# Patient Record
Sex: FEMALE | Race: WHITE | NOT HISPANIC OR LATINO | Employment: UNEMPLOYED | URBAN - METROPOLITAN AREA
[De-identification: names, ages, dates, MRNs, and addresses within clinical notes are randomized per-mention and may not be internally consistent; named-entity substitution may affect disease eponyms.]

---

## 2017-01-04 ENCOUNTER — ALLSCRIPTS OFFICE VISIT (OUTPATIENT)
Dept: OTHER | Facility: OTHER | Age: 57
End: 2017-01-04

## 2017-02-01 ENCOUNTER — ALLSCRIPTS OFFICE VISIT (OUTPATIENT)
Dept: OTHER | Facility: OTHER | Age: 57
End: 2017-02-01

## 2017-02-08 ENCOUNTER — APPOINTMENT (OUTPATIENT)
Dept: LAB | Facility: CLINIC | Age: 57
End: 2017-02-08
Payer: COMMERCIAL

## 2017-02-08 ENCOUNTER — GENERIC CONVERSION - ENCOUNTER (OUTPATIENT)
Dept: OTHER | Facility: OTHER | Age: 57
End: 2017-02-08

## 2017-02-08 ENCOUNTER — TRANSCRIBE ORDERS (OUTPATIENT)
Dept: LAB | Facility: CLINIC | Age: 57
End: 2017-02-08

## 2017-02-08 DIAGNOSIS — E78.5 HYPERLIPIDEMIA, UNSPECIFIED HYPERLIPIDEMIA TYPE: Primary | ICD-10-CM

## 2017-02-08 LAB — VENIPUNCTURE: NORMAL

## 2017-02-08 PROCEDURE — 36415 COLL VENOUS BLD VENIPUNCTURE: CPT | Performed by: FAMILY MEDICINE

## 2017-02-10 LAB — INTERPRETATION (HISTORICAL): NORMAL

## 2017-02-12 ENCOUNTER — GENERIC CONVERSION - ENCOUNTER (OUTPATIENT)
Dept: OTHER | Facility: OTHER | Age: 57
End: 2017-02-12

## 2017-03-01 ENCOUNTER — ALLSCRIPTS OFFICE VISIT (OUTPATIENT)
Dept: OTHER | Facility: OTHER | Age: 57
End: 2017-03-01

## 2017-03-01 DIAGNOSIS — J44.9 CHRONIC OBSTRUCTIVE PULMONARY DISEASE (HCC): ICD-10-CM

## 2017-03-01 DIAGNOSIS — M54.9 DORSALGIA: ICD-10-CM

## 2017-03-01 LAB
BILIRUB UR QL STRIP: NORMAL
CLARITY UR: NORMAL
COLOR UR: YELLOW
GLUCOSE (HISTORICAL): NORMAL
HGB UR QL STRIP.AUTO: NORMAL
KETONES UR STRIP-MCNC: NORMAL MG/DL
LEUKOCYTE ESTERASE UR QL STRIP: NORMAL
NITRITE UR QL STRIP: NORMAL
PH UR STRIP.AUTO: 8 [PH]
PROT UR STRIP-MCNC: NORMAL MG/DL
SP GR UR STRIP.AUTO: 1
UROBILINOGEN UR QL STRIP.AUTO: NORMAL

## 2017-03-20 ENCOUNTER — TRANSCRIBE ORDERS (OUTPATIENT)
Dept: RADIOLOGY | Facility: CLINIC | Age: 57
End: 2017-03-20

## 2017-03-20 ENCOUNTER — HOSPITAL ENCOUNTER (OUTPATIENT)
Dept: RADIOLOGY | Facility: CLINIC | Age: 57
Discharge: HOME | End: 2017-03-20
Payer: COMMERCIAL

## 2017-03-20 DIAGNOSIS — M54.9 DORSALGIA: ICD-10-CM

## 2017-03-20 DIAGNOSIS — J44.9 CHRONIC OBSTRUCTIVE PULMONARY DISEASE (HCC): ICD-10-CM

## 2017-03-20 PROCEDURE — 72110 X-RAY EXAM L-2 SPINE 4/>VWS: CPT

## 2017-03-20 PROCEDURE — 71020 HB CHEST X-RAY 2VW FRONTAL&LATL: CPT

## 2017-03-21 ENCOUNTER — GENERIC CONVERSION - ENCOUNTER (OUTPATIENT)
Dept: OTHER | Facility: OTHER | Age: 57
End: 2017-03-21

## 2017-03-27 ENCOUNTER — ALLSCRIPTS OFFICE VISIT (OUTPATIENT)
Dept: OTHER | Facility: OTHER | Age: 57
End: 2017-03-27

## 2017-04-07 ENCOUNTER — ALLSCRIPTS OFFICE VISIT (OUTPATIENT)
Dept: OTHER | Facility: OTHER | Age: 57
End: 2017-04-07

## 2017-06-02 ENCOUNTER — ALLSCRIPTS OFFICE VISIT (OUTPATIENT)
Dept: OTHER | Facility: OTHER | Age: 57
End: 2017-06-02

## 2017-06-02 LAB — HBA1C MFR BLD HPLC: 7.7 %

## 2017-06-21 ENCOUNTER — ALLSCRIPTS OFFICE VISIT (OUTPATIENT)
Dept: OTHER | Facility: OTHER | Age: 57
End: 2017-06-21

## 2017-06-22 ENCOUNTER — GENERIC CONVERSION - ENCOUNTER (OUTPATIENT)
Dept: OTHER | Facility: OTHER | Age: 57
End: 2017-06-22

## 2017-06-29 DIAGNOSIS — G89.4 CHRONIC PAIN SYNDROME: ICD-10-CM

## 2017-06-29 DIAGNOSIS — M54.50 LOW BACK PAIN: ICD-10-CM

## 2017-06-29 DIAGNOSIS — M96.1 POSTLAMINECTOMY SYNDROME, NOT ELSEWHERE CLASSIFIED: ICD-10-CM

## 2017-06-30 ENCOUNTER — GENERIC CONVERSION - ENCOUNTER (OUTPATIENT)
Dept: OTHER | Facility: OTHER | Age: 57
End: 2017-06-30

## 2017-07-17 ENCOUNTER — ALLSCRIPTS OFFICE VISIT (OUTPATIENT)
Dept: OTHER | Facility: OTHER | Age: 57
End: 2017-07-17

## 2017-07-24 ENCOUNTER — ALLSCRIPTS OFFICE VISIT (OUTPATIENT)
Dept: OTHER | Facility: OTHER | Age: 57
End: 2017-07-24

## 2017-08-02 ENCOUNTER — ALLSCRIPTS OFFICE VISIT (OUTPATIENT)
Dept: OTHER | Facility: OTHER | Age: 57
End: 2017-08-02

## 2017-08-07 ENCOUNTER — APPOINTMENT (OUTPATIENT)
Dept: PHYSICAL THERAPY | Facility: CLINIC | Age: 57
End: 2017-08-07
Payer: OTHER MISCELLANEOUS

## 2017-08-07 PROCEDURE — 97110 THERAPEUTIC EXERCISES: CPT

## 2017-08-07 PROCEDURE — 97161 PT EVAL LOW COMPLEX 20 MIN: CPT

## 2017-08-09 ENCOUNTER — GENERIC CONVERSION - ENCOUNTER (OUTPATIENT)
Dept: OTHER | Facility: OTHER | Age: 57
End: 2017-08-09

## 2017-08-14 ENCOUNTER — ALLSCRIPTS OFFICE VISIT (OUTPATIENT)
Dept: OTHER | Facility: OTHER | Age: 57
End: 2017-08-14

## 2017-08-28 ENCOUNTER — GENERIC CONVERSION - ENCOUNTER (OUTPATIENT)
Dept: OTHER | Facility: OTHER | Age: 57
End: 2017-08-28

## 2017-08-28 DIAGNOSIS — M79.661 PAIN OF RIGHT LOWER LEG: ICD-10-CM

## 2017-08-28 DIAGNOSIS — M79.89 OTHER DISORDERS OF SOFT TISSUE: ICD-10-CM

## 2017-08-28 DIAGNOSIS — I83.811 VARICOSE VEINS OF RIGHT LOWER EXTREMITY WITH PAIN: ICD-10-CM

## 2017-09-09 ENCOUNTER — GENERIC CONVERSION - ENCOUNTER (OUTPATIENT)
Dept: OTHER | Facility: OTHER | Age: 57
End: 2017-09-09

## 2017-09-18 ENCOUNTER — HOSPITAL ENCOUNTER (OUTPATIENT)
Dept: RADIOLOGY | Facility: HOSPITAL | Age: 57
Discharge: HOME | End: 2017-09-18
Attending: FAMILY MEDICINE
Payer: COMMERCIAL

## 2017-09-18 DIAGNOSIS — I83.811 VARICOSE VEINS OF RIGHT LOWER EXTREMITY WITH PAIN: ICD-10-CM

## 2017-09-18 DIAGNOSIS — M79.89 OTHER DISORDERS OF SOFT TISSUE: ICD-10-CM

## 2017-09-18 DIAGNOSIS — M79.661 PAIN OF RIGHT LOWER LEG: ICD-10-CM

## 2017-09-18 PROCEDURE — 93971 EXTREMITY STUDY: CPT

## 2017-09-20 ENCOUNTER — ALLSCRIPTS OFFICE VISIT (OUTPATIENT)
Dept: OTHER | Facility: OTHER | Age: 57
End: 2017-09-20

## 2017-10-02 ENCOUNTER — GENERIC CONVERSION - ENCOUNTER (OUTPATIENT)
Dept: OTHER | Facility: OTHER | Age: 57
End: 2017-10-02

## 2017-10-09 ENCOUNTER — APPOINTMENT (OUTPATIENT)
Dept: PHYSICAL THERAPY | Facility: CLINIC | Age: 57
End: 2017-10-09
Payer: OTHER MISCELLANEOUS

## 2017-10-09 ENCOUNTER — GENERIC CONVERSION - ENCOUNTER (OUTPATIENT)
Dept: OTHER | Facility: OTHER | Age: 57
End: 2017-10-09

## 2017-10-09 LAB
A/G RATIO (HISTORICAL): 2 (ref 1.2–2.2)
ALBUMIN SERPL BCP-MCNC: 4.1 G/DL (ref 3.5–5.5)
ALP SERPL-CCNC: 69 IU/L (ref 39–117)
ALT SERPL W P-5'-P-CCNC: 34 IU/L (ref 0–32)
AST SERPL W P-5'-P-CCNC: 21 IU/L (ref 0–40)
BASOPHILS # BLD AUTO: 0 %
BASOPHILS # BLD AUTO: 0 X10E3/UL (ref 0–0.2)
BILIRUB SERPL-MCNC: 0.2 MG/DL (ref 0–1.2)
BUN SERPL-MCNC: 19 MG/DL (ref 6–24)
BUN/CREA RATIO (HISTORICAL): 19 (ref 9–23)
CALCIUM SERPL-MCNC: 9.1 MG/DL (ref 8.7–10.2)
CHLORIDE SERPL-SCNC: 97 MMOL/L (ref 96–106)
CHOLEST SERPL-MCNC: 119 MG/DL (ref 100–199)
CK SERPL-CCNC: 131 U/L (ref 24–173)
CO2 SERPL-SCNC: 27 MMOL/L (ref 18–29)
CREAT SERPL-MCNC: 1.01 MG/DL (ref 0.57–1)
DEPRECATED RDW RBC AUTO: 13.5 % (ref 12.3–15.4)
EGFR AFRICAN AMERICAN (HISTORICAL): 71 ML/MIN/1.73
EGFR-AMERICAN CALC (HISTORICAL): 62 ML/MIN/1.73
EOSINOPHIL # BLD AUTO: 0.3 X10E3/UL (ref 0–0.4)
EOSINOPHIL # BLD AUTO: 3 %
GLUCOSE SERPL-MCNC: 131 MG/DL (ref 65–99)
HCT VFR BLD AUTO: 38.7 % (ref 34–46.6)
HDLC SERPL-MCNC: 32 MG/DL
HGB BLD-MCNC: 13.4 G/DL (ref 11.1–15.9)
IMM.GRANULOCYTES (CD4/8) (HISTORICAL): 0 %
IMM.GRANULOCYTES (CD4/8) (HISTORICAL): 0 X10E3/UL (ref 0–0.1)
LDLC SERPL CALC-MCNC: 43 MG/DL (ref 0–99)
LYMPHOCYTES # BLD AUTO: 2.5 X10E3/UL (ref 0.7–3.1)
LYMPHOCYTES # BLD AUTO: 28 %
MCH RBC QN AUTO: 31.7 PG (ref 26.6–33)
MCHC RBC AUTO-ENTMCNC: 34.6 G/DL (ref 31.5–35.7)
MCV RBC AUTO: 92 FL (ref 79–97)
MONOCYTES # BLD AUTO: 0.5 X10E3/UL (ref 0.1–0.9)
MONOCYTES (HISTORICAL): 6 %
NEUTROPHILS # BLD AUTO: 5.6 X10E3/UL (ref 1.4–7)
NEUTROPHILS # BLD AUTO: 63 %
PLATELET # BLD AUTO: 246 X10E3/UL (ref 150–379)
POTASSIUM SERPL-SCNC: 5 MMOL/L (ref 3.5–5.2)
RBC (HISTORICAL): 4.23 X10E6/UL (ref 3.77–5.28)
SODIUM SERPL-SCNC: 139 MMOL/L (ref 134–144)
TOT. GLOBULIN, SERUM (HISTORICAL): 2.1 G/DL (ref 1.5–4.5)
TOTAL PROTEIN (HISTORICAL): 6.2 G/DL (ref 6–8.5)
TRIGL SERPL-MCNC: 222 MG/DL (ref 0–149)
VLDLC SERPL CALC-MCNC: 44 MG/DL (ref 5–40)
WBC # BLD AUTO: 9 X10E3/UL (ref 3.4–10.8)

## 2017-10-09 PROCEDURE — 97110 THERAPEUTIC EXERCISES: CPT

## 2017-10-10 LAB
HBA1C MFR BLD HPLC: 9.2 % (ref 4.8–5.6)
INTERPRETATION (HISTORICAL): NORMAL
TSH SERPL DL<=0.05 MIU/L-ACNC: 2.35 UIU/ML (ref 0.45–4.5)

## 2017-10-11 ENCOUNTER — GENERIC CONVERSION - ENCOUNTER (OUTPATIENT)
Dept: OTHER | Facility: OTHER | Age: 57
End: 2017-10-11

## 2017-10-11 ENCOUNTER — APPOINTMENT (OUTPATIENT)
Dept: PHYSICAL THERAPY | Facility: CLINIC | Age: 57
End: 2017-10-11
Payer: OTHER MISCELLANEOUS

## 2017-10-12 ENCOUNTER — APPOINTMENT (OUTPATIENT)
Dept: PHYSICAL THERAPY | Facility: CLINIC | Age: 57
End: 2017-10-12
Payer: OTHER MISCELLANEOUS

## 2017-10-12 PROCEDURE — 97112 NEUROMUSCULAR REEDUCATION: CPT

## 2017-10-12 PROCEDURE — 97110 THERAPEUTIC EXERCISES: CPT

## 2017-10-13 ENCOUNTER — APPOINTMENT (OUTPATIENT)
Dept: PHYSICAL THERAPY | Facility: CLINIC | Age: 57
End: 2017-10-13
Payer: OTHER MISCELLANEOUS

## 2017-10-13 PROCEDURE — 97140 MANUAL THERAPY 1/> REGIONS: CPT

## 2017-10-13 PROCEDURE — 97110 THERAPEUTIC EXERCISES: CPT

## 2017-10-16 ENCOUNTER — APPOINTMENT (OUTPATIENT)
Dept: PHYSICAL THERAPY | Facility: CLINIC | Age: 57
End: 2017-10-16
Payer: OTHER MISCELLANEOUS

## 2017-10-16 PROCEDURE — 97110 THERAPEUTIC EXERCISES: CPT

## 2017-10-16 PROCEDURE — 97140 MANUAL THERAPY 1/> REGIONS: CPT

## 2017-10-17 ENCOUNTER — APPOINTMENT (OUTPATIENT)
Dept: PHYSICAL THERAPY | Facility: CLINIC | Age: 57
End: 2017-10-17
Payer: OTHER MISCELLANEOUS

## 2017-10-17 PROCEDURE — 97140 MANUAL THERAPY 1/> REGIONS: CPT

## 2017-10-17 PROCEDURE — 97110 THERAPEUTIC EXERCISES: CPT

## 2017-10-18 ENCOUNTER — APPOINTMENT (OUTPATIENT)
Dept: PHYSICAL THERAPY | Facility: CLINIC | Age: 57
End: 2017-10-18
Payer: OTHER MISCELLANEOUS

## 2017-10-18 PROCEDURE — 97110 THERAPEUTIC EXERCISES: CPT

## 2017-10-18 PROCEDURE — 97140 MANUAL THERAPY 1/> REGIONS: CPT

## 2017-10-20 ENCOUNTER — APPOINTMENT (OUTPATIENT)
Dept: PHYSICAL THERAPY | Facility: CLINIC | Age: 57
End: 2017-10-20
Payer: OTHER MISCELLANEOUS

## 2017-10-23 ENCOUNTER — APPOINTMENT (OUTPATIENT)
Dept: PHYSICAL THERAPY | Facility: CLINIC | Age: 57
End: 2017-10-23
Payer: OTHER MISCELLANEOUS

## 2017-10-23 PROCEDURE — 97112 NEUROMUSCULAR REEDUCATION: CPT

## 2017-10-23 PROCEDURE — 97110 THERAPEUTIC EXERCISES: CPT

## 2017-10-25 ENCOUNTER — APPOINTMENT (OUTPATIENT)
Dept: PHYSICAL THERAPY | Facility: CLINIC | Age: 57
End: 2017-10-25
Payer: OTHER MISCELLANEOUS

## 2017-10-25 PROCEDURE — 97112 NEUROMUSCULAR REEDUCATION: CPT

## 2017-10-25 PROCEDURE — 97110 THERAPEUTIC EXERCISES: CPT

## 2017-10-27 ENCOUNTER — APPOINTMENT (OUTPATIENT)
Dept: PHYSICAL THERAPY | Facility: CLINIC | Age: 57
End: 2017-10-27
Payer: OTHER MISCELLANEOUS

## 2017-10-27 ENCOUNTER — GENERIC CONVERSION - ENCOUNTER (OUTPATIENT)
Dept: OTHER | Facility: OTHER | Age: 57
End: 2017-10-27

## 2017-10-27 PROCEDURE — 97110 THERAPEUTIC EXERCISES: CPT

## 2017-10-27 PROCEDURE — 97112 NEUROMUSCULAR REEDUCATION: CPT

## 2017-11-01 DIAGNOSIS — M54.16 RADICULOPATHY OF LUMBAR REGION: ICD-10-CM

## 2017-11-01 DIAGNOSIS — R10.9 ABDOMINAL PAIN: ICD-10-CM

## 2017-11-01 DIAGNOSIS — M96.1 POSTLAMINECTOMY SYNDROME, NOT ELSEWHERE CLASSIFIED: ICD-10-CM

## 2017-11-01 DIAGNOSIS — M54.50 LOW BACK PAIN: ICD-10-CM

## 2017-11-01 DIAGNOSIS — R14.0 ABDOMINAL DISTENSION (GASEOUS): ICD-10-CM

## 2017-11-01 DIAGNOSIS — G89.4 CHRONIC PAIN SYNDROME: ICD-10-CM

## 2017-11-02 ENCOUNTER — HOSPITAL ENCOUNTER (OUTPATIENT)
Facility: AMBULARY SURGERY CENTER | Age: 57
Setting detail: OUTPATIENT SURGERY
Discharge: HOME | End: 2017-11-02
Attending: ANESTHESIOLOGY | Admitting: ANESTHESIOLOGY
Payer: OTHER MISCELLANEOUS

## 2017-11-02 ENCOUNTER — HOSPITAL ENCOUNTER (OUTPATIENT)
Dept: RADIOLOGY | Facility: HOSPITAL | Age: 57
Setting detail: OUTPATIENT SURGERY
Discharge: HOME | End: 2017-11-02
Payer: OTHER MISCELLANEOUS

## 2017-11-02 VITALS
OXYGEN SATURATION: 98 % | HEART RATE: 92 BPM | BODY MASS INDEX: 43.41 KG/M2 | WEIGHT: 245 LBS | DIASTOLIC BLOOD PRESSURE: 71 MMHG | HEIGHT: 63 IN | TEMPERATURE: 98.8 F | SYSTOLIC BLOOD PRESSURE: 139 MMHG | RESPIRATION RATE: 18 BRPM

## 2017-11-02 PROBLEM — G89.4 CHRONIC PAIN SYNDROME: Status: ACTIVE | Noted: 2017-11-02

## 2017-11-02 PROBLEM — M54.50 LOW BACK PAIN: Status: ACTIVE | Noted: 2017-11-02

## 2017-11-02 PROBLEM — M96.1 POSTLAMINECTOMY SYNDROME, NOT ELSEWHERE CLASSIFIED: Status: ACTIVE | Noted: 2017-11-02

## 2017-11-02 PROCEDURE — 72220 X-RAY EXAM SACRUM TAILBONE: CPT

## 2017-11-02 RX ORDER — DULOXETIN HYDROCHLORIDE 60 MG/1
20 CAPSULE, DELAYED RELEASE ORAL DAILY
COMMUNITY
End: 2018-03-19 | Stop reason: SDUPTHER

## 2017-11-02 RX ORDER — LISINOPRIL AND HYDROCHLOROTHIAZIDE 25; 20 MG/1; MG/1
1 TABLET ORAL DAILY
COMMUNITY
End: 2018-03-19 | Stop reason: SDUPTHER

## 2017-11-02 RX ORDER — OXYCODONE AND ACETAMINOPHEN 10; 325 MG/1; MG/1
1 TABLET ORAL EVERY 4 HOURS PRN
COMMUNITY
End: 2018-02-14 | Stop reason: SDUPTHER

## 2017-11-02 RX ORDER — FLUTICASONE PROPIONATE 50 MCG
1 SPRAY, SUSPENSION (ML) NASAL DAILY
COMMUNITY
End: 2018-02-21 | Stop reason: SDUPTHER

## 2017-11-02 RX ORDER — METHYLPREDNISOLONE ACETATE 80 MG/ML
INJECTION, SUSPENSION INTRA-ARTICULAR; INTRALESIONAL; INTRAMUSCULAR; SOFT TISSUE AS NEEDED
Status: DISCONTINUED | OUTPATIENT
Start: 2017-11-02 | End: 2017-11-02 | Stop reason: HOSPADM

## 2017-11-02 RX ORDER — PREGABALIN 100 MG/1
50 CAPSULE ORAL 3 TIMES DAILY
COMMUNITY
End: 2018-03-19 | Stop reason: SDUPTHER

## 2017-11-02 RX ORDER — DICLOFENAC SODIUM 75 MG/1
50 TABLET, DELAYED RELEASE ORAL 2 TIMES DAILY
COMMUNITY
End: 2018-05-23 | Stop reason: ALTCHOICE

## 2017-11-02 RX ORDER — ATORVASTATIN CALCIUM 80 MG/1
80 TABLET, FILM COATED ORAL DAILY
COMMUNITY
End: 2018-03-19 | Stop reason: SDUPTHER

## 2017-11-02 RX ORDER — LIDOCAINE HYDROCHLORIDE 10 MG/ML
INJECTION, SOLUTION EPIDURAL; INFILTRATION; INTRACAUDAL; PERINEURAL AS NEEDED
Status: DISCONTINUED | OUTPATIENT
Start: 2017-11-02 | End: 2017-11-02 | Stop reason: HOSPADM

## 2017-11-02 RX ORDER — QUETIAPINE FUMARATE 25 MG/1
25 TABLET, FILM COATED ORAL
COMMUNITY
End: 2018-03-19 | Stop reason: SDUPTHER

## 2017-11-02 RX ORDER — LIDOCAINE WITH 8.4% SOD BICARB 0.9%(10ML)
SYRINGE (ML) INJECTION AS NEEDED
Status: DISCONTINUED | OUTPATIENT
Start: 2017-11-02 | End: 2017-11-02 | Stop reason: HOSPADM

## 2017-11-02 RX ORDER — ALPRAZOLAM 1 MG/1
1 TABLET ORAL
COMMUNITY
End: 2018-02-14 | Stop reason: SDUPTHER

## 2017-11-02 RX ORDER — OXYCODONE HCL 10 MG/1
10 TABLET, FILM COATED, EXTENDED RELEASE ORAL EVERY 12 HOURS SCHEDULED
COMMUNITY
End: 2018-02-14 | Stop reason: SDUPTHER

## 2017-11-02 NOTE — OP NOTE
ATTENDING PHYSICIAN:  Avel Taylor MD     PROCEDURE: Caudal epidural steroid injection under fluoroscopic guidance  PREPROCEDURE DIAGNOSIS:  Lumbar post-laminectomy pain syndrome  POSTPROCEDURE DIAGNOSIS:  Lumbar post-laminectomy pain syndrome  ANESTHESIA:  Local     ESTIMATED BLOOD LOSS:  Minimal     COMPLICATIONS:  None  LOCATION:  [facility]  CONSENT:  Today's procedure, its potential benefits as well as its risks and potential side effects were reviewed  Discussed risks of the procedure including bleeding, infection, nerve irritation or damage, reactions to the medications, headache, failure of the pain to improve, and potential worsening of the pain were explained in detail to the patient who verbalized understanding and who wished to proceed  Written informed consent was thereby obtained  DESCRIPTION OF THE PROCEDURE: After written informed consent was obtained, the patient was taken to the fluoroscopy suite and placed in the prone position  Anatomical landmarks were identified by palpation to identify the sacral hiatus and by way of fluoroscopy in the PA and lateral views  The skin overlying the sacral hiatus region was prepped using antiseptic applied x3 and draped in the usual sterile fashion  Strict aseptic technique was utilized  The skin and subcutaneous tissues at the needle entry site were infiltrated with 3 mL of 1% preservative-free lidocaine using a 25-gauge 1-1/2-inch needle  A 22-gauge spinal needle was then advanced under fluoroscopic guidance in the lateral view at the sacral hiatus and guided to enter the sacral canal and directed towards the epidural space  Proper placement into the epidural space of the sacral canal in the midline was confirmed with fluoroscopy in the PA view  After negative aspiration for CSF or heme, contrast was injected which delineated the epidural space under fluoroscopy in the PA and lateral views      After negative aspiration was reconfirmed, a 13 mL injectate consisting of 1 mL of 40 mg/mL of Depo-Medrol, 1 mL of 80 mg/mL of Depo-Medrol, 3 mL of 1% preservative-free lidocaine, and 8 mL of preservative-free normal saline was slowly injected incrementally with negative aspiration between aliquots  The patient tolerated the procedure well and all needles were removed intact  Hemostasis was obtained and there were no paresthesias or apparent complications  The skin was wiped free of the antiseptic and a Band-Aid was placed as appropriate  The patient was monitored for an appropriate period of time following the procedure and remained hemodynamically stable and neurovascularly intact following the procedure  The patient was ultimately discharged to home with supervision in good condition and instructed to call the office in approximately 7-10 days with an update or sooner as warranted  Discharge and follow up instructions were provided  I was present for and participated in all key and critical portions of this procedure      Paulo Boles MD  11/2/2017  3:23 PM

## 2017-11-10 ENCOUNTER — ALLSCRIPTS OFFICE VISIT (OUTPATIENT)
Dept: OTHER | Facility: OTHER | Age: 57
End: 2017-11-10

## 2017-11-14 ENCOUNTER — GENERIC CONVERSION - ENCOUNTER (OUTPATIENT)
Dept: OTHER | Facility: OTHER | Age: 57
End: 2017-11-14

## 2017-11-17 ENCOUNTER — GENERIC CONVERSION - ENCOUNTER (OUTPATIENT)
Dept: OTHER | Facility: OTHER | Age: 57
End: 2017-11-17

## 2017-11-20 ENCOUNTER — GENERIC CONVERSION - ENCOUNTER (OUTPATIENT)
Dept: OTHER | Facility: OTHER | Age: 57
End: 2017-11-20

## 2017-11-27 ENCOUNTER — ALLSCRIPTS OFFICE VISIT (OUTPATIENT)
Dept: OTHER | Facility: OTHER | Age: 57
End: 2017-11-27

## 2017-12-12 ENCOUNTER — HOSPITAL ENCOUNTER (OUTPATIENT)
Dept: RADIOLOGY | Facility: HOSPITAL | Age: 57
Discharge: HOME | End: 2017-12-12
Attending: ANESTHESIOLOGY
Payer: OTHER MISCELLANEOUS

## 2017-12-12 ENCOUNTER — HOSPITAL ENCOUNTER (OUTPATIENT)
Dept: RADIOLOGY | Facility: HOSPITAL | Age: 57
Discharge: HOME | End: 2017-12-12
Attending: FAMILY MEDICINE
Payer: OTHER MISCELLANEOUS

## 2017-12-12 DIAGNOSIS — M96.1 POSTLAMINECTOMY SYNDROME, NOT ELSEWHERE CLASSIFIED: ICD-10-CM

## 2017-12-12 DIAGNOSIS — M54.50 LOW BACK PAIN: ICD-10-CM

## 2017-12-12 DIAGNOSIS — R10.9 ABDOMINAL PAIN: ICD-10-CM

## 2017-12-12 DIAGNOSIS — R14.0 ABDOMINAL DISTENSION (GASEOUS): ICD-10-CM

## 2017-12-12 DIAGNOSIS — M54.16 RADICULOPATHY OF LUMBAR REGION: ICD-10-CM

## 2017-12-12 DIAGNOSIS — G89.4 CHRONIC PAIN SYNDROME: ICD-10-CM

## 2017-12-12 PROCEDURE — A9585 GADOBUTROL INJECTION: HCPCS | Performed by: ANESTHESIOLOGY

## 2017-12-12 PROCEDURE — 76700 US EXAM ABDOM COMPLETE: CPT

## 2017-12-12 PROCEDURE — 72158 MRI LUMBAR SPINE W/O & W/DYE: CPT

## 2017-12-12 RX ADMIN — GADOBUTROL 11 ML: 604.72 INJECTION INTRAVENOUS at 10:26

## 2017-12-13 ENCOUNTER — GENERIC CONVERSION - ENCOUNTER (OUTPATIENT)
Dept: OTHER | Facility: OTHER | Age: 57
End: 2017-12-13

## 2017-12-13 DIAGNOSIS — R07.81 PLEURODYNIA: ICD-10-CM

## 2018-01-03 ENCOUNTER — GENERIC CONVERSION - ENCOUNTER (OUTPATIENT)
Dept: OTHER | Facility: OTHER | Age: 58
End: 2018-01-03

## 2018-01-09 NOTE — MISCELLANEOUS
Message   Recorded as Task   Date: 11/08/2017 12:57 PM, Created By: Kaela Rodriguez   Task Name: Follow Up   Assigned To: [facility] clerical,Team   Regarding Patient: Erika Araiza, Status: In Progress   Comment:    FrankJully - 08 Nov 2017 12:57 PM     TASK CREATED  L/m to return call with % of relief  S/p done CAUDAL JESSIE  Done 11/2/17 by Geno Reyes - 08 Nov 2017 1:01 PM     TASK EDITED  pt returning call thursday she was ok friday she had sharp pains in her back  when she starts to move around she gets the pain  when she doesnt do anything she is fine  no relief # [phone] confirmed follow up appt   FrankDari - 09 Nov 2017 1:37 PM     TASK EDITED  Return call and  L/m to return my call  Rachel Ramirez - 09 Nov 2017 1:54 PM     TASK EDITED  Pt returned call and can be reached at [phone]  FrankJully - 09 Nov 2017 2:04 PM     TASK EDITED  Patient reports she received 20 % relief from her procedure  Her pain level today is an 8  She stated that she gets minimal relief when she is sitting down doing nothing  FrankJully - 09 Nov 2017 2:04 PM     TASK EDITED   Jeny Molina - 09 Nov 2017 3:43 PM     TASK REPLIED TO: Previously Assigned To Jeny Molian  Follow up at 2 week sravan  FrankJully - 09 Nov 2017 3:49 PM     TASK EDITED   Arash Gaming - 16 Nov 7184 0:00 PM     TASK EDITED  Patient reports about 30% relief - Patient was advised if any other recommendations prior to next OV she will be contacted other wise she will be re evaluated at next [address] - 16 Nov 2017 3:45 PM     TASK REPLIED TO: Previously Assigned To Jeny Molina  Bring patient in for earlier follow up (next available)  Arash Gaming - 17 Nov 1137 5:90 AM     TASK EDITED  Please schedule for earlier appointment if avialable   Per Dr Blanco Albright - 17 Nov 2017 9:22 AM     TASK IN PROGRESS   Jitendra Hoffman - 17 Nov 2017 9:25 AM     TASK EDITED  Pt is r/s for 11/20/17 @ 8:15 Active Problems    1  Atypical nevi (216 9) (D22 9)   2  Back pain (724 5) (M54 9)   3  BMI 40 0-44 9, adult (V85 41) (Z68 41)   4  Chronic low back pain (724 2,338 29) (M54 5,G89 29)   5  Chronic obstructive pulmonary disease (496) (J44 9)   6  Chronic pain (338 29) (G89 29)   7  Chronic pain syndrome (338 4) (G89 4)   8  Cramp in lower leg (729 82) (R25 2)   9  Cramps of lower extremity (729 82) (R25 2)   10  Depression with anxiety (300 4) (F41 8)   11  Diabetes (250 00) (E11 9)   12  Diabetes type 2, uncontrolled (250 02) (E11 65)   13  Disc degeneration, lumbosacral (722 52) (M51 37)   14  Encounter for screening for malignant neoplasm of colon (V76 51) (Z12 11)   15  Encounter for screening mammogram for malignant neoplasm of breast (V76 12)    (Z12 31)   16  Esophageal reflux (530 81) (K21 9)   17  Fungal dermatitis (111 9) (B36 9)   18  Hyperlipidemia (272 4) (E78 5)   19  Hypertension (401 9) (I10)   20  Insomnia (780 52) (G47 00)   21  Laceration of middle finger of right hand without complication, subsequent encounter    (V58 89,883 0) (W03 570S)   22  Left shoulder pain (719 41) (M25 512)   23  Morbid or severe obesity due to excess calories (278 01) (E66 01)   24  Need for influenza vaccination (V04 81) (Z23)   25  Need for prophylactic vaccination and inoculation against influenza (V04 81) (Z23)   26  Need for tetanus booster (V03 7) (Z23)   27  Nicotine dependence (305 1) (F17 200)   28  Pap smear, as part of routine gynecological examination (V76 2) (Z01 419)   29  Persistent insomnia of non-organic origin (307 42) (F51 01)   30  Post-laminectomy syndrome (722 80) (M96 1)   31  Screening for breast cancer (V76 10) (Z12 31)   32  Screening for colorectal cancer (V76 51) (Z12 11,Z12 12)   33  Swelling of calf (729 81) (M79 89)   34  Tenderness of right calf (729 5) (M79 661)   35  Varicose veins of both lower extremities with pain (454 8) (I83 813)   36   Varicose veins with inflammation (454 1) (I83 10)   37  Varicose veins with pain, right    Current Meds   1  Alogliptin Benzoate 25 MG Oral Tablet; TAKE 1 TABLET BY MOUTH ONCE A DAY; Therapy: 22ZSJ1889 to (Evaluate:10Mar2018)  Requested for: 85WAN4436; Last   Rx:10Nov2017; Status: ACTIVE - Transmit to Pharmacy - Awaiting Verification Ordered   2  ALPRAZolam 1 MG Oral Tablet; Taper down to ONE-HALF  tab BID prn; Therapy: 42LKB2480 to (Sunny Andrade)  Requested for: 27Oct2017; Last   Rx:27Oct2017 Ordered   3  Atorvastatin Calcium 80 MG Oral Tablet; 1 EVERY MORNING; Therapy: 41MXB5490 to (Last Rx:93Ozi1534)  Requested for: 31Oct2017; Status:   ACTIVE - Transmit to Pharmacy - Awaiting Verification Ordered   4  Diclofenac Sodium 75 MG Oral Tablet Delayed Release; 1 two times a day; Therapy: 82BGA8621 to (Last NT:23TRE4716)  Requested for: 07PQP4051; Status:   ACTIVE - Transmit to Pharmacy - Awaiting Verification Ordered   5  Dulera 200-5 MCG/ACT Inhalation Aerosol; INHALE TWO PUFFS BY MOUTH EVERY   TWELVE HOURS; Therapy: 31WYU7011 to (Last Rx:26May2017)  Requested for: 90Bxc8592 Ordered   6  DULoxetine HCl - 60 MG Oral Capsule Delayed Release Particles (Cymbalta); TAKE   ONE CAPSULE BY MOUTH EVERY DAY IN THE MORNING; Therapy: 01CXI9661 to (Last Rx:05Osr7306)  Requested for: 31Oct2017; Status:   ACTIVE - Transmit to Pharmacy - Awaiting Verification Ordered   7  Fluticasone Propionate 50 MCG/ACT Nasal Suspension (Flonase Allergy Relief); USE   ONE TO TWO SPRAY(S) IN EACH NOSTRIL ONCE DAILY; Therapy: 13NSB9131 to (Last Rx:73Qsi6902)  Requested for: 40YYZ5231 Ordered   8  Ipratropium-Albuterol 0 5-2 5 (3) MG/3ML Inhalation Solution; one vial via nebuliazer   BID rpn; Therapy: 79BJD6261 to (Last Rx:02Jun2017)  Requested for: 02Jun2017; Status:   ACTIVE - Transmit to Pharmacy - Awaiting Verification Ordered   9  Lisinopril-Hydrochlorothiazide 20-25 MG Oral Tablet; TAKE ONE TABLET BY MOUTH   EVERY DAY IN THE MORNING;    Therapy: 01GZZ7203 to (Last PH:74YKL2213)  Requested for: 27DUJ2844; Status:   ACTIVE - Transmit to [facility] Verification Ordered   10  Lyrica 100 MG Oral Capsule; TAKE ONE CAPSULE BY MOUTH EVERY EIGHT HOURS; Therapy: 15LMI1053 to (Last Rx:92Kvz6419)  Requested for: 31Oct2017 Ordered   11  MetFORMIN HCl - 1000 MG Oral Tablet; 1 two times a day; Therapy: 29LLS2950 to (Last JU:16UAA6323)  Requested for: 31Oct2017; Status:    ACTIVE - Transmit to [facility] Verification Ordered   12  Nystatin-Triamcinolone 068489-8 1 UNIT/GM-% External Cream; APPLY SPARINGLY TO    AFFECTED AREA(S) TWICE DAILY; Therapy: 07Fey4299 to (Last Rx:89Zmw3863)  Requested for: 61Akt6987; Status:    ACTIVE - Transmit to [facility] Verification Ordered   13  Oxycodone-Acetaminophen  MG Oral Tablet (Percocet); 1 Every Six Hours as    needed; Therapy: 41Oli4694 to (Last Rx:27Oct2017)  Requested for: 27Oct2017 Ordered   14  OxyCONTIN 10 MG Oral Tablet ER 12 Hour Abuse-Deterrent; one tab po bid; Therapy: 09Qlr6737 to (Last Rx:27Oct2017) Ordered   15  QUEtiapine Fumarate 25 MG Oral Tablet; 1-2 tablets qhs;    Therapy: 50Dcp3982 to (Last Rx:01Nov2017)  Requested for: 04MPO5047; Status:    ACTIVE - Transmit to [facility] Verification Ordered   16  Ventolin  (90 Base) MCG/ACT Inhalation Aerosol Solution; INHALE TWO PUFFS    EVERY SIX HOURS AS NEEDED FOR COUGH/WHEEZE;    Therapy: 25SFC3112 to (Last GX:30CXC6946)  Requested for: 62UMX0847; Status:    ACTIVE - Transmit to Pharmacy - Awaiting Verification Ordered    Allergies    1   No Known Drug Allergies    Signatures   Electronically signed by : Vin Dominique, ; Nov 17 2017  9:25AM EST                       (Author)

## 2018-01-10 NOTE — RESULT NOTES
Verified Results  (1) CBC/PLT/DIFF 80NNT4096 12:55PM Dariela Albin     Test Name Result Flag Reference   WBC 9 0 x10E3/uL  3 4-10 8   RBC 4 23 x10E6/uL  3 77-5 28   Hemoglobin 13 4 g/dL  11 1-15 9   Hematocrit 38 7 %  34 0-46  6   MCV 92 fL  79-97   MCH 31 7 pg  26 6-33 0   MCHC 34 6 g/dL  31 5-35 7   RDW 13 5 %  12 3-15 4   Platelets 765 K98E4/MU  150-379   Neutrophils 63 %  Not Estab  Lymphs 28 %  Not Estab  Monocytes 6 %  Not Estab  Eos 3 %  Not Estab  Basos 0 %  Not Estab  Neutrophils (Absolute) 5 6 x10E3/uL  1 4-7 0   Lymphs (Absolute) 2 5 x10E3/uL  0 7-3 1   Monocytes(Absolute) 0 5 x10E3/uL  0 1-0 9   Eos (Absolute) 0 3 x10E3/uL  0 0-0 4   Baso (Absolute) 0 0 x10E3/uL  0 0-0 2   Immature Granulocytes 0 %  Not Estab     Immature Grans (Abs) 0 0 x10E3/uL  0 0-0 1     (1) COMPREHENSIVE METABOLIC PANEL 03VIH1034 81:53UX Dariela Albin     Test Name Result Flag Reference   Glucose, Serum 131 mg/dL H 65-99   BUN 19 mg/dL  6-24   Creatinine, Serum 1 01 mg/dL H 0 57-1 00   BUN/Creatinine Ratio 19  9-23   Sodium, Serum 139 mmol/L  134-144   Potassium, Serum 5 0 mmol/L  3 5-5 2   Chloride, Serum 97 mmol/L     Carbon Dioxide, Total 27 mmol/L  18-29   Calcium, Serum 9 1 mg/dL  8 7-10 2   Protein, Total, Serum 6 2 g/dL  6 0-8 5   Albumin, Serum 4 1 g/dL  3 5-5 5   Globulin, Total 2 1 g/dL  1 5-4 5   A/G Ratio 2 0  1 2-2 2   Bilirubin, Total 0 2 mg/dL  0 0-1 2   Alkaline Phosphatase, S 69 IU/L     AST (SGOT) 21 IU/L  0-40   ALT (SGPT) 34 IU/L H 0-32   eGFR If NonAfricn Am 62 mL/min/1 73  >59   eGFR If Africn Am 71 mL/min/1 73  >59     (1) CK (CPK) 09Oct2017 12:55PM [employer]     Test Name Result Flag Reference   Creatine Kinase,Total,Serum 131 U/L       (1) HEMOGLOBIN A1C 09Oct2017 12:55PM [employer]     Test Name Result Flag Reference   Hemoglobin A1c 9 2 % H 4 8-5 6   Pre-diabetes: 5 7 - 6 4           Diabetes: >6 4           Glycemic control for adults with diabetes: <7 0 (1) TSH 09Oct2017 12:55PM Marco Shuck     Test Name Result Flag Reference   TSH 2 350 uIU/mL  0 450-4 500     [facility]) Lipid Panel 24YQW4892 12:55PM Marco Shuck     Test Name Result Flag Reference   Cholesterol, Total 119 mg/dL  100-199   Triglycerides 222 mg/dL H 0-149   HDL Cholesterol 32 mg/dL L >39   VLDL Cholesterol Sandip 44 mg/dL H 5-40   LDL Cholesterol Calc 43 mg/dL  0-99     [facility]) Cardiovascular Risk Assessment 09Oct2017 12:55PM Marco Shuck     Test Name Result Flag Reference   Interpretation Note     -------------------------------  CARDIOVASCULAR REPORT:  -------------------------------  Current available clinical information suggests the  patient's risk is at least HIGH  Your patient appears to  have one CHD risk equivalent (diabetes)  Two additional  major risk factors are present (age over 54 and HDL-C less  than 40)  -  Insulin resistance, obesity, excessive alcohol use, smoking,  liver disease, and certain medications can cause secondary  dyslipidemia  Consider evaluation if clinically indicated  -  Therapeutic lifestyle changes are always valuable to achieve  optimal blood lipid status (diet, exercise, weight  management)  -------------------------------  LIPID MANAGEMENT  Select one patient risk category based upon medical history  and clinical judgment  Additional risk factors such as  personal or family history of premature CHD, smoking, and  hypertension modify a patient's goals of therapy  In CVD  prevention, the intensity of therapy should be adjusted to  the level of patient risk  MODERATE intensity statin therapy  generally results in an average LDL-C reduction of 30% to  less than 50% from the untreated baseline  Examples include  (daily doses): atorvastatin 10-20 mg, rosuvastatin 5-10 mg,  simvastatin 20-40 mg, pravastatin 40-80 mg, lovastatin 40  mg   HIGH intensity statin therapy generally results in an  average LDL-C reduction of 50% or more from the untreated  baseline  Examples include (daily doses): atorvastatin 40-80  mg and rosuvastatin 20 mg   -------------------------------  LOW RISK ASSESSMENT AND TREATMENT SUGGESTIONS  -------------------------------  LDL-C is optimal, was 77 and now is 43 mg/dL  Non-HDL  Cholesterol is optimal, was 112 and now is 87 mg/dL  -  Please refer to assessment and treatment suggestions under  high risk category  -------------------------------  INTERMEDIATE RISK ASSESSMENT AND TREATMENT SUGGESTIONS  -------------------------------  LDL-C is optimal, was 77 and now is 43 mg/dL  Non-HDL  Cholesterol is optimal, was 112 and now is 87 mg/dL  -  Please refer to assessment and treatment suggestions under  high risk category  -------------------------------  HIGH RISK ASSESSMENT AND TREATMENT SUGGESTIONS  -------------------------------  LDL-C is optimal, was 77 and now is 43 mg/dL  Non-HDL  Cholesterol is optimal, was 112 and now is 87 mg/dL  -  Continue statin if in use  Consider measurement of LDL  particle number or Apo B to adjudicate need for further LDL  lowering therapy  If statin cannot be tolerated or  increased, alternatives include use of an intestinal agent  (ezetimibe or bile acid sequestrant), niacin, and/or fish  oil   -------------------------------  DISCLAIMER  These assessments and treatment suggestions are provided as  a convenience in support of the physician-patient  relationship and are not intended to replace the physician's  clinical judgment  They are derived from national guidelines  in addition to other evidence and expert opinion  The  clinician should consider this information within the  context of clinical opinion and the individual patient    SEE GUIDANCE FOR CARDIOVASCULAR REPORT: Latricia Gunn  2013  ACC/AHA guideline on the treatment of blood cholesterol to  reduce atherosclerotic cardiovascular risk in adults: a  report of the [place] [employer]265 Task Force on Practice Guidelines  Circulation  2014; 129 (suppl 2): S1?S45; Stef et al  Clin Chem 2009;  55(3):407-419; Harvey et al  Diabetes Care 2008;  31(4):811-82  PDF Image

## 2018-01-10 NOTE — MISCELLANEOUS
Message   Recorded as Task   Date: 11/20/2017 08:11 AM, Created By: Jacob Brennan   Task Name: Miscellaneous   Assigned To: Rachel Ramirez   Regarding Patient: Jose Heard, Status: Active   Comment:    Rachel Ramirez - 20 Nov 2017 8:11 AM     TASK CREATED  Pt called to reschedule her appt from today at 8:15 to 11/27 at 8:45 because she thought her appt was at 9:00 today, not 8:15  [address] - 20 Nov 2017 8:11 AM     TASK REPLIED TO: Previously Assigned To [place]  Thanks       Signatures   Electronically signed by : Lali Roldan, ; Nov 20 2017  9:06AM EST                       (Author)

## 2018-01-10 NOTE — PROGRESS NOTES
Assessment    1  Varicose veins of both lower extremities with pain (454 8) (I58 293)   2  Morbid or severe obesity due to excess calories (278 01) (E66 01)    Plan    1  Begin or continue regular aerobic exercise  Gradually work up to at least {count1}   sessions of {dur1} of exercise a week ; Status:Complete;   Done: 02WIJ0067   2  Keep your leg elevated whenever you can to decrease swelling and increase circulation ;   Status:Complete;   Done: 19YVP2566   3  Some eating tips that can help you lose weight ; Status:Complete;   Done: 43GIO4088   4  Support stockings can help keep the blood from pooling in the small veins in your feet   and legs ; Status:Complete;   Done: 98JDK9024   5  Follow-up PRN Evaluation and Treatment  Follow-up  Status: Complete  Done:   28IAM6562   6  Gradient compression stocking, below knee, 20-30mm Hg, each; Status:Complete;     Done: 80LGR8624    Discussion/Summary  Discussion Summary:   Mrs Nathaniel Ni is seen for evaluation of bilateral lower extremity varicose veins  She is experiencing intermittent pulsating pain to bilateral calves and cramping  She has a normal pulse exam  We discussed the pathophysiology of venous disease  I advised she begin the daily use of 20-30 mmHg gradient compression stockings  We also discussed the benefits of periodic elevation, regular physical activity and weight loss  If her symptoms worsen or she develops edema she should notify the office, otherwise will be happy to see her on an as needed basis  Chief Complaint  Chief Complaint Free Text Note Form: "I am here to have my varicose veins checked " [facility] 9/18/17         History of Present Illness  Varicose Veins [facility]: The patient is being seen for a consultation regarding varicose veins     Symptoms:  bilateral bulging veins and bilateral leg pain, but no leg swelling, no muscle cramps, no spider veins, no stasis dermatitis, no cellulitis, no hyperpigmentation, no venous ulcers, no dry skin, no itching and no bleeding  The patient describes the pain as Pulsating  This patient has no history of DVT, pulmonary embolism, superficial venous thrombosis, or a hypercoagulable state  Evaluation and Treatment History: She was previously evaluated by a primary physician  This patient has had no prior surgical treatment of the venous system  This patient is not currently utilizing any conservative management strategies to manage the current symptoms  Free Text HPI: Patient is new to our practice, referred by Dr Miller  for evaluation of varicose veins  Patient has not had any recent testing  She has truncal varicosities to bilateral lower exremities  She reports intermittent pulsating pain that occurs mainly to the calves  She also reports occasional itching to the legs  These symptoms have been ongoing sporadically for almost 20 years  She is unable to over any exacerbating or relieving factors  Denies edema  She has never worn compression  She does not elevate  No tissue loss/skin changes  Review of Systems  Complete Female - Vasc:   Constitutional: no loss in appetite and feeling tired, but no fever, no recent weight gain, no chills and no recent weight loss  Eyes: purulent discharge from the eyes, no sudden vision loss, no blurred vision and no double vision, but no eye pain, no eyesight problems, no dryness of the eyes, eyes not red, no itching of the eyes and wears glasses  ENT: no loss of hearing, no nosebleeds, no hoarseness  Cardiovascular: no chest pain, regular heart rate  Respiratory: No sob, no wheezing, no cough, no sob with exertion, no orthopnea  Gastrointestinal: No nausea, No vomiting, no diarrhea, no blood in stool  Genitourinary: no dysuria, no Hematuria,no urinary incontinence  Musculoskeletal: myalgias, but lumbar pain, no joint swelling, no limb pain, no joint stiffness and no limb swelling  Integumentary: no rash, no lesions, no wounds, no ulcer  Neurological: no dementia, no headache, no numbness, no limb weakness, no dizziness, no difficulty walking  Psychiatric: depression, anxiety and no mood disorder  Hematologic/Lymphatic: no bleeding disorder, no easy bruising  ROS Reviewed:   ROS reviewed  Active Problems    1  Atypical nevi (216 9) (D22 9)   2  Back pain (724 5) (M54 9)   3  BMI 40 0-44 9, adult (V85 41) (Z68 41)   4  Chronic low back pain (724 2,338 29) (M54 5,G89 29)   5  Chronic obstructive pulmonary disease (496) (J44 9)   6  Chronic pain (338 29) (G89 29)   7  Chronic pain syndrome (338 4) (G89 4)   8  Cramp in lower leg (729 82) (R25 2)   9  Cramps of lower extremity (729 82) (R25 2)   10  Depression with anxiety (300 4) (F41 8)   11  Diabetes (250 00) (E11 9)   12  Diabetes type 2, uncontrolled (250 02) (E11 65)   13  Disc degeneration, lumbosacral (722 52) (M51 37)   14  Encounter for screening for malignant neoplasm of colon (V76 51) (Z12 11)   15  Encounter for screening mammogram for malignant neoplasm of breast (V76 12)    (Z12 31)   16  Esophageal reflux (530 81) (K21 9)   17  Fungal dermatitis (111 9) (B36 9)   18  Hyperlipidemia (272 4) (E78 5)   19  Hypertension (401 9) (I10)   20  Insomnia (780 52) (G47 00)   21  Laceration of middle finger of right hand without complication, subsequent encounter    (V58 89,883 0) (D10 979A)   22  Left shoulder pain (719 41) (M25 512)   23  Morbid or severe obesity due to excess calories (278 01) (E66 01)   24  Need for influenza vaccination (V04 81) (Z23)   25  Need for prophylactic vaccination and inoculation against influenza (V04 81) (Z23)   26  Need for tetanus booster (V03 7) (Z23)   27  Nicotine dependence (305 1) (F17 200)   28  Pap smear, as part of routine gynecological examination (V76 2) (Z01 419)   29  Persistent insomnia of non-organic origin (307 42) (F51 01)   30  Post-laminectomy syndrome (722 80) (M96 1)   31  Screening for breast cancer (V76 10) (Z12 31)   32  Screening for colorectal cancer (V76 51) (Z12 11,Z12 12)   33  Swelling of calf (729 81) (M79 89)   34  Tenderness of right calf (729 5) (M79 661)   35  Varicose veins of both lower extremities with pain (454 8) (I83 813)   36  Varicose veins with inflammation (454 1) (I83 10)   37  Varicose veins with pain, right    Past Medical History    1  History of Abdominal right upper quadrant tenderness (789 61) (R10 811)   2  History of Acute bronchitis (466 0) (J20 9)   3  History of Acute bronchitis (466 0) (J20 9)   4  History of Acute pain of left knee (719 46) (M25 562)   5  History of Acute upper respiratory infection (465 9) (J06 9)   6  History of Anxiety (300 00) (F41 9)   7  History of Bloating (787 3) (R14 0)   8  History of Carpal tunnel syndrome, unspecified laterality (354 0) (G56 00)   9  History of COPD with exacerbation (491 21) (J44 1)   10  History of Hip pain, unspecified laterality   11  History of acute bronchitis (V12 69) (Z87 09)   12  History of acute bronchitis (V12 69) (Z87 09)   13  History of acute bronchitis (V12 69) (Z87 09)   14  History of arthritis (V13 4) (Z87 39)   15  History of asthma (V12 69) (Z87 09)   16  History of breast lump (V13 89) (Z87 898)   17  History of depression (V11 8) (Z86 59)   18  History of fibromyalgia (V13 59) (Z87 39)   19  History of hypercholesterolemia (V12 29) (Z86 39)   20  History of sinusitis (V12 69) (Z87 09)   21  Hypertension (401 9) (I10)   22  History of Joint pain, knee (719 46) (M25 569)   23  History of Laceration of finger (883 0) (S61 219A)   24  History of Nodule of tendon sheath (727 89) (M67 90)   25  History of Right calf pain (729 5) (M79 661)   26  History of Type 2 diabetes mellitus with hyperglycemia (250 00) (E11 65)   27  History of Visit for wound check (V58 89) (Z51 89)  Active Problems And Past Medical History Reviewed: The active problems and past medical history were reviewed and updated today  Surgical History    1   History of Laminectomy Lumbar  Surgical History Reviewed: The surgical history was reviewed and updated today  Family History  Mother    1  Family history of diabetes mellitus (V18 0) (Z83 3)  Father    2  Family history of dementia (V17 2) (Z81 8)  Family History Reviewed: The family history was reviewed and updated today  Social History    · Current every day smoker (305 1) (F17 200)   ·    · Lives alone without help available (V60 4) (Z60 2)   · No alcohol use   · No illicit drug use   · Tobacco use (305 1) (Z72 0)   · Unemployed (V62 0) (Z56 0)  Social History Reviewed: The social history was reviewed and updated today  Current Meds   1  Alogliptin Benzoate 25 MG Oral Tablet; TAKE 1 TABLET BY MOUTH ONCE A DAY; Therapy: 89ATR6891 to (Evaluate:2018)  Requested for: 55SJO5301; Last   Rx:2017; Status: ACTIVE - Transmit to Pharmacy - Awaiting Verification Ordered   2  ALPRAZolam 1 MG Oral Tablet; Taper down to ONE-HALF  tab BID prn; Therapy: 79ERZ2215 to (Delonte Gongora)  Requested for: 2017; Last   Rx:2017 Ordered   3  Atorvastatin Calcium 80 MG Oral Tablet; 1 EVERY MORNING; Therapy: 46WLV2550 to (Last Rx:92Vvr1301)  Requested for: 2017; Status: ACTIVE   - Transmit to Pharmacy - Awaiting Verification Ordered   4  Diclofenac Sodium 75 MG Oral Tablet Delayed Release; 1 two times a day; Therapy: 60YBA5223 to (Last 48KOO8213)  Requested for: 79ZUT8815; Status: ACTIVE   - Transmit to Pharmacy - Awaiting Verification Ordered   5  Dulera 200-5 MCG/ACT Inhalation Aerosol; INHALE TWO PUFFS BY MOUTH EVERY   TWELVE HOURS; Therapy: 51GEE7898 to (Last Rx:53Mpk3347)  Requested for: 06Qka8465 Ordered   6  DULoxetine HCl - 60 MG Oral Capsule Delayed Release Particles; TAKE ONE CAPSULE   BY MOUTH EVERY DAY IN THE MORNING; Therapy: 94FFQ4021 to (Last Rx:03Ozo5110)  Requested for: 2017; Status: ACTIVE   - Transmit to Pharmacy - Awaiting Verification Ordered   7  Fluticasone Propionate 50 MCG/ACT Nasal Suspension; USE ONE TO TWO SPRAY(S)   IN EACH NOSTRIL ONCE DAILY; Therapy: 80QNM9325 to (Last Rx:18Ebd6783)  Requested for: 93OYZ9584 Ordered   8  Ipratropium-Albuterol 0 5-2 5 (3) MG/3ML Inhalation Solution; one vial via nebuliazer BID   rpn; Therapy: 50EVE0134 to (Last Rx:92Wba4506)  Requested for: 02Jun2017; Status: ACTIVE   - Transmit to Pharmacy - Awaiting Verification Ordered   9  Lisinopril-Hydrochlorothiazide 20-25 MG Oral Tablet; TAKE ONE TABLET BY MOUTH   EVERY DAY IN THE MORNING; Therapy: 25QDB3180 to (Last Rx:64Chq2964)  Requested for: 31Oct2017; Status: ACTIVE   - Transmit to [facility] Verification Ordered   10  Lyrica 100 MG Oral Capsule; TAKE ONE CAPSULE BY MOUTH EVERY EIGHT HOURS; Therapy: 71YLT7230 to (Last Rx:26Shz8175)  Requested for: 31Oct2017 Ordered   11  MetFORMIN HCl - 1000 MG Oral Tablet; 1 two times a day; Therapy: 90QVJ2441 to (Last DA:60ZOG2125)  Requested for: 31Oct2017; Status: ACTIVE    - Transmit to [facility] Verification Ordered   12  Nystatin-Triamcinolone 571320-7 1 UNIT/GM-% External Cream; APPLY SPARINGLY TO    AFFECTED AREA(S) TWICE DAILY; Therapy: 06Mru5957 to (Last Rx:89Jik3563)  Requested for: 51Dci2347; Status:    ACTIVE - Transmit to [facility] Verification Ordered   13  Oxycodone-Acetaminophen  MG Oral Tablet; 1 Every Six Hours as needed; Therapy: 03Ihl1663 to (Last Rx:97Ymg4038)  Requested for: 27Oct2017 Ordered   14  OxyCONTIN 10 MG Oral Tablet ER 12 Hour Abuse-Deterrent; one tab po bid; Therapy: 67Ldd6881 to (Last Rx:27Oct2017) Ordered   15  QUEtiapine Fumarate 25 MG Oral Tablet; 1-2 tablets qhs;    Therapy: 11Xhh3748 to (Last Rx:01Nov2017)  Requested for: 88XHA3238; Status:    ACTIVE - Transmit to [facility] Verification Ordered   16   Ventolin  (90 Base) MCG/ACT Inhalation Aerosol Solution; INHALE TWO PUFFS    EVERY SIX HOURS AS NEEDED FOR COUGH/WHEEZE; Therapy: 45FFL8444 to (Last TC:51NKK3498)  Requested for: 28QRB7407; Status: ACTIVE    - Transmit to Pharmacy - Awaiting Verification Ordered  Medication List Reviewed: The medication list was reviewed and updated today  Allergies    1  No Known Drug Allergies    Vitals  Vital Signs    Recorded: 82WHW7000 10:58AM   Temperature 98 2 F, Tympanic   Heart Rate 84, R Radial   Pulse Quality Normal, R Radial   Respiration Quality Normal   Respiration 18   Systolic 120, RUE, Sitting   Diastolic 90, RUE, Sitting   Height 5 ft 3 in   Weight 243 lb    BMI Calculated 43 05   BSA Calculated 2 1     Results/Data  VAS LOWER LIMB VENOUS DUPLEX STUDY, UNILATERAL/LIMITED 37Fhq5110 12:49PM Austin Driscoll Order Number: [id number]    - Patient Instructions: To schedule this appointment, please contact Central Scheduling at [phone]  Test Name Result Flag Reference   VAS LOWER LIMB VENOUS DUPLEX STUDY, UNILATERAL/LIMITED (Report)     [facility] REPORT   CLINICAL:   Indications: Other specified soft tissue disorders [M79 89]  Patient presents   with right lower extremity posterior calf vein in the area of a varicose vein  Clinical:         CONCLUSION:   Impression:   RIGHT LOWER LIMB: NORMAL   No evidence of acute or chronic deep vein thrombosis   No evidence of superficial thrombophlebitis noted  Doppler evaluation shows a normal response to augmentation maneuvers  Popliteal, posterior tibial and anterior tibial arterial Doppler waveforms are   triphasic  LEFT LOWER LIMB LIMITED: NORMAL   Evaluation shows no evidence of thrombus in the common femoral vein  Doppler evaluation shows a normal response to augmentation maneuvers  SIGNATURE:   Electronically Signed by: Robert Celaya MD on 2017-09-18 10:51:44 PM     Physical Exam    Posterior tibialis: right 2+ and left 2+  Dorsalis pedis: right 2+ and left 2+  Distal Pulse Exam: Normal Capillary Refill       Extremities: No upper or lower extremity edema  LE Varicose Veins: right leg truncal veins and left leg truncal veins  The heart rate was normal  The rhythm was regular  Heart sounds: normal S1 and normal S2    Murmurs: No murmurs were heard  Pulmonary   Respiratory effort: No increased work of breathing or signs of respiratory distress  Abdomen   Abdomen: The abdomen was obese  Psychiatric   Orientation to person, place and time: Normal    Eyes   Conjunctiva and lids: No swelling, erythema, or discharge  Ears, Nose, Mouth, and Throat   Hearing: Normal    Neck   Neck: No jugular venous distention, normal ROM and extension  No cervical adenopathy, trachea midline, neck supple  Neurologic Sensory exam normal   Motor skills intact  Musculoskeletal   Gait and station: Normal    Muscle strength/tone: Normal    Skin   Skin and subcutaneous tissue: Normal without rashes or lesions  Venous Disease: No lipodermatosclerosis, stasis dermatitis, hyperpigmentation, or atrophie norma noted on exam       Future Appointments    Date/Time Provider Specialty Site   11/20/2017 01:45 PM NOBLE Ugalde  [address] [place] [address]   11/30/2017 10:45 AM NOBLE Yee  Pain Management [facility]     Signatures   Electronically signed by : Tavo Kitchen; Nov 10 2017  1:08PM EST                       (Author)    Electronically signed by :  Sima Holder MD; Nov 12 2017  5:51AM EST                       (Author)

## 2018-01-11 NOTE — RESULT NOTES
Verified Results  (1) COMPREHENSIVE METABOLIC PANEL 53FMN3675 29:55EI Galina Poser     Test Name Result Flag Reference   Glucose, Serum 121 mg/dL H 65-99   BUN 17 mg/dL  6-24   Creatinine, Serum 0 89 mg/dL  0 57-1 00   eGFR If NonAfricn Am 73 mL/min/1 73  >59   eGFR If Africn Am 84 mL/min/1 73  >59   BUN/Creatinine Ratio 19  9-23   Sodium, Serum 142 mmol/L  134-144   Potassium, Serum 5 0 mmol/L  3 5-5 2   Chloride, Serum 101 mmol/L     Carbon Dioxide, Total 26 mmol/L  18-29   Calcium, Serum 9 5 mg/dL  8 7-10 2   Protein, Total, Serum 7 1 g/dL  6 0-8 5   Albumin, Serum 4 5 g/dL  3 5-5 5   Globulin, Total 2 6 g/dL  1 5-4 5   A/G Ratio 1 7  1 1-2 5   Bilirubin, Total 0 2 mg/dL  0 0-1 2   Alkaline Phosphatase, S 58 IU/L     AST (SGOT) 16 IU/L  0-40   ALT (SGPT) 18 IU/L  0-32     (1) HEMOGLOBIN A1C 84VSF2778 12:00AM Galina Poser     Test Name Result Flag Reference   Hemoglobin A1c 6 6 % H 4 8-5 6   Pre-diabetes: 5 7 - 6 4           Diabetes: >6 4           Glycemic control for adults with diabetes: <7 0     (1) TSH 59MMV9583 12:00AM Galina Poser     Test Name Result Flag Reference   TSH 3 630 uIU/mL  0 450-4 500     (1) LIPID PANEL, FASTING 92HAK0011 12:00AM Galina Poser     Test Name Result Flag Reference   Cholesterol, Total 228 mg/dL H 100-199   Triglycerides 267 mg/dL H 0-149   HDL Cholesterol 34 mg/dL L >39   According to ATP-III Guidelines, HDL-C >59 mg/dL is considered a  negative risk factor for CHD  VLDL Cholesterol Sandip 53 mg/dL H 5-40   LDL Cholesterol Calc 141 mg/dL H 0-99   T  Chol/HDL Ratio 6 7 ratio units H 0 0-4 4   T  Chol/HDL Ratio                                                             Men  Women                                               1/2 Avg  Risk  3 4    3 3                                                   Avg Risk  5 0    4 4                                                2X Avg  Risk  9 6    7 1                                                3X Avg  Risk 23 4   11 0 [facility]) Cardiovascular Risk Assessment 18LOQ2747 12:00AM Surendra Romero     Test Name Result Flag Reference   Interpretation Note     -------------------------------  CARDIOVASCULAR REPORT:  -------------------------------  Current available clinical information suggests the patient's risk is  at least HIGH  Your patient appears to have one CHD risk equivalent  (diabetes)  Two additional major risk factors are present (age over 54  and HDL-C less than 40)  -  Insulin resistance, obesity, excessive alcohol use, smoking, liver  disease, and certain medications can cause secondary dyslipidemia  Consider evaluation if clinically indicated  -  Fasting status is unknown; lipid assessment and treatment suggestions  assume patient was fasting  Therapeutic lifestyle changes are always  valuable to achieve optimal blood lipid status (diet, exercise, weight  management)  -------------------------------  LIPID MANAGEMENT  Select one patient risk category based upon medical history and  clinical judgment  Additional risk factors such as personal or family  history of premature CHD, smoking, and hypertension modify a patient's  goals of therapy  In CVD prevention, the intensity of therapy should  be adjusted to the level of patient risk  MODERATE intensity statin  therapy generally results in an average LDL-C reduction of 30% to less  than 50% from the untreated baseline  Examples include (daily doses):  atorvastatin 10-20 mg, rosuvastatin 5-10 mg, simvastatin 20-40 mg,  pravastatin 40-80 mg, lovastatin 40 mg  HIGH intensity statin therapy  generally results in an average LDL-C reduction of 50% or more from  the untreated baseline  Examples include (daily doses): atorvastatin  40-80 mg and rosuvastatin 20 mg   -------------------------------  LOW RISK ASSESSMENT AND TREATMENT SUGGESTIONS  -------------------------------  LDL-C is acceptable, was 71 and now is 141 mg/dL   Non-HDL Cholesterol  is high, was 127 and now is 194 mg/dL   -  Please refer to assessment and treatment suggestions under high risk  category  -------------------------------  INTERMEDIATE RISK ASSESSMENT AND TREATMENT SUGGESTIONS  -------------------------------  LDL-C is borderline high, was 71 and now is 141 mg/dL  Non-HDL  Cholesterol is high, was 127 and now is 194 mg/dL  -  Please refer to assessment and treatment suggestions under high risk  category  -------------------------------  HIGH RISK ASSESSMENT AND TREATMENT SUGGESTIONS  -------------------------------  LDL-C is high, was 71 and now is 141 mg/dL  Non-HDL Cholesterol is  high, was 127 and now is 194 mg/dL  -  Begin statin  If statin already in use, consider increasing dose to  achieve at least a 50% LDL reduction from baseline  Moderate or high  intensity statin is preferred  If statin cannot be tolerated or  increased, alternatives include use of an intestinal agent (ezetimibe  or bile acid sequestrant), niacin, and/or fish oil   -------------------------------  DISCLAIMER  These assessments and treatment suggestions are provided as a  convenience in support of the physician-patient relationship and are  not intended to replace the physician's clinical judgment  They are  derived from the national guidelines in addition to other evidence and  expert opinion  The clinician should consider this information within  the context of clinical opinion and the individual patient  SEE GUIDANCE FOR CARDIOVASCULAR REPORT: National Heart, Lung, and  Blood Parsons's Third Report of the NCEP Expert Panel on Detection,  Evaluation and Treatment of High Blood Cholesterol in Adults (ATP III)  (2002  NIH publication );  Harvey et al  Diabetes Care 2008;  31(4):811-82; Stef et al  Clin Chem 2009; 55(3):407-419; Liset Garcia  et al  2013 ACC/AHA guideline on the treatment of blood cholesterol to  reduce atherosclerotic cardiovascular risk in adults: a report of the  [employer] of Cardiology/American Heart Association Task Force  on Practice Guidelines  Circulation 4168;841(HQOJD 2):S1? S45  PDF Image            Discussion/Summary   Good results   Good results   Good results   Very poor results for diabetic, should consider additional medication

## 2018-01-11 NOTE — RESULT NOTES
Verified Results  (1) MICROALBUMIN CREATININE RATIO, RANDOM URINE 58Mcj0478 12:15PM Nohemi Lea     Test Name Result Flag Reference   Creatinine, Urine 55 6 mg/dL  15 0-278 0   Microalbumin, Urine 16 6 ug/mL  0 0-17 0   Microalb/Creat Ratio 29 9 mg/g creat  0 0-30 0     Health Maintenance Flow Sheet 92Yya8730 08:14AM      Test Name Result Flag Reference   Mammography 6/20/2000         Discussion/Summary   Good results

## 2018-01-12 VITALS
SYSTOLIC BLOOD PRESSURE: 120 MMHG | TEMPERATURE: 97.5 F | RESPIRATION RATE: 18 BRPM | BODY MASS INDEX: 43.41 KG/M2 | DIASTOLIC BLOOD PRESSURE: 74 MMHG | HEIGHT: 63 IN | WEIGHT: 245 LBS | HEART RATE: 68 BPM

## 2018-01-12 NOTE — RESULT NOTES
Verified Results  * XR CHEST PA & LATERAL 52NJQ5989 09:45AM [facility]   TW Order Number: [id number]     Test Name Result Flag Reference   XR CHEST PA & LATERAL (Report)     CHEST - DUAL ENERGY     INDICATION: COPD  COMPARISON: October 22, 2013  VIEWS: PA (including soft tissue/bone algorithms) and lateral projections     IMAGES: 4     FINDINGS:        Cardiomediastinal silhouette appears unremarkable  There is chronic mild scarring in the right upper lobe  The lungs and pleural spaces are otherwise clear  Visualized osseous structures appear within normal limits for the patient's age  IMPRESSION:     No acute abnormality in the chest        Workstation performed: [id number]     Signed by:   Bradley Rivas MD   3/21/17     * XR SPINE LUMBAR MINIMUM 4 VIEWS NON INJURY 20Mar2017 09:41AM [facility]   TW Order Number: [id number]     Test Name Result Flag Reference   XR SPINE LUMBAR MINIMUM 4 VIEWS (Report)     LUMBAR SPINE     INDICATION: Low back pain  History of surgery  COMPARISON: June 27, 2012  VIEWS: AP, lateral and bilateral oblique projections;      IMAGES: 5     FINDINGS:     There has been discectomy and fusion at L5-S1  There is no evidence of hardware failure  There is 1 1 cm of anterolisthesis of L4 on L5, more pronounced now than on the earlier study  Vertebral alignment is otherwise normal      The vertebral bodies are normal in height  There is no evidence of fracture  There is no evidence of bone destruction or osteoblastic lesion  Degenerative facet disease is present in the lower lumbar spine, most advanced at L4-L5  Degenerative disc disease is present at L4-L5  The other disc spaces are unremarkable  Visualized soft tissues appear unremarkable  IMPRESSION:     1  Prior L5-S1 fusion  2  1 1 cm of anterolisthesis of L4 on L5, most likely due to degenerative facet disease, more pronounced now than on a study from 2012   If clinically indicated, flexion and extension views of the lumbar spine may be helpful to determine if this is a    stable or unstable anterolisthesis  3  No acute bony abnormality in the lumbar spine         Workstation performed: [id number]     Signed by:   Elissa Medeiros MD   3/21/17       Discussion/Summary   please call to discuss results-Best Regards-Dr JULIO

## 2018-01-12 NOTE — RESULT NOTES
Verified Results  [facility]) Cardiovascular Risk Assessment [id number] 12:00AM Leyda Pelletier     Test Name Result Flag Reference   Interpretation Note     -------------------------------  CARDIOVASCULAR REPORT:  -------------------------------  Current available clinical information suggests the  patient's risk is at least HIGH  Your patient appears to  have one CHD risk equivalent (diabetes)  Two additional  major risk factors are present (age over 54 and HDL-C less  than 40)  -  Insulin resistance, obesity, excessive alcohol use, smoking,  liver disease, and certain medications can cause secondary  dyslipidemia  Consider evaluation if clinically indicated  -  Fasting status is unknown; lipid assessment and treatment  suggestions assume patient was fasting  Therapeutic  lifestyle changes are always valuable to achieve optimal  blood lipid status (diet, exercise, weight management)  -------------------------------  LIPID MANAGEMENT  Select one patient risk category based upon medical history  and clinical judgment  Additional risk factors such as  personal or family history of premature CHD, smoking, and  hypertension modify a patient's goals of therapy  In CVD  prevention, the intensity of therapy should be adjusted to  the level of patient risk  MODERATE intensity statin therapy  generally results in an average LDL-C reduction of 30% to  less than 50% from the untreated baseline  Examples include  (daily doses): atorvastatin 10-20 mg, rosuvastatin 5-10 mg,  simvastatin 20-40 mg, pravastatin 40-80 mg, lovastatin 40  mg  HIGH intensity statin therapy generally results in an  average LDL-C reduction of 50% or more from the untreated  baseline  Examples include (daily doses): atorvastatin 40-80  mg and rosuvastatin 20 mg   -------------------------------  LOW RISK ASSESSMENT AND TREATMENT SUGGESTIONS  -------------------------------  LDL-C is optimal, was 141 and now is 77 mg/dL   Non-HDL  Cholesterol is optimal, was 194 and now is 112 mg/dL  -  Please refer to assessment and treatment suggestions under  high risk category  -------------------------------  INTERMEDIATE RISK ASSESSMENT AND TREATMENT SUGGESTIONS  -------------------------------  LDL-C is optimal, was 141 and now is 77 mg/dL  Non-HDL  Cholesterol is optimal, was 194 and now is 112 mg/dL  -  Please refer to assessment and treatment suggestions under  high risk category  -------------------------------  HIGH RISK ASSESSMENT AND TREATMENT SUGGESTIONS  -------------------------------  LDL-C is normal, was 141 and now is 77 mg/dL  Non-HDL  Cholesterol is normal, was 194 and now is 112 mg/dL  -  If at least a 50% LDL reduction from baseline has not been  achieved, begin or increase statin  Consider measurement of  LDL particle number or Apo B to adjudicate need for further  LDL lowering therapy  If statin cannot be tolerated or  increased, alternatives include use of an intestinal agent  (ezetimibe or bile acid sequestrant), niacin, and/or fish  oil   -------------------------------  DISCLAIMER  These assessments and treatment suggestions are provided as  a convenience in support of the physician-patient  relationship and are not intended to replace the physician's  clinical judgment  They are derived from the national  guidelines in addition to other evidence and expert opinion  The clinician should consider this information within the  context of clinical opinion and the individual patient  SEE GUIDANCE FOR CARDIOVASCULAR REPORT: National Heart,  Lung, and Blood [place]'s Third Report of the NCEP Expert  Panel on Detection, Evaluation and Treatment of High Blood  Cholesterol in Adults (ATP III) (2002  NIH publication  );  Harvey et al  Diabetes Care 2008; 31(4):811-82;  Leobardoois et al  Clin Chem 2009; 55(3):407-419; Luz Campa et  al  2013 ACC/AHA guideline on the treatment of blood  cholesterol to reduce atherosclerotic cardiovascular risk in  adults: a report of the [employer] of  Cardiology/American Heart Association Task Force on Practice  Guidelines  Circulation 8231;704(XHVTL 2):S1? S45  PDF Image

## 2018-01-13 VITALS
WEIGHT: 241 LBS | HEART RATE: 88 BPM | OXYGEN SATURATION: 92 % | TEMPERATURE: 98.1 F | RESPIRATION RATE: 32 BRPM | SYSTOLIC BLOOD PRESSURE: 120 MMHG | BODY MASS INDEX: 42.7 KG/M2 | DIASTOLIC BLOOD PRESSURE: 78 MMHG | HEIGHT: 63 IN

## 2018-01-13 VITALS
BODY MASS INDEX: 43.59 KG/M2 | WEIGHT: 246 LBS | DIASTOLIC BLOOD PRESSURE: 70 MMHG | HEART RATE: 66 BPM | HEIGHT: 63 IN | TEMPERATURE: 96.9 F | RESPIRATION RATE: 16 BRPM | SYSTOLIC BLOOD PRESSURE: 110 MMHG

## 2018-01-13 VITALS
WEIGHT: 243 LBS | HEIGHT: 63 IN | TEMPERATURE: 97.1 F | SYSTOLIC BLOOD PRESSURE: 120 MMHG | HEART RATE: 92 BPM | OXYGEN SATURATION: 95 % | RESPIRATION RATE: 18 BRPM | DIASTOLIC BLOOD PRESSURE: 76 MMHG | BODY MASS INDEX: 43.05 KG/M2

## 2018-01-13 VITALS
WEIGHT: 245 LBS | HEART RATE: 82 BPM | TEMPERATURE: 97.7 F | HEIGHT: 63 IN | DIASTOLIC BLOOD PRESSURE: 70 MMHG | BODY MASS INDEX: 43.41 KG/M2 | SYSTOLIC BLOOD PRESSURE: 128 MMHG | RESPIRATION RATE: 20 BRPM

## 2018-01-13 VITALS
HEART RATE: 90 BPM | RESPIRATION RATE: 18 BRPM | HEIGHT: 63 IN | WEIGHT: 242 LBS | BODY MASS INDEX: 42.88 KG/M2 | SYSTOLIC BLOOD PRESSURE: 106 MMHG | OXYGEN SATURATION: 94 % | DIASTOLIC BLOOD PRESSURE: 70 MMHG | TEMPERATURE: 98.1 F

## 2018-01-13 VITALS
BODY MASS INDEX: 42.88 KG/M2 | HEIGHT: 63 IN | DIASTOLIC BLOOD PRESSURE: 78 MMHG | WEIGHT: 242 LBS | HEART RATE: 80 BPM | RESPIRATION RATE: 18 BRPM | SYSTOLIC BLOOD PRESSURE: 100 MMHG | TEMPERATURE: 97.8 F

## 2018-01-13 VITALS
WEIGHT: 243 LBS | HEIGHT: 63 IN | BODY MASS INDEX: 43.05 KG/M2 | HEART RATE: 84 BPM | RESPIRATION RATE: 18 BRPM | TEMPERATURE: 98.2 F | DIASTOLIC BLOOD PRESSURE: 90 MMHG | SYSTOLIC BLOOD PRESSURE: 130 MMHG

## 2018-01-14 VITALS
HEIGHT: 63 IN | RESPIRATION RATE: 16 BRPM | TEMPERATURE: 97.3 F | WEIGHT: 243.8 LBS | DIASTOLIC BLOOD PRESSURE: 80 MMHG | BODY MASS INDEX: 43.2 KG/M2 | SYSTOLIC BLOOD PRESSURE: 120 MMHG | HEART RATE: 80 BPM

## 2018-01-14 VITALS
TEMPERATURE: 97.7 F | WEIGHT: 239 LBS | BODY MASS INDEX: 42.35 KG/M2 | RESPIRATION RATE: 18 BRPM | DIASTOLIC BLOOD PRESSURE: 76 MMHG | SYSTOLIC BLOOD PRESSURE: 110 MMHG | HEIGHT: 63 IN | HEART RATE: 80 BPM

## 2018-01-14 VITALS
DIASTOLIC BLOOD PRESSURE: 72 MMHG | RESPIRATION RATE: 18 BRPM | SYSTOLIC BLOOD PRESSURE: 104 MMHG | HEIGHT: 63 IN | HEART RATE: 78 BPM | TEMPERATURE: 97.1 F | BODY MASS INDEX: 43.77 KG/M2 | WEIGHT: 247 LBS

## 2018-01-14 VITALS
WEIGHT: 248 LBS | HEIGHT: 63 IN | SYSTOLIC BLOOD PRESSURE: 124 MMHG | DIASTOLIC BLOOD PRESSURE: 76 MMHG | BODY MASS INDEX: 43.94 KG/M2 | HEART RATE: 80 BPM | RESPIRATION RATE: 18 BRPM

## 2018-01-14 VITALS
WEIGHT: 244 LBS | HEART RATE: 84 BPM | SYSTOLIC BLOOD PRESSURE: 116 MMHG | BODY MASS INDEX: 43.23 KG/M2 | RESPIRATION RATE: 20 BRPM | TEMPERATURE: 98 F | DIASTOLIC BLOOD PRESSURE: 74 MMHG | HEIGHT: 63 IN

## 2018-01-14 NOTE — MISCELLANEOUS
Message   Recorded as Task   Date: 06/21/2017 04:18 PM, Created By: Jamar Charlton   Task Name: Miscellaneous   Assigned To: [address] [facility], [address] Clinical,Team   Regarding Patient: Trish Rodriguez, Status: Active   Comment:    Jamar Charlton - 21 Jun 2017 4:18 PM     TASK CREATED  phone call from patient requesting a refill of percocet  patient states her family doctor is out on vacation  please call patient at [phone]  Anali Clancy - 21 Jun 2017 4:47 PM     TASK EDITED  Pt was seen today for a consult  I don't see that you are prescribing this medication for the pt  Via [facility] 17 - 22 Jun 2017 8:19 AM     TASK REPLIED TO: Previously Assigned To [facility] [address]  I am not taking over this patient's opioid medications  The covering physician should be able to take over prescribing this medication for her until her PCP comes back from vacation  Sundeep Olmstead - 22 Jun 2017 11:03 AM     TASK EDITED  s/w pt, advised of above  Pt verbalized understanding, will fu w/ PCP  Active Problems    1  Back pain (724 5) (M54 9)   2  BMI 40 0-44 9, adult (V85 41) (Z68 41)   3  Chronic low back pain (724 2,338 29) (M54 5,G89 29)   4  Chronic obstructive pulmonary disease (496) (J44 9)   5  Chronic pain (338 29) (G89 29)   6  Chronic pain syndrome (338 4) (G89 4)   7  Depression with anxiety (300 4) (F41 8)   8  Disc degeneration, lumbosacral (722 52) (M51 37)   9  Encounter for screening for malignant neoplasm of colon (V76 51) (Z12 11)   10  Encounter for screening mammogram for malignant neoplasm of breast (V76 12)    (Z12 31)   11  Esophageal reflux (530 81) (K21 9)   12  Hyperlipidemia (272 4) (E78 5)   13  Hypertension (401 9) (I10)   14  Joint pain, knee (719 46) (M25 569)   15  Laceration of finger (883 0) (S61 219A)   16  Laceration of middle finger of right hand without complication, subsequent encounter    (V58 89,883 0) (M18 561A)   17  Left shoulder pain (719 41) (M25 512)   18   Morbid or severe obesity due to excess calories (278 01) (E66 01)   19  Need for influenza vaccination (V04 81) (Z23)   20  Need for prophylactic vaccination and inoculation against influenza (V04 81) (Z23)   21  Need for tetanus booster (V03 7) (Z23)   22  Nicotine dependence (305 1) (F17 200)   23  Pap smear, as part of routine gynecological examination (V76 2) (Z01 419)   24  Persistent insomnia of non-organic origin (307 42) (F51 01)   25  Post-laminectomy syndrome (722 80) (M96 1)   26  Right calf pain (729 5) (M79 661)   27  Screening for breast cancer (V76 10) (Z12 39)   28  Screening for colorectal cancer (V76 51) (Z12 11,Z12 12)   29  Sinusitis (473 9) (J32 9)   30  Visit for wound check (V58 89) (Z51 89)    Current Meds   1  ALPRAZolam 1 MG Oral Tablet; Taper down to ONE-HALF TO ONE TABLET BY MOUTH   TWO TIMES DAILY; Therapy: 69IOY2272 to (Evaluate:70Stx7859)  Requested for: 62SAV8795; Last   Rx:34Hmh0240 Ordered   2  Atorvastatin Calcium 80 MG Oral Tablet; 1 EVERY MORNING; Therapy: 10BBU7904 to (Last RY:44NEQ0439)  Requested for: 39MUD2670; Status:   ACTIVE - Transmit to Pharmacy - Awaiting Verification Ordered   3  Diclofenac Sodium 75 MG Oral Tablet Delayed Release; 1 two times a day; Therapy: 09KTC5292 to (Last NL:34ZDL7732)  Requested for: 76LQT0813; Status:   ACTIVE - Transmit to Pharmacy - Awaiting Verification Ordered   4  Dulera 200-5 MCG/ACT Inhalation Aerosol; INHALE TWO PUFFS BY MOUTH EVERY   TWELVE HOURS; Therapy: 74LXV4234 to (Last Rx:76Cxb0064)  Requested for: 53DMM9979 Ordered   5  DULoxetine HCl - 60 MG Oral Capsule Delayed Release Particles (Cymbalta); TAKE   ONE CAPSULE BY MOUTH EVERY DAY IN THE MORNING; Therapy: 94XVA6064 to (Last Rx:42Ahg2967)  Requested for: 04ADJ9332; Status:   ACTIVE - Transmit to Pharmacy - Awaiting Verification Ordered   6  Fluticasone Propionate 50 MCG/ACT Nasal Suspension (Flonase Allergy Relief); USE   ONE TO TWO SPRAY(S) IN EACH NOSTRIL ONCE DAILY;    Therapy: 96CJQ3967 to (Last QZ:68DXS4518)  Requested for: 17LFL4614 Ordered   7  Ipratropium-Albuterol 0 5-2 5 (3) MG/3ML Inhalation Solution; one vial via nebuliazer   BID rpn; Therapy: 04VOO4896 to (Last Rx:02Jun2017)  Requested for: 02Jun2017; Status:   ACTIVE - Transmit to Pharmacy - Awaiting Verification Ordered   8  Lisinopril-Hydrochlorothiazide 20-25 MG Oral Tablet; TAKE ONE TABLET BY MOUTH   EVERY DAY IN THE MORNING; Therapy: 78DXF0766 to (Last Rx:69Rhr9121)  Requested for: 21GXN2888; Status:   ACTIVE - Transmit to [facility] Verification Ordered   9  Lyrica 100 MG Oral Capsule; TAKE ONE CAPSULE BY MOUTH EVERY EIGHT HOURS; Therapy: 01WET3389 to (Last Rx:16Ukz3570)  Requested for: 76GET9916 Ordered   10  MetFORMIN HCl - 1000 MG Oral Tablet; 1 two times a day; Therapy: 63BQO1185 to (Last IF:81TOD2750)  Requested for: 72KPY8501; Status:    ACTIVE - Transmit to [facility] Verification Ordered   11  Oxycodone-Acetaminophen  MG Oral Tablet (Percocet); 1 Every Six Hours as    needed; Therapy: 25Vcb0033 to (Last Rx:02Jun2017)  Requested for: 02Jun2017 Ordered   12  OxyCONTIN 80 MG Oral Tablet ER 12 Hour Abuse-Deterrent; one every 12 hours for    pain; Therapy: 66FKU4374 to (Last Rx:02Jun2017) Ordered   13  QUEtiapine Fumarate 25 MG Oral Tablet; TAKE ONE TABLET BY MOUTH ONE TIME    DAILY AT BEDTIME; Therapy: 32TTI8963 to (Last ED:71IYN2034)  Requested for: 56DJQ5484; Status:    ACTIVE - Transmit to [facility] Verification Ordered   14  Ventolin  (90 Base) MCG/ACT Inhalation Aerosol Solution; INHALE TWO PUFFS    EVERY SIX HOURS AS NEEDED FOR COUGH/WHEEZE;    Therapy: 98QQB7341 to (Last RH:41KAV4950)  Requested for: 92DTS9257; Status:    ACTIVE - Transmit to Pharmacy - Awaiting Verification Ordered    Allergies    1   No Known Drug Allergies    Signatures   Electronically signed by : Ulisses Hoyos, ; Jun 22 2017 11:03AM EST                       (Author)

## 2018-01-15 NOTE — RESULT NOTES
Verified Results  * XR KNEE 4+ VIEW LEFT [id number] 02:23PM Alize Rajput Order Number: [id number]     Test Name Result Flag Reference   XR KNEE 4+ VW LEFT (Report)     LEFT KNEE     INDICATION: Fall  Pain lateral patella     COMPARISON: None     VIEWS: 4; 4 images     FINDINGS:     There is no acute fracture or dislocation  There is a small well-corticated ossific density just lateral to the patella which may represent sequela from prior injury or degenerative change  Degenerative changes are noted minimally within all 3 compartments  No lytic or blastic lesions are seen  Soft tissue swelling noted minimally anterior to patella       IMPRESSION:     No acute osseous abnormality         Workstation performed: [id number]     Signed by:   David Siddiqui MD   5/18/16

## 2018-01-16 NOTE — RESULT NOTES
Verified Results  ([facility]) Pap IG, rfx HPV ASCU [id number] 12:00AM Buffy Session     Test Name Result Flag Reference   DIAGNOSIS: Comment     NEGATIVE FOR INTRAEPITHELIAL LESION AND MALIGNANCY  Specimen adequacy: Comment     Satisfactory for evaluation  Endocervical and/or squamous metaplastic  cells (endocervical component) are present  Clinician provided ICD10: Comment     Z01 419   Performed by: Jerilyn Rogers, Cytotechnologist (ASCP)     Brad Marie Note: Comment     The Pap smear is a screening test designed to aid in the detection of  premalignant and malignant conditions of the uterine cervix  It is not a  diagnostic procedure and should not be used as the sole means of detecting  cervical cancer  Both false-positive and false-negative reports do occur    Comment     The HPV DNA reflex criteria were not met with this specimen result  therefore, no HPV testing was performed  Discussion/Summary   negative pap  Repeat in 2 years     Signatures   Electronically signed by : ALEXIS Bosch;  Aug  9 2016  7:56AM EST                       (Author)

## 2018-01-17 NOTE — MISCELLANEOUS
Message   Recorded as Task   Date: 06/29/2017 10:18 AM, Created By: Estefania Oropeza   Task Name: Care Coordination   Assigned To: Dari Marie   Regarding Patient: Will Blum, Status: Active   Comment:    Dari Marie - 29 Jun 2017 10:18 AM     TASK CREATED  L/m notifying patient that MRI was denied by her Ins  Patient has to do 6wk of PT, MedicationTherapy and  have a f/u eval with DR Max Garcia  PT order was left at the  for  at her earliest convenience  [address] [place] [address] - 29 Jun 2017 10:35 AM     TASK REPLIED TO: Previously Assigned To [place]  Thanks  Active Problems    1  Back pain (724 5) (M54 9)   2  BMI 40 0-44 9, adult (V85 41) (Z68 41)   3  Chronic low back pain (724 2,338 29) (M54 5,G89 29)   4  Chronic obstructive pulmonary disease (496) (J44 9)   5  Chronic pain (338 29) (G89 29)   6  Chronic pain syndrome (338 4) (G89 4)   7  Depression with anxiety (300 4) (F41 8)   8  Disc degeneration, lumbosacral (722 52) (M51 37)   9  Encounter for screening for malignant neoplasm of colon (V76 51) (Z12 11)   10  Encounter for screening mammogram for malignant neoplasm of breast (V76 12)    (Z12 31)   11  Esophageal reflux (530 81) (K21 9)   12  Hyperlipidemia (272 4) (E78 5)   13  Hypertension (401 9) (I10)   14  Joint pain, knee (719 46) (M25 569)   15  Laceration of finger (883 0) (S61 219A)   16  Laceration of middle finger of right hand without complication, subsequent encounter    (V58 89,883 0) (K16 450U)   17  Left shoulder pain (719 41) (M25 512)   18  Morbid or severe obesity due to excess calories (278 01) (E66 01)   19  Need for influenza vaccination (V04 81) (Z23)   20  Need for prophylactic vaccination and inoculation against influenza (V04 81) (Z23)   21  Need for tetanus booster (V03 7) (Z23)   22  Nicotine dependence (305 1) (F17 200)   23  Pap smear, as part of routine gynecological examination (V76 2) (Z01 419)   24   Persistent insomnia of non-organic origin (307 42) (F51 01)   25  Post-laminectomy syndrome (722 80) (M96 1)   26  Right calf pain (729 5) (M79 661)   27  Screening for breast cancer (V76 10) (Z12 39)   28  Screening for colorectal cancer (V76 51) (Z12 11,Z12 12)   29  Sinusitis (473 9) (J32 9)   30  Visit for wound check (V58 89) (Z51 89)    Current Meds   1  ALPRAZolam 1 MG Oral Tablet; Taper down to ONE-HALF TO ONE TABLET BY MOUTH   TWO TIMES DAILY; Therapy: 31FBR4966 to (Evaluate:86Voi5216)  Requested for: 27Jun2017; Last   Rx:27Jun2017 Ordered   2  Atorvastatin Calcium 80 MG Oral Tablet; 1 EVERY MORNING; Therapy: 40ILX4066 to (Last XE:95CQB2115)  Requested for: 15XQA0617; Status:   ACTIVE - Transmit to Pharmacy - Awaiting Verification Ordered   3  Diclofenac Sodium 75 MG Oral Tablet Delayed Release; 1 two times a day; Therapy: 16GWC6244 to (Last VM:17IGV2632)  Requested for: 77ONG2789; Status:   ACTIVE - Transmit to Pharmacy - Awaiting Verification Ordered   4  Dulera 200-5 MCG/ACT Inhalation Aerosol; INHALE TWO PUFFS BY MOUTH EVERY   TWELVE HOURS; Therapy: 37SXK9569 to (Last Rx:43Uta7501)  Requested for: 82KTV6344 Ordered   5  DULoxetine HCl - 60 MG Oral Capsule Delayed Release Particles (Cymbalta); TAKE   ONE CAPSULE BY MOUTH EVERY DAY IN THE MORNING; Therapy: 58OFG4076 to (Last Rx:85Eaj4243)  Requested for: 90YOW3862; Status:   ACTIVE - Transmit to Pharmacy - Awaiting Verification Ordered   6  Fluticasone Propionate 50 MCG/ACT Nasal Suspension (Flonase Allergy Relief); USE   ONE TO TWO SPRAY(S) IN EACH NOSTRIL ONCE DAILY; Therapy: 87ZLZ9561 to (Last Rx:15Oey3959)  Requested for: 76UOF1670 Ordered   7  Ipratropium-Albuterol 0 5-2 5 (3) MG/3ML Inhalation Solution; one vial via nebuliazer   BID rpn; Therapy: 18RDE2129 to (Last Rx:75Fhr6474)  Requested for: 02Jun2017; Status:   ACTIVE - Transmit to Pharmacy - Awaiting Verification Ordered   8   Lisinopril-Hydrochlorothiazide 20-25 MG Oral Tablet; TAKE ONE TABLET BY MOUTH   EVERY DAY IN THE MORNING; Therapy: 80XPZ9263 to (Last Rx:57Qmm4703)  Requested for: 06IKQ1159; Status:   ACTIVE - Transmit to [facility] Verification Ordered   9  Lyrica 100 MG Oral Capsule; TAKE ONE CAPSULE BY MOUTH EVERY EIGHT HOURS; Therapy: 81KVS1800 to (Last Rx:31Tzt7163)  Requested for: 03HPO5010 Ordered   10  MetFORMIN HCl - 1000 MG Oral Tablet; 1 two times a day; Therapy: 41EME4121 to (Last KL:28GSN9304)  Requested for: 66ELC7984; Status:    ACTIVE - Transmit to [facility] Verification Ordered   11  Oxycodone-Acetaminophen  MG Oral Tablet (Percocet); 1 Every Six Hours as    needed; Therapy: 09Bus8292 to (Last Rx:26Jun2017)  Requested for: 26Jun2017 Ordered   12  OxyCONTIN 60 MG Oral Tablet ER 12 Hour Abuse-Deterrent; TAKE 1 TABLET EVERY    12 HOURS DAILY; Therapy: 79WKA7183 to (Evaluate:09Czh6144); Last Rx:26Jun2017 Ordered   13  OxyCONTIN 80 MG Oral Tablet ER 12 Hour Abuse-Deterrent; one every 12 hours for    pain; Therapy: 44BOX5217 to (Last Rx:02Jun2017) Ordered   14  QUEtiapine Fumarate 25 MG Oral Tablet; TAKE ONE TABLET BY MOUTH ONE TIME    DAILY AT BEDTIME; Therapy: 81OLR6270 to (Last NM:18VXM9169)  Requested for: 91WAP3931; Status:    ACTIVE - Transmit to [facility] Verification Ordered   15  Ventolin  (90 Base) MCG/ACT Inhalation Aerosol Solution; INHALE TWO PUFFS    EVERY SIX HOURS AS NEEDED FOR COUGH/WHEEZE;    Therapy: 57KTL5147 to (Last EJ:30BXE3403)  Requested for: 39KZX9120; Status:    ACTIVE - Transmit to Pharmacy - Awaiting Verification Ordered    Allergies    1   No Known Drug Allergies    Signatures   Electronically signed by : Noel Montano MA; Jun 30 2017  7:16AM EST                       (Author)

## 2018-01-17 NOTE — RESULT NOTES
Verified Results  (1) HEMOGLOBIN A1C 00RNQ9984 12:15PM Marc Him     Test Name Result Flag Reference   Hemoglobin A1c 6 5 % H 4 8-5 6   Pre-diabetes: 5 7 - 6 4           Diabetes: >6 4           Glycemic control for adults with diabetes: <7 0       Discussion/Summary   Diabetes remains well controlled

## 2018-01-22 ENCOUNTER — ALLSCRIPTS OFFICE VISIT (OUTPATIENT)
Dept: OTHER | Facility: OTHER | Age: 58
End: 2018-01-22

## 2018-01-22 VITALS
HEIGHT: 63 IN | WEIGHT: 244 LBS | RESPIRATION RATE: 22 BRPM | SYSTOLIC BLOOD PRESSURE: 132 MMHG | BODY MASS INDEX: 43.23 KG/M2 | HEART RATE: 80 BPM | DIASTOLIC BLOOD PRESSURE: 80 MMHG | TEMPERATURE: 97.7 F

## 2018-01-22 VITALS
WEIGHT: 248 LBS | TEMPERATURE: 97.1 F | RESPIRATION RATE: 16 BRPM | SYSTOLIC BLOOD PRESSURE: 120 MMHG | DIASTOLIC BLOOD PRESSURE: 70 MMHG | BODY MASS INDEX: 43.94 KG/M2 | HEIGHT: 63 IN | HEART RATE: 76 BPM

## 2018-01-22 VITALS
TEMPERATURE: 98.1 F | DIASTOLIC BLOOD PRESSURE: 80 MMHG | WEIGHT: 244 LBS | HEART RATE: 80 BPM | SYSTOLIC BLOOD PRESSURE: 124 MMHG | BODY MASS INDEX: 43.23 KG/M2 | HEIGHT: 63 IN

## 2018-01-22 VITALS
BODY MASS INDEX: 43.59 KG/M2 | TEMPERATURE: 98.2 F | RESPIRATION RATE: 14 BRPM | HEIGHT: 63 IN | HEART RATE: 72 BPM | DIASTOLIC BLOOD PRESSURE: 60 MMHG | WEIGHT: 246 LBS | SYSTOLIC BLOOD PRESSURE: 110 MMHG

## 2018-01-22 VITALS
HEIGHT: 63 IN | RESPIRATION RATE: 20 BRPM | TEMPERATURE: 97.8 F | HEART RATE: 88 BPM | BODY MASS INDEX: 43.23 KG/M2 | WEIGHT: 244 LBS | DIASTOLIC BLOOD PRESSURE: 72 MMHG | SYSTOLIC BLOOD PRESSURE: 112 MMHG

## 2018-01-22 VITALS
WEIGHT: 244 LBS | SYSTOLIC BLOOD PRESSURE: 110 MMHG | RESPIRATION RATE: 20 BRPM | HEART RATE: 80 BPM | TEMPERATURE: 97.9 F | DIASTOLIC BLOOD PRESSURE: 72 MMHG | HEIGHT: 63 IN | BODY MASS INDEX: 43.23 KG/M2

## 2018-01-22 LAB — HBA1C MFR BLD HPLC: 7.3 %

## 2018-01-23 NOTE — MISCELLANEOUS
Message   Recorded as Task   Date: 12/13/2017 11:01 AM, Created By: Jose Hi   Task Name: Care Coordination   Assigned To: [facility] 14 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 S1 nerve as it exits the spinal column  I would like the patient evaluated by Dr Lars Mejia to see if there are any surgical options prior to considering a spinal cord stimulator trial   I have placed a referral to see Dr Lars Mejia in [employer]  Please also have the patient educated regarding a [address] [address] spinal cord stimulator trial as well by Janette from [address] [place] [address] - 13 Dec 2017 11:46 AM     TASK EDITED  Attempted to reach pt  LVMMOM with c/b #, location and office hours  [facility] [address] 6954 1:04 PM     TASK EDITED  Pt called back  Please call [phone]   Sarah Prince - 13 Dec 2017 2:13 PM     TASK EDITED  SARAHY Meier, will you forward this to AS after you have spoken with the patient  Thanks  S/W pt  Advised pt same  Pt verbalized understanding and would like a referral mailed to her for Dr Lars Mejia  In addition, advised pt that AS would like for Janette from [address] [place] [address] to educate her on spinal cord stimulator trial  Advised pt will pass this on the Felicia Ajs who handles all of our stimulator trials  Pt verbalized understanding  Maria Guadalupe Trevino - 13 Dec 2017 2:19 PM     TASK EDITED  ***SARAHY Lorenzo Spoke with pt and she is scheduled for education on 1/4/18, 1500 at [facility] office  Saniya Ellis - 13 Dec 2017 2:20 PM     TASK REPLIED TO: Previously Assigned To [address] [place] [address] 64 [facility]  Active Problems    1  Abdominal bloating (787 3) (R14 0)   2  Abdominal cramping (789 00) (R10 9)   3   Atypical nevi (216 9) (D22 9)   4  Back pain (724 5) (M54 9)   5  BMI 40 0-44 9, adult (V85 41) (Z68 41)   6  Chronic low back pain (724 2,338 29) (M54 5,G89 29)   7  Chronic obstructive pulmonary disease (496) (J44 9)   8  Chronic pain (338 29) (G89 29)   9  Chronic pain syndrome (338 4) (G89 4)   10  Cramp in lower leg (729 82) (R25 2)   11  Cramps of lower extremity (729 82) (R25 2)   12  Depression with anxiety (300 4) (F41 8)   13  Diabetes (250 00) (E11 9)   14  Diabetes type 2, uncontrolled (250 02) (E11 65)   15  Disc degeneration, lumbosacral (722 52) (M51 37)   16  Encounter for screening for malignant neoplasm of colon (V76 51) (Z12 11)   17  Encounter for screening mammogram for malignant neoplasm of breast (V76 12)    (Z12 31)   18  Esophageal reflux (530 81) (K21 9)   19  Fungal dermatitis (111 9) (B36 9)   20  Hyperlipidemia (272 4) (E78 5)   21  Hypertension (401 9) (I10)   22  Insomnia (780 52) (G47 00)   23  Laceration of middle finger of right hand without complication, subsequent encounter    (V58 89,883 0) (Q18 703J)   24  Left shoulder pain (719 41) (M25 512)   25  Lumbar radiculopathy (724 4) (M54 16)   26  Morbid or severe obesity due to excess calories (278 01) (E66 01)   27  Need for influenza vaccination (V04 81) (Z23)   28  Need for prophylactic vaccination and inoculation against influenza (V04 81) (Z23)   29  Need for tetanus booster (V03 7) (Z23)   30  Nicotine dependence (305 1) (F17 200)   31  Pap smear, as part of routine gynecological examination (V76 2) (Z01 419)   32  Persistent insomnia of non-organic origin (307 42) (F51 01)   33  Post-laminectomy syndrome (722 80) (M96 1)   34  Rib pain on right side (786 50) (R07 81)   35  Screening for breast cancer (V76 10) (Z12 31)   36  Screening for colorectal cancer (V76 51) (Z12 11,Z12 12)   37  Swelling of calf (729 81) (M79 89)   38  Tenderness of right calf (729 5) (M79 661)   39  Varicose veins of both lower extremities with pain (454 8) (I83 813)   40  Varicose veins with inflammation (454 1) (I83 10)   41  Varicose veins with pain, right    Current Meds   1  Alogliptin Benzoate 25 MG Oral Tablet; TAKE 1 TABLET BY MOUTH ONCE A DAY; Therapy: 19BIN4186 to (Evaluate:10Mar2018)  Requested for: 17UXH3220; Last   Rx:10Nov2017; Status: ACTIVE - Transmit to Pharmacy - Awaiting Verification Ordered   2  ALPRAZolam 1 MG Oral Tablet; Taper down to ONE-HALF  tab BID prn; Therapy: 12PKE4975 to (Evaluate:12Jan2018)  Requested for: 29Veq4533; Last   Rx:85Vck1085 Ordered   3  Atorvastatin Calcium 80 MG Oral Tablet; 1 EVERY MORNING; Therapy: 92LXC8704 to (Last Rx:02Pml9366)  Requested for: 31Oct2017; Status:   ACTIVE - Transmit to Pharmacy - Awaiting Verification Ordered   4  Diclofenac Sodium 75 MG Oral Tablet Delayed Release; 1 two times a day; Therapy: 60HEV1219 to (Last UA:91LCT1954)  Requested for: 69JEQ1880; Status:   ACTIVE - Transmit to Pharmacy - Awaiting Verification Ordered   5  DULoxetine HCl - 60 MG Oral Capsule Delayed Release Particles (Cymbalta); TAKE   ONE CAPSULE BY MOUTH EVERY DAY IN THE MORNING; Therapy: 71FAO8501 to (Last Rx:65Seq1839)  Requested for: 28Nov2017; Status:   ACTIVE - Transmit to Pharmacy - Awaiting Verification Ordered   6  Fluticasone Propionate 50 MCG/ACT Nasal Suspension (Flonase Allergy Relief); USE   ONE TO TWO SPRAY(S) IN EACH NOSTRIL ONCE DAILY; Therapy: 81UEW2983 to (Last Rx:03Dxa5469)  Requested for: 22AFC4936 Ordered   7  Lisinopril-Hydrochlorothiazide 20-25 MG Oral Tablet; TAKE ONE TABLET BY MOUTH   EVERY DAY IN THE MORNING; Therapy: 84EGT3219 to (Evaluate:19Apr2018)  Requested for: 03JSY2200; Last   Rx:20Nov2017 Ordered   8  Lyrica 100 MG Oral Capsule; TAKE ONE CAPSULE BY MOUTH EVERY EIGHT HOURS; Therapy: 65NXH6429 to (Last Rx:20Nov2017)  Requested for: 20Nov2017 Ordered   9  MetFORMIN HCl - 1000 MG Oral Tablet; 1 two times a day;    Therapy: 86OCD2689 to (Last YY:40WLK2198)  Requested for: 31Oct2017; Status:   ACTIVE - Transmit to Pharmacy - Awaiting Verification Ordered   10  Oxycodone-Acetaminophen  MG Oral Tablet (Percocet); 1 Every Six Hours as    needed; Therapy: 28Axv0832 to (Last Rx:54Ayd4380)  Requested for: 39Qmw1334 Ordered   11  OxyCONTIN 10 MG Oral Tablet ER 12 Hour Abuse-Deterrent; one tab po bid; Therapy: 74Pvh7456 to (Last Rx:74Qcc0755) Ordered   12  QUEtiapine Fumarate 25 MG Oral Tablet; 1-2 tablets qhs;    Therapy: 26Lrz6700 to (Last Rx:01Nov2017)  Requested for: 79UWE9511; Status:    ACTIVE - Transmit to [facility] Verification Ordered   13  Ventolin  (90 Base) MCG/ACT Inhalation Aerosol Solution; INHALE TWO PUFFS    BY MOUTH EVERY SIX HOURS AS NEEDED FOR COUGH/WHEEZE;    Therapy: 10ZOJ6056 to (26 547112)  Requested for: 20Nov2017; Last    Rx:20Nov2017; Status: ACTIVE - Transmit to Pharmacy - Awaiting Verification Ordered    Allergies    1   No Known Drug Allergies    Signatures   Electronically signed by : Kim Carlos RN; Dec 13 2017  2:39PM EST                       (Author)

## 2018-01-23 NOTE — MISCELLANEOUS
Message   Recorded as Task   Date: 01/03/2018 08:57 AM, Created By: Daniella Galeas   Task Name: Miscellaneous   Assigned To: [facility], [facility] 14 [facility] Clinical,Team   Regarding Patient: Geoff Hi, Status: Active   Comment:    Daniella Galeas - 03 Jan 2018 8:57 AM     TASK CREATED  Pt called stating that she would like to speak with Dr Wagner Munoz regarding her MRI results  States that a nurse did call and review them with her, but she would like to go over them with the doctor before making an appt with Dr Paige Wilson  Does she need to schedul an office visit to do that? Pt can be reached at [phone]  Anish Hewitt - 03 Jan 2018 11:03 AM     TASK EDITED  Cassia Knight AS  Would you like to just call her and go over the MRI results or would you like to schedule her for an SOVS to go over the MRI results? Please advise  Thanks  Anish Hewitt - 03 Jan 2018 11:04 AM     TASK EDITED   Sudha Wadsworth - 03 Jan 2018 11:30 AM     TASK REPLIED TO: Previously Assigned To Sudha Wadsworth  I called the patient and discussed her MRI results over the phone  I also suggested that she keep her appointment for the educational session with the [facility] representatives and also to speak to Dr Paige Wilson about possible further surgery  Active Problems    1  Abdominal bloating (787 3) (R14 0)   2  Abdominal cramping (789 00) (R10 9)   3  Atypical nevi (216 9) (D22 9)   4  Back pain (724 5) (M54 9)   5  BMI 40 0-44 9, adult (V85 41) (Z68 41)   6  Chronic low back pain (724 2,338 29) (M54 5,G89 29)   7  Chronic obstructive pulmonary disease (496) (J44 9)   8  Chronic pain (338 29) (G89 29)   9  Chronic pain syndrome (338 4) (G89 4)   10  Cramp in lower leg (729 82) (R25 2)   11  Cramps of lower extremity (729 82) (R25 2)   12  Depression with anxiety (300 4) (F41 8)   13  Diabetes (250 00) (E11 9)   14  Diabetes type 2, uncontrolled (250 02) (E11 65)   15  Disc degeneration, lumbosacral (722 52) (W88 46)   16   Encounter for screening for malignant neoplasm of colon (V76 51) (Z12 11)   17  Encounter for screening mammogram for malignant neoplasm of breast (V76 12)    (Z12 31)   18  Esophageal reflux (530 81) (K21 9)   19  Fungal dermatitis (111 9) (B36 9)   20  Hyperlipidemia (272 4) (E78 5)   21  Hypertension (401 9) (I10)   22  Insomnia (780 52) (G47 00)   23  Laceration of middle finger of right hand without complication, subsequent encounter    (V58 89,883 0) (E46 694D)   24  Left shoulder pain (719 41) (M25 512)   25  Lumbar radiculopathy (724 4) (M54 16)   26  Morbid or severe obesity due to excess calories (278 01) (E66 01)   27  Need for influenza vaccination (V04 81) (Z23)   28  Need for prophylactic vaccination and inoculation against influenza (V04 81) (Z23)   29  Need for tetanus booster (V03 7) (Z23)   30  Nicotine dependence (305 1) (F17 200)   31  Pap smear, as part of routine gynecological examination (V76 2) (Z01 419)   32  Persistent insomnia of non-organic origin (307 42) (F51 01)   33  Post-laminectomy syndrome (722 80) (M96 1)   34  Rib pain on right side (786 50) (R07 81)   35  Screening for breast cancer (V76 10) (Z12 31)   36  Screening for colorectal cancer (V76 51) (Z12 11,Z12 12)   37  Swelling of calf (729 81) (M79 89)   38  Tenderness of right calf (729 5) (M79 661)   39  Varicose veins of both lower extremities with pain (454 8) (I83 813)   40  Varicose veins with inflammation (454 1) (I83 10)   41  Varicose veins with pain, right    Current Meds   1  Alogliptin Benzoate 25 MG Oral Tablet; TAKE 1 TABLET BY MOUTH ONCE A DAY; Therapy: 20WPP6036 to (Evaluate:10Mar2018)  Requested for: 18RAC0841; Last   Rx:10Nov2017; Status: ACTIVE - Transmit to Pharmacy - Awaiting Verification Ordered   2  ALPRAZolam 1 MG Oral Tablet; Taper down to ONE-HALF  tab BID prn; Therapy: 05MUR3732 to (Evaluate:12Jan2018)  Requested for: 71Jow5229; Last   Rx:73Jqy5808 Ordered   3  Atorvastatin Calcium 80 MG Oral Tablet; 1 EVERY MORNING;    Therapy: 22MXA2742 to (Last R22FFF8004)  Requested for: 2017; Status:   [address] to Pharmacy - Awaiting Verification Ordered   4  Diclofenac Sodium 75 MG Oral Tablet Delayed Release; 1 two times a day; Therapy: 95SWH8252 to (Last ML:64ESX7590)  Requested for: 72YHS7755; Status:   ACTIVE - Transmit to Pharmacy - Awaiting Verification Ordered   5  DULoxetine HCl - 60 MG Oral Capsule Delayed Release Particles (Cymbalta); TAKE   ONE CAPSULE BY MOUTH EVERY DAY IN THE MORNING; Therapy: 10FXN0316 to (Last Rx:07Jfz0225)  Requested for: 2017; Status:   ACTIVE - Transmit to Pharmacy - Awaiting Verification Ordered   6  Fluticasone Propionate 50 MCG/ACT Nasal Suspension (Flonase Allergy Relief); USE   ONE TO TWO SPRAY(S) IN EACH NOSTRIL ONCE DAILY; Therapy: 78EBC5558 to (Last Rx:95Hqq7329)  Requested for: 55UML0369 Ordered   7  Lisinopril-Hydrochlorothiazide 20-25 MG Oral Tablet; TAKE ONE TABLET BY MOUTH   EVERY DAY IN THE MORNING; Therapy: 88DON8372 to (Evaluate:2018)  Requested for: 75TPO3615; Last   Rx:2017 Ordered   8  Lyrica 100 MG Oral Capsule; TAKE ONE CAPSULE BY MOUTH EVERY EIGHT HOURS; Therapy: 34PTC1264 to (Last Rx:2017)  Requested for: 2017 Ordered   9  MetFORMIN HCl - 1000 MG Oral Tablet; 1 two times a day; Therapy: 47OWZ4780 to (Last G60CVP1777)  Requested for: 2017; Status:   ACTIVE - Transmit to [facility] Verification Ordered   10  Oxycodone-Acetaminophen  MG Oral Tablet (Percocet); 1 Every Six Hours as    needed; Therapy: 39Amo0912 to (Last Rx:95Xkp0683)  Requested for: 89Jgo8139 Ordered   11  OxyCONTIN 10 MG Oral Tablet ER 12 Hour Abuse-Deterrent; one tab po bid; Therapy: 29Ecu9047 to (Last Rx:15Fbe4990) Ordered   12  QUEtiapine Fumarate 25 MG Oral Tablet; 1-2 tablets qhs;    Therapy: 10Wwn1494 to (Last Rx:2017)  Requested for: 45WNA8100; Status:    ACTIVE - Transmit to [facility] Verification Ordered   13   [facility] 108 (90 Base) MCG/ACT Inhalation Aerosol Solution; INHALE TWO PUFFS    BY MOUTH EVERY SIX HOURS AS NEEDED FOR COUGH/WHEEZE;    Therapy: 29GPR8931 to (Reine Hodgkin)  Requested for: 20Nov2017; Last    Rx:20Nov2017; Status: ACTIVE - Transmit to Pharmacy - Awaiting Verification Ordered    Allergies    1   No Known Drug Allergies    Signatures   Electronically signed by : Patricia Barry RN; Ivan  3 2018 11:53AM EST                       (Author)

## 2018-01-23 NOTE — PROGRESS NOTES
Assessment    1  Backache (724 5) (M54 9)   2  Disc degeneration, lumbosacral (722 52) (M51 37)   3  Post-laminectomy syndrome (722 80) (M96 1)    Plan  Disc degeneration, lumbosacral    · Oxycodone-Acetaminophen  MG Oral Tablet (Percocet); 1 Every Six Hours  as needed   · OxyCONTIN 80 MG Oral Tablet ER 12 Hour Abuse-Deterrent; one every 12 hours  for pain    Discussion/Summary    Patient will continue to get workman's comp information for pain management evaluation  The patient was counseled regarding instructions for management, risk factor reductions  Chief Complaint  Med check renew oxycodone and oxycontin ck/lpn      History of Present Illness  The history is obtained from the patient  The patient is being seen for a medication check for routine follow-up  Medication(s): opiates  There were no side effects noted  The patient was compliant  Since her last visit, the following has occurred: no change in pain Symptomatic changes since the last visit included back pain worse while standing on hard floor  Bending over is painful as well  Any length of sitting causes pain  CAn't walk for any distance  Since the last visit, there has been no home monitoring  No additional therapy or lifestyle changes are being done  Review of Systems    Constitutional: feeling poorly  Cardiovascular: No complaints of slow heart rate, no fast heart rate, no chest pain, no palpitations, no leg claudication, no lower extremity edema  Respiratory: cough  Musculoskeletal: arthralgias, limb pain, myalgias, joint stiffness and lef thip  Integumentary: No complaints of skin rash or lesions, no itching, no skin wounds, no breast pain or lump  Active Problems    1  Anxiety and depression (300 4) (F41 8)   2  Backache (724 5) (M54 9)   3  Chronic obstructive pulmonary disease (496) (J44 9)   4  Depression (311) (F32 9)   5  Disc degeneration, lumbosacral (722 52) (M51 37)   6   Encounter for screening for malignant neoplasm of colon (V76 51) (Z12 11)   7  Encounter for screening mammogram for malignant neoplasm of breast (V76 12)   (Z12 31)   8  Esophageal reflux (530 81) (K21 9)   9  Hyperlipidemia (272 4) (E78 5)   10  Hypertension (401 9) (I10)   11  Joint pain, knee (719 46) (M25 569)   12  Morbid or severe obesity due to excess calories (278 01) (E66 01)   13  Need for prophylactic vaccination and inoculation against influenza (V04 81) (Z23)   14  Nicotine dependence (305 1) (F17 200)   15  Persistent insomnia of non-organic origin (307 42) (F51 01)   16  Post-laminectomy syndrome (722 80) (M96 1)   17  Right calf pain (729 5) (M79 661)   18  Type 2 diabetes mellitus with hyperglycemia (250 00) (E11 65)    Past Medical History    1  History of Abdominal right upper quadrant tenderness (789 61) (R10 811)   2  History of Acute bronchitis (466 0) (J20 9)   3  History of Acute bronchitis (466 0) (J20 9)   4  History of Bloating (787 3) (R14 0)   5  History of Carpal tunnel syndrome, unspecified laterality (354 0) (G56 00)   6  History of COPD with exacerbation (491 21) (J44 1)   7  History of Hip pain, unspecified laterality   8  History of acute bronchitis (V12 69) (Z87 09)   9  History of acute bronchitis (V12 69) (Z87 09)   10  History of breast lump (V13 89) (Z87 898)   11  History of Nodule of tendon sheath (727 89) (M67 90)    Surgical History    1  History of Laminectomy Lumbar    Family History    1  Family history of diabetes mellitus (V18 0) (Z83 3)    2  Family history of dementia (V17 2) (Z81 8)    Social History    · Current every day smoker (305 1) (F17 200)   · Tobacco use (305 1) (Z72 0)  The social history was reviewed and is unchanged  Current Meds   1  ALPRAZolam 1 MG Oral Tablet; TAKE ONE TABLET BY MOUTH THREE TIMES DAILY; Therapy: 40VQI3937 to (Last Rx:20Wln7054)  Requested for: 20Evc6134 Ordered   2  Diclofenac Sodium 75 MG Oral Tablet Delayed Release; 1 two times a day;    Therapy: 84IRH9481 to (Last Rx:30Sui4374)  Requested for: 03Nca6332 Ordered   3  Dulera 200-5 MCG/ACT Inhalation Aerosol; INHALE 2 PUFFS EVERY 12 HOURS; Therapy: 88ACB8169 to (Last Rx:10Ygu6401)  Requested for: 17Ghx0162 Ordered   4  DULoxetine HCl - 60 MG Oral Capsule Delayed Release Particles; TAKE ONE CAPSULE   BY MOUTH EVERY DAY IN THE MORNING; Therapy: 86BFW4121 to (Last Rx:10Nov2015)  Requested for: 97XGD9736 Ordered   5  Lisinopril-Hydrochlorothiazide 20-25 MG Oral Tablet; TAKE ONE TABLET BY MOUTH   EVERY DAY IN THE MORNING; Therapy: 82NSS2968 to (Last Rx:30Klo6102)  Requested for: 81Ycf9620 Ordered   6  Lyrica 100 MG Oral Capsule; TAKE ONE CAPSULE BY MOUTH EVERY EIGHT HOURS; Therapy: 64AHM1577 to (Last Rx:25Ryz3298)  Requested for: 79Vsh3479 Ordered   7  MetFORMIN HCl - 1000 MG Oral Tablet; 1 two times a day; Therapy: 11UQZ1685 to (Last Rx:85Qjc7243)  Requested for: 79Nsc2116 Ordered   8  Oxycodone-Acetaminophen  MG Oral Tablet; 1 Every Six Hours as needed; Therapy: 91Oll0103 to (Last Rx:06Jan2016)  Requested for: 62LNG1337 Ordered   9  OxyCONTIN 80 MG Oral Tablet ER 12 Hour Abuse-Deterrent; one every 12 hours for pain; Therapy: 18YDO6561 to (Last EI:45LJB2404) Ordered   10  Pravastatin Sodium 40 MG Oral Tablet; 1 Every Day At Bedtime; Therapy: 40ANH2656 to (Last Rx:40Jqo4057)  Requested for: 46Dyj3615 Ordered    The medication list was reviewed and updated today  Allergies    1  No Known Drug Allergies    Vitals  Vital Signs [Data Includes: Current Encounter]    Recorded: 72IDA7310 11:44AM   Temperature 96 9 F   Heart Rate 78   Respiration 14   Systolic 492   Diastolic 70   Height 5 ft 3 in   Weight 237 lb    BMI Calculated 41 98   BSA Calculated 2 08     Physical Exam    Constitutional   General appearance: Abnormal   uncomfortable and obese  Abdomen   Abdomen: Abnormal   The abdomen was obese     Musculoskeletal   Gait and station: Normal     Range of motion: Abnormal   Range of Motion: Flexion: painful restricted AROM  Internal rotation: painless restricted AROM  External rotation: painful restricted AROM     Muscle strength/tone: Normal        Signatures   Electronically signed by : NOBLE Mcclain ; Feb 2 2016 12:18PM EST                       (Author)

## 2018-01-23 NOTE — RESULT NOTES
Verified Results  * US ABDOMEN COMPLETE 75Lgr3227 10:50AM Khushbu Garcia Order Number: [id number]    - Patient Instructions: To schedule this appointment, please contact Central Scheduling at [phone]  Test Name Result Flag Reference   US ABDOMEN COMPLETE (Report)     ABDOMEN ULTRASOUND, COMPLETE     INDICATION: 54-year-old woman with right upper quadrant cramping  COMPARISON: Abdominal sonogram from May 14, 2015  TECHNIQUE: Real-time ultrasound of the abdomen was performed with a curvilinear transducer with both volumetric sweeps and still imaging techniques  Image quality is significantly limited due to the patient's body habitus and overlying gastrointestinal gas  FINDINGS:     PANCREAS: Portions obscured by overlying gastrointestinal gas  Imaged pancreas unremarkable  AORTA AND IVC: Not satisfactorily visualized  LIVER:   Poorly visualized with limited far field penetration  Unable to accurately measure liver size  Diffusely increased echogenicity, consistent with fatty infiltration  No gross evidence of hepatic mass, although much of the liver obscured and not evaluated  Main portal vein poorly visualized  GALLBLADDER: Layering biliary sludge without evidence of gallstone  Gallbladder wall normal in thickness, measuring 2 mm  No pericholecystic fluid  Sonographer reports no sonographic Mccabe's sign  BILE DUCTS: Common bile duct not satisfactorily visualized  No intra-hepatic bile duct dilatation  KIDNEYS:    Right kidney measures 10 4 x 4 1 cm  Cortex normal in thickness and echogenicity  No masses  No calculus or hydronephrosis  Left kidney measures 11 7 x 5 5 cm  Cortex normal in thickness and echogenicity  No masses  No calculus or hydronephrosis  SPLEEN:    Normal in size, measuring 10 3 cm in length  Normal echogenicity  No mass or perisplenic collection  ASCITES: None         IMPRESSION:     1  Markedly limited examination as described above  2  Hepatic steatosis  3  Biliary sludge in the gallbladder without evidence of cholelithiasis  4  Nondiagnostic evaluation of the pancreas and bile ducts         Workstation performed: [id number]     Signed by:   Maida Jones MD   12/13/17

## 2018-01-24 ENCOUNTER — TELEPHONE (OUTPATIENT)
Dept: FAMILY MEDICINE CLINIC | Facility: CLINIC | Age: 58
End: 2018-01-24

## 2018-01-24 DIAGNOSIS — E13.9 DIABETES 1.5, MANAGED AS TYPE 2 (HCC): Primary | ICD-10-CM

## 2018-01-24 NOTE — PROGRESS NOTES
Assessment   1  Conjunctivitis (372 30) (H10 9)   2  Chronic pain syndrome (338 4) (G89 4)   3  Depression with anxiety (300 4) (F41 8)   4   Hypertension (401 9) (I10)    Plan   Chronic obstructive pulmonary disease    · Ventolin  (90 Base) MCG/ACT Inhalation Aerosol Solution; INHALE TWO    PUFFS BY MOUTH EVERY SIX HOURS AS NEEDED FOR COUGH/WHEEZE  Chronic pain syndrome    · OxyCONTIN 10 MG Oral Tablet ER 12 Hour Abuse-Deterrent; one tab po bid  Chronic pain, Disc degeneration, lumbosacral    · Oxycodone-Acetaminophen  MG Oral Tablet (Percocet); 1 Every Six Hours    as needed  Conjunctivitis    · Tobramycin 0 3 % Ophthalmic Solution; 1-2 drops in affected eye(s) q4hrs wa x    3-5d   · Avoid touching or rubbing your eyes ; Status:Complete;   Done: 94EWA9035 04:52PM   · Avoid using eye makeup until your problem has cleared up ; Status:Complete;   Done:    55TDW9349 04:52PM   · Good handwashing is one of the best ways to control the spread of germs ;    Status:Complete;   Done: 68PEW0205 04:52PM   · How to use eye drops ; Status:Complete;   Done: 92ZQH0660 04:52PM   · Call [phone] if: Drainage from the eye is getting worse ; Status:Complete;   Done:    56JEN5752 04:52PM   · Call [phone] if: Drainage from the eye is not better in 1 day or gone in 1 week ;    Status:Complete;   Done: 32TLZ4833 04:52PM  Depression with anxiety    · ALPRAZolam 1 MG Oral Tablet; Taper down to ONE-HALF  tab BID prn  Depression with anxiety, Disc degeneration, lumbosacral    · DULoxetine HCl - 60 MG Oral Capsule Delayed Release Particles (Cymbalta);    TAKE ONE CAPSULE BY MOUTH EVERY DAY IN THE MORNING  Diabetes    · Hemoglobin A1c- POC; Status:Complete;   Done: 28FVW1036 02:12PM  Diabetes type 2, uncontrolled    · Alogliptin Benzoate 25 MG Oral Tablet; TAKE 1 TABLET BY MOUTH ONCE A DAY   · OneTouch Verio In Vitro Strip; TEST ONCE DAILY  Disc degeneration, lumbosacral    · Lyrica 100 MG Oral Capsule; TAKE ONE CAPSULE BY MOUTH EVERY EIGHT    HOURS  Hyperlipidemia    · Atorvastatin Calcium 80 MG Oral Tablet; 1 EVERY MORNING  Hypertension    · Lisinopril-Hydrochlorothiazide 20-25 MG Oral Tablet; TAKE ONE TABLET BY    MOUTH EVERY DAY IN THE MORNING  Insomnia    · QUEtiapine Fumarate 25 MG Oral Tablet; 1-2 tablets qhs  PMH: Type 2 diabetes mellitus with hyperglycemia    · MetFORMIN HCl - 1000 MG Oral Tablet; 1 two times a day    Discussion/Summary      Continue to taper down on benzodiazepine(alprazolam) pain med same for now--feel benefits outweigh risks in pt at this time mgt contract on file, [place]  has been previously reviewed        Possible side effects of new medications were reviewed with the patient/guardian today  The treatment plan was reviewed with the patient/guardian  The patient/guardian understands and agrees with the treatment plan      Chief Complaint   pt here for BP and med check  also c/o ?eye infection  Sylvain Manifold tc/cma      History of Present Illness   62 yo pt in for follow-up wnl  been to pain specialist--notes reviewed- pt not interested in any further surgical interventtion at this time- like to maximize non-surg options first--pain mgt d/w pt possible other med options but pt needs to be tapered off benzodiazepine-- acute c/o eye irritation/discharge-no trauma, no fever           Active Problems   1  Abdominal bloating (787 3) (R14 0)   2  Abdominal cramping (789 00) (R10 9)   3  Atypical nevi (216 9) (D22 9)   4  Back pain (724 5) (M54 9)   5  BMI 40 0-44 9, adult (V85 41) (Z68 41)   6  Chronic low back pain (724 2,338 29) (M54 5,G89 29)   7  Chronic obstructive pulmonary disease (496) (J44 9)   8  Chronic pain (338 29) (G89 29)   9  Chronic pain syndrome (338 4) (G89 4)   10  Conjunctivitis (372 30) (H10 9)   11  Cramp in lower leg (729 82) (R25 2)   12  Cramps of lower extremity (729 82) (R25 2)   13  Depression with anxiety (300 4) (F41 8)   14  Diabetes (250 00) (E11 9)   15   Diabetes type 2, uncontrolled (250 02) (E11 65)   16  Disc degeneration, lumbosacral (722 52) (M51 37)   17  Encounter for screening for malignant neoplasm of colon (V76 51) (Z12 11)   18  Encounter for screening mammogram for malignant neoplasm of breast (V76 12)      (Z12 31)   19  Esophageal reflux (530 81) (K21 9)   20  Fungal dermatitis (111 9) (B36 9)   21  Hyperlipidemia (272 4) (E78 5)   22  Hypertension (401 9) (I10)   23  Insomnia (780 52) (G47 00)   24  Laceration of middle finger of right hand without complication, subsequent encounter      (V58 89,883 0) (N12 773X)   25  Left shoulder pain (719 41) (M25 512)   26  Lumbar radiculopathy (724 4) (M54 16)   27  Morbid or severe obesity due to excess calories (278 01) (E66 01)   28  Need for influenza vaccination (V04 81) (Z23)   29  Need for prophylactic vaccination and inoculation against influenza (V04 81) (Z23)   30  Need for tetanus booster (V03 7) (Z23)   31  Nicotine dependence (305 1) (F17 200)   32  Pap smear, as part of routine gynecological examination (V76 2) (Z01 419)   33  Persistent insomnia of non-organic origin (307 42) (F51 01)   34  Post-laminectomy syndrome (722 80) (M96 1)   35  Rib pain on right side (786 50) (R07 81)   36  Screening for breast cancer (V76 10) (Z12 31)   37  Screening for colorectal cancer (V76 51) (Z12 11,Z12 12)   38  Swelling of calf (729 81) (M79 89)   39  Tenderness of right calf (729 5) (M79 661)   40  Varicose veins of both lower extremities with pain (454 8) (I83 813)   41  Varicose veins with inflammation (454 1) (I83 10)   42  Varicose veins with pain, right    Past Medical History   1  History of Abdominal right upper quadrant tenderness (789 61) (R10 811)   2  History of Acute bronchitis (466 0) (J20 9)   3  History of Acute bronchitis (466 0) (J20 9)   4  History of Acute pain of left knee (719 46) (M25 562)   5  History of Acute upper respiratory infection (465 9) (J06 9)   6  History of Anxiety (300 00) (F41 9)   7  History of Bloating (787 3) (R14 0)   8  History of Carpal tunnel syndrome, unspecified laterality (354 0) (G56 00)   9  History of COPD with exacerbation (491 21) (J44 1)   10  History of Hip pain, unspecified laterality   11  History of acute bronchitis (V12 69) (Z87 09)   12  History of acute bronchitis (V12 69) (Z87 09)   13  History of acute bronchitis (V12 69) (Z87 09)   14  History of arthritis (V13 4) (Z87 39)   15  History of asthma (V12 69) (Z87 09)   16  History of breast lump (V13 89) (Z87 898)   17  History of depression (V11 8) (Z86 59)   18  History of fibromyalgia (V13 59) (Z87 39)   19  History of hypercholesterolemia (V12 29) (Z86 39)   20  History of sinusitis (V12 69) (Z87 09)   21  Hypertension (401 9) (I10)   22  History of Joint pain, knee (719 46) (M25 569)   23  History of Laceration of finger (883 0) (S61 219A)   24  History of Nodule of tendon sheath (727 89) (M67 90)   25  History of Right calf pain (729 5) (M79 661)   26  History of Type 2 diabetes mellitus with hyperglycemia (250 00) (E11 65)   27  History of Visit for wound check (V58 89) (Z51 89)    Surgical History   1  History of Laminectomy Lumbar    Family History   Mother    1  Family history of diabetes mellitus (V18 0) (Z83 3)  Father    2  Family history of dementia (V17 2) (Z81 8)    Social History    · Current every day smoker (305 1) (F17 200)   ·    · Lives alone without help available (V60 4) (Z60 2)   · No alcohol use   · No illicit drug use   · Tobacco use (305 1) (Z72 0)   · Unemployed (V62 0) (Z56 0)    Current Meds    1  Alogliptin Benzoate 25 MG Oral Tablet; TAKE 1 TABLET BY MOUTH ONCE A DAY; Therapy: 97OIR4475 to (Evaluate:10Mar2018)  Requested for: 75UOR5608; Last     Rx:10Nov2017; Status: ACTIVE - Transmit to Pharmacy - Awaiting Verification Ordered   2  ALPRAZolam 1 MG Oral Tablet; Taper down to ONE-HALF  tab BID prn;      Therapy: 61KHR9456 to (Evaluate:12Jan2018)  Requested for: 52Dym9020; Last Rx:50Hea4309 Ordered   3  Atorvastatin Calcium 80 MG Oral Tablet; 1 EVERY MORNING; Therapy: 41SDN5285 to (Last Rx:95Fbj2580)  Requested for: 31Oct2017; Status: ACTIVE     - Transmit to Pharmacy - Awaiting Verification Ordered   4  Diclofenac Sodium 75 MG Oral Tablet Delayed Release; 1 two times a day; Therapy: 82LWA6601 to (Last VF:98CSE9201)  Requested for: 35VGY3859; Status: ACTIVE     - Transmit to Pharmacy - Awaiting Verification Ordered   5  DULoxetine HCl - 60 MG Oral Capsule Delayed Release Particles; TAKE ONE CAPSULE     BY MOUTH EVERY DAY IN THE MORNING; Therapy: 36DEU4205 to (Last Rx:70Hin4651)  Requested for: 28Nov2017; Status: ACTIVE     - Transmit to Pharmacy - Awaiting Verification Ordered   6  Fluticasone Propionate 50 MCG/ACT Nasal Suspension; USE ONE TO TWO SPRAY(S)     IN EACH NOSTRIL ONCE DAILY; Therapy: 97OVD1230 to (Last Rx:83Xgk8351)  Requested for: 56OLF4352 Ordered   7  Lisinopril-Hydrochlorothiazide 20-25 MG Oral Tablet; TAKE ONE TABLET BY MOUTH     EVERY DAY IN THE MORNING; Therapy: 28YLR6606 to (Evaluate:19Apr2018)  Requested for: 47HZO9645; Last     Rx:20Nov2017 Ordered   8  Lyrica 100 MG Oral Capsule; TAKE ONE CAPSULE BY MOUTH EVERY EIGHT HOURS; Therapy: 65WIS9152 to (Last Rx:20Nov2017)  Requested for: 20Nov2017 Ordered   9  MetFORMIN HCl - 1000 MG Oral Tablet; 1 two times a day; Therapy: 05VZW2494 to (Last ZP:27ZCD5084)  Requested for: 31Oct2017; Status: ACTIVE     - Transmit to [facility] Verification Ordered   10  OneTouch Test STRP; Therapy: ([employer]) to Recorded   11  Oxycodone-Acetaminophen  MG Oral Tablet; 1 Every Six Hours as needed; Therapy: 56Ike6934 to (Last Rx:87Knx3561)  Requested for: 83Qzz5654 Ordered   12  OxyCONTIN 10 MG Oral Tablet ER 12 Hour Abuse-Deterrent; one tab po bid; Therapy: 05Bib0066 to (Last Rx:27Xur4872) Ordered   13   QUEtiapine Fumarate 25 MG Oral Tablet; 1-2 tablets qhs;      Therapy: 71VXE7682 to (Last Rx:01Nov2017)  Requested for: 07TWR9092; Status:      ACTIVE - Transmit to [facility] Verification Ordered   14  Ventolin  (90 Base) MCG/ACT Inhalation Aerosol Solution; INHALE TWO PUFFS      BY MOUTH EVERY SIX HOURS AS NEEDED FOR COUGH/WHEEZE;      Therapy: 35DNG7354 to (Jesus Byrnes)  Requested for: 20Nov2017; Last      Rx:20Nov2017; Status: ACTIVE - Transmit to [facility] Verification Ordered     The medication list was reviewed and updated today  Allergies   1  No Known Drug Allergies    Vitals   Vital Signs    Recorded: 43PUS9689 01:36PM   Temperature 96 5 F, Temporal   Heart Rate 88, L Radial   Pulse Quality Normal, L Radial   Respiration Quality Normal   Respiration 22   Systolic 788, LUE, Sitting   Diastolic 72, LUE, Sitting   Height 5 ft 3 in   Weight 249 lb    BMI Calculated 44 11   BSA Calculated 2 12     Physical Exam        Constitutional      General appearance: No acute distress, well appearing and well nourished  chronically ill-- and-- obese  Eyes      Conjunctiva and lids: Abnormal  -- b/l conj injection w some crusting along lashline on left  Pulmonary      Auscultation of lungs: Clear to auscultation  Cardiovascular RRR  Musculoskeletal      Inspection/palpation of joints, bones, and muscles: Abnormal  -- cervical and LS paravertebral tenderness w restricted ROM  Neurologic      Cranial nerves: Cranial nerves 2-12 intact  Reflexes: 2+ and symmetric  Sensation: No sensory loss         Psychiatric      Orientation to person, place, and time: Normal        Mood and affect: Normal           Results/Data   Hemoglobin A1c- POC 46NHU3630 02:12PM Maddy Aburto      Test Name Result Flag Reference   HEMOGLOBIN A1C 7 3        PHQ-9 Adult Depression Screening 25LIV4975 01:44PM Rajiv, Seema      Test Name Result Flag Reference   PHQ-9 Adult Depression Score 7     Over the last two weeks, how often have you been bothered by any of the following problems? Little interest or pleasure in doing things: Not at all - 0     Feeling down, depressed, or hopeless: More than half the days - 2     Trouble falling or staying asleep, or sleeping too much: Not at all - 0     Feeling tired or having little energy: Nearly every day - 3     Poor appetite or over eating: Not at all - 0     Feeling bad about yourself - or that you are a failure or have let yourself or your family down: Several days - 1     Trouble concentrating on things, such as reading the newspaper or watching television: Not at all - 0     Moving or speaking so slowly that other people could have noticed  Or the opposite -  being so fidgety or restless that you have been moving around a lot more than usual: Several days - 1     Thoughts that you would be better off dead, or of hurting yourself in some way: Not at all - 0   PHQ-9 Adult Depression Screening Negative     PHQ-9 Difficulty Level Not difficult at all     PHQ-9 Severity Mild Depression          Health Management   Pap smear, as part of routine gynecological examination   (1) THIN PREP PAP WITH IMAGING; every 1 year; Next Due: 50Omv3115; Overdue    Future Appointments      Date/Time Provider Specialty Site   02/01/2018 11:00 AM NOBLE Butler   Neurosurgery [facility] 27     Signatures    Electronically signed by : NOBLE Mayer ; Jan 23 2018  4:53PM EST                       (Author)

## 2018-02-01 ENCOUNTER — OFFICE VISIT (OUTPATIENT)
Dept: NEUROSURGERY | Facility: CLINIC | Age: 58
End: 2018-02-01
Payer: OTHER MISCELLANEOUS

## 2018-02-01 VITALS
BODY MASS INDEX: 41.29 KG/M2 | SYSTOLIC BLOOD PRESSURE: 119 MMHG | HEIGHT: 63 IN | TEMPERATURE: 97.2 F | HEART RATE: 86 BPM | WEIGHT: 233 LBS | DIASTOLIC BLOOD PRESSURE: 81 MMHG | RESPIRATION RATE: 16 BRPM

## 2018-02-01 DIAGNOSIS — G89.4 CHRONIC PAIN SYNDROME: ICD-10-CM

## 2018-02-01 DIAGNOSIS — M54.41 CHRONIC BILATERAL LOW BACK PAIN WITH BILATERAL SCIATICA: ICD-10-CM

## 2018-02-01 DIAGNOSIS — M96.1 POSTLAMINECTOMY SYNDROME, NOT ELSEWHERE CLASSIFIED: ICD-10-CM

## 2018-02-01 DIAGNOSIS — M54.42 CHRONIC BILATERAL LOW BACK PAIN WITH BILATERAL SCIATICA: ICD-10-CM

## 2018-02-01 DIAGNOSIS — G89.29 CHRONIC BILATERAL LOW BACK PAIN WITH BILATERAL SCIATICA: ICD-10-CM

## 2018-02-01 DIAGNOSIS — R10.32 GROIN PAIN, LEFT: ICD-10-CM

## 2018-02-01 DIAGNOSIS — M43.16 SPONDYLOLISTHESIS OF LUMBAR REGION: Primary | Chronic | ICD-10-CM

## 2018-02-01 PROBLEM — IMO0002 ADJACENT SEGMENT DISEASE WITH SPINAL STENOSIS: Chronic | Status: ACTIVE | Noted: 2018-02-01

## 2018-02-01 PROCEDURE — 99205 OFFICE O/P NEW HI 60 MIN: CPT | Performed by: NEUROLOGICAL SURGERY

## 2018-02-01 RX ORDER — ALBUTEROL SULFATE 90 UG/1
AEROSOL, METERED RESPIRATORY (INHALATION) AS NEEDED
COMMUNITY
Start: 2016-03-23 | End: 2018-03-19 | Stop reason: SDUPTHER

## 2018-02-01 RX ORDER — PREGABALIN 100 MG/1
100 CAPSULE ORAL
COMMUNITY
Start: 2012-06-28 | End: 2018-03-19 | Stop reason: DRUGHIGH

## 2018-02-01 RX ORDER — TOBRAMYCIN 3 MG/ML
SOLUTION/ DROPS OPHTHALMIC
COMMUNITY
Start: 2018-01-22 | End: 2018-03-19 | Stop reason: ALTCHOICE

## 2018-02-01 RX ORDER — ALOGLIPTIN 25 MG/1
1 TABLET, FILM COATED ORAL DAILY
COMMUNITY
Start: 2017-11-10 | End: 2018-03-19 | Stop reason: SDUPTHER

## 2018-02-01 NOTE — PROGRESS NOTES
Office Note - Neurosurgery   Maria Luz Gaona 61 y o  female MRN: [id number]      Assessment:    50-year-old woman with lower back pain and bilateral radiculopathy secondary to adjacent level degenerative changes and spondylolisthesis at L4-5  She has left groin pain consistent with osteoarthritis of the left hip  I reviewed her history, physical examination and imaging in detail with her today  She has tried a number of nonsurgical pain management strategies with limited improvement in her symptoms  She is receiving education with respect to neuromodulation in the form of a spinal cord stimulator trial   This may help her lower back pain and bilateral leg pain  However, she does have anterolisthesis at L4-5 with stenosis impinging on the exiting nerve roots  She is a candidate for reopening of the lumbar incision for inspection of previous fusion and L4-5 with decompression and possible transverse lumbar interbody fusion and L4-S1 posterior instrumented fixation and fusion with possible extension to additional levels  The goal of surgery is to relieve pressure neural structures and hopefully improve radicular pain and symptoms of neurogenic claudication  Weakness, numbness and back pain are less likely to improve  The risks of surgery were described in detail  1  Risk of general anesthetic with possible cardiac and respiratory complication  Risk of infection and bleeding  2  Risk of neurological injury with new pain, weakness or numbness in the legs or difficulties with bowel and bladder function  Risk of CSF leak was described  3  Possible need for additional lumbar spine surgery in the future  All her questions were answered to her satisfaction  She would like to take some time to consider her options  She may require both lumbar decompression and fusion and at some point neuromodulation in the form of spinal cord stimulator to more effectively manage her symptoms    We also discussed the impact of smoking on perioperative morbidity and degenerative disc disease  There are number of smoking cessation strategies available to her and she will discuss this further with her PCP  I reviewed the signs and symptoms of progressive lumbar radiculopathy and cauda equina syndrome with her today asked her to contact my office should any concerns arise  She will follow up on a p r n  basis  I would be pleased to see her again should she have additional questions regarding surgery  She is in agreement with this course of action  Plan:    Diagnoses and all orders for this visit:    Spondylolisthesis of lumbar region    Chronic pain syndrome    Postlaminectomy syndrome, not elsewhere classified    Chronic bilateral low back pain with bilateral sciatica    Groin pain, left  -     XR hips bilateral 3-4 vw w pelvis if performed; Future    Other orders  -     pregabalin (LYRICA) 100 mg capsule; Take 100 mg by mouth  -     Alogliptin Benzoate 25 MG TABS; Take 1 tablet by mouth daily  -     tobramycin (TOBREX) 0 3 % SOLN; Apply to eye  -     albuterol (VENTOLIN HFA) 90 mcg/act inhaler; Inhale as needed        Subjective/Objective     Chief Complaint    Back pain radiating to  Left groin area    HPI    58-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 spine surgery in 2005 which provided some improvement in her lower back pain  However over the past few years she has had been having increasing back pain mostly when standing and walking  The pain can radiate into her left groin  She rates his 5 to 6/10 pain  She has also noticed some numbness and tingling diffusely down her legs, more so on the left than on the right which he describes as pulsating  It is not clearly positional or activity related  She denies any difficulties with bowel bladder function or changing perineal sensation  She currently takes oxycodone, Lyrica and diclofenac with some improvement in her symptoms  Physical therapy and caudal epidural steroid injection were not helpful  She is attending a spinal cord stimulator education session later on today  She presents today to review the results of an MRI and plain film of the lumbar spine  ROS    Review of Systems   Constitutional: Negative  HENT: Negative  Eyes: Negative  Respiratory: Negative  Cardiovascular: Negative  Gastrointestinal: Positive for constipation (due to meds)  Negative for abdominal distention, abdominal pain, anal bleeding, blood in stool, diarrhea, nausea, rectal pain and vomiting  Endocrine: Negative  Genitourinary: Negative  Musculoskeletal: Positive for back pain (radiating to left groing area)  Negative for arthralgias, gait problem, joint swelling, myalgias, neck pain and neck stiffness  Skin: Negative  Allergic/Immunologic: Negative  Neurological: Positive for numbness (numbness and tingling  B/L legs)  Negative for dizziness, tremors, seizures, syncope, facial asymmetry, speech difficulty, weakness, light-headedness and headaches  Hematological: Negative  Psychiatric/Behavioral: Negative  Family History    History reviewed  No pertinent family history  Social History    Social History     Social History    Marital status: Single     Spouse name: N/A    Number of children: N/A    Years of education: N/A     Occupational History    Not on file       Social History Main Topics    Smoking status: Current Every Day Smoker     Packs/day: 1 00     Types: Cigarettes    Smokeless tobacco: Never Used    Alcohol use No    Drug use: No    Sexual activity: Not on file     Other Topics Concern    Not on file     Social History Narrative    No narrative on file       Past Medical History    Past Medical History:   Diagnosis Date    Anxiety     Back pain     COPD [facility]chronic obstructive pulmonary disease) ([facility] )     Depression     Diabetes mellitus ([facility] )     GERD (gastroesophageal reflux disease)     Hyperlipidemia     Hypertension     Insomnia     Obesity     Varicose vein of leg        Surgical History    Past Surgical History:   Procedure Laterality Date    BACK SURGERY  2005    lower fusion    CAUDAL BLOCK N/A 2017    Procedure: BLOCK / INJECTION CAUDAL;  Surgeon: Velia Toro MD;  [facility] MA[facility];  Service: Pain Management      SECTION         Medications      Current Outpatient Prescriptions:     albuterol (VENTOLIN HFA) 90 mcg/act inhaler, Inhale as needed, Disp: , Rfl:     Alogliptin Benzoate 25 MG TABS, Take 1 tablet by mouth daily, Disp: , Rfl:     pregabalin (LYRICA) 100 mg capsule, Take 100 mg by mouth, Disp: , Rfl:     tobramycin (TOBREX) 0 3 % SOLN, Apply to eye, Disp: , Rfl:     ALPRAZolam (XANAX) 1 mg tablet, Take 1 mg by mouth daily at bedtime as needed for anxiety, Disp: , Rfl:     atorvastatin (LIPITOR) 80 mg tablet, Take 80 mg by mouth daily, Disp: , Rfl:     diclofenac (VOLTAREN) 75 mg EC tablet, Take 50 mg by mouth 2 (two) times a day, Disp: , Rfl:     DULoxetine (CYMBALTA) 60 mg delayed release capsule, Take 20 mg by mouth daily, Disp: , Rfl:     fluticasone (FLONASE) 50 mcg/act nasal spray, 1 spray into each nostril daily, Disp: , Rfl:     glucose blood test strip, by In Vitro route daily, Disp: , Rfl:     lisinopril-hydrochlorothiazide (PRINZIDE,ZESTORETIC) 20-25 MG per tablet, Take 1 tablet by mouth daily, Disp: , Rfl:     metFORMIN (GLUCOPHAGE) 1000 MG tablet, Take 1,000 mg by mouth 2 (two) times a day with meals, Disp: , Rfl:     oxyCODONE (OxyCONTIN) 10 mg 12 hr tablet, Take 10 mg by mouth every 12 (twelve) hours, Disp: , Rfl:     oxyCODONE-acetaminophen (PERCOCET)  mg per tablet, Take 1 tablet by mouth every 4 (four) hours as needed for moderate pain, Disp: , Rfl:     pregabalin (LYRICA) 100 mg capsule, Take 50 mg by mouth 3 (three) times a day, Disp: , Rfl:     QUEtiapine (SEROquel) 25 mg tablet, Take 25 mg by mouth daily at bedtime, Disp: , Rfl:     Allergies    No Known Allergies    The following portions of the patient's history were reviewed and updated as appropriate: allergies, current medications, past family history, past medical history, past social history, past surgical history and problem list     Investigations    I personally reviewed the MRI and XRAY results with the patient:    MRI of the lumbar spine without contrast dated December 12th, 2017  Previous anterior lumbar interbody fusion at L5-S1 with posterior decompression  There is degenerative changes at L4-5 with grade 1 anterolisthesis and mild to moderate central canal stenosis and moderate biforaminal stenosis, worse on the left than on the right  No other areas of significant neural compression  Intrathecal contents unremarkable  Upright plain film lumbar spine dated March of 2017  Grade 1 degenerative anterolisthesis at L4-5 which is adjacent to previous L5-S1 ALIF with apparent solid bony fusion across the L5-S1 disc space  Physical Exam    Vitals:  Blood pressure 119/81, pulse 86, temperature (!) 97 2 °F (36 2 °C), temperature source Tympanic, resp  rate 16, height 5' 3" (1 6 m), weight 106 kg (233 lb)  ,Body mass index is 41 27 kg/m²  Physical Exam   Constitutional: She is oriented to person, place, and time  She appears well-developed and well-nourished  Cardiovascular: Normal heart sounds  Pulmonary/Chest: Effort normal  She has wheezes in the left upper field  Musculoskeletal:        Left hip: She exhibits decreased range of motion  Lumbar back: She exhibits normal range of motion and no tenderness  Midline lumbar incision well healed  Extension produces pain which radiates into the legs  Pain and left groin with external rotation of the left hip  Neurological: She is alert and oriented to person, place, and time  Gait normal      Neurologic Exam     Mental Status   Oriented to person, place, and time       Motor Exam   Muscle bulk: normal  Right leg tone: normal  Left leg tone: normal    Strength   Strength 5/5 except as noted  Upper extremities not tested       Sensory Exam   Right leg light touch: normal  Left leg light touch: normal  Right leg pinprick: normal  Left leg pinprick: normal    Gait, Coordination, and Reflexes     Gait  Gait: normal

## 2018-02-01 NOTE — LETTER
February 1, 2018     Michael Mckinley MD  [address] [facility]    Patient: Maria C Ny   YOB: 1960   Date of Visit: 2/1/2018       Dear Dr Alejandro Yeh: Thank you for referring Abrahan Solano to me for evaluation  Below are the relevant portions of my assessment and plan of care  If you have questions, please do not hesitate to call me  I look forward to following [facility] along with you           Sincerely,        Venkata Wilson MD        CC: No Recipients

## 2018-02-05 ENCOUNTER — TELEPHONE (OUTPATIENT)
Dept: PAIN MEDICINE | Facility: CLINIC | Age: 58
End: 2018-02-05

## 2018-02-05 DIAGNOSIS — G89.4 CHRONIC PAIN SYNDROME: Primary | ICD-10-CM

## 2018-02-05 NOTE — TELEPHONE ENCOUNTER
Pt to be an [employer] SCS Trial with Dr Shelley Garcia  Pt completed education  This will be Worker's comp  Pt needs psych eval script   Message sent to Dr Shelley Garcia to put order in for psych eval

## 2018-02-05 NOTE — TELEPHONE ENCOUNTER
Script for psych eval with list of providers mailed to pt  I LM informing pt that on the script it is referring her to one particular group but that she can go to anyone on the enclosed list of providers

## 2018-02-14 ENCOUNTER — OFFICE VISIT (OUTPATIENT)
Dept: FAMILY MEDICINE CLINIC | Facility: CLINIC | Age: 58
End: 2018-02-14
Payer: OTHER MISCELLANEOUS

## 2018-02-14 VITALS
HEART RATE: 84 BPM | BODY MASS INDEX: 43.23 KG/M2 | TEMPERATURE: 97.6 F | RESPIRATION RATE: 20 BRPM | HEIGHT: 63 IN | WEIGHT: 244 LBS | DIASTOLIC BLOOD PRESSURE: 82 MMHG | SYSTOLIC BLOOD PRESSURE: 124 MMHG

## 2018-02-14 DIAGNOSIS — E11.9 TYPE 2 DIABETES MELLITUS WITHOUT COMPLICATION, WITHOUT LONG-TERM CURRENT USE OF INSULIN (HCC): ICD-10-CM

## 2018-02-14 DIAGNOSIS — F17.210 CIGARETTE NICOTINE DEPENDENCE WITHOUT COMPLICATION: ICD-10-CM

## 2018-02-14 DIAGNOSIS — R05.9 COUGH: Primary | ICD-10-CM

## 2018-02-14 DIAGNOSIS — G89.4 CHRONIC PAIN SYNDROME: Primary | ICD-10-CM

## 2018-02-14 DIAGNOSIS — F41.9 ANXIETY: ICD-10-CM

## 2018-02-14 PROCEDURE — 99214 OFFICE O/P EST MOD 30 MIN: CPT | Performed by: FAMILY MEDICINE

## 2018-02-14 RX ORDER — ALPRAZOLAM 1 MG/1
1 TABLET ORAL
Qty: 10 TABLET | Refills: 0 | Status: SHIPPED | OUTPATIENT
Start: 2018-02-14 | End: 2018-03-19 | Stop reason: ALTCHOICE

## 2018-02-14 RX ORDER — OXYCODONE HCL 10 MG/1
10 TABLET, FILM COATED, EXTENDED RELEASE ORAL EVERY 12 HOURS SCHEDULED
Qty: 60 TABLET | Refills: 0 | Status: SHIPPED | OUTPATIENT
Start: 2018-02-14 | End: 2018-03-19 | Stop reason: SDUPTHER

## 2018-02-14 RX ORDER — PREDNISONE 20 MG/1
20 TABLET ORAL DAILY
Qty: 11 TABLET | Refills: 0 | Status: SHIPPED | OUTPATIENT
Start: 2018-02-14 | End: 2018-02-16 | Stop reason: SDUPTHER

## 2018-02-14 RX ORDER — AZITHROMYCIN 250 MG/1
TABLET, FILM COATED ORAL
Qty: 6 TABLET | Refills: 0 | Status: SHIPPED | OUTPATIENT
Start: 2018-02-14 | End: 2018-02-19

## 2018-02-14 RX ORDER — OXYCODONE AND ACETAMINOPHEN 10; 325 MG/1; MG/1
1 TABLET ORAL EVERY 4 HOURS PRN
Qty: 30 TABLET | Refills: 0 | Status: SHIPPED | OUTPATIENT
Start: 2018-02-14 | End: 2018-03-19 | Stop reason: SDUPTHER

## 2018-02-15 PROBLEM — G89.29 CHRONIC LOW BACK PAIN: Status: ACTIVE | Noted: 2017-11-02

## 2018-02-15 PROBLEM — I83.813 VARICOSE VEINS OF BOTH LOWER EXTREMITIES WITH PAIN: Status: ACTIVE | Noted: 2017-09-20

## 2018-02-15 PROBLEM — I83.10 VARICOSE VEINS WITH INFLAMMATION: Status: ACTIVE | Noted: 2017-09-20

## 2018-02-15 PROBLEM — G47.00 INSOMNIA: Status: ACTIVE | Noted: 2017-08-14

## 2018-02-15 PROBLEM — M54.16 LUMBAR RADICULOPATHY: Status: ACTIVE | Noted: 2017-11-27

## 2018-02-15 PROBLEM — E11.9 DIABETES (HCC): Status: ACTIVE | Noted: 2017-10-27

## 2018-02-16 DIAGNOSIS — J42 CHRONIC BRONCHITIS, UNSPECIFIED CHRONIC BRONCHITIS TYPE (HCC): Primary | ICD-10-CM

## 2018-02-16 DIAGNOSIS — R05.9 COUGH: ICD-10-CM

## 2018-02-16 NOTE — PROGRESS NOTES
Assessment/Plan:       disPrescription given  Patient reports overall reduced pain and improved level of functioning without significant side effects so will continue on current regimen  Risks and side effects of chronic opioid treatment were discusses in detail with the patient including but not limited to nausea,vomiting, sedation, mental clouding, opioid -induced hyperalgesia,endocrine dysfunction, dependence and tolerance  Patient made aware of FDA black box warning regarding benzodiazepines and opioid medications  Patient is to take medication as prescribed and directed and to keep medication in safe place away from access of others  Pain management contract is on file and [place]  has been reviewed  Follow-up is planned in one month's time or sooner if needed/warranted  Diagnoses and all orders for this visit:    Chronic pain syndrome  -     oxyCODONE (OxyCONTIN) 10 mg 12 hr tablet; Take 1 tablet (10 mg total) by mouth every 12 (twelve) hours Max Daily Amount: 20 mg  -     oxyCODONE-acetaminophen (PERCOCET)  mg per tablet; Take 1 tablet by mouth every 4 (four) hours as needed for moderate pain Max Daily Amount: 6 tablets    Anxiety  -     ALPRAZolam (XANAX) 1 mg tablet; Take 1 tablet (1 mg total) by mouth daily at bedtime as needed for anxiety    Cigarette nicotine dependence without complication  -     Discontinue: nicotine (NICOTROL) 10 MG inhaler; Inhale 1 puff as needed for smoking cessation  -     nicotine (NICOTROL) 10 MG inhaler; Inhale 1 puff as needed for smoking cessation            Subjective:      Patient ID: Janice Caraballo is a 61 y o  female  Chief Complaint   Patient presents with    Pain     follow up       60 yo pt in for follow-up on chronic pain and anxiety--req med refills  Did see spinal and pain mgt specialists-consults reviewed in chart    Pt given possible alternative tx options in form of neuromodulation w spinal cord stimulator vs possible surgical intervention similar to past surgery  Pt not sure if she wants to proceed w any procedures at this time  She continues to work on tapering down bdz  Pain worse in colder weather  No change in b/b  The following portions of the patient's history were reviewed and updated as appropriate: allergies, current medications, past family history, past medical history, past social history, past surgical history and problem list      Review of Systems   Constitutional: Positive for fatigue  Respiratory: Negative  Cardiovascular: Negative  Gastrointestinal: Negative  Genitourinary: Negative  Musculoskeletal: Positive for arthralgias, back pain, gait problem, myalgias and neck pain  Skin: Negative for rash  Neurological: Positive for headaches  Negative for seizures  Psychiatric/Behavioral: Positive for sleep disturbance  The patient is nervous/anxious  Objective:    /82 (BP Location: Left arm, Patient Position: Sitting, Cuff Size: Standard)   Pulse 84   Temp 97 6 °F (36 4 °C)   Resp 20   Ht 5' 3" (1 6 m)   Wt 111 kg (244 lb)   BMI 43 22 kg/m²        Physical Exam   Constitutional: She is oriented to person, place, and time  She appears distressed  overweight   Neck:   Cervical paraspinal and trapezius mm tenderness  Some restricted ROM   Cardiovascular: Normal rate and regular rhythm  Pulmonary/Chest: Effort normal and breath sounds normal    Abdominal: Soft  Bowel sounds are normal    Musculoskeletal: She exhibits tenderness (b/l lower back w restricted ROM)  Neurological: She is alert and oriented to person, place, and time  Skin: Skin is warm and dry  Psychiatric: She has a normal mood and affect  Nursing note and vitals reviewed                Lily Kolb MD

## 2018-02-16 NOTE — PATIENT INSTRUCTIONS
Anxiety, Ambulatory Care   GENERAL INFORMATION:   Anxiety  is a condition that causes you to feel excessive worry, uneasiness, or fear  Family or work stress, smoking, caffeine, and alcohol can increase your risk for anxiety  Certain medicines or health conditions can also increase your risk  Anxiety may begin gradually and can become a long-term condition if it is not managed or treated  Common symptoms include the following:   · Fatigue or muscle tightness     · Shaking, restlessness, or irritability     · Problems focusing     · Trouble sleeping     · Feeling jumpy, easily startled, or dizzy     · Rapid heartbeat or shortness of breath  Seek immediate care for the following symptoms:   · Chest pain, tightness, or heaviness that may spread to your shoulders, arms, jaw, neck, or back    · Feeling like hurting yourself or someone else    · Dizziness or feeling lightheaded or faint  Treatment for anxiety  may include medicines to help you feel calm and relaxed, and decrease your symptoms  Healthcare providers will treat any medical conditions that may be causing your symptoms  Manage anxiety:   · Go to counseling as directed  Cognitive behavioral therapy can help you understand and change how you react to events that trigger your symptoms  · Find ways to manage your symptoms  Activities such as exercise, meditation, or listening to music can help you relax  · Practice deep breathing  Breathing can change how your body reacts to stress  Focus on taking slow, deep breaths several times a day, or during an anxiety attack  Breathe in through your nose, and out through your mouth  · Avoid caffeine  Caffeine can make your symptoms worse  Avoid foods or drinks that are meant to increase your energy level  · Limit or avoid alcohol  Ask your healthcare provider if alcohol is safe for you  You may not be able to drink alcohol if you take certain anxiety or depression medicines   Limit alcohol to 1 drink per day if you are a woman  Limit alcohol to 2 drinks per day if you are a man  A drink of alcohol is 12 ounces of beer, 5 ounces of wine, or 1½ ounces of liquor  Follow up with your healthcare provider as directed:  Write down your questions so you remember to ask them during your visits  CARE AGREEMENT:   You have the right to help plan your care  Learn about your health condition and how it may be treated  Discuss treatment options with your caregivers to decide what care you want to receive  You always have the right to refuse treatment  The above information is an  only  It is not intended as medical advice for individual conditions or treatments  Talk to your doctor, nurse or pharmacist before following any medical regimen to see if it is safe and effective for you  © 2014 [address] is for End User's use only and may not be sold, redistributed or otherwise used for commercial purposes  All illustrations and images included in CareNotes® are the copyrighted property of [employer]  or [employer] Jimenez  Chronic Pain   AMBULATORY CARE:   Chronic pain  is pain that does not get better for 3 months or longer  Chronic pain may hurt all the time, or come and go  Call 911 or have someone call 911 for any of the following:   · You are breathing slower than normal, or you have trouble breathing  · You cannot be awakened  · You have a seizure  Seek care immediately if:   · Your heart is beating slower than normal     · Your heart feels like it is jumping or fluttering  · You cannot think clearly  Contact your healthcare provider if:   · You have side effects from prescription pain medicine, such as itching, nausea, or vomiting  · You have trouble sleeping  · Your pain gets worse, even after you take medicine  · You don't think the medicine is working  · You have questions or concerns about your condition or care    Treatment for chronic pain  may need any of the following:  · Acetaminophen  decreases pain  It is available without a doctor's order  Ask how much to take and how often to take it  Follow directions  Read the labels of all other medicines you are using to see if they also contain acetaminophen, or ask your doctor or pharmacist  Acetaminophen can cause liver damage if not taken correctly  Do not use more than 4 grams (4,000 milligrams) total of acetaminophen in one day  · NSAIDs , such as ibuprofen, help decrease swelling, pain, and fever  This medicine is available with or without a doctor's order  NSAIDs can cause stomach bleeding or kidney problems in certain people  If you take blood thinner medicine, always ask your healthcare provider if NSAIDs are safe for you  Always read the medicine label and follow directions  · Prescription pain medicine  called narcotics or opioids may be given  Ask your healthcare provider how to take this medicine safely  · Anesthetics  can be rubbed on your skin or injected into a nerve or muscle to numb an area  · Other medicines  may reduce pain, anxiety, muscle tension, or swelling  Manage your chronic pain:   · Apply heat  on the area in pain for 20 to 30 minutes every 2 hours for as many days as directed  Heat helps decrease pain and muscle spasms  · Apply ice  on the part of your body that hurts for 15 to 20 minutes every hour or as directed  Use an ice pack, or put crushed ice in a plastic bag  Cover it with a towel  Ice decreases pain and swelling, and helps prevent tissue damage  · Go to physical therapy as directed  A physical therapist teaches you exercises to help improve movement and strength, and to decrease pain  · Exercise for 30 minutes, 3 times a week  Regular physical activity can help decrease pain and improve your quality of life  Ask your healthcare provider about the best exercise plan for your type of pain  · Get enough sleep    Create a relaxing bedtime routine  Go to sleep and wake up at the same time every day  Avoid caffeine in the afternoon  · Talk with a counselor or therapist   A type of counseling called cognitive behavioral therapy (CBT) can help your chronic pain by changing the way you think about it  CBT can also improve your mood, sleep, and ability to move  What you must know if you take narcotic pain medicine:   · You may need to take a bowel movement softener  The most common side effect of prescription pain medicine is constipation  Bowel movement softeners are available over the counter  · Do not mix prescription pain medicines  This can cause an overdose of medicine, which can become life-threatening  Read labels  Make sure you know the ingredients in all of your medicines  · Do not drink alcohol  when you take prescription pain medicine  It is not safe to mix narcotics or opioids with alcohol or illegal drugs  · Prescription pain medicine may impair your ability to drive or work safely  They may also cause dizziness and increase your risk for falling  · Store prescription pain medicine in a safe location at home  Keep your medicine away from children and other people  Never share your medicine with anyone  Follow up with your healthcare provider as directed: You may be referred to a pain specialist  Write down your questions so you remember to ask them during your visits  © 2017 [address] Information is for End User's use only and may not be sold, redistributed or otherwise used for commercial purposes  All illustrations and images included in CareNotes® are the copyrighted property of [employer]  or [address]  The above information is an  only  It is not intended as medical advice for individual conditions or treatments  Talk to your doctor, nurse or pharmacist before following any medical regimen to see if it is safe and effective for you

## 2018-02-19 RX ORDER — PREDNISONE 20 MG/1
20 TABLET ORAL DAILY
Qty: 11 TABLET | Refills: 0 | Status: SHIPPED | OUTPATIENT
Start: 2018-02-19 | End: 2018-03-01

## 2018-02-21 DIAGNOSIS — J30.89 CHRONIC ALLERGIC RHINITIS DUE TO OTHER ALLERGIC TRIGGER, UNSPECIFIED SEASONALITY: Primary | ICD-10-CM

## 2018-02-21 NOTE — TELEPHONE ENCOUNTER
Dr Cleve Cuellar wrote out a script for new blood sugar montiotr ,pt was notified it was at   also she needs to try nicatine patches before she csn try nictron  tc/cma

## 2018-02-22 RX ORDER — FLUTICASONE PROPIONATE 50 MCG
SPRAY, SUSPENSION (ML) NASAL
Qty: 16 G | Refills: 1 | Status: SHIPPED | OUTPATIENT
Start: 2018-02-22 | End: 2018-03-19 | Stop reason: SDUPTHER

## 2018-02-27 ENCOUNTER — TRANSCRIBE ORDERS (OUTPATIENT)
Dept: RADIOLOGY | Facility: CLINIC | Age: 58
End: 2018-02-27

## 2018-02-27 ENCOUNTER — APPOINTMENT (OUTPATIENT)
Dept: RADIOLOGY | Facility: CLINIC | Age: 58
End: 2018-02-27
Payer: COMMERCIAL

## 2018-02-27 ENCOUNTER — TELEPHONE (OUTPATIENT)
Dept: FAMILY MEDICINE CLINIC | Facility: CLINIC | Age: 58
End: 2018-02-27

## 2018-02-27 DIAGNOSIS — R10.32 GROIN PAIN, LEFT: ICD-10-CM

## 2018-02-27 PROCEDURE — 73522 X-RAY EXAM HIPS BI 3-4 VIEWS: CPT

## 2018-03-19 ENCOUNTER — OFFICE VISIT (OUTPATIENT)
Dept: FAMILY MEDICINE CLINIC | Facility: CLINIC | Age: 58
End: 2018-03-19
Payer: COMMERCIAL

## 2018-03-19 DIAGNOSIS — E11.8 TYPE 2 DIABETES MELLITUS WITH COMPLICATION, WITHOUT LONG-TERM CURRENT USE OF INSULIN (HCC): ICD-10-CM

## 2018-03-19 DIAGNOSIS — E78.01 FAMILIAL HYPERCHOLESTEROLEMIA: ICD-10-CM

## 2018-03-19 DIAGNOSIS — F51.01 PRIMARY INSOMNIA: ICD-10-CM

## 2018-03-19 DIAGNOSIS — F17.200 NICOTINE DEPENDENCE, UNCOMPLICATED, UNSPECIFIED NICOTINE PRODUCT TYPE: ICD-10-CM

## 2018-03-19 DIAGNOSIS — I10 ESSENTIAL HYPERTENSION: ICD-10-CM

## 2018-03-19 DIAGNOSIS — F41.8 DEPRESSION WITH ANXIETY: ICD-10-CM

## 2018-03-19 DIAGNOSIS — J44.9 CHRONIC OBSTRUCTIVE PULMONARY DISEASE, UNSPECIFIED COPD TYPE (HCC): ICD-10-CM

## 2018-03-19 DIAGNOSIS — G89.4 CHRONIC PAIN SYNDROME: Primary | ICD-10-CM

## 2018-03-19 DIAGNOSIS — J30.89 CHRONIC ALLERGIC RHINITIS DUE TO OTHER ALLERGIC TRIGGER, UNSPECIFIED SEASONALITY: ICD-10-CM

## 2018-03-19 PROCEDURE — 3074F SYST BP LT 130 MM HG: CPT | Performed by: FAMILY MEDICINE

## 2018-03-19 PROCEDURE — 3078F DIAST BP <80 MM HG: CPT | Performed by: FAMILY MEDICINE

## 2018-03-19 PROCEDURE — 99214 OFFICE O/P EST MOD 30 MIN: CPT | Performed by: FAMILY MEDICINE

## 2018-03-19 RX ORDER — FLUTICASONE PROPIONATE 50 MCG
SPRAY, SUSPENSION (ML) NASAL
Qty: 16 G | Refills: 3 | Status: SHIPPED | OUTPATIENT
Start: 2018-03-19 | End: 2018-07-17 | Stop reason: SDUPTHER

## 2018-03-19 RX ORDER — OXYCODONE HCL 10 MG/1
10 TABLET, FILM COATED, EXTENDED RELEASE ORAL EVERY 12 HOURS SCHEDULED
Qty: 60 TABLET | Refills: 0 | Status: SHIPPED | OUTPATIENT
Start: 2018-03-19 | End: 2018-04-10 | Stop reason: ALTCHOICE

## 2018-03-19 RX ORDER — LISINOPRIL AND HYDROCHLOROTHIAZIDE 25; 20 MG/1; MG/1
1 TABLET ORAL DAILY
Qty: 90 TABLET | Refills: 3 | Status: SHIPPED | OUTPATIENT
Start: 2018-03-19 | End: 2019-04-15 | Stop reason: SDUPTHER

## 2018-03-19 RX ORDER — ALOGLIPTIN 25 MG/1
1 TABLET, FILM COATED ORAL DAILY
Qty: 90 TABLET | Refills: 3 | Status: SHIPPED | OUTPATIENT
Start: 2018-03-19 | End: 2018-06-06 | Stop reason: DRUGHIGH

## 2018-03-19 RX ORDER — QUETIAPINE FUMARATE 25 MG/1
25 TABLET, FILM COATED ORAL 2 TIMES DAILY
Qty: 60 TABLET | Refills: 3 | Status: SHIPPED | OUTPATIENT
Start: 2018-03-19 | End: 2018-08-06 | Stop reason: SDUPTHER

## 2018-03-19 RX ORDER — ATORVASTATIN CALCIUM 80 MG/1
80 TABLET, FILM COATED ORAL DAILY
Qty: 90 TABLET | Refills: 3 | Status: SHIPPED | OUTPATIENT
Start: 2018-03-19 | End: 2019-03-18 | Stop reason: SDUPTHER

## 2018-03-19 RX ORDER — OXYCODONE AND ACETAMINOPHEN 10; 325 MG/1; MG/1
1 TABLET ORAL EVERY 4 HOURS PRN
Qty: 30 TABLET | Refills: 0 | Status: SHIPPED | OUTPATIENT
Start: 2018-03-19 | End: 2018-04-10 | Stop reason: ALTCHOICE

## 2018-03-19 RX ORDER — PREGABALIN 100 MG/1
100 CAPSULE ORAL 3 TIMES DAILY
Qty: 90 CAPSULE | Refills: 1 | Status: SHIPPED | OUTPATIENT
Start: 2018-03-19 | End: 2018-08-18 | Stop reason: SDUPTHER

## 2018-03-19 RX ORDER — ALBUTEROL SULFATE 90 UG/1
2 AEROSOL, METERED RESPIRATORY (INHALATION) EVERY 4 HOURS PRN
Qty: 1 INHALER | Refills: 3 | Status: SHIPPED | OUTPATIENT
Start: 2018-03-19 | End: 2018-07-17 | Stop reason: SDUPTHER

## 2018-03-19 RX ORDER — DULOXETIN HYDROCHLORIDE 60 MG/1
60 CAPSULE, DELAYED RELEASE ORAL DAILY
Qty: 90 CAPSULE | Refills: 3 | Status: SHIPPED | OUTPATIENT
Start: 2018-03-19 | End: 2019-03-19 | Stop reason: SDUPTHER

## 2018-03-19 RX ORDER — VARENICLINE TARTRATE 25 MG
KIT ORAL
Qty: 53 TABLET | Refills: 0 | Status: SHIPPED | OUTPATIENT
Start: 2018-03-19 | End: 2018-05-23 | Stop reason: SDUPTHER

## 2018-03-19 NOTE — PATIENT INSTRUCTIONS
Chronic Pain   WHAT YOU NEED TO KNOW:   What is chronic pain? Chronic pain is pain that does not get better for 3 months or longer  Chronic pain may hurt all the time, or come and go  How is chronic pain diagnosed? Your healthcare provider will ask where your pain is, what it feels like, and when it started  He will ask how your pain is affecting your daily life, your mood, and your sleep  Tell your healthcare provider if anything helps your pain or makes it worse  You may need any of the following:  · Pain scales  use numbers or faces to help you describe how bad your pain is  Your healthcare provider may ask you to rate your pain on a scale of 0 to 10  · An x-ray, ultrasound, CT, or MRI  may show the cause of your chronic pain  You may be given contrast liquid to help the area show up better in the pictures  Tell the healthcare provider if you have ever had an allergic reaction to contrast liquid  Do not enter the MRI room with anything metal  Metal can cause serious injury  Tell the healthcare provider if you have any metal in or on your body  · Stimulation tests  may help to find which nerves or muscles are affected by pain  These tests may include nerve conduction or muscle function studies  How is chronic pain treated? · Medicines:      ¨ Acetaminophen  decreases pain  It is available without a doctor's order  Ask how much to take and how often to take it  Follow directions  Read the labels of all other medicines you are using to see if they also contain acetaminophen, or ask your doctor or pharmacist  Acetaminophen can cause liver damage if not taken correctly  Do not use more than 4 grams (4,000 milligrams) total of acetaminophen in one day  ¨ NSAIDs , such as ibuprofen, help decrease swelling, pain, and fever  This medicine is available with or without a doctor's order  NSAIDs can cause stomach bleeding or kidney problems in certain people   If you take blood thinner medicine, always ask your healthcare provider if NSAIDs are safe for you  Always read the medicine label and follow directions  ¨ Prescription pain medicine  called narcotics or opioids may be given  Ask your healthcare provider how to take this medicine safely  ¨ Anesthetics  can be rubbed on your skin or injected into a nerve or muscle to numb an area  ¨ Other medicines  may reduce pain, anxiety, muscle tension, or swelling  · Transcutaneous electrical nerve stimulation  (TENS) gives you mild, safe electrical signals through a small device attached to your skin  · Surgery  may be done to implant a device that releases pain medicine into your body  Other devices stimulate your nerves with safe electrical signals  What else can I do to manage chronic pain? · Apply heat  on the area in pain for 20 to 30 minutes every 2 hours for as many days as directed  Heat helps decrease pain and muscle spasms  · Apply ice  on the part of your body that hurts for 15 to 20 minutes every hour or as directed  Use an ice pack, or put crushed ice in a plastic bag  Cover it with a towel  Ice decreases pain and swelling, and helps prevent tissue damage  · Go to physical therapy as directed  A physical therapist teaches you exercises to help improve movement and strength, and to decrease pain  · Exercise for 30 minutes, 3 times a week  Regular physical activity can help decrease pain and improve your quality of life  Ask your healthcare provider about the best exercise plan for your type of pain  · Get enough sleep  Create a relaxing bedtime routine  Go to sleep and wake up at the same time every day  Avoid caffeine in the afternoon  · Talk with a counselor or therapist   A type of counseling called cognitive behavioral therapy (CBT) can help your chronic pain by changing the way you think about it  CBT can also improve your mood, sleep, and ability to move    What do I need to know if I take prescription pain medicine? · You may need to take a bowel movement softener  The most common side effect of prescription pain medicine is constipation  Bowel movement softeners are available over the counter  · Do not mix prescription pain medicines  This can cause an overdose of medicine, which can become life-threatening  Read labels  Make sure you know the ingredients in all of your medicines  · Do not drink alcohol  when you take prescription pain medicine  It is not safe to mix narcotics or opioids with alcohol or illegal drugs  · Prescription pain medicine may impair your ability to drive or work safely  They may also cause dizziness and increase your risk for falling  · Store prescription pain medicine in a safe location at home  Keep your medicine away from children and other people  Never share your medicine with anyone  Call 911 or have someone call 911 for any of the following:   · You are breathing slower than normal, or you have trouble breathing  · You cannot be awakened  · You have a seizure  When should I seek immediate care? · Your heart is beating slower than normal     · Your heart feels like it is jumping or fluttering  · You cannot think clearly  When should I contact my healthcare provider? · You have side effects from prescription pain medicine, such as itching, nausea, or vomiting  · You have trouble sleeping  · Your pain gets worse, even after you take medicine  · You don't think the medicine is working  · You have questions or concerns about your condition or care  CARE AGREEMENT:   You have the right to help plan your care  Learn about your health condition and how it may be treated  Discuss treatment options with your caregivers to decide what care you want to receive  You always have the right to refuse treatment  The above information is an  only  It is not intended as medical advice for individual conditions or treatments   Talk to your doctor, nurse or pharmacist before following any medical regimen to see if it is safe and effective for you  © 2017 [address] Information is for End User's use only and may not be sold, redistributed or otherwise used for commercial purposes  All illustrations and images included in CareNotes® are the copyrighted property of [employer]  or [address]  Cigarette Smoking and Your Health   AMBULATORY CARE:   Risks to your health if you smoke:  Nicotine and other chemicals found in tobacco damage every cell in your body  Even if you are a light smoker, you have an increased risk for cancer, heart disease, and lung disease  If you are pregnant or have diabetes, smoking increases your risk for complications  Benefits to your health if you stop smoking:   · You decrease respiratory symptoms such as coughing, wheezing, and shortness of breath  · You reduce your risk for cancers of the lung, mouth, throat, kidney, bladder, pancreas, stomach, and cervix  If you already have cancer, you increase the benefits of chemotherapy  You also reduce your risk for cancer returning or a second cancer from developing  · You reduce your risk for heart disease, blood clots, heart attack, and stroke  · You reduce your risk for lung infections, and diseases such as pneumonia, asthma, chronic bronchitis, and emphysema  · Your circulation improves  More oxygen can be delivered to your body  If you have diabetes, you lower your risk for complications, such as kidney, artery, and eye diseases  You also lower your risk for nerve damage  Nerve damage can lead to amputations, poor vision, and blindness  · You improve your body's ability to heal and to fight infections  Benefits to the health of others if you stop smoking:  Tobacco is harmful to nonsmokers who breathe in your secondhand smoke   The following are ways the health of others around you may improve when you stop smoking:  · You lower the risks for lung cancer and heart disease in nonsmoking adults  · If you are pregnant, you lower the risk for miscarriage, early delivery, low birth weight, and stillbirth  You also lower your baby's risk for SIDS, obesity, developmental delay, and neurobehavioral problems, such as ADHD  · If you have children, you lower their risk for ear infections, colds, pneumonia, bronchitis, and asthma  For more information and support to stop smoking:   · [employer]  Phone: 0- 433 - [phone]  Web Address: www [employer]  Follow up with your healthcare provider as directed:  Write down your questions so you remember to ask them during your visits  © 2017 [address] [facility] Information is for End User's use only and may not be sold, redistributed or otherwise used for commercial purposes  All illustrations and images included in CareNotes® are the copyrighted property of [employer] M [employer]  or Daniel Jimenez  The above information is an  only  It is not intended as medical advice for individual conditions or treatments  Talk to your doctor, nurse or pharmacist before following any medical regimen to see if it is safe and effective for you

## 2018-03-20 ENCOUNTER — TELEPHONE (OUTPATIENT)
Dept: FAMILY MEDICINE CLINIC | Facility: CLINIC | Age: 58
End: 2018-03-20

## 2018-03-20 VITALS
WEIGHT: 243 LBS | HEIGHT: 63 IN | SYSTOLIC BLOOD PRESSURE: 108 MMHG | OXYGEN SATURATION: 97 % | RESPIRATION RATE: 18 BRPM | TEMPERATURE: 98 F | DIASTOLIC BLOOD PRESSURE: 72 MMHG | HEART RATE: 98 BPM | BODY MASS INDEX: 43.05 KG/M2

## 2018-03-20 NOTE — PROGRESS NOTES
Assessment/Plan:   disPrescription given  Patient reports overall reduced pain and improved level of functioning without significant side effects so will continue on current regimen  Risks and side effects of chronic opioid treatment were discusses in detail with the patient including but not limited to nausea,vomiting, sedation, mental clouding, opioid -induced hyperalgesia,endocrine dysfunction, dependence and tolerance  Patient made aware of FDA black box warning regarding benzodiazepines and opioid medications  Patient is to take medication as prescribed and directed and to keep medication in safe place away from access of others  Pain management contract is on file and [place]  has been reviewed  Follow-up is planned in one month's time or sooner if needed/warranted  Diagnoses and all orders for this visit:    Chronic pain syndrome  Comments:  rx given for this month  she will sched appt w pain mgt prior to next refill to discuss future mgt/ alternative tx now that she is off alprazolam   Orders:  -     oxyCODONE (OxyCONTIN) 10 mg 12 hr tablet; Take 1 tablet (10 mg total) by mouth every 12 (twelve) hours Max Daily Amount: 20 mg  -     oxyCODONE-acetaminophen (PERCOCET)  mg per tablet; Take 1 tablet by mouth every 4 (four) hours as needed for moderate pain Max Daily Amount: 6 tablets  -     DULoxetine (CYMBALTA) 60 mg delayed release capsule; Take 1 capsule (60 mg total) by mouth daily    Nicotine dependence, uncomplicated, unspecified nicotine product type  Comments:  rx chantix starter kit  Orders:  -     varenicline (CHANTIX BLAKE) 0 5 MG X 11 & 1 MG X 42 tablet; Take one 0 5mg tablet by mouth once daily for 3 days, then increase to one 0 5mg tablet twice daily for 3 days, then increase to one 1mg tablet twice daily  Chronic obstructive pulmonary disease, unspecified COPD type ([facility] )  Comments:  meds as listed  encouraged smoking cessation    Orders:  -     tiotropium (SPIRIVA) 18 mcg inhalation capsule; Place 1 capsule (18 mcg total) into inhaler and inhale daily  -     albuterol (VENTOLIN HFA) 90 mcg/act inhaler; Inhale 2 puffs every 4 (four) hours as needed for wheezing    Chronic allergic rhinitis due to other allergic trigger, unspecified seasonality  -     fluticasone (FLONASE) 50 mcg/act nasal spray; 1-2 sprays per nostril per day    Type 2 diabetes mellitus with complication, without long-term current use of insulin (HCC)  Comments:  cont current meds, ADA diet  Orders:  -     Alogliptin Benzoate 25 MG TABS; Take 1 tablet (25 mg total) by mouth daily  -     pregabalin (LYRICA) 100 mg capsule; Take 1 capsule (100 mg total) by mouth 3 (three) times a day    Familial hypercholesterolemia  -     atorvastatin (LIPITOR) 80 mg tablet; Take 1 tablet (80 mg total) by mouth daily    Essential hypertension  Comments:  BP low end  monitor at home-call w readings in 1-2 wks, sooner  prn -if remains low cut med to 1/2 tab  Orders:  -     lisinopril-hydrochlorothiazide (PRINZIDE,ZESTORETIC) 20-25 MG per tablet; Take 1 tablet by mouth daily    Primary insomnia  -     QUEtiapine (SEROquel) 25 mg tablet; Take 1 tablet (25 mg total) by mouth 2 (two) times a day for 30 days    Depression with anxiety  Comments:  pt has tapered off bdz(alprazolam)  Cont duloxetine  Subjective:      Patient ID: Erwin Wilkes is a 61 y o  female  Chief Complaint   Patient presents with    Follow-up     med check, discuss quitting smoking       HPI    The following portions of the patient's history were reviewed and updated as appropriate: allergies, current medications, past family history, past medical history, past social history, past surgical history and problem list      Review of Systems   Constitutional: Positive for fatigue  Respiratory: Positive for cough  Cardiovascular: Negative for chest pain  Genitourinary: Negative  Musculoskeletal: Positive for arthralgias, back pain and myalgias  Psychiatric/Behavioral: Positive for decreased concentration and sleep disturbance  Negative for hallucinations  The patient is nervous/anxious  Objective:    /72 (BP Location: Left arm, Patient Position: Sitting, Cuff Size: Large)   Pulse 98   Temp 98 °F (36 7 °C)   Resp 18   Ht 5' 3" (1 6 m)   Wt 110 kg (243 lb)   SpO2 97%   BMI 43 05 kg/m²        Physical Exam   Constitutional: She is oriented to person, place, and time  She appears well-developed and well-nourished  No distress  overweight   Neck: Neck supple  Cardiovascular: Normal rate and regular rhythm  Pulmonary/Chest: Effort normal and breath sounds normal    Abdominal: Soft  Bowel sounds are normal  There is no tenderness  Musculoskeletal:   Cervical and LS paraspinal tenderness w restricted ROM   Neurological: She is alert and oriented to person, place, and time  No cranial nerve deficit  Skin: Skin is warm and dry  Psychiatric: She has a normal mood and affect  Nursing note and vitals reviewed                Kusum Bustamante MD

## 2018-04-10 ENCOUNTER — OFFICE VISIT (OUTPATIENT)
Dept: PAIN MEDICINE | Facility: CLINIC | Age: 58
End: 2018-04-10
Payer: OTHER MISCELLANEOUS

## 2018-04-10 VITALS
DIASTOLIC BLOOD PRESSURE: 50 MMHG | SYSTOLIC BLOOD PRESSURE: 100 MMHG | BODY MASS INDEX: 44.37 KG/M2 | RESPIRATION RATE: 18 BRPM | HEART RATE: 81 BPM | HEIGHT: 63 IN | TEMPERATURE: 98.1 F | WEIGHT: 250.4 LBS

## 2018-04-10 DIAGNOSIS — M54.16 LUMBAR RADICULOPATHY: ICD-10-CM

## 2018-04-10 DIAGNOSIS — G89.4 CHRONIC PAIN SYNDROME: Primary | ICD-10-CM

## 2018-04-10 DIAGNOSIS — M54.42 CHRONIC BILATERAL LOW BACK PAIN WITH LEFT-SIDED SCIATICA: ICD-10-CM

## 2018-04-10 DIAGNOSIS — M96.1 POSTLAMINECTOMY SYNDROME, NOT ELSEWHERE CLASSIFIED: ICD-10-CM

## 2018-04-10 DIAGNOSIS — G89.29 CHRONIC BILATERAL LOW BACK PAIN WITH LEFT-SIDED SCIATICA: ICD-10-CM

## 2018-04-10 PROCEDURE — 99214 OFFICE O/P EST MOD 30 MIN: CPT | Performed by: ANESTHESIOLOGY

## 2018-04-10 NOTE — PATIENT INSTRUCTIONS
Tapentadol (By mouth)   Tapentadol (ta-PEN-ta-dol)  Treats severe pain  This medicine is a narcotic pain reliever  Brand Name(s): Karla RIGGS   There may be other brand names for this medicine  When This Medicine Should Not Be Used: This medicine is not right for everyone  Do not use it if you had an allergic reaction to tapentadol, or if you have severe breathing problems or paralytic ileus  How to Use This Medicine:   Liquid, Tablet, Long Acting Tablet  · Take your medicine as directed  Your dose may need to be changed several times to find what works best for you  An overdose can be dangerous  Follow directions carefully so you do not get too much medicine at one time  · Oral liquid: Measure the oral liquid medicine with a marked measuring spoon, oral syringe, or medicine cup  · Swallow the extended-release tablet whole  Do not crush, break, or chew it  Take the tablet with enough water to swallow it completely  If you have been told to use more than one tablet, take them one at a time  · This medicine should come with a Medication Guide  Ask your pharmacist for a copy if you do not have one  · Missed dose: Take a dose as soon as you remember  If it is almost time for your next dose, wait until then and take a regular dose  Do not take extra medicine to make up for a missed dose  · Store the medicine in a closed container at room temperature, away from heat, moisture, and direct light  Store the medicine in a safe and secure place  Do not throw unused medicine in the trash  Ask your pharmacist about the best way to dispose of medicine you do not use  Keep the oral liquid bottle upright after you open it  Drugs and Foods to Avoid:   Ask your doctor or pharmacist before using any other medicine, including over-the-counter medicines, vitamins, and herbal products  · Do not use this medicine if you are using or have used an MAO inhibitor within the past 14 days    · Some medicines can affect how tapentadol works  Tell your doctor if you are using any of the following:   ¨ Tramadol  ¨ Benzodiazepine medicine  ¨ Diuretic medicine  ¨ Medicine to treat depression (including mirtazapine, trazodone)  ¨ Phenothiazine medicine  ¨ Triptan medicine to treat migraine headaches  · Do not drink alcohol while you are using this medicine  · Tell your doctor if you use anything else that makes you sleepy  Some examples are allergy medicine, narcotic pain medicine, and alcohol  Tell your doctor if you are also using buprenorphine, butorphanol, nalbuphine, pentazocine, or a muscle relaxer  Warnings While Using This Medicine:   · Tell your doctor if you are pregnant or breastfeeding, or if you have kidney disease, liver disease, breathing or lung problems (such as COPD or asthma), adrenal problems, gallbladder problems, pancreas problems, stomach or bowel problems, or trouble urinating  Tell your doctor if you have a history of head injury, brain tumor, seizures, depression, or alcohol or drug abuse  · This medicine may cause the following problems:  ¨ High risk of overdose, which can lead to death  ¨ Respiratory depression (serious breathing problem that can be life-threatening)  ¨ Serotonin syndrome (when used with certain medicines)  · This medicine can be habit-forming  Do not use more than your prescribed dose  Call your doctor if you think your medicine is not working  · This medicine may make you dizzy, drowsy, or faint  Do not drive or do anything else that could be dangerous until you know how this medicine affects you  Sit or lie down if you feel dizzy  Stand up carefully  · Do not stop using this medicine suddenly  Your doctor will need to slowly decrease your dose before you stop it completely  · This medicine may cause constipation, especially with long-term use  Ask your doctor if you should use a laxative to prevent and treat constipation  · This medicine could cause infertility   Talk with your doctor before using this medicine if you plan to have children  · Keep all medicine out of the reach of children  Never share your medicine with anyone  Possible Side Effects While Using This Medicine:   Call your doctor right away if you notice any of these side effects:  · Allergic reaction: Itching or hives, swelling in your face or hands, swelling or tingling in your mouth or throat, chest tightness, trouble breathing  · Anxiety, restlessness, muscle spasms, twitching, diarrhea, seeing or hearing things that are not there  · Blue lips, fingernails, or skin  · Extreme dizziness or weakness, shallow breathing, slow or uneven heartbeat, sweating, seizures, and cold, clammy skin  · Severe confusion, lightheadedness, dizziness, fainting  · Severe constipation, stomach pain, or vomiting  · Trouble breathing or slow breathing  If you notice these less serious side effects, talk with your doctor:   · Mild constipation, diarrhea, nausea, or vomiting  · Mild sleepiness or tiredness  If you notice other side effects that you think are caused by this medicine, tell your doctor  Call your doctor for medical advice about side effects  You may report side effects to FDA at 7-047-FDA-5195  © 2017 [address] [employer] Information is for End User's use only and may not be sold, redistributed or otherwise used for commercial purposes  The above information is an  only  It is not intended as medical advice for individual conditions or treatments  Talk to your doctor, nurse or pharmacist before following any medical regimen to see if it is safe and effective for you

## 2018-04-10 NOTE — LETTER
April 10, 2018     Сергей Evangelista MD  [address]    Patient: Jacqueline Clark   YOB: 1960   Date of Visit: 4/10/2018       Dear Dr Rajwinder Kelley: Thank you for referring Ada Hughes to me for evaluation  Below are my notes for this consultation  If you have questions, please do not hesitate to call me  I look forward to following your patient along with you  Sincerely,        Areli Valverde MD        CC: No Recipients  Areli Valverde MD  4/10/2018  3:13 PM  Sign at close encounter  Pain Medicine Follow-Up Note    Assessment:  1  Chronic pain syndrome    2  Chronic bilateral low back pain with left-sided sciatica    3  Postlaminectomy syndrome, not elsewhere classified    4  Lumbar radiculopathy        Plan:  New Medications Ordered This Visit   Medications    Tapentadol HCl ER 50 MG TB12     Sig: Take 1 tablet (50 mg total) by mouth every 12 (twelve) hours Max Daily Amount: 100 mg     Dispense:  60 tablet     Refill:  0       My impressions and treatment recommendations were discussed in detail with the patient who verbalized understanding and had no further questions  The patient reports that she has been weaned off of all of her medicine except for OxyContin 10 mg every 12 hours  She is completely off of the benzodiazepine as well  She is asking that I take over her opioid medications at today's visit  As such, I discussed with her that I would perform an opioid rotation since I think she has significant opioid tolerance to oxycodone and OxyContin  I will trial the patient on Nucynta ER 50 mg every 12 hours  I had the patient sign an opioid treatment agreement at today's visit  The risks and side effects of chronic opioid treatment were discussed in detail with the patient   Side effects include but are not limited to nausea, vomiting, GI intolerance, sedation, constipation, mental clouding, opioid-induced hyperalgesia, endocrine dysfunction, addiction, dependence, and tolerance  The patient was asked to take his medications only as prescribed and directed, never in excess, and never for any other reason other than for pain control  The patient was also asked to keep his medications out of the reach of others and away from children, preferably in a locked drawer  The patient verbalized understanding and wished to use these opioid medications  A urine drug test was collected at today's office visit as part of our medication management protocol  The point of care testing results were appropriate for what was being prescribed  The specimen will be sent for confirmatory testing  The drug screen is medically necessary because the patient is either dependent on opioid medication or is being considered for opioid medication therapy and the results could impact ongoing or future treatment  The drug screen is to evaluate for the presence or absence of prescribed, non-prescribed, and/or illicit drugs and substances  [place] Prescription Drug Monitoring Program report was reviewed and was appropriate     The patient does report that she does not wish to pursue the spinal cord stimulator trial or see Dr Alden Piedra for lumbar spine surgery at this time  She wants to trial a different medication to see if that helps her  Follow-up is planned in 4 weeks time or sooner as warranted  Discharge instructions were provided  I personally saw and examined the patient and I agree with the above discussed plan of care  History of Present Illness:    Isaac Marte is a 61 y o  female who presents to [facility] for interval re-evaluation of the above stated pain complaints  The patient has a past medical and chronic pain history as outlined in the assessment section  She was last seen on November 27, 2018      At today's office visit, the patient is complaining primarily of low back pain with intermittent radiation into her left lower extremity  She states that her pain is worse in the morning, evening, and night  Her pain is constant in nature and she describes the quality of her pain as burning, dull/aching, sharp, cramping, pressure like, and shooting    The patient reports that she would like me to take over her opioid medications at today's visit  She states that she has completed her weaning off of OxyContin and is currently just maintained on OxyContin 10 mg every 12 hours  Pain Contract Signed:  April 10, 2018  Last Urine Drug Screen:  April 10, 2018    Other than as stated above, the patient denies any interval changes in medications, medical condition, mental condition, symptoms, or allergies since the last office visit  Review of Systems:    Review of Systems   Respiratory: Positive for shortness of breath  Cardiovascular: Negative for chest pain  Gastrointestinal: Positive for constipation  Negative for diarrhea, nausea and vomiting  Musculoskeletal: Positive for gait problem (difficulty walking/ decreased range of motion)  Negative for arthralgias, joint swelling and myalgias  Skin: Negative for rash  Neurological: Negative for dizziness, seizures and weakness  All other systems reviewed and are negative          Patient Active Problem List   Diagnosis    Chronic pain syndrome    Chronic low back pain    Postlaminectomy syndrome, not elsewhere classified    Adjacent segment disease with spinal stenosis    Spondylolisthesis of lumbar region    Groin pain, left    Chronic obstructive pulmonary disease (HCC)    Depression with anxiety    Diabetes ([facility]    Disc degeneration, lumbosacral    Hypertension    Hyperlipidemia    Insomnia    Lumbar radiculopathy    Nicotine dependence    Complication of surgical procedure    Varicose veins of both lower extremities with pain    Varicose veins with inflammation       Past Medical History:   Diagnosis Date    Anxiety     Asthma     Back pain     COPD [facility]chronic obstructive [facility] disease) [facility]     Depression     Diabetes mellitus (HCC)     GERD (gastroesophageal reflux disease)     Hyperlipidemia     Hypertension     Insomnia     Low back pain     Lumbosacral disc disease     Obesity     Peripheral neuropathy     Varicose vein of leg        Past Surgical History:   Procedure Laterality Date    BACK SURGERY  2005    lower fusion    CAUDAL BLOCK N/A 2017    Procedure: BLOCK / INJECTION CAUDAL;  Surgeon: Avel Taylor MD;  Location: [facility];  Service: Pain Management      SECTION      LAMINECTOMY      Lumbar        No family history on file  Social History     Occupational History    Not on file       Social History Main Topics    Smoking status: Current Every Day Smoker     Packs/day:  00     Types: Cigarettes    Smokeless tobacco: Never Used    Alcohol use No    Drug use: No    Sexual activity: Not on file         Current Outpatient Prescriptions:     albuterol (VENTOLIN HFA) 90 mcg/act inhaler, Inhale 2 puffs every 4 (four) hours as needed for wheezing, Disp: 1 Inhaler, Rfl: 3    Alogliptin Benzoate 25 MG TABS, Take 1 tablet (25 mg total) by mouth daily, Disp: 90 tablet, Rfl: 3    atorvastatin (LIPITOR) 80 mg tablet, Take 1 tablet (80 mg total) by mouth daily, Disp: 90 tablet, Rfl: 3    diclofenac (VOLTAREN) 75 mg EC tablet, Take 50 mg by mouth 2 (two) times a day, Disp: , Rfl:     DULoxetine (CYMBALTA) 60 mg delayed release capsule, Take 1 capsule (60 mg total) by mouth daily, Disp: 90 capsule, Rfl: 3    fluticasone (FLONASE) 50 mcg/act nasal spray, 1-2 sprays per nostril per day, Disp: 16 g, Rfl: 3    glucose blood test strip, Once daily testing, Disp: 100 each, Rfl: 0    lisinopril-hydrochlorothiazide (PRINZIDE,ZESTORETIC) 20-25 MG per tablet, Take 1 tablet by mouth daily, Disp: 90 tablet, Rfl: 3    metFORMIN (GLUCOPHAGE) 1000 MG tablet, Take 1,000 mg by mouth 2 (two) times a day with meals, Disp: , Rfl:     pregabalin (LYRICA) 100 mg capsule, Take 1 capsule (100 mg total) by mouth 3 (three) times a day, Disp: 90 capsule, Rfl: 1    QUEtiapine (SEROquel) 25 mg tablet, Take 1 tablet (25 mg total) by mouth 2 (two) times a day for 30 days, Disp: 60 tablet, Rfl: 3    tiotropium (SPIRIVA) 18 mcg inhalation capsule, Place 1 capsule (18 mcg total) into inhaler and inhale daily, Disp: 30 capsule, Rfl: 3    varenicline (CHANTIX BLAKE) 0 5 MG X 11 & 1 MG X 42 tablet, Take one 0 5mg tablet by mouth once daily for 3 days, then increase to one 0 5mg tablet twice daily for 3 days, then increase to one 1mg tablet twice daily  , Disp: 53 tablet, Rfl: 0    [START ON 4/20/2018] Tapentadol HCl ER 50 MG TB12, Take 1 tablet (50 mg total) by mouth every 12 (twelve) hours Max Daily Amount: 100 mg, Disp: 60 tablet, Rfl: 0    No Known Allergies    Physical Exam:    /50 (BP Location: Left arm, Patient Position: Sitting, Cuff Size: Standard)   Pulse 81   Temp 98 1 °F (36 7 °C) (Oral)   Resp 18   Ht 5' 3" (1 6 m)   Wt 114 kg (250 lb 6 4 oz)   BMI 44 36 kg/m²      Constitutional:obese  Eyes:anicteric  HEENT:grossly intact  Neck:supple, symmetric, trachea midline and no masses   Pulmonary:even and unlabored  Cardiovascular:No edema or pitting edema present  Skin:Normal without rashes or lesions and well hydrated  Psychiatric:Mood and affect appropriate  Neurologic:Cranial Nerves II-XII grossly intact  Musculoskeletal:normal, tenderness noted over the left sacroiliac joint  Imaging  No orders to display   2/27/18  BILATERAL HIPS AND PELVIS     INDICATION:  Left groin pain      COMPARISON:  07/13/2011     VIEWS:  XR HIPS BILATERAL 3-4 VW W PELVIS IF PERFORMED        FINDINGS:  The bony pelvis appears intact  Visualized lower lumbar spine is unremarkable  Postoperative changes in the upper sacrum  Degenerative changes in the lower lumbar spine      LEFT HIP:  Mild narrowing of the left hip joint    Joint space alignment is maintained  Mild calcific tendinitis is present      RIGHT HIP:  Mild narrowing of the right hip joint  Joint space alignment is maintained  Extensive calcific tendinitis      IMPRESSION:     1   Calcific tendinitis in both hips, right more than left  2   Degenerative changes in both hips and in the lower lumbar spine      Workstation performed: [id number]      Imaging     XR hips bilateral 3-4 vw w pelvis if performed (Order #[id number]) on 2/27/2018 - Imaging Information   Result History     XR hips bilateral 3-4 vw w pelvis if performed (Order #[id number]) on 2/27/2018 - Order Result History Report           No orders of the defined types were placed in this encounter

## 2018-05-04 ENCOUNTER — TELEPHONE (OUTPATIENT)
Dept: PAIN MEDICINE | Facility: CLINIC | Age: 58
End: 2018-05-04

## 2018-05-04 NOTE — TELEPHONE ENCOUNTER
LVMMOM with c/b #, [place] provided  ----- Message from Arielle Jade MD sent at 5/4/2018 11:36 AM EDT -----  Regarding: UDT Results  UDT from 4/11/18 shows positivity for hydrocodone and oxycodone  Is she using both?

## 2018-05-07 NOTE — TELEPHONE ENCOUNTER
S/W patient, reviewed UDS results  Pt states that she was taking the hydrocodone and oxycodone at the same time and she said [facility] was aware of this  Pt states that she now she is only taking Nucynta ER 50 mg  Pt states she has an appointment with [facility] tomorrow  Advised will make [facility] aware

## 2018-05-07 NOTE — TELEPHONE ENCOUNTER
Attempted to reach pt  LVMMOM with c/b #  Please put her through to [phone] when she calls back

## 2018-05-08 ENCOUNTER — OFFICE VISIT (OUTPATIENT)
Dept: PAIN MEDICINE | Facility: CLINIC | Age: 58
End: 2018-05-08
Payer: OTHER MISCELLANEOUS

## 2018-05-08 VITALS
HEART RATE: 79 BPM | HEIGHT: 63 IN | BODY MASS INDEX: 43.34 KG/M2 | DIASTOLIC BLOOD PRESSURE: 72 MMHG | TEMPERATURE: 97.5 F | SYSTOLIC BLOOD PRESSURE: 118 MMHG | RESPIRATION RATE: 18 BRPM | WEIGHT: 244.6 LBS

## 2018-05-08 DIAGNOSIS — G89.29 CHRONIC BILATERAL LOW BACK PAIN WITH LEFT-SIDED SCIATICA: ICD-10-CM

## 2018-05-08 DIAGNOSIS — G89.4 CHRONIC PAIN SYNDROME: ICD-10-CM

## 2018-05-08 DIAGNOSIS — M96.1 POSTLAMINECTOMY SYNDROME, NOT ELSEWHERE CLASSIFIED: ICD-10-CM

## 2018-05-08 DIAGNOSIS — M54.16 LUMBAR RADICULOPATHY: ICD-10-CM

## 2018-05-08 DIAGNOSIS — M54.42 CHRONIC BILATERAL LOW BACK PAIN WITH LEFT-SIDED SCIATICA: ICD-10-CM

## 2018-05-08 PROCEDURE — 99214 OFFICE O/P EST MOD 30 MIN: CPT | Performed by: ANESTHESIOLOGY

## 2018-05-08 NOTE — PATIENT INSTRUCTIONS
Tapentadol (By mouth)   Tapentadol (ta-PEN-ta-dol)  Treats severe pain  This medicine is a narcotic pain reliever  Brand Name(s): Frankey Single ER   There may be other brand names for this medicine  When This Medicine Should Not Be Used: This medicine is not right for everyone  Do not use it if you had an allergic reaction to tapentadol, or if you have severe breathing problems or paralytic ileus  How to Use This Medicine:   Liquid, Tablet, Long Acting Tablet  · Take your medicine as directed  Your dose may need to be changed several times to find what works best for you  An overdose can be dangerous  Follow directions carefully so you do not get too much medicine at one time  · Oral liquid: Measure the oral liquid medicine with a marked measuring spoon, oral syringe, or medicine cup  · Swallow the extended-release tablet whole  Do not crush, break, or chew it  Take the tablet with enough water to swallow it completely  If you have been told to use more than one tablet, take them one at a time  · This medicine should come with a Medication Guide  Ask your pharmacist for a copy if you do not have one  · Missed dose: Take a dose as soon as you remember  If it is almost time for your next dose, wait until then and take a regular dose  Do not take extra medicine to make up for a missed dose  · Store the medicine in a closed container at room temperature, away from heat, moisture, and direct light  Store the medicine in a safe and secure place  Do not throw unused medicine in the trash  Ask your pharmacist about the best way to dispose of medicine you do not use  Keep the oral liquid bottle upright after you open it  Drugs and Foods to Avoid:   Ask your doctor or pharmacist before using any other medicine, including over-the-counter medicines, vitamins, and herbal products  · Do not use this medicine if you are using or have used an MAO inhibitor within the past 14 days    · Some medicines can affect how tapentadol works  Tell your doctor if you are using any of the following:   ¨ Tramadol  ¨ Benzodiazepine medicine  ¨ Diuretic medicine  ¨ Medicine to treat depression (including mirtazapine, trazodone)  ¨ Phenothiazine medicine  ¨ Triptan medicine to treat migraine headaches  · Do not drink alcohol while you are using this medicine  · Tell your doctor if you use anything else that makes you sleepy  Some examples are allergy medicine, narcotic pain medicine, and alcohol  Tell your doctor if you are also using buprenorphine, butorphanol, nalbuphine, pentazocine, or a muscle relaxer  Warnings While Using This Medicine:   · Tell your doctor if you are pregnant or breastfeeding, or if you have kidney disease, liver disease, breathing or lung problems (such as COPD or asthma), adrenal problems, gallbladder problems, pancreas problems, stomach or bowel problems, or trouble urinating  Tell your doctor if you have a history of head injury, brain tumor, seizures, depression, or alcohol or drug abuse  · This medicine may cause the following problems:  ¨ High risk of overdose, which can lead to death  ¨ Respiratory depression (serious breathing problem that can be life-threatening)  ¨ Serotonin syndrome (when used with certain medicines)  · This medicine can be habit-forming  Do not use more than your prescribed dose  Call your doctor if you think your medicine is not working  · This medicine may make you dizzy, drowsy, or faint  Do not drive or do anything else that could be dangerous until you know how this medicine affects you  Sit or lie down if you feel dizzy  Stand up carefully  · Do not stop using this medicine suddenly  Your doctor will need to slowly decrease your dose before you stop it completely  · This medicine may cause constipation, especially with long-term use  Ask your doctor if you should use a laxative to prevent and treat constipation  · This medicine could cause infertility   Talk with your doctor before using this medicine if you plan to have children  · Keep all medicine out of the reach of children  Never share your medicine with anyone  Possible Side Effects While Using This Medicine:   Call your doctor right away if you notice any of these side effects:  · Allergic reaction: Itching or hives, swelling in your face or hands, swelling or tingling in your mouth or throat, chest tightness, trouble breathing  · Anxiety, restlessness, muscle spasms, twitching, diarrhea, seeing or hearing things that are not there  · Blue lips, fingernails, or skin  · Extreme dizziness or weakness, shallow breathing, slow or uneven heartbeat, sweating, seizures, and cold, clammy skin  · Severe confusion, lightheadedness, dizziness, fainting  · Severe constipation, stomach pain, or vomiting  · Trouble breathing or slow breathing  If you notice these less serious side effects, talk with your doctor:   · Mild constipation, diarrhea, nausea, or vomiting  · Mild sleepiness or tiredness  If you notice other side effects that you think are caused by this medicine, tell your doctor  Call your doctor for medical advice about side effects  You may report side effects to FDA at 6-729-FDA-0790  © 2017 [address] [employer] Information is for End User's use only and may not be sold, redistributed or otherwise used for commercial purposes  The above information is an  only  It is not intended as medical advice for individual conditions or treatments  Talk to your doctor, nurse or pharmacist before following any medical regimen to see if it is safe and effective for you

## 2018-05-08 NOTE — PROGRESS NOTES
Pain Medicine Follow-Up Note    Assessment:  1  Chronic pain syndrome    2  Chronic bilateral low back pain with left-sided sciatica    3  Postlaminectomy syndrome, not elsewhere classified    4  Lumbar radiculopathy        Plan:  New Medications Ordered This Visit   Medications    Tapentadol HCl ER 50 MG TB12     Sig: Take 1 tablet (50 mg total) by mouth every 12 (twelve) hours for 30 days Earliest Fill Date: 5/20/18 Max Daily Amount: 100 mg     Dispense:  60 tablet     Refill:  0     My impressions and treatment recommendations were discussed in detail with the patient who verbalized understanding and had no further questions  Given that the patient reports overall reduced pain and improved level functioning without significant side effects, I felt a reasonable to continue her on Nucynta ER 50 mg 1 tablet every 12 hours as needed for pain  The risks and side effects of chronic opioid treatment were discussed in detail with the patient  Side effects include but are not limited to nausea, vomiting, GI intolerance, sedation, constipation, mental clouding, opioid-induced hyperalgesia, endocrine dysfunction, addiction, dependence, and tolerance  The patient was asked to take his medications only as prescribed and directed, never in excess, and never for any other reason other than for pain control  The patient was also asked to keep his medications out of the reach of others and away from children, preferably in a locked drawer  The patient verbalized understanding and wished to use these opioid medications  [place] Prescription Drug Monitoring Program report was reviewed and was appropriate       I again discussed the possibility of undergoing a spinal cord stimulator trial with the patient  She is still hesitant to proceed  She also does not wish to pursue lumbar spine surgery at this time  Follow-up is planned in 4 weeks time or sooner as warranted  Discharge instructions were provided   I personally saw and examined the patient and I agree with the above discussed plan of care  History of Present Illness:    Nano Fish is a 61 y o  female who presents to [facility] for interval re-evaluation of the above stated pain complaints  The patient has a past medical and chronic pain history as outlined in the assessment section  She was last seen on  April 10, 2018 at time she was trialed on Nucynta ER 50 mg every 12 hours  At today's office visit, the patient's pain score is 6/10 on the verbal numerical pain rating scale  The patient states that her pain is constant nature and reports the quality of her pain as "burning, dull/ aching, sharp, and pressure like  "  Patient does report that the Nucynta ER 50 mg every 12 hours as helping "a little  "  She is complaining of some diarrhea that she had when she completely stopped the OxyContin, but notes that has improved over the past several weeks  Other than as stated above, the patient denies any interval changes in medications, medical condition, mental condition, symptoms, or allergies since the last office visit  Review of Systems:    Review of Systems   Respiratory: Negative for shortness of breath  Cardiovascular: Negative for chest pain  Gastrointestinal: Negative for constipation, diarrhea, nausea and vomiting  Musculoskeletal: Negative for arthralgias, gait problem, joint swelling and myalgias  Skin: Negative for rash  Neurological: Negative for dizziness, seizures and weakness  All other systems reviewed and are negative          Patient Active Problem List   Diagnosis    Chronic pain syndrome    Chronic low back pain    Postlaminectomy syndrome, not elsewhere classified    Adjacent segment disease with spinal stenosis    Spondylolisthesis of lumbar region    Groin pain, left    Chronic obstructive pulmonary disease ([facility] [address]    Depression with anxiety    Diabetes ([facility] [address] [facility]    Disc degeneration, lumbosacral    Hypertension    Hyperlipidemia    Insomnia    Lumbar radiculopathy    Nicotine dependence    Complication of surgical procedure    Varicose veins of both lower extremities with pain    Varicose veins with inflammation       Past Medical History:   Diagnosis Date    Anxiety     Asthma     Back pain     COPD (chronic obstructive pulmonary disease) (HCC)     Depression     Diabetes mellitus (HCC)     GERD (gastroesophageal reflux disease)     Hyperlipidemia     Hypertension     Insomnia     Low back pain     Lumbosacral disc disease     Obesity     Peripheral neuropathy     Varicose vein of leg        Past Surgical History:   Procedure Laterality Date    BACK SURGERY  2005    lower fusion    CAUDAL BLOCK N/A 2017    Procedure: BLOCK / INJECTION CAUDAL;  Surgeon: Mal Bains MD;  Location: [place] [address] MAIN OR;  Service: Pain Management      SECTION      LAMINECTOMY      Lumbar 2005       History reviewed  No pertinent family history  Social History     Occupational History    Not on file       Social History Main Topics    Smoking status: Current Every Day Smoker     Packs/day:  00     Types: Cigarettes    Smokeless tobacco: Never Used    Alcohol use No    Drug use: No    Sexual activity: Not on file         Current Outpatient Prescriptions:     albuterol (VENTOLIN HFA) 90 mcg/act inhaler, Inhale 2 puffs every 4 (four) hours as needed for wheezing, Disp: 1 Inhaler, Rfl: 3    Alogliptin Benzoate 25 MG TABS, Take 1 tablet (25 mg total) by mouth daily, Disp: 90 tablet, Rfl: 3    atorvastatin (LIPITOR) 80 mg tablet, Take 1 tablet (80 mg total) by mouth daily, Disp: 90 tablet, Rfl: 3    diclofenac (VOLTAREN) 75 mg EC tablet, Take 50 mg by mouth 2 (two) times a day, Disp: , Rfl:     DULoxetine (CYMBALTA) 60 mg delayed release capsule, Take 1 capsule (60 mg total) by mouth daily, Disp: 90 capsule, Rfl: 3    fluticasone (FLONASE) 50 mcg/act nasal spray, 1-2 sprays per nostril per day, Disp: 16 g, Rfl: 3    glucose blood test strip, Once daily testing, Disp: 100 each, Rfl: 0    lisinopril-hydrochlorothiazide (PRINZIDE,ZESTORETIC) 20-25 MG per tablet, Take 1 tablet by mouth daily, Disp: 90 tablet, Rfl: 3    metFORMIN (GLUCOPHAGE) 1000 MG tablet, Take 1,000 mg by mouth 2 (two) times a day with meals, Disp: , Rfl:     pregabalin (LYRICA) 100 mg capsule, Take 1 capsule (100 mg total) by mouth 3 (three) times a day, Disp: 90 capsule, Rfl: 1    [START ON 5/20/2018] Tapentadol HCl ER 50 MG TB12, Take 1 tablet (50 mg total) by mouth every 12 (twelve) hours for 30 days Earliest Fill Date: 5/20/18 Max Daily Amount: 100 mg, Disp: 60 tablet, Rfl: 0    tiotropium (SPIRIVA) 18 mcg inhalation capsule, Place 1 capsule (18 mcg total) into inhaler and inhale daily, Disp: 30 capsule, Rfl: 3    varenicline (CHANTIX BLAKE) 0 5 MG X 11 & 1 MG X 42 tablet, Take one 0 5mg tablet by mouth once daily for 3 days, then increase to one 0 5mg tablet twice daily for 3 days, then increase to one 1mg tablet twice daily  , Disp: 53 tablet, Rfl: 0    QUEtiapine (SEROquel) 25 mg tablet, Take 1 tablet (25 mg total) by mouth 2 (two) times a day for 30 days, Disp: 60 tablet, Rfl: 3    No Known Allergies    Physical Exam:    /72 (BP Location: Left arm, Patient Position: Sitting, Cuff Size: Standard)   Pulse 79   Temp 97 5 °F (36 4 °C) (Oral)   Resp 18   Ht 5' 3" (1 6 m)   Wt 111 kg (244 lb 9 6 oz)   BMI 43 33 kg/m²     Constitutional:obese  Eyes:anicteric  HEENT:grossly intact  Neck:supple, symmetric, trachea midline and no masses   Pulmonary:even and unlabored  Cardiovascular:No edema or pitting edema present  Skin:Normal without rashes or lesions and well hydrated  Psychiatric:Mood and affect appropriate  Neurologic:Cranial Nerves II-XII grossly intact  Musculoskeletal:normal

## 2018-05-08 NOTE — LETTER
May 8, 2018     Lakeisha Becker MD  [address] [facility]    Patient: Danny Beltrán   YOB: 1960   Date of Visit: 5/8/2018       Dear Dr Mauricio Vigil: Thank you for referring Jose Benny to me for evaluation  Below are my notes for this consultation  If you have questions, please do not hesitate to call me  I look forward to following your patient along with you  Sincerely,        Austin Ayon MD        CC: No Recipients  Austin Ayon MD  5/8/2018 10:45 AM  Sign at close encounter  Pain Medicine Follow-Up Note    Assessment:  1  Chronic pain syndrome    2  Chronic bilateral low back pain with left-sided sciatica    3  Postlaminectomy syndrome, not elsewhere classified    4  Lumbar radiculopathy        Plan:  New Medications Ordered This Visit   Medications    Tapentadol HCl ER 50 MG TB12     Sig: Take 1 tablet (50 mg total) by mouth every 12 (twelve) hours for 30 days Earliest Fill Date: 5/20/18 Max Daily Amount: 100 mg     Dispense:  60 tablet     Refill:  0     My impressions and treatment recommendations were discussed in detail with the patient who verbalized understanding and had no further questions  Given that the patient reports overall reduced pain and improved level functioning without significant side effects, I felt a reasonable to continue her on Nucynta ER 50 mg 1 tablet every 12 hours as needed for pain  The risks and side effects of chronic opioid treatment were discussed in detail with the patient  Side effects include but are not limited to nausea, vomiting, GI intolerance, sedation, constipation, mental clouding, opioid-induced hyperalgesia, endocrine dysfunction, addiction, dependence, and tolerance  The patient was asked to take his medications only as prescribed and directed, never in excess, and never for any other reason other than for pain control   The patient was also asked to keep his medications out of the reach of others and away from children, preferably in a locked drawer  The patient verbalized understanding and wished to use these opioid medications  [place] Prescription Drug Monitoring Program report was reviewed and was appropriate       I again discussed the possibility of undergoing a spinal cord stimulator trial with the patient  She is still hesitant to proceed  She also does not wish to pursue lumbar spine surgery at this time  Follow-up is planned in 4 weeks time or sooner as warranted  Discharge instructions were provided  I personally saw and examined the patient and I agree with the above discussed plan of care  History of Present Illness:    Yarely Dobbins is a 61 y o  female who presents to [facility] for interval re-evaluation of the above stated pain complaints  The patient has a past medical and chronic pain history as outlined in the assessment section  She was last seen on  April 10, 2018 at time she was trialed on Nucynta ER 50 mg every 12 hours  At today's office visit, the patient's pain score is 6/10 on the verbal numerical pain rating scale  The patient states that her pain is constant nature and reports the quality of her pain as "burning, dull/ aching, sharp, and pressure like  "  Patient does report that the Nucynta ER 50 mg every 12 hours as helping "a little  "  She is complaining of some diarrhea that she had when she completely stopped the OxyContin, but notes that has improved over the past several weeks  Other than as stated above, the patient denies any interval changes in medications, medical condition, mental condition, symptoms, or allergies since the last office visit  Review of Systems:    Review of Systems   Respiratory: Negative for shortness of breath  Cardiovascular: Negative for chest pain  Gastrointestinal: Negative for constipation, diarrhea, nausea and vomiting     Musculoskeletal: Negative for arthralgias, gait problem, joint swelling and myalgias  Skin: Negative for rash  Neurological: Negative for dizziness, seizures and weakness  All other systems reviewed and are negative  Patient Active Problem List   Diagnosis    Chronic pain syndrome    Chronic low back pain    Postlaminectomy syndrome, not elsewhere classified    Adjacent segment disease with spinal stenosis    Spondylolisthesis of lumbar region    Groin pain, left    Chronic obstructive pulmonary disease (HCC)    Depression with anxiety    Diabetes ([address] )    Disc degeneration, lumbosacral    Hypertension    Hyperlipidemia    Insomnia    Lumbar radiculopathy    Nicotine dependence    Complication of surgical procedure    Varicose veins of both lower extremities with pain    Varicose veins with inflammation       Past Medical History:   Diagnosis Date    Anxiety     Asthma     Back pain     COPD (chronic obstructive pulmonary disease) (HCC)     Depression     Diabetes mellitus (HCC)     GERD (gastroesophageal reflux disease)     Hyperlipidemia     Hypertension     Insomnia     Low back pain     Lumbosacral disc disease     Obesity     Peripheral neuropathy     Varicose vein of leg        Past Surgical History:   Procedure Laterality Date    BACK SURGERY  2005    lower fusion    CAUDAL BLOCK N/A 2017    Procedure: BLOCK / INJECTION CAUDAL;  Surgeon: Paulo Boles MD;  Location: [facility] MAIN OR;  Service: Pain Management      SECTION      LAMINECTOMY      Lumbar 2005       History reviewed  No pertinent family history  Social History     Occupational History    Not on file       Social History Main Topics    Smoking status: Current Every Day Smoker     Packs/day: 1 00     Types: Cigarettes    Smokeless tobacco: Never Used    Alcohol use No    Drug use: No    Sexual activity: Not on file         Current Outpatient Prescriptions:     albuterol (VENTOLIN HFA) 90 mcg/act inhaler, Inhale 2 puffs every 4 (four) hours as needed for wheezing, Disp: 1 Inhaler, Rfl: 3    Alogliptin Benzoate 25 MG TABS, Take 1 tablet (25 mg total) by mouth daily, Disp: 90 tablet, Rfl: 3    atorvastatin (LIPITOR) 80 mg tablet, Take 1 tablet (80 mg total) by mouth daily, Disp: 90 tablet, Rfl: 3    diclofenac (VOLTAREN) 75 mg EC tablet, Take 50 mg by mouth 2 (two) times a day, Disp: , Rfl:     DULoxetine (CYMBALTA) 60 mg delayed release capsule, Take 1 capsule (60 mg total) by mouth daily, Disp: 90 capsule, Rfl: 3    fluticasone (FLONASE) 50 mcg/act nasal spray, 1-2 sprays per nostril per day, Disp: 16 g, Rfl: 3    glucose blood test strip, Once daily testing, Disp: 100 each, Rfl: 0    lisinopril-hydrochlorothiazide (PRINZIDE,ZESTORETIC) 20-25 MG per tablet, Take 1 tablet by mouth daily, Disp: 90 tablet, Rfl: 3    metFORMIN (GLUCOPHAGE) 1000 MG tablet, Take 1,000 mg by mouth 2 (two) times a day with meals, Disp: , Rfl:     pregabalin (LYRICA) 100 mg capsule, Take 1 capsule (100 mg total) by mouth 3 (three) times a day, Disp: 90 capsule, Rfl: 1    [START ON 5/20/2018] Tapentadol HCl ER 50 MG TB12, Take 1 tablet (50 mg total) by mouth every 12 (twelve) hours for 30 days Earliest Fill Date: 5/20/18 Max Daily Amount: 100 mg, Disp: 60 tablet, Rfl: 0    tiotropium (SPIRIVA) 18 mcg inhalation capsule, Place 1 capsule (18 mcg total) into inhaler and inhale daily, Disp: 30 capsule, Rfl: 3    varenicline (CHANTIX BLAKE) 0 5 MG X 11 & 1 MG X 42 tablet, Take one 0 5mg tablet by mouth once daily for 3 days, then increase to one 0 5mg tablet twice daily for 3 days, then increase to one 1mg tablet twice daily  , Disp: 53 tablet, Rfl: 0    QUEtiapine (SEROquel) 25 mg tablet, Take 1 tablet (25 mg total) by mouth 2 (two) times a day for 30 days, Disp: 60 tablet, Rfl: 3    No Known Allergies    Physical Exam:    /72 (BP Location: Left arm, Patient Position: Sitting, Cuff Size: Standard)   Pulse 79   Temp 97 5 °F (36 4 °C) (Oral)   Resp 18   Ht 5' 3" (1 6 m)   Wt 111 kg (244 lb 9 6 oz)   BMI 43 33 kg/m²      Constitutional:obese  Eyes:anicteric  HEENT:grossly intact  Neck:supple, symmetric, trachea midline and no masses   Pulmonary:even and unlabored  Cardiovascular:No edema or pitting edema present  Skin:Normal without rashes or lesions and well hydrated  Psychiatric:Mood and affect appropriate  Neurologic:Cranial Nerves II-XII grossly intact  Musculoskeletal:normal

## 2018-05-23 ENCOUNTER — TRANSCRIBE ORDERS (OUTPATIENT)
Dept: RADIOLOGY | Facility: CLINIC | Age: 58
End: 2018-05-23

## 2018-05-23 ENCOUNTER — APPOINTMENT (OUTPATIENT)
Dept: RADIOLOGY | Facility: CLINIC | Age: 58
End: 2018-05-23
Payer: COMMERCIAL

## 2018-05-23 ENCOUNTER — OFFICE VISIT (OUTPATIENT)
Dept: FAMILY MEDICINE CLINIC | Facility: CLINIC | Age: 58
End: 2018-05-23
Payer: COMMERCIAL

## 2018-05-23 VITALS
BODY MASS INDEX: 43.52 KG/M2 | WEIGHT: 245.6 LBS | SYSTOLIC BLOOD PRESSURE: 124 MMHG | HEART RATE: 100 BPM | TEMPERATURE: 97.6 F | RESPIRATION RATE: 24 BRPM | DIASTOLIC BLOOD PRESSURE: 74 MMHG | HEIGHT: 63 IN

## 2018-05-23 DIAGNOSIS — E13.9 DIABETES 1.5, MANAGED AS TYPE 2 (HCC): ICD-10-CM

## 2018-05-23 DIAGNOSIS — R06.2 WHEEZE: ICD-10-CM

## 2018-05-23 DIAGNOSIS — F17.200 NICOTINE DEPENDENCE, UNCOMPLICATED, UNSPECIFIED NICOTINE PRODUCT TYPE: ICD-10-CM

## 2018-05-23 DIAGNOSIS — J44.9 CHRONIC OBSTRUCTIVE PULMONARY DISEASE, UNSPECIFIED COPD TYPE (HCC): ICD-10-CM

## 2018-05-23 DIAGNOSIS — R06.2 WHEEZE: Primary | ICD-10-CM

## 2018-05-23 LAB
SL AMB  POCT GLUCOSE, UA: 1000
SL AMB LEUKOCYTE ESTERASE,UA: ABNORMAL
SL AMB POCT BILIRUBIN,UA: ABNORMAL
SL AMB POCT BLOOD,UA: ABNORMAL
SL AMB POCT CLARITY,UA: ABNORMAL
SL AMB POCT COLOR,UA: YELLOW
SL AMB POCT HEMOGLOBIN AIC: 8.6
SL AMB POCT KETONES,UA: ABNORMAL
SL AMB POCT NITRITE,UA: ABNORMAL
SL AMB POCT PH,UA: 6.5
SL AMB POCT SPECIFIC GRAVITY,UA: 1
SL AMB POCT URINE PROTEIN: ABNORMAL
SL AMB POCT UROBILINOGEN: ABNORMAL

## 2018-05-23 PROCEDURE — 81003 URINALYSIS AUTO W/O SCOPE: CPT | Performed by: FAMILY MEDICINE

## 2018-05-23 PROCEDURE — 99214 OFFICE O/P EST MOD 30 MIN: CPT | Performed by: FAMILY MEDICINE

## 2018-05-23 PROCEDURE — 83036 HEMOGLOBIN GLYCOSYLATED A1C: CPT | Performed by: FAMILY MEDICINE

## 2018-05-23 PROCEDURE — 71046 X-RAY EXAM CHEST 2 VIEWS: CPT

## 2018-05-23 RX ORDER — AZITHROMYCIN 250 MG/1
250 TABLET, FILM COATED ORAL EVERY 24 HOURS
Qty: 6 TABLET | Refills: 0 | Status: SHIPPED | OUTPATIENT
Start: 2018-05-23 | End: 2018-05-28

## 2018-05-23 RX ORDER — PREDNISONE 10 MG/1
TABLET ORAL
Qty: 22 TABLET | Refills: 0 | Status: SHIPPED | OUTPATIENT
Start: 2018-05-23 | End: 2018-06-20 | Stop reason: DRUGHIGH

## 2018-05-23 RX ORDER — VARENICLINE TARTRATE 25 MG
KIT ORAL
Qty: 53 TABLET | Refills: 0 | Status: SHIPPED | OUTPATIENT
Start: 2018-05-23 | End: 2018-06-20

## 2018-05-23 NOTE — PATIENT INSTRUCTIONS
Cigarette Smoking and Your Health   AMBULATORY CARE:   Risks to your health if you smoke:  Nicotine and other chemicals found in tobacco damage every cell in your body  Even if you are a light smoker, you have an increased risk for cancer, heart disease, and lung disease  If you are pregnant or have diabetes, smoking increases your risk for complications  Benefits to your health if you stop smoking:   · You decrease respiratory symptoms such as coughing, wheezing, and shortness of breath  · You reduce your risk for cancers of the lung, mouth, throat, kidney, bladder, pancreas, stomach, and cervix  If you already have cancer, you increase the benefits of chemotherapy  You also reduce your risk for cancer returning or a second cancer from developing  · You reduce your risk for heart disease, blood clots, heart attack, and stroke  · You reduce your risk for lung infections, and diseases such as pneumonia, asthma, chronic bronchitis, and emphysema  · Your circulation improves  More oxygen can be delivered to your body  If you have diabetes, you lower your risk for complications, such as kidney, artery, and eye diseases  You also lower your risk for nerve damage  Nerve damage can lead to amputations, poor vision, and blindness  · You improve your body's ability to heal and to fight infections  Benefits to the health of others if you stop smoking:  Tobacco is harmful to nonsmokers who breathe in your secondhand smoke  The following are ways the health of others around you may improve when you stop smoking:  · You lower the risks for lung cancer and heart disease in nonsmoking adults  · If you are pregnant, you lower the risk for miscarriage, early delivery, low birth weight, and stillbirth  You also lower your baby's risk for SIDS, obesity, developmental delay, and neurobehavioral problems, such as ADHD       · If you have children, you lower their risk for ear infections, colds, pneumonia, bronchitis, and asthma  For more information and support to stop smoking:   · Smokefree  gov  Phone: 7- 868 - 612-9360  Web Address: www [employer]  Follow up with your healthcare provider as directed:  Write down your questions so you remember to ask them during your visits  © 2017 [address] Information is for End User's use only and may not be sold, redistributed or otherwise used for commercial purposes  All illustrations and images included in CareNotes® are the copyrighted property of [employer]  or [employer]  The above information is an  only  It is not intended as medical advice for individual conditions or treatments  Talk to your doctor, nurse or pharmacist before following any medical regimen to see if it is safe and effective for you  Acute Cough   AMBULATORY CARE:   An acute cough  can last up to 3 weeks  Common causes of an acute cough include a cold, allergies, or a lung infection  Seek care immediately if:   · You have trouble breathing or feel short of breath  · You cough up blood, or you see blood in your mucus  · You faint or feel weak or dizzy  · You have chest pain when you cough or take a deep breath  · You have new wheezing  Contact your healthcare provider if:   · You have a fever  · Your cough lasts longer than 4 weeks  · Your symptoms do not improve with treatment  · You have questions or concerns about your condition or care  Treatment:  An acute cough usually goes away on its own  Ask your healthcare provider about medicines you can take to decrease your cough  You may need medicine to stop the cough, decrease swelling in your airways, or help open your airways  Medicine may also be given to help you cough up mucus  If you have an infection caused by bacteria, you may need antibiotics  Manage your symptoms:   · Do not smoke and stay away from others who smoke    Nicotine and other chemicals in cigarettes and cigars can cause lung damage and make your cough worse  Ask your healthcare provider for information if you currently smoke and need help to quit  E-cigarettes or smokeless tobacco still contain nicotine  Talk to your healthcare provider before you use these products  · Drink extra liquids as directed  Liquids will help thin and loosen mucus so you can cough it up  Liquids will also help prevent dehydration  Examples of good liquids to drink include water, fruit juice, and broth  Do not drink liquids that contain caffeine  Caffeine can increase your risk for dehydration  Ask your healthcare provider how much liquid to drink each day  · Rest as directed  Do not do activities that make your cough worse, such as exercise  · Use a humidifier or vaporizer  Use a cool mist humidifier or a vaporizer to increase air moisture in your home  This may make it easier for you to breathe and help decrease your cough  · Eat 2 to 5 mL of honey 2 times each day  Honey can help thin mucus and decrease your cough  · Use cough drops or lozenges  These can help decrease throat irritation and your cough  Follow up with your healthcare provider as directed:  Write down your questions so you remember to ask them during your visits  © 2017 [facility] Information is for End User's use only and may not be sold, redistributed or otherwise used for commercial purposes  All illustrations and images included in CareNotes® are the copyrighted property of [employer]  or Daniel Jimenez  The above information is an  only  It is not intended as medical advice for individual conditions or treatments  Talk to your doctor, nurse or pharmacist before following any medical regimen to see if it is safe and effective for you

## 2018-05-24 NOTE — PROGRESS NOTES
Assessment/Plan:     Diagnoses and all orders for this visit:    Wheeze  Comments:  rx zpk, rx prednisone  check cxr  needs to quit smoking  Orders:  -     XR chest pa & lateral; Future  -     azithromycin (ZITHROMAX) 250 mg tablet; Take 1 tablet (250 mg total) by mouth every 24 hours for 5 days  -     predniSONE 10 mg tablet; 6 tabs po d1, 5 tabs po d2, 4 tabs d3, 3 tabs d4, 2 tabs d5, 1 tab d6&7    Chronic obstructive pulmonary disease, unspecified COPD type ([facility] )  Comments:  meds as listed  encouraged smoking cessation  Orders:  -     XR chest pa & lateral; Future  -     azithromycin (ZITHROMAX) 250 mg tablet; Take 1 tablet (250 mg total) by mouth every 24 hours for 5 days  -     predniSONE 10 mg tablet; 6 tabs po d1, 5 tabs po d2, 4 tabs d3, 3 tabs d4, 2 tabs d5, 1 tab d6&7    Diabetes 1 5, managed as type 2 ([facility] )  Comments:  uncontrolled  BwL9L=3 6% but pt on recent steroid  DIetary modif  /inc exercise when feeling better  cont current meds--may need increase if reading remain high  Orders:  -     POCT hemoglobin A1c  -     Microalbumin / creatinine urine ratio  -     POCT urine dip auto non-scope    Nicotine dependence, uncomplicated, unspecified nicotine product type  Comments:  rx chantix-may need PA  Orders:  -     varenicline (CHANTIX BLAKE) 0 5 MG X 11 & 1 MG X 42 tablet; Take one 0 5mg tablet by mouth once daily for 3 days, then increase to one 0 5mg tablet twice daily for 3 days, then increase to one 1mg tablet twice daily  -     XR chest pa & lateral; Future  -     azithromycin (ZITHROMAX) 250 mg tablet; Take 1 tablet (250 mg total) by mouth every 24 hours for 5 days  -     predniSONE 10 mg tablet; 6 tabs po d1, 5 tabs po d2, 4 tabs d3, 3 tabs d4, 2 tabs d5, 1 tab d6&7            Subjective:      Patient ID: Pedrito Ríos is a 61 y o  female  Chief Complaint   Patient presents with    Cough     chest feels heavy    Diabetes       62 yo pt-+smoker-in w cough and wheezing  No hemoptysis  +-/Shortness of breath  +Chest congestion  No inc edema  +Hx diabetes-uncontrolled-qxh8r=8 6% in office today  BP wnl  No fever/chills or gi sxs  No rash or neck stiffness  Following w Dr Angeles Montenegro for pain mgt---new meds reviewed  The following portions of the patient's history were reviewed and updated as appropriate: allergies, current medications, past family history, past medical history, past social history, past surgical history and problem list      Review of Systems   Constitutional: Positive for fatigue  Negative for fever  HENT: Positive for congestion, postnasal drip and sinus pressure  Respiratory: Positive for cough and wheezing  Cardiovascular: Negative for leg swelling  Gastrointestinal: Negative  Musculoskeletal: Positive for arthralgias and myalgias  Skin: Negative for rash  Psychiatric/Behavioral: Positive for sleep disturbance  The patient is nervous/anxious  Objective:    /74 (BP Location: Left arm, Patient Position: Sitting, Cuff Size: Standard)   Pulse 100   Temp 97 6 °F (36 4 °C)   Resp (!) 24   Ht 5' 3" (1 6 m)   Wt 111 kg (245 lb 9 6 oz)   BMI 43 51 kg/m²        Physical Exam   Constitutional: She is oriented to person, place, and time  Overweight, acutely ill  HENT:   Mouth/Throat: No oropharyngeal exudate  Eyes: Conjunctivae are normal    Neck: Neck supple  Cardiovascular: Normal rate and regular rhythm  Borderline tachy   Pulmonary/Chest: She has wheezes  Abdominal: Soft  Bowel sounds are normal  There is no tenderness  Musculoskeletal: She exhibits tenderness  Neurological: She is alert and oriented to person, place, and time  No cranial nerve deficit  Skin: Skin is warm and dry  No rash noted  Psychiatric: She has a normal mood and affect  Nursing note and vitals reviewed  Labs;  Labs in chart were reviewed        Ebony Swain MD

## 2018-05-25 ENCOUNTER — TELEPHONE (OUTPATIENT)
Dept: FAMILY MEDICINE CLINIC | Facility: CLINIC | Age: 58
End: 2018-05-25

## 2018-05-25 DIAGNOSIS — R05.9 COUGH: Primary | ICD-10-CM

## 2018-05-25 LAB
ALBUMIN/CREAT UR: 100.3 MG/G CREAT (ref 0–30)
CREAT UR-MCNC: 29 MG/DL
MICROALBUMIN UR-MCNC: 29.1 UG/ML

## 2018-05-25 RX ORDER — DEXTROMETHORPHAN HYDROBROMIDE AND PROMETHAZINE HYDROCHLORIDE 15; 6.25 MG/5ML; MG/5ML
5 SYRUP ORAL 4 TIMES DAILY PRN
Qty: 118 ML | Refills: 0 | Status: SHIPPED | OUTPATIENT
Start: 2018-05-25 | End: 2018-10-10 | Stop reason: ALTCHOICE

## 2018-05-25 NOTE — TELEPHONE ENCOUNTER
Dr Maren Santoro:    Patient came in today in reference to chest xray results  Also, she would like to know if you could call in something for her cough for when she is trying to sleep?

## 2018-06-06 ENCOUNTER — OFFICE VISIT (OUTPATIENT)
Dept: FAMILY MEDICINE CLINIC | Facility: CLINIC | Age: 58
End: 2018-06-06
Payer: COMMERCIAL

## 2018-06-06 VITALS
OXYGEN SATURATION: 92 % | TEMPERATURE: 97.1 F | RESPIRATION RATE: 24 BRPM | HEIGHT: 63 IN | BODY MASS INDEX: 42.84 KG/M2 | WEIGHT: 241.8 LBS | SYSTOLIC BLOOD PRESSURE: 114 MMHG | HEART RATE: 104 BPM | DIASTOLIC BLOOD PRESSURE: 68 MMHG

## 2018-06-06 DIAGNOSIS — R06.2 WHEEZES: Primary | ICD-10-CM

## 2018-06-06 DIAGNOSIS — R30.0 DYSURIA: ICD-10-CM

## 2018-06-06 DIAGNOSIS — E11.8 TYPE 2 DIABETES MELLITUS WITH COMPLICATION, WITHOUT LONG-TERM CURRENT USE OF INSULIN (HCC): ICD-10-CM

## 2018-06-06 LAB
SL AMB  POCT GLUCOSE, UA: ABNORMAL
SL AMB LEUKOCYTE ESTERASE,UA: 25
SL AMB POCT BILIRUBIN,UA: ABNORMAL
SL AMB POCT BLOOD,UA: 50
SL AMB POCT CLARITY,UA: ABNORMAL
SL AMB POCT COLOR,UA: YELLOW
SL AMB POCT KETONES,UA: ABNORMAL
SL AMB POCT NITRITE,UA: ABNORMAL
SL AMB POCT PH,UA: 5
SL AMB POCT SPECIFIC GRAVITY,UA: 1.01
SL AMB POCT URINE PROTEIN: ABNORMAL
SL AMB POCT UROBILINOGEN: ABNORMAL

## 2018-06-06 PROCEDURE — 99214 OFFICE O/P EST MOD 30 MIN: CPT | Performed by: FAMILY MEDICINE

## 2018-06-06 PROCEDURE — 81003 URINALYSIS AUTO W/O SCOPE: CPT | Performed by: FAMILY MEDICINE

## 2018-06-06 RX ORDER — LEVALBUTEROL INHALATION SOLUTION 1.25 MG/3ML
1.25 SOLUTION RESPIRATORY (INHALATION) ONCE
Status: COMPLETED | OUTPATIENT
Start: 2018-06-06 | End: 2018-06-06

## 2018-06-06 RX ORDER — SULFAMETHOXAZOLE AND TRIMETHOPRIM 800; 160 MG/1; MG/1
1 TABLET ORAL EVERY 12 HOURS SCHEDULED
Qty: 14 TABLET | Refills: 0 | Status: SHIPPED | OUTPATIENT
Start: 2018-06-06 | End: 2018-06-13

## 2018-06-06 RX ADMIN — LEVALBUTEROL INHALATION SOLUTION 1.25 MG: 1.25 SOLUTION RESPIRATORY (INHALATION) at 14:28

## 2018-06-06 NOTE — PATIENT INSTRUCTIONS
10% - bad control"> 10% - bad control,Hemoglobin A1c (HbA1c) greater than 10% indicating poor diabetic control,Haemoglobin A1c greater than 10% indicating poor diabetic control">   Diabetes Mellitus Type 2 in Adults, Ambulatory Care   GENERAL INFORMATION:   Diabetes mellitus type 2  is a disease that affects how your body uses glucose (sugar)  Insulin helps move sugar out of the blood so it can be used for energy  Normally, when the blood sugar level increases, the pancreas makes more insulin  Type 2 diabetes develops because either the body cannot make enough insulin, or it cannot use the insulin correctly  After many years, your pancreas may stop making insulin  Common symptoms include the following:   · More hunger or thirst than usual     · Frequent urination     · Weight loss without trying     · Blurred vision  Seek immediate care for the following symptoms:   · Severe abdominal pain, or pain that spreads to your back  You may also be vomiting  · Trouble staying awake or focusing    · Shaking or sweating    · Blurred or double vision    · Breath has a fruity, sweet smell    · Breathing is deep and labored, or rapid and shallow    · Heartbeat is fast and weak  Treatment for diabetes mellitus type 2  includes keeping your blood sugar at a normal level  You must eat the right foods, and exercise regularly  You may also need medicine if you cannot control your blood sugar level with nutrition and exercise  Manage diabetes mellitus type 2:   · Check your blood sugar level  You will be taught how to check a small drop of blood in a glucose monitor  Ask your healthcare provider when and how often to check during the day  Ask your healthcare provider what your blood sugar levels should be when you check them  · Keep track of carbohydrates (sugar and starchy foods)  Your blood sugar level can get too high if you eat too many carbohydrates   Your dietitian will help you plan meals and snacks that have the right amount of carbohydrates  · Eat low-fat foods  Some examples are skinless chicken and low-fat milk  · Eat less sodium (salt)  Some examples of high-sodium foods to limit are soy sauce, potato chips, and soup  Do not add salt to food you cook  Limit your use of table salt  · Eat high-fiber foods  Foods that are a good source of fiber include vegetables, whole grain bread, and beans  · Limit alcohol  Alcohol affects your blood sugar level and can make it harder to manage your diabetes  Women should limit alcohol to 1 drink a day  Men should limit alcohol to 2 drinks a day  A drink of alcohol is 12 ounces of beer, 5 ounces of wine, or 1½ ounces of liquor  · Get regular exercise  Exercise can help keep your blood sugar level steady, decrease your risk of heart disease, and help you lose weight  Exercise for at least 30 minutes, 5 days a week  Include muscle strengthening activities 2 days each week  Work with your healthcare provider to create an exercise plan  · Check your feet each day  for injuries or open sores  Ask your healthcare provider for activities you can do if you have an open sore  · Quit smoking  If you smoke, it is never too late to quit  Smoking can worsen the problems that may occur with diabetes  Ask your healthcare provider for information about how to stop smoking if you are having trouble quitting  · Ask about your weight:  Ask healthcare providers if you need to lose weight, and how much to lose  Ask them to help you with a weight loss program  Even a 10 to 15 pound weight loss can help you manage your blood sugar level  · Carry medical alert identification  Wear medical alert jewelry or carry a card that says you have diabetes  Ask your healthcare provider where to get these items  · Ask about vaccines  Diabetes puts you at risk of serious illness if you get the flu, pneumonia, or hepatitis   Ask your healthcare provider if you should get a flu, pneumonia, or hepatitis B vaccine, and when to get the vaccine  Follow up with your healthcare provider as directed:  Write down your questions so you remember to ask them during your visits  CARE AGREEMENT:   You have the right to help plan your care  Learn about your health condition and how it may be treated  Discuss treatment options with your caregivers to decide what care you want to receive  You always have the right to refuse treatment  The above information is an  only  It is not intended as medical advice for individual conditions or treatments  Talk to your doctor, nurse or pharmacist before following any medical regimen to see if it is safe and effective for you  © 2014 [address] is for End User's use only and may not be sold, redistributed or otherwise used for commercial purposes  All illustrations and images included in CareNotes® are the copyrighted property of [employer]  or Daniel Jimenez  Wheezing   WHAT YOU NEED TO KNOW:   Wheezing happens when air flows through a narrowed airway  Wheezing can happen when you breathe in, breathe out, or both  Wheezes may sound like a whistle, squeal, groan, or creak  Wheezes may also sound musical or high-pitched  Wheezing cannot be stopped by coughing  Asthma, allergies, or infection are the most common causes of wheezing  A foreign body, asthma, extra mucus, or smoking can also cause wheezing  DISCHARGE INSTRUCTIONS:   Call 911 if:   · You have sudden trouble breathing  · Your throat feels like it is swelling or feels tight  · You are dizzy, lightheaded, confused, or feel faint  · You have chest pain or tightness  Return to the emergency department if:   · You have shortness of breath  · You are coughing up blood  · You have chest pain  Contact your healthcare provider if:   · You have a fever  · Your wheezing does not get better or it gets worse      · You have questions or concerns about your condition or care  Medicines:   · Medicines  decrease inflammation, open airways, and make it easier to breathe  · Take your medicine as directed  Contact your healthcare provider if you think your medicine is not helping or if you have side effects  Tell him of her if you are allergic to any medicine  Keep a list of the medicines, vitamins, and herbs you take  Include the amounts, and when and why you take them  Bring the list or the pill bottles to follow-up visits  Carry your medicine list with you in case of an emergency  Follow up with your healthcare provider as directed: You may be referred to a specialist  Write down your questions so you remember to ask them during your visits  Manage your symptoms:   · Avoid allergy triggers , such as animals, grass, pollen, or dust     · Return to your usual activity as directed  You may need to limit certain activities until you follow up with your healthcare provider or your symptoms improve  Your child may need to avoid sports until his symptoms improve  · Take deep breaths and cough several times a day  This will decrease your risk for a lung infection and help decrease wheezing  Take a deep breath and hold it for as long as you can  Let the air out and then cough strongly  Deep breaths help open your airway  You may be given an incentive spirometer to help you take deep breaths  Put the plastic piece in your mouth and take a slow, deep breath, then let the air out and cough  Repeat these steps 10 times every hour  · Drink liquids as directed  You may need to drink more liquids than usual to thin your mucus and prevent dehydration  Ask how much liquid to drink each day and which liquids are best for you  © 2017 [address] Information is for End User's use only and may not be sold, redistributed or otherwise used for commercial purposes   All illustrations and images included in CareNotes® are the copyrighted property of [employer]  or [address]  The above information is an  only  It is not intended as medical advice for individual conditions or treatments  Talk to your doctor, nurse or pharmacist before following any medical regimen to see if it is safe and effective for you  Dysuria   WHAT YOU NEED TO KNOW:   What is dysuria? Dysuria is difficulty urinating, or pain, burning, or discomfort when you urinate  Dysuria is usually a symptom of another problem  What causes dysuria? The following are the most common causes of dysuria:  · Infections, such as urinary tract infections and sexually transmitted infections     · Trauma, such as bicycle injury or sexual abuse     · Abnormal structure, such as narrowing of the urethra     · Blockage, such as kidney stones     · Medical conditions, such as constipation, enlarged prostate, and reactive arthritis     · Chemicals, such as douches, spermicides, and bubble bath     · Medicines, such as chemotherapy  What increases my risk for dysuria? · Dehydration     · Loss of bladder muscle strength due to older age     · Holding urine in your bladder for a long period of time     · Caffeine, soda, alcohol, and citrus drinks  What other symptoms may I have with dysuria? · Fever     · Cloudy, bad smelling urine     · Urge to urinate often but urinating little     · Back, side, or abdominal pain     · Blood in your urine     · Discharge that smells bad     · Itching     · Swelling of your genitals     · Pain with ejaculation or bowel movement (for males)  How is dysuria diagnosed? Your healthcare provider will examine you and ask about your symptoms  Tell your healthcare provider about any medicines you are taking  You may need any of the following to find the cause of your dysuria:  · A urine test  may be done to look for bacteria, blood, or pus  · A blood test  may be done to look for signs of infection       · A cystoscopy  allows healthcare providers to look for problems inside your bladder  A scope is put into your bladder through your urethra  The scope is a flexible tube with a light and camera on the end  · An ultrasound  uses sound waves to show pictures on a monitor  An ultrasound may be done to show problems in your bladder  How is dysuria treated? Treatment will depend on what is causing your dysuria  Your healthcare provider may refer you to a specialist, such as a urologist or a nephrologist  Pallavi Margarita may need medicines to help treat a bacterial infection or help decrease bladder spasms  How can I manage my dysuria? · Drink more liquids  Liquids help flush out bacteria that may be causing an infection  Ask your healthcare provider how much liquid to drink each day and which liquids are best for you  · Take sitz baths as directed  Fill a bathtub with 4 to 6 inches of warm water  You may also use a sitz bath pan that fits over a toilet  Sit in the sitz bath for 20 minutes  Do this 2 to 3 times a day, or as directed  The warm water can help decrease pain and swelling  When should I seek immediate care? · You have severe back, side, or abdominal pain  · You have fever and shaking chills  · You vomit several times in a row  When should I contact my healthcare provider? · Your symptoms do not go away, even after treatment  · You have questions or concerns about your condition or care  CARE AGREEMENT:   You have the right to help plan your care  Learn about your health condition and how it may be treated  Discuss treatment options with your caregivers to decide what care you want to receive  You always have the right to refuse treatment  The above information is an  only  It is not intended as medical advice for individual conditions or treatments  Talk to your doctor, nurse or pharmacist before following any medical regimen to see if it is safe and effective for you    © 2017 [facility] [address] [place] [address] is for End User's use only and may not be sold, redistributed or otherwise used for commercial purposes  All illustrations and images included in CareNotes® are the copyrighted property of [employer]  or Daniel Jimenez

## 2018-06-08 NOTE — PROGRESS NOTES
Assessment/Plan:     Diagnoses and all orders for this visit:    Wheezes  Comments:  xopenex tx adminstered  rx bactrim and prednisone given  cont inhalers as per orders  encouraged smoking cessation  Orders:  -     levalbuterol (XOPENEX) inhalation solution 1 25 mg; Take 3 mL (1 25 mg total) by nebulization once   -     sulfamethoxazole-trimethoprim (BACTRIM DS) 800-160 mg per tablet; Take 1 tablet by mouth every 12 (twelve) hours for 7 days    Dysuria  -     POCT urine dip auto non-scope  -     sulfamethoxazole-trimethoprim (BACTRIM DS) 800-160 mg per tablet; Take 1 tablet by mouth every 12 (twelve) hours for 7 days    Type 2 diabetes mellitus with complication, without long-term current use of insulin (HCC)  -     liraglutide (VICTOZA) injection; Inject 0 1 mL (0 6 mg total) under the skin daily            Subjective:      Patient ID: Louisa Aguilar is a 61 y o  female  Chief Complaint   Patient presents with    Difficulty Urinating    Shortness of Breath       60 yo pt in w c/o dysuria and urgency  UA-+bld, +leukos, no vaginal d/c or itch  No fever/rash or abd pain  +chronic back pain  Also w cough and wheezing, no hemoptysis  +/-sweats  HgA1C inc =8 6%         The following portions of the patient's history were reviewed and updated as appropriate: allergies, current medications, past family history, past medical history, past social history, past surgical history and problem list      Review of Systems   Constitutional: Positive for fatigue  Negative for fever  HENT: Positive for congestion, postnasal drip and sinus pressure  Respiratory: Positive for cough, shortness of breath and wheezing  Cardiovascular: Positive for leg swelling  Negative for chest pain  Gastrointestinal: Positive for abdominal pain  Endocrine:        DM   Genitourinary: Positive for dysuria, frequency, hematuria and urgency  Negative for vaginal discharge     Musculoskeletal: Positive for arthralgias, back pain, myalgias and neck pain  Skin: Negative for rash  Allergic/Immunologic: Positive for environmental allergies  Neurological: Negative  Psychiatric/Behavioral: Positive for decreased concentration and sleep disturbance  The patient is nervous/anxious  Objective:    /68 (BP Location: Left arm, Patient Position: Sitting, Cuff Size: Standard)   Pulse 104   Temp (!) 97 1 °F (36 2 °C)   Resp (!) 24   Ht 5' 3" (1 6 m)   Wt 110 kg (241 lb 12 8 oz)   SpO2 92%   BMI 42 83 kg/m²        Physical Exam   Constitutional: She is oriented to person, place, and time  Obese, acutely and chronically ill   HENT:   Mouth/Throat: No oropharyngeal exudate  Nasal bogginess, pngtt   Eyes: Conjunctivae are normal    Neck: Neck supple  Cardiovascular: Regular rhythm  Borderline tachy   Pulmonary/Chest: Effort normal  She has wheezes  Abdominal: Soft  Bowel sounds are normal  There is no tenderness  Musculoskeletal: She exhibits edema and tenderness  Neurological: She is alert and oriented to person, place, and time  No cranial nerve deficit  Skin: Skin is warm and dry  Psychiatric: She has a normal mood and affect  Nursing note and vitals reviewed          John Amezquita MD

## 2018-06-18 DIAGNOSIS — E11.40 TYPE 2 DIABETES MELLITUS WITH DIABETIC NEUROPATHY, WITHOUT LONG-TERM CURRENT USE OF INSULIN (HCC): Primary | ICD-10-CM

## 2018-06-18 NOTE — TELEPHONE ENCOUNTER
[facility] said [facility] mini pen needles  they dont have her on file for needing any but they will work tc/cma

## 2018-06-20 ENCOUNTER — TRANSCRIBE ORDERS (OUTPATIENT)
Dept: RADIOLOGY | Facility: CLINIC | Age: 58
End: 2018-06-20

## 2018-06-20 ENCOUNTER — APPOINTMENT (OUTPATIENT)
Dept: RADIOLOGY | Facility: CLINIC | Age: 58
End: 2018-06-20
Payer: COMMERCIAL

## 2018-06-20 ENCOUNTER — OFFICE VISIT (OUTPATIENT)
Dept: FAMILY MEDICINE CLINIC | Facility: CLINIC | Age: 58
End: 2018-06-20
Payer: COMMERCIAL

## 2018-06-20 VITALS
TEMPERATURE: 96 F | RESPIRATION RATE: 20 BRPM | OXYGEN SATURATION: 92 % | HEIGHT: 63 IN | HEART RATE: 80 BPM | SYSTOLIC BLOOD PRESSURE: 110 MMHG | BODY MASS INDEX: 42.35 KG/M2 | WEIGHT: 239 LBS | DIASTOLIC BLOOD PRESSURE: 62 MMHG

## 2018-06-20 DIAGNOSIS — E13.9 DIABETES 1.5, MANAGED AS TYPE 2 (HCC): ICD-10-CM

## 2018-06-20 DIAGNOSIS — R06.2 WHEEZE: ICD-10-CM

## 2018-06-20 DIAGNOSIS — F17.210 CIGARETTE NICOTINE DEPENDENCE WITHOUT COMPLICATION: ICD-10-CM

## 2018-06-20 DIAGNOSIS — R06.2 WHEEZE: Primary | ICD-10-CM

## 2018-06-20 DIAGNOSIS — F34.1 DYSTHYMIA: ICD-10-CM

## 2018-06-20 PROCEDURE — 71046 X-RAY EXAM CHEST 2 VIEWS: CPT

## 2018-06-20 PROCEDURE — 99214 OFFICE O/P EST MOD 30 MIN: CPT | Performed by: FAMILY MEDICINE

## 2018-06-20 RX ORDER — IPRATROPIUM BROMIDE AND ALBUTEROL SULFATE 2.5; .5 MG/3ML; MG/3ML
3 SOLUTION RESPIRATORY (INHALATION) ONCE
Status: COMPLETED | OUTPATIENT
Start: 2018-06-20 | End: 2018-06-20

## 2018-06-20 RX ORDER — PREDNISONE 20 MG/1
TABLET ORAL
Qty: 13 TABLET | Refills: 1 | Status: SHIPPED | OUTPATIENT
Start: 2018-06-20 | End: 2018-07-06 | Stop reason: SDUPTHER

## 2018-06-20 RX ORDER — BUPROPION HYDROCHLORIDE 100 MG/1
100 TABLET ORAL 2 TIMES DAILY
Qty: 60 TABLET | Refills: 1 | Status: SHIPPED | OUTPATIENT
Start: 2018-06-20 | End: 2018-10-01 | Stop reason: SDUPTHER

## 2018-06-20 RX ADMIN — IPRATROPIUM BROMIDE AND ALBUTEROL SULFATE 3 ML: 2.5; .5 SOLUTION RESPIRATORY (INHALATION) at 12:04

## 2018-06-20 NOTE — PATIENT INSTRUCTIONS

## 2018-06-23 NOTE — PROGRESS NOTES
Assessment/Plan   Diagnoses and all orders for this visit:    Wheeze  Comments:  duoneb tx x 1 given in office  rx prednisone taper, chck cxr  Quit smoking  Orders:  -     ipratropium-albuterol (DUO-NEB) 0 5-2 5 mg/3 mL inhalation solution 3 mL; Take 3 mL by nebulization once   -     XR chest pa & lateral; Future  -     predniSONE 20 mg tablet; 3 tabs po x 2d, then 2 tabs x 2d, then 1 tab x 2d, 1/2 tab po x 2d    Diabetes 1 5, managed as type 2 ([facility] [address] )  Comments:  has not yet started the Victoza---just filled rx for needles needed for use  cont lifesyle modif w diet/exercise  Dysthymia  Comments:  start bupropion 100mg bid  Orders:  -     buPROPion (WELLBUTRIN) 100 mg tablet; Take 1 tablet (100 mg total) by mouth 2 (two) times a day    Cigarette nicotine dependence without complication  Comments:  monitor response to Bupropion  Discouraged vaporizer use  Other orders  -     Cancel: Diabetic foot exam; Future          Subjective:      Patient ID: Rupa Hardy is a 61 y o  female  Chief Complaint   Patient presents with    Diabetes    Headache - Recurrent or Known Dx Migraines    Wheezing     SOB       67-year-old patient in for follow-up on diabetes as well as with acute complaint of cough with wheeze  Has not yet started Victoza- needed Rx for needles to be filled  Continues to smoke but has cut down significantly  Was considering vaporizer use but wanted to discuss first   Is motivated to get off cigarettes-has been struggling with recurrent sinus and chest infections  Denies hemoptysis  No fever, rash, or vomiting  The following portions of the patient's history were reviewed and updated as appropriate: allergies, current medications, past family history, past medical history, past social history, past surgical history and problem list      Review of Systems   Constitutional: Positive for fatigue  Negative for fever  HENT: Positive for congestion      Respiratory: Positive for cough and wheezing  Cardiovascular: Negative for chest pain  Genitourinary: Negative for dysuria  Musculoskeletal: Positive for arthralgias, back pain and myalgias  Psychiatric/Behavioral: Positive for sleep disturbance  The patient is nervous/anxious  Objective:    /62 (BP Location: Left arm, Patient Position: Sitting, Cuff Size: Large)   Pulse 80   Temp (!) 96 °F (35 6 °C)   Resp 20   Ht 5' 3" (1 6 m)   Wt 108 kg (239 lb)   SpO2 92%   BMI 42 34 kg/m²        Physical Exam   Constitutional: She is oriented to person, place, and time  Obese, looks tired   HENT:   Mouth/Throat: No oropharyngeal exudate  Cardiovascular: Normal rate and regular rhythm  Pulmonary/Chest: She has wheezes  Abdominal: Soft  Bowel sounds are normal  There is no tenderness  Neurological: She is alert and oriented to person, place, and time  No cranial nerve deficit  Skin: No rash noted  Psychiatric:   anxious   Nursing note and vitals reviewed  Labs;  Labs in chart were reviewed        Lakeisha Becker MD

## 2018-06-25 ENCOUNTER — TELEPHONE (OUTPATIENT)
Dept: FAMILY MEDICINE CLINIC | Facility: CLINIC | Age: 58
End: 2018-06-25

## 2018-06-27 ENCOUNTER — OFFICE VISIT (OUTPATIENT)
Dept: PAIN MEDICINE | Facility: CLINIC | Age: 58
End: 2018-06-27
Payer: OTHER MISCELLANEOUS

## 2018-06-27 VITALS
WEIGHT: 239.6 LBS | HEART RATE: 84 BPM | BODY MASS INDEX: 42.45 KG/M2 | DIASTOLIC BLOOD PRESSURE: 68 MMHG | SYSTOLIC BLOOD PRESSURE: 102 MMHG | HEIGHT: 63 IN | RESPIRATION RATE: 20 BRPM

## 2018-06-27 DIAGNOSIS — M54.42 CHRONIC BILATERAL LOW BACK PAIN WITH LEFT-SIDED SCIATICA: ICD-10-CM

## 2018-06-27 DIAGNOSIS — M96.1 POSTLAMINECTOMY SYNDROME, NOT ELSEWHERE CLASSIFIED: ICD-10-CM

## 2018-06-27 DIAGNOSIS — G89.29 CHRONIC BILATERAL LOW BACK PAIN WITH LEFT-SIDED SCIATICA: ICD-10-CM

## 2018-06-27 DIAGNOSIS — M54.16 LUMBAR RADICULOPATHY: ICD-10-CM

## 2018-06-27 DIAGNOSIS — G89.4 CHRONIC PAIN SYNDROME: Primary | ICD-10-CM

## 2018-06-27 PROCEDURE — 99214 OFFICE O/P EST MOD 30 MIN: CPT | Performed by: ANESTHESIOLOGY

## 2018-06-27 NOTE — PROGRESS NOTES
Pain Medicine Follow-Up Note    Assessment:  1  Chronic pain syndrome    2  Chronic bilateral low back pain with left-sided sciatica    3  Lumbar radiculopathy    4  Postlaminectomy syndrome, not elsewhere classified        Plan:  New Medications Ordered This Visit   Medications    Tapentadol HCl ER (NUCYNTA ER) 50 MG TB12     Sig: Take 1 tablet (50 mg total) by mouth every 12 (twelve) hours for 30 days Max Daily Amount: 100 mg     Dispense:  60 tablet     Refill:  0    Tapentadol HCl ER (NUCYNTA ER) 50 MG TB12     Sig: Take 1 tablet (50 mg total) by mouth every 12 (twelve) hours for 30 days Max Daily Amount: 100 mg     Dispense:  60 tablet     Refill:  0     My impressions and treatment recommendations were discussed in detail with the patient who verbalized understanding and had no further questions  Given that the patient reports overall reduced pain and improved level of functioning without significant side effects, I felt a reasonable to continue the patient on Nucynta ER 50 mg every 12 hr  The risks and side effects of chronic opioid treatment were discussed in detail with the patient  Side effects include but are not limited to nausea, vomiting, GI intolerance, sedation, constipation, mental clouding, opioid-induced hyperalgesia, endocrine dysfunction, addiction, dependence, and tolerance  The patient was asked to take his medications only as prescribed and directed, never in excess, and never for any other reason other than for pain control  The patient was also asked to keep his medications out of the reach of others and away from children, preferably in a locked drawer  The patient verbalized understanding and wished to use these opioid medications  [place] Prescription Drug Monitoring Program report was reviewed and was appropriate     Follow-up is planned in 2 months time or sooner as warranted  Discharge instructions were provided   I personally saw and examined the patient and I agree with the above discussed plan of care  History of Present Illness:    Rupa Hardy is a 61 y o  female who presents to [facility] for interval re-evaluation of the above stated pain complaints  The patient has a past medical and chronic pain history as outlined in the assessment section  She was last seen on May 8, 2018 at which time she was maintained on Nucynta ER 50 mg every 12 hr  At today's office visit, the patient's pain score is 4/10 on the verbal numerical pain rating scale  The patient states that her pain is worse in the morning, evening, and night  Her pain is intermittent in nature and she describes the quality of her pain as dull/aching    The patient reports that she is doing very well with Nucynta ER 50 mg every 12 hr  She states that she occasionally takes the Nucynta ER only once a day rather than twice a day, which is why she had extra tablets  She only has 2 tablets left of the Nucynta ER at this point in time  She denies opioid induced constipation  Other than as stated above, the patient denies any interval changes in medications, medical condition, mental condition, symptoms, or allergies since the last office visit  Review of Systems:    Review of Systems   Respiratory: Positive for shortness of breath  Cardiovascular: Negative for chest pain  Gastrointestinal: Positive for diarrhea  Negative for constipation, nausea and vomiting  Musculoskeletal: Positive for gait problem (difficulty walking)  Negative for arthralgias, joint swelling and myalgias  Skin: Negative for rash  Neurological: Negative for dizziness, seizures and weakness  All other systems reviewed and are negative          Patient Active Problem List   Diagnosis    Chronic pain syndrome    Chronic low back pain    Postlaminectomy syndrome, not elsewhere classified    Adjacent segment disease with spinal stenosis    Spondylolisthesis of lumbar region    Groin pain, left    Chronic obstructive pulmonary disease (HCC)    Depression with anxiety    Diabetes (HCC)    Disc degeneration, lumbosacral    Hypertension    Hyperlipidemia    Insomnia    Lumbar radiculopathy    Nicotine dependence    Complication of surgical procedure    Varicose veins of both lower extremities with pain    Varicose veins with inflammation       Past Medical History:   Diagnosis Date    Anxiety     Arthritis     Asthma     Back pain     Breast lump     last assessed 10/14/14     Carpal tunnel syndrome 2006    unspecifiedd laterality / last assessed 10/14/14     COPD (chronic obstructive pulmonary disease) ([facility] )     with exacerbation / last assessed 14     Depression     Diabetes mellitus ([facility] )     GERD (gastroesophageal reflux disease)     Hypercholesterolemia     Hyperlipidemia     Hypertension     Insomnia     Low back pain     Lumbosacral disc disease     Nodule of tendon sheath     last assessed 2/5/15     Obesity     Peripheral neuropathy     Type 2 diabetes mellitus with hyperglycemia ([facility] )     last assessed 17     Varicose vein of leg        Past Surgical History:   Procedure Laterality Date    BACK SURGERY  2005    lower fusion    CAUDAL BLOCK N/A 2017    Procedure: BLOCK / INJECTION CAUDAL;  Surgeon: Jaren Babb MD;  [facility] MA[facility];  Service: Pain Management      SECTION      LAMINECTOMY      Lumbar        Family History   Problem Relation Age of Onset    Diabetes Mother     Dementia Father        Social History     Occupational History          unemployed     Social History Main Topics    Smoking status: Current Every Day Smoker     Packs/day: 1 00     Types: Cigarettes    Smokeless tobacco: Never Used      Comment: tobacco use    Alcohol use No    Drug use: No    Sexual activity: Not on file         Current Outpatient Prescriptions:     albuterol (VENTOLIN HFA) 90 mcg/act inhaler, Inhale 2 puffs every 4 (four) hours as needed for wheezing, Disp: 1 Inhaler, Rfl: 3    atorvastatin (LIPITOR) 80 mg tablet, Take 1 tablet (80 mg total) by mouth daily, Disp: 90 tablet, Rfl: 3    buPROPion (WELLBUTRIN) 100 mg tablet, Take 1 tablet (100 mg total) by mouth 2 (two) times a day, Disp: 60 tablet, Rfl: 1    DULoxetine (CYMBALTA) 60 mg delayed release capsule, Take 1 capsule (60 mg total) by mouth daily, Disp: 90 capsule, Rfl: 3    fluticasone (FLONASE) 50 mcg/act nasal spray, 1-2 sprays per nostril per day, Disp: 16 g, Rfl: 3    glucose blood test strip, Once daily testing, Disp: 100 each, Rfl: 0    Insulin Pen Needle 31G X 5 MM MISC, Inject as directed daily For use w victoza pen, Disp: 30 each, Rfl: 3    liraglutide (VICTOZA) injection, Inject 0 1 mL (0 6 mg total) under the skin daily, Disp: 3 mL, Rfl: 3    lisinopril-hydrochlorothiazide (PRINZIDE,ZESTORETIC) 20-25 MG per tablet, Take 1 tablet by mouth daily, Disp: 90 tablet, Rfl: 3    metFORMIN (GLUCOPHAGE) 1000 MG tablet, Take 1,000 mg by mouth 2 (two) times a day with meals, Disp: , Rfl:     predniSONE 20 mg tablet, 3 tabs po x 2d, then 2 tabs x 2d, then 1 tab x 2d, 1/2 tab po x 2d, Disp: 13 tablet, Rfl: 1    pregabalin (LYRICA) 100 mg capsule, Take 1 capsule (100 mg total) by mouth 3 (three) times a day, Disp: 90 capsule, Rfl: 1    promethazine-dextromethorphan (PHENERGAN-DM) 6 25-15 mg/5 mL oral syrup, Take 5 mL by mouth 4 (four) times a day as needed for cough, Disp: 118 mL, Rfl: 0    tiotropium (SPIRIVA) 18 mcg inhalation capsule, Place 1 capsule (18 mcg total) into inhaler and inhale daily, Disp: 30 capsule, Rfl: 3    QUEtiapine (SEROquel) 25 mg tablet, Take 1 tablet (25 mg total) by mouth 2 (two) times a day for 30 days, Disp: 60 tablet, Rfl: 3    Tapentadol HCl ER (NUCYNTA ER) 50 MG TB12, Take 1 tablet (50 mg total) by mouth every 12 (twelve) hours for 30 days Max Daily Amount: 100 mg, Disp: 60 tablet, Rfl: 0    [START ON 7/27/2018] Tapentadol HCl ER (NUCYNTA ER) 50 MG TB12, Take 1 tablet (50 mg total) by mouth every 12 (twelve) hours for 30 days Max Daily Amount: 100 mg, Disp: 60 tablet, Rfl: 0    No Known Allergies    Physical Exam:    /68 (BP Location: Left arm, Patient Position: Sitting, Cuff Size: Standard)   Pulse 84   Resp 20   Ht 5' 3" (1 6 m)   Wt 109 kg (239 lb 9 6 oz)   BMI 42 44 kg/m²     Constitutional:obese  Eyes:anicteric  HEENT:grossly intact  Neck:supple, symmetric, trachea midline and no masses   Pulmonary:even and unlabored  Cardiovascular:No edema or pitting edema present  Skin:Normal without rashes or lesions and well hydrated  Psychiatric:Mood and affect appropriate  Neurologic:Cranial Nerves II-XII grossly intact  Musculoskeletal:normal

## 2018-06-27 NOTE — PATIENT INSTRUCTIONS
Tapentadol (By mouth)   Tapentadol (ta-PEN-ta-dol)  Treats severe pain  This medicine is a narcotic pain reliever  Brand Name(s): Zaynab RIGGS   There may be other brand names for this medicine  When This Medicine Should Not Be Used: This medicine is not right for everyone  Do not use it if you had an allergic reaction to tapentadol, or if you have severe breathing problems or paralytic ileus  How to Use This Medicine:   Liquid, Tablet, Long Acting Tablet  · Take your medicine as directed  Your dose may need to be changed several times to find what works best for you  An overdose can be dangerous  Follow directions carefully so you do not get too much medicine at one time  · Oral liquid: Measure the oral liquid medicine with a marked measuring spoon, oral syringe, or medicine cup  · Swallow the extended-release tablet whole  Do not crush, break, or chew it  Take the tablet with enough water to swallow it completely  If you have been told to use more than one tablet, take them one at a time  · This medicine should come with a Medication Guide  Ask your pharmacist for a copy if you do not have one  · Missed dose: Take a dose as soon as you remember  If it is almost time for your next dose, wait until then and take a regular dose  Do not take extra medicine to make up for a missed dose  · Store the medicine in a closed container at room temperature, away from heat, moisture, and direct light  Store the medicine in a safe and secure place  Do not throw unused medicine in the trash  Ask your pharmacist about the best way to dispose of medicine you do not use  Keep the oral liquid bottle upright after you open it  Drugs and Foods to Avoid:   Ask your doctor or pharmacist before using any other medicine, including over-the-counter medicines, vitamins, and herbal products  · Do not use this medicine if you are using or have used an MAO inhibitor within the past 14 days    · Some medicines can affect how tapentadol works  Tell your doctor if you are using any of the following:   ¨ Tramadol  ¨ Benzodiazepine medicine  ¨ Diuretic medicine  ¨ Medicine to treat depression (including mirtazapine, trazodone)  ¨ Phenothiazine medicine  ¨ Triptan medicine to treat migraine headaches  · Do not drink alcohol while you are using this medicine  · Tell your doctor if you use anything else that makes you sleepy  Some examples are allergy medicine, narcotic pain medicine, and alcohol  Tell your doctor if you are also using buprenorphine, butorphanol, nalbuphine, pentazocine, or a muscle relaxer  Warnings While Using This Medicine:   · Tell your doctor if you are pregnant or breastfeeding, or if you have kidney disease, liver disease, breathing or lung problems (such as COPD or asthma), adrenal problems, gallbladder problems, pancreas problems, stomach or bowel problems, or trouble urinating  Tell your doctor if you have a history of head injury, brain tumor, seizures, depression, or alcohol or drug abuse  · This medicine may cause the following problems:  ¨ High risk of overdose, which can lead to death  ¨ Respiratory depression (serious breathing problem that can be life-threatening)  ¨ Serotonin syndrome (when used with certain medicines)  · This medicine can be habit-forming  Do not use more than your prescribed dose  Call your doctor if you think your medicine is not working  · This medicine may make you dizzy, drowsy, or faint  Do not drive or do anything else that could be dangerous until you know how this medicine affects you  Sit or lie down if you feel dizzy  Stand up carefully  · Do not stop using this medicine suddenly  Your doctor will need to slowly decrease your dose before you stop it completely  · This medicine may cause constipation, especially with long-term use  Ask your doctor if you should use a laxative to prevent and treat constipation  · This medicine could cause infertility   Talk with your doctor before using this medicine if you plan to have children  · Keep all medicine out of the reach of children  Never share your medicine with anyone  Possible Side Effects While Using This Medicine:   Call your doctor right away if you notice any of these side effects:  · Allergic reaction: Itching or hives, swelling in your face or hands, swelling or tingling in your mouth or throat, chest tightness, trouble breathing  · Anxiety, restlessness, muscle spasms, twitching, diarrhea, seeing or hearing things that are not there  · Blue lips, fingernails, or skin  · Extreme dizziness or weakness, shallow breathing, slow or uneven heartbeat, sweating, seizures, and cold, clammy skin  · Severe confusion, lightheadedness, dizziness, fainting  · Severe constipation, stomach pain, or vomiting  · Trouble breathing or slow breathing  If you notice these less serious side effects, talk with your doctor:   · Mild constipation, diarrhea, nausea, or vomiting  · Mild sleepiness or tiredness  If you notice other side effects that you think are caused by this medicine, tell your doctor  Call your doctor for medical advice about side effects  You may report side effects to FDA at 2-860-FDA-9314  © 2017 [address] [employer] Information is for End User's use only and may not be sold, redistributed or otherwise used for commercial purposes  The above information is an  only  It is not intended as medical advice for individual conditions or treatments  Talk to your doctor, nurse or pharmacist before following any medical regimen to see if it is safe and effective for you

## 2018-06-27 NOTE — LETTER
June 27, 2018     Graham Medina MD  [address] [facility]    Patient: Rupa Hardy   YOB: 1960   Date of Visit: 6/27/2018       Dear Dr Renny Roper: Thank you for referring Flori Amanda to me for evaluation  Below are my notes for this consultation  If you have questions, please do not hesitate to call me  I look forward to following your patient along with you  Sincerely,        Vida Jacobson MD        CC: No Recipients  Vida Jacobson MD  6/27/2018 10:23 AM  Sign at close encounter  Pain Medicine Follow-Up Note    Assessment:  1  Chronic pain syndrome    2  Chronic bilateral low back pain with left-sided sciatica    3  Lumbar radiculopathy    4  Postlaminectomy syndrome, not elsewhere classified        Plan:  New Medications Ordered This Visit   Medications    Tapentadol HCl ER (NUCYNTA ER) 50 MG TB12     Sig: Take 1 tablet (50 mg total) by mouth every 12 (twelve) hours for 30 days Max Daily Amount: 100 mg     Dispense:  60 tablet     Refill:  0    Tapentadol HCl ER (NUCYNTA ER) 50 MG TB12     Sig: Take 1 tablet (50 mg total) by mouth every 12 (twelve) hours for 30 days Max Daily Amount: 100 mg     Dispense:  60 tablet     Refill:  0     My impressions and treatment recommendations were discussed in detail with the patient who verbalized understanding and had no further questions  Given that the patient reports overall reduced pain and improved level of functioning without significant side effects, I felt a reasonable to continue the patient on Nucynta ER 50 mg every 12 hr  The risks and side effects of chronic opioid treatment were discussed in detail with the patient  Side effects include but are not limited to nausea, vomiting, GI intolerance, sedation, constipation, mental clouding, opioid-induced hyperalgesia, endocrine dysfunction, addiction, dependence, and tolerance   The patient was asked to take his medications only as prescribed and directed, never in excess, and never for any other reason other than for pain control  The patient was also asked to keep his medications out of the reach of others and away from children, preferably in a locked drawer  The patient verbalized understanding and wished to use these opioid medications  [place] Prescription Drug Monitoring Program report was reviewed and was appropriate     Follow-up is planned in 2 months time or sooner as warranted  Discharge instructions were provided  I personally saw and examined the patient and I agree with the above discussed plan of care  History of Present Illness:    Yo Alva is a 61 y o  female who presents to [facility] for interval re-evaluation of the above stated pain complaints  The patient has a past medical and chronic pain history as outlined in the assessment section  She was last seen on May 8, 2018 at which time she was maintained on Nucynta ER 50 mg every 12 hr  At today's office visit, the patient's pain score is 4/10 on the verbal numerical pain rating scale  The patient states that her pain is worse in the morning, evening, and night  Her pain is intermittent in nature and she describes the quality of her pain as dull/aching    The patient reports that she is doing very well with Nucynta ER 50 mg every 12 hr  She states that she occasionally takes the Nucynta ER only once a day rather than twice a day, which is why she had extra tablets  She only has 2 tablets left of the Nucynta ER at this point in time  She denies opioid induced constipation  Other than as stated above, the patient denies any interval changes in medications, medical condition, mental condition, symptoms, or allergies since the last office visit  Review of Systems:    Review of Systems   Respiratory: Positive for shortness of breath  Cardiovascular: Negative for chest pain  Gastrointestinal: Positive for diarrhea   Negative for constipation, nausea and vomiting  Musculoskeletal: Positive for gait problem (difficulty walking)  Negative for arthralgias, joint swelling and myalgias  Skin: Negative for rash  Neurological: Negative for dizziness, seizures and weakness  All other systems reviewed and are negative          Patient Active Problem List   Diagnosis    Chronic pain syndrome    Chronic low back pain    Postlaminectomy syndrome, not elsewhere classified    Adjacent segment disease with spinal stenosis    Spondylolisthesis of lumbar region    Groin pain, left    Chronic obstructive pulmonary disease ([address]    Depression with anxiety    Diabetes ([address]    Disc degeneration, lumbosacral    Hypertension    Hyperlipidemia    Insomnia    Lumbar radiculopathy    Nicotine dependence    Complication of surgical procedure    Varicose veins of both lower extremities with pain    Varicose veins with inflammation       Past Medical History:   Diagnosis Date    Anxiety     Arthritis     Asthma     Back pain     Breast lump     last assessed 10/14/14     Carpal tunnel syndrome 2006    unspecifiedd laterality / last assessed 10/14/14     COPD (chronic obstructive pulmonary disease) ([address] )     with exacerbation / last assessed 14     Depression     Diabetes mellitus (HCC)     GERD (gastroesophageal reflux disease)     Hypercholesterolemia     Hyperlipidemia     Hypertension     Insomnia     Low back pain     Lumbosacral disc disease     Nodule of tendon sheath     last assessed 2/5/15     Obesity     Peripheral neuropathy     Type 2 diabetes mellitus with hyperglycemia ([facility] [address] )     last assessed 17     Varicose vein of leg        Past Surgical History:   Procedure Laterality Date    BACK SURGERY      lower fusion    CAUDAL BLOCK N/A 2017    Procedure: BLOCK / INJECTION CAUDAL;  Surgeon: Meghan Estrada MD;  [facility] MA[facility];  Service: Pain Management      SECTION      LAMINECTOMY      Lumbar 2005       Family History   Problem Relation Age of Onset    Diabetes Mother     Dementia Father        Social History     Occupational History          unemployed     Social History Main Topics    Smoking status: Current Every Day Smoker     Packs/day: 1 00     Types: Cigarettes    Smokeless tobacco: Never Used      Comment: tobacco use    Alcohol use No    Drug use: No    Sexual activity: Not on file         Current Outpatient Prescriptions:     albuterol (VENTOLIN HFA) 90 mcg/act inhaler, Inhale 2 puffs every 4 (four) hours as needed for wheezing, Disp: 1 Inhaler, Rfl: 3    atorvastatin (LIPITOR) 80 mg tablet, Take 1 tablet (80 mg total) by mouth daily, Disp: 90 tablet, Rfl: 3    buPROPion (WELLBUTRIN) 100 mg tablet, Take 1 tablet (100 mg total) by mouth 2 (two) times a day, Disp: 60 tablet, Rfl: 1    DULoxetine (CYMBALTA) 60 mg delayed release capsule, Take 1 capsule (60 mg total) by mouth daily, Disp: 90 capsule, Rfl: 3    fluticasone (FLONASE) 50 mcg/act nasal spray, 1-2 sprays per nostril per day, Disp: 16 g, Rfl: 3    glucose blood test strip, Once daily testing, Disp: 100 each, Rfl: 0    Insulin Pen Needle 31G X 5 MM MISC, Inject as directed daily For use w victoza pen, Disp: 30 each, Rfl: 3    liraglutide (VICTOZA) injection, Inject 0 1 mL (0 6 mg total) under the skin daily, Disp: 3 mL, Rfl: 3    lisinopril-hydrochlorothiazide (PRINZIDE,ZESTORETIC) 20-25 MG per tablet, Take 1 tablet by mouth daily, Disp: 90 tablet, Rfl: 3    metFORMIN (GLUCOPHAGE) 1000 MG tablet, Take 1,000 mg by mouth 2 (two) times a day with meals, Disp: , Rfl:     predniSONE 20 mg tablet, 3 tabs po x 2d, then 2 tabs x 2d, then 1 tab x 2d, 1/2 tab po x 2d, Disp: 13 tablet, Rfl: 1    pregabalin (LYRICA) 100 mg capsule, Take 1 capsule (100 mg total) by mouth 3 (three) times a day, Disp: 90 capsule, Rfl: 1    promethazine-dextromethorphan (PHENERGAN-DM) 6 25-15 mg/5 mL oral syrup, Take 5 mL by mouth 4 (four) times a day as needed for cough, Disp: 118 mL, Rfl: 0    tiotropium (SPIRIVA) 18 mcg inhalation capsule, Place 1 capsule (18 mcg total) into inhaler and inhale daily, Disp: 30 capsule, Rfl: 3    QUEtiapine (SEROquel) 25 mg tablet, Take 1 tablet (25 mg total) by mouth 2 (two) times a day for 30 days, Disp: 60 tablet, Rfl: 3    Tapentadol HCl ER (NUCYNTA ER) 50 MG TB12, Take 1 tablet (50 mg total) by mouth every 12 (twelve) hours for 30 days Max Daily Amount: 100 mg, Disp: 60 tablet, Rfl: 0    [START ON 7/27/2018] Tapentadol HCl ER (NUCYNTA ER) 50 MG TB12, Take 1 tablet (50 mg total) by mouth every 12 (twelve) hours for 30 days Max Daily Amount: 100 mg, Disp: 60 tablet, Rfl: 0    No Known Allergies    Physical Exam:    /68 (BP Location: Left arm, Patient Position: Sitting, Cuff Size: Standard)   Pulse 84   Resp 20   Ht 5' 3" (1 6 m)   Wt 109 kg (239 lb 9 6 oz)   BMI 42 44 kg/m²      Constitutional:obese  Eyes:anicteric  HEENT:grossly intact  Neck:supple, symmetric, trachea midline and no masses   Pulmonary:even and unlabored  Cardiovascular:No edema or pitting edema present  Skin:Normal without rashes or lesions and well hydrated  Psychiatric:Mood and affect appropriate  Neurologic:Cranial Nerves II-XII grossly intact  Musculoskeletal:normal

## 2018-07-06 ENCOUNTER — OFFICE VISIT (OUTPATIENT)
Dept: FAMILY MEDICINE CLINIC | Facility: CLINIC | Age: 58
End: 2018-07-06
Payer: COMMERCIAL

## 2018-07-06 ENCOUNTER — TRANSCRIBE ORDERS (OUTPATIENT)
Dept: RADIOLOGY | Facility: CLINIC | Age: 58
End: 2018-07-06

## 2018-07-06 ENCOUNTER — APPOINTMENT (OUTPATIENT)
Dept: RADIOLOGY | Facility: CLINIC | Age: 58
End: 2018-07-06
Payer: COMMERCIAL

## 2018-07-06 VITALS
RESPIRATION RATE: 18 BRPM | HEART RATE: 88 BPM | WEIGHT: 239.4 LBS | OXYGEN SATURATION: 97 % | TEMPERATURE: 97 F | HEIGHT: 63 IN | SYSTOLIC BLOOD PRESSURE: 108 MMHG | DIASTOLIC BLOOD PRESSURE: 70 MMHG | BODY MASS INDEX: 42.42 KG/M2

## 2018-07-06 DIAGNOSIS — F17.200 NICOTINE DEPENDENCE, UNCOMPLICATED, UNSPECIFIED NICOTINE PRODUCT TYPE: ICD-10-CM

## 2018-07-06 DIAGNOSIS — J45.909 UNCOMPLICATED ASTHMA, UNSPECIFIED ASTHMA SEVERITY, UNSPECIFIED WHETHER PERSISTENT: ICD-10-CM

## 2018-07-06 DIAGNOSIS — M54.42 CHRONIC BILATERAL LOW BACK PAIN WITH LEFT-SIDED SCIATICA: ICD-10-CM

## 2018-07-06 DIAGNOSIS — R06.2 WHEEZE: Primary | ICD-10-CM

## 2018-07-06 DIAGNOSIS — J98.11 ATELECTASIS: ICD-10-CM

## 2018-07-06 DIAGNOSIS — R06.2 WHEEZE: ICD-10-CM

## 2018-07-06 DIAGNOSIS — G89.29 CHRONIC BILATERAL LOW BACK PAIN WITH LEFT-SIDED SCIATICA: ICD-10-CM

## 2018-07-06 PROCEDURE — 71046 X-RAY EXAM CHEST 2 VIEWS: CPT

## 2018-07-06 PROCEDURE — 99214 OFFICE O/P EST MOD 30 MIN: CPT | Performed by: FAMILY MEDICINE

## 2018-07-06 RX ORDER — PREDNISONE 20 MG/1
TABLET ORAL
Qty: 13 TABLET | Refills: 0 | Status: SHIPPED | OUTPATIENT
Start: 2018-07-06 | End: 2018-09-13 | Stop reason: ALTCHOICE

## 2018-07-09 NOTE — PROGRESS NOTES
Assessment/Plan:     Diagnoses and all orders for this visit:    Wheeze  Comments:   rpt prednisone taper, chck cxr  Quit smoking  Orders:  -     XR chest pa & lateral; Future  -     predniSONE 20 mg tablet; 3 tabs po x 2d, then 2 tabs x 2d, then 1 tab x 2d, 1/2 tab po x 2d    Atelectasis  Comments:  check follow-up cxr  cont deep breathing exercises  Orders:  -     XR chest pa & lateral; Future    Uncomplicated asthma, unspecified asthma severity, unspecified whether persistent  Comments:  cont inhalers as per med orders  Orders:  -     XR chest pa & lateral; Future    Nicotine dependence, uncomplicated, unspecified nicotine product type  Comments:  cont bupropion  Orders:  -     XR chest pa & lateral; Future    Chronic bilateral low back pain with left-sided sciatica  Comments:  cont folow w Dr Bernarda Hathaway  monitor repsonse to prednisone (rx for wheeze above)            Subjective:      Patient ID: Kayleen Aleman is a 61 y o  female  Chief Complaint   Patient presents with    Asthma     f/u       58-year-old patient in for follow-up  Treated for bronchitis at last visit  Chest x-ray at that time showed some atelectasis  Patient has decreased smoking with bupropion use  Is complaining of increased problems with her site  Sees Dr Bernarda Hathaway for pain management  The following portions of the patient's history were reviewed and updated as appropriate: allergies, current medications, past family history, past medical history, past social history, past surgical history and problem list      Review of Systems   Constitutional: Positive for fatigue  Negative for fever  Respiratory: Positive for wheezing  Cardiovascular: Negative for chest pain  Gastrointestinal: Negative for abdominal pain and blood in stool  Musculoskeletal: Positive for back pain  Psychiatric/Behavioral: Positive for sleep disturbance           Objective:    /70 (BP Location: Left arm, Patient Position: Sitting, Cuff Size: Standard)   Pulse 88   Temp (!) 97 °F (36 1 °C)   Resp 18   Ht 5' 3" (1 6 m)   Wt 109 kg (239 lb 6 4 oz)   SpO2 97%   BMI 42 41 kg/m²        Physical Exam   Constitutional:   overweight   Cardiovascular: Normal rate and regular rhythm  Pulmonary/Chest: Effort normal    occ faint wheeze--dec from previous visit  BS=   Abdominal: Soft  Bowel sounds are normal  There is no tenderness  Musculoskeletal: She exhibits tenderness  Skin: Skin is warm and dry  No rash noted  Nursing note and vitals reviewed            Joselo Smith MD

## 2018-07-16 ENCOUNTER — TELEPHONE (OUTPATIENT)
Dept: OBGYN CLINIC | Facility: HOSPITAL | Age: 58
End: 2018-07-16

## 2018-07-16 NOTE — TELEPHONE ENCOUNTER
Caller: patient  Call back number: [phone]    Patient called stating she is having a lot of pain in her neck  She states she tried OTC medications and nothing helped  She is asking what to do for the pain  Also, nuccynta is no longer helping  She states it did for a little while but not she feels no relief  She is asking if she can increase it or what she should no   Please advise

## 2018-07-17 ENCOUNTER — OFFICE VISIT (OUTPATIENT)
Dept: PAIN MEDICINE | Facility: CLINIC | Age: 58
End: 2018-07-17
Payer: OTHER MISCELLANEOUS

## 2018-07-17 VITALS
DIASTOLIC BLOOD PRESSURE: 70 MMHG | RESPIRATION RATE: 18 BRPM | HEIGHT: 63 IN | HEART RATE: 100 BPM | SYSTOLIC BLOOD PRESSURE: 118 MMHG | BODY MASS INDEX: 43.05 KG/M2 | WEIGHT: 243 LBS

## 2018-07-17 DIAGNOSIS — G89.29 CHRONIC BILATERAL LOW BACK PAIN WITH LEFT-SIDED SCIATICA: ICD-10-CM

## 2018-07-17 DIAGNOSIS — M79.18 MYOFASCIAL PAIN SYNDROME: ICD-10-CM

## 2018-07-17 DIAGNOSIS — M54.2 CERVICAL PAIN (NECK): Primary | ICD-10-CM

## 2018-07-17 DIAGNOSIS — J31.0 RHINITIS, UNSPECIFIED TYPE: Primary | ICD-10-CM

## 2018-07-17 DIAGNOSIS — J44.9 CHRONIC OBSTRUCTIVE PULMONARY DISEASE, UNSPECIFIED COPD TYPE (HCC): ICD-10-CM

## 2018-07-17 DIAGNOSIS — M54.42 CHRONIC BILATERAL LOW BACK PAIN WITH LEFT-SIDED SCIATICA: ICD-10-CM

## 2018-07-17 PROCEDURE — 99214 OFFICE O/P EST MOD 30 MIN: CPT | Performed by: ANESTHESIOLOGY

## 2018-07-17 RX ORDER — METHYLPREDNISOLONE 4 MG/1
TABLET ORAL
Qty: 21 TABLET | Refills: 0 | Status: SHIPPED | OUTPATIENT
Start: 2018-07-17 | End: 2018-09-13 | Stop reason: ALTCHOICE

## 2018-07-17 RX ORDER — TIOTROPIUM BROMIDE 18 UG/1
CAPSULE ORAL; RESPIRATORY (INHALATION)
Qty: 30 EACH | Refills: 2 | Status: SHIPPED | OUTPATIENT
Start: 2018-07-17 | End: 2018-10-10 | Stop reason: SDUPTHER

## 2018-07-17 RX ORDER — BACLOFEN 10 MG/1
10 TABLET ORAL 3 TIMES DAILY PRN
Qty: 90 TABLET | Refills: 0 | Status: SHIPPED | OUTPATIENT
Start: 2018-07-17 | End: 2018-08-27 | Stop reason: ALTCHOICE

## 2018-07-17 RX ORDER — FLUTICASONE PROPIONATE 50 MCG
SPRAY, SUSPENSION (ML) NASAL
Qty: 16 G | Refills: 2 | Status: SHIPPED | OUTPATIENT
Start: 2018-07-17 | End: 2018-10-10 | Stop reason: SDUPTHER

## 2018-07-17 NOTE — TELEPHONE ENCOUNTER
I have never evaluated her for the neck as of yet  Are we following spine center protocols for new onset pain in the neck or back  If so, we should follow the spine center protocol for her new onset neck pain

## 2018-07-17 NOTE — TELEPHONE ENCOUNTER
Sorry, I am not aware of the spine center protocol  Would you like me to make an appointment for the patient to be evaluated for new onset of symptoms?  Thanks

## 2018-07-17 NOTE — LETTER
July 17, 2018     John Amezquita MD  [address] [facility]    Patient: Lauren July   YOB: 1960   Date of Visit: 7/17/2018       Dear Dr Vasquez Ace: Thank you for referring Carmen Mckeon to me for evaluation  Below are my notes for this consultation  If you have questions, please do not hesitate to call me  I look forward to following your patient along with you  Sincerely,        Charisma Acuña MD        CC: No Recipients  Charisma Acuña MD  7/17/2018  2:46 PM  Sign at close encounter  Pain Medicine Follow-Up Note    Assessment:  1  Cervical pain (neck)    2  Myofascial pain syndrome    3  Chronic bilateral low back pain with left-sided sciatica        Plan:  Orders Placed This Encounter   Procedures    Ambulatory referral to Physical Therapy     Standing Status:   Future     Standing Expiration Date:   1/17/2019     Referral Priority:   Routine     Referral Type:   Physical Therapy     Referral Reason:   Specialty Services Required     Requested Specialty:   Physical Therapy     Number of Visits Requested:   1     Expiration Date:   7/17/2019       New Medications Ordered This Visit   Medications    Methylprednisolone 4 MG TBPK     Sig: Use as directed on package     Dispense:  21 tablet     Refill:  0    baclofen 10 mg tablet     Sig: Take 1 tablet (10 mg total) by mouth 3 (three) times a day as needed for muscle spasms     Dispense:  90 tablet     Refill:  0     My impressions and treatment recommendations were discussed in detail with the patient who verbalized understanding and had no further questions  The patient reports that she woke up about 3 days ago with intense pain in her neck  She is having difficulty her head to both the left and right side    As such, since she is having significant muscular pain of her neck, I felt a reasonable to prescribe her a muscle relaxant in the form of baclofen 10 mg 0 5-1 tablet up to 3 times daily as needed for muscle spasms  I also felt a reasonable to have the patient undergo a course of physical therapy 2-3 times per week for 4-6 weeks in regards to her neck pain  In addition, the patient reports that she has walked significantly long distances at the beginning of July 2018  She reports that her back has been bothering her significantly  As such, I felt a reasonable to have her use a methylprednisolone Dosepak help alleviate any residual inflammatory component to her pain  The patient stated that she would trial this medication  Side effects were reviewed with patient  Follow-up planned with the patient in 6 weeks time or sooner as warranted  Discharge instructions were provided  I personally saw and examined the patient and I agree with the above discussed plan of care  History of Present Illness:    Umu Retana is a 61 y o  female who presents to [facility] for interval re-evaluation of the above stated pain complaints  The patient has a past medical and chronic pain history as outlined in the assessment section  She was last seen on June 27, 2018 at which time she was maintained on Nucynta ER 50 mg every 12 hours  At today's office visit, the patient's pain score is 8/10 on the verbal numerical pain rating scale  The patient states that her pain is worse in the morning, evening, and night  Her pain is constant in nature and she reports the quality of her pain as burning, throbbing, and pressure like    The patient states that she woke up about 3 days ago with intense pain in her neck  She attributed it to a muscle spasm, but reports that it has not improved much over the past 3 days  She is wondering what can be done in regards to her pain  She is willing to trial a muscle relaxant at today's visit as well as physical therapy      Other than as stated above, the patient denies any interval changes in medications, medical condition, mental condition, symptoms, or allergies since the last office visit  Review of Systems:    Review of Systems   Respiratory: Negative for shortness of breath  Cardiovascular: Negative for chest pain  Gastrointestinal: Negative for constipation, diarrhea, nausea and vomiting  Musculoskeletal: Positive for gait problem (difficulty walking) and joint swelling (joint stiffness)  Negative for arthralgias and myalgias  Skin: Negative for rash  Neurological: Negative for dizziness, seizures and weakness  All other systems reviewed and are negative          Patient Active Problem List   Diagnosis    Chronic pain syndrome    Chronic low back pain    Postlaminectomy syndrome, not elsewhere classified    Adjacent segment disease with spinal stenosis    Spondylolisthesis of lumbar region    Groin pain, left    Chronic obstructive pulmonary disease (HCC)    Depression with anxiety    Diabetes [facility] [address] [facility]    Disc degeneration, lumbosacral    Hypertension    Hyperlipidemia    Insomnia    Lumbar radiculopathy    Nicotine dependence    Complication of surgical procedure    Varicose veins of both lower extremities with pain    Varicose veins with inflammation    Cervical pain (neck)    Myofascial pain syndrome       Past Medical History:   Diagnosis Date    Anxiety     Arthritis     Asthma     Back pain     Breast lump     last assessed 10/14/14     Carpal tunnel syndrome 03/17/2006    unspecifiedd laterality / last assessed 10/14/14     COPD (chronic obstructive pulmonary disease) ([address] )     with exacerbation / last assessed 6/25/14     Depression     Diabetes mellitus ([facility])     GERD (gastroesophageal reflux disease)     Hypercholesterolemia     Hyperlipidemia     Hypertension     Insomnia     Low back pain     Lumbosacral disc disease     Nodule of tendon sheath     last assessed 2/5/15     Obesity     Peripheral neuropathy     Type 2 diabetes mellitus with hyperglycemia ([address] )     last assessed 6/8/17     Varicose vein of leg        Past Surgical History:   Procedure Laterality Date    BACK SURGERY      lower fusion    CAUDAL BLOCK N/A 2017    Procedure: BLOCK / INJECTION CAUDAL;  Surgeon: Daniele Lopez MD;  Location: [place] [address] MAIN OR;  Service: Pain Management      SECTION      LAMINECTOMY      Lumbar        Family History   Problem Relation Age of Onset    Diabetes Mother     Dementia Father        Social History     Occupational History          unemployed     Social History Main Topics    Smoking status: Current Every Day Smoker     Packs/day: 1 00     Types: Cigarettes    Smokeless tobacco: Never Used      Comment: tobacco use    Alcohol use No    Drug use: No    Sexual activity: Not on file         Current Outpatient Prescriptions:     albuterol (VENTOLIN HFA) 90 mcg/act inhaler, Inhale 2 puffs every 4 (four) hours as needed for wheezing, Disp: 1 Inhaler, Rfl: 3    atorvastatin (LIPITOR) 80 mg tablet, Take 1 tablet (80 mg total) by mouth daily, Disp: 90 tablet, Rfl: 3    buPROPion (WELLBUTRIN) 100 mg tablet, Take 1 tablet (100 mg total) by mouth 2 (two) times a day, Disp: 60 tablet, Rfl: 1    DULoxetine (CYMBALTA) 60 mg delayed release capsule, Take 1 capsule (60 mg total) by mouth daily, Disp: 90 capsule, Rfl: 3    fluticasone (FLONASE) 50 mcg/act nasal spray, 1-2 sprays per nostril per day, Disp: 16 g, Rfl: 3    glucose blood test strip, Once daily testing, Disp: 100 each, Rfl: 0    Insulin Pen Needle 31G X 5 MM MISC, Inject as directed daily For use w victoza pen, Disp: 30 each, Rfl: 3    liraglutide (VICTOZA) injection, Inject 0 1 mL (0 6 mg total) under the skin daily, Disp: 3 mL, Rfl: 3    lisinopril-hydrochlorothiazide (PRINZIDE,ZESTORETIC) 20-25 MG per tablet, Take 1 tablet by mouth daily, Disp: 90 tablet, Rfl: 3    metFORMIN (GLUCOPHAGE) 1000 MG tablet, Take 1,000 mg by mouth 2 (two) times a day with meals, Disp: , Rfl:     predniSONE 20 mg tablet, 3 tabs po x 2d, then 2 tabs x 2d, then 1 tab x 2d, 1/2 tab po x 2d, Disp: 13 tablet, Rfl: 0    pregabalin (LYRICA) 100 mg capsule, Take 1 capsule (100 mg total) by mouth 3 (three) times a day, Disp: 90 capsule, Rfl: 1    promethazine-dextromethorphan (PHENERGAN-DM) 6 25-15 mg/5 mL oral syrup, Take 5 mL by mouth 4 (four) times a day as needed for cough, Disp: 118 mL, Rfl: 0    Tapentadol HCl ER (NUCYNTA ER) 50 MG TB12, Take 1 tablet (50 mg total) by mouth every 12 (twelve) hours for 30 days Max Daily Amount: 100 mg, Disp: 60 tablet, Rfl: 0    tiotropium (SPIRIVA) 18 mcg inhalation capsule, Place 1 capsule (18 mcg total) into inhaler and inhale daily, Disp: 30 capsule, Rfl: 3    baclofen 10 mg tablet, Take 1 tablet (10 mg total) by mouth 3 (three) times a day as needed for muscle spasms, Disp: 90 tablet, Rfl: 0    Methylprednisolone 4 MG TBPK, Use as directed on package, Disp: 21 tablet, Rfl: 0    QUEtiapine (SEROquel) 25 mg tablet, Take 1 tablet (25 mg total) by mouth 2 (two) times a day for 30 days, Disp: 60 tablet, Rfl: 3    No Known Allergies    Physical Exam:    /70 (BP Location: Right arm, Patient Position: Sitting, Cuff Size: Standard)   Pulse 100   Resp 18   Ht 5' 3" (1 6 m)   Wt 110 kg (243 lb)   BMI 43 05 kg/m²      Constitutional:obese  Eyes:anicteric  HEENT:grossly intact  Neck:supple, symmetric, trachea midline and no masses   Pulmonary:even and unlabored  Cardiovascular:No edema or pitting edema present  Skin:Normal without rashes or lesions and well hydrated  Psychiatric:Mood and affect appropriate  Neurologic:Cranial Nerves II-XII grossly intact  Musculoskeletal:normal     Cervical Spine Exam    Appearance:  Normal lordosis  Palpation/Tenderness:  left cervical paraspinal tenderness  right cervical paraspinal tenderness  Sensory:  no sensory deficits noted  Range of Motion:  Flexion:   Moderately limited  with pain  Extension:  Moderately limited  with pain  Lateral Flexion - Left:  Moderately limited  with pain  Lateral Flexion - Right:  Moderately limited  with pain  Rotation - Left:  Moderately limited  with pain  Rotation - Right:  Moderately limited  with pain  Motor Strength:  Left Arm Flexion  5/5  Left Arm Extension  5/5  Right Arm Flexion  5/5  Right Arm Extension  5/5  Left Wrist Flexion  5/5  Left Wrist Extension  5/5  Left Finger Abduction  5/5  Right Finger Abduction  5/5  Left Pincer Grasp  5/5  Right Pincer Grasp  5/5  Left    5/5  Right   5/5  Reflexes:  Left Biceps:  2+   Right Biceps:  2+   Left Brachioradialis:  2+   Right Brachioradialis:  2+   Left Triceps:  2+   Right Triceps:  2+   Special Tests:  Left Spurlings:  negative  Right Spurlings  negative    Orders Placed This Encounter   Procedures    Ambulatory referral to Physical Therapy

## 2018-07-17 NOTE — PROGRESS NOTES
Pain Medicine Follow-Up Note    Assessment:  1  Cervical pain (neck)    2  Myofascial pain syndrome    3  Chronic bilateral low back pain with left-sided sciatica        Plan:  Orders Placed This Encounter   Procedures    Ambulatory referral to Physical Therapy     Standing Status:   Future     Standing Expiration Date:   1/17/2019     Referral Priority:   Routine     Referral Type:   Physical Therapy     Referral Reason:   Specialty Services Required     Requested Specialty:   Physical Therapy     Number of Visits Requested:   1     Expiration Date:   7/17/2019       New Medications Ordered This Visit   Medications    Methylprednisolone 4 MG TBPK     Sig: Use as directed on package     Dispense:  21 tablet     Refill:  0    baclofen 10 mg tablet     Sig: Take 1 tablet (10 mg total) by mouth 3 (three) times a day as needed for muscle spasms     Dispense:  90 tablet     Refill:  0     My impressions and treatment recommendations were discussed in detail with the patient who verbalized understanding and had no further questions  The patient reports that she woke up about 3 days ago with intense pain in her neck  She is having difficulty her head to both the left and right side  As such, since she is having significant muscular pain of her neck, I felt a reasonable to prescribe her a muscle relaxant in the form of baclofen 10 mg 0 5-1 tablet up to 3 times daily as needed for muscle spasms  I also felt a reasonable to have the patient undergo a course of physical therapy 2-3 times per week for 4-6 weeks in regards to her neck pain  In addition, the patient reports that she has walked significantly long distances at the beginning of July 2018  She reports that her back has been bothering her significantly  As such, I felt a reasonable to have her use a methylprednisolone Dosepak help alleviate any residual inflammatory component to her pain  The patient stated that she would trial this medication    Side effects were reviewed with patient  Follow-up planned with the patient in 6 weeks time or sooner as warranted  Discharge instructions were provided  I personally saw and examined the patient and I agree with the above discussed plan of care  History of Present Illness:    Mora Hernandez is a 61 y o  female who presents to [facility] for interval re-evaluation of the above stated pain complaints  The patient has a past medical and chronic pain history as outlined in the assessment section  She was last seen on June 27, 2018 at which time she was maintained on Nucynta ER 50 mg every 12 hours  At today's office visit, the patient's pain score is 8/10 on the verbal numerical pain rating scale  The patient states that her pain is worse in the morning, evening, and night  Her pain is constant in nature and she reports the quality of her pain as burning, throbbing, and pressure like    The patient states that she woke up about 3 days ago with intense pain in her neck  She attributed it to a muscle spasm, but reports that it has not improved much over the past 3 days  She is wondering what can be done in regards to her pain  She is willing to trial a muscle relaxant at today's visit as well as physical therapy  Other than as stated above, the patient denies any interval changes in medications, medical condition, mental condition, symptoms, or allergies since the last office visit  Review of Systems:    Review of Systems   Respiratory: Negative for shortness of breath  Cardiovascular: Negative for chest pain  Gastrointestinal: Negative for constipation, diarrhea, nausea and vomiting  Musculoskeletal: Positive for gait problem (difficulty walking) and joint swelling (joint stiffness)  Negative for arthralgias and myalgias  Skin: Negative for rash  Neurological: Negative for dizziness, seizures and weakness     All other systems reviewed and are negative          Patient Active Problem List   Diagnosis    Chronic pain syndrome    Chronic low back pain    Postlaminectomy syndrome, not elsewhere classified    Adjacent segment disease with spinal stenosis    Spondylolisthesis of lumbar region    Groin pain, left    Chronic obstructive pulmonary disease (HCC)    Depression with anxiety    [facility] [address]    Disc degeneration, lumbosacral    Hypertension    Hyperlipidemia    Insomnia    Lumbar radiculopathy    Nicotine dependence    Complication of surgical procedure    Varicose veins of both lower extremities with pain    Varicose veins with inflammation    Cervical pain (neck)    Myofascial pain syndrome       Past Medical History:   Diagnosis Date    Anxiety     Arthritis     Asthma     Back pain     Breast lump     last assessed 10/14/14     Carpal tunnel syndrome 2006    unspecifiedd laterality / last assessed 10/14/14     COPD (chronic obstructive pulmonary disease) ([facility] [address] [facility]     with exacerbation / last assessed 14     Depression     Diabetes mellitus (HCC)     GERD (gastroesophageal reflux disease)     Hypercholesterolemia     Hyperlipidemia     Hypertension     Insomnia     Low back pain     Lumbosacral disc disease     Nodule of tendon sheath     last assessed 2/5/15     Obesity     Peripheral neuropathy     Type 2 diabetes mellitus with hyperglycemia ([facility] [address] )     last assessed 17     Varicose vein of leg        Past Surgical History:   Procedure Laterality Date    BACK SURGERY  2005    lower fusion    CAUDAL BLOCK N/A 2017    Procedure: BLOCK / INJECTION CAUDAL;  Surgeon: Tigre Cintron MD;  [facility] MAIN OR;  Service: Pain Management      SECTION      LAMINECTOMY      Lumbar        Family History   Problem Relation Age of Onset    Diabetes Mother     Dementia Father        Social History     Occupational History          unemployed     Social History Main Topics    Smoking status: Current Every Day Smoker     Packs/day: 1 00     Types: Cigarettes    Smokeless tobacco: Never Used      Comment: tobacco use    Alcohol use No    Drug use: No    Sexual activity: Not on file         Current Outpatient Prescriptions:     albuterol (VENTOLIN HFA) 90 mcg/act inhaler, Inhale 2 puffs every 4 (four) hours as needed for wheezing, Disp: 1 Inhaler, Rfl: 3    atorvastatin (LIPITOR) 80 mg tablet, Take 1 tablet (80 mg total) by mouth daily, Disp: 90 tablet, Rfl: 3    buPROPion (WELLBUTRIN) 100 mg tablet, Take 1 tablet (100 mg total) by mouth 2 (two) times a day, Disp: 60 tablet, Rfl: 1    DULoxetine (CYMBALTA) 60 mg delayed release capsule, Take 1 capsule (60 mg total) by mouth daily, Disp: 90 capsule, Rfl: 3    fluticasone (FLONASE) 50 mcg/act nasal spray, 1-2 sprays per nostril per day, Disp: 16 g, Rfl: 3    glucose blood test strip, Once daily testing, Disp: 100 each, Rfl: 0    Insulin Pen Needle 31G X 5 MM MISC, Inject as directed daily For use w victoza pen, Disp: 30 each, Rfl: 3    liraglutide (VICTOZA) injection, Inject 0 1 mL (0 6 mg total) under the skin daily, Disp: 3 mL, Rfl: 3    lisinopril-hydrochlorothiazide (PRINZIDE,ZESTORETIC) 20-25 MG per tablet, Take 1 tablet by mouth daily, Disp: 90 tablet, Rfl: 3    metFORMIN (GLUCOPHAGE) 1000 MG tablet, Take 1,000 mg by mouth 2 (two) times a day with meals, Disp: , Rfl:     predniSONE 20 mg tablet, 3 tabs po x 2d, then 2 tabs x 2d, then 1 tab x 2d, 1/2 tab po x 2d, Disp: 13 tablet, Rfl: 0    pregabalin (LYRICA) 100 mg capsule, Take 1 capsule (100 mg total) by mouth 3 (three) times a day, Disp: 90 capsule, Rfl: 1    promethazine-dextromethorphan (PHENERGAN-DM) 6 25-15 mg/5 mL oral syrup, Take 5 mL by mouth 4 (four) times a day as needed for cough, Disp: 118 mL, Rfl: 0    Tapentadol HCl ER (NUCYNTA ER) 50 MG TB12, Take 1 tablet (50 mg total) by mouth every 12 (twelve) hours for 30 days Max Daily Amount: 100 mg, Disp: 60 tablet, Rfl: 0    tiotropium (SPIRIVA) 18 mcg inhalation capsule, Place 1 capsule (18 mcg total) into inhaler and inhale daily, Disp: 30 capsule, Rfl: 3    baclofen 10 mg tablet, Take 1 tablet (10 mg total) by mouth 3 (three) times a day as needed for muscle spasms, Disp: 90 tablet, Rfl: 0    Methylprednisolone 4 MG TBPK, Use as directed on package, Disp: 21 tablet, Rfl: 0    QUEtiapine (SEROquel) 25 mg tablet, Take 1 tablet (25 mg total) by mouth 2 (two) times a day for 30 days, Disp: 60 tablet, Rfl: 3    No Known Allergies    Physical Exam:    /70 (BP Location: Right arm, Patient Position: Sitting, Cuff Size: Standard)   Pulse 100   Resp 18   Ht 5' 3" (1 6 m)   Wt 110 kg (243 lb)   BMI 43 05 kg/m²     Constitutional:obese  Eyes:anicteric  HEENT:grossly intact  Neck:supple, symmetric, trachea midline and no masses   Pulmonary:even and unlabored  Cardiovascular:No edema or pitting edema present  Skin:Normal without rashes or lesions and well hydrated  Psychiatric:Mood and affect appropriate  Neurologic:Cranial Nerves II-XII grossly intact  Musculoskeletal:normal     Cervical Spine Exam    Appearance:  Normal lordosis  Palpation/Tenderness:  left cervical paraspinal tenderness  right cervical paraspinal tenderness  Sensory:  no sensory deficits noted  Range of Motion:  Flexion:   Moderately limited  with pain  Extension:  Moderately limited  with pain  Lateral Flexion - Left:  Moderately limited  with pain  Lateral Flexion - Right:  Moderately limited  with pain  Rotation - Left:  Moderately limited  with pain  Rotation - Right:  Moderately limited  with pain  Motor Strength:  Left Arm Flexion  5/5  Left Arm Extension  5/5  Right Arm Flexion  5/5  Right Arm Extension  5/5  Left Wrist Flexion  5/5  Left Wrist Extension  5/5  Left Finger Abduction  5/5  Right Finger Abduction  5/5  Left Pincer Grasp  5/5  Right Pincer Grasp  5/5  Left    5/5  Right   5/5  Reflexes:  Left Biceps:  2+   Right Biceps:  2+   Left Brachioradialis:  2+   Right Brachioradialis:  2+   Left Triceps:  2+   Right Triceps:  2+   Special Tests:  Left Spurlings:  negative  Right Spurlings  negative    Orders Placed This Encounter   Procedures    Ambulatory referral to Physical Therapy

## 2018-08-06 DIAGNOSIS — F32.A DEPRESSION, UNSPECIFIED DEPRESSION TYPE: Primary | ICD-10-CM

## 2018-08-06 DIAGNOSIS — F51.01 PRIMARY INSOMNIA: ICD-10-CM

## 2018-08-06 RX ORDER — QUETIAPINE FUMARATE 25 MG/1
25 TABLET, FILM COATED ORAL 2 TIMES DAILY
Qty: 60 TABLET | Refills: 2 | Status: SHIPPED | OUTPATIENT
Start: 2018-08-06 | End: 2018-10-29 | Stop reason: SDUPTHER

## 2018-08-06 NOTE — TELEPHONE ENCOUNTER
Patient said that her sleeping pill is  and out of refills so she needs you to please refill it   TY

## 2018-08-13 ENCOUNTER — TELEPHONE (OUTPATIENT)
Dept: FAMILY MEDICINE CLINIC | Facility: CLINIC | Age: 58
End: 2018-08-13

## 2018-08-13 NOTE — TELEPHONE ENCOUNTER
Dr Paula Alvares    Patient was at birthday party for her great nephew last month  Child has now been diagnosed with whooping cough and his pediatrician is recommending that everyone who was in contact with him, get zpac  Please call in zpac or antibiotic to [facility]

## 2018-08-14 DIAGNOSIS — R05.9 COUGH: Primary | ICD-10-CM

## 2018-08-14 RX ORDER — AZITHROMYCIN 250 MG/1
TABLET, FILM COATED ORAL
Qty: 6 TABLET | Refills: 0 | Status: SHIPPED | OUTPATIENT
Start: 2018-08-14 | End: 2018-08-18

## 2018-08-18 DIAGNOSIS — E11.8 TYPE 2 DIABETES MELLITUS WITH COMPLICATION, WITHOUT LONG-TERM CURRENT USE OF INSULIN (HCC): ICD-10-CM

## 2018-08-27 ENCOUNTER — TELEPHONE (OUTPATIENT)
Dept: PAIN MEDICINE | Facility: CLINIC | Age: 58
End: 2018-08-27

## 2018-08-27 ENCOUNTER — OFFICE VISIT (OUTPATIENT)
Dept: PAIN MEDICINE | Facility: CLINIC | Age: 58
End: 2018-08-27
Payer: OTHER MISCELLANEOUS

## 2018-08-27 VITALS
BODY MASS INDEX: 43.23 KG/M2 | RESPIRATION RATE: 18 BRPM | HEART RATE: 87 BPM | WEIGHT: 244 LBS | HEIGHT: 63 IN | SYSTOLIC BLOOD PRESSURE: 110 MMHG | DIASTOLIC BLOOD PRESSURE: 70 MMHG

## 2018-08-27 DIAGNOSIS — M96.1 POSTLAMINECTOMY SYNDROME, NOT ELSEWHERE CLASSIFIED: ICD-10-CM

## 2018-08-27 DIAGNOSIS — G89.29 CHRONIC BILATERAL LOW BACK PAIN WITH LEFT-SIDED SCIATICA: ICD-10-CM

## 2018-08-27 DIAGNOSIS — F11.20 UNCOMPLICATED OPIOID DEPENDENCE (HCC): ICD-10-CM

## 2018-08-27 DIAGNOSIS — M54.42 CHRONIC BILATERAL LOW BACK PAIN WITH LEFT-SIDED SCIATICA: ICD-10-CM

## 2018-08-27 DIAGNOSIS — M54.16 LUMBAR RADICULOPATHY: ICD-10-CM

## 2018-08-27 DIAGNOSIS — G89.4 CHRONIC PAIN SYNDROME: Primary | ICD-10-CM

## 2018-08-27 DIAGNOSIS — Z79.891 LONG-TERM CURRENT USE OF OPIATE ANALGESIC: ICD-10-CM

## 2018-08-27 PROCEDURE — 80305 DRUG TEST PRSMV DIR OPT OBS: CPT | Performed by: ANESTHESIOLOGY

## 2018-08-27 PROCEDURE — 99214 OFFICE O/P EST MOD 30 MIN: CPT | Performed by: ANESTHESIOLOGY

## 2018-08-27 NOTE — LETTER
August 27, 2018     Rima Granados MD  [address]    Patient: Jes Lance   YOB: 1960   Date of Visit: 8/27/2018       Dear Dr Veronica Horton: Thank you for referring Lissy Curtis to me for evaluation  Below are my notes for this consultation  If you have questions, please do not hesitate to call me  I look forward to following your patient along with you  Sincerely,        Jimmy Bennett MD        CC: No Recipients  Jimmy Bennett MD  8/27/2018  1:00 PM  Sign at close encounter  Pain Medicine Follow-Up Note    Assessment:  1  Chronic pain syndrome    2  Chronic bilateral low back pain with left-sided sciatica    3  Lumbar radiculopathy    4  Postlaminectomy syndrome, not elsewhere classified        Plan:  Orders Placed This Encounter   Procedures    Ambulatory referral to Orthopedic Surgery     Standing Status:   Future     Standing Expiration Date:   2/27/2019     Referral Priority:   Routine     Referral Type:   Consult - AMB     Referral Reason:   Specialty Services Required     Referred to Provider:   Sachi Mireles DO     Requested Specialty:   Orthopedic Surgery     Number of Visits Requested:   1     Expiration Date:   8/27/2019     My impressions and treatment recommendations were discussed in detail with the patient who verbalized understanding and had no further questions  Given that the patient reports overall reduced pain and improved level of functioning without significant side effects, I felt a reasonable to continue the patient on Nucynta ER 50 mg every 12 hr  The patient does report that Baclofen is not significantly alleviating her pain complaints, so I felt it reasonable to discontinue baclofen at today's visit  The risks and side effects of chronic opioid treatment were discussed in detail with the patient   Side effects include but are not limited to nausea, vomiting, GI intolerance, sedation, constipation, mental clouding, opioid-induced hyperalgesia, endocrine dysfunction, addiction, dependence, and tolerance  The patient was asked to take his medications only as prescribed and directed, never in excess, and never for any other reason other than for pain control  The patient was also asked to keep his medications out of the reach of others and away from children, preferably in a locked drawer  The patient verbalized understanding and wished to use these opioid medications  A urine drug test was collected at today's office visit as part of our medication management protocol  The point of care testing results were appropriate for what was being prescribed  The specimen will be sent for confirmatory testing  The drug screen is medically necessary because the patient is either dependent on opioid medication or is being considered for opioid medication therapy and the results could impact ongoing or future treatment  The drug screen is to evaluate for the presence or absence of prescribed, non-prescribed, and/or illicit drugs and substances  [place] Prescription Drug Monitoring Program report was reviewed and was appropriate     The patient does report that her pain in her neck continues to bother her at this point in time  She does report that she had some relief of symptoms following the methylprednisolone Dosepak that I had prescribed for her at her last office visit, but only reports about a week worth of relief  She has not yet initiated physical therapy, so I suggested that she trial physical therapy for her pain at this time  Follow-up is planned in 2 months time or sooner as warranted  Discharge instructions were provided  I personally saw and examined the patient and I agree with the above discussed plan of care  History of Present Illness:    Tenzin Olivares is a 61 y o  female who presents to [facility] for interval re-evaluation of the above stated pain complaints   The patient has a past medical and chronic pain history as outlined in the assessment section  She was last seen on July 17, 2018  At today's office visit, the patient's pain score is 8/10 on the verbal numerical pain rating scale  The patient states that her pain is primarily in her low back and left hip region  She states that her pain is worse in the morning, evening, and night  Her pain is constant in nature and she describes the quality of her pain as burning, dull/aching, and pressure like    She reports that the Nucynta ER 50 mg every 12 hours is giving her some pain relief, but states that her neck continues to bother her along with her low back  She has not yet started physical therapy for her neck region  Last Urine Drug Screen:  August 27, 2018    Other than as stated above, the patient denies any interval changes in medications, medical condition, mental condition, symptoms, or allergies since the last office visit  Review of Systems:    Review of Systems   Respiratory: Positive for shortness of breath  Cardiovascular: Negative for chest pain  Gastrointestinal: Positive for diarrhea  Negative for constipation, nausea and vomiting  Musculoskeletal: Positive for gait problem (difficulty walking)  Negative for arthralgias, joint swelling and myalgias  Skin: Negative for rash  Neurological: Negative for dizziness, seizures and weakness  All other systems reviewed and are negative          Patient Active Problem List   Diagnosis    Chronic pain syndrome    Chronic low back pain    Postlaminectomy syndrome, not elsewhere classified    Adjacent segment disease with spinal stenosis    Spondylolisthesis of lumbar region    Groin pain, left    Chronic obstructive pulmonary disease (HCC)    Depression with anxiety    Diabetes ([facility] [address]    Disc degeneration, lumbosacral    Hypertension    Hyperlipidemia    Insomnia    Lumbar radiculopathy    Nicotine dependence    Complication of surgical procedure    Varicose veins of both lower extremities with pain    Varicose veins with inflammation    Cervical pain (neck)    Myofascial pain syndrome       Past Medical History:   Diagnosis Date    Anxiety     Arthritis     Asthma     Back pain     Breast lump     last assessed 10/14/14     Carpal tunnel syndrome 2006    unspecifiedd laterality / last assessed 10/14/14     COPD (chronic obstructive pulmonary disease) ([facility] [address] [facility]     with exacerbation / last assessed 14     Depression     Diabetes mellitus ([facility] [address] [facility]     GERD (gastroesophageal reflux disease)     Hypercholesterolemia     Hyperlipidemia     Hypertension     Insomnia     Low back pain     Lumbosacral disc disease     Nodule of tendon sheath     last assessed 2/5/15     Obesity     Peripheral neuropathy     Type 2 diabetes mellitus with hyperglycemia ([facility] [address] )     last assessed 17     Varicose vein of leg        Past Surgical History:   Procedure Laterality Date    BACK SURGERY  2005    lower fusion    CAUDAL BLOCK N/A 2017    Procedure: BLOCK / [address];  Surgeon: Hansa Cuba MD;  Location: [place] [address] MAIN OR;  Service: Pain Management      SECTION      LAMINECTOMY      Lumbar        Family History   Problem Relation Age of Onset    Diabetes Mother     Dementia Father        Social History     Occupational History          unemployed     Social History Main Topics    Smoking status: Current Every Day Smoker     Packs/day: 1 00     Types: Cigarettes    Smokeless tobacco: Never Used      Comment: tobacco use    Alcohol use No    Drug use: No    Sexual activity: Not on file         Current Outpatient Prescriptions:     atorvastatin (LIPITOR) 80 mg tablet, Take 1 tablet (80 mg total) by mouth daily, Disp: 90 tablet, Rfl: 3    baclofen 10 mg tablet, Take 1 tablet (10 mg total) by mouth 3 (three) times a day as needed for muscle spasms, Disp: 90 tablet, Rfl: 0    buPROPion (WELLBUTRIN) 100 mg tablet, Take 1 tablet (100 mg total) by mouth 2 (two) times a day, Disp: 60 tablet, Rfl: 1    DULoxetine (CYMBALTA) 60 mg delayed release capsule, Take 1 capsule (60 mg total) by mouth daily, Disp: 90 capsule, Rfl: 3    fluticasone (FLONASE) 50 mcg/act nasal spray, USE ONE OR TWO SPRAYS EACH NOSTRIL DAILY , Disp: 16 g, Rfl: 2    glucose blood test strip, Once daily testing, Disp: 100 each, Rfl: 0    Insulin Pen Needle 31G X 5 MM MISC, Inject as directed daily For use w victoza pen, Disp: 30 each, Rfl: 3    liraglutide (VICTOZA) injection, Inject 0 1 mL (0 6 mg total) under the skin daily, Disp: 3 mL, Rfl: 3    lisinopril-hydrochlorothiazide (PRINZIDE,ZESTORETIC) 20-25 MG per tablet, Take 1 tablet by mouth daily, Disp: 90 tablet, Rfl: 3    LYRICA 100 MG capsule, TAKE ONE CAPSULE BY MOUTH EVERY EIGHT HOURS , Disp: 90 capsule, Rfl: 4    metFORMIN (GLUCOPHAGE) 1000 MG tablet, Take 1,000 mg by mouth 2 (two) times a day with meals, Disp: , Rfl:     predniSONE 20 mg tablet, 3 tabs po x 2d, then 2 tabs x 2d, then 1 tab x 2d, 1/2 tab po x 2d, Disp: 13 tablet, Rfl: 0    promethazine-dextromethorphan (PHENERGAN-DM) 6 25-15 mg/5 mL oral syrup, Take 5 mL by mouth 4 (four) times a day as needed for cough, Disp: 118 mL, Rfl: 0    QUEtiapine (SEROquel) 25 mg tablet, Take 1 tablet (25 mg total) by mouth 2 (two) times a day for 30 days, Disp: 60 tablet, Rfl: 2    SPIRIVA HANDIHALER 18 MCG inhalation capsule, TAKE 1 INHALATION ONCE A DAY , Disp: 30 each, Rfl: 2    VENTOLIN  (90 Base) MCG/ACT inhaler, INHALE TWO PUFFS BY MOUTH EVERY FOUR HOURS AS NEEDED FOR WHEEZING , Disp: 18 g, Rfl: 2    Methylprednisolone 4 MG TBPK, Use as directed on package (Patient not taking: Reported on 8/27/2018 ), Disp: 21 tablet, Rfl: 0    Tapentadol HCl ER (NUCYNTA ER) 50 MG TB12, Take 1 tablet (50 mg total) by mouth every 12 (twelve) hours for 30 days Max Daily Amount: 100 mg, Disp: 60 tablet, Rfl: 0    No Known Allergies    Physical Exam:    /70 (BP Location: Left arm, Patient Position: Sitting, Cuff Size: Standard)   Pulse 87   Resp 18   Ht 5' 3" (1 6 m)   Wt 111 kg (244 lb)   BMI 43 22 kg/m²      Constitutional:obese  Eyes:anicteric  HEENT:grossly intact  Neck:supple, symmetric, trachea midline and no masses   Pulmonary:even and unlabored  Cardiovascular:No edema or pitting edema present  Skin:Normal without rashes or lesions and well hydrated  Psychiatric:Mood and affect appropriate  Neurologic:Cranial Nerves II-XII grossly intact  Musculoskeletal:normal    Orders Placed This Encounter   Procedures    Ambulatory referral to Orthopedic Surgery

## 2018-08-27 NOTE — PROGRESS NOTES
Pain Medicine Follow-Up Note    Assessment:  1  Chronic pain syndrome    2  Chronic bilateral low back pain with left-sided sciatica    3  Lumbar radiculopathy    4  Postlaminectomy syndrome, not elsewhere classified        Plan:  Orders Placed This Encounter   Procedures    Ambulatory referral to Orthopedic Surgery     Standing Status:   Future     Standing Expiration Date:   2/27/2019     Referral Priority:   Routine     Referral Type:   Consult - AMB     Referral Reason:   Specialty Services Required     Referred to Provider:   Torrey Philippe DO     Requested Specialty:   Orthopedic Surgery     Number of Visits Requested:   1     Expiration Date:   8/27/2019     My impressions and treatment recommendations were discussed in detail with the patient who verbalized understanding and had no further questions  Given that the patient reports overall reduced pain and improved level of functioning without significant side effects, I felt a reasonable to continue the patient on Nucynta ER 50 mg every 12 hr  The patient does report that Baclofen is not significantly alleviating her pain complaints, so I felt it reasonable to discontinue baclofen at today's visit  The risks and side effects of chronic opioid treatment were discussed in detail with the patient  Side effects include but are not limited to nausea, vomiting, GI intolerance, sedation, constipation, mental clouding, opioid-induced hyperalgesia, endocrine dysfunction, addiction, dependence, and tolerance  The patient was asked to take his medications only as prescribed and directed, never in excess, and never for any other reason other than for pain control  The patient was also asked to keep his medications out of the reach of others and away from children, preferably in a locked drawer  The patient verbalized understanding and wished to use these opioid medications      A urine drug test was collected at today's office visit as part of our medication management protocol  The point of care testing results were appropriate for what was being prescribed  The specimen will be sent for confirmatory testing  The drug screen is medically necessary because the patient is either dependent on opioid medication or is being considered for opioid medication therapy and the results could impact ongoing or future treatment  The drug screen is to evaluate for the presence or absence of prescribed, non-prescribed, and/or illicit drugs and substances  [place] Prescription Drug Monitoring Program report was reviewed and was appropriate     The patient does report that her pain in her neck continues to bother her at this point in time  She does report that she had some relief of symptoms following the methylprednisolone Dosepak that I had prescribed for her at her last office visit, but only reports about a week worth of relief  She has not yet initiated physical therapy, so I suggested that she trial physical therapy for her pain at this time  Follow-up is planned in 2 months time or sooner as warranted  Discharge instructions were provided  I personally saw and examined the patient and I agree with the above discussed plan of care  History of Present Illness:    Jacqueline Clark is a 61 y o  female who presents to [facility] for interval re-evaluation of the above stated pain complaints  The patient has a past medical and chronic pain history as outlined in the assessment section  She was last seen on July 17, 2018  At today's office visit, the patient's pain score is 8/10 on the verbal numerical pain rating scale  The patient states that her pain is primarily in her low back and left hip region  She states that her pain is worse in the morning, evening, and night  Her pain is constant in nature and she describes the quality of her pain as burning, dull/aching, and pressure like    She reports that the Nucynta ER 50 mg every 12 hours is giving her some pain relief, but states that her neck continues to bother her along with her low back  She has not yet started physical therapy for her neck region  Last Urine Drug Screen:  August 27, 2018    Other than as stated above, the patient denies any interval changes in medications, medical condition, mental condition, symptoms, or allergies since the last office visit  Review of Systems:    Review of Systems   Respiratory: Positive for shortness of breath  Cardiovascular: Negative for chest pain  Gastrointestinal: Positive for diarrhea  Negative for constipation, nausea and vomiting  Musculoskeletal: Positive for gait problem (difficulty walking)  Negative for arthralgias, joint swelling and myalgias  Skin: Negative for rash  Neurological: Negative for dizziness, seizures and weakness  All other systems reviewed and are negative          Patient Active Problem List   Diagnosis    Chronic pain syndrome    Chronic low back pain    Postlaminectomy syndrome, not elsewhere classified    Adjacent segment disease with spinal stenosis    Spondylolisthesis of lumbar region    Groin pain, left    Chronic obstructive pulmonary disease (HCC)    Depression with anxiety    Diabetes ([facility] [place] [address] [facility]    Disc degeneration, lumbosacral    Hypertension    Hyperlipidemia    Insomnia    Lumbar radiculopathy    Nicotine dependence    Complication of surgical procedure    Varicose veins of both lower extremities with pain    Varicose veins with inflammation    Cervical pain (neck)    Myofascial pain syndrome       Past Medical History:   Diagnosis Date    Anxiety     Arthritis     Asthma     Back pain     Breast lump     last assessed 10/14/14     Carpal tunnel syndrome 03/17/2006    unspecifiedd laterality / last assessed 10/14/14     COPD (chronic obstructive pulmonary disease) ([facility] [place] [address] [facility]     with exacerbation / last assessed 6/25/14     Depression     Diabetes mellitus (HCC)     GERD (gastroesophageal reflux disease)     Hypercholesterolemia     Hyperlipidemia     Hypertension     Insomnia     Low back pain     Lumbosacral disc disease     Nodule of tendon sheath     last assessed 2/5/15     Obesity     Peripheral neuropathy     Type 2 diabetes mellitus with hyperglycemia ([facility] [address]     last assessed 17     Varicose vein of leg        Past Surgical History:   Procedure Laterality Date    BACK SURGERY  2005    lower fusion    CAUDAL BLOCK N/A 2017    Procedure: BLOCK / INJECTION CAUDAL;  Surgeon: Harlan Mckinley MD;  Location: [facility] MAIN OR;  Service: Pain Management      SECTION      LAMINECTOMY      Lumbar        Family History   Problem Relation Age of Onset    Diabetes Mother     Dementia Father        Social History     Occupational History          unemployed     Social History Main Topics    Smoking status: Current Every Day Smoker     Packs/day: 1 00     Types: Cigarettes    Smokeless tobacco: Never Used      Comment: tobacco use    Alcohol use No    Drug use: No    Sexual activity: Not on file         Current Outpatient Prescriptions:     atorvastatin (LIPITOR) 80 mg tablet, Take 1 tablet (80 mg total) by mouth daily, Disp: 90 tablet, Rfl: 3    baclofen 10 mg tablet, Take 1 tablet (10 mg total) by mouth 3 (three) times a day as needed for muscle spasms, Disp: 90 tablet, Rfl: 0    buPROPion (WELLBUTRIN) 100 mg tablet, Take 1 tablet (100 mg total) by mouth 2 (two) times a day, Disp: 60 tablet, Rfl: 1    DULoxetine (CYMBALTA) 60 mg delayed release capsule, Take 1 capsule (60 mg total) by mouth daily, Disp: 90 capsule, Rfl: 3    fluticasone (FLONASE) 50 mcg/act nasal spray, USE ONE OR TWO SPRAYS EACH NOSTRIL DAILY , Disp: 16 g, Rfl: 2    glucose blood test strip, Once daily testing, Disp: 100 each, Rfl: 0    Insulin Pen Needle 31G X 5 MM MISC, Inject as directed daily For use w victoza pen, Disp: 30 each, Rfl: 3    liraglutide (VICTOZA) injection, Inject 0 1 mL (0 6 mg total) under the skin daily, Disp: 3 mL, Rfl: 3    lisinopril-hydrochlorothiazide (PRINZIDE,ZESTORETIC) 20-25 MG per tablet, Take 1 tablet by mouth daily, Disp: 90 tablet, Rfl: 3    LYRICA 100 MG capsule, TAKE ONE CAPSULE BY MOUTH EVERY EIGHT HOURS , Disp: 90 capsule, Rfl: 4    metFORMIN (GLUCOPHAGE) 1000 MG tablet, Take 1,000 mg by mouth 2 (two) times a day with meals, Disp: , Rfl:     predniSONE 20 mg tablet, 3 tabs po x 2d, then 2 tabs x 2d, then 1 tab x 2d, 1/2 tab po x 2d, Disp: 13 tablet, Rfl: 0    promethazine-dextromethorphan (PHENERGAN-DM) 6 25-15 mg/5 mL oral syrup, Take 5 mL by mouth 4 (four) times a day as needed for cough, Disp: 118 mL, Rfl: 0    QUEtiapine (SEROquel) 25 mg tablet, Take 1 tablet (25 mg total) by mouth 2 (two) times a day for 30 days, Disp: 60 tablet, Rfl: 2    SPIRIVA HANDIHALER 18 MCG inhalation capsule, TAKE 1 INHALATION ONCE A DAY , Disp: 30 each, Rfl: 2    VENTOLIN  (90 Base) MCG/ACT inhaler, INHALE TWO PUFFS BY MOUTH EVERY FOUR HOURS AS NEEDED FOR WHEEZING , Disp: 18 g, Rfl: 2    Methylprednisolone 4 MG TBPK, Use as directed on package (Patient not taking: Reported on 8/27/2018 ), Disp: 21 tablet, Rfl: 0    Tapentadol HCl ER (NUCYNTA ER) 50 MG TB12, Take 1 tablet (50 mg total) by mouth every 12 (twelve) hours for 30 days Max Daily Amount: 100 mg, Disp: 60 tablet, Rfl: 0    No Known Allergies    Physical Exam:    /70 (BP Location: Left arm, Patient Position: Sitting, Cuff Size: Standard)   Pulse 87   Resp 18   Ht 5' 3" (1 6 m)   Wt 111 kg (244 lb)   BMI 43 22 kg/m²     Constitutional:obese  Eyes:anicteric  HEENT:grossly intact  Neck:supple, symmetric, trachea midline and no masses   Pulmonary:even and unlabored  Cardiovascular:No edema or pitting edema present  Skin:Normal without rashes or lesions and well hydrated  Psychiatric:Mood and affect appropriate  Neurologic:Cranial Nerves II-XII grossly intact  Musculoskeletal:normal    Orders Placed This Encounter   Procedures    Ambulatory referral to Orthopedic Surgery

## 2018-08-27 NOTE — TELEPHONE ENCOUNTER
----- Message from Tamara Marie MD sent at 8/27/2018 12:14 PM EDT -----  Regarding: E-prescribing error  Received an error from [facility] that there was an error in e-prescribing  Can you check with the pharmacy to see what was received and what I have to re-send?

## 2018-08-27 NOTE — TELEPHONE ENCOUNTER
S/w pharmacist at [facility] they did not receive any medications today on this patient

## 2018-08-31 ENCOUNTER — OFFICE VISIT (OUTPATIENT)
Dept: OBGYN CLINIC | Facility: CLINIC | Age: 58
End: 2018-08-31
Payer: OTHER MISCELLANEOUS

## 2018-08-31 VITALS
HEIGHT: 63 IN | SYSTOLIC BLOOD PRESSURE: 128 MMHG | WEIGHT: 244.4 LBS | DIASTOLIC BLOOD PRESSURE: 86 MMHG | BODY MASS INDEX: 43.3 KG/M2 | HEART RATE: 106 BPM

## 2018-08-31 DIAGNOSIS — M16.12 PRIMARY OSTEOARTHRITIS OF ONE HIP, LEFT: ICD-10-CM

## 2018-08-31 DIAGNOSIS — M25.552 PAIN IN LEFT HIP: Primary | ICD-10-CM

## 2018-08-31 PROCEDURE — 99204 OFFICE O/P NEW MOD 45 MIN: CPT | Performed by: ORTHOPAEDIC SURGERY

## 2018-08-31 NOTE — PROGRESS NOTES
Assessment/Plan:  1  Pain in left hip  XR hip/pelv 2-3 vws left if performed    Ambulatory referral to Physical Therapy   2  Primary osteoarthritis of one hip, left  Ambulatory referral to Physical Therapy   3  BMI 40 0-44 9, adult [facility])       Patient is a very pleasant 69-year-old female that is here as a referral from Dr Marcy Vidal of spine and pain for evaluation of her left hip pain  After thorough history, clinical exam, review of her x-rays she does have very mild osteoarthritis of her left hip with greater than 50% of the joint space still remaining  She cannot take anti-inflammatories due to other medical comorbidities well as her other pain medications she is on therefore recommended she continue her analgesics regiment with her pain management specialist and I have added physical therapy to address her left hip osteoarthritis  Patient does have some mild calcifications just superior to the acetabulum that could be heterotopic ossification or and osteophyte that has dissociated from the acetabulum  It is benign in appearance  At this point she is not in any need of left hip surgical intervention and I recommend that she continue with conservative measures of treatment for the time being  If she does not see any significant relief with physical therapy over the next 3 months we may consider a steroid injection with Dr Marcy Vidal into her left hip articulation  If she sees him prior to her next appointment with me and she is not doing well with physical therapy he may address this with her  I will see her back in 3 months time  If she is still having significant discomfort at that point we will get repeat x-rays of her left hip at that time  Subjective:   Left hip pain    Patient ID: Iman Burgos is a 61 y o  female  HPI  Patient is a 69-year-old female this is a referral from Dr Marcy Vidal of spine and pain for evaluation of her left hip discomfort    She states that she has had left groin pain for a few years now this point  She reports sharp and achy pain that is localized in the left groin  The pain can reach 7-8/10 on the pain scale  The pain is exacerbated with walking, ambulating, prolonged periods of weight-bearing, and going up stairs  She denies any paresthetica type pain down the left lower extremity  She denies any radicular type pain going into her left groin  She does have a significant history of prior lumbosacral surgery  She feels as though her pain is strictly activity-related and it does happen to decreased with rest   She is on various analgesic medications via her pain management specialist   She has not attempted any significant anti-inflammatories for her discomfort  She has not attempted formal physical therapy for her left hip yet  Review of Systems   Constitutional: Positive for activity change  HENT: Negative  Eyes: Negative  Respiratory: Negative  Cardiovascular: Negative  Gastrointestinal: Negative  Endocrine: Negative  Musculoskeletal: Positive for arthralgias  Skin: Negative  Neurological: Negative  Psychiatric/Behavioral: Negative            Past Medical History:   Diagnosis Date    Anxiety     Arthritis     Asthma     Back pain     Breast lump     last assessed 10/14/14     Carpal tunnel syndrome 03/17/2006    unspecifiedd laterality / last assessed 10/14/14     COPD (chronic obstructive pulmonary disease) [address] [address]     with exacerbation / last assessed 6/25/14     Depression     Diabetes mellitus ([facility] [address] [facility]     GERD (gastroesophageal reflux disease)     Hypercholesterolemia     Hyperlipidemia     Hypertension     Insomnia     Low back pain     Lumbosacral disc disease     Nodule of tendon sheath     last assessed 2/5/15     Obesity     Peripheral neuropathy     Type 2 diabetes mellitus with hyperglycemia ([facility] [address] [facility]     last assessed 6/8/17     Varicose vein of leg        Past Surgical History:   Procedure Laterality Date    BACK SURGERY      lower fusion    CAUDAL BLOCK N/A 2017    Procedure: BLOCK / INJECTION CAUDAL;  Surgeon: Zina Lagunas MD;  Location: [place] [id number] MAIN OR;  Service: Pain Management      SECTION      LAMINECTOMY      Lumbar        Family History   Problem Relation Age of Onset    Diabetes Mother     Dementia Father        Social History     Occupational History          unemployed     Social History Main Topics    Smoking status: Current Every Day Smoker     Packs/day: 1 00     Types: Cigarettes    Smokeless tobacco: Never Used      Comment: tobacco use    Alcohol use No    Drug use: No    Sexual activity: Not on file         Current Outpatient Prescriptions:     atorvastatin (LIPITOR) 80 mg tablet, Take 1 tablet (80 mg total) by mouth daily, Disp: 90 tablet, Rfl: 3    buPROPion (WELLBUTRIN) 100 mg tablet, Take 1 tablet (100 mg total) by mouth 2 (two) times a day, Disp: 60 tablet, Rfl: 1    DULoxetine (CYMBALTA) 60 mg delayed release capsule, Take 1 capsule (60 mg total) by mouth daily, Disp: 90 capsule, Rfl: 3    fluticasone (FLONASE) 50 mcg/act nasal spray, USE ONE OR TWO SPRAYS EACH NOSTRIL DAILY , Disp: 16 g, Rfl: 2    glucose blood test strip, Once daily testing, Disp: 100 each, Rfl: 0    Insulin Pen Needle 31G X 5 MM MISC, Inject as directed daily For use w victoza pen, Disp: 30 each, Rfl: 3    liraglutide (VICTOZA) injection, Inject 0 1 mL (0 6 mg total) under the skin daily, Disp: 3 mL, Rfl: 3    lisinopril-hydrochlorothiazide (PRINZIDE,ZESTORETIC) 20-25 MG per tablet, Take 1 tablet by mouth daily, Disp: 90 tablet, Rfl: 3    LYRICA 100 MG capsule, TAKE ONE CAPSULE BY MOUTH EVERY EIGHT HOURS , Disp: 90 capsule, Rfl: 4    metFORMIN (GLUCOPHAGE) 1000 MG tablet, Take 1,000 mg by mouth 2 (two) times a day with meals, Disp: , Rfl:     Methylprednisolone 4 MG TBPK, Use as directed on package, Disp: 21 tablet, Rfl: 0    predniSONE 20 mg tablet, 3 tabs po x 2d, then 2 tabs x 2d, then 1 tab x 2d, 1/2 tab po x 2d, Disp: 13 tablet, Rfl: 0    promethazine-dextromethorphan (PHENERGAN-DM) 6 25-15 mg/5 mL oral syrup, Take 5 mL by mouth 4 (four) times a day as needed for cough, Disp: 118 mL, Rfl: 0    QUEtiapine (SEROquel) 25 mg tablet, Take 1 tablet (25 mg total) by mouth 2 (two) times a day for 30 days, Disp: 60 tablet, Rfl: 2    SPIRIVA HANDIHALER 18 MCG inhalation capsule, TAKE 1 INHALATION ONCE A DAY , Disp: 30 each, Rfl: 2    Tapentadol HCl ER (NUCYNTA ER) 50 MG TB12, Take 1 tablet (50 mg total) by mouth every 12 (twelve) hours for 30 days Max Daily Amount: 100 mg, Disp: 60 tablet, Rfl: 0    [START ON 9/26/2018] Tapentadol HCl ER 50 MG TB12, Take 1 tablet (50 mg total) by mouth every 12 (twelve) hours for 30 days Max Daily Amount: 100 mg, Disp: 60 tablet, Rfl: 0    VENTOLIN  (90 Base) MCG/ACT inhaler, INHALE TWO PUFFS BY MOUTH EVERY FOUR HOURS AS NEEDED FOR WHEEZING , Disp: 18 g, Rfl: 2    No Known Allergies    Objective:  Vitals:    08/31/18 1428   BP: 128/86   Pulse: (!) 106       Body mass index is 43 29 kg/m²  Right Knee Exam     Other   Other tests: no effusion present      Left Hip Exam     Tenderness   The patient is experiencing no tenderness  Range of Motion   Extension: 0   Flexion: 110   Internal Rotation: 20   External Rotation: 50   Abduction: 50   Adduction: 30     Muscle Strength   Abduction: 5/5   Adduction: 5/5   Flexion: 5/5     Tests   SHERRELL: negative  Roger: negative    Other   Erythema: absent  Scars: absent  Sensation: normal  Pulse: present    Comments:  Patient does have an equivocal Stinchfield  Patient has some mild discomfort with FADIR, negative SHERRELL  No internal or external snapping hip  No tenderness palpation over the greater trochanter  No leg length deformity  Observations     Right Knee   Negative for effusion  Physical Exam   Constitutional: She is oriented to person, place, and time  She appears well-developed  HENT:   Head: Atraumatic  Eyes: EOM are normal    Neck: Neck supple  Cardiovascular: Normal rate  Pulmonary/Chest: Effort normal    Musculoskeletal:        Right knee: She exhibits no effusion  See orthopedic exam   Neurological: She is alert and oriented to person, place, and time  Skin: Skin is warm and dry  Psychiatric: She has a normal mood and affect  Nursing note and vitals reviewed  I have personally reviewed pertinent films in PACS  X-rays from February 2018 reviewed by me  X-rays of the left hip and pelvis demonstrate mild osteoarthritis of the left hip articulation with subtle sclerosis and early osteophyte formation on the periphery of the acetabulum  Greater than 50% of the joint space still remaining  There is a small area of calcification within the soft tissues that could represent heterotopic ossification verses a dissociated osteophyte complex that is in the soft tissues  This appears benign on x-ray  No lytic or blastic lesions appreciated  No fracture appreciated  Prior lumbosacral surgery with what appears to be fusion is noted  Linda SULLIVAN    Division of Adult Reconstruction  Department of [facility]

## 2018-09-05 ENCOUNTER — OFFICE VISIT (OUTPATIENT)
Dept: FAMILY MEDICINE CLINIC | Facility: CLINIC | Age: 58
End: 2018-09-05
Payer: COMMERCIAL

## 2018-09-05 VITALS
HEART RATE: 98 BPM | DIASTOLIC BLOOD PRESSURE: 80 MMHG | RESPIRATION RATE: 18 BRPM | HEIGHT: 63 IN | SYSTOLIC BLOOD PRESSURE: 108 MMHG | TEMPERATURE: 96.9 F | WEIGHT: 243.8 LBS | BODY MASS INDEX: 43.2 KG/M2

## 2018-09-05 DIAGNOSIS — R30.0 DYSURIA: ICD-10-CM

## 2018-09-05 DIAGNOSIS — F32.A DEPRESSION, UNSPECIFIED DEPRESSION TYPE: ICD-10-CM

## 2018-09-05 DIAGNOSIS — E78.5 HYPERLIPIDEMIA, UNSPECIFIED HYPERLIPIDEMIA TYPE: ICD-10-CM

## 2018-09-05 DIAGNOSIS — E11.8 TYPE 2 DIABETES MELLITUS WITH COMPLICATION, WITHOUT LONG-TERM CURRENT USE OF INSULIN (HCC): Primary | ICD-10-CM

## 2018-09-05 DIAGNOSIS — R53.83 FATIGUE, UNSPECIFIED TYPE: ICD-10-CM

## 2018-09-05 LAB
SL AMB  POCT GLUCOSE, UA: 250
SL AMB LEUKOCYTE ESTERASE,UA: 25
SL AMB POCT BILIRUBIN,UA: ABNORMAL
SL AMB POCT BLOOD,UA: ABNORMAL
SL AMB POCT CLARITY,UA: ABNORMAL
SL AMB POCT COLOR,UA: YELLOW
SL AMB POCT HEMOGLOBIN AIC: 8.7
SL AMB POCT KETONES,UA: ABNORMAL
SL AMB POCT NITRITE,UA: ABNORMAL
SL AMB POCT PH,UA: 5
SL AMB POCT SPECIFIC GRAVITY,UA: 1.02
SL AMB POCT URINE PROTEIN: ABNORMAL
SL AMB POCT UROBILINOGEN: ABNORMAL

## 2018-09-05 PROCEDURE — 99214 OFFICE O/P EST MOD 30 MIN: CPT | Performed by: FAMILY MEDICINE

## 2018-09-05 PROCEDURE — 83036 HEMOGLOBIN GLYCOSYLATED A1C: CPT | Performed by: FAMILY MEDICINE

## 2018-09-05 PROCEDURE — 81003 URINALYSIS AUTO W/O SCOPE: CPT | Performed by: FAMILY MEDICINE

## 2018-09-05 PROCEDURE — 3008F BODY MASS INDEX DOCD: CPT | Performed by: FAMILY MEDICINE

## 2018-09-05 PROCEDURE — 3045F PR MOST RECENT HEMOGLOBIN A1C LEVEL 7.0-9.0%: CPT | Performed by: FAMILY MEDICINE

## 2018-09-05 RX ORDER — DULOXETIN HYDROCHLORIDE 30 MG/1
CAPSULE, DELAYED RELEASE ORAL
Qty: 30 CAPSULE | Refills: 0 | Status: SHIPPED | OUTPATIENT
Start: 2018-09-05 | End: 2018-09-19 | Stop reason: SDUPTHER

## 2018-09-09 LAB
BACTERIA UR CULT: ABNORMAL
LABORATORY COMMENT REPORT: NORMAL
Lab: ABNORMAL
SL AMB ANTIMICROBIAL SUSCEPTIBILITY: ABNORMAL

## 2018-09-11 ENCOUNTER — APPOINTMENT (OUTPATIENT)
Dept: PHYSICAL THERAPY | Facility: CLINIC | Age: 58
End: 2018-09-11
Payer: OTHER MISCELLANEOUS

## 2018-09-11 NOTE — PROGRESS NOTES
Assessment/Plan:     Diagnoses and all orders for this visit:    Type 2 diabetes mellitus with complication, without long-term current use of insulin (HCC)  Comments:  fux5w=7 7%-inc victoza t 1/2mg and then further to 1 8mg if no improvement  cont metformin-Inc exercise  sched eye exam   follow-ADA diet  Orders:  -     POCT urine dip auto non-scope  -     POCT hemoglobin A1c  -     liraglutide (VICTOZA) injection; Inject 0 2 mL (1 2 mg total) under the skin daily  -     Comprehensive metabolic panel; Future  -     Comprehensive metabolic panel  -     Ambulatory referral to Ophthalmology; Future    Depression, unspecified depression type  Comments:  start trial of Duloxetine  Orders:  -     DULoxetine (CYMBALTA) 30 mg delayed release capsule; Take 30mg cap along w 60mg cap for total of 90mg daily    Hyperlipidemia, unspecified hyperlipidemia type  Comments:  low chol diet/inc actvity as tolerated  Orders:  -     Comprehensive metabolic panel; Future  -     TSH, 3rd generation with Free T4 reflex; Future  -     Lipid panel; Future  -     Comprehensive metabolic panel  -     TSH, 3rd generation with Free T4 reflex  -     Lipid panel    Fatigue, unspecified type  Comments:  check labs as lsited  Orders:  -     Comprehensive metabolic panel; Future  -     CBC and Platelet; Future  -     Comprehensive metabolic panel  -     CBC and Platelet    Dysuria  Comments:  UA-gluc/leukos otherwise neg  needs tighter DM control  Orders:  -     Cancel: Urine culture; Future  -     Urine culture  -     Urine culture    Other orders  -     Result  -     [facility] Comment            Subjective:      Patient ID: Kamran Rangel is a 61 y o  female  Chief Complaint   Patient presents with    Diabetes     med check wants to make sure shot is doing what is should be       60 yo pt in for follow-up on chronic conditions inc diabetes  MzX2F=0 7% and UA-+glucose/leukos  Tolerating Victoza w/o adverse effect   Has not been taking the metformin consistently  Admits to non-compliance w diet/exercise  Cont t smoke but has cut back significantly  Due for eye check  BP wnl  The following portions of the patient's history were reviewed and updated as appropriate: allergies, current medications, past family history, past medical history, past social history, past surgical history and problem list      Review of Systems   Constitutional: Positive for fatigue  Negative for fever  Respiratory: Positive for cough  Cardiovascular: Negative for chest pain  Gastrointestinal: Negative for abdominal pain  Endocrine:        DM   Genitourinary: Positive for dysuria  Negative for hematuria  Musculoskeletal: Positive for arthralgias, back pain, myalgias and neck pain  Neurological: Negative  Psychiatric/Behavioral: Positive for decreased concentration and sleep disturbance  The patient is nervous/anxious  Objective:    /80 (BP Location: Left arm, Patient Position: Sitting, Cuff Size: Large)   Pulse 98   Temp (!) 96 9 °F (36 1 °C)   Resp 18   Ht 5' 3" (1 6 m)   Wt 111 kg (243 lb 12 8 oz)   BMI 43 19 kg/m²        Physical Exam   Constitutional: She is oriented to person, place, and time  Overweight, NAD   Eyes: Conjunctivae are normal    Neck: Neck supple  Cardiovascular: Normal rate, regular rhythm, normal heart sounds and intact distal pulses  No murmur heard  Pulmonary/Chest: Effort normal and breath sounds normal  No respiratory distress  Occ, scattered wheeze   Abdominal: Soft  Bowel sounds are normal  There is no tenderness  Neurological: She is alert and oriented to person, place, and time  No cranial nerve deficit  Skin: Skin is warm and dry  Psychiatric: She has a normal mood and affect  Nursing note and vitals reviewed  Labs;  Labs in chart were reviewed        Albino Ramirez MD

## 2018-09-13 ENCOUNTER — TELEPHONE (OUTPATIENT)
Dept: FAMILY MEDICINE CLINIC | Facility: CLINIC | Age: 58
End: 2018-09-13

## 2018-09-13 DIAGNOSIS — N39.0 URINARY TRACT INFECTION WITHOUT HEMATURIA, SITE UNSPECIFIED: Primary | ICD-10-CM

## 2018-09-13 RX ORDER — CIPROFLOXACIN 500 MG/1
500 TABLET, FILM COATED ORAL EVERY 12 HOURS SCHEDULED
Qty: 6 TABLET | Refills: 0 | Status: SHIPPED | OUTPATIENT
Start: 2018-09-13 | End: 2018-09-16

## 2018-09-13 NOTE — TELEPHONE ENCOUNTER
----- Message from Verona Baker MD sent at 9/13/2018  8:49 AM EDT -----  Urine cx-+--rx for antibiotic sent to pharmacy--please inform pt--

## 2018-09-13 NOTE — TELEPHONE ENCOUNTER
Urine cx-+e  coil--rx for Cipro sent to pharmacy-please confirm that pt not taking any steroid at this time from any other provider  Will rpt UA at follow-up

## 2018-09-14 ENCOUNTER — APPOINTMENT (OUTPATIENT)
Dept: PHYSICAL THERAPY | Facility: CLINIC | Age: 58
End: 2018-09-14
Payer: OTHER MISCELLANEOUS

## 2018-09-19 DIAGNOSIS — F32.A DEPRESSION, UNSPECIFIED DEPRESSION TYPE: ICD-10-CM

## 2018-09-20 RX ORDER — DULOXETIN HYDROCHLORIDE 30 MG/1
CAPSULE, DELAYED RELEASE ORAL
Qty: 30 CAPSULE | Refills: 0 | Status: SHIPPED | OUTPATIENT
Start: 2018-09-20 | End: 2018-10-10 | Stop reason: SDUPTHER

## 2018-09-29 LAB
ALBUMIN SERPL-MCNC: 4.5 G/DL (ref 3.5–5.5)
ALBUMIN/GLOB SERPL: 2.3 {RATIO} (ref 1.2–2.2)
ALP SERPL-CCNC: 86 IU/L (ref 39–117)
ALT SERPL-CCNC: 26 IU/L (ref 0–32)
AST SERPL-CCNC: 17 IU/L (ref 0–40)
BILIRUB SERPL-MCNC: 0.3 MG/DL (ref 0–1.2)
BUN SERPL-MCNC: 17 MG/DL (ref 6–24)
BUN/CREAT SERPL: 16 (ref 9–23)
CALCIUM SERPL-MCNC: 9.8 MG/DL (ref 8.7–10.2)
CHLORIDE SERPL-SCNC: 93 MMOL/L (ref 96–106)
CHOLEST SERPL-MCNC: 133 MG/DL (ref 100–199)
CHOLEST/HDLC SERPL: 3.2 RATIO (ref 0–4.4)
CO2 SERPL-SCNC: 27 MMOL/L (ref 20–29)
CREAT SERPL-MCNC: 1.08 MG/DL (ref 0.57–1)
ERYTHROCYTE [DISTWIDTH] IN BLOOD BY AUTOMATED COUNT: 13.5 % (ref 12.3–15.4)
GLOBULIN SER-MCNC: 2 G/DL (ref 1.5–4.5)
GLUCOSE SERPL-MCNC: 166 MG/DL (ref 65–99)
HCT VFR BLD AUTO: 42.3 % (ref 34–46.6)
HDLC SERPL-MCNC: 41 MG/DL
HGB BLD-MCNC: 14.5 G/DL (ref 11.1–15.9)
LDLC SERPL CALC-MCNC: 51 MG/DL (ref 0–99)
MCH RBC QN AUTO: 31.9 PG (ref 26.6–33)
MCHC RBC AUTO-ENTMCNC: 34.3 G/DL (ref 31.5–35.7)
MCV RBC AUTO: 93 FL (ref 79–97)
MICRODELETION SYND BLD/T FISH: NORMAL
MICRODELETION SYND BLD/T FISH: NORMAL
PLATELET # BLD AUTO: 307 X10E3/UL (ref 150–379)
POTASSIUM SERPL-SCNC: 4.6 MMOL/L (ref 3.5–5.2)
PROT SERPL-MCNC: 6.5 G/DL (ref 6–8.5)
RBC # BLD AUTO: 4.55 X10E6/UL (ref 3.77–5.28)
SL AMB EGFR AFRICAN AMERICAN: 65 ML/MIN/1.73
SL AMB EGFR NON AFRICAN AMERICAN: 57 ML/MIN/1.73
SL AMB PDF IMAGE: NORMAL
SL AMB VLDL CHOLESTEROL CALC: 41 MG/DL (ref 5–40)
SODIUM SERPL-SCNC: 136 MMOL/L (ref 134–144)
TRIGL SERPL-MCNC: 207 MG/DL (ref 0–149)
TSH SERPL DL<=0.005 MIU/L-ACNC: 2.64 UIU/ML (ref 0.45–4.5)
WBC # BLD AUTO: 12.3 X10E3/UL (ref 3.4–10.8)

## 2018-10-01 ENCOUNTER — EVALUATION (OUTPATIENT)
Dept: PHYSICAL THERAPY | Facility: CLINIC | Age: 58
End: 2018-10-01
Payer: OTHER MISCELLANEOUS

## 2018-10-01 DIAGNOSIS — M25.552 LEFT HIP PAIN: Primary | ICD-10-CM

## 2018-10-01 DIAGNOSIS — F34.1 DYSTHYMIA: ICD-10-CM

## 2018-10-01 DIAGNOSIS — M25.552 PAIN IN LEFT HIP: Primary | ICD-10-CM

## 2018-10-01 PROCEDURE — G8978 MOBILITY CURRENT STATUS: HCPCS | Performed by: PHYSICAL THERAPIST

## 2018-10-01 PROCEDURE — G8979 MOBILITY GOAL STATUS: HCPCS | Performed by: PHYSICAL THERAPIST

## 2018-10-01 PROCEDURE — 97161 PT EVAL LOW COMPLEX 20 MIN: CPT | Performed by: PHYSICAL THERAPIST

## 2018-10-01 RX ORDER — BUPROPION HYDROCHLORIDE 100 MG/1
TABLET ORAL
Qty: 60 TABLET | Refills: 0 | Status: SHIPPED | OUTPATIENT
Start: 2018-10-01 | End: 2018-10-10 | Stop reason: SDUPTHER
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
## 2018-10-05 ENCOUNTER — OFFICE VISIT (OUTPATIENT)
Dept: PHYSICAL THERAPY | Facility: CLINIC | Age: 58
End: 2018-10-05
Payer: OTHER MISCELLANEOUS

## 2018-10-05 DIAGNOSIS — M25.552 LEFT HIP PAIN: Primary | ICD-10-CM

## 2018-10-05 PROCEDURE — 97110 THERAPEUTIC EXERCISES: CPT

## 2018-10-05 PROCEDURE — 97140 MANUAL THERAPY 1/> REGIONS: CPT

## 2018-10-05 NOTE — PROGRESS NOTES
Daily Note     Today's date: 10/5/2018  Patient name: Anay Olivia  : 1960  MRN: [id number]  Referring provider: Eric Sandoval DO  Dx:   Encounter Diagnosis     ICD-10-CM    1  Left hip pain M25 552                   Subjective: /10 L hip pain  Objective: See treatment diary below    Precautions: COPD, DM    Daily Treatment Diary     Manual   10/5           L hip  prom           L hip flexor stretching  done                                                      Exercise Diary  10/1 10/5           bridge 10x 10x           SLR Flex, ABD 2x10 hep           clamshells 10x Red tb x20           nustep  20min L1           Step ups  4"x10           Lateral step ups  4" x10           Heel slides  20x           Lumbar rotation  20x           [place] resisted walking  @#7   2x10                                                                                                                                                              Modalities                                                         Assessment: Tolerated treatment well  Patient demonstrated fatigue post treatment      Plan: Progress treatment as tolerated

## 2018-10-08 ENCOUNTER — OFFICE VISIT (OUTPATIENT)
Dept: PHYSICAL THERAPY | Facility: CLINIC | Age: 58
End: 2018-10-08
Payer: OTHER MISCELLANEOUS

## 2018-10-08 DIAGNOSIS — M25.552 LEFT HIP PAIN: Primary | ICD-10-CM

## 2018-10-08 PROCEDURE — 97110 THERAPEUTIC EXERCISES: CPT

## 2018-10-08 PROCEDURE — 97112 NEUROMUSCULAR REEDUCATION: CPT

## 2018-10-08 NOTE — PROGRESS NOTES
Daily Note     Today's date: 10/8/2018  Patient name: Justin Ty  : 1960  MRN: [id number]  Referring provider: Noelle Akhtar DO  Dx:   Encounter Diagnosis     ICD-10-CM    1  Left hip pain M25 552                   Subjective: Not too sore after last visit  Objective: See treatment diary below    Precautions: COPD, DM    Daily Treatment Diary     Manual   10/5 10/8          L hip  prom -----          L hip flexor stretching  done done                                                     Exercise Diary  10/1 10/5 10/8          bridge 10x 10x 10x          SLR Flex, ABD 2x10 hep hep          clamshells 10x Red tb x20 Red tbx20          nustep  20min L1 15min L2          Step ups  4"x10 4"x20          Lateral step ups  4" x10 4"x20          Heel slides  20x 20x          Lumbar rotation  20x 20x          [place] resisted walking  @#7   2x10 @#7  4x10          Sit to stand    Chair x 20                                                                                                                                                Modalities                                                       Assessment: Tolerated treatment well  Patient demonstrated fatigue post treatment      Plan: Progress treatment as tolerated

## 2018-10-09 ENCOUNTER — OFFICE VISIT (OUTPATIENT)
Dept: PHYSICAL THERAPY | Facility: CLINIC | Age: 58
End: 2018-10-09
Payer: OTHER MISCELLANEOUS

## 2018-10-09 DIAGNOSIS — M25.552 LEFT HIP PAIN: Primary | ICD-10-CM

## 2018-10-09 PROCEDURE — 97110 THERAPEUTIC EXERCISES: CPT

## 2018-10-09 PROCEDURE — 97112 NEUROMUSCULAR REEDUCATION: CPT

## 2018-10-10 ENCOUNTER — OFFICE VISIT (OUTPATIENT)
Dept: FAMILY MEDICINE CLINIC | Facility: CLINIC | Age: 58
End: 2018-10-10
Payer: COMMERCIAL

## 2018-10-10 VITALS
DIASTOLIC BLOOD PRESSURE: 80 MMHG | TEMPERATURE: 96 F | WEIGHT: 241 LBS | SYSTOLIC BLOOD PRESSURE: 116 MMHG | RESPIRATION RATE: 22 BRPM | HEIGHT: 63 IN | BODY MASS INDEX: 42.7 KG/M2

## 2018-10-10 DIAGNOSIS — Z23 NEED FOR PNEUMOCOCCAL VACCINATION: ICD-10-CM

## 2018-10-10 DIAGNOSIS — J44.9 CHRONIC OBSTRUCTIVE PULMONARY DISEASE, UNSPECIFIED COPD TYPE (HCC): ICD-10-CM

## 2018-10-10 DIAGNOSIS — Z23 NEED FOR INFLUENZA VACCINATION: ICD-10-CM

## 2018-10-10 DIAGNOSIS — J31.0 RHINITIS, UNSPECIFIED TYPE: ICD-10-CM

## 2018-10-10 DIAGNOSIS — R05.9 COUGH: ICD-10-CM

## 2018-10-10 DIAGNOSIS — F34.1 DYSTHYMIA: ICD-10-CM

## 2018-10-10 DIAGNOSIS — F32.A DEPRESSION, UNSPECIFIED DEPRESSION TYPE: ICD-10-CM

## 2018-10-10 DIAGNOSIS — E11.8 TYPE 2 DIABETES MELLITUS WITH COMPLICATION, WITHOUT LONG-TERM CURRENT USE OF INSULIN (HCC): Primary | ICD-10-CM

## 2018-10-10 DIAGNOSIS — R30.0 DYSURIA: ICD-10-CM

## 2018-10-10 LAB
SL AMB  POCT GLUCOSE, UA: 50
SL AMB LEUKOCYTE ESTERASE,UA: ABNORMAL
SL AMB POCT BILIRUBIN,UA: ABNORMAL
SL AMB POCT BLOOD,UA: ABNORMAL
SL AMB POCT CLARITY,UA: CLEAR
SL AMB POCT COLOR,UA: YELLOW
SL AMB POCT KETONES,UA: ABNORMAL
SL AMB POCT NITRITE,UA: ABNORMAL
SL AMB POCT PH,UA: 5
SL AMB POCT SPECIFIC GRAVITY,UA: 1.02
SL AMB POCT URINE PROTEIN: ABNORMAL
SL AMB POCT UROBILINOGEN: ABNORMAL

## 2018-10-10 PROCEDURE — 99214 OFFICE O/P EST MOD 30 MIN: CPT | Performed by: FAMILY MEDICINE

## 2018-10-10 PROCEDURE — 90472 IMMUNIZATION ADMIN EACH ADD: CPT

## 2018-10-10 PROCEDURE — 90732 PPSV23 VACC 2 YRS+ SUBQ/IM: CPT

## 2018-10-10 PROCEDURE — 90471 IMMUNIZATION ADMIN: CPT

## 2018-10-10 PROCEDURE — 90686 IIV4 VACC NO PRSV 0.5 ML IM: CPT

## 2018-10-10 PROCEDURE — 81003 URINALYSIS AUTO W/O SCOPE: CPT | Performed by: FAMILY MEDICINE

## 2018-10-10 RX ORDER — DULOXETIN HYDROCHLORIDE 30 MG/1
30 CAPSULE, DELAYED RELEASE ORAL DAILY
Qty: 90 CAPSULE | Refills: 3 | Status: SHIPPED | OUTPATIENT
Start: 2018-10-10 | End: 2019-10-01 | Stop reason: SDUPTHER

## 2018-10-10 RX ORDER — BUPROPION HYDROCHLORIDE 100 MG/1
100 TABLET ORAL 2 TIMES DAILY
Qty: 60 TABLET | Refills: 5 | Status: SHIPPED | OUTPATIENT
Start: 2018-10-10 | End: 2019-04-15 | Stop reason: SDUPTHER

## 2018-10-10 RX ORDER — FLUTICASONE PROPIONATE 50 MCG
2 SPRAY, SUSPENSION (ML) NASAL DAILY
Qty: 16 G | Refills: 3 | Status: SHIPPED | OUTPATIENT
Start: 2018-10-10 | End: 2019-04-25 | Stop reason: SDUPTHER

## 2018-10-10 RX ORDER — PROMETHAZINE HYDROCHLORIDE AND CODEINE PHOSPHATE 6.25; 1 MG/5ML; MG/5ML
5 SYRUP ORAL EVERY 4 HOURS PRN
Qty: 180 ML | Refills: 0 | Status: SHIPPED | OUTPATIENT
Start: 2018-10-10 | End: 2019-01-24

## 2018-10-10 RX ORDER — ALBUTEROL SULFATE 90 UG/1
2 AEROSOL, METERED RESPIRATORY (INHALATION) EVERY 4 HOURS PRN
Qty: 18 G | Refills: 5 | Status: SHIPPED | OUTPATIENT
Start: 2018-10-10 | End: 2019-07-07 | Stop reason: SDUPTHER

## 2018-10-14 NOTE — PROGRESS NOTES
Assessment/Plan:     Diagnoses and all orders for this visit:    Type 2 diabetes mellitus with complication, without long-term current use of insulin (HCC)  Comments:  sje1j=4 7%-rx victoza 1 2mg and inc further to 1 8mg if no improvement  cont metformin-Inc exercise  sched eye exam   follow-ADA diet  Orders:  -     liraglutide (VICTOZA) injection; Inject 0 2 mL (1 2 mg total) under the skin daily    Need for influenza vaccination  Comments:  fu vacc administered  Orders:  -     SYRINGE/SINGLE-DOSE VIAL: influenza vaccine, 9406-1902, quadrivalent, 0 5 mL, preservative-free, for patients 3+ yr (FLUZONE)  -     Cancel: influenza vaccine, 7913-6805, high-dose, PF 0 5 mL, for patients 65 yr+ (FLUZONE HIGH-DOSE)    Need for pneumococcal vaccination  Comments:  +smoker+DM-last vacc 5yrs ago-booster dose given today w flu vacc  Orders:  -     PNEUMOCOCCAL POLYSACCHARIDE VACCINE 23-VALENT =>1YO SQ IM    Cough  Comments:  chronic-cont efforts at smoking cessation  Orders:  -     promethazine-codeine (PHENERGAN WITH CODEINE) 6 25-10 mg/5 mL syrup; Take 5 mL by mouth every 4 (four) hours as needed for cough    Chronic obstructive pulmonary disease, unspecified [facility] type ([facility] )  Comments:  meds as listed  encouraged smoking cessation  Orders:  -     tiotropium (SPIRIVA HANDIHALER) 18 mcg inhalation capsule; Place 1 capsule (18 mcg total) into inhaler and inhale daily  -     albuterol (VENTOLIN HFA) 90 mcg/act inhaler; Inhale 2 puffs every 4 (four) hours as needed for wheezing    Depression, unspecified depression type  Comments:  start trial of Duloxetine  Orders:  -     DULoxetine (CYMBALTA) 30 mg delayed release capsule; Take 1 capsule (30 mg total) by mouth daily    Dysthymia  Comments:  start bupropion 100mg bid  Orders:  -     buPROPion (WELLBUTRIN) 100 mg tablet;  Take 1 tablet (100 mg total) by mouth 2 (two) times a day    Rhinitis, unspecified type  -     fluticasone (FLONASE) 50 mcg/act nasal spray; 2 sprays into each nostril daily    Dysuria  Comments:  UA-+glucose-otherwie neg  Orders:  -     POCT urine dip auto non-scope            Subjective:      Patient ID: Rubens Rabago is a 61 y o  female  Chief Complaint   Patient presents with    Results     BW    Constipation     after 2 days she starts to go and it is loose ,     Cough       58-year-old patient in for follow-up on diabetes and to review blood work  Last labs done in September showed hemoglobin A1c equal to 8 7%--pt on Victoza--recently inc dose-trying to modify diet, exercise limited by back pain  WBC slightly elevated 12 8 patient was recently on steroids at time of lab draw--chronic cough --trying to quit smoking-has dec sig  Metabolic panel essentially normal creatinine equal to 1 08  Total cholesterol equal to 133 triglycerides elevated=261, HDLlow end=41  TSH wnl  LFTS wnl   req flu shot and pneum booster  Eye/dental care due  The following portions of the patient's history were reviewed and updated as appropriate: allergies, current medications, past family history, past medical history, past social history, past surgical history and problem list      Review of Systems   Constitutional: Positive for fatigue  Gastrointestinal:        Occ GERD   Endocrine:        DM   Musculoskeletal: Positive for arthralgias  Neurological: Negative  Psychiatric/Behavioral: Positive for sleep disturbance  The patient is nervous/anxious  Objective:    /80 (BP Location: Left arm, Patient Position: Sitting, Cuff Size: Large)   Temp (!) 96 °F (35 6 °C)   Resp 22   Ht 5' 3" (1 6 m)   Wt 109 kg (241 lb)   BMI 42 69 kg/m²        Physical Exam   Constitutional: She is oriented to person, place, and time  OW, chronically ill, NAD   Eyes: Conjunctivae are normal    Neck: Neck supple  Cardiovascular: Normal rate, regular rhythm, normal heart sounds and intact distal pulses  Pulmonary/Chest: Effort normal    No loc   W/r/r   Abdominal: Soft  Bowel sounds are normal  There is no tenderness  Musculoskeletal: She exhibits tenderness  Neurological: She is alert and oriented to person, place, and time  No cranial nerve deficit  Skin: Skin is warm and dry  Psychiatric: She has a normal mood and affect  Nursing note and vitals reviewed  Labs;  Labs in chart were reviewed    Recent Results (from the past 672 hour(s))   Comprehensive metabolic panel    Collection Time: 09/28/18 10:17 AM   Result Value Ref Range    Glucose 166 (H) 65 - 99 mg/dL    BUN 17 6 - 24 mg/dL    Creatinine 1 08 (H) 0 57 - 1 00 mg/dL    eGFR Non  57 (L) >59 mL/min/1 73    SL AMB EGFR AFRICAN AMERICAN 65 >59 mL/min/1 73    SL AMB BUN/CREATININE RATIO 16 9 - 23    Sodium 136 134 - 144 mmol/L    SL AMB POTASSIUM 4 6 3 5 - 5 2 mmol/L    Chloride 93 (L) 96 - 106 mmol/L    CO2 27 20 - 29 mmol/L    CALCIUM 9 8 8 7 - 10 2 mg/dL    SL AMB PROTEIN, TOTAL 6 5 6 0 - 8 5 g/dL    Albumin 4 5 3 5 - 5 5 g/dL    Globulin, Total 2 0 1 5 - 4 5 g/dL    SL AMB ALBUMIN/GLOBULIN RATIO 2 3 (H) 1 2 - 2 2    TOTAL BILIRUBIN 0 3 0 0 - 1 2 mg/dL    Alk Phos Isoenzymes 86 39 - 117 IU/L    SL AMB AST 17 0 - 40 IU/L    SL AMB ALT 26 0 - 32 IU/L   CBC and Platelet    Collection Time: 09/28/18 10:17 AM   Result Value Ref Range    White Blood Cell Count 12 3 (H) 3 4 - 10 8 x10E3/uL    Red Blood Cell Count 4 55 3 77 - 5 28 x10E6/uL    Hemoglobin 14 5 11 1 - 15 9 g/dL    HCT 42 3 34 0 - 46 6 %    MCV 93 79 - 97 fL    MCH 31 9 26 6 - 33 0 pg    MCHC 34 3 31 5 - 35 7 g/dL    RDW 13 5 12 3 - 15 4 %    Platelet Count 510 304 - 379 x10E3/uL   TSH, 3rd generation with Free T4 reflex    Collection Time: 09/28/18 10:17 AM   Result Value Ref Range    TSH 2 640 0 450 - 4 500 uIU/mL   Lipid panel    Collection Time: 09/28/18 10:17 AM   Result Value Ref Range    Cholesterol, Total 133 100 - 199 mg/dL    Triglycerides 207 (H) 0 - 149 mg/dL    HDL 41 >39 mg/dL    SL AMB VLDL CHOLESTEROL CALC 41 (H) 5 - 40 mg/dL    LDL Direct 51 0 - 99 mg/dL    T   Chol/HDL Ratio 3 2 0 0 - 4 4 ratio   Cardiovascular Report    Collection Time: 09/28/18 10:17 AM   Result Value Ref Range    SL AMB INTERPRETATION Note     PDF Image Not applicable    [facility] CKD Program    Collection Time: 09/28/18 10:17 AM   Result Value Ref Range    SL AMB INTERPRETATION Note    POCT urine dip auto non-scope    Collection Time: 10/10/18 11:38 AM   Result Value Ref Range     COLOR,UA yellow      CLARITY,UA clear     SPECIFIC GRAVITY,UA 1 020      PH,UA 5     LEUKOCYTE ESTERASE,UA neg     NITRITE,UA neg     GLUCOSE, UA 50     KETONES,UA neg      BILIRUBIN,UA neg      BLOOD,UA neg     SL AMB POCT URINE PROTEIN neg     SL AMB POCT UROBILINOGEN neg      Guillaume Marrero MD

## 2018-10-15 ENCOUNTER — OFFICE VISIT (OUTPATIENT)
Dept: PHYSICAL THERAPY | Facility: CLINIC | Age: 58
End: 2018-10-15
Payer: OTHER MISCELLANEOUS

## 2018-10-15 DIAGNOSIS — M25.552 LEFT HIP PAIN: Primary | ICD-10-CM

## 2018-10-15 PROCEDURE — 97140 MANUAL THERAPY 1/> REGIONS: CPT

## 2018-10-15 PROCEDURE — G8978 MOBILITY CURRENT STATUS: HCPCS

## 2018-10-15 PROCEDURE — G8979 MOBILITY GOAL STATUS: HCPCS

## 2018-10-15 PROCEDURE — 97110 THERAPEUTIC EXERCISES: CPT

## 2018-10-15 NOTE — PROGRESS NOTES
Daily Note     Today's date: 10/15/2018  Patient name: Roxy Roman  : 1960  MRN: [id number]  Referring provider: Diaz Cano DO  Dx:   Encounter Diagnosis     ICD-10-CM    1  Left hip pain M25 552                   Subjective: Patient reports increased soreness from a busy weekend  Objective: See treatment diary below    Precautions: COPD, DM    Daily Treatment Diary     Manual   10/5 10/8  10/15        L hip  prom -----  prom        L hip flexor stretching  done done  stretch/STM                                                   Exercise Diary  10/1 10/5 10/8 10/9 10/15        bridge 10x 10x 10x 20x 20xw/  add        SLR Flex, ABD 2x10 hep hep 2x10 2x10        clamshells 10x Red tb x20 Red tbx20 Red tb x10  S/L Red tb x20        nustep  20min L1 15min L2 Bike x11min L3 NuStep x15min L2        Step ups  4"x10 4"x20 4"x20 4"x20        Lateral step ups  4" x10 4"x20 4"x20 4"x20        Heel slides  20x 20x 20x 20x        Lumbar rotation  20x 20x 20x 20x        [place] resisted walking  @#7   2x10 @#7  4x10 @#8  4x10 @#*  4x10        Sit to stand    Chair x 20 20x chair 20x chair        SLS kicking    4x5 hep                                                                                                                                 Modalities                                                       Assessment: Tolerated treatment fair  Patient would benefit from continued PT      Plan: Progress treatment as tolerated

## 2018-10-16 ENCOUNTER — APPOINTMENT (OUTPATIENT)
Dept: PHYSICAL THERAPY | Facility: CLINIC | Age: 58
End: 2018-10-16
Payer: OTHER MISCELLANEOUS

## 2018-10-23 ENCOUNTER — OFFICE VISIT (OUTPATIENT)
Dept: PHYSICAL THERAPY | Facility: CLINIC | Age: 58
End: 2018-10-23
Payer: OTHER MISCELLANEOUS

## 2018-10-23 DIAGNOSIS — M25.552 LEFT HIP PAIN: Primary | ICD-10-CM

## 2018-10-23 PROCEDURE — 97112 NEUROMUSCULAR REEDUCATION: CPT

## 2018-10-23 PROCEDURE — 97110 THERAPEUTIC EXERCISES: CPT

## 2018-10-23 NOTE — PROGRESS NOTES
Daily Note     Today's date: 10/23/2018  Patient name: Sebastien Pina  : 1960  MRN: [id number]  Referring provider: Jong Sanz DO  Dx:   Encounter Diagnosis     ICD-10-CM    1  Left hip pain M25 552                   Subjective: Exhausted from a very busy weekend  Objective: See treatment diary below  Precautions: COPD, DM    Daily Treatment Diary     Manual   10/5 10/8  10/15        L hip  prom -----  prom        L hip flexor stretching  done done  stretch/STM                                                   Exercise Diary  10/1 10/5 10/8 10/9 10/15 10/23       bridge 10x 10x 10x 20x 20xw/  add 20x w/add       SLR Flex, ABD 2x10 hep hep 2x10 2x10 2x10       clamshells 10x Red tb x20 Red tbx20 Red tb x10  S/L Red tb x20 Gr tb x20       nustep  20min L1 15min L2 Bike x11min L3 NuStep x15min L2 NuStep x15min   L2       Step ups  4"x10 4"x20 4"x20 4"x20 4"x20       Lateral step ups  4" x10 4"x20 4"x20 4"x20 4"x20       Heel slides  20x 20x 20x 20x 20x       Lumbar rotation  20x 20x 20x 20x 20x       [place] resisted walking  @#7   2x10 @#7  4x10 @#8  4x10 @#8  4x10 @#8  4x10       Sit to stand    Chair x 20 20x chair 20x chair 20x chair       SLS kicking    4x5 hep hep                                                                                                                                Modalities                                                       Assessment: Tolerated treatment well  Patient demonstrated fatigue post treatment      Plan: Progress treatment as tolerated

## 2018-10-24 ENCOUNTER — OFFICE VISIT (OUTPATIENT)
Dept: PHYSICAL THERAPY | Facility: CLINIC | Age: 58
End: 2018-10-24
Payer: OTHER MISCELLANEOUS

## 2018-10-24 DIAGNOSIS — M25.552 LEFT HIP PAIN: Primary | ICD-10-CM

## 2018-10-24 PROCEDURE — 97112 NEUROMUSCULAR REEDUCATION: CPT

## 2018-10-24 PROCEDURE — G8979 MOBILITY GOAL STATUS: HCPCS

## 2018-10-24 PROCEDURE — 97110 THERAPEUTIC EXERCISES: CPT

## 2018-10-24 PROCEDURE — G8978 MOBILITY CURRENT STATUS: HCPCS

## 2018-10-24 NOTE — PROGRESS NOTES
PT Re-Evaluation     Today's date: 10/24/2018  Patient name: Charley Velazquez  : 1960  MRN: [id number]  Referring provider: Swathi Land DO  Dx:   Encounter Diagnosis     ICD-10-CM    1  Left hip pain M25 552        Start Time: 0900  Stop Time: 1000  Total time in clinic (min): 60 minutes    Assessment  Impairments: abnormal or restricted ROM, activity intolerance, impaired balance, impaired physical strength and pain with function    Assessment details: Patient has shown steady progress of left hip function, she continues with pain, limited balance and limited ambulation ability, she would benefit with continued PT to restore prior level of function  Goals  STG  TUG < 9 sec in 3 weeks  Gait Speed >  95 m/s in 3 weeks  SLS on left > 20 sec in 3 weeks  LTG  TUG < 8 5 sec in 6 weeks  Gait Speed > 1 1 m/s in 6 weeks  SLS on left > 25 sec in 6 weeks    Plan  Planned therapy interventions: joint mobilization, manual therapy, neuromuscular re-education, strengthening, stretching, therapeutic exercise, balance, gait training and home exercise program  Frequency: 2x week  Duration in visits: 12  Duration in weeks: 6  Treatment plan discussed with: patient        Subjective Evaluation    History of Present Illness  Mechanism of injury: Patient reports less intense pain, less frequent pain of the left hip  The physical therapy is benefitting her ability to walk and improved balance  Objective     Active Range of Motion   Left Hip   Flexion: 105 degrees   Abduction: 30 degrees   External rotation (90/90): 35 degrees     Strength/Myotome Testing     Left Hip   Planes of Motion   Flexion: 3+  Abduction: 3+  External rotation: 3+    Right Hip   Planes of Motion   Flexion: 5  Abduction: 5  External rotation: 5    Functional Assessment     Single Leg Stance   Left: 14 seconds  Right: 24 seconds    Comments  TUG  9 86  Gait Speed   85 m/s      Flowsheet Rows      Most Recent Value   PT/OT G-Codes   Current Score 62   Projected Score  52   FOTO information reviewed  Yes   Assessment Type  Re-evaluation   G code set  Mobility: Walking & Moving Around   Mobility: Walking and Moving Around Current Status ()  CK   Mobility: Walking and Moving Around Goal Status ()  CK

## 2018-10-24 NOTE — PROGRESS NOTES
Daily Note     Today's date: 10/24/2018  Patient name: Charley Velazquez  : 1960  MRN: [id number]  Referring provider: Swathi Land DO  Dx:   Encounter Diagnosis     ICD-10-CM    1  Left hip pain M25 552                   Subjective: Feeling better  Objective: See treatment diary below  Precautions: COPD, DM    Daily Treatment Diary     Manual   10/5 10/8  10/15        L hip  prom -----  prom        L hip flexor stretching  done done  stretch/STM                                                   Exercise Diary  10/1 10/5 10/8 10/9 10/15 10/23 10/24      bridge 10x 10x 10x 20x 20xw/  add 20x w/add 20x w/add      SLR Flex, ABD 2x10 hep hep 2x10 2x10 2x10 2x10      clamshells 10x Red tb x20 Red tbx20 Red tb x10  S/L Red tb x20 Gr tb x20 Gr tb x20      nustep  20min L1 15min L2 Bike x11min L3 NuStep x15min L2 NuStep x15min   L2 Bike x15min       Step ups  4"x10 4"x20 4"x20 4"x20 4"x20 6"x10      Lateral step ups  4" x10 4"x20 4"x20 4"x20 4"x20 6"x10      Heel slides  20x 20x 20x 20x 20x 20x      Lumbar rotation  20x 20x 20x 20x 20x 20x      [place] resisted walking  @#7   2x10 @#7  4x10 @#8  4x10 @#8  4x10 @#8  4x10 @#13  4x10      Sit to stand    Chair x 20 20x chair 20x chair 20x chair 20x  chair      SLS kicking    4x5 hep hep hep                                                                                                                               Modalities                                                       Assessment: Tolerated treatment fair  Patient demonstrated fatigue post treatment      Plan: Progress treatment as tolerated

## 2018-10-25 ENCOUNTER — OFFICE VISIT (OUTPATIENT)
Dept: PAIN MEDICINE | Facility: CLINIC | Age: 58
End: 2018-10-25
Payer: OTHER MISCELLANEOUS

## 2018-10-25 VITALS
SYSTOLIC BLOOD PRESSURE: 114 MMHG | HEIGHT: 63 IN | RESPIRATION RATE: 22 BRPM | BODY MASS INDEX: 43.3 KG/M2 | DIASTOLIC BLOOD PRESSURE: 68 MMHG | WEIGHT: 244.4 LBS | HEART RATE: 62 BPM

## 2018-10-25 DIAGNOSIS — M54.42 CHRONIC BILATERAL LOW BACK PAIN WITH LEFT-SIDED SCIATICA: ICD-10-CM

## 2018-10-25 DIAGNOSIS — M54.16 LUMBAR RADICULOPATHY: ICD-10-CM

## 2018-10-25 DIAGNOSIS — G89.29 CHRONIC BILATERAL LOW BACK PAIN WITH LEFT-SIDED SCIATICA: ICD-10-CM

## 2018-10-25 DIAGNOSIS — M96.1 POSTLAMINECTOMY SYNDROME, NOT ELSEWHERE CLASSIFIED: ICD-10-CM

## 2018-10-25 DIAGNOSIS — G89.4 CHRONIC PAIN SYNDROME: ICD-10-CM

## 2018-10-25 PROCEDURE — 99214 OFFICE O/P EST MOD 30 MIN: CPT | Performed by: ANESTHESIOLOGY

## 2018-10-25 NOTE — LETTER
October 25, 2018     [place] Halter, [address] [facility] [id number]    Patient: Doris Jimenes   YOB: 1960   Date of Visit: 10/25/2018       Dear Dr Cuellar Gamaliel: Thank you for referring Lorraine Esteban to me for evaluation  Below are my notes for this consultation  If you have questions, please do not hesitate to call me  I look forward to following your patient along with you  Sincerely,        Criss Malik MD        CC: No Recipients  Criss Malik MD  10/25/2018 11:46 AM  Sign at close encounter  Pain Medicine Follow-Up Note    Assessment:  1  Chronic pain syndrome    2  Chronic bilateral low back pain with left-sided sciatica    3  Lumbar radiculopathy    4  Postlaminectomy syndrome, not elsewhere classified        Plan:  New Medications Ordered This Visit   Medications    Tapentadol HCl ER (NUCYNTA ER) 50 MG TB12     Sig: Take 1 tablet (50 mg total) by mouth every 12 (twelve) hours for 30 days Max Daily Amount: 100 mg     Dispense:  60 tablet     Refill:  0    Tapentadol HCl ER 50 MG TB12     Sig: Take 1 tablet (50 mg total) by mouth every 12 (twelve) hours for 30 days Max Daily Amount: 100 mg     Dispense:  60 tablet     Refill:  0     My impressions and treatment recommendations were discussed in detail with the patient who verbalized understanding and had no further questions  Given that the patient reports overall reduced pain and improved level of functioning without significant side effects, I felt a reasonable to continue the patient on Nucynta ER 50 mg every 12 hours  I have sent E prescriptions dated October 26, 2018 and November 25, 2018 to the patient's pharmacy  The risks and side effects of chronic opioid treatment were discussed in detail with the patient   Side effects include but are not limited to nausea, vomiting, GI intolerance, sedation, constipation, mental clouding, opioid-induced hyperalgesia, endocrine dysfunction, addiction, dependence, and tolerance  The patient was asked to take his medications only as prescribed and directed, never in excess, and never for any other reason other than for pain control  The patient was also asked to keep his medications out of the reach of others and away from children, preferably in a locked drawer  The patient verbalized understanding and wished to use these opioid medications  [place] Prescription Drug Monitoring Program report was reviewed and was appropriate     I did also discuss with the patient about the possibility of undergoing a intra-articular joint injection in her hip  She states that she is doing somewhat okay with physical therapy at this point in time  She would prefer to follow-up with Dr Linsey Garcia before deciding on a intra-articular joint injection  Follow-up is planned in 2 months time or sooner as warranted  Discharge instructions were provided  I personally saw and examined the patient and I agree with the above discussed plan of care  History of Present Illness:    Vasile Horne is a 61 y o  female who presents to [facility] for interval re-evaluation of the above stated pain complaints  The patient has a past medical and chronic pain history as outlined in the assessment section  She was last seen on August 27, 2018 at which time she was maintained on Nucynta ER 50 mg every 12 hours  At today's office visit, the patient's pain score is 5/10 on the verbal numerical pain rating scale  The patient states that her pain is worse in the morning, evening, and night  Her pain is intermittent in nature and she describes the quality of her pain as burning, dull/aching, and pressure like    Patient reports 50% relief of symptoms with the Nucynta ER 50 mg every 12 hours  She reports that she is having diarrhea rather than opioid induced constipation at this time      Last Urine Drug Screen:  August 27, 2018    Other than as stated above, the patient denies any interval changes in medications, medical condition, mental condition, symptoms, or allergies since the last office visit  Review of Systems:    Review of Systems   Respiratory: Negative for shortness of breath  Cardiovascular: Negative for chest pain  Gastrointestinal: Positive for diarrhea  Negative for constipation, nausea and vomiting  Musculoskeletal: Negative for arthralgias, gait problem, joint swelling and myalgias  Skin: Negative for rash  Neurological: Negative for dizziness, seizures and weakness  All other systems reviewed and are negative          Patient Active Problem List   Diagnosis    Chronic pain syndrome    Chronic low back pain    Postlaminectomy syndrome, not elsewhere classified    Adjacent segment disease with spinal stenosis    Spondylolisthesis of lumbar region    Groin pain, left    Chronic obstructive pulmonary disease (HCC)    Depression with anxiety    [facility]    Disc degeneration, lumbosacral    Hypertension    Hyperlipidemia    Insomnia    Lumbar radiculopathy    Nicotine dependence    Complication of surgical procedure    Varicose veins of both lower extremities with pain    Varicose veins with inflammation    Cervical pain (neck)    Myofascial pain syndrome    Primary osteoarthritis of one hip, left    Pain in left hip       Past Medical History:   Diagnosis Date    Anxiety     Arthritis     Asthma     Back pain     Breast lump     last assessed 10/14/14     Carpal tunnel syndrome 03/17/2006    unspecifiedd laterality / last assessed 10/14/14     COPD (chronic obstructive pulmonary disease) ([facility] )     with exacerbation / last assessed 6/25/14     Depression     [facility]     GERD (gastroesophageal reflux disease)     Hypercholesterolemia     Hyperlipidemia     Hypertension     Insomnia     Low back pain     Lumbosacral disc disease     Nodule of tendon sheath     last assessed 2/5/15  Obesity     Peripheral neuropathy     Type 2 diabetes mellitus with hyperglycemia ([facility]     last assessed 17     Varicose vein of leg        Past Surgical History:   Procedure Laterality Date    BACK SURGERY  2005    lower fusion    CAUDAL BLOCK N/A 2017    Procedure: BLOCK / INJECTION CAUDAL;  Surgeon: Ruben Amaral MD;  Location[facility] MAIN OR;  Service: Pain Management      SECTION      LAMINECTOMY      Lumbar        Family History   Problem Relation Age of Onset    Diabetes Mother     Dementia Father        Social History     Occupational History          unemployed     Social History Main Topics    Smoking status: Current Every Day Smoker     Packs/day: 1 00     Types: Cigarettes    Smokeless tobacco: Never Used      Comment: tobacco use    Alcohol use No    Drug use: No    Sexual activity: Not on file         Current Outpatient Prescriptions:     albuterol (VENTOLIN HFA) 90 mcg/act inhaler, Inhale 2 puffs every 4 (four) hours as needed for wheezing, Disp: 18 g, Rfl: 5    atorvastatin (LIPITOR) 80 mg tablet, Take 1 tablet (80 mg total) by mouth daily, Disp: 90 tablet, Rfl: 3    buPROPion (WELLBUTRIN) 100 mg tablet, Take 1 tablet (100 mg total) by mouth 2 (two) times a day, Disp: 60 tablet, Rfl: 5    DULoxetine (CYMBALTA) 30 mg delayed release capsule, Take 1 capsule (30 mg total) by mouth daily, Disp: 90 capsule, Rfl: 3    DULoxetine (CYMBALTA) 60 mg delayed release capsule, Take 1 capsule (60 mg total) by mouth daily, Disp: 90 capsule, Rfl: 3    fluticasone (FLONASE) 50 mcg/act nasal spray, 2 sprays into each nostril daily, Disp: 16 g, Rfl: 3    glucose blood test strip, Once daily testing, Disp: 100 each, Rfl: 0    Insulin Pen Needle 31G X 5 MM MISC, Inject as directed daily For use w victoza pen, Disp: 30 each, Rfl: 3    liraglutide (VICTOZA) injection, Inject 0 2 mL (1 2 mg total) under the skin daily, Disp: 3 mL, Rfl: 3    lisinopril-hydrochlorothiazide (PRINZIDE,ZESTORETIC) 20-25 MG per tablet, Take 1 tablet by mouth daily, Disp: 90 tablet, Rfl: 3    LYRICA 100 MG capsule, TAKE ONE CAPSULE BY MOUTH EVERY EIGHT HOURS , Disp: 90 capsule, Rfl: 4    metFORMIN (GLUCOPHAGE) 1000 MG tablet, Take 1,000 mg by mouth daily , Disp: , Rfl:     promethazine-codeine (PHENERGAN WITH CODEINE) 6 25-10 mg/5 mL syrup, Take 5 mL by mouth every 4 (four) hours as needed for cough, Disp: 180 mL, Rfl: 0    [START ON 11/25/2018] Tapentadol HCl ER 50 MG TB12, Take 1 tablet (50 mg total) by mouth every 12 (twelve) hours for 30 days Max Daily Amount: 100 mg, Disp: 60 tablet, Rfl: 0    tiotropium (SPIRIVA HANDIHALER) 18 mcg inhalation capsule, Place 1 capsule (18 mcg total) into inhaler and inhale daily, Disp: 30 capsule, Rfl: 5    QUEtiapine (SEROquel) 25 mg tablet, Take 1 tablet (25 mg total) by mouth 2 (two) times a day for 30 days, Disp: 60 tablet, Rfl: 2    [START ON 10/26/2018] Tapentadol HCl ER (NUCYNTA ER) 50 MG TB12, Take 1 tablet (50 mg total) by mouth every 12 (twelve) hours for 30 days Max Daily Amount: 100 mg, Disp: 60 tablet, Rfl: 0    No Known Allergies    Physical Exam:    /68 (BP Location: Right arm, Patient Position: Sitting, Cuff Size: Standard)   Pulse 62   Resp 22   Ht 5' 3" (1 6 m)   Wt 111 kg (244 lb 6 4 oz)   BMI 43 29 kg/m²      Constitutional:normal, well developed, well nourished, alert, in no distress and non-toxic and no overt pain behavior    Eyes:anicteric  HEENT:grossly intact  Neck:supple, symmetric, trachea midline and no masses   Pulmonary:even and unlabored  Cardiovascular:No edema or pitting edema present  Skin:Normal without rashes or lesions and well hydrated  Psychiatric:Mood and affect appropriate  Neurologic:Cranial Nerves II-XII grossly intact  Musculoskeletal:normal

## 2018-10-25 NOTE — PROGRESS NOTES
Pain Medicine Follow-Up Note    Assessment:  1  Chronic pain syndrome    2  Chronic bilateral low back pain with left-sided sciatica    3  Lumbar radiculopathy    4  Postlaminectomy syndrome, not elsewhere classified        Plan:  New Medications Ordered This Visit   Medications    Tapentadol HCl ER (NUCYNTA ER) 50 MG TB12     Sig: Take 1 tablet (50 mg total) by mouth every 12 (twelve) hours for 30 days Max Daily Amount: 100 mg     Dispense:  60 tablet     Refill:  0    Tapentadol HCl ER 50 MG TB12     Sig: Take 1 tablet (50 mg total) by mouth every 12 (twelve) hours for 30 days Max Daily Amount: 100 mg     Dispense:  60 tablet     Refill:  0     My impressions and treatment recommendations were discussed in detail with the patient who verbalized understanding and had no further questions  Given that the patient reports overall reduced pain and improved level of functioning without significant side effects, I felt a reasonable to continue the patient on Nucynta ER 50 mg every 12 hours  I have sent E prescriptions dated October 26, 2018 and November 25, 2018 to the patient's pharmacy  The risks and side effects of chronic opioid treatment were discussed in detail with the patient  Side effects include but are not limited to nausea, vomiting, GI intolerance, sedation, constipation, mental clouding, opioid-induced hyperalgesia, endocrine dysfunction, addiction, dependence, and tolerance  The patient was asked to take his medications only as prescribed and directed, never in excess, and never for any other reason other than for pain control  The patient was also asked to keep his medications out of the reach of others and away from children, preferably in a locked drawer  The patient verbalized understanding and wished to use these opioid medications      [place] Prescription Drug Monitoring Program report was reviewed and was appropriate     I did also discuss with the patient about the possibility of undergoing a intra-articular joint injection in her hip  She states that she is doing somewhat okay with physical therapy at this point in time  She would prefer to follow-up with Dr Irene Tanner before deciding on a intra-articular joint injection  Follow-up is planned in 2 months time or sooner as warranted  Discharge instructions were provided  I personally saw and examined the patient and I agree with the above discussed plan of care  History of Present Illness:    Mario Linares is a 61 y o  female who presents to [facility] for interval re-evaluation of the above stated pain complaints  The patient has a past medical and chronic pain history as outlined in the assessment section  She was last seen on August 27, 2018 at which time she was maintained on Nucynta ER 50 mg every 12 hours  At today's office visit, the patient's pain score is 5/10 on the verbal numerical pain rating scale  The patient states that her pain is worse in the morning, evening, and night  Her pain is intermittent in nature and she describes the quality of her pain as burning, dull/aching, and pressure like    Patient reports 50% relief of symptoms with the Nucynta ER 50 mg every 12 hours  She reports that she is having diarrhea rather than opioid induced constipation at this time  Last Urine Drug Screen:  August 27, 2018    Other than as stated above, the patient denies any interval changes in medications, medical condition, mental condition, symptoms, or allergies since the last office visit  Review of Systems:    Review of Systems   Respiratory: Negative for shortness of breath  Cardiovascular: Negative for chest pain  Gastrointestinal: Positive for diarrhea  Negative for constipation, nausea and vomiting  Musculoskeletal: Negative for arthralgias, gait problem, joint swelling and myalgias  Skin: Negative for rash  Neurological: Negative for dizziness, seizures and weakness     All other systems reviewed and are negative          Patient Active Problem List   Diagnosis    Chronic pain syndrome    Chronic low back pain    Postlaminectomy syndrome, not elsewhere classified    Adjacent segment disease with spinal stenosis    Spondylolisthesis of lumbar region    Groin pain, left    Chronic obstructive pulmonary disease (HCC)    Depression with anxiety    Diabetes ([facility]    Disc degeneration, lumbosacral    Hypertension    Hyperlipidemia    Insomnia    Lumbar radiculopathy    Nicotine dependence    Complication of surgical procedure    Varicose veins of both lower extremities with pain    Varicose veins with inflammation    Cervical pain (neck)    Myofascial pain syndrome    Primary osteoarthritis of one hip, left    Pain in left hip       Past Medical History:   Diagnosis Date    Anxiety     Arthritis     Asthma     Back pain     Breast lump     last assessed 10/14/14     Carpal tunnel syndrome 2006    unspecifiedd laterality / last assessed 10/14/14     COPD (chronic obstructive pulmonary disease) ([facility] )     with exacerbation / last assessed 14     Depression     Diabetes mellitus (HCC)     GERD (gastroesophageal reflux disease)     Hypercholesterolemia     Hyperlipidemia     Hypertension     Insomnia     Low back pain     Lumbosacral disc disease     Nodule of tendon sheath     last assessed 2/5/15     Obesity     Peripheral neuropathy     Type 2 diabetes mellitus with hyperglycemia ([facility] )     last assessed 17     Varicose vein of leg        Past Surgical History:   Procedure Laterality Date    BACK SURGERY  2005    lower fusion    CAUDAL BLOCK N/A 2017    Procedure: BLOCK / INJECTION CAUDAL;  Surgeon: Igor Pozo MD;  Location: [place] [address] MAIN OR;  Service: Pain Management      SECTION      LAMINECTOMY      Lumbar        Family History   Problem Relation Age of Onset    Diabetes Mother     Dementia Father        Social History     Occupational History          unemployed     Social History Main Topics    Smoking status: Current Every Day Smoker     Packs/day: 1 00     Types: Cigarettes    Smokeless tobacco: Never Used      Comment: tobacco use    Alcohol use No    Drug use: No    Sexual activity: Not on file         Current Outpatient Prescriptions:     albuterol (VENTOLIN HFA) 90 mcg/act inhaler, Inhale 2 puffs every 4 (four) hours as needed for wheezing, Disp: 18 g, Rfl: 5    atorvastatin (LIPITOR) 80 mg tablet, Take 1 tablet (80 mg total) by mouth daily, Disp: 90 tablet, Rfl: 3    buPROPion (WELLBUTRIN) 100 mg tablet, Take 1 tablet (100 mg total) by mouth 2 (two) times a day, Disp: 60 tablet, Rfl: 5    DULoxetine (CYMBALTA) 30 mg delayed release capsule, Take 1 capsule (30 mg total) by mouth daily, Disp: 90 capsule, Rfl: 3    DULoxetine (CYMBALTA) 60 mg delayed release capsule, Take 1 capsule (60 mg total) by mouth daily, Disp: 90 capsule, Rfl: 3    fluticasone (FLONASE) 50 mcg/act nasal spray, 2 sprays into each nostril daily, Disp: 16 g, Rfl: 3    glucose blood test strip, Once daily testing, Disp: 100 each, Rfl: 0    Insulin Pen Needle 31G X 5 MM MISC, Inject as directed daily For use w victoza pen, Disp: 30 each, Rfl: 3    liraglutide (VICTOZA) injection, Inject 0 2 mL (1 2 mg total) under the skin daily, Disp: 3 mL, Rfl: 3    lisinopril-hydrochlorothiazide (PRINZIDE,ZESTORETIC) 20-25 MG per tablet, Take 1 tablet by mouth daily, Disp: 90 tablet, Rfl: 3    LYRICA 100 MG capsule, TAKE ONE CAPSULE BY MOUTH EVERY EIGHT HOURS , Disp: 90 capsule, Rfl: 4    metFORMIN (GLUCOPHAGE) 1000 MG tablet, Take 1,000 mg by mouth daily , Disp: , Rfl:     promethazine-codeine (PHENERGAN WITH CODEINE) 6 25-10 mg/5 mL syrup, Take 5 mL by mouth every 4 (four) hours as needed for cough, Disp: 180 mL, Rfl: 0    [START ON 11/25/2018] Tapentadol HCl ER 50 MG TB12, Take 1 tablet (50 mg total) by mouth every 12 (twelve) hours for 30 days Max Daily Amount: 100 mg, Disp: 60 tablet, Rfl: 0    tiotropium (SPIRIVA HANDIHALER) 18 mcg inhalation capsule, Place 1 capsule (18 mcg total) into inhaler and inhale daily, Disp: 30 capsule, Rfl: 5    QUEtiapine (SEROquel) 25 mg tablet, Take 1 tablet (25 mg total) by mouth 2 (two) times a day for 30 days, Disp: 60 tablet, Rfl: 2    [START ON 10/26/2018] Tapentadol HCl ER (NUCYNTA ER) 50 MG TB12, Take 1 tablet (50 mg total) by mouth every 12 (twelve) hours for 30 days Max Daily Amount: 100 mg, Disp: 60 tablet, Rfl: 0    No Known Allergies    Physical Exam:    /68 (BP Location: Right arm, Patient Position: Sitting, Cuff Size: Standard)   Pulse 62   Resp 22   Ht 5' 3" (1 6 m)   Wt 111 kg (244 lb 6 4 oz)   BMI 43 29 kg/m²     Constitutional:normal, well developed, well nourished, alert, in no distress and non-toxic and no overt pain behavior    Eyes:anicteric  HEENT:grossly intact  Neck:supple, symmetric, trachea midline and no masses   Pulmonary:even and unlabored  Cardiovascular:No edema or pitting edema present  Skin:Normal without rashes or lesions and well hydrated  Psychiatric:Mood and affect appropriate  Neurologic:Cranial Nerves II-XII grossly intact  Musculoskeletal:normal

## 2018-10-29 ENCOUNTER — OFFICE VISIT (OUTPATIENT)
Dept: PHYSICAL THERAPY | Facility: CLINIC | Age: 58
End: 2018-10-29
Payer: OTHER MISCELLANEOUS

## 2018-10-29 DIAGNOSIS — F32.A DEPRESSION, UNSPECIFIED DEPRESSION TYPE: ICD-10-CM

## 2018-10-29 DIAGNOSIS — M25.552 LEFT HIP PAIN: Primary | ICD-10-CM

## 2018-10-29 PROCEDURE — 97110 THERAPEUTIC EXERCISES: CPT

## 2018-10-29 PROCEDURE — 97112 NEUROMUSCULAR REEDUCATION: CPT

## 2018-10-29 NOTE — PROGRESS NOTES
Daily Note     Today's date: 10/29/2018  Patient name: Bob Soto  : 1960  MRN: [id number]  Referring provider: Marianela Suh, DO  Dx:   Encounter Diagnosis     ICD-10-CM    1  Left hip pain M25 552                   Subjective: I saw Dr Ciara Ely on Thursday & did not get an injection in my hip because I'm feeling better  I follow up with Dr Marie Sinclair on   Objective: See treatment diary below    Precautions: COPD, DM; Dr Marie Sinclair 18  Daily Treatment Diary     Manual   10/5 10/8  10/15        L hip  prom -----  prom        L hip flexor stretching  done done  stretch/STM                                                   Exercise Diary  10/1 10/5 10/8 10/9 10/15 10/23 10/24 10/29     bridge 10x 10x 10x 20x 20xw/  add 20x w/add 20x w/add 20x w/add     SLR Flex, ABD 2x10 hep hep 2x10 2x10 2x10 2x10 2x10     clamshells 10x Red tb x20 Red tbx20 Red tb x10  S/L Red tb x20 Gr tb x20 Gr tb x20 Gr tb x20     nustep  20min L1 15min L2 Bike x11min L3 NuStep x15min L2 NuStep x15min   L2 Bike x15min  Bike x 15 min      Step ups  4"x10 4"x20 4"x20 4"x20 4"x20 6"x10 6"x15     Lateral step ups  4" x10 4"x20 4"x20 4"x20 4"x20 6"x10 6"x15     Heel slides  20x 20x 20x 20x 20x 20x 20x     Lumbar rotation  20x 20x 20x 20x 20x 20x 20x     [place] resisted walking  @#7   2x10 @#7  4x10 @#8  4x10 @#8  4x10 @#8  4x10 @#13  4x10 @#13  4x10     Sit to stand    Chair x 20 20x chair 20x chair 20x chair 20x  chair 20x chair     SLS kicking    4x5 hep hep hep hep                                                                                                                              Modalities                                                       Assessment: Tolerated treatment well  Patient demonstrated fatigue post treatment      Plan: Progress treatment as tolerated

## 2018-10-30 RX ORDER — QUETIAPINE FUMARATE 25 MG/1
TABLET, FILM COATED ORAL
Qty: 60 TABLET | Refills: 1 | Status: SHIPPED | OUTPATIENT
Start: 2018-10-30 | End: 2018-12-27 | Stop reason: SDUPTHER

## 2018-10-30 RX ORDER — QUETIAPINE FUMARATE 25 MG/1
25 TABLET, FILM COATED ORAL 2 TIMES DAILY
Qty: 60 TABLET | Refills: 0 | OUTPATIENT
Start: 2018-10-30 | End: 2018-11-29

## 2018-11-13 ENCOUNTER — OFFICE VISIT (OUTPATIENT)
Dept: PHYSICAL THERAPY | Facility: CLINIC | Age: 58
End: 2018-11-13
Payer: OTHER MISCELLANEOUS

## 2018-11-13 DIAGNOSIS — M25.552 LEFT HIP PAIN: Primary | ICD-10-CM

## 2018-11-13 PROCEDURE — 97140 MANUAL THERAPY 1/> REGIONS: CPT

## 2018-11-13 PROCEDURE — 97110 THERAPEUTIC EXERCISES: CPT

## 2018-11-13 NOTE — PROGRESS NOTES
Daily Note     Today's date: 2018  Patient name: Maria Luz Gaona  : 1960  MRN: [id number]  Referring provider: Chloe Kasper DO  Dx:   Encounter Diagnosis     ICD-10-CM    1  Left hip pain M25 552                   Subjective: My L hip pain is back & it's snapping, 5/10  Objective: See treatment diary below    Precautions: COPD, DM; Dr Nick Christiansen 18  Daily Treatment Diary     Manual   10/5 10/8  10/15    11/13    L hip  prom -----  prom    ----    L hip flexor stretching  done done  stretch/STM    Over edge of mat             S/L foam roller             L HS stretching                     Exercise Diary  10/1 10/5 10/8 10/9 10/15 10/23 10/24 10/29 11/13    bridge 10x 10x 10x 20x 20xw/  add 20x w/add 20x w/add 20x w/add Hip add w/ ball x20    SLR Flex, ABD 2x10 hep hep 2x10 2x10 2x10 2x10 2x10 2x10    clamshells 10x Red tb x20 Red tbx20 Red tb x10  S/L Red tb x20 Gr tb x20 Gr tb x20 Gr tb x20 Gr tb x20    nustep  20min L1 15min L2 Bike x11min L3 NuStep x15min L2 NuStep x15min   L2 Bike x15min  Bike x 15 min  NuStep x15min L2    Step ups  4"x10 4"x20 4"x20 4"x20 4"x20 6"x10 6"x15 4"x15    Lateral step ups  4" x10 4"x20 4"x20 4"x20 4"x20 6"x10 6"x15 4"x15    Heel slides  20x 20x 20x 20x 20x 20x 20x 20x    Lumbar rotation  20x 20x 20x 20x 20x 20x 20x 20x    [place] resisted walking  @#7   2x10 @#7  4x10 @#8  4x10 @#8  4x10 @#8  4x10 @#13  4x10 @#13  4x10 @#10  4x10    Sit to stand    Chair x 20 20x chair 20x chair 20x chair 20x  chair 20x chair 20x chair    SLS kicking    4x5 hep hep hep hep hep                                                                                                                             Modalities                                                       Assessment: Tolerated treatment fair  Patient demonstrated fatigue post treatment      Plan: Progress treatment as tolerated

## 2018-11-16 ENCOUNTER — APPOINTMENT (OUTPATIENT)
Dept: PHYSICAL THERAPY | Facility: CLINIC | Age: 58
End: 2018-11-16
Payer: OTHER MISCELLANEOUS

## 2018-11-20 ENCOUNTER — APPOINTMENT (OUTPATIENT)
Dept: PHYSICAL THERAPY | Facility: CLINIC | Age: 58
End: 2018-11-20
Payer: OTHER MISCELLANEOUS

## 2018-11-27 ENCOUNTER — OFFICE VISIT (OUTPATIENT)
Dept: PHYSICAL THERAPY | Facility: CLINIC | Age: 58
End: 2018-11-27
Payer: OTHER MISCELLANEOUS

## 2018-11-27 DIAGNOSIS — M25.552 LEFT HIP PAIN: Primary | ICD-10-CM

## 2018-11-27 PROCEDURE — 97140 MANUAL THERAPY 1/> REGIONS: CPT

## 2018-11-27 PROCEDURE — 97110 THERAPEUTIC EXERCISES: CPT

## 2018-11-27 NOTE — PROGRESS NOTES
Daily Note     Today's date: 2018  Patient name: Mario Linares  : 1960  MRN: [id number]  Referring provider: Manuel Dixon DO  Dx: No diagnosis found  Subjective: I have not been experiencing that sharp hip pain since last visit  Objective: See treatment diary below    Precautions: COPD, DM; Dr Irene Tanner 18  Daily Treatment Diary     Manual   10/5 10/8  10/15    11/13 11/27   L hip  prom -----  prom    ---- ---   L hip flexor stretching  done done  stretch/STM    Over edge of mat done            S/L foam roller ---            L HS stretching done                    Exercise Diary  10/1 10/5 10/8 10/9 10/15 10/23 10/24 10/29 11/13 11/27   bridge 10x 10x 10x 20x 20xw/  add 20x w/add 20x w/add 20x w/add Hip add w/ ball x20 2x10   SLR Flex, ABD 2x10 hep hep 2x10 2x10 2x10 2x10 2x10 2x10 2x10   clamshells 10x Red tb x20 Red tbx20 Red tb x10  S/L Red tb x20 Gr tb x20 Gr tb x20 Gr tb x20 Gr tb x20 Gr tb x20   nustep  20min L1 15min L2 Bike x11min L3 NuStep x15min L2 NuStep x15min   L2 Bike x15min  Bike x 15 min  NuStep x15min L2 NuStep x15 mni L3   Step ups  4"x10 4"x20 4"x20 4"x20 4"x20 6"x10 6"x15 4"x15 6"x10   Lateral step ups  4" x10 4"x20 4"x20 4"x20 4"x20 6"x10 6"x15 4"x15 6"x10   Heel slides  20x 20x 20x 20x 20x 20x 20x 20x 20x   Lumbar rotation  20x 20x 20x 20x 20x 20x 20x 20x 20x   [place] resisted walking  @#7   2x10 @#7  4x10 @#8  4x10 @#8  4x10 @#8  4x10 @#13  4x10 @#13  4x10 @#10  4x10 @#10  4x10   Sit to stand    Chair x 20 20x chair 20x chair 20x chair 20x  chair 20x chair 20x chair 20x chair   SLS kicking    4x5 hep hep hep hep hep hep                                                                                                                            Modalities                                                       Assessment: Tolerated treatment well  Patient would benefit from continued PT      Plan: Progress treatment as tolerated

## 2018-11-28 ENCOUNTER — OFFICE VISIT (OUTPATIENT)
Dept: PHYSICAL THERAPY | Facility: CLINIC | Age: 58
End: 2018-11-28
Payer: OTHER MISCELLANEOUS

## 2018-11-28 DIAGNOSIS — M25.552 LEFT HIP PAIN: Primary | ICD-10-CM

## 2018-11-28 PROCEDURE — 97110 THERAPEUTIC EXERCISES: CPT

## 2018-11-28 PROCEDURE — 97140 MANUAL THERAPY 1/> REGIONS: CPT

## 2018-11-28 NOTE — PROGRESS NOTES
Daily Note     Today's date: 2018  Patient name: Bianka Purdy  : 1960  MRN: [id number]  Referring provider: Stevie Logan DO  Dx:   Encounter Diagnosis     ICD-10-CM    1  Left hip pain M25 552                   Subjective: My hip pain returned today, 5/10  My low back is painful today also though  Objective: See treatment diary below    Precautions: COPD, DM; Dr Latisha Yanes 18  Daily Treatment Diary     Manual   10/5 10/8  10/15    11/13 11/28   L hip  prom -----  prom    ---- ---   L hip flexor stretching  done done  stretch/STM    Over edge of mat done            S/L foam roller ---            L HS stretching done                    Exercise Diary  10/1 10/5 10/8 10/9 10/15 10/23 10/24 10/29 11/13 11/28   bridge 10x 10x 10x 20x 20xw/  add 20x w/add 20x w/add 20x w/add Hip add w/ ball x20 2x10   SLR Flex, ABD 2x10 hep hep 2x10 2x10 2x10 2x10 2x10 2x10 hep   clamshells 10x Red tb x20 Red tbx20 Red tb x10  S/L Red tb x20 Gr tb x20 Gr tb x20 Gr tb x20 Gr tb x20 Gr tb x20   nustep  20min L1 15min L2 Bike x11min L3 NuStep x15min L2 NuStep x15min   L2 Bike x15min  Bike x 15 min  NuStep x15min L2 NuStep x10 min L2   Step ups  4"x10 4"x20 4"x20 4"x20 4"x20 6"x10 6"x15 4"x15 4"x10   Lateral step ups  4" x10 4"x20 4"x20 4"x20 4"x20 6"x10 6"x15 4"x15 4"x10   Heel slides  20x 20x 20x 20x 20x 20x 20x 20x 20x   Lumbar rotation  20x 20x 20x 20x 20x 20x 20x 20x 20x   [place]ernando resisted walking  @#7   2x10 @#7  4x10 @#8  4x10 @#8  4x10 @#8  4x10 @#13  4x10 @#13  4x10 @#10  4x10 @#8  4x10   Sit to stand    Chair x 20 20x chair 20x chair 20x chair 20x  chair 20x chair 20x chair 20x chair   SLS kicking    4x5 hep hep hep hep hep hep                                                                                                                            Modalities              CP           patientdeferred                               Assessment: Tolerated treatment fair   Patient demonstrated fatigue post treatment      Plan: Progress treatment as tolerated

## 2018-11-29 ENCOUNTER — OFFICE VISIT (OUTPATIENT)
Dept: PHYSICAL THERAPY | Facility: CLINIC | Age: 58
End: 2018-11-29
Payer: OTHER MISCELLANEOUS

## 2018-11-29 DIAGNOSIS — M25.552 LEFT HIP PAIN: Primary | ICD-10-CM

## 2018-11-29 PROCEDURE — 97110 THERAPEUTIC EXERCISES: CPT

## 2018-11-29 PROCEDURE — 97140 MANUAL THERAPY 1/> REGIONS: CPT

## 2018-11-29 PROCEDURE — G8979 MOBILITY GOAL STATUS: HCPCS

## 2018-11-29 PROCEDURE — G8978 MOBILITY CURRENT STATUS: HCPCS

## 2018-11-29 NOTE — PROGRESS NOTES
Daily Note     Today's date: 2018  Patient name: Ricardo Levin  : 1960  MRN: [id number]  Referring provider: Rebekah Viera DO  Dx:   Encounter Diagnosis     ICD-10-CM    1  Left hip pain M25 552                   Subjective: I see the Dr tomorrow  5/10 L hip pain       Objective: See treatment diary below    Precautions: COPD, DM; Dr Maria Ines Ferreira 18  Daily Treatment Diary     Manual   10/5 10/8  10/15    11/13 11/29   L hip  prom -----  prom    ---- ---   L hip flexor stretching  done done  stretch/STM    Over edge of mat done            S/L foam roller ---            L HS stretching done                    Exercise Diary  10/1 10/5 10/8 10/9 10/15 10/23 10/24 10/29 11/13 11/29   bridge 10x 10x 10x 20x 20xw/  add 20x w/add 20x w/add 20x w/add Hip add w/ ball x20 2x10   SLR Flex, ABD 2x10 hep hep 2x10 2x10 2x10 2x10 2x10 2x10 hep   clamshells 10x Red tb x20 Red tbx20 Red tb x10  S/L Red tb x20 Gr tb x20 Gr tb x20 Gr tb x20 Gr tb x20 Gr tb x20   nustep  20min L1 15min L2 Bike x11min L3 NuStep x15min L2 NuStep x15min   L2 Bike x15min  Bike x 15 min  NuStep x15min L2 NuStep x10 min L2   Step ups  4"x10 4"x20 4"x20 4"x20 4"x20 6"x10 6"x15 4"x15 4"x10   Lateral step ups  4" x10 4"x20 4"x20 4"x20 4"x20 6"x10 6"x15 4"x15 4"x10   Heel slides  20x 20x 20x 20x 20x 20x 20x 20x 20x   Lumbar rotation  20x 20x 20x 20x 20x 20x 20x 20x 20x   Fernando resisted walking  @#7   2x10 @#7  4x10 @#8  4x10 @#8  4x10 @#8  4x10 @#13  4x10 @#13  4x10 @#10  4x10 @#8  4x10   Sit to stand    Chair x 20 20x chair 20x chair 20x chair 20x  chair 20x chair 20x chair 20x chair   SLS kicking    4x5 hep hep hep hep hep hep                                                                                                                            Modalities              CP           patientdeferred                               Assessment: Tolerated treatment fair  Patient demonstrated fatigue post treatment    Progression limited due to c/o LBP  Plan: Progress treatment as tolerated

## 2018-11-30 ENCOUNTER — OFFICE VISIT (OUTPATIENT)
Dept: OBGYN CLINIC | Facility: CLINIC | Age: 58
End: 2018-11-30
Payer: OTHER MISCELLANEOUS

## 2018-11-30 VITALS
HEART RATE: 98 BPM | BODY MASS INDEX: 43.05 KG/M2 | DIASTOLIC BLOOD PRESSURE: 77 MMHG | HEIGHT: 63 IN | SYSTOLIC BLOOD PRESSURE: 114 MMHG | WEIGHT: 243 LBS

## 2018-11-30 DIAGNOSIS — M25.552 LEFT HIP PAIN: ICD-10-CM

## 2018-11-30 DIAGNOSIS — M16.12 PRIMARY OSTEOARTHRITIS OF ONE HIP, LEFT: Primary | ICD-10-CM

## 2018-11-30 PROCEDURE — 99213 OFFICE O/P EST LOW 20 MIN: CPT | Performed by: ORTHOPAEDIC SURGERY

## 2018-11-30 NOTE — PROGRESS NOTES
Assessment/Plan:  1  Primary osteoarthritis of one hip, left  XR hip/pelv 2-3 vws left if performed   2  Left hip pain     3  BMI 40 0-44 9, adult ([address] )       Karrie Bach is a pleasant 60year old with activity related left hip pain due to moderate osteoarthritis  Although she has seen significant benefit from physical therapy she continues to have groin pain consistent with arthritis when active and is limited in her abilities to perform ADLs  She also has lumbar spine pathology which could be contributing to her pain  She is reluctant, but we encouraged her to contact Dr Leigh Knight office for a fluoroscopic guided intra-articular left hip injection  This would serve diagnostic and therapeutic purposes to see how much pain is from the hip and how much for the back  She would like to finish out her current course of physical therapy and continue with her home exercise program to see how she responds  She will contemplate the intra-articular injection and contact Dr Leigh Knight office directly to schedule  She can follow up with us as needed  Subjective: Left hip pain follow up    Patient ID: Sincere Ricci is a 60 y o  female  Karrie Bach is a very pleasant 47 female presenting today for her activity related left hip pain  She has been working with physical therapy for the past 3 months and noticed approximately 50 percent decrease in her overall symptoms  She continues to activity pain in groin  She did see Dr Daniel Raza in the interim offered her an intra-articular cortisone injection, but she declined as she had seen improvement  Most of her improvement of pain is at rest, but her activity related groin pain remains and limits her ability to perform ADLs and activities of enjoyment, such as walking  No new injuries  Review of Systems   Constitutional: Negative  HENT: Negative  Eyes: Positive for visual disturbance  Respiratory: Positive for cough, shortness of breath and wheezing      Cardiovascular: Negative  Gastrointestinal: Negative  Endocrine: Positive for polydipsia and polyuria  Genitourinary: Negative  Musculoskeletal: Positive for arthralgias  Skin: Negative  Allergic/Immunologic: Negative  Neurological: Negative  Hematological: Negative  Psychiatric/Behavioral: Positive for decreased concentration  The patient is nervous/anxious            Past Medical History:   Diagnosis Date    Anxiety     Arthritis     Asthma     Back pain     Breast lump     last assessed 10/14/14     Carpal tunnel syndrome 2006    unspecifiedd laterality / last assessed 10/14/14     COPD (chronic obstructive pulmonary disease) ([place] [address] )     with exacerbation / last assessed 14     Depression     Diabetes mellitus ([facility] )     GERD (gastroesophageal reflux disease)     Hypercholesterolemia     Hyperlipidemia     Hypertension     Insomnia     Low back pain     Lumbosacral disc disease     Nodule of tendon sheath     last assessed 2/5/15     Obesity     Peripheral neuropathy     Type 2 diabetes mellitus with hyperglycemia ([place] [address] )     last assessed 17     Varicose vein of leg        Past Surgical History:   Procedure Laterality Date    BACK SURGERY  2005    lower fusion    CAUDAL BLOCK N/A 2017    Procedure: BLOCK / INJECTION CAUDAL;  Surgeon: Milena Randall MD;  Location: [facility] MAIN OR;  Service: Pain Management      SECTION      LAMINECTOMY      Lumbar        Family History   Problem Relation Age of Onset    Diabetes Mother     Dementia Father        Social History     Occupational History          unemployed     Social History Main Topics    Smoking status: Current Every Day Smoker     Packs/day: 1 00     Types: Cigarettes    Smokeless tobacco: Never Used      Comment: tobacco use    Alcohol use No    Drug use: No    Sexual activity: Not on file         Current Outpatient Prescriptions:     albuterol (VENTOLIN HFA) 90 mcg/act inhaler, Inhale 2 puffs every 4 (four) hours as needed for wheezing, Disp: 18 g, Rfl: 5    atorvastatin (LIPITOR) 80 mg tablet, Take 1 tablet (80 mg total) by mouth daily, Disp: 90 tablet, Rfl: 3    buPROPion (WELLBUTRIN) 100 mg tablet, Take 1 tablet (100 mg total) by mouth 2 (two) times a day, Disp: 60 tablet, Rfl: 5    DULoxetine (CYMBALTA) 30 mg delayed release capsule, Take 1 capsule (30 mg total) by mouth daily, Disp: 90 capsule, Rfl: 3    DULoxetine (CYMBALTA) 60 mg delayed release capsule, Take 1 capsule (60 mg total) by mouth daily, Disp: 90 capsule, Rfl: 3    fluticasone (FLONASE) 50 mcg/act nasal spray, 2 sprays into each nostril daily, Disp: 16 g, Rfl: 3    glucose blood test strip, Once daily testing, Disp: 100 each, Rfl: 0    Insulin Pen Needle 31G X 5 MM MISC, Inject as directed daily For use w victoza pen, Disp: 30 each, Rfl: 3    liraglutide (VICTOZA) injection, Inject 0 2 mL (1 2 mg total) under the skin daily, Disp: 3 mL, Rfl: 3    lisinopril-hydrochlorothiazide (PRINZIDE,ZESTORETIC) 20-25 MG per tablet, Take 1 tablet by mouth daily, Disp: 90 tablet, Rfl: 3    LYRICA 100 MG capsule, TAKE ONE CAPSULE BY MOUTH EVERY EIGHT HOURS , Disp: 90 capsule, Rfl: 4    metFORMIN (GLUCOPHAGE) 1000 MG tablet, Take 1,000 mg by mouth daily , Disp: , Rfl:     promethazine-codeine (PHENERGAN WITH CODEINE) 6 25-10 mg/5 mL syrup, Take 5 mL by mouth every 4 (four) hours as needed for cough, Disp: 180 mL, Rfl: 0    QUEtiapine (SEROquel) 25 mg tablet, TAKE 1 TABLET BY MOUTH TWICE DAILY , Disp: 60 tablet, Rfl: 1    Tapentadol HCl ER 50 MG TB12, Take 1 tablet (50 mg total) by mouth every 12 (twelve) hours for 30 days Max Daily Amount: 100 mg, Disp: 60 tablet, Rfl: 0    tiotropium (SPIRIVA HANDIHALER) 18 mcg inhalation capsule, Place 1 capsule (18 mcg total) into inhaler and inhale daily, Disp: 30 capsule, Rfl: 5    Tapentadol HCl ER (NUCYNTA ER) 50 MG TB12, Take 1 tablet (50 mg total) by mouth every 12 (twelve) hours for 30 days Max Daily Amount: 100 mg, Disp: 60 tablet, Rfl: 0    No Known Allergies    Objective:  Vitals:    11/30/18 1059   BP: 114/77   Pulse: 98       Body mass index is 43 05 kg/m²  Left Hip Exam     Tenderness   The patient is experiencing no tenderness  Range of Motion   Extension: 0   Flexion: 110   Internal Rotation: 20   External Rotation: 50   Abduction: 50   Adduction: 30     Muscle Strength   Abduction: 5/5   Adduction: 5/5   Flexion: 5/5     Tests   SHERRELL: negative  Roger: negative    Other   Erythema: absent  Scars: absent  Sensation: normal  Pulse: present    Comments:  Patient does have an equivocal [place] and impingement  Negative SHERRELL  No internal or external snapping hip  No tenderness palpation over the greater trochanter or IT band  No leg length discrepancy  No abrasion, laceration, ulceration, or other skin changes over anterior hip            Physical Exam   Constitutional: She is oriented to person, place, and time  She appears well-developed and well-nourished  Body mass index is 43 05 kg/m²  HENT:   Head: Normocephalic and atraumatic  Eyes: EOM are normal    Neck: Normal range of motion  Cardiovascular: Intact distal pulses  Pulmonary/Chest: Effort normal    Musculoskeletal:   See ortho exam   Neurological: She is alert and oriented to person, place, and time  Skin: Skin is warm and dry  Psychiatric: She has a normal mood and affect   Her behavior is normal  Judgment and thought content normal

## 2018-12-04 ENCOUNTER — TELEPHONE (OUTPATIENT)
Dept: FAMILY MEDICINE CLINIC | Facility: CLINIC | Age: 58
End: 2018-12-04

## 2018-12-04 ENCOUNTER — OFFICE VISIT (OUTPATIENT)
Dept: PHYSICAL THERAPY | Facility: CLINIC | Age: 58
End: 2018-12-04
Payer: OTHER MISCELLANEOUS

## 2018-12-04 DIAGNOSIS — M25.552 LEFT HIP PAIN: Primary | ICD-10-CM

## 2018-12-04 PROCEDURE — 97110 THERAPEUTIC EXERCISES: CPT

## 2018-12-04 PROCEDURE — 97140 MANUAL THERAPY 1/> REGIONS: CPT

## 2018-12-04 NOTE — PROGRESS NOTES
Daily Note     Today's date: 2018  Patient name: Nette Singh  : 1960  MRN: [id number]  Referring provider: Crystal Villanueva DO  Dx:   Encounter Diagnosis     ICD-10-CM    1  Left hip pain M25 552                   Subjective: I saw Dr Rashmi Johnson & he wants me to go see Dr Haley Jauregui for a L hip injection  I have an appointment on the   I took my mother to her Dr's appointments & shopping at [employer] yesterday & by the end of the day, I could hardly walk because I had so much L groin & LBP  Objective: See treatment diary below  Precautions: COPD, DM; Dr Rashmi Johnson 18      Daily Treatment Diary     Manual   10/5 10/8  10/15    11/13 12/4   L hip  prom -----  prom    ---- ---   L hip flexor stretching  done done  stretch/STM    Over edge of mat done            S/L foam roller ---            L HS stretching done                    Exercise Diary  10/1 10/5 10/8 10/9 10/15 10/23 10/24 10/29 11/13 12/4   bridge 10x 10x 10x 20x 20xw/  add 20x w/add 20x w/add 20x w/add Hip add w/ ball x20 2x10   SLR Flex, ABD 2x10 hep hep 2x10 2x10 2x10 2x10 2x10 2x10 hep   clamshells 10x Red tb x20 Red tbx20 Red tb x10  S/L Red tb x20 Gr tb x20 Gr tb x20 Gr tb x20 Gr tb x20 Gr tb x20   nustep  20min L1 15min L2 Bike x11min L3 NuStep x15min L2 NuStep x15min   L2 Bike x15min  Bike x 15 min  NuStep x15min L2 NuStep x10 min L2   Step ups  4"x10 4"x20 4"x20 4"x20 4"x20 6"x10 6"x15 4"x15 4"x10   Lateral step ups  4" x10 4"x20 4"x20 4"x20 4"x20 6"x10 6"x15 4"x15 4"x10   Heel slides  20x 20x 20x 20x 20x 20x 20x 20x 20x   Lumbar rotation  20x 20x 20x 20x 20x 20x 20x 20x 20x   [place]ernando resisted walking  @#7   2x10 @#7  4x10 @#8  4x10 @#8  4x10 @#8  4x10 @#13  4x10 @#13  4x10 @#10  4x10 @#9  4x10   Sit to stand    Chair x 20 20x chair 20x chair 20x chair 20x  chair 20x chair 20x chair 20x chair   SLS kicking    4x5 hep hep hep hep hep hep Modalities           12/4   CP           patientdeferred                               Assessment: Tolerated treatment fair  Patient demonstrated fatigue post treatment      Plan: Progress treatment as tolerated

## 2018-12-05 ENCOUNTER — APPOINTMENT (OUTPATIENT)
Dept: PHYSICAL THERAPY | Facility: CLINIC | Age: 58
End: 2018-12-05
Payer: OTHER MISCELLANEOUS

## 2018-12-07 ENCOUNTER — OFFICE VISIT (OUTPATIENT)
Dept: PHYSICAL THERAPY | Facility: CLINIC | Age: 58
End: 2018-12-07
Payer: OTHER MISCELLANEOUS

## 2018-12-07 DIAGNOSIS — M25.552 LEFT HIP PAIN: Primary | ICD-10-CM

## 2018-12-07 PROCEDURE — 97140 MANUAL THERAPY 1/> REGIONS: CPT

## 2018-12-07 PROCEDURE — 97110 THERAPEUTIC EXERCISES: CPT

## 2018-12-07 NOTE — PROGRESS NOTES
Daily Note     Today's date: 2018  Patient name: Tere Grace  : 1960  MRN: [id number]  Referring provider: Cristina Zamora DO  Dx:   Encounter Diagnosis     ICD-10-CM    1  Left hip pain M25 552                   Subjective: Continued c/o L groin area soreness  Objective: See treatment diary below  Precautions: COPD, DM; Dr Matthew Marti  18  Daily Treatment Diary     Manual  12/7  10/8  10/15    11/13 12/4   L hip prom  -----  prom    ---- ---   L hip flexor stretching Over edge of mat  done  stretch/STM    Over edge of mat done   L HS stretching done        S/L foam roller ---            L HS stretching done                    Exercise Diary  12/7 10/5 10/8 10/9 10/15 10/23 10/24 10/29 11/13 12/4   bridge 2x10  10x 20x 20xw/  add 20x w/add 20x w/add 20x w/add Hip add w/ ball x20 2x10   SLR Flex, ABD ---  hep 2x10 2x10 2x10 2x10 2x10 2x10 hep   clamshells Gr tb x20  Red tbx20 Red tb x10  S/L Red tb x20 Gr tb x20 Gr tb x20 Gr tb x20 Gr tb x20 Gr tb x20   nustep 15min L1  15min L2 Bike x11min L3 NuStep x15min L2 NuStep x15min   L2 Bike x15min  Bike x 15 min  NuStep x15min L2 NuStep x10 min L2   Step ups 4"x10  4"x20 4"x20 4"x20 4"x20 6"x10 6"x15 4"x15 4"x10   Lateral step ups 4"x10  4"x20 4"x20 4"x20 4"x20 6"x10 6"x15 4"x15 4"x10   Heel slides 20x  20x 20x 20x 20x 20x 20x 20x 20x   Lumbar rotation 20x  20x 20x 20x 20x 20x 20x 20x 20x   Fernando resisted walking @#8 4x10  @#7  4x10 @#8  4x10 @#8  4x10 @#8  4x10 @#13  4x10 @#13  4x10 @#10  4x10 @#9  4x10   Sit to stand  20x chair  Chair x 20 20x chair 20x chair 20x chair 20x  chair 20x chair 20x chair 20x chair   SLS kicking hep   4x5 hep hep hep hep hep hep   Standing hip flexor stretching [id number]                                                                                                                        Modalities              CP           patientdeferred                               Assessment: Tolerated treatment fair   Patient demonstrated fatigue post treatment   Patient instructed in standing hip flexor stretch  Advised to perform daily, patient verbalized understanding  Plan: Progress treatment as tolerated

## 2018-12-11 ENCOUNTER — OFFICE VISIT (OUTPATIENT)
Dept: PHYSICAL THERAPY | Facility: CLINIC | Age: 58
End: 2018-12-11
Payer: OTHER MISCELLANEOUS

## 2018-12-11 DIAGNOSIS — M25.552 LEFT HIP PAIN: Primary | ICD-10-CM

## 2018-12-11 PROCEDURE — 97110 THERAPEUTIC EXERCISES: CPT

## 2018-12-11 PROCEDURE — 97140 MANUAL THERAPY 1/> REGIONS: CPT

## 2018-12-11 NOTE — PROGRESS NOTES
Daily Note     Today's date: 2018  Patient name: Anay Olivia  : 1960  MRN: [id number]  Referring provider: Eric Sandoval DO  Dx:   Encounter Diagnosis     ICD-10-CM    1  Left hip pain M25 552                   Subjective: Continued c/o L hip/groin pain, but not as bad today as it has been  Objective: See treatment diary below    Precautions: COPD, DM; Dr Nelly Smith  18      Daily Treatment Diary     Manual  12/7 12/11   10/15    11/13 12/4   L hip prom prom   prom    ---- ---   L hip flexor stretching Over edge of mat Over edge of mat   stretch/STM    Over edge of mat done   L HS stretching done done       S/L foam roller ---            L HS stretching done                    Exercise Diary  12/7 12/11  10/9 10/15 10/23 10/24 10/29 11/13 12/4   bridge 2x10 2x10  20x 20xw/  add 20x w/add 20x w/add 20x w/add Hip add w/ ball x20 2x10   SLR Flex, ABD --- hep hep 2x10 2x10 2x10 2x10 2x10 2x10 hep   clamshells Gr tb x20 Gr tb x20  Red tb x10  S/L Red tb x20 Gr tb x20 Gr tb x20 Gr tb x20 Gr tb x20 Gr tb x20   nustep 15min L1 20min L1  Bike x11min L3 NuStep x15min L2 NuStep x15min   L2 Bike x15min  Bike x 15 min  NuStep x15min L2 NuStep x10 min L2   Step ups 4"x10 4"x10  4"x20 4"x20 4"x20 6"x10 6"x15 4"x15 4"x10   Lateral step ups 4"x10 4"x10  4"x20 4"x20 4"x20 6"x10 6"x15 4"x15 4"x10   Heel slides 20x 20x  20x 20x 20x 20x 20x 20x 20x   Lumbar rotation 20x 20x  20x 20x 20x 20x 20x 20x 20x   Fernando resisted walking @#8 4x10 @#8  4x10  @#8  4x10 @#8  4x10 @#8  4x10 @#13  4x10 @#13  4x10 @#10  4x10 @#9  4x10   Sit to stand  20x chair 20x chair  20x chair 20x chair 20x chair 20x  chair 20x chair 20x chair 20x chair   SLS kicking hep hep  4x5 hep hep hep hep hep hep   Standing hip flexor stretching [id number] hep                                                                                                                       Modalities           12/4   CP           patientdeferred Assessment: Tolerated treatment fair  Patient demonstrated fatigue post treatment      Plan: Progress treatment as tolerated

## 2018-12-13 ENCOUNTER — OFFICE VISIT (OUTPATIENT)
Dept: PHYSICAL THERAPY | Facility: CLINIC | Age: 58
End: 2018-12-13
Payer: OTHER MISCELLANEOUS

## 2018-12-13 DIAGNOSIS — M25.552 LEFT HIP PAIN: Primary | ICD-10-CM

## 2018-12-13 PROCEDURE — 97110 THERAPEUTIC EXERCISES: CPT

## 2018-12-13 PROCEDURE — G8980 MOBILITY D/C STATUS: HCPCS

## 2018-12-13 PROCEDURE — G8978 MOBILITY CURRENT STATUS: HCPCS

## 2018-12-13 PROCEDURE — G8979 MOBILITY GOAL STATUS: HCPCS

## 2018-12-13 PROCEDURE — 97140 MANUAL THERAPY 1/> REGIONS: CPT

## 2018-12-13 NOTE — PROGRESS NOTES
Daily Note     Today's date: 2018  Patient name: Maria Luz Gaona  : 1960  MRN: [id number]  Referring provider: Chloe Kasper DO  Dx:   Encounter Diagnosis     ICD-10-CM    1  Left hip pain M25 552                   Subjective: My hip feels better, but my L low back is acting up today  Objective: See treatment diary below    Precautions: COPD, DM; Dr Derrick De Jesus  18      Daily Treatment Diary     Manual            L hip prom prom prom  prom    ---- ---   L hip flexor stretching Over edge of mat Over edge of mat done  stretch/STM    Over edge of mat done   L HS stretching done done done      S/L foam roller ---            L HS stretching done                    Exercise Diary            bridge 2x10 2x10 2x10 20x 20xw/  add 20x w/add 20x w/add 20x w/add Hip add w/ ball x20 2x10   SLR Flex, ABD --- hep hep 2x10 2x10 2x10 2x10 2x10 2x10 hep   clamshells Gr tb x20 Gr tb x20 Gr  Tb x20 Red tb x10  S/L Red tb x20 Gr tb x20 Gr tb x20 Gr tb x20 Gr tb x20 Gr tb x20   nustep 15min L1 20min L1 20min L1 Bike x11min L3 NuStep x15min L2 NuStep x15min   L2 Bike x15min  Bike x 15 min  NuStep x15min L2 NuStep x10 min L2   Step ups 4"x10 4"x10 4"x10 4"x20 4"x20 4"x20 6"x10 6"x15 4"x15 4"x10   Lateral step ups 4"x10 4"x10 4"x10 4"x20 4"x20 4"x20 6"x10 6"x15 4"x15 4"x10   Heel slides 20x 20x 20x 20x 20x 20x 20x 20x 20x 20x   Lumbar rotation 20x 20x 20x 20x 20x 20x 20x 20x 20x 20x   [place] resisted walking @#8 4x10 @#8  4x10 @#8   4x10 @#8  4x10 @#8  4x10 @#8  4x10 @#13  4x10 @#13  4x10 @#10  4x10 @#9  4x10   Sit to stand  20x chair 20x chair Chair x20 20x chair 20x chair 20x chair 20x  chair 20x chair 20x chair 20x chair   SLS kicking hep hep hep 4x5 hep hep hep hep hep hep   Standing hip flexor stretching [id number] hep review                                                                                                                      Modalities              CP patientdeferred                                   Assessment: Tolerated treatment fair  Patient demonstrated fatigue post treatment      Plan: D/C with HEP & follow up with pain management

## 2018-12-13 NOTE — PROGRESS NOTES
PT Discharge    Today's date: 2018  Patient name: Bob Soto  : 1960  MRN: [id number]  Referring provider: Marianela Suh DO  Dx:   Encounter Diagnosis     ICD-10-CM    1  Left hip pain M25 552        Start Time: 0900  Stop Time: 1015  Total time in clinic (min): 75 minutes    Assessment  Assessment details: Bob Soto has been seen for Left hip pain  (primary encounter diagnosis),and has met the goals of physical therapy  And is independent with a HEP  Plan  Plan details: Discontinue PT        Subjective Evaluation    History of Present Illness  Mechanism of injury: Overall my hip and back are less painful and I am walking better  Pain  Current pain ratin  At best pain ratin  At worst pain ratin          Objective     Functional Assessment     Comments  TUG  9 13 sec  Gait Speed   93 m/s  30 sec Chair Rise Test 12 reps      Flowsheet Rows      Most Recent Value   PT/OT G-Codes   Current Score  42   Projected Score  52   FOTO information reviewed  Yes   Assessment Type  Discharge   G code set  Mobility: Walking & Moving Around   Mobility: Walking and Moving Around Current Status ()  CK   Mobility: Walking and Moving Around Goal Status ()  CK   Mobility: Walking and Moving Around Discharge Status ()  CK

## 2018-12-17 ENCOUNTER — OFFICE VISIT (OUTPATIENT)
Dept: PAIN MEDICINE | Facility: CLINIC | Age: 58
End: 2018-12-17
Payer: OTHER MISCELLANEOUS

## 2018-12-17 ENCOUNTER — TELEPHONE (OUTPATIENT)
Dept: PAIN MEDICINE | Facility: CLINIC | Age: 58
End: 2018-12-17

## 2018-12-17 VITALS
SYSTOLIC BLOOD PRESSURE: 112 MMHG | RESPIRATION RATE: 22 BRPM | HEART RATE: 98 BPM | BODY MASS INDEX: 43.09 KG/M2 | WEIGHT: 243.2 LBS | DIASTOLIC BLOOD PRESSURE: 64 MMHG | HEIGHT: 63 IN

## 2018-12-17 DIAGNOSIS — G89.29 CHRONIC BILATERAL LOW BACK PAIN WITH LEFT-SIDED SCIATICA: ICD-10-CM

## 2018-12-17 DIAGNOSIS — M54.16 LUMBAR RADICULOPATHY: ICD-10-CM

## 2018-12-17 DIAGNOSIS — M54.42 CHRONIC BILATERAL LOW BACK PAIN WITH LEFT-SIDED SCIATICA: ICD-10-CM

## 2018-12-17 DIAGNOSIS — M96.1 POSTLAMINECTOMY SYNDROME, NOT ELSEWHERE CLASSIFIED: ICD-10-CM

## 2018-12-17 DIAGNOSIS — G89.4 CHRONIC PAIN SYNDROME: Primary | ICD-10-CM

## 2018-12-17 PROBLEM — M54.50 LOW BACK PAIN: Status: ACTIVE | Noted: 2018-12-17

## 2018-12-17 PROCEDURE — 99214 OFFICE O/P EST MOD 30 MIN: CPT | Performed by: ANESTHESIOLOGY

## 2018-12-17 NOTE — PROGRESS NOTES
Pain Medicine Follow-Up Note    Assessment:  1  Chronic pain syndrome    2  Chronic bilateral low back pain with left-sided sciatica    3  Lumbar radiculopathy    4  Postlaminectomy syndrome, not elsewhere classified        Plan:  New Medications Ordered This Visit   Medications    Tapentadol HCl ER (NUCYNTA ER) 50 MG TB12     Sig: Take 1 tablet (50 mg total) by mouth every 12 (twelve) hours Earliest Fill Date: 12/26/18 Max Daily Amount: 100 mg     Dispense:  60 tablet     Refill:  0    Tapentadol HCl ER 50 MG TB12     Sig: Take 1 tablet (50 mg total) by mouth every 12 (twelve) hours for 30 days Earliest Fill Date: 1/25/19 Max Daily Amount: 100 mg     Dispense:  60 tablet     Refill:  0     My impressions and treatment recommendations were discussed in detail with the patient who verbalized understanding and had no further questions  The patient is reporting an exquisite amount of low back pain at today's visit  This appears to be largely related to her lumbar post-laminectomy syndrome  As such, I felt a reasonable to offer the patient a caudal epidural steroid injection since this could be potentially therapeutic  The procedures, its risks, and benefits were explained in detail to the patient  Risks include but are not limited to bleeding, infection, hematoma formation, abscess formation, weakness, headache, failure the pain to improve, nerve irritation or damage, and potential worsening of the pain  The patient verbalized understanding and wished to proceed with the procedure  I also felt a reasonable to continue the patient on Nucynta ER 50 mg every 12 hr   I sent E prescriptions to the pharmacy dated December 26, 2018 as well as January 25, 2019  The risks and side effects of chronic opioid treatment were discussed in detail with the patient   Side effects include but are not limited to nausea, vomiting, GI intolerance, sedation, constipation, mental clouding, opioid-induced hyperalgesia, endocrine dysfunction, addiction, dependence, and tolerance  The patient was asked to take his medications only as prescribed and directed, never in excess, and never for any other reason other than for pain control  The patient was also asked to keep his medications out of the reach of others and away from children, preferably in a locked drawer  The patient verbalized understanding and wished to use these opioid medications  A urine drug test was collected at today's office visit as part of our medication management protocol  The point of care testing results were appropriate for what was being prescribed  The specimen will be sent for confirmatory testing  The drug screen is medically necessary because the patient is either dependent on opioid medication or is being considered for opioid medication therapy and the results could impact ongoing or future treatment  The drug screen is to evaluate for the presence or absence of prescribed, non-prescribed, and/or illicit drugs and substances  [place] Prescription Drug Monitoring Program report was reviewed and was appropriate     Follow-up is planned in 4 weeks time or sooner as warranted  Discharge instructions were provided  I personally saw and examined the patient and I agree with the above discussed plan of care  History of Present Illness:    Ruben De Jesus is a 60 y o  female who presents to [facility] for interval re-evaluation of the above stated pain complaints  The patient has a past medical and chronic pain history as outlined in the assessment section  She was last seen on October 25, 2018 at which time she was maintained on Nucynta ER 50 mg every 12 hr  At today's office visit, the patient's pain score is 10/10 on the verbal numerical pain rating scale  She states that her pain is primarily in her left low back region  She describes her pain as worse in the morning, evening, and night    Her pain is constant in nature and she reports the quality of her pain as sharp, throbbing, and shooting    She reports 0% relief of symptoms with the Nucynta ER 50 mg every 12 hr  She reports that her acute exacerbation of the pain has been lasting about 3 days and she would like to have it relieved as soon as possible  She denies opioid induced constipation  Other than as stated above, the patient denies any interval changes in medications, medical condition, mental condition, symptoms, or allergies since the last office visit  Review of Systems:    Review of Systems   Respiratory: Negative for shortness of breath  Cardiovascular: Negative for chest pain  Gastrointestinal: Negative for constipation, diarrhea, nausea and vomiting  Musculoskeletal: Positive for gait problem (difficulty walking/ decreased range of motion)  Negative for arthralgias, joint swelling and myalgias  Skin: Negative for rash  Neurological: Negative for dizziness, seizures and weakness  All other systems reviewed and are negative          Patient Active Problem List   Diagnosis    Chronic pain syndrome    Chronic low back pain    Postlaminectomy syndrome, not elsewhere classified    Adjacent segment disease with spinal stenosis    Spondylolisthesis of lumbar region    Groin pain, left    Chronic obstructive pulmonary disease (HCC)    Depression with anxiety    Diabetes ([facility] )    Disc degeneration, lumbosacral    Hypertension    Hyperlipidemia    Insomnia    Lumbar radiculopathy    Nicotine dependence    Complication of surgical procedure    Varicose veins of both lower extremities with pain    Varicose veins with inflammation    Cervical pain (neck)    Myofascial pain syndrome    Primary osteoarthritis of one hip, left    Pain in left hip    BMI 40 0-44 9, adult [facility])       Past Medical History:   Diagnosis Date    Anxiety     Arthritis     Asthma     Back pain     Breast lump     last assessed 10/14/14     Carpal tunnel syndrome 2006    unspecifiedd laterality / last assessed 10/14/14     COPD (chronic obstructive pulmonary disease) ([facility] )     with exacerbation / last assessed 14     Depression     Diabetes mellitus ([facility] )     GERD (gastroesophageal reflux disease)     Hypercholesterolemia     Hyperlipidemia     Hypertension     Insomnia     Low back pain     Lumbosacral disc disease     Nodule of tendon sheath     last assessed 2/5/15     Obesity     Peripheral neuropathy     Type 2 diabetes mellitus with hyperglycemia ([facility] )     last assessed 17     Varicose vein of leg        Past Surgical History:   Procedure Laterality Date    BACK SURGERY  2005    lower fusion    CAUDAL BLOCK N/A 2017    Procedure: BLOCK / INJECTION CAUDAL;  Surgeon: Marilee York MD;  Location: [place] [address] MAIN OR;  Service: Pain Management      SECTION      LAMINECTOMY      Lumbar        Family History   Problem Relation Age of Onset    Diabetes Mother     Dementia Father        Social History     Occupational History          unemployed     Social History Main Topics    Smoking status: Current Every Day Smoker     Packs/day: 1 00     Types: Cigarettes    Smokeless tobacco: Never Used      Comment: tobacco use    Alcohol use No    Drug use: No    Sexual activity: Not on file         Current Outpatient Prescriptions:     albuterol (VENTOLIN HFA) 90 mcg/act inhaler, Inhale 2 puffs every 4 (four) hours as needed for wheezing, Disp: 18 g, Rfl: 5    atorvastatin (LIPITOR) 80 mg tablet, Take 1 tablet (80 mg total) by mouth daily, Disp: 90 tablet, Rfl: 3    buPROPion (WELLBUTRIN) 100 mg tablet, Take 1 tablet (100 mg total) by mouth 2 (two) times a day, Disp: 60 tablet, Rfl: 5    DULoxetine (CYMBALTA) 30 mg delayed release capsule, Take 1 capsule (30 mg total) by mouth daily, Disp: 90 capsule, Rfl: 3    DULoxetine (CYMBALTA) 60 mg delayed release capsule, Take 1 capsule (60 mg total) by mouth daily, Disp: 90 capsule, Rfl: 3    fluticasone (FLONASE) 50 mcg/act nasal spray, 2 sprays into each nostril daily, Disp: 16 g, Rfl: 3    glucose blood test strip, Once daily testing, Disp: 100 each, Rfl: 0    Insulin Pen Needle 31G X 5 MM MISC, Inject as directed daily For use w victoza pen, Disp: 30 each, Rfl: 3    liraglutide (VICTOZA) injection, Inject 0 2 mL (1 2 mg total) under the skin daily, Disp: 3 mL, Rfl: 3    lisinopril-hydrochlorothiazide (PRINZIDE,ZESTORETIC) 20-25 MG per tablet, Take 1 tablet by mouth daily, Disp: 90 tablet, Rfl: 3    LYRICA 100 MG capsule, TAKE ONE CAPSULE BY MOUTH EVERY EIGHT HOURS , Disp: 90 capsule, Rfl: 4    metFORMIN (GLUCOPHAGE) 1000 MG tablet, Take 1,000 mg by mouth daily , Disp: , Rfl:     promethazine-codeine (PHENERGAN WITH CODEINE) 6 25-10 mg/5 mL syrup, Take 5 mL by mouth every 4 (four) hours as needed for cough, Disp: 180 mL, Rfl: 0    QUEtiapine (SEROquel) 25 mg tablet, TAKE 1 TABLET BY MOUTH TWICE DAILY , Disp: 60 tablet, Rfl: 1    [START ON 1/25/2019] Tapentadol HCl ER 50 MG TB12, Take 1 tablet (50 mg total) by mouth every 12 (twelve) hours for 30 days Earliest Fill Date: 1/25/19 Max Daily Amount: 100 mg, Disp: 60 tablet, Rfl: 0    tiotropium (SPIRIVA HANDIHALER) 18 mcg inhalation capsule, Place 1 capsule (18 mcg total) into inhaler and inhale daily, Disp: 30 capsule, Rfl: 5    [START ON 12/26/2018] Tapentadol HCl ER (NUCYNTA ER) 50 MG TB12, Take 1 tablet (50 mg total) by mouth every 12 (twelve) hours Earliest Fill Date: 12/26/18 Max Daily Amount: 100 mg, Disp: 60 tablet, Rfl: 0    No Known Allergies    Physical Exam:    /64 (BP Location: Left arm, Patient Position: Sitting, Cuff Size: Standard)   Pulse 98   Resp 22   Ht 5' 3" (1 6 m)   Wt 110 kg (243 lb 3 2 oz)   BMI 43 08 kg/m²     Constitutional:obese  Eyes:anicteric  HEENT:grossly intact  Neck:supple, symmetric, trachea midline and no masses   Pulmonary:even and unlabored  Cardiovascular:No edema or pitting edema present  Skin:Normal without rashes or lesions and well hydrated  Psychiatric:Mood and affect appropriate  Neurologic:Cranial Nerves II-XII grossly intact  Musculoskeletal:normal, no tenderness noted over the left sacroiliac joint

## 2018-12-17 NOTE — TELEPHONE ENCOUNTER
Sched pt for Caudal JESSIE for 12/20/2018   Went over pre-procedure instructions below:  Pt denies prescription blood thinners  Pt denies Nsaids  Nothing to eat or drink 1 hr prior to procedure  Need to arrange transportation  Proper clothing for procedure  If ill or placed on antibiotics please call to reschedule

## 2018-12-17 NOTE — LETTER
December 17, 2018     Trevor Lopez MD  [address] [facility]    Patient: Javi Fraction   YOB: 1960   Date of Visit: 12/17/2018       Dear Dr Garner Primes: Thank you for referring Robert Murillo to me for evaluation  Below are my notes for this consultation  If you have questions, please do not hesitate to call me  I look forward to following your patient along with you           Sincerely,        Kavin Recio MD        CC: No Recipients

## 2018-12-19 ENCOUNTER — OFFICE VISIT (OUTPATIENT)
Dept: FAMILY MEDICINE CLINIC | Facility: CLINIC | Age: 58
End: 2018-12-19
Payer: COMMERCIAL

## 2018-12-19 VITALS
DIASTOLIC BLOOD PRESSURE: 68 MMHG | SYSTOLIC BLOOD PRESSURE: 96 MMHG | BODY MASS INDEX: 42.66 KG/M2 | HEART RATE: 80 BPM | HEIGHT: 63 IN | WEIGHT: 240.8 LBS | TEMPERATURE: 97 F | RESPIRATION RATE: 20 BRPM

## 2018-12-19 DIAGNOSIS — E11.9 TYPE 2 DIABETES MELLITUS WITHOUT COMPLICATION, WITHOUT LONG-TERM CURRENT USE OF INSULIN (HCC): Primary | ICD-10-CM

## 2018-12-19 DIAGNOSIS — I10 ESSENTIAL HYPERTENSION: ICD-10-CM

## 2018-12-19 DIAGNOSIS — Z83.49 FAMILY HISTORY OF HEMOCHROMATOSIS: ICD-10-CM

## 2018-12-19 LAB — SL AMB POCT HEMOGLOBIN AIC: 8.4

## 2018-12-19 PROCEDURE — 83036 HEMOGLOBIN GLYCOSYLATED A1C: CPT | Performed by: FAMILY MEDICINE

## 2018-12-19 PROCEDURE — 99214 OFFICE O/P EST MOD 30 MIN: CPT | Performed by: FAMILY MEDICINE

## 2018-12-20 ENCOUNTER — APPOINTMENT (OUTPATIENT)
Dept: RADIOLOGY | Facility: HOSPITAL | Age: 58
End: 2018-12-20
Payer: OTHER MISCELLANEOUS

## 2018-12-20 ENCOUNTER — HOSPITAL ENCOUNTER (OUTPATIENT)
Facility: AMBULARY SURGERY CENTER | Age: 58
Setting detail: OUTPATIENT SURGERY
Discharge: HOME | End: 2018-12-20
Attending: ANESTHESIOLOGY | Admitting: ANESTHESIOLOGY
Payer: OTHER MISCELLANEOUS

## 2018-12-20 VITALS
OXYGEN SATURATION: 96 % | SYSTOLIC BLOOD PRESSURE: 135 MMHG | DIASTOLIC BLOOD PRESSURE: 69 MMHG | HEART RATE: 101 BPM | RESPIRATION RATE: 20 BRPM | TEMPERATURE: 97 F

## 2018-12-20 LAB — GLUCOSE SERPL-MCNC: 146 MG/DL (ref 65–140)

## 2018-12-20 PROCEDURE — 62323 NJX INTERLAMINAR LMBR/SAC: CPT | Performed by: ANESTHESIOLOGY

## 2018-12-20 PROCEDURE — 82948 REAGENT STRIP/BLOOD GLUCOSE: CPT

## 2018-12-20 PROCEDURE — 72220 X-RAY EXAM SACRUM TAILBONE: CPT

## 2018-12-20 RX ORDER — LIDOCAINE HYDROCHLORIDE 10 MG/ML
INJECTION, SOLUTION EPIDURAL; INFILTRATION; INTRACAUDAL; PERINEURAL AS NEEDED
Status: DISCONTINUED | OUTPATIENT
Start: 2018-12-20 | End: 2018-12-20 | Stop reason: HOSPADM

## 2018-12-20 RX ORDER — METHYLPREDNISOLONE ACETATE 80 MG/ML
INJECTION, SUSPENSION INTRA-ARTICULAR; INTRALESIONAL; INTRAMUSCULAR; SOFT TISSUE AS NEEDED
Status: DISCONTINUED | OUTPATIENT
Start: 2018-12-20 | End: 2018-12-20 | Stop reason: HOSPADM

## 2018-12-20 RX ORDER — LIDOCAINE WITH 8.4% SOD BICARB 0.9%(10ML)
SYRINGE (ML) INJECTION AS NEEDED
Status: DISCONTINUED | OUTPATIENT
Start: 2018-12-20 | End: 2018-12-20 | Stop reason: HOSPADM

## 2018-12-20 RX ORDER — 0.9 % SODIUM CHLORIDE 0.9 %
VIAL (ML) INJECTION AS NEEDED
Status: DISCONTINUED | OUTPATIENT
Start: 2018-12-20 | End: 2018-12-20 | Stop reason: HOSPADM

## 2018-12-20 NOTE — OP NOTE
ATTENDING PHYSICIAN:  Kuldeep Wang MD     PROCEDURE: Caudal epidural steroid injection under fluoroscopic guidance  PREPROCEDURE DIAGNOSIS:  Lumbar post-laminectomy syndrome  POSTPROCEDURE DIAGNOSIS:  Lumbar post-laminectomy syndrome  ANESTHESIA:  Local     ESTIMATED BLOOD LOSS:  Minimal     COMPLICATIONS:  None  LOCATION:  [address] [place] [address]  CONSENT:  Today's procedure, its potential benefits as well as its risks and potential side effects were reviewed  Discussed risks of the procedure including bleeding, infection, nerve irritation or damage, reactions to the medications, headache, failure of the pain to improve, and potential worsening of the pain were explained in detail to the patient who verbalized understanding and who wished to proceed  Written informed consent was thereby obtained  DESCRIPTION OF THE PROCEDURE: After written informed consent was obtained, the patient was taken to the fluoroscopy suite and placed in the prone position  Anatomical landmarks were identified by palpation to identify the sacral hiatus and by way of fluoroscopy in the PA and lateral views  The skin overlying the sacral hiatus region was prepped using antiseptic applied x3 and draped in the usual sterile fashion  Strict aseptic technique was utilized  The skin and subcutaneous tissues at the needle entry site were infiltrated with 3 mL of 1% preservative-free lidocaine using a 25-gauge 1-1/2-inch needle  A 22-gauge spinal needle was then advanced under fluoroscopic guidance in the lateral view at the sacral hiatus and guided to enter the sacral canal and directed towards the epidural space  Proper placement into the epidural space of the sacral canal in the midline was confirmed with fluoroscopy in the PA view  After negative aspiration for CSF or heme, contrast was injected which delineated the epidural space under fluoroscopy in the PA and lateral views      After negative aspiration was reconfirmed, a 13 mL injectate consisting of 1 mL of 40 mg/mL of Depo-Medrol, 1 mL of 80 mg/mL of Depo-Medrol, 3 mL of 1% preservative-free lidocaine, and 8 mL of preservative-free normal saline was slowly injected incrementally with negative aspiration between aliquots  The patient tolerated the procedure well and all needles were removed intact  Hemostasis was obtained and there were no paresthesias or apparent complications  The skin was wiped free of the antiseptic and a Band-Aid was placed as appropriate  The patient was monitored for an appropriate period of time following the procedure and remained hemodynamically stable and neurovascularly intact following the procedure  The patient was ultimately discharged to home with supervision in good condition and instructed to call the office in approximately 7-10 days with an update or sooner as warranted  Discharge and follow up instructions were provided  I was present for and participated in all key and critical portions of this procedure      Jassi Brown MD  12/20/2018  2:35 PM

## 2018-12-20 NOTE — H&P (VIEW-ONLY)
Pain Medicine Follow-Up Note    Assessment:  1  Chronic pain syndrome    2  Chronic bilateral low back pain with left-sided sciatica    3  Lumbar radiculopathy    4  Postlaminectomy syndrome, not elsewhere classified        Plan:  New Medications Ordered This Visit   Medications    Tapentadol HCl ER (NUCYNTA ER) 50 MG TB12     Sig: Take 1 tablet (50 mg total) by mouth every 12 (twelve) hours Earliest Fill Date: 12/26/18 Max Daily Amount: 100 mg     Dispense:  60 tablet     Refill:  0    Tapentadol HCl ER 50 MG TB12     Sig: Take 1 tablet (50 mg total) by mouth every 12 (twelve) hours for 30 days Earliest Fill Date: 1/25/19 Max Daily Amount: 100 mg     Dispense:  60 tablet     Refill:  0     My impressions and treatment recommendations were discussed in detail with the patient who verbalized understanding and had no further questions  The patient is reporting an exquisite amount of low back pain at today's visit  This appears to be largely related to her lumbar post-laminectomy syndrome  As such, I felt a reasonable to offer the patient a caudal epidural steroid injection since this could be potentially therapeutic  The procedures, its risks, and benefits were explained in detail to the patient  Risks include but are not limited to bleeding, infection, hematoma formation, abscess formation, weakness, headache, failure the pain to improve, nerve irritation or damage, and potential worsening of the pain  The patient verbalized understanding and wished to proceed with the procedure  I also felt a reasonable to continue the patient on Nucynta ER 50 mg every 12 hr   I sent E prescriptions to the pharmacy dated December 26, 2018 as well as January 25, 2019  The risks and side effects of chronic opioid treatment were discussed in detail with the patient   Side effects include but are not limited to nausea, vomiting, GI intolerance, sedation, constipation, mental clouding, opioid-induced hyperalgesia, endocrine dysfunction, addiction, dependence, and tolerance  The patient was asked to take his medications only as prescribed and directed, never in excess, and never for any other reason other than for pain control  The patient was also asked to keep his medications out of the reach of others and away from children, preferably in a locked drawer  The patient verbalized understanding and wished to use these opioid medications  A urine drug test was collected at today's office visit as part of our medication management protocol  The point of care testing results were appropriate for what was being prescribed  The specimen will be sent for confirmatory testing  The drug screen is medically necessary because the patient is either dependent on opioid medication or is being considered for opioid medication therapy and the results could impact ongoing or future treatment  The drug screen is to evaluate for the presence or absence of prescribed, non-prescribed, and/or illicit drugs and substances  [place] Prescription Drug Monitoring Program report was reviewed and was appropriate     Follow-up is planned in 4 weeks time or sooner as warranted  Discharge instructions were provided  I personally saw and examined the patient and I agree with the above discussed plan of care  History of Present Illness:    Rubens Rabago is a 60 y o  female who presents to [facility] for interval re-evaluation of the above stated pain complaints  The patient has a past medical and chronic pain history as outlined in the assessment section  She was last seen on October 25, 2018 at which time she was maintained on Nucynta ER 50 mg every 12 hr  At today's office visit, the patient's pain score is 10/10 on the verbal numerical pain rating scale  She states that her pain is primarily in her left low back region  She describes her pain as worse in the morning, evening, and night    Her pain is constant in nature and she reports the quality of her pain as sharp, throbbing, and shooting    She reports 0% relief of symptoms with the Nucynta ER 50 mg every 12 hr  She reports that her acute exacerbation of the pain has been lasting about 3 days and she would like to have it relieved as soon as possible  She denies opioid induced constipation  Other than as stated above, the patient denies any interval changes in medications, medical condition, mental condition, symptoms, or allergies since the last office visit  Review of Systems:    Review of Systems   Respiratory: Negative for shortness of breath  Cardiovascular: Negative for chest pain  Gastrointestinal: Negative for constipation, diarrhea, nausea and vomiting  Musculoskeletal: Positive for gait problem (difficulty walking/ decreased range of motion)  Negative for arthralgias, joint swelling and myalgias  Skin: Negative for rash  Neurological: Negative for dizziness, seizures and weakness  All other systems reviewed and are negative          Patient Active Problem List   Diagnosis    Chronic pain syndrome    Chronic low back pain    Postlaminectomy syndrome, not elsewhere classified    Adjacent segment disease with spinal stenosis    Spondylolisthesis of lumbar region    Groin pain, left    Chronic obstructive pulmonary disease (HCC)    Depression with anxiety    Diabetes ([facility] )    Disc degeneration, lumbosacral    Hypertension    Hyperlipidemia    Insomnia    Lumbar radiculopathy    Nicotine dependence    Complication of surgical procedure    Varicose veins of both lower extremities with pain    Varicose veins with inflammation    Cervical pain (neck)    Myofascial pain syndrome    Primary osteoarthritis of one hip, left    Pain in left hip    BMI 40 0-44 9, adult [facility])       Past Medical History:   Diagnosis Date    Anxiety     Arthritis     Asthma     Back pain     Breast lump     last assessed 10/14/14     Carpal tunnel syndrome 2006    unspecifiedd laterality / last assessed 10/14/14     COPD (chronic obstructive pulmonary disease) ([facility] )     with exacerbation / last assessed 14     Depression     Diabetes mellitus ([facility] )     GERD (gastroesophageal reflux disease)     Hypercholesterolemia     Hyperlipidemia     Hypertension     Insomnia     Low back pain     Lumbosacral disc disease     Nodule of tendon sheath     last assessed 2/5/15     Obesity     Peripheral neuropathy     Type 2 diabetes mellitus with hyperglycemia ([facility] )     last assessed 17     Varicose vein of leg        Past Surgical History:   Procedure Laterality Date    BACK SURGERY  2005    lower fusion    CAUDAL BLOCK N/A 2017    Procedure: BLOCK / INJECTION CAUDAL;  Surgeon: Vitaliy Thomson MD;  Location: [facility];  Service: Pain Management      SECTION      LAMINECTOMY      Lumbar        Family History   Problem Relation Age of Onset    Diabetes Mother     Dementia Father        Social History     Occupational History          unemployed     Social History Main Topics    Smoking status: Current Every Day Smoker     Packs/day: 1 00     Types: Cigarettes    Smokeless tobacco: Never Used      Comment: tobacco use    Alcohol use No    Drug use: No    Sexual activity: Not on file         Current Outpatient Prescriptions:     albuterol (VENTOLIN HFA) 90 mcg/act inhaler, Inhale 2 puffs every 4 (four) hours as needed for wheezing, Disp: 18 g, Rfl: 5    atorvastatin (LIPITOR) 80 mg tablet, Take 1 tablet (80 mg total) by mouth daily, Disp: 90 tablet, Rfl: 3    buPROPion (WELLBUTRIN) 100 mg tablet, Take 1 tablet (100 mg total) by mouth 2 (two) times a day, Disp: 60 tablet, Rfl: 5    DULoxetine (CYMBALTA) 30 mg delayed release capsule, Take 1 capsule (30 mg total) by mouth daily, Disp: 90 capsule, Rfl: 3    DULoxetine (CYMBALTA) 60 mg delayed release capsule, Take 1 capsule (60 mg total) by mouth daily, Disp: 90 capsule, Rfl: 3    fluticasone (FLONASE) 50 mcg/act nasal spray, 2 sprays into each nostril daily, Disp: 16 g, Rfl: 3    glucose blood test strip, Once daily testing, Disp: 100 each, Rfl: 0    Insulin Pen Needle 31G X 5 MM MISC, Inject as directed daily For use w victoza pen, Disp: 30 each, Rfl: 3    liraglutide (VICTOZA) injection, Inject 0 2 mL (1 2 mg total) under the skin daily, Disp: 3 mL, Rfl: 3    lisinopril-hydrochlorothiazide (PRINZIDE,ZESTORETIC) 20-25 MG per tablet, Take 1 tablet by mouth daily, Disp: 90 tablet, Rfl: 3    LYRICA 100 MG capsule, TAKE ONE CAPSULE BY MOUTH EVERY EIGHT HOURS , Disp: 90 capsule, Rfl: 4    metFORMIN (GLUCOPHAGE) 1000 MG tablet, Take 1,000 mg by mouth daily , Disp: , Rfl:     promethazine-codeine (PHENERGAN WITH CODEINE) 6 25-10 mg/5 mL syrup, Take 5 mL by mouth every 4 (four) hours as needed for cough, Disp: 180 mL, Rfl: 0    QUEtiapine (SEROquel) 25 mg tablet, TAKE 1 TABLET BY MOUTH TWICE DAILY , Disp: 60 tablet, Rfl: 1    [START ON 1/25/2019] Tapentadol HCl ER 50 MG TB12, Take 1 tablet (50 mg total) by mouth every 12 (twelve) hours for 30 days Earliest Fill Date: 1/25/19 Max Daily Amount: 100 mg, Disp: 60 tablet, Rfl: 0    tiotropium (SPIRIVA HANDIHALER) 18 mcg inhalation capsule, Place 1 capsule (18 mcg total) into inhaler and inhale daily, Disp: 30 capsule, Rfl: 5    [START ON 12/26/2018] Tapentadol HCl ER (NUCYNTA ER) 50 MG TB12, Take 1 tablet (50 mg total) by mouth every 12 (twelve) hours Earliest Fill Date: 12/26/18 Max Daily Amount: 100 mg, Disp: 60 tablet, Rfl: 0    No Known Allergies    Physical Exam:    /64 (BP Location: Left arm, Patient Position: Sitting, Cuff Size: Standard)   Pulse 98   Resp 22   Ht 5' 3" (1 6 m)   Wt 110 kg (243 lb 3 2 oz)   BMI 43 08 kg/m²     Constitutional:obese  Eyes:anicteric  HEENT:grossly intact  Neck:supple, symmetric, trachea midline and no masses   Pulmonary:even and unlabored  Cardiovascular:No edema or pitting edema present  Skin:Normal without rashes or lesions and well hydrated  Psychiatric:Mood and affect appropriate  Neurologic:Cranial Nerves II-XII grossly intact  Musculoskeletal:normal, no tenderness noted over the left sacroiliac joint

## 2018-12-20 NOTE — DISCHARGE INSTRUCTIONS
Epidural Steroid Injection   WHAT YOU NEED TO KNOW:   An epidural steroid injection (JESSIE) is a procedure to inject steroid medicine into the epidural space  The epidural space is between your spinal cord and vertebrae  Steroids reduce inflammation and fluid buildup in your spine that may be causing pain  You may be given pain medicine along with the steroids  ACTIVITY  · Do not drive or operate machinery today  · No strenuous activity today - bending, lifting, etc   · You may resume normal activites starting tomorrow - start slowly and as tolerated  · You may shower today, but no tub baths or hot tubs  · You may have numbness for several hours from the local anesthetic  Please use caution and common sense, especially with weight-bearing activities  CARE OF THE INJECTION SITE  · If you have soreness or pain, apply ice to the area today (20 minutes on/20 minutes off)  · Starting tomorrow, you may use warm, moist heat or ice if needed  · You may have an increase or change in your discomfort for 36-48 hours after your treatment  · Apply ice and continue with any pain medication you have been prescribed  · Notify [facility] if you have any of the following: redness, drainage, swelling, headache, stiff neck or fever above 100°F     SPECIAL INSTRUCTIONS  · Our office will contact you in approximately 7 days for a progress report  MEDICATIONS  · Continue to take all routine medications  · Our office may have instructed you to hold some medications  If you have a problem specifically related to your procedure, please call our office at [phone]  Problems not related to your procedure should be directed to your primary care physician

## 2018-12-24 LAB
ALBUMIN SERPL-MCNC: 4.5 G/DL (ref 3.5–5.5)
ALBUMIN/GLOB SERPL: 1.8 {RATIO} (ref 1.2–2.2)
ALP SERPL-CCNC: 92 IU/L (ref 39–117)
ALT SERPL-CCNC: 25 IU/L (ref 0–32)
AST SERPL-CCNC: 18 IU/L (ref 0–40)
BILIRUB SERPL-MCNC: 0.2 MG/DL (ref 0–1.2)
BUN SERPL-MCNC: 18 MG/DL (ref 6–24)
BUN/CREAT SERPL: 17 (ref 9–23)
CALCIUM SERPL-MCNC: 10.1 MG/DL (ref 8.7–10.2)
CHLORIDE SERPL-SCNC: 95 MMOL/L (ref 96–106)
CO2 SERPL-SCNC: 25 MMOL/L (ref 20–29)
CREAT SERPL-MCNC: 1.04 MG/DL (ref 0.57–1)
FERRITIN SERPL-MCNC: 138 NG/ML (ref 15–150)
GLOBULIN SER-MCNC: 2.5 G/DL (ref 1.5–4.5)
GLUCOSE SERPL-MCNC: 134 MG/DL (ref 65–99)
HFE GENE MUT ANL BLD/T: NORMAL
IRON SATN MFR SERPL: 21 % (ref 15–55)
IRON SERPL-MCNC: 77 UG/DL (ref 27–159)
MICRODELETION SYND BLD/T FISH: NORMAL
POTASSIUM SERPL-SCNC: 4.6 MMOL/L (ref 3.5–5.2)
PROT SERPL-MCNC: 7 G/DL (ref 6–8.5)
SL AMB EGFR AFRICAN AMERICAN: 68 ML/MIN/1.73
SL AMB EGFR NON AFRICAN AMERICAN: 59 ML/MIN/1.73
SODIUM SERPL-SCNC: 140 MMOL/L (ref 134–144)
TIBC SERPL-MCNC: 364 UG/DL (ref 250–450)
UIBC SERPL-MCNC: 287 UG/DL (ref 131–425)

## 2018-12-27 ENCOUNTER — TELEPHONE (OUTPATIENT)
Dept: PAIN MEDICINE | Facility: CLINIC | Age: 58
End: 2018-12-27

## 2018-12-27 DIAGNOSIS — F32.A DEPRESSION, UNSPECIFIED DEPRESSION TYPE: ICD-10-CM

## 2018-12-27 RX ORDER — QUETIAPINE FUMARATE 25 MG/1
25 TABLET, FILM COATED ORAL 2 TIMES DAILY
Qty: 60 TABLET | Refills: 0 | Status: SHIPPED | OUTPATIENT
Start: 2018-12-27 | End: 2019-01-22 | Stop reason: SDUPTHER

## 2019-01-07 NOTE — PROGRESS NOTES
Assessment/Plan:   Diagnoses and all orders for this visit:    Type 2 diabetes mellitus without complication, without long-term current use of insulin (HCC)  Comments:  NeS3Y=9 4%--slight improvement from 8 7%  pt tolerating Victoza well  Cont ADA diet, inc activity as tolerated  Orders:  -     POCT hemoglobin A1c  -     Ferritin; Future  -     Ferritin    Family history of hemochromatosis  Comments:  check for  [facility]  mutation along w ferritin level and lfts  Orders:  -     Comprehensive metabolic panel; Future  -     Iron Saturation %; Future  -     Hemochromatosis mutation; Future  -     Comprehensive metabolic panel  -     Hemochromatosis mutation    Essential hypertension  Comments:  BP low end normal--trial dec Lisnopril-HCTZ to 1/2 tab daily  BMI 40 0-44 9, [facility])  Comments:  encouraged cont efforts at wgt reduction thru lifestyle changes  Other orders  -     TIBC  -     [facility] CKD Program            Subjective:      Patient ID: Da Duong is a 60 y o  female  Chief Complaint   Patient presents with    Follow-up     would like iron levels checked    Diabetes     would like a1c done       Diabetes   She presents for her follow-up diabetic visit  She has type 2 diabetes mellitus  Her disease course has been improving  Hypoglycemia symptoms include headaches and nervousness/anxiousness  Associated symptoms include fatigue, polyuria and weakness  Symptoms are improving  Risk factors for coronary artery disease include dyslipidemia, family history, hypertension, post-menopausal, sedentary lifestyle, stress, obesity and male sex  Current diabetic treatment includes diet (metformin plus victoza inj)  She is compliant with treatment most of the time  She is following a diabetic and low fat/cholesterol diet  She participates in exercise intermittently  An ACE inhibitor/angiotensin II receptor blocker is being taken  Eye exam is current  Hypertension   This is a chronic problem   The current episode started more than 1 year ago  The problem has been waxing and waning since onset  The problem is controlled  Associated symptoms include anxiety, headaches, neck pain and palpitations  Risk factors for coronary artery disease include diabetes mellitus, dyslipidemia, obesity, post-menopausal state, sedentary lifestyle, smoking/tobacco exposure and stress  Past treatments include diuretics and ACE inhibitors  The current treatment provides moderate improvement  Compliance problems include exercise, diet and psychosocial issues  The following portions of the patient's history were reviewed and updated as appropriate: allergies, current medications, past family history, past medical history, past social history, past surgical history and problem list      Review of Systems   Constitutional: Positive for fatigue  Respiratory: Positive for cough  Cardiovascular: Positive for palpitations  Gastrointestinal:        Occ GERD   Endocrine: Positive for polyuria  DM   Musculoskeletal: Positive for arthralgias, back pain, myalgias and neck pain  Neurological: Positive for weakness, numbness and headaches  Psychiatric/Behavioral: Positive for sleep disturbance  The patient is nervous/anxious  Objective:    BP 96/68 (BP Location: Left arm, Patient Position: Sitting, Cuff Size: Large)   Pulse 80   Temp (!) 97 °F (36 1 °C)   Resp 20   Ht 5' 3" (1 6 m)   Wt 109 kg (240 lb 12 8 oz)   BMI 42 66 kg/m²        Physical Exam   Constitutional: She is oriented to person, place, and time  OW, NAD   HENT:   Head: Normocephalic and atraumatic  Mouth/Throat: No oropharyngeal exudate  Eyes: Pupils are equal, round, and reactive to light  Conjunctivae are normal  No scleral icterus  Neck: Neck supple  Cardiovascular: Normal rate, regular rhythm and intact distal pulses  Pulmonary/Chest: Effort normal and breath sounds normal  No respiratory distress  Abdominal: Soft   Bowel sounds are normal  There is no tenderness  Musculoskeletal: She exhibits tenderness  Neurological: She is alert and oriented to person, place, and time  No cranial nerve deficit  Skin: Skin is warm and dry  Psychiatric: She has a normal mood and affect  Nursing note and vitals reviewed        Primo Vidal MD

## 2019-01-17 ENCOUNTER — OFFICE VISIT (OUTPATIENT)
Dept: PAIN MEDICINE | Facility: CLINIC | Age: 59
End: 2019-01-17
Payer: OTHER MISCELLANEOUS

## 2019-01-17 VITALS
HEART RATE: 91 BPM | WEIGHT: 236 LBS | HEIGHT: 63 IN | RESPIRATION RATE: 20 BRPM | BODY MASS INDEX: 41.82 KG/M2 | DIASTOLIC BLOOD PRESSURE: 70 MMHG | SYSTOLIC BLOOD PRESSURE: 110 MMHG

## 2019-01-17 DIAGNOSIS — G89.29 CHRONIC BILATERAL LOW BACK PAIN WITH LEFT-SIDED SCIATICA: ICD-10-CM

## 2019-01-17 DIAGNOSIS — M96.1 POSTLAMINECTOMY SYNDROME, NOT ELSEWHERE CLASSIFIED: ICD-10-CM

## 2019-01-17 DIAGNOSIS — G89.4 CHRONIC PAIN SYNDROME: ICD-10-CM

## 2019-01-17 DIAGNOSIS — M54.42 CHRONIC BILATERAL LOW BACK PAIN WITH LEFT-SIDED SCIATICA: ICD-10-CM

## 2019-01-17 DIAGNOSIS — M54.16 LUMBAR RADICULOPATHY: ICD-10-CM

## 2019-01-17 PROCEDURE — 99214 OFFICE O/P EST MOD 30 MIN: CPT | Performed by: ANESTHESIOLOGY

## 2019-01-17 NOTE — PROGRESS NOTES
Pain Medicine Follow-Up Note    Assessment:  1  Chronic pain syndrome    2  Chronic bilateral low back pain with left-sided sciatica    3  Lumbar radiculopathy    4  Postlaminectomy syndrome, not elsewhere classified        Plan:  My impressions and treatment recommendations were discussed in detail with the patient who verbalized understanding and had no further questions  The patient is reporting excellent pain relief following the caudal epidural steroid injection on December 20, 2018  She reports 90% relief of symptoms and is very happy with the results of the injection  She continues to use the Nucynta ER 50 mg every 12 hours and states that this continues to be of significant clinical benefit  She does not require a refill at today's visit as I already refilled her January 4, 2019 at her last office visit  The risks and side effects of chronic opioid treatment were discussed in detail with the patient  Side effects include but are not limited to nausea, vomiting, GI intolerance, sedation, constipation, mental clouding, opioid-induced hyperalgesia, endocrine dysfunction, addiction, dependence, and tolerance  The patient was asked to take his medications only as prescribed and directed, never in excess, and never for any other reason other than for pain control  The patient was also asked to keep his medications out of the reach of others and away from children, preferably in a locked drawer  The patient verbalized understanding and wished to use these opioid medications  [place] Prescription Drug Monitoring Program report was reviewed and was appropriate     Follow-up is planned in 4 weeks time or sooner as warranted  Discharge instructions were provided  I personally saw and examined the patient and I agree with the above discussed plan of care      History of Present Illness:    Jennie Campo is a 60 y o  female who presents to [facility] for interval re-evaluation of the above stated pain complaints  The patient has a past medical and chronic pain history as outlined in the assessment section  She was last seen on December 20, 2018 at which time she underwent a caudal epidural steroid injection  The patient has also been maintained on Nucynta ER 50 mg every 12 hours    At today's office visit, the patient's pain score is 3/10 on the verbal numerical pain rating scale  The patient states that her pain is worse in the evening and intermittent in nature  She reports the quality of her pain as dull/aching    She reports excellent pain relief with Nucynta ER 50 mg every 12 hours  She denies any opioid induced constipation  She is very happy with the results of the injection as well as the pain medications that she is using  Other than as stated above, the patient denies any interval changes in medications, medical condition, mental condition, symptoms, or allergies since the last office visit  Review of Systems:    Review of Systems   Respiratory: Negative for shortness of breath  Cardiovascular: Negative for chest pain  Gastrointestinal: Negative for constipation, diarrhea, nausea and vomiting  Musculoskeletal: Negative for arthralgias, gait problem, joint swelling and myalgias  Skin: Negative for rash  Neurological: Positive for dizziness  Negative for seizures and weakness  All other systems reviewed and are negative          Patient Active Problem List   Diagnosis    Chronic pain syndrome    Chronic low back pain    Postlaminectomy syndrome, not elsewhere classified    Adjacent segment disease with spinal stenosis    Spondylolisthesis of lumbar region    Groin pain, left    Chronic obstructive pulmonary disease (HCC)    Depression with anxiety    Diabetes ([facility] [address] [facility]    Disc degeneration, lumbosacral    Hypertension    Hyperlipidemia    Insomnia    Lumbar radiculopathy    Nicotine dependence    Complication of surgical procedure    Varicose veins of both lower extremities with pain    Varicose veins with inflammation    Cervical pain (neck)    Myofascial pain syndrome    Primary osteoarthritis of one hip, left    Pain in left hip    BMI 40 0-44 9, adult (HCC)    Postlaminectomy syndrome, lumbar region    Low back pain       Past Medical History:   Diagnosis Date    Anxiety     Arthritis     Asthma     Back pain     Breast lump     last assessed 10/14/14     Carpal tunnel syndrome 2006    unspecifiedd laterality / last assessed 10/14/14     COPD (chronic obstructive pulmonary disease) ([facility] )     with exacerbation / last assessed 14     Depression     Diabetes mellitus ([facility] )     GERD (gastroesophageal reflux disease)     Hypercholesterolemia     Hyperlipidemia     Hypertension     Insomnia     Low back pain     Lumbosacral disc disease     Nodule of tendon sheath     last assessed 2/5/15     Obesity     Peripheral neuropathy     Type 2 diabetes mellitus with hyperglycemia ([place] [address] )     last assessed 17     Varicose vein of leg        Past Surgical History:   Procedure Laterality Date    BACK SURGERY  2005    lower fusion    CAUDAL BLOCK N/A 2017    Procedure: BLOCK / INJECTION CAUDAL;  Surgeon: Vitaliy Thomson MD;  Location: [place] [address] MAIN OR;  Service: Pain Management     CAUDAL BLOCK N/A 2018    Procedure: Caudal Epidural Steroid Injection (17007);   Surgeon: Vitaliy Thomson MD;  Location: [facility] MAIN OR;  Service: Pain Management      SECTION      LAMINECTOMY      Lumbar        Family History   Problem Relation Age of Onset    Diabetes Mother     Dementia Father        Social History     Occupational History          unemployed     Social History Main Topics    Smoking status: Current Every Day Smoker     Packs/day: 1 00     Types: Cigarettes    Smokeless tobacco: Never Used      Comment: tobacco use    Alcohol use No    Drug use: No    Sexual activity: Not on file Current Outpatient Prescriptions:     albuterol (VENTOLIN HFA) 90 mcg/act inhaler, Inhale 2 puffs every 4 (four) hours as needed for wheezing, Disp: 18 g, Rfl: 5    atorvastatin (LIPITOR) 80 mg tablet, Take 1 tablet (80 mg total) by mouth daily, Disp: 90 tablet, Rfl: 3    buPROPion (WELLBUTRIN) 100 mg tablet, Take 1 tablet (100 mg total) by mouth 2 (two) times a day, Disp: 60 tablet, Rfl: 5    DULoxetine (CYMBALTA) 30 mg delayed release capsule, Take 1 capsule (30 mg total) by mouth daily, Disp: 90 capsule, Rfl: 3    DULoxetine (CYMBALTA) 60 mg delayed release capsule, Take 1 capsule (60 mg total) by mouth daily, Disp: 90 capsule, Rfl: 3    fluticasone (FLONASE) 50 mcg/act nasal spray, 2 sprays into each nostril daily, Disp: 16 g, Rfl: 3    glucose blood test strip, Once daily testing, Disp: 100 each, Rfl: 0    Insulin Pen Needle 31G X 5 MM MISC, Inject as directed daily For use w victoza pen, Disp: 30 each, Rfl: 3    liraglutide (VICTOZA) injection, Inject 0 2 mL (1 2 mg total) under the skin daily, Disp: 3 mL, Rfl: 3    lisinopril-hydrochlorothiazide (PRINZIDE,ZESTORETIC) 20-25 MG per tablet, Take 1 tablet by mouth daily, Disp: 90 tablet, Rfl: 3    LYRICA 100 MG capsule, TAKE ONE CAPSULE BY MOUTH EVERY EIGHT HOURS , Disp: 90 capsule, Rfl: 4    metFORMIN (GLUCOPHAGE) 1000 MG tablet, Take 1,000 mg by mouth daily , Disp: , Rfl:     promethazine-codeine (PHENERGAN WITH CODEINE) 6 25-10 mg/5 mL syrup, Take 5 mL by mouth every 4 (four) hours as needed for cough, Disp: 180 mL, Rfl: 0    QUEtiapine (SEROquel) 25 mg tablet, Take 1 tablet (25 mg total) by mouth 2 (two) times a day, Disp: 60 tablet, Rfl: 0    Tapentadol HCl ER (NUCYNTA ER) 50 MG TB12, Take 1 tablet (50 mg total) by mouth every 12 (twelve) hours Earliest Fill Date: 12/26/18 Max Daily Amount: 100 mg, Disp: 60 tablet, Rfl: 0    tiotropium (SPIRIVA HANDIHALER) 18 mcg inhalation capsule, Place 1 capsule (18 mcg total) into inhaler and inhale daily, Disp: 30 capsule, Rfl: 5    No Known Allergies    Physical Exam:    /70 (BP Location: Left arm, Patient Position: Sitting, Cuff Size: Standard)   Pulse 91   Resp 20   Ht 5' 3" (1 6 m)   Wt 107 kg (236 lb)   BMI 41 81 kg/m²     Constitutional:normal, well developed, well nourished, alert, in no distress and non-toxic and no overt pain behavior    Eyes:anicteric  HEENT:grossly intact  Neck:supple, symmetric, trachea midline and no masses   Pulmonary:even and unlabored  Cardiovascular:No edema or pitting edema present  Skin:Normal without rashes or lesions and well hydrated  Psychiatric:Mood and affect appropriate  Neurologic:Cranial Nerves II-XII grossly intact  Musculoskeletal:normal

## 2019-01-17 NOTE — LETTER
January 17, 2019     Saintclair Halter, MD  [address] [facility]    Patient: Doris Jimenes   YOB: 1960   Date of Visit: 1/17/2019       Dear Dr Cuellar Gamaliel: Thank you for referring Lorraine Esteban to me for evaluation  Below are my notes for this consultation  If you have questions, please do not hesitate to call me  I look forward to following your patient along with you  Sincerely,        Criss Malik MD        CC: No Recipients  Criss Malik MD  1/17/2019 10:39 AM  Sign at close encounter  Pain Medicine Follow-Up Note    Assessment:  1  Chronic pain syndrome    2  Chronic bilateral low back pain with left-sided sciatica    3  Lumbar radiculopathy    4  Postlaminectomy syndrome, not elsewhere classified        Plan:  My impressions and treatment recommendations were discussed in detail with the patient who verbalized understanding and had no further questions  The patient is reporting excellent pain relief following the caudal epidural steroid injection on December 20, 2018  She reports 90% relief of symptoms and is very happy with the results of the injection  She continues to use the Nucynta ER 50 mg every 12 hours and states that this continues to be of significant clinical benefit  She does not require a refill at today's visit as I already refilled her January 4, 2019 at her last office visit  The risks and side effects of chronic opioid treatment were discussed in detail with the patient  Side effects include but are not limited to nausea, vomiting, GI intolerance, sedation, constipation, mental clouding, opioid-induced hyperalgesia, endocrine dysfunction, addiction, dependence, and tolerance  The patient was asked to take his medications only as prescribed and directed, never in excess, and never for any other reason other than for pain control   The patient was also asked to keep his medications out of the reach of others and away from children, preferably in a locked drawer  The patient verbalized understanding and wished to use these opioid medications  [place] Prescription Drug Monitoring Program report was reviewed and was appropriate     Follow-up is planned in 4 weeks time or sooner as warranted  Discharge instructions were provided  I personally saw and examined the patient and I agree with the above discussed plan of care  History of Present Illness:    Denny Marx is a 60 y o  female who presents to [facility] for interval re-evaluation of the above stated pain complaints  The patient has a past medical and chronic pain history as outlined in the assessment section  She was last seen on December 20, 2018 at which time she underwent a caudal epidural steroid injection  The patient has also been maintained on Nucynta ER 50 mg every 12 hours    At today's office visit, the patient's pain score is 3/10 on the verbal numerical pain rating scale  The patient states that her pain is worse in the evening and intermittent in nature  She reports the quality of her pain as dull/aching    She reports excellent pain relief with Nucynta ER 50 mg every 12 hours  She denies any opioid induced constipation  She is very happy with the results of the injection as well as the pain medications that she is using  Other than as stated above, the patient denies any interval changes in medications, medical condition, mental condition, symptoms, or allergies since the last office visit  Review of Systems:    Review of Systems   Respiratory: Negative for shortness of breath  Cardiovascular: Negative for chest pain  Gastrointestinal: Negative for constipation, diarrhea, nausea and vomiting  Musculoskeletal: Negative for arthralgias, gait problem, joint swelling and myalgias  Skin: Negative for rash  Neurological: Positive for dizziness  Negative for seizures and weakness     All other systems reviewed and are negative  Patient Active Problem List   Diagnosis    Chronic pain syndrome    Chronic low back pain    Postlaminectomy syndrome, not elsewhere classified    Adjacent segment disease with spinal stenosis    Spondylolisthesis of lumbar region    Groin pain, left    Chronic obstructive pulmonary disease ([address] )    Depression with anxiety    Diabetes ([address] )    Disc degeneration, lumbosacral    Hypertension    Hyperlipidemia    Insomnia    Lumbar radiculopathy    Nicotine dependence    Complication of surgical procedure    Varicose veins of both lower extremities with pain    Varicose veins with inflammation    Cervical pain (neck)    Myofascial pain syndrome    Primary osteoarthritis of one hip, left    Pain in left hip    BMI 40 0-44 9, adult (HCC)    Postlaminectomy syndrome, lumbar region    Low back pain       Past Medical History:   Diagnosis Date    Anxiety     Arthritis     Asthma     Back pain     Breast lump     last assessed 10/14/14     Carpal tunnel syndrome 03/17/2006    unspecifiedd laterality / last assessed 10/14/14     COPD (chronic obstructive pulmonary disease) ([facility]     with exacerbation / last assessed 6/25/14     Depression     [facility]     GERD (gastroesophageal reflux disease)     Hypercholesterolemia     Hyperlipidemia     Hypertension     Insomnia     Low back pain     Lumbosacral disc disease     Nodule of tendon sheath     last assessed 2/5/15     Obesity     Peripheral neuropathy     Type 2 diabetes mellitus with hyperglycemia ([address] )     last assessed 6/8/17     Varicose vein of leg        Past Surgical History:   Procedure Laterality Date    BACK SURGERY  2005    lower fusion    CAUDAL BLOCK N/A 11/2/2017    Procedure: BLOCK / INJECTION CAUDAL;  Surgeon: Rere Walker MD;  Location: [place] [address] MAIN OR;  Service: Pain Management     CAUDAL BLOCK N/A 12/20/2018    Procedure: Caudal Epidural Steroid Injection (06396);   Surgeon: Dina Parson MD;  Location: [facility] MA[facility];  Service: Pain Management      SECTION      LAMINECTOMY      Lumbar 2005       Family History   Problem Relation Age of Onset    Diabetes Mother     Dementia Father        Social History     Occupational History          unemployed     Social History Main Topics    Smoking status: Current Every Day Smoker     Packs/day: 1 00     Types: Cigarettes    Smokeless tobacco: Never Used      Comment: tobacco use    Alcohol use No    Drug use: No    Sexual activity: Not on file         Current Outpatient Prescriptions:     albuterol (VENTOLIN HFA) 90 mcg/act inhaler, Inhale 2 puffs every 4 (four) hours as needed for wheezing, Disp: 18 g, Rfl: 5    atorvastatin (LIPITOR) 80 mg tablet, Take 1 tablet (80 mg total) by mouth daily, Disp: 90 tablet, Rfl: 3    buPROPion (WELLBUTRIN) 100 mg tablet, Take 1 tablet (100 mg total) by mouth 2 (two) times a day, Disp: 60 tablet, Rfl: 5    DULoxetine (CYMBALTA) 30 mg delayed release capsule, Take 1 capsule (30 mg total) by mouth daily, Disp: 90 capsule, Rfl: 3    DULoxetine (CYMBALTA) 60 mg delayed release capsule, Take 1 capsule (60 mg total) by mouth daily, Disp: 90 capsule, Rfl: 3    fluticasone (FLONASE) 50 mcg/act nasal spray, 2 sprays into each nostril daily, Disp: 16 g, Rfl: 3    glucose blood test strip, Once daily testing, Disp: 100 each, Rfl: 0    Insulin Pen Needle 31G X 5 MM MISC, Inject as directed daily For use w victoza pen, Disp: 30 each, Rfl: 3    liraglutide (VICTOZA) injection, Inject 0 2 mL (1 2 mg total) under the skin daily, Disp: 3 mL, Rfl: 3    lisinopril-hydrochlorothiazide (PRINZIDE,ZESTORETIC) 20-25 MG per tablet, Take 1 tablet by mouth daily, Disp: 90 tablet, Rfl: 3    LYRICA 100 MG capsule, TAKE ONE CAPSULE BY MOUTH EVERY EIGHT HOURS , Disp: 90 capsule, Rfl: 4    metFORMIN (GLUCOPHAGE) 1000 MG tablet, Take 1,000 mg by mouth daily , Disp: , Rfl:     promethazine-codeine (PHENERGAN WITH CODEINE) 6 25-10 mg/5 mL syrup, Take 5 mL by mouth every 4 (four) hours as needed for cough, Disp: 180 mL, Rfl: 0    QUEtiapine (SEROquel) 25 mg tablet, Take 1 tablet (25 mg total) by mouth 2 (two) times a day, Disp: 60 tablet, Rfl: 0    Tapentadol HCl ER (NUCYNTA ER) 50 MG TB12, Take 1 tablet (50 mg total) by mouth every 12 (twelve) hours Earliest Fill Date: 12/26/18 Max Daily Amount: 100 mg, Disp: 60 tablet, Rfl: 0    tiotropium (SPIRIVA HANDIHALER) 18 mcg inhalation capsule, Place 1 capsule (18 mcg total) into inhaler and inhale daily, Disp: 30 capsule, Rfl: 5    No Known Allergies    Physical Exam:    /70 (BP Location: Left arm, Patient Position: Sitting, Cuff Size: Standard)   Pulse 91   Resp 20   Ht 5' 3" (1 6 m)   Wt 107 kg (236 lb)   BMI 41 81 kg/m²      Constitutional:normal, well developed, well nourished, alert, in no distress and non-toxic and no overt pain behavior    Eyes:anicteric  HEENT:grossly intact  Neck:supple, symmetric, trachea midline and no masses   Pulmonary:even and unlabored  Cardiovascular:No edema or pitting edema present  Skin:Normal without rashes or lesions and well hydrated  Psychiatric:Mood and affect appropriate  Neurologic:Cranial Nerves II-XII grossly intact  Musculoskeletal:normal

## 2019-01-22 DIAGNOSIS — F32.A DEPRESSION, UNSPECIFIED DEPRESSION TYPE: ICD-10-CM

## 2019-01-22 RX ORDER — QUETIAPINE FUMARATE 25 MG/1
TABLET, FILM COATED ORAL
Qty: 60 TABLET | Refills: 5 | Status: SHIPPED | OUTPATIENT
Start: 2019-01-22 | End: 2019-07-10 | Stop reason: SDUPTHER

## 2019-01-24 ENCOUNTER — OFFICE VISIT (OUTPATIENT)
Dept: FAMILY MEDICINE CLINIC | Facility: CLINIC | Age: 59
End: 2019-01-24
Payer: COMMERCIAL

## 2019-01-24 VITALS
RESPIRATION RATE: 22 BRPM | BODY MASS INDEX: 41.64 KG/M2 | SYSTOLIC BLOOD PRESSURE: 120 MMHG | WEIGHT: 235 LBS | HEART RATE: 102 BPM | HEIGHT: 63 IN | DIASTOLIC BLOOD PRESSURE: 70 MMHG | TEMPERATURE: 98.7 F

## 2019-01-24 DIAGNOSIS — J06.9 UPPER RESPIRATORY TRACT INFECTION, UNSPECIFIED TYPE: Primary | ICD-10-CM

## 2019-01-24 DIAGNOSIS — J44.9 CHRONIC OBSTRUCTIVE PULMONARY DISEASE, UNSPECIFIED COPD TYPE (HCC): ICD-10-CM

## 2019-01-24 PROCEDURE — 99213 OFFICE O/P EST LOW 20 MIN: CPT | Performed by: FAMILY MEDICINE

## 2019-01-24 RX ORDER — AZITHROMYCIN 250 MG/1
TABLET, FILM COATED ORAL
Qty: 6 TABLET | Refills: 0 | Status: SHIPPED | OUTPATIENT
Start: 2019-01-24 | End: 2019-01-28

## 2019-01-24 RX ORDER — PROMETHAZINE HYDROCHLORIDE AND CODEINE PHOSPHATE 6.25; 1 MG/5ML; MG/5ML
5 SYRUP ORAL EVERY 4 HOURS PRN
Qty: 180 ML | Refills: 0 | Status: SHIPPED | OUTPATIENT
Start: 2019-01-24 | End: 2019-04-03

## 2019-01-24 RX ORDER — PREDNISONE 10 MG/1
TABLET ORAL
Qty: 30 TABLET | Refills: 0 | Status: SHIPPED | OUTPATIENT
Start: 2019-01-24 | End: 2019-02-20 | Stop reason: SDUPTHER

## 2019-01-24 NOTE — PROGRESS NOTES
Vasile Horne 1960 female MRN: [id number]    FAMILY PRACTICE OFFICE VISIT  [facility] - [address]      ASSESSMENT/PLAN  Vasile Horne is a 60 y o  female presents to the office for    Diagnoses and all orders for this visit:    Upper respiratory tract infection, unspecified type  -     azithromycin (ZITHROMAX) 250 mg tablet; Take 2 tablets today then 1 tablet daily x 4 days  -     predniSONE 10 mg tablet; 4 tablets x 3, 3 tablets x 3, 2 tablets x 3, then 1 tablets x 3   -     promethazine-codeine (PHENERGAN WITH CODEINE) 6 25-10 mg/5 mL syrup; Take 5 mL by mouth every 4 (four) hours as needed for cough    Chronic obstructive pulmonary disease, unspecified COPD type ([facility] )  -     azithromycin (ZITHROMAX) 250 mg tablet; Take 2 tablets today then 1 tablet daily x 4 days  -     predniSONE 10 mg tablet; 4 tablets x 3, 3 tablets x 3, 2 tablets x 3, then 1 tablets x 3  Patient prior to her appointment did have to use her rescue inhaler therefore an accurate lung exam was not able to be obtain  Patient does have diffuse wheezing with decreased breath sounds  Will start the patient on a Z-Diego/presents on taper/codeine syrup to help with cough  Patient is to see her PCP if her symptoms worsen  Reassured the patient that this is not the flu    Upper lip sore is likely secondary to dry lips  No signs of herpes or fever ulcers      Disposition: RTC in 1 week if symptoms worsen  Future Appointments  Date Time Provider [place]   3/1/2019 11:30 AM DO KATHI [facility]   3/4/2019 11:00 AM Jovita Hinson MD  [facility]          SUBJECTIVE  CC: Nasal Congestion (x 4 days ) and Cough      HPI:  Vasile Horne is a 60 y o  female who presents for an acute appointment  Patient presenting with 4 days of nasal congestion and significant cough with wheezes  Patient has been dependent on her albuterol inhaler    States that she usually gets 1 bout of bronchitis yearly  In decided to come in soon so that did not turn into bronchitis  She states that she does have a history of COPD very well controlled on her inhalers  Patient has been visiting her mom at the nursing home and has been around a lot of sick contacts  Has not tried anything over-the-counter has only been using her inhalers for relief      Review of Systems   Constitutional: Negative for activity change, appetite change, chills, fatigue and fever  HENT: Positive for congestion  Eyes: Negative for visual disturbance  Respiratory: Positive for cough  Negative for chest tightness and shortness of breath  Cardiovascular: Negative for chest pain and leg swelling  Gastrointestinal: Negative for abdominal distention, abdominal pain, constipation, diarrhea, nausea and vomiting  Allergic/Immunologic: Negative for environmental allergies  Neurological: Negative for dizziness, light-headedness and headaches  All other systems reviewed and are negative        Historical Information   The patient history was reviewed as follows:  Past Medical History:   Diagnosis Date    Anxiety     Arthritis     Asthma     Back pain     Breast lump     last assessed 10/14/14     Carpal tunnel syndrome 03/17/2006    unspecifiedd laterality / last assessed 10/14/14     COPD [facility]chronic obstructive [facility] disease) ([facility] )     with exacerbation / last assessed 6/25/14     Depression     Diabetes mellitus [facility]     GERD (gastroesophageal reflux disease)     Hypercholesterolemia     Hyperlipidemia     Hypertension     Insomnia     Low back pain     Lumbosacral disc disease     Nodule of tendon sheath     last assessed 2/5/15     Obesity     Peripheral neuropathy     Type 2 diabetes mellitus with hyperglycemia ([facility] )     last assessed 6/8/17     Varicose vein of leg          Medications:     Current Outpatient Prescriptions:     albuterol (VENTOLIN HFA) 90 mcg/act inhaler, Inhale 2 puffs every 4 (four) hours as needed for wheezing, Disp: 18 g, Rfl: 5    atorvastatin (LIPITOR) 80 mg tablet, Take 1 tablet (80 mg total) by mouth daily, Disp: 90 tablet, Rfl: 3    buPROPion (WELLBUTRIN) 100 mg tablet, Take 1 tablet (100 mg total) by mouth 2 (two) times a day, Disp: 60 tablet, Rfl: 5    DULoxetine (CYMBALTA) 30 mg delayed release capsule, Take 1 capsule (30 mg total) by mouth daily, Disp: 90 capsule, Rfl: 3    DULoxetine (CYMBALTA) 60 mg delayed release capsule, Take 1 capsule (60 mg total) by mouth daily, Disp: 90 capsule, Rfl: 3    fluticasone (FLONASE) 50 mcg/act nasal spray, 2 sprays into each nostril daily, Disp: 16 g, Rfl: 3    glucose blood test strip, Once daily testing, Disp: 100 each, Rfl: 0    Insulin Pen Needle 31G X 5 MM MISC, Inject as directed daily For use w victoza pen, Disp: 30 each, Rfl: 3    liraglutide (VICTOZA) injection, Inject 0 2 mL (1 2 mg total) under the skin daily, Disp: 3 mL, Rfl: 3    lisinopril-hydrochlorothiazide (PRINZIDE,ZESTORETIC) 20-25 MG per tablet, Take 1 tablet by mouth daily, Disp: 90 tablet, Rfl: 3    LYRICA 100 MG capsule, TAKE ONE CAPSULE BY MOUTH EVERY EIGHT HOURS , Disp: 90 capsule, Rfl: 4    metFORMIN (GLUCOPHAGE) 1000 MG tablet, Take 1,000 mg by mouth daily , Disp: , Rfl:     QUEtiapine (SEROquel) 25 mg tablet, TAKE 1 TABLET BY MOUTH TWICE A DAY, Disp: 60 tablet, Rfl: 5    Tapentadol HCl ER (NUCYNTA ER) 50 MG TB12, Take 1 tablet (50 mg total) by mouth every 12 (twelve) hours Earliest Fill Date: 12/26/18 Max Daily Amount: 100 mg, Disp: 60 tablet, Rfl: 0    tiotropium (SPIRIVA HANDIHALER) 18 mcg inhalation capsule, Place 1 capsule (18 mcg total) into inhaler and inhale daily, Disp: 30 capsule, Rfl: 5    No Known Allergies    OBJECTIVE  Vitals:   Vitals:    01/24/19 0956   BP: 120/70   BP Location: Left arm   Patient Position: Sitting   Cuff Size: Standard   Pulse: 102   Resp: 22   Temp: 98 7 °F (37 1 °C)   Weight: 107 kg (235 lb)   Height: 5' 3" (1 6 m)         Physical Exam   Constitutional: She is oriented to person, place, and time  Vital signs are normal  She appears well-developed and well-nourished  HENT:   Head: Normocephalic and atraumatic  Right Ear: Hearing normal    Left Ear: Hearing normal    Mouth/Throat: Posterior oropharyngeal erythema present  Eyes: Pupils are equal, round, and reactive to light  Conjunctivae and EOM are normal    Neck: Normal range of motion  Neck supple  Cardiovascular: Normal rate, regular rhythm, S1 normal, S2 normal, normal heart sounds and intact distal pulses  No murmur heard  Pulmonary/Chest: Effort normal  No respiratory distress  She has wheezes (Diffusely)  Abdominal: Soft  Bowel sounds are normal  She exhibits no distension  There is no tenderness  Musculoskeletal: Normal range of motion  She exhibits no edema  Neurological: She is alert and oriented to person, place, and time  She has normal strength  Skin: Skin is warm  No rash noted  Psychiatric: She has a normal mood and affect  Her speech is normal and behavior is normal  Judgment and thought content normal    Vitals reviewed                   Bernerd Gowers, MD,   [facility]  1/24/2019

## 2019-02-15 ENCOUNTER — TELEPHONE (OUTPATIENT)
Dept: FAMILY MEDICINE CLINIC | Facility: CLINIC | Age: 59
End: 2019-02-15

## 2019-02-15 ENCOUNTER — OFFICE VISIT (OUTPATIENT)
Dept: FAMILY MEDICINE CLINIC | Facility: CLINIC | Age: 59
End: 2019-02-15
Payer: COMMERCIAL

## 2019-02-15 DIAGNOSIS — J44.9 CHRONIC OBSTRUCTIVE PULMONARY DISEASE, UNSPECIFIED COPD TYPE (HCC): ICD-10-CM

## 2019-02-15 DIAGNOSIS — Z12.11 COLON CANCER SCREENING: ICD-10-CM

## 2019-02-15 DIAGNOSIS — R06.00 DYSPNEA ON EXERTION: ICD-10-CM

## 2019-02-15 DIAGNOSIS — R05.9 COUGH: ICD-10-CM

## 2019-02-15 DIAGNOSIS — F17.219 CIGARETTE NICOTINE DEPENDENCE WITH NICOTINE-INDUCED DISORDER: ICD-10-CM

## 2019-02-15 DIAGNOSIS — K21.9 GASTROESOPHAGEAL REFLUX DISEASE WITHOUT ESOPHAGITIS: ICD-10-CM

## 2019-02-15 DIAGNOSIS — L71.9 ROSACEA: ICD-10-CM

## 2019-02-15 DIAGNOSIS — E11.9 TYPE 2 DIABETES MELLITUS WITHOUT COMPLICATION, WITHOUT LONG-TERM CURRENT USE OF INSULIN (HCC): Primary | ICD-10-CM

## 2019-02-15 PROCEDURE — 99214 OFFICE O/P EST MOD 30 MIN: CPT | Performed by: FAMILY MEDICINE

## 2019-02-15 RX ORDER — FLUTICASONE FUROATE AND VILANTEROL 100; 25 UG/1; UG/1
1 POWDER RESPIRATORY (INHALATION) DAILY
Qty: 1 INHALER | Refills: 4 | Status: SHIPPED | OUTPATIENT
Start: 2019-02-15 | End: 2019-02-22 | Stop reason: ALTCHOICE

## 2019-02-15 RX ORDER — RANITIDINE 150 MG/1
150 TABLET ORAL 2 TIMES DAILY
Qty: 60 TABLET | Refills: 1 | Status: SHIPPED | OUTPATIENT
Start: 2019-02-15 | End: 2019-04-03

## 2019-02-15 NOTE — PROGRESS NOTES
Assessment/Plan:     Diagnoses and all orders for this visit:    Type 2 diabetes mellitus without complication, without long-term current use of insulin ([facility] )  Comments:  uncontrolled  cont Victoza and metformin-reviewed ADA diet, exercise limited at present sec musculoskeletal condition sched eye check  Orders:  -     Diabetic foot exam; Future  -     Ambulatory referral to Ophthalmology; Future  -     Ambulatory referral to Cardiology; Future    Colon cancer screening  Comments:  ref to GI for eval for colonoscopy  Orders:  -     Ambulatory referral to Gastroenterology; Future    Cough  Comments:  prob copd related +/- GERD  sched pulm and GI follow-up  Orders:  -     Discontinue: fluticasone-vilanterol (BREO ELLIPTA) 100-25 mcg/inh inhaler; Inhale 1 puff daily Rinse mouth after use  Chronic obstructive pulmonary disease, unspecified COPD type ([facility] )  -     Discontinue: fluticasone-vilanterol (BREO ELLIPTA) 100-25 mcg/inh inhaler; Inhale 1 puff daily Rinse mouth after use  Rosacea  Comments:  rx Metrogel 1%  Orders:  -     Discontinue: metroNIDAZOLE (NORITRATE) 1 % cream; Apply topically daily    Gastroesophageal reflux disease without esophagitis  -     ranitidine (ZANTAC) 150 mg tablet; Take 1 tablet (150 mg total) by mouth 2 (two) times a day    Dyspnea on exertion  Comments:  sched cardio eval, cont efforts at smoking cessation and weight reduction  Orders:  -     Ambulatory referral to Cardiology; Future    Cigarette nicotine dependence with nicotine-induced disorder  Comments:  readdressed need for smoking cessation  Orders:  -     Ambulatory referral to Cardiology; Future            Subjective:      Patient ID: Maria Luz Gaona is a 60 y o  female  Chief Complaint   Patient presents with    Results     iron lab    Rash     on right cheek    Shortness of Breath     with cough       60year-old patient in to review labs  ongoing fatigue and some dyspnea on exertion as well as a mild facial rash  Denies chest pain     Continues to smoke  Also continues to struggle with weight  Is seeing Dr Sultana Ortega for pain management-musculoskeletal condition does limited amount of activity patient able to maintain  Blood sugars running okay although last hemoglobin A1c was elevated  Patient has made some positive dietary changes since that time  She is due for eye care- requests referral   Also needs referral for GI for eval for colonoscopy  Will be scheduling Pulmonary and Cardio follow-up to further evaluate her shortness of breath in addition to other specialiats outlined above             The following portions of the patient's history were reviewed and updated as appropriate: allergies, current medications, past family history, past medical history, past social history, past surgical history and problem list      Review of Systems   Constitutional: Positive for fatigue  Negative for fever  HENT: Positive for congestion and postnasal drip  Respiratory: Positive for cough and shortness of breath  Cardiovascular: Positive for palpitations  Negative for chest pain  Gastrointestinal:        GERD   Endocrine:        DM   Genitourinary: Negative for dysuria  Musculoskeletal: Positive for arthralgias, back pain, gait problem and myalgias  Skin: Positive for rash  Allergic/Immunologic: Positive for environmental allergies  Neurological: Positive for dizziness  Psychiatric/Behavioral: Positive for sleep disturbance  Negative for hallucinations and self-injury  The patient is nervous/anxious  Objective:    /82 (BP Location: Left arm, Patient Position: Sitting, Cuff Size: Large)   Pulse 88   Temp 97 7 °F (36 5 °C)   Resp 22   Ht 5' 3" (1 6 m)   Wt 108 kg (238 lb 3 2 oz)   SpO2 95% Comment: RA  BMI 42 20 kg/m²        Physical Exam   Constitutional: She is oriented to person, place, and time  OW, chronically ill   HENT:   Mouth/Throat: No oropharyngeal exudate  Neck: Neck supple  Cardiovascular: Normal rate and regular rhythm  Pulmonary/Chest: No respiratory distress  She has wheezes  Abdominal: Soft  Bowel sounds are normal  There is no tenderness  Neurological: She is alert and oriented to person, place, and time  No cranial nerve deficit  Skin: Skin is warm and dry  Rash (few pappust lesions and telangiectasias across nasal bridge area) noted  Psychiatric: Her mood appears anxious  Nursing note and vitals reviewed  Labs;  Labs in chart were reviewed        Cullen Rodriguez MD

## 2019-02-17 DIAGNOSIS — E11.8 TYPE 2 DIABETES MELLITUS WITH COMPLICATION, WITHOUT LONG-TERM CURRENT USE OF INSULIN (HCC): ICD-10-CM

## 2019-02-17 DIAGNOSIS — L71.9 ROSACEA: ICD-10-CM

## 2019-02-17 RX ORDER — METRONIDAZOLE 10 MG/G
GEL TOPICAL DAILY
Qty: 45 G | Refills: 0 | Status: SHIPPED | OUTPATIENT
Start: 2019-02-17 | End: 2019-07-09

## 2019-02-20 DIAGNOSIS — J06.9 UPPER RESPIRATORY TRACT INFECTION, UNSPECIFIED TYPE: ICD-10-CM

## 2019-02-20 DIAGNOSIS — J44.9 CHRONIC OBSTRUCTIVE PULMONARY DISEASE, UNSPECIFIED COPD TYPE (HCC): ICD-10-CM

## 2019-02-20 RX ORDER — PREDNISONE 10 MG/1
TABLET ORAL
Qty: 30 TABLET | Refills: 0 | Status: SHIPPED | OUTPATIENT
Start: 2019-02-20 | End: 2019-04-03

## 2019-02-21 ENCOUNTER — TELEPHONE (OUTPATIENT)
Dept: FAMILY MEDICINE CLINIC | Facility: CLINIC | Age: 59
End: 2019-02-21

## 2019-02-22 DIAGNOSIS — R05.9 COUGH: Primary | ICD-10-CM

## 2019-02-22 RX ORDER — FLUTICASONE PROPIONATE AND SALMETEROL 113; 14 UG/1; UG/1
1 POWDER, METERED RESPIRATORY (INHALATION) 2 TIMES DAILY
Qty: 1 INHALER | Refills: 3 | Status: SHIPPED | OUTPATIENT
Start: 2019-02-22 | End: 2019-11-26 | Stop reason: CLARIF

## 2019-03-01 ENCOUNTER — OFFICE VISIT (OUTPATIENT)
Dept: OBGYN CLINIC | Facility: CLINIC | Age: 59
End: 2019-03-01
Payer: OTHER MISCELLANEOUS

## 2019-03-01 VITALS
WEIGHT: 240 LBS | DIASTOLIC BLOOD PRESSURE: 78 MMHG | HEART RATE: 110 BPM | HEIGHT: 63 IN | SYSTOLIC BLOOD PRESSURE: 112 MMHG | BODY MASS INDEX: 42.52 KG/M2

## 2019-03-01 DIAGNOSIS — M96.1 POSTLAMINECTOMY SYNDROME, LUMBAR REGION: Primary | ICD-10-CM

## 2019-03-01 DIAGNOSIS — M25.552 LEFT HIP PAIN: ICD-10-CM

## 2019-03-01 DIAGNOSIS — M16.12 PRIMARY OSTEOARTHRITIS OF ONE HIP, LEFT: ICD-10-CM

## 2019-03-01 DIAGNOSIS — M54.16 LUMBAR RADICULOPATHY: ICD-10-CM

## 2019-03-01 PROCEDURE — 99213 OFFICE O/P EST LOW 20 MIN: CPT | Performed by: ORTHOPAEDIC SURGERY

## 2019-03-05 ENCOUNTER — OFFICE VISIT (OUTPATIENT)
Dept: PAIN MEDICINE | Facility: CLINIC | Age: 59
End: 2019-03-05
Payer: OTHER MISCELLANEOUS

## 2019-03-05 VITALS
BODY MASS INDEX: 43.41 KG/M2 | DIASTOLIC BLOOD PRESSURE: 60 MMHG | WEIGHT: 245 LBS | SYSTOLIC BLOOD PRESSURE: 106 MMHG | HEIGHT: 63 IN | RESPIRATION RATE: 18 BRPM | HEART RATE: 102 BPM

## 2019-03-05 DIAGNOSIS — M96.1 POSTLAMINECTOMY SYNDROME, NOT ELSEWHERE CLASSIFIED: ICD-10-CM

## 2019-03-05 DIAGNOSIS — G89.29 CHRONIC BILATERAL LOW BACK PAIN WITH LEFT-SIDED SCIATICA: ICD-10-CM

## 2019-03-05 DIAGNOSIS — M54.42 CHRONIC BILATERAL LOW BACK PAIN WITH LEFT-SIDED SCIATICA: ICD-10-CM

## 2019-03-05 DIAGNOSIS — M54.16 LUMBAR RADICULOPATHY: ICD-10-CM

## 2019-03-05 DIAGNOSIS — G89.4 CHRONIC PAIN SYNDROME: Primary | ICD-10-CM

## 2019-03-05 PROCEDURE — 99214 OFFICE O/P EST MOD 30 MIN: CPT | Performed by: ANESTHESIOLOGY

## 2019-03-05 NOTE — PROGRESS NOTES
Pain Medicine Follow-Up Note    Assessment:  1  Chronic pain syndrome    2  Chronic bilateral low back pain with left-sided sciatica    3  Lumbar radiculopathy    4  Postlaminectomy syndrome, not elsewhere classified        Plan:  New Medications Ordered This Visit   Medications    Tapentadol HCl ER (NUCYNTA ER) 50 MG TB12     Sig: Take 1 tablet (50 mg total) by mouth every 12 (twelve) hoursMax Daily Amount: 100 mg     Dispense:  60 tablet     Refill:  0    Tapentadol HCl ER 50 MG TB12     Sig: Take 1 tablet (50 mg total) by mouth every 12 (twelve) hoursMax Daily Amount: 100 mg     Dispense:  60 tablet     Refill:  0     My impressions and treatment recommendations were discussed in detail with the patient who verbalized understanding and had no further questions  The patient currently reports the 3/10 pain on the verbal numerical pain rating scale  She states that her pain is primarily in her low back  She states that her pain is well controlled on the tapentadol ER 50 mg every 12 hours  She denies any side effects of this medication  As such, I felt a reasonable to continue her on tapentadol ER 50 mg every 12 hours  I sent refills dated March 5, 2019 and April 4, 2019 to her pharmacy  The risks and side effects of chronic opioid treatment were discussed in detail with the patient  Side effects include but are not limited to nausea, vomiting, GI intolerance, sedation, constipation, mental clouding, opioid-induced hyperalgesia, endocrine dysfunction, addiction, dependence, and tolerance  The patient was asked to take his medications only as prescribed and directed, never in excess, and never for any other reason other than for pain control  The patient was also asked to keep his medications out of the reach of others and away from children, preferably in a locked drawer  The patient verbalized understanding and wished to use these opioid medications      A urine drug test was collected at [facility]'s office visit as part of our medication management protocol  The point of care testing results were appropriate for what was being prescribed  The specimen will be sent for confirmatory testing  The drug screen is medically necessary because the patient is either dependent on opioid medication or is being considered for opioid medication therapy and the results could impact ongoing or future treatment  The drug screen is to evaluate for the presence or absence of prescribed, non-prescribed, and/or illicit drugs and substances  In addition, the patient has been reporting excellent pain relief following the caudal epidural steroid injections that I have been performing on her  She states that her pain has been slowly returning and would be interested in undergoing a repeat caudal epidural steroid injection in the future  I asked her to call my office when she is interested in proceeding  The patient verbalized understanding  Follow-up is planned in 4 weeks time or sooner as warranted  Discharge instructions were provided  I personally saw and examined the patient and I agree with the above discussed plan of care  History of Present Illness:    Lauryn Matthew is a 60 y o  female who presents to [facility] for interval re-evaluation of the above stated pain complaints  The patient has a past medical and chronic pain history as outlined in the assessment section  She was last seen on January 17, 2019 at which time she was maintained on tapentadol ER 50 mg every 12 hours  At today's office visit, the patient's pain score is 3/10 on the verbal numerical pain rating scale  The patient states that her pain is primarily in her low back  She describes her pain as worse in the morning, evening, and night  Her pain is constant in nature and she reports the quality of her pain as dull/aching   She reports 60% relief of symptoms with the combination of her medications as well as the caudal epidural steroid injection performed on her in December 2018  She does state that her pain is slowly returning, but is not interested in repeating the injection currently  She states that she will consider it if her pain continues to worsen  Last Urine Drug Screen:  December 17, 2018    Other than as stated above, the patient denies any interval changes in medications, medical condition, mental condition, symptoms, or allergies since the last office visit  Review of Systems:    Review of Systems   Respiratory: Positive for shortness of breath  Cardiovascular: Negative for chest pain  Gastrointestinal: Negative for constipation, diarrhea, nausea and vomiting  Musculoskeletal: Positive for gait problem (difficulty walking)  Negative for arthralgias, joint swelling and myalgias  Skin: Negative for rash  Neurological: Positive for dizziness and weakness (muscle weakness)  Negative for seizures  All other systems reviewed and are negative          Patient Active Problem List   Diagnosis    Chronic pain syndrome    Chronic low back pain    Postlaminectomy syndrome, not elsewhere classified    Adjacent segment disease with spinal stenosis    Spondylolisthesis of lumbar region    Groin pain, left    Chronic obstructive pulmonary disease (HCC)    Depression with anxiety    Diabetes [facility]    Disc degeneration, lumbosacral    Hypertension    Hyperlipidemia    Insomnia    Lumbar radiculopathy    Nicotine dependence    Complication of surgical procedure    Varicose veins of both lower extremities with pain    Varicose veins with inflammation    Cervical pain (neck)    Myofascial pain syndrome    Primary osteoarthritis of one hip, left    Pain in left hip    BMI 40 0-44 9, adult (HCC)    Postlaminectomy syndrome, lumbar region    Low back pain       Past Medical History:   Diagnosis Date    Anxiety     Arthritis     Asthma     Back pain     Breast lump     last assessed 10/14/14     Carpal tunnel syndrome 2006    unspecifiedd laterality / last assessed 10/14/14     COPD (chronic obstructive pulmonary disease) ([facility])     with exacerbation / last assessed 14     Depression     Diabetes mellitus ([facility] )     GERD (gastroesophageal reflux disease)     Hypercholesterolemia     Hyperlipidemia     Hypertension     Insomnia     Low back pain     Lumbosacral disc disease     Nodule of tendon sheath     last assessed 2/5/15     Obesity     Peripheral neuropathy     Type 2 diabetes mellitus with hyperglycemia ([facility] )     last assessed 17     Varicose vein of leg        Past Surgical History:   Procedure Laterality Date    BACK SURGERY      lower fusion    CAUDAL BLOCK N/A 2017    Procedure: BLOCK / INJECTION CAUDAL;  Surgeon: Jassi Brown MD;  [facility] MAIN OR;  Service: Pain Management     CAUDAL BLOCK N/A 2018    Procedure: Caudal Epidural Steroid Injection (43075);   Surgeon: Jassi Brown MD;  Location: [facility] MAIN OR;  Service: Pain Management      SECTION      LAMINECTOMY      Lumbar 2005       Family History   Problem Relation Age of Onset    Diabetes Mother     Dementia Father        Social History     Occupational History     Comment: unemployed   Tobacco Use    Smoking status: Current Every Day Smoker     Packs/day: 1 00     Types: Cigarettes    Smokeless tobacco: Never Used    Tobacco comment: tobacco use   Substance and Sexual Activity    Alcohol use: No    Drug use: No    Sexual activity: Not on file         Current Outpatient Medications:     albuterol (VENTOLIN HFA) 90 mcg/act inhaler, Inhale 2 puffs every 4 (four) hours as needed for wheezing, Disp: 18 g, Rfl: 5    atorvastatin (LIPITOR) 80 mg tablet, Take 1 tablet (80 mg total) by mouth daily, Disp: 90 tablet, Rfl: 3    buPROPion (WELLBUTRIN) 100 mg tablet, Take 1 tablet (100 mg total) by mouth 2 (two) times a day, Disp: 60 tablet, Rfl: 5    DULoxetine (CYMBALTA) 30 mg delayed release capsule, Take 1 capsule (30 mg total) by mouth daily, Disp: 90 capsule, Rfl: 3    DULoxetine (CYMBALTA) 60 mg delayed release capsule, Take 1 capsule (60 mg total) by mouth daily, Disp: 90 capsule, Rfl: 3    fluticasone (FLONASE) 50 mcg/act nasal spray, 2 sprays into each nostril daily, Disp: 16 g, Rfl: 3    fluticasone-salmeterol (AIRDUO RESPICLICK) 089-04 mcg/act dry powder inhaler, Inhale 1 puff 2 (two) times a day Rinse mouth after use , Disp: 1 Inhaler, Rfl: 3    glucose blood test strip, Once daily testing, Disp: 100 each, Rfl: 0    Insulin Pen Needle 31G X 5 MM MISC, Inject as directed daily For use w victoza pen, Disp: 30 each, Rfl: 3    liraglutide (VICTOZA) injection, Inject 0 3 mL (1 8 mg total) under the skin daily, Disp: 3 mL, Rfl: 3    lisinopril-hydrochlorothiazide (PRINZIDE,ZESTORETIC) 20-25 MG per tablet, Take 1 tablet by mouth daily, Disp: 90 tablet, Rfl: 3    LYRICA 100 MG capsule, TAKE ONE CAPSULE BY MOUTH EVERY EIGHT HOURS , Disp: 90 capsule, Rfl: 4    metFORMIN (GLUCOPHAGE) 1000 MG tablet, Take 1,000 mg by mouth daily , Disp: , Rfl:     metroNIDAZOLE (METROGEL) 1 % gel, Apply topically daily, Disp: 45 g, Rfl: 0    predniSONE 10 mg tablet, 4 tablets x 3, 3 tablets x 3, 2 tablets x 3, then 1 tablets x 3 , Disp: 30 tablet, Rfl: 0    promethazine-codeine (PHENERGAN WITH CODEINE) 6 25-10 mg/5 mL syrup, Take 5 mL by mouth every 4 (four) hours as needed for cough, Disp: 180 mL, Rfl: 0    QUEtiapine (SEROquel) 25 mg tablet, TAKE 1 TABLET BY MOUTH TWICE A DAY, Disp: 60 tablet, Rfl: 5    ranitidine (ZANTAC) 150 mg tablet, Take 1 tablet (150 mg total) by mouth 2 (two) times a day, Disp: 60 tablet, Rfl: 1    Tapentadol HCl ER (NUCYNTA ER) 50 MG TB12, Take 1 tablet (50 mg total) by mouth every 12 (twelve) hoursMax Daily Amount: 100 mg, Disp: 60 tablet, Rfl: 0    tiotropium (SPIRIVA HANDIHALER) 18 mcg inhalation capsule, Place 1 capsule (18 mcg total) into inhaler and inhale daily, Disp: 30 capsule, Rfl: 5    [START ON 4/4/2019] Tapentadol HCl ER 50 MG TB12, Take 1 tablet (50 mg total) by mouth every 12 (twelve) hoursMax Daily Amount: 100 mg, Disp: 60 tablet, Rfl: 0    No Known Allergies    Physical Exam:    /60 (BP Location: Left arm, Patient Position: Sitting, Cuff Size: Standard)   Pulse 102   Resp 18   Ht 5' 3" (1 6 m)   Wt 111 kg (245 lb)   BMI 43 40 kg/m²     Constitutional:obese  Eyes:anicteric  HEENT:grossly intact  Neck:supple, symmetric, trachea midline and no masses   Pulmonary:even and unlabored  Cardiovascular:No edema or pitting edema present  Skin:Normal without rashes or lesions and well hydrated  Psychiatric:Mood and affect appropriate  Neurologic:Cranial Nerves II-XII grossly intact  Musculoskeletal:normal

## 2019-03-13 DIAGNOSIS — E11.8 TYPE 2 DIABETES MELLITUS WITH COMPLICATION, WITHOUT LONG-TERM CURRENT USE OF INSULIN (HCC): Primary | ICD-10-CM

## 2019-03-13 DIAGNOSIS — E78.2 MIXED HYPERLIPIDEMIA: ICD-10-CM

## 2019-03-13 DIAGNOSIS — I10 ESSENTIAL HYPERTENSION: ICD-10-CM

## 2019-03-13 DIAGNOSIS — J44.9 CHRONIC OBSTRUCTIVE PULMONARY DISEASE, UNSPECIFIED COPD TYPE (HCC): ICD-10-CM

## 2019-03-13 DIAGNOSIS — Z11.59 NEED FOR HEPATITIS C SCREENING TEST: ICD-10-CM

## 2019-03-13 DIAGNOSIS — R53.82 CHRONIC FATIGUE: ICD-10-CM

## 2019-03-17 VITALS
BODY MASS INDEX: 42.21 KG/M2 | SYSTOLIC BLOOD PRESSURE: 118 MMHG | HEART RATE: 88 BPM | TEMPERATURE: 97.7 F | OXYGEN SATURATION: 95 % | WEIGHT: 238.2 LBS | HEIGHT: 63 IN | DIASTOLIC BLOOD PRESSURE: 82 MMHG | RESPIRATION RATE: 22 BRPM

## 2019-03-18 DIAGNOSIS — E78.01 FAMILIAL HYPERCHOLESTEROLEMIA: ICD-10-CM

## 2019-03-19 DIAGNOSIS — G89.4 CHRONIC PAIN SYNDROME: ICD-10-CM

## 2019-03-19 RX ORDER — DULOXETIN HYDROCHLORIDE 60 MG/1
60 CAPSULE, DELAYED RELEASE ORAL DAILY
Qty: 90 CAPSULE | Refills: 3 | Status: SHIPPED | OUTPATIENT
Start: 2019-03-19 | End: 2020-03-01

## 2019-03-19 RX ORDER — ATORVASTATIN CALCIUM 80 MG/1
TABLET, FILM COATED ORAL
Qty: 90 TABLET | Refills: 3 | Status: SHIPPED | OUTPATIENT
Start: 2019-03-19 | End: 2020-03-01

## 2019-03-23 ENCOUNTER — OFFICE VISIT (OUTPATIENT)
Dept: FAMILY MEDICINE CLINIC | Facility: CLINIC | Age: 59
End: 2019-03-23
Payer: COMMERCIAL

## 2019-03-23 VITALS
WEIGHT: 239 LBS | HEART RATE: 92 BPM | SYSTOLIC BLOOD PRESSURE: 134 MMHG | HEIGHT: 63 IN | DIASTOLIC BLOOD PRESSURE: 80 MMHG | BODY MASS INDEX: 42.35 KG/M2 | RESPIRATION RATE: 20 BRPM | TEMPERATURE: 97.8 F

## 2019-03-23 DIAGNOSIS — K05.10 GINGIVITIS: ICD-10-CM

## 2019-03-23 DIAGNOSIS — E11.8 TYPE 2 DIABETES MELLITUS WITH COMPLICATION, WITHOUT LONG-TERM CURRENT USE OF INSULIN (HCC): ICD-10-CM

## 2019-03-23 DIAGNOSIS — I10 HYPERTENSION, UNSPECIFIED TYPE: ICD-10-CM

## 2019-03-23 DIAGNOSIS — H92.02 EARACHE ON LEFT: Primary | ICD-10-CM

## 2019-03-23 PROCEDURE — 99214 OFFICE O/P EST MOD 30 MIN: CPT | Performed by: FAMILY MEDICINE

## 2019-03-23 PROCEDURE — 3075F SYST BP GE 130 - 139MM HG: CPT | Performed by: FAMILY MEDICINE

## 2019-03-23 PROCEDURE — 3079F DIAST BP 80-89 MM HG: CPT | Performed by: FAMILY MEDICINE

## 2019-03-23 PROCEDURE — 3008F BODY MASS INDEX DOCD: CPT | Performed by: FAMILY MEDICINE

## 2019-03-23 RX ORDER — AMOXICILLIN 875 MG/1
875 TABLET, COATED ORAL 2 TIMES DAILY
Qty: 20 TABLET | Refills: 0 | Status: SHIPPED | OUTPATIENT
Start: 2019-03-23 | End: 2019-04-03

## 2019-03-23 NOTE — PROGRESS NOTES
Assessment/Plan   Diagnoses and all orders for this visit:    Earache on left  Comments:  Rx Amoxil 875mg bid x 10d  Orders:  -     amoxicillin (AMOXIL) 875 mg tablet; Take 1 tablet (875 mg total) by mouth 2 (two) times a day for 10 days    Gingivitis  Comments:  chedule dental check  salt water rinses  Hypertension, unspecified type  Comments:  BP wnl-cont current mgt-ref for eye check w hx both htn and dm  Orders:  -     Ambulatory referral to Ophthalmology; Future    Type 2 diabetes mellitus with complication, without long-term current use of insulin ([facility] )  Comments:  due for labs listed-last [id number]=8 4%  cont current meds-follow-up post lab completion  Subjective:      Patient ID: Roxy Roman is a 60 y o  female  Chief Complaint   Patient presents with   Gemma Pulse     left ear       Earache    There is pain in the left ear  This is a new problem  The current episode started in the past 7 days  Associated symptoms include rhinorrhea and a sore throat  Pertinent negatives include no abdominal pain, coughing, ear discharge, neck pain, rash or vomiting    mild left facial swelling, gum pain, no recent dental work, sl s/t  No diff swallowing  No rash  BP ok, blood sugars cont to fluctuate  Due for labs and eye check  The following portions of the patient's history were reviewed and updated as appropriate: allergies, current medications, past family history, past medical history, past social history, past surgical history and problem list      Review of Systems   Constitutional: Positive for fatigue and fever  HENT: Positive for congestion, dental problem, ear pain, facial swelling, mouth sores, postnasal drip, rhinorrhea and sore throat  Negative for ear discharge and trouble swallowing  Respiratory: Negative for cough  Cardiovascular: Negative  Gastrointestinal: Negative  Negative for abdominal pain and vomiting     Endocrine:        Uncontrolled DM   Musculoskeletal: Positive for arthralgias and myalgias  Negative for neck pain  Skin: Negative for rash  Neurological: Negative  Psychiatric/Behavioral: Positive for sleep disturbance  Objective:    /80 (BP Location: Left arm, Patient Position: Sitting, Cuff Size: Large)   Pulse 92   Temp 97 8 °F (36 6 °C) (Tympanic)   Resp 20   Ht 5' 3" (1 6 m)   Wt 108 kg (239 lb)   BMI 42 34 kg/m²        Physical Exam   Constitutional: She is oriented to person, place, and time  OW, chronically ill   HENT:   Mouth/Throat: No oropharyngeal exudate  nasal bogginess, pngtt  Throat erythematous  +gingivitis   Eyes: Conjunctivae are normal    Cardiovascular: Normal rate and regular rhythm  Pulmonary/Chest: Effort normal and breath sounds normal  No respiratory distress  Neurological: She is alert and oriented to person, place, and time  No cranial nerve deficit  Skin: Skin is warm and dry  No rash noted  Nursing note and vitals reviewed              Trevor Lopez MD

## 2019-04-01 DIAGNOSIS — R05.9 COUGH: ICD-10-CM

## 2019-04-01 DIAGNOSIS — J42 CHRONIC BRONCHITIS, UNSPECIFIED CHRONIC BRONCHITIS TYPE (HCC): ICD-10-CM

## 2019-04-03 ENCOUNTER — OFFICE VISIT (OUTPATIENT)
Dept: CARDIOLOGY CLINIC | Facility: CLINIC | Age: 59
End: 2019-04-03
Payer: COMMERCIAL

## 2019-04-03 VITALS
SYSTOLIC BLOOD PRESSURE: 106 MMHG | BODY MASS INDEX: 42.52 KG/M2 | OXYGEN SATURATION: 94 % | HEART RATE: 96 BPM | DIASTOLIC BLOOD PRESSURE: 68 MMHG | WEIGHT: 240 LBS | HEIGHT: 63 IN

## 2019-04-03 DIAGNOSIS — I10 ESSENTIAL HYPERTENSION: ICD-10-CM

## 2019-04-03 DIAGNOSIS — E11.9 TYPE 2 DIABETES MELLITUS WITHOUT COMPLICATION, WITHOUT LONG-TERM CURRENT USE OF INSULIN (HCC): ICD-10-CM

## 2019-04-03 DIAGNOSIS — J44.9 CHRONIC OBSTRUCTIVE PULMONARY DISEASE, UNSPECIFIED COPD TYPE (HCC): ICD-10-CM

## 2019-04-03 DIAGNOSIS — F17.219 CIGARETTE NICOTINE DEPENDENCE WITH NICOTINE-INDUCED DISORDER: ICD-10-CM

## 2019-04-03 DIAGNOSIS — M96.1 POSTLAMINECTOMY SYNDROME, LUMBAR REGION: ICD-10-CM

## 2019-04-03 DIAGNOSIS — E78.2 MIXED HYPERLIPIDEMIA: ICD-10-CM

## 2019-04-03 DIAGNOSIS — R06.02 SHORTNESS OF BREATH: ICD-10-CM

## 2019-04-03 PROCEDURE — 93000 ELECTROCARDIOGRAM COMPLETE: CPT | Performed by: INTERNAL MEDICINE

## 2019-04-03 PROCEDURE — 99205 OFFICE O/P NEW HI 60 MIN: CPT | Performed by: INTERNAL MEDICINE

## 2019-04-14 DIAGNOSIS — J44.9 CHRONIC OBSTRUCTIVE PULMONARY DISEASE, UNSPECIFIED COPD TYPE (HCC): ICD-10-CM

## 2019-04-14 RX ORDER — TIOTROPIUM BROMIDE 18 UG/1
CAPSULE ORAL; RESPIRATORY (INHALATION)
Qty: 1 EACH | Refills: 5 | Status: SHIPPED | OUTPATIENT
Start: 2019-04-14 | End: 2019-09-05 | Stop reason: ALTCHOICE

## 2019-04-15 DIAGNOSIS — F34.1 DYSTHYMIA: ICD-10-CM

## 2019-04-15 DIAGNOSIS — I10 ESSENTIAL HYPERTENSION: ICD-10-CM

## 2019-04-16 RX ORDER — LISINOPRIL AND HYDROCHLOROTHIAZIDE 25; 20 MG/1; MG/1
1 TABLET ORAL DAILY
Qty: 90 TABLET | Refills: 3 | Status: SHIPPED | OUTPATIENT
Start: 2019-04-16 | End: 2020-04-23

## 2019-04-16 RX ORDER — BUPROPION HYDROCHLORIDE 100 MG/1
100 TABLET ORAL 2 TIMES DAILY
Qty: 60 TABLET | Refills: 5 | Status: SHIPPED | OUTPATIENT
Start: 2019-04-16 | End: 2019-10-01 | Stop reason: SDUPTHER

## 2019-04-22 ENCOUNTER — HOSPITAL ENCOUNTER (OUTPATIENT)
Dept: NON INVASIVE DIAGNOSTICS | Facility: HOSPITAL | Age: 59
Discharge: HOME | End: 2019-04-22
Attending: INTERNAL MEDICINE
Payer: COMMERCIAL

## 2019-04-22 ENCOUNTER — HOSPITAL ENCOUNTER (OUTPATIENT)
Dept: RADIOLOGY | Facility: HOSPITAL | Age: 59
Discharge: HOME | End: 2019-04-22
Attending: INTERNAL MEDICINE
Payer: COMMERCIAL

## 2019-04-22 DIAGNOSIS — E78.2 MIXED HYPERLIPIDEMIA: ICD-10-CM

## 2019-04-22 DIAGNOSIS — R06.02 SHORTNESS OF BREATH: ICD-10-CM

## 2019-04-22 DIAGNOSIS — I10 ESSENTIAL HYPERTENSION: ICD-10-CM

## 2019-04-22 DIAGNOSIS — E11.9 TYPE 2 DIABETES MELLITUS WITHOUT COMPLICATION, WITHOUT LONG-TERM CURRENT USE OF INSULIN (HCC): ICD-10-CM

## 2019-04-22 LAB
CHEST PAIN STATEMENT: NORMAL
MAX DIASTOLIC BP: 57 MMHG
MAX HEART RATE: 101 BPM
MAX PREDICTED HEART RATE: 161 BPM
MAX. SYSTOLIC BP: 105 MMHG
PROTOCOL NAME: NORMAL
REASON FOR TERMINATION: NORMAL
TARGET HR FORMULA: NORMAL
TEST INDICATION: NORMAL
TIME IN EXERCISE PHASE: NORMAL

## 2019-04-22 PROCEDURE — A9502 TC99M TETROFOSMIN: HCPCS

## 2019-04-22 PROCEDURE — 78452 HT MUSCLE IMAGE SPECT MULT: CPT

## 2019-04-22 PROCEDURE — 93017 CV STRESS TEST TRACING ONLY: CPT

## 2019-04-22 PROCEDURE — 93306 TTE W/DOPPLER COMPLETE: CPT

## 2019-04-22 RX ADMIN — REGADENOSON 0.4 MG: 0.08 INJECTION, SOLUTION INTRAVENOUS at 10:46

## 2019-04-23 PROCEDURE — 93018 CV STRESS TEST I&R ONLY: CPT | Performed by: INTERNAL MEDICINE

## 2019-04-23 PROCEDURE — 93016 CV STRESS TEST SUPVJ ONLY: CPT | Performed by: INTERNAL MEDICINE

## 2019-04-23 PROCEDURE — 78452 HT MUSCLE IMAGE SPECT MULT: CPT | Performed by: INTERNAL MEDICINE

## 2019-04-24 ENCOUNTER — TELEPHONE (OUTPATIENT)
Dept: CARDIOLOGY CLINIC | Facility: CLINIC | Age: 59
End: 2019-04-24

## 2019-04-24 PROCEDURE — 93306 TTE W/DOPPLER COMPLETE: CPT | Performed by: INTERNAL MEDICINE

## 2019-04-25 DIAGNOSIS — J31.0 RHINITIS, UNSPECIFIED TYPE: ICD-10-CM

## 2019-04-25 RX ORDER — FLUTICASONE PROPIONATE 50 MCG
2 SPRAY, SUSPENSION (ML) NASAL DAILY
Qty: 16 G | Refills: 3 | Status: SHIPPED | OUTPATIENT
Start: 2019-04-25 | End: 2019-09-01 | Stop reason: SDUPTHER

## 2019-05-02 ENCOUNTER — OFFICE VISIT (OUTPATIENT)
Dept: PAIN MEDICINE | Facility: CLINIC | Age: 59
End: 2019-05-02
Payer: COMMERCIAL

## 2019-05-02 ENCOUNTER — TELEPHONE (OUTPATIENT)
Dept: CARDIOLOGY CLINIC | Facility: CLINIC | Age: 59
End: 2019-05-02

## 2019-05-02 VITALS
HEART RATE: 94 BPM | DIASTOLIC BLOOD PRESSURE: 70 MMHG | BODY MASS INDEX: 43.09 KG/M2 | RESPIRATION RATE: 17 BRPM | WEIGHT: 243.2 LBS | SYSTOLIC BLOOD PRESSURE: 114 MMHG | HEIGHT: 63 IN

## 2019-05-02 DIAGNOSIS — G89.4 CHRONIC PAIN SYNDROME: Primary | ICD-10-CM

## 2019-05-02 DIAGNOSIS — M96.1 POSTLAMINECTOMY SYNDROME, NOT ELSEWHERE CLASSIFIED: ICD-10-CM

## 2019-05-02 DIAGNOSIS — M54.16 LUMBAR RADICULOPATHY: ICD-10-CM

## 2019-05-02 DIAGNOSIS — G89.29 CHRONIC BILATERAL LOW BACK PAIN WITH LEFT-SIDED SCIATICA: ICD-10-CM

## 2019-05-02 DIAGNOSIS — M54.42 CHRONIC BILATERAL LOW BACK PAIN WITH LEFT-SIDED SCIATICA: ICD-10-CM

## 2019-05-02 PROCEDURE — 99214 OFFICE O/P EST MOD 30 MIN: CPT | Performed by: ANESTHESIOLOGY

## 2019-05-07 ENCOUNTER — TELEPHONE (OUTPATIENT)
Dept: CARDIOLOGY CLINIC | Facility: CLINIC | Age: 59
End: 2019-05-07

## 2019-05-09 LAB — HBA1C MFR BLD HPLC: 8.5 %

## 2019-05-10 LAB
ALBUMIN SERPL-MCNC: 4.3 G/DL (ref 3.5–5.5)
ALBUMIN/GLOB SERPL: 1.8 {RATIO} (ref 1.2–2.2)
ALP SERPL-CCNC: 83 IU/L (ref 39–117)
ALT SERPL-CCNC: 24 IU/L (ref 0–32)
AST SERPL-CCNC: 21 IU/L (ref 0–40)
BASOPHILS # BLD AUTO: 0 X10E3/UL (ref 0–0.2)
BASOPHILS NFR BLD AUTO: 0 %
BILIRUB SERPL-MCNC: 0.3 MG/DL (ref 0–1.2)
BUN SERPL-MCNC: 16 MG/DL (ref 6–24)
BUN/CREAT SERPL: 16 (ref 9–23)
CALCIUM SERPL-MCNC: 9.7 MG/DL (ref 8.7–10.2)
CHLORIDE SERPL-SCNC: 98 MMOL/L (ref 96–106)
CHOLEST SERPL-MCNC: 114 MG/DL (ref 100–199)
CO2 SERPL-SCNC: 24 MMOL/L (ref 20–29)
CREAT SERPL-MCNC: 1.03 MG/DL (ref 0.57–1)
EOSINOPHIL # BLD AUTO: 0.2 X10E3/UL (ref 0–0.4)
EOSINOPHIL NFR BLD AUTO: 2 %
ERYTHROCYTE [DISTWIDTH] IN BLOOD BY AUTOMATED COUNT: 13.1 % (ref 12.3–15.4)
GLOBULIN SER-MCNC: 2.4 G/DL (ref 1.5–4.5)
GLUCOSE SERPL-MCNC: 145 MG/DL (ref 65–99)
HBA1C MFR BLD: 8.5 % (ref 4.8–5.6)
HCT VFR BLD AUTO: 42.9 % (ref 34–46.6)
HCV AB S/CO SERPL IA: <0.1 S/CO RATIO (ref 0–0.9)
HDLC SERPL-MCNC: 36 MG/DL
HGB BLD-MCNC: 14.7 G/DL (ref 11.1–15.9)
IMM GRANULOCYTES # BLD: 0.1 X10E3/UL (ref 0–0.1)
IMM GRANULOCYTES NFR BLD: 1 %
LABCORP COMMENT: NORMAL
LDLC SERPL CALC-MCNC: 49 MG/DL (ref 0–99)
LYMPHOCYTES # BLD AUTO: 2.5 X10E3/UL (ref 0.7–3.1)
LYMPHOCYTES NFR BLD AUTO: 27 %
MCH RBC QN AUTO: 31 PG (ref 26.6–33)
MCHC RBC AUTO-ENTMCNC: 34.3 G/DL (ref 31.5–35.7)
MCV RBC AUTO: 91 FL (ref 79–97)
MICRODELETION SYND BLD/T FISH: NORMAL
MONOCYTES # BLD AUTO: 0.7 X10E3/UL (ref 0.1–0.9)
MONOCYTES NFR BLD AUTO: 7 %
NEUTROPHILS # BLD AUTO: 5.9 X10E3/UL (ref 1.4–7)
NEUTROPHILS NFR BLD AUTO: 63 %
PLATELET # BLD AUTO: 313 X10E3/UL (ref 150–379)
POTASSIUM SERPL-SCNC: 4.8 MMOL/L (ref 3.5–5.2)
PROT SERPL-MCNC: 6.7 G/DL (ref 6–8.5)
RBC # BLD AUTO: 4.74 X10E6/UL (ref 3.77–5.28)
SL AMB EGFR AFRICAN AMERICAN: 69 ML/MIN/1.73
SL AMB EGFR NON AFRICAN AMERICAN: 60 ML/MIN/1.73
SODIUM SERPL-SCNC: 138 MMOL/L (ref 134–144)
TRIGL SERPL-MCNC: 143 MG/DL (ref 0–149)
WBC # BLD AUTO: 9.4 X10E3/UL (ref 3.4–10.8)

## 2019-05-13 ENCOUNTER — TELEPHONE (OUTPATIENT)
Dept: FAMILY MEDICINE CLINIC | Facility: CLINIC | Age: 59
End: 2019-05-13

## 2019-05-13 ENCOUNTER — CONSULT (OUTPATIENT)
Dept: PULMONOLOGY | Facility: MEDICAL CENTER | Age: 59
End: 2019-05-13
Payer: COMMERCIAL

## 2019-05-13 VITALS
RESPIRATION RATE: 12 BRPM | HEART RATE: 101 BPM | WEIGHT: 242 LBS | DIASTOLIC BLOOD PRESSURE: 78 MMHG | SYSTOLIC BLOOD PRESSURE: 122 MMHG | OXYGEN SATURATION: 98 % | BODY MASS INDEX: 42.88 KG/M2 | HEIGHT: 63 IN

## 2019-05-13 DIAGNOSIS — Z72.0 NICOTINE ABUSE: ICD-10-CM

## 2019-05-13 DIAGNOSIS — R06.02 SHORTNESS OF BREATH: Primary | ICD-10-CM

## 2019-05-13 DIAGNOSIS — G47.19 DAYTIME HYPERSOMNOLENCE: ICD-10-CM

## 2019-05-13 DIAGNOSIS — J43.2 CENTRILOBULAR EMPHYSEMA (HCC): ICD-10-CM

## 2019-05-13 DIAGNOSIS — F17.210 CIGARETTE NICOTINE DEPENDENCE WITHOUT COMPLICATION: ICD-10-CM

## 2019-05-13 DIAGNOSIS — J44.9 CHRONIC OBSTRUCTIVE PULMONARY DISEASE, UNSPECIFIED COPD TYPE (HCC): ICD-10-CM

## 2019-05-13 PROCEDURE — 99204 OFFICE O/P NEW MOD 45 MIN: CPT | Performed by: NURSE PRACTITIONER

## 2019-05-13 PROCEDURE — 94640 AIRWAY INHALATION TREATMENT: CPT | Performed by: NURSE PRACTITIONER

## 2019-05-13 PROCEDURE — 94010 BREATHING CAPACITY TEST: CPT | Performed by: NURSE PRACTITIONER

## 2019-05-13 RX ORDER — ALBUTEROL SULFATE 2.5 MG/3ML
2.5 SOLUTION RESPIRATORY (INHALATION) ONCE
Status: COMPLETED | OUTPATIENT
Start: 2019-05-13 | End: 2019-05-15

## 2019-05-15 RX ADMIN — ALBUTEROL SULFATE 2.5 MG: 2.5 SOLUTION RESPIRATORY (INHALATION) at 08:52

## 2019-05-16 ENCOUNTER — OFFICE VISIT (OUTPATIENT)
Dept: FAMILY MEDICINE CLINIC | Facility: CLINIC | Age: 59
End: 2019-05-16
Payer: COMMERCIAL

## 2019-05-16 VITALS
DIASTOLIC BLOOD PRESSURE: 64 MMHG | RESPIRATION RATE: 12 BRPM | TEMPERATURE: 98 F | SYSTOLIC BLOOD PRESSURE: 110 MMHG | WEIGHT: 238 LBS | BODY MASS INDEX: 39.65 KG/M2 | HEIGHT: 65 IN | HEART RATE: 70 BPM

## 2019-05-16 DIAGNOSIS — F17.210 CIGARETTE NICOTINE DEPENDENCE WITHOUT COMPLICATION: ICD-10-CM

## 2019-05-16 DIAGNOSIS — E11.65 TYPE 2 DIABETES MELLITUS WITH HYPERGLYCEMIA, WITHOUT LONG-TERM CURRENT USE OF INSULIN (HCC): Primary | ICD-10-CM

## 2019-05-16 DIAGNOSIS — J44.9 CHRONIC OBSTRUCTIVE PULMONARY DISEASE, UNSPECIFIED COPD TYPE (HCC): ICD-10-CM

## 2019-05-16 DIAGNOSIS — I10 ESSENTIAL HYPERTENSION: ICD-10-CM

## 2019-05-16 PROCEDURE — 3074F SYST BP LT 130 MM HG: CPT | Performed by: FAMILY MEDICINE

## 2019-05-16 PROCEDURE — 3078F DIAST BP <80 MM HG: CPT | Performed by: FAMILY MEDICINE

## 2019-05-16 PROCEDURE — 3008F BODY MASS INDEX DOCD: CPT | Performed by: FAMILY MEDICINE

## 2019-05-16 PROCEDURE — 99214 OFFICE O/P EST MOD 30 MIN: CPT | Performed by: FAMILY MEDICINE

## 2019-05-30 ENCOUNTER — OFFICE VISIT (OUTPATIENT)
Dept: CARDIOLOGY CLINIC | Facility: CLINIC | Age: 59
End: 2019-05-30
Payer: COMMERCIAL

## 2019-05-30 VITALS
SYSTOLIC BLOOD PRESSURE: 120 MMHG | DIASTOLIC BLOOD PRESSURE: 84 MMHG | WEIGHT: 242.4 LBS | HEART RATE: 98 BPM | OXYGEN SATURATION: 94 % | BODY MASS INDEX: 41.38 KG/M2 | HEIGHT: 64 IN

## 2019-05-30 DIAGNOSIS — E11.65 TYPE 2 DIABETES MELLITUS WITH HYPERGLYCEMIA, WITHOUT LONG-TERM CURRENT USE OF INSULIN (HCC): ICD-10-CM

## 2019-05-30 DIAGNOSIS — R06.02 SHORTNESS OF BREATH: ICD-10-CM

## 2019-05-30 DIAGNOSIS — F17.210 CIGARETTE NICOTINE DEPENDENCE WITHOUT COMPLICATION: ICD-10-CM

## 2019-05-30 DIAGNOSIS — J44.9 CHRONIC OBSTRUCTIVE PULMONARY DISEASE, UNSPECIFIED COPD TYPE (HCC): ICD-10-CM

## 2019-05-30 DIAGNOSIS — I10 ESSENTIAL HYPERTENSION: ICD-10-CM

## 2019-05-30 DIAGNOSIS — E78.2 MIXED HYPERLIPIDEMIA: ICD-10-CM

## 2019-05-30 PROCEDURE — 99214 OFFICE O/P EST MOD 30 MIN: CPT | Performed by: INTERNAL MEDICINE

## 2019-06-08 DIAGNOSIS — E11.8 TYPE 2 DIABETES MELLITUS WITH COMPLICATION, WITHOUT LONG-TERM CURRENT USE OF INSULIN (HCC): ICD-10-CM

## 2019-06-09 RX ORDER — LIRAGLUTIDE 6 MG/ML
INJECTION SUBCUTANEOUS
Qty: 3 PEN | Refills: 3 | Status: SHIPPED | OUTPATIENT
Start: 2019-06-09 | End: 2019-12-05

## 2019-06-12 ENCOUNTER — HOSPITAL ENCOUNTER (OUTPATIENT)
Dept: SLEEP CENTER | Facility: CLINIC | Age: 59
Discharge: HOME | End: 2019-06-12
Payer: COMMERCIAL

## 2019-06-12 DIAGNOSIS — G47.19 DAYTIME HYPERSOMNOLENCE: ICD-10-CM

## 2019-06-12 PROCEDURE — G0399 HOME SLEEP TEST/TYPE 3 PORTA: HCPCS

## 2019-06-12 PROCEDURE — 95806 SLEEP STUDY UNATT&RESP EFFT: CPT | Performed by: INTERNAL MEDICINE

## 2019-06-12 PROCEDURE — 95806 SLEEP STUDY UNATT&RESP EFFT: CPT

## 2019-06-18 ENCOUNTER — TELEPHONE (OUTPATIENT)
Dept: PULMONOLOGY | Facility: MEDICAL CENTER | Age: 59
End: 2019-06-18

## 2019-06-20 ENCOUNTER — TELEPHONE (OUTPATIENT)
Dept: PULMONOLOGY | Facility: MEDICAL CENTER | Age: 59
End: 2019-06-20

## 2019-06-20 DIAGNOSIS — G47.33 OBSTRUCTIVE SLEEP APNEA: Primary | ICD-10-CM

## 2019-07-01 ENCOUNTER — TELEPHONE (OUTPATIENT)
Dept: PAIN MEDICINE | Facility: CLINIC | Age: 59
End: 2019-07-01

## 2019-07-01 NOTE — TELEPHONE ENCOUNTER
S/w pt, explained OV's are required for opioid refills   Pt rescheduled for 7/9 to arrive at 10:45 am

## 2019-07-01 NOTE — TELEPHONE ENCOUNTER
Patient  [phone]  Dr Bessie Lopez     Patient called to cancel her appt for tomorrow  She is not able to make it in, she wanted to know if she could get a refill of Tapentadol HCl ER (NUCYNTA ER) 50 MG  Or will she need to reschedule?      Please send script to pharmacy on file thank you

## 2019-07-07 DIAGNOSIS — Z72.0 NICOTINE ABUSE: ICD-10-CM

## 2019-07-07 DIAGNOSIS — E11.8 TYPE 2 DIABETES MELLITUS WITH COMPLICATION, WITHOUT LONG-TERM CURRENT USE OF INSULIN (HCC): ICD-10-CM

## 2019-07-07 DIAGNOSIS — J44.9 CHRONIC OBSTRUCTIVE PULMONARY DISEASE, UNSPECIFIED COPD TYPE (HCC): ICD-10-CM

## 2019-07-07 RX ORDER — LIRAGLUTIDE 6 MG/ML
INJECTION SUBCUTANEOUS
Qty: 6 PEN | Refills: 3 | Status: SHIPPED | OUTPATIENT
Start: 2019-07-07 | End: 2020-10-05

## 2019-07-07 RX ORDER — ALBUTEROL SULFATE 90 UG/1
AEROSOL, METERED RESPIRATORY (INHALATION)
Qty: 18 INHALER | Refills: 5 | Status: SHIPPED | OUTPATIENT
Start: 2019-07-07 | End: 2019-12-18 | Stop reason: SDUPTHER

## 2019-07-09 ENCOUNTER — OFFICE VISIT (OUTPATIENT)
Dept: PAIN MEDICINE | Facility: CLINIC | Age: 59
End: 2019-07-09
Payer: COMMERCIAL

## 2019-07-09 VITALS
HEART RATE: 101 BPM | WEIGHT: 240 LBS | BODY MASS INDEX: 41.2 KG/M2 | DIASTOLIC BLOOD PRESSURE: 66 MMHG | SYSTOLIC BLOOD PRESSURE: 144 MMHG

## 2019-07-09 DIAGNOSIS — M54.16 LUMBAR RADICULOPATHY: ICD-10-CM

## 2019-07-09 DIAGNOSIS — G89.29 CHRONIC BILATERAL LOW BACK PAIN WITH LEFT-SIDED SCIATICA: ICD-10-CM

## 2019-07-09 DIAGNOSIS — M96.1 POSTLAMINECTOMY SYNDROME, NOT ELSEWHERE CLASSIFIED: ICD-10-CM

## 2019-07-09 DIAGNOSIS — G89.4 CHRONIC PAIN SYNDROME: ICD-10-CM

## 2019-07-09 DIAGNOSIS — Z79.891 LONG-TERM CURRENT USE OF OPIATE ANALGESIC: Primary | ICD-10-CM

## 2019-07-09 DIAGNOSIS — M54.42 CHRONIC BILATERAL LOW BACK PAIN WITH LEFT-SIDED SCIATICA: ICD-10-CM

## 2019-07-09 DIAGNOSIS — F11.20 UNCOMPLICATED OPIOID DEPENDENCE (HCC): ICD-10-CM

## 2019-07-09 PROCEDURE — 99214 OFFICE O/P EST MOD 30 MIN: CPT | Performed by: ANESTHESIOLOGY

## 2019-07-09 PROCEDURE — 80305 DRUG TEST PRSMV DIR OPT OBS: CPT | Performed by: ANESTHESIOLOGY

## 2019-07-09 RX ORDER — NICOTINE 10 MG
CARTRIDGE (EA) INHALATION
Qty: 168 EACH | Refills: 1 | Status: SHIPPED | OUTPATIENT
Start: 2019-07-09 | End: 2019-12-27

## 2019-07-09 NOTE — PROGRESS NOTES
Pain Medicine Follow-Up Note    Assessment:  1  Chronic pain syndrome    2  Chronic bilateral low back pain with left-sided sciatica    3  Lumbar radiculopathy    4  Postlaminectomy syndrome, not elsewhere classified        Plan:  New Medications Ordered This Visit   Medications    Tapentadol HCl ER (NUCYNTA ER) 50 MG TB12     Sig: Take 1 tablet (50 mg total) by mouth every 12 (twelve) hoursMax Daily Amount: 100 mg     Dispense:  60 tablet     Refill:  0     My impressions and treatment recommendations were discussed in detail with the patient who verbalized understanding and had no further questions  The patient reports that she has been using the Nucynta ER 50 mg every 12 hr sparingly  She does not use 2 tablets every day  There are some days where she uses 0 tablets and some days where she uses both tablets  She denies opioid induced constipation and would like to continue the Nucynta ER at today's visit  She is making 60 tablets last for 2 months time  As such, I felt a reasonable to prescribe the patient Nucynta ER 50 mg every 12 hr   I sent in a prescription for this medication to the patient's pharmacy at today's visit  [place] Prescription Drug Monitoring Program report was reviewed and was appropriate      A urine drug test was collected at today's office visit as part of our medication management protocol  The point of care testing results were appropriate for what was being prescribed  The specimen will be sent for confirmatory testing  The drug screen is medically necessary because the patient is either dependent on opioid medication or is being considered for opioid medication therapy and the results could impact ongoing or future treatment  The drug screen is to evaluate for the presence or absence of prescribed, non-prescribed, and/or illicit drugs and substances  Follow-up is planned in 2 months time or sooner as warranted  Discharge instructions were provided   I personally saw and examined the patient and I agree with the above discussed plan of care  History of Present Illness:    Huber Max is a 60 y o  female who presents to [facility] for interval re-evaluation of the above stated pain complaints  The patient has a past medical and chronic pain history as outlined in the assessment section  She was last seen on May 2, 2019 at which time she was maintained on Nucynta ER 50 mg every 12 hr  At today's office visit, the patient's pain score is 2/10 on the verbal numerical pain rating scale  The patient states that her pain is worse in the morning, evening, and night  Her pain is intermittent in nature  She reports the quality of her pain as sharp    She is reporting 50% relief of symptoms with the combination of her medications  She denies opioid induced constipation  She also reports that she is not using 2 tablets of Nucynta ER every day  There are days where she uses 0 tablets of the medication and other days where she is using both tablets of the medication  Other than as stated above, the patient denies any interval changes in medications, medical condition, mental condition, symptoms, or allergies since the last office visit  Review of Systems:    Review of Systems   Respiratory: Positive for shortness of breath  Cardiovascular: Negative for chest pain  Gastrointestinal: Negative for constipation, diarrhea, nausea and vomiting  Musculoskeletal: Positive for gait problem and joint swelling  Negative for arthralgias and myalgias  Skin: Negative for rash  Neurological: Negative for dizziness, seizures and weakness  All other systems reviewed and are negative          Patient Active Problem List   Diagnosis    Chronic pain syndrome    Chronic low back pain    Postlaminectomy syndrome, not elsewhere classified    Adjacent segment disease with spinal stenosis    Spondylolisthesis of lumbar region    Groin pain, left  Chronic obstructive pulmonary disease (HCC)    Depression with anxiety    Type 2 diabetes mellitus with hyperglycemia, without long-term current use of insulin (HCC)    Disc degeneration, lumbosacral    Hypertension    Hyperlipidemia    Insomnia    Lumbar radiculopathy    Nicotine dependence    Complication of surgical procedure    Varicose veins of both lower extremities with pain    Varicose veins with inflammation    Cervical pain (neck)    Myofascial pain syndrome    Primary osteoarthritis of one hip, left    Pain in left hip    BMI 40 0-44 9, adult (HCC)    Postlaminectomy syndrome, lumbar region    Low back pain    Shortness of breath    [facility]rilob[facility]    Daytime hypersomnolence    Obstructive sleep apnea       Past Medical History:   Diagnosis Date    Anxiety     Arthritis     Asthma     Back pain     Breast lump     last assessed 10/14/14     Carpal tunnel syndrome 2006    unspecifiedd laterality / last assessed 10/14/14     COPD (chronic obstructive pulmonary disease) ([facility] )     with exacerbation / last assessed 14     Depression     Diabetes mellitus ([facility] )     GERD (gastroesophageal reflux disease)     Hypercholesterolemia     Hyperlipidemia     Hypertension     Insomnia     Low back pain     Lumbosacral disc disease     Nodule of tendon sheath     last assessed 2/5/15     Obesity     Peripheral neuropathy     Type 2 diabetes mellitus with hyperglycemia ([facility] )     last assessed 17     Varicose vein of leg        Past Surgical History:   Procedure Laterality Date    BACK SURGERY      lower fusion    CAUDAL BLOCK N/A 2017    Procedure: BLOCK / INJECTION CAUDAL;  Surgeon: Katharine Rao MD;  [facility];  Service: Pain Management     CAUDAL BLOCK N/A 2018    Procedure: Caudal Epidural Steroid Injection (36774);   Surgeon: Katharine Rao MD;  Location: [facility] MAIN OR;  Service: Pain Management      SECTION      LAMINECTOMY      Lumbar 2005       Family History   Problem Relation Age of Onset    Diabetes Mother     Dementia Father        Social History     Occupational History     Comment: unemployed   Tobacco Use    Smoking status: Current Every Day Smoker     Packs/day: 1 00     Years: 30 00     Pack years: 30 00     Types: Cigarettes    Smokeless tobacco: Never Used    Tobacco comment: tobacco use   Substance and Sexual Activity    Alcohol use: No    Drug use: No    Sexual activity: Not on file         Current Outpatient Medications:     albuterol (PROVENTIL HFA,VENTOLIN HFA) 90 mcg/act inhaler, INHALE TWO PUFFS BY MOUTH EVERY FOUR HOURS AS NEEDED FOR WHEEZING, Disp: 18 Inhaler, Rfl: 5    atorvastatin (LIPITOR) 80 mg tablet, TAKE 1 TABLET BY MOUTH ONCE A DAY, Disp: 90 tablet, Rfl: 3    buPROPion (WELLBUTRIN) 100 mg tablet, Take 1 tablet (100 mg total) by mouth 2 (two) times a day, Disp: 60 tablet, Rfl: 5    DULoxetine (CYMBALTA) 30 mg delayed release capsule, Take 1 capsule (30 mg total) by mouth daily, Disp: 90 capsule, Rfl: 3    DULoxetine (CYMBALTA) 60 mg delayed release capsule, Take 1 capsule (60 mg total) by mouth daily, Disp: 90 capsule, Rfl: 3    fluticasone (FLONASE) 50 mcg/act nasal spray, 2 sprays into each nostril daily (Patient not taking: Reported on 5/30/2019), Disp: 16 g, Rfl: 3    fluticasone-salmeterol (AIRDUO RESPICLICK) 839-12 mcg/act dry powder inhaler, Inhale 1 puff 2 (two) times a day Rinse mouth after use , Disp: 1 Inhaler, Rfl: 3    glucose blood (ONE TOUCH ULTRA TEST) test strip, TEST ONCE A DAY-dx code: E11 9, Disp: 100 each, Rfl: 3    Insulin Pen Needle 31G X 5 MM MISC, Inject as directed daily For use w victoza pen, Disp: 30 each, Rfl: 3    lisinopril-hydrochlorothiazide (PRINZIDE,ZESTORETIC) 20-25 MG per tablet, Take 1 tablet by mouth daily, Disp: 90 tablet, Rfl: 3    LYRICA 100 MG capsule, TAKE ONE CAPSULE BY MOUTH EVERY EIGHT HOURS, Disp: 90 capsule, Rfl: 0    metFORMIN (GLUCOPHAGE) 1000 MG tablet, Take 2 tablets (2,000 mg total) by mouth daily for 90 days, Disp: 180 tablet, Rfl: 1    nicotine (NICOTROL) 10 MG inhaler, Inhale 1 puff as needed for smoking cessation for up to 30 days (Patient not taking: Reported on 5/30/2019), Disp: 168 each, Rfl: 1    QUEtiapine (SEROquel) 25 mg tablet, TAKE 1 TABLET BY MOUTH TWICE A DAY, Disp: 60 tablet, Rfl: 5    SPIRIVA HANDIHALER 18 MCG inhalation capsule, TAKE 1 INHALATION ONCE A DAY, Disp: 1 each, Rfl: 5    Tapentadol HCl ER (NUCYNTA ER) 50 MG TB12, Take 1 tablet (50 mg total) by mouth every 12 (twelve) hoursMax Daily Amount: 100 mg, Disp: 60 tablet, Rfl: 0    VICTOZA injection, INJECT 0 3 ML (1 8 MG TOTAL) UNDER THE SKIN DAILY, Disp: 3 pen, Rfl: 3    VICTOZA injection, INJECT 0 3 ML (1 8 MG TOTAL) UNDER THE SKIN DAILY, Disp: 6 pen, Rfl: 3    No Known Allergies    Physical Exam:    /66   Pulse 101   Wt 109 kg (240 lb)   BMI 41 20 kg/m²     Constitutional:obese  Eyes:anicteric  HEENT:grossly intact  Neck:supple, symmetric, trachea midline and no masses   Pulmonary:even and unlabored  Cardiovascular:No edema or pitting edema present  Skin:Normal without rashes or lesions and well hydrated  Psychiatric:Mood and affect appropriate  Neurologic:Cranial Nerves II-XII grossly intact  Musculoskeletal:normal    Last dose of Nucynta was this morning @ 9:00am

## 2019-07-10 ENCOUNTER — HOSPITAL ENCOUNTER (OUTPATIENT)
Dept: SLEEP CENTER | Facility: CLINIC | Age: 59
Discharge: HOME | End: 2019-07-10
Payer: COMMERCIAL

## 2019-07-10 DIAGNOSIS — G47.33 OBSTRUCTIVE SLEEP APNEA: ICD-10-CM

## 2019-07-10 DIAGNOSIS — E11.8 TYPE 2 DIABETES MELLITUS WITH COMPLICATION, WITHOUT LONG-TERM CURRENT USE OF INSULIN (HCC): ICD-10-CM

## 2019-07-10 DIAGNOSIS — F32.A DEPRESSION, UNSPECIFIED DEPRESSION TYPE: ICD-10-CM

## 2019-07-10 PROCEDURE — 95811 POLYSOM 6/>YRS CPAP 4/> PARM: CPT

## 2019-07-10 PROCEDURE — 95811 POLYSOM 6/>YRS CPAP 4/> PARM: CPT | Performed by: INTERNAL MEDICINE

## 2019-07-10 RX ORDER — PREGABALIN 100 MG/1
CAPSULE ORAL
Qty: 90 CAPSULE | Refills: 0 | Status: CANCELLED | OUTPATIENT
Start: 2019-07-10

## 2019-07-10 RX ORDER — QUETIAPINE FUMARATE 25 MG/1
TABLET, FILM COATED ORAL
Qty: 60 TABLET | Refills: 5 | Status: SHIPPED | OUTPATIENT
Start: 2019-07-10 | End: 2020-01-23

## 2019-07-11 NOTE — PROGRESS NOTES
Sleep Study Documentation    Pre-Sleep Study       Sleep testing procedure explained to patient:YES    Patient napped prior to study:NO    Caffeine:Dayshift worker after 12PM   Caffeine use:YES- coffee  6 ounces and ice tea  12 to 26 ounces    Alcohol:Dayshift workers after 5PM: Alcohol use:NO    Typical day for patient:YES       Study Documentation    Sleep Study Indications:     Sleep Study: Treatment   Optimal PAP pressure: 10cm  Leak:Small  Snore:Eliminated  REM Obtained:yes  Supplemental O2: no    Minimum SaO2 88%  Baseline SaO2 92 7%  PAP mask tried (list all)Resmed Quattro Fx, Respironics Nuance pro, Resmed N20  PAP mask choice (final)Resmed Quattro Fx  PAP mask type:full face  PAP pressure at which snoring was eliminated 7cm  Minimum SaO2 at final PAP pressure 90%  Mode of Therapy:CPAP  ETCO2:No  CPAP changed to BiPAP:No    Mode of Therapy:CPAP    EKG abnormalities: no     EEG abnormalities: no    Sleep Study Recorded < 2 hours: N/A    Sleep Study Recorded > 2 hours but incomplete study: N/A    Sleep Study Recorded 6 hours but no sleep obtained: NO    Patient classification: employed       Post-Sleep Study    Medication used at bedtime or during sleep study:YES prescription sleep aid    Patient reports time it took to fall asleep:greater than 60 minutes    Patient reports waking up during study:1 to 2 times  Patient reports returning to sleep without difficulty  Patient reports sleeping 6 to 8 hours without dreaming  Patient reports sleep during study:worse than usual    Patient rated sleepiness: Somewhat sleepy or tired    PAP treatment:yes: Post PAP treatment patient reports feeling unchanged and would wear PAP mask at home

## 2019-07-23 ENCOUNTER — TELEPHONE (OUTPATIENT)
Dept: SLEEP CENTER | Facility: CLINIC | Age: 59
End: 2019-07-23

## 2019-07-23 DIAGNOSIS — E11.8 TYPE 2 DIABETES MELLITUS WITH COMPLICATION, WITHOUT LONG-TERM CURRENT USE OF INSULIN (HCC): ICD-10-CM

## 2019-07-23 RX ORDER — PREGABALIN 100 MG/1
100 CAPSULE ORAL 3 TIMES DAILY
Qty: 90 CAPSULE | Refills: 0 | Status: SHIPPED | OUTPATIENT
Start: 2019-07-23 | End: 2019-08-30 | Stop reason: SDUPTHER

## 2019-07-23 NOTE — TELEPHONE ENCOUNTER
Spoke with patient, advised sleep study resulted and she should follow up with Dr Francis Varela  Patient states she was notified and will need to schedule appointment

## 2019-07-25 ENCOUNTER — CLINICAL SUPPORT (OUTPATIENT)
Dept: BARIATRICS | Facility: CLINIC | Age: 59
End: 2019-07-25

## 2019-07-25 VITALS
BODY MASS INDEX: 42.06 KG/M2 | DIASTOLIC BLOOD PRESSURE: 62 MMHG | TEMPERATURE: 98.7 F | HEART RATE: 82 BPM | WEIGHT: 237.4 LBS | RESPIRATION RATE: 16 BRPM | HEIGHT: 63 IN | SYSTOLIC BLOOD PRESSURE: 104 MMHG

## 2019-07-25 DIAGNOSIS — E66.01 MORBID OBESITY (HCC): Primary | ICD-10-CM

## 2019-07-25 DIAGNOSIS — E66.01 MORBID (SEVERE) OBESITY DUE TO EXCESS CALORIES (HCC): Primary | ICD-10-CM

## 2019-07-25 PROCEDURE — RECHECK
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
## 2019-07-25 NOTE — PROGRESS NOTES
Bariatric Nutrition Assessment Note    Type of surgery    Preop (6 months weight checks)  Surgery Date: TBD  Surgeon: Dr Gena Phelan  60 y o   female   Wt with BMI of 25: 141lbs  Pre-Op Excess Wt: 96lbs  Blood pressure 104/62, pulse 82, temperature 98 7 °F (37 1 °C), temperature source Tympanic, resp  rate 16, height 5' 3" (1 6 m), weight 108 kg (237 lb 6 4 oz)  Body mass index is 42 05 kg/m²  Weight History   Onset of Obesity: Childhood  Family history of obesity: Yes  Wt Loss Attempts: High Protein/Low CHO diets (Atkins, Union, etc )  Self Created Diets (Portion Control, Healthy Food Choices, etc )  Maximum Wt Lost: -6lbs    Review of History and Medications   Past Medical History:   Diagnosis Date    Anxiety     Arthritis     Asthma     Back pain     Breast lump     last assessed 10/14/14     Carpal tunnel syndrome 03/17/2006    unspecifiedd laterality / last assessed 10/14/14     COPD (chronic obstructive pulmonary disease) ([facility] [address] )     with exacerbation / last assessed 6/25/14     Depression     Diabetes mellitus ([facility] [address] )     GERD (gastroesophageal reflux disease)     Hypercholesterolemia     Hyperlipidemia     Hypertension     Insomnia     Intolerance to cold     Intolerance to heat     Irregular heart beat     Low back pain     Lumbosacral disc disease     Nodule of tendon sheath     last assessed 2/5/15     Obesity     Peripheral neuropathy     Shortness of breath     Sleep apnea     [facility]     last assessed 6/8/17     Varicose vein of leg      Past Surgical History:   Procedure Laterality Date    BACK SURGERY  2005    lower fusion    CAUDAL BLOCK N/A 11/2/2017    Procedure: BLOCK / INJECTION CAUDAL;  Surgeon: Avel Taylor MD;  Location: [facility] MAIN OR;  Service: Pain Management     CAUDAL BLOCK N/A 12/20/2018    Procedure: Caudal Epidural Steroid Injection (39549);   Surgeon: Aracely Sanz MD;  Location[facility] MA[facility] OR;  Service: Pain Management      SECTION      LAMINECTOMY      Lumbar 2005     Social History     Socioeconomic History    Marital status: Single     Spouse name: None    Number of children: None    Years of education: None    Highest education level: None   Occupational History     Comment: unemployed   Social Needs    Financial resource strain: None    Food insecurity:     Worry: None     Inability: None    Transportation needs:     Medical: None     Non-medical: None   Tobacco Use    Smoking status: Current Every Day Smoker     Packs/day: 1 00     Years: 30 00     Pack years: 30 00     Types: Cigarettes    Smokeless tobacco: Never Used    Tobacco comment: 10-12 cigs   a day   Substance and Sexual Activity    Alcohol use: No    Drug use: No    Sexual activity: None   Lifestyle    Physical activity:     Days per week: None     Minutes per session: None    Stress: None   Relationships    Social connections:     Talks on phone: None     Gets together: None     Attends Sikh service: None     Active member of club or organization: None     Attends meetings of clubs or organizations: None     Relationship status: None    Intimate partner violence:     Fear of current or ex partner: None     Emotionally abused: None     Physically abused: None     Forced sexual activity: None   Other Topics Concern    None   Social History Narrative     per allscript     Lives alone without help available       Current Outpatient Medications:     albuterol (PROVENTIL HFA,VENTOLIN HFA) 90 mcg/act inhaler, INHALE TWO PUFFS BY MOUTH EVERY FOUR HOURS AS NEEDED FOR WHEEZING, Disp: 18 Inhaler, Rfl: 5    atorvastatin (LIPITOR) 80 mg tablet, TAKE 1 TABLET BY MOUTH ONCE A DAY, Disp: 90 tablet, Rfl: 3    buPROPion (WELLBUTRIN) 100 mg tablet, Take 1 tablet (100 mg total) by mouth 2 (two) times a day, Disp: 60 tablet, Rfl: 5    DULoxetine (CYMBALTA) 30 mg delayed release capsule, Take 1 capsule (30 mg total) by mouth daily, Disp: 90 capsule, Rfl: 3    DULoxetine (CYMBALTA) 60 mg delayed release capsule, Take 1 capsule (60 mg total) by mouth daily, Disp: 90 capsule, Rfl: 3    fluticasone (FLONASE) 50 mcg/act nasal spray, 2 sprays into each nostril daily (Patient taking differently: 2 sprays into each nostril as needed ), Disp: 16 g, Rfl: 3    fluticasone-salmeterol (AIRDUO RESPICLICK) 728-06 mcg/act dry powder inhaler, Inhale 1 puff 2 (two) times a day Rinse mouth after use , Disp: 1 Inhaler, Rfl: 3    glucose blood (ONE TOUCH ULTRA TEST) test strip, TEST ONCE A DAY-dx code: E11 9, Disp: 100 each, Rfl: 3    Insulin Pen Needle 31G X 5 MM MISC, Inject as directed daily For use w victoza pen, Disp: 30 each, Rfl: 3    lisinopril-hydrochlorothiazide (PRINZIDE,ZESTORETIC) 20-25 MG per tablet, Take 1 tablet by mouth daily, Disp: 90 tablet, Rfl: 3    QUEtiapine (SEROquel) 25 mg tablet, TAKE 1 TABLET BY MOUTH TWICE A DAY, Disp: 60 tablet, Rfl: 5    SPIRIVA HANDIHALER 18 MCG inhalation capsule, TAKE 1 INHALATION ONCE A DAY, Disp: 1 each, Rfl: 5    Tapentadol HCl ER (NUCYNTA ER) 50 MG TB12, Take 1 tablet (50 mg total) by mouth every 12 (twelve) hoursMax Daily Amount: 100 mg, Disp: 60 tablet, Rfl: 0    VICTOZA injection, INJECT 0 3 ML (1 8 MG TOTAL) UNDER THE SKIN DAILY, Disp: 3 pen, Rfl: 3    metFORMIN (GLUCOPHAGE) 1000 MG tablet, Take 2 tablets (2,000 mg total) by mouth daily for 90 days, Disp: 180 tablet, Rfl: 1    NICOTROL 10 MG inhaler, INHALE 1 PUFF AS NEEDED FOR SMOKING CESSATION FOR UP TO 30 DAYS, Disp: 168 each, Rfl: 1    pregabalin (LYRICA) 100 mg capsule, Take 1 capsule (100 mg total) by mouth 3 (three) times a day (Patient not taking: Reported on 7/25/2019), Disp: 90 capsule, Rfl: 0    VICTOZA injection, INJECT 0 3 ML (1 8 MG TOTAL) UNDER THE SKIN DAILY (Patient not taking: Reported on 7/25/2019), Disp: 6 pen, Rfl: 3  Food Intake and Lifestyle Assessment   Food Intake Assessment completed via food log brought by patient  Breakfast: bowl of frosted flakes or cinnamon toast crunch or oat bran with 6oz OJ and coffee with 1tbsp Half and Half and 1 tsp sugar  Snack: -   Lunch: skips or will have 2 light and fit yogurts or egg salad sandwich on whole wheat bread or tomato and tuna salad or salad with 2 boiled eggs and dressing  Snack: -  Dinner: corn on the cob with butter and a cheeseburger or eggplant parm and salad or 2 hot dogs without the bun and green beans or corn and chicken and peas  Snack: yogurt or gold fish  Beverage intake: water, sugar free beverages and coffee/tea  Protein supplement: not at this time, provided with samples  Estimated protein intake per day: 60gm  Estimated fluid intake per day: 16oz water, 48-64oz of unsweet tea, 6-8oz of OJ  Meals eaten away from home: not often, maybe once a month  Typical meal pattern: 2-3 meals per day and 1-2 snacks per day  Eating Behaviors: Consumption of high calorie/ high fat foods, Large portion sizes and Mindless eating  Food allergies or intolerances: No Known Allergies  Cultural or Hoahaoism considerations: none    Physical Assessment  Physical Activity  Types of exercise: None, very active in her daily activities, but no specific exercise routine  Current physical limitations: had back surgery    Psychosocial Assessment   Support systems: parent(s) friend(s) relative(s)  Socioeconomic factors: none    Nutrition Diagnosis  Diagnosis: Overweight / Obesity (NC-3 3)  Related to: Physical inactivity and Excessive energy intake  As Evidenced by: BMI >25     Nutrition Prescription: Recommend the following diet  Regular    Interventions and Teaching   Discussed pre-op and post-op nutrition guidelines  Patient educated and handouts provided    Surgical changes to stomach / GI  Capacity of post-surgery stomach  Diet progression  Adequate hydration  Sugar and fat restriction to decrease "dumping syndrome"  Fat restriction to decrease steatorrhea  Expected weight loss  Weight loss plateaus/ possibility of weight regain  Exercise  Suggestions for pre-op diet  Nutrition considerations after surgery  Protein supplements  Meal planning and preparation  Appropriate carbohydrate, protein, and fat intake, and food/fluid choices to maximize safe weight loss, nutrient intake, and tolerance   Dietary and lifestyle changes  Possible problems with poor eating habits  Intuitive eating  Techniques for self monitoring and keeping daily food journal  Potential for food intolerance after surgery, and ways to deal with them including: lactose intolerance, nausea, reflux, vomiting, diarrhea, food intolerance, appetite changes, gas  Vitamin / Mineral supplementation of Multivitamin with minerals and Vitamin D    Education provided to: patient    Barriers to learning: No barriers identified  Readiness to change: preparation    Prior research on procedure: books, internet, discussed with provider and friends or family    Comprehension: verbalizes understanding     Expected Compliance: good  Recommendations  Pt is an appropriate candidate for surgery  Yes  Evaluation / Monitoring  Dietitian to Monitor: Eating pattern as discussed Body weight Physical activity  Pt with DM  Last A1c in May was 8 5  Advised pt that the goal is to keep the A1c under 9 for surgery  Reviewed diet recall and it appears pt has added sugar in her diet in the form of OJ, sugary cereals, sugar in her coffee, etc   Discussed with pt to change to more protein based foods in the morning and to try splenda in place of the sugar to help decrease her calorie, carb/sugar intake and better control her blood sugar serena  Did some menu planning and stressed the importance of adding physical activity      Pre-op weight loss goals:  5% by day of surgery:  -12lbs (225lbs)  1/2 in order to submit:  -6lbs (231lbs)  Goals  Food journal via Yozio mónica  Exercise 30 minutes 5 times per week-stationary bike  Complete lession plans 1-6  Eat 3 meals per day with protein at each meal  Eliminate mindless snacking  Decrease carbs and sugary cereals    In place try cottage cheese for breakfast    Time Spent:   1 Hour

## 2019-08-03 NOTE — PROGRESS NOTES
Consultation - Cardiology Office  [facility]  Rupa Hardy 60 y o  female MRN: [id number]  : 1960  Unit/Bed#:  Encounter: [id number]      Assessment:     1  Shortness of breath    2  Chronic obstructive pulmonary disease, unspecified COPD type ([facility] )    3  Essential hypertension    4  Obstructive sleep apnea    5  Mixed hyperlipidemia    6  Chronic pain syndrome    7  Cigarette nicotine dependence without complication    8  BMI 40 0-44 9, adult ([facility] )    9  Morbid obesity ([facility] )        Discussion summary and Plan:  1  Shortness of breath  Patient has shortness of breath which may be multifactorial   She still smokes pack a day and has been smoking for about 35 years  Pulmonary note noted  She has moderate to severe COPD with restrictive and obstructive defect on pulmonary function test   She has responded well to inhaler treatment  Nuclear stress test reviewed  Echo shows normal LV systolic function nuclear has minimal ischemia in apical area which could be attenuation artifact  Patient continues to have shortness of breath  She had a strong family history of coronary artery disease  Discussed with patient at length about various options available to us  As she has multiple risk factors as well as continues tobacco abuse and she may need bariatric surgery she will be scheduled for cardiac catheterization  All risks benefit alternate discussed with patient she wishes to proceed  2  Essential hypertension  She taking lisinopril HCT 20/20 5 mg daily   Patient blood pressure is acceptable  3  Dyslipidemia  Continue high intensity statins  She has diabetes mellitus and she has multiple other risk factors  Continue Lipitor 80 mg p o  Q h s      4  Type 2 diabetes mellitus  Not pretty well controlled as per patient  She is diabetic for more than 10 years    Issues related to uncontrolled diabetes mellitus including high risk for heart attack and stroke discussed with her  As she has mildly abnormal stress test she has chronic shortness of breath foam multiple reasons she was scheduled for cardiac catheterization  5  Post laminectomy syndrome in the lumbar region as well as chronic pain  Advised to lose weight it will help her with her back pain    6  Obesity with BMI around 42 and thick neck and with snoring  She was diagnosed to have sleep apnea  She already has been given CPAP machine however she is not using it she has as she did not have the mask  Advised to lose weight  She is considering bariatric surgery  7  COPD on inhalers management as per Pulmonary  Pulmonary function test report noted   She has significant impairment in her lung function  8  Continues tobacco abuse advised to quit smoking  Patient still continues to smoke  She has been smoking for long period of time  Her PFT, sleep apnea, risk for coronary artery disease pathophysiology of coronary artery disease increase risk for heart attack discussed with her  Scheduled her for cardiac catheterization  Routine labs  A BMP CBC PT INR ordered           Thank you for your consultation  If he have any question please call me at [phone]  Counseling :  A description of the counseling  Goals and Barriers  Patient's ability to self care: Yes  Medication side effect reviewed with patient in detail and all their questions answered to their satisfaction  Primary Care Physician :Xiomara Ponce MD    HPI :     Susanna Kramer is a 60y o  year old female who was referred by primary care doctor for shortness of breath  She has past medical history diabetes mellitus for the last 10 years, COPD, tobacco abuse, dyslipidemia, obesity with BMI around 43, family history of coronary artery disease who was having episodes of shortness of breath    Her mother had episodes of shortness of breath when she was around 80 and during cardiac catheterization found to have three-vessel disease and needed stents  Patient is very concerned about that  She has chronic back pain and because of that she is not much active and not able to lose weight  As mentioned earlier she is diabetic for about 10 years now she started recently on Victoza  Her blood sugar are running little high  She has been taking her cholesterol medication for a while  Lately she was feeling little bit dizzy her primary care doctor decrease her lisinopril hydrochlorothiazide as she is feeling little bit better  Blood pressure is 106/70 today  Heart rate is elevated  She denies any chest pain  She short of breath when she walks and does some stuff  But she also has short of breath due to COPD  No leg pain    She smokes about pack a day has been smoking for 35 years  She is single now  She lives with her mom  She has son was 80-year-old  No recent surgery no other issues  She takes a sleeping pill  She snores a lot at night  She has no recent sleep apnea study  08/08/2019  Above reviewed  Patient came for follow-up  She still have shortness of breath  She also had known moderate to severe emphysema  Her FEV1 is 58 percent of predicted  Nuclear stress was equivocal with mild reversible ischemia overall LV function was normal   She denies any chest pain but still have shortness of breath which is mostly exertional not changed  She has significantly decreased her smoking but has not fully quit  She also had restrictive as well as obstructive disease as noted by pulmonary function  No nausea no vomiting  She is now motivated to lose weight and may need sleep surgery  Her nuclear stress test finding discussed with her  Recently she is diagnosed to have sleep apnea and equipment has been ordered though she has not started to use it  Review of Systems   Constitutional: Negative for activity change, chills, diaphoresis, fever and unexpected weight change  HENT: Negative for congestion      Eyes: Negative for discharge and redness  Respiratory: Positive for shortness of breath  Negative for cough, chest tightness and wheezing  Cardiovascular: Negative  Negative for chest pain, palpitations and leg swelling  Gastrointestinal: Negative for abdominal pain, diarrhea and nausea  Endocrine: Negative  Genitourinary: Negative for decreased urine volume and urgency  Musculoskeletal: Positive for back pain  Negative for arthralgias and gait problem  Skin: Negative for rash and wound  Allergic/Immunologic: Negative  Neurological: Negative for dizziness, seizures, syncope, weakness, light-headedness and headaches  Hematological: Negative  Psychiatric/Behavioral: Positive for sleep disturbance  Negative for agitation and confusion  The patient is nervous/anxious          Historical Information   Past Medical History:   Diagnosis Date    Anxiety     Arthritis     Asthma     Back pain     Breast lump     last assessed 10/14/14     Carpal tunnel syndrome 03/17/2006    unspecifiedd laterality / last assessed 10/14/14     COPD (chronic obstructive pulmonary disease) ([facility] )     with exacerbation / last assessed 6/25/14     Depression     Diabetes mellitus ([facility] )     GERD (gastroesophageal reflux disease)     Hypercholesterolemia     Hyperlipidemia     Hypertension     Insomnia     Intolerance to cold     Intolerance to heat     Irregular heart beat     Low back pain     Lumbosacral disc disease     Nodule of tendon sheath     last assessed 2/5/15     Obesity     Peripheral neuropathy     Shortness of breath     Sleep apnea     Type 2 diabetes mellitus with hyperglycemia ([facility] )     last assessed 6/8/17     Varicose vein of leg      Past Surgical History:   Procedure Laterality Date    BACK SURGERY  2005    lower fusion    CAUDAL BLOCK N/A 11/2/2017    Procedure: BLOCK / INJECTION CAUDAL;  Surgeon: Tamara Botello MD;  Location: [place] [address] MAIN OR;  Service: Pain Management     CAUDAL BLOCK N/A 2018    Procedure: Caudal Epidural Steroid Injection (01576); Surgeon: Daniele Lopez MD;  Location: [facility] MAIN OR;  Service: Pain Management      SECTION      LAMINECTOMY      Lumbar 2005     Social History     Substance and Sexual Activity   Alcohol Use No     Social History     Substance and Sexual Activity   Drug Use No     Social History     Tobacco Use   Smoking Status Current Every Day Smoker    Packs/day: 1 00    Years: 30 00    Pack years: 30 00    Types: Cigarettes   Smokeless Tobacco Never Used   Tobacco Comment    10-12 cigs   a day     Family History:   Family History   Problem Relation Age of Onset    Diabetes Mother     Heart disease Mother     Dementia Father     Heart disease Father     Heart disease Sister     Diabetes Sister     Diabetes Brother        Meds/Allergies     No Known Allergies    Current Outpatient Medications:     albuterol (PROVENTIL HFA,VENTOLIN HFA) 90 mcg/act inhaler, INHALE TWO PUFFS BY MOUTH EVERY FOUR HOURS AS NEEDED FOR WHEEZING, Disp: 18 Inhaler, Rfl: 5    atorvastatin (LIPITOR) 80 mg tablet, TAKE 1 TABLET BY MOUTH ONCE A DAY, Disp: 90 tablet, Rfl: 3    buPROPion (WELLBUTRIN) 100 mg tablet, Take 1 tablet (100 mg total) by mouth 2 (two) times a day, Disp: 60 tablet, Rfl: 5    DULoxetine (CYMBALTA) 30 mg delayed release capsule, Take 1 capsule (30 mg total) by mouth daily, Disp: 90 capsule, Rfl: 3    DULoxetine (CYMBALTA) 60 mg delayed release capsule, Take 1 capsule (60 mg total) by mouth daily, Disp: 90 capsule, Rfl: 3    fluticasone (FLONASE) 50 mcg/act nasal spray, 2 sprays into each nostril daily (Patient taking differently: 2 sprays into each nostril as needed ), Disp: 16 g, Rfl: 3    fluticasone-salmeterol (AIRDUO RESPICLICK) 106-84 mcg/act dry powder inhaler, Inhale 1 puff 2 (two) times a day Rinse mouth after use , Disp: 1 Inhaler, Rfl: 3    glucose blood (ONE TOUCH ULTRA TEST) test strip, TEST ONCE A DAY-dx code: E11 9, Disp: 100 each, Rfl: 3    Insulin Pen Needle 31G X 5 MM MISC, Inject as directed daily For use w victoza pen, Disp: 30 each, Rfl: 3    lisinopril-hydrochlorothiazide (PRINZIDE,ZESTORETIC) 20-25 MG per tablet, Take 1 tablet by mouth daily, Disp: 90 tablet, Rfl: 3    metFORMIN (GLUCOPHAGE) 1000 MG tablet, Take 2 tablets (2,000 mg total) by mouth daily for 90 days, Disp: 180 tablet, Rfl: 1    pregabalin (LYRICA) 100 mg capsule, Take 1 capsule (100 mg total) by mouth 3 (three) times a day, Disp: 90 capsule, Rfl: 0    QUEtiapine (SEROquel) 25 mg tablet, TAKE 1 TABLET BY MOUTH TWICE A DAY, Disp: 60 tablet, Rfl: 5    SPIRIVA HANDIHALER 18 MCG inhalation capsule, TAKE 1 INHALATION ONCE A DAY, Disp: 1 each, Rfl: 5    Tapentadol HCl ER (NUCYNTA ER) 50 MG TB12, Take 1 tablet (50 mg total) by mouth every 12 (twelve) hoursMax Daily Amount: 100 mg, Disp: 60 tablet, Rfl: 0    VICTOZA injection, INJECT 0 3 ML (1 8 MG TOTAL) UNDER THE SKIN DAILY, Disp: 3 pen, Rfl: 3    VICTOZA injection, INJECT 0 3 ML (1 8 MG TOTAL) UNDER THE SKIN DAILY, Disp: 6 pen, Rfl: 3    NICOTROL 10 MG inhaler, INHALE 1 PUFF AS NEEDED FOR SMOKING CESSATION FOR UP TO 30 DAYS (Patient not taking: Reported on 8/8/2019), Disp: 168 each, Rfl: 1    Vitals: Blood pressure 104/70, pulse 88, height 5' 3" (1 6 m), weight 108 kg (239 lb), SpO2 96 %  Body mass index is 42 34 kg/m²  Vitals:    08/08/19 1446   Weight: 108 kg (239 lb)     BP Readings from Last 3 Encounters:   08/08/19 104/70   08/07/19 104/68   08/06/19 118/80         Physical Exam   Constitutional: She is oriented to person, place, and time  She appears well-developed and well-nourished  No distress  HENT:   Head: Normocephalic and atraumatic  Eyes: Pupils are equal, round, and reactive to light  Neck: Normal range of motion  Neck supple  No JVD present  No tracheal deviation present  No thyromegaly present     Cardiovascular: Normal rate, regular rhythm, S1 normal and S2 normal  Exam reveals no gallop, no S3, no S4, no distant heart sounds and no friction rub  Murmur heard  Systolic (ejection) murmur is present with a grade of 2/6  Pulmonary/Chest: No respiratory distress  She has no wheezes  She has no rales  She exhibits no tenderness  Bilateral decreased air entry with pursed lip breathing and coarse breath sounds  Rare rhonchi and wheezing   Abdominal: Soft  Bowel sounds are normal  She exhibits no distension  There is no tenderness  There is no rebound  Musculoskeletal: Normal range of motion  She exhibits no edema, tenderness or deformity  Neurological: She is alert and oriented to person, place, and time  Skin: Skin is warm and dry  No rash noted  She is not diaphoretic  No pallor  Psychiatric: She has a normal mood and affect  Her behavior is normal  Judgment normal        Diagnostic Studies Review Cardio:    Echo Doppler  Echo Doppler in April of 2019 shows EF 60 percent, no significant valvular disease  Tricuspid jet was not sufficient to main pulmonary artery pressure  Mild LVH noted  Grade 1 diastolic dysfunction  Nuclear stress test shows mild apical ischemia  No large reversible defect was noted  Her EF was preserved  Stress test done April of 2019  EKG:  Twelve lead EKG done in our office shows normal sinus rhythm  There is a mild persistent elevation noted in inferior leads most likely due to early repolarization changes  Heart rate 96 beats per minute  No other significant ST changes  Imaging:  Chest X-Ray:   Xr Chest Pa & Lateral    Result Date: 7/6/2018  Impression No acute cardiopulmonary disease  Workstation performed: [id number]     Xr Chest Pa & Lateral    =ent    Lab Review   Lab Results   Component Value Date    WBC 9 4 05/09/2019    HGB 14 7 05/09/2019    HCT 42 9 05/09/2019    MCV 91 05/09/2019    RDW 13 1 05/09/2019     05/09/2019     BMP:  Lab Results   Component Value Date    SODIUM 138 05/09/2019    K 4 8 05/09/2019    CL 98 05/09/2019    CO2 24 05/09/2019    BUN 16 05/09/2019    CREATININE 1 03 (H) 05/09/2019    GLUC 145 (H) 05/09/2019    CALCIUM 9 1 10/09/2017     LFT:  Lab Results   Component Value Date    AST 21 05/09/2019    ALT 24 05/09/2019    ALKPHOS 69 10/09/2017    TP 6 7 05/09/2019    ALB 4 3 05/09/2019      Lab Results   Component Value Date    MUQ4YREUREEP 2 350 10/09/2017     Lab Results   Component Value Date    HGBA1C 8 5 (H) 05/09/2019     Lipid Profile:   Lab Results   Component Value Date    CHOLESTEROL 114 05/09/2019    HDL 36 (L) 05/09/2019    LDLCALC 43 10/09/2017    TRIG 143 05/09/2019     Lab Results   Component Value Date    CHOLESTEROL 114 05/09/2019    CHOLESTEROL 133 09/28/2018     Lab Results   Component Value Date    CKTOTAL 131 10/09/2017       Dr Aleksandar Kelly MD [facility]      "This note has been constructed using a voice recognition system  Therefore there may be syntax, spelling, and/or grammatical errors   Please call if you have any questions  "

## 2019-08-06 ENCOUNTER — OFFICE VISIT (OUTPATIENT)
Dept: BARIATRICS | Facility: CLINIC | Age: 59
End: 2019-08-06
Payer: COMMERCIAL

## 2019-08-06 VITALS
TEMPERATURE: 98 F | HEIGHT: 63 IN | WEIGHT: 236.8 LBS | BODY MASS INDEX: 41.96 KG/M2 | DIASTOLIC BLOOD PRESSURE: 80 MMHG | SYSTOLIC BLOOD PRESSURE: 118 MMHG | RESPIRATION RATE: 16 BRPM | HEART RATE: 91 BPM

## 2019-08-06 DIAGNOSIS — Z01.818 PREOPERATIVE CLEARANCE: ICD-10-CM

## 2019-08-06 DIAGNOSIS — E11.65 TYPE 2 DIABETES MELLITUS WITH HYPERGLYCEMIA, WITHOUT LONG-TERM CURRENT USE OF INSULIN (HCC): ICD-10-CM

## 2019-08-06 DIAGNOSIS — F41.8 DEPRESSION WITH ANXIETY: ICD-10-CM

## 2019-08-06 DIAGNOSIS — I10 ESSENTIAL HYPERTENSION: ICD-10-CM

## 2019-08-06 DIAGNOSIS — G89.4 CHRONIC PAIN SYNDROME: ICD-10-CM

## 2019-08-06 DIAGNOSIS — G47.33 OBSTRUCTIVE SLEEP APNEA: ICD-10-CM

## 2019-08-06 DIAGNOSIS — F17.210 CIGARETTE NICOTINE DEPENDENCE WITHOUT COMPLICATION: ICD-10-CM

## 2019-08-06 DIAGNOSIS — E78.2 MIXED HYPERLIPIDEMIA: ICD-10-CM

## 2019-08-06 DIAGNOSIS — E66.01 MORBID OBESITY (HCC): Primary | ICD-10-CM

## 2019-08-06 PROCEDURE — 3074F SYST BP LT 130 MM HG: CPT | Performed by: PHYSICIAN ASSISTANT

## 2019-08-06 PROCEDURE — 99204 OFFICE O/P NEW MOD 45 MIN: CPT | Performed by: PHYSICIAN ASSISTANT

## 2019-08-06 NOTE — PATIENT INSTRUCTIONS
Goals: Food log (ie ) www myfitnesspal com,sparkpeople  com,loseit com,calorieking  com,etc    No sugary beverages  At least 64oz of water daily  Increase physical activity by 10 minutes daily   Gradually increase physical activity to a goal of 5 days per week for 30 minutes of MODERATE intensity PLUS 2 days per week of FULL BODY resistance training  Practice 30/60 rule  Practice lesson plans 1-6 in bariatric manual   Discuss extended release medication with Pain medicine - absorption may be affected post surgery  Please continue work on quitting smoking

## 2019-08-06 NOTE — ASSESSMENT & PLAN NOTE
-followed by pain management  -On Nucynta ER and cymbalta  Asked patient to discuss with prescribing provider as ER medications may not be fully absorbed post surgery   ER meds in capsule may be absorbed better as most capsules can be opened up

## 2019-08-06 NOTE — PROGRESS NOTES
Assessment/Plan: Morbid obesity ([facility] )  -Interested in bariatric surgery with Dr Val Norwood  10% Weight loss-Not applicable   Screening labs-reviewed Lipid panel  Needs HgbA1c, CMP, CBC and TSH prior to surgery  Recommend after 3rd weight check  Reports PCP will order HgbA1c  Support Group-Not Completed  Cardiac Risk Assessment-Not Completed  Upper Endoscopy-Not Completed; H  Pylori biopsy-not completed  Sleep Medicine Assessment- Has LESLIE, picking up CPAP mask tomorrow  Alcohol and nicotine use is not recommended following bariatric surgery - needs cotinine testing after 6th weight check  NSAIDs should be avoided following bariatric surgery  Advised that pregnancy should be avoided following bariatric surgery for 12-18 months and should not solely rely on OCP and consider additional/alternative sources for contraception due to potential absorption issues- post menopausal    Type 2 diabetes mellitus with hyperglycemia, without long-term current use of insulin ([facility])  Lab Results   Component Value Date    HGBA1C 8 5 (H) 05/09/2019     -on Metformin and Victoza  -goal to keep HgbA1c under 9 for surgery  -due for repeat HgbA1c by PCP  -likely to improve/resolve post bariatric surgery      Hypertension  -controlled on antihypertensives  -likely to improve with weight loss    Obstructive sleep apnea  -newly diagnosed  -has f/u with pulm tomorrow    Nicotine dependence  -discussed risks of nicotine use post bariatric surgery  -reports she is actively trying to quit    Chronic pain syndrome  -followed by pain management  -On Nucynta ER and cymbalta  Asked patient to discuss with prescribing provider as ER medications may not be fully absorbed post surgery   ER meds in capsule may be absorbed better as most capsules can be opened up     Depression with anxiety  -On Wellbutrin and Seroquel    Hyperlipidemia  -on statin therapy  -dietary/lifestyle changes    Goals:    Food log (ie ) www myfitnesspal com,sparkpeople  com,loseit com,eShakti.com  com,etc    No sugary beverages  At least 64oz of water daily  Increase physical activity by 10 minutes daily  Gradually increase physical activity to a goal of 5 days per week for 30 minutes of MODERATE intensity PLUS 2 days per week of FULL BODY resistance training  Practice 30/60 rule  Practice lesson plans 1-6 in bariatric manual   Discuss extended release medication with Pain medicine - absorption may be affected post surgery  Please continue to work on quitting smoking    Follow up in approximately 1 month with Surgical Dietician  Diagnoses and all orders for this visit:    Morbid obesity ([facility] [facility] )  -     Comprehensive metabolic panel; Future  -     CBC and Platelet; Future  -     TSH, 3rd generation with Free T4 reflex; Future  -     Comprehensive metabolic panel  -     CBC and Platelet  -     TSH, 3rd generation with Free T4 reflex    Type 2 diabetes mellitus with hyperglycemia, without long-term current use of insulin (HCC)    Essential hypertension    Preoperative clearance  -     Comprehensive metabolic panel; Future  -     CBC and Platelet; Future  -     TSH, 3rd generation with Free T4 reflex; Future  -     Comprehensive metabolic panel  -     CBC and Platelet  -     TSH, 3rd generation with Free T4 reflex    Obstructive sleep apnea    Cigarette nicotine dependence without complication    Chronic pain syndrome    Depression with anxiety    Mixed hyperlipidemia          Subjective:   Chief Complaint   Patient presents with    Weight Check     Pt is here for 1/6 wt check  Patient ID: Kamran Rangel  is a 60 y o  female with excess weight/obesity here to pursue weight managment      Past Medical History:   Diagnosis Date    Anxiety     Arthritis     Asthma     Back pain     Breast lump     last assessed 10/14/14     Carpal tunnel syndrome 03/17/2006    unspecifiedd laterality / last assessed 10/14/14     COPD (chronic obstructive pulmonary disease) ([facility] )     with exacerbation / last assessed 6/25/14     Depression     Diabetes mellitus ([place] [address] )     GERD (gastroesophageal reflux disease)     Hypercholesterolemia     Hyperlipidemia     Hypertension     Insomnia     Intolerance to cold     Intolerance to heat     Irregular heart beat     Low back pain     Lumbosacral disc disease     Nodule of tendon sheath     last assessed 2/5/15     Obesity     Peripheral neuropathy     Shortness of breath     Sleep apnea     Type 2 diabetes mellitus with hyperglycemia ([place] [address] )     last assessed 6/8/17     Varicose vein of leg          HPI: Patient presents for 1/6 weight check  Obesity/Excess Weight:  Severity: Severe  Onset:  Since teenager    Modifiers: cutting out sweets  cutting down portions  Contributing factors: large portion sizes  Associated symptoms: comorbid conditions and increased shortness of breath    The patient has been:  Following the lessons in the manual- yes  Practicing the 30/60 minute rule- yes  Food logging- no  Exercise- walking sporadically, limited by back and hip pain    The following portions of the patient's history were reviewed and updated as appropriate: allergies, current medications, past family history, past medical history, past social history, past surgical history and problem list     Review of Systems   Constitutional: Negative for chills and fever  HENT: Negative for sore throat  Respiratory: Positive for cough and shortness of breath  Cardiovascular: Negative for chest pain and palpitations  Gastrointestinal: Negative for abdominal pain, nausea and vomiting  Musculoskeletal: Positive for arthralgias and back pain  Skin: Negative for rash  Neurological: Negative for dizziness and headaches     Psychiatric/Behavioral:        Reports mood stable       Objective:    /80 (BP Location: Left arm, Patient Position: Sitting, Cuff Size: Large)   Pulse 91   Temp 98 °F (36 7 °C) (Tympanic) Resp 16   Ht 5' 3" (1 6 m)   Wt 107 kg (236 lb 12 8 oz)   BMI 41 95 kg/m²     Physical Exam   Nursing note and vitals reviewed  Constitutional   General appearance: Abnormal   well developed and morbidly obese  Eyes No conjunctival pallor  Ears, Nose, Mouth, and Throat Oral mucosa moist    Pulmonary   Respiratory effort: No increased work of breathing or signs of respiratory distress  Auscultation of lungs: Clear to auscultation, equal breath sounds bilaterally, no wheezes, no rales, no rhonci  Cardiovascular   Auscultation of heart: Normal rate and rhythm, normal S1 and S2, without murmurs  Examination of extremities for edema and/or varicosities: Normal   no edema  Abdomen   Abdomen: Abnormal   The abdomen was obese  Bowel sounds were normal  The abdomen was soft and nontender     Musculoskeletal   Gait and station: Normal     Psychiatric   Orientation to person, place and time: Normal     Affect: appropriate

## 2019-08-06 NOTE — ASSESSMENT & PLAN NOTE
Lab Results   Component Value Date    HGBA1C 8 5 (H) 05/09/2019     -on Metformin and Victoza  -goal to keep HgbA1c under 9 for surgery  -due for repeat HgbA1c by PCP  -likely to improve/resolve post bariatric surgery

## 2019-08-06 NOTE — ASSESSMENT & PLAN NOTE
-Interested in bariatric surgery with Dr Cara Ahumada  10% Weight loss-Not applicable   Screening labs-reviewed Lipid panel  Needs HgbA1c, CMP, CBC and TSH prior to surgery  Recommend after 3rd weight check   Reports PCP will order HgbA1c  Support Group-Not Completed  Cardiac Risk Assessment-Not Completed  Upper Endoscopy-Not Completed; H  Pylori biopsy-not completed  Sleep Medicine Assessment- Has LESLIE, picking up CPAP mask tomorrow  Alcohol and nicotine use is not recommended following bariatric surgery - needs cotinine testing after 6th weight check  NSAIDs should be avoided following bariatric surgery  Advised that pregnancy should be avoided following bariatric surgery for 12-18 months and should not solely rely on OCP and consider additional/alternative sources for contraception due to potential absorption issues- post menopausal

## 2019-08-07 ENCOUNTER — OFFICE VISIT (OUTPATIENT)
Dept: PULMONOLOGY | Facility: MEDICAL CENTER | Age: 59
End: 2019-08-07
Payer: COMMERCIAL

## 2019-08-07 VITALS
HEIGHT: 63 IN | SYSTOLIC BLOOD PRESSURE: 104 MMHG | HEART RATE: 97 BPM | BODY MASS INDEX: 41.82 KG/M2 | DIASTOLIC BLOOD PRESSURE: 68 MMHG | OXYGEN SATURATION: 96 % | WEIGHT: 236 LBS | RESPIRATION RATE: 12 BRPM | TEMPERATURE: 96.5 F

## 2019-08-07 DIAGNOSIS — F17.210 CIGARETTE NICOTINE DEPENDENCE WITHOUT COMPLICATION: ICD-10-CM

## 2019-08-07 DIAGNOSIS — J43.2 CENTRILOBULAR EMPHYSEMA (HCC): ICD-10-CM

## 2019-08-07 DIAGNOSIS — R06.89 ALVEOLAR HYPOVENTILATION: ICD-10-CM

## 2019-08-07 DIAGNOSIS — G47.33 OBSTRUCTIVE SLEEP APNEA: Primary | ICD-10-CM

## 2019-08-07 PROCEDURE — 99213 OFFICE O/P EST LOW 20 MIN: CPT | Performed by: NURSE PRACTITIONER

## 2019-08-07 NOTE — ASSESSMENT & PLAN NOTE
Kendra Encinas is aware that cigarette smoking reduction would be overall beneficial to her health  She is now smoking 3/4 of a pack per day  She does have nickel trial inhaler and did use    However she stopped using soon thereafter and would like to retry again

## 2019-08-07 NOTE — ASSESSMENT & PLAN NOTE
Joseline Garcia has moderately severe centrilobular emphysema  Pulmonary function tests were done at her initial visit May 13, 2019  Forced vital capacity is 1 85 L or 58% of predicted, FEV1 was 1 36 L or 55% obstruction ratio 74  She is now currently using her air Duo 113/14 mcg 1 puff twice per day she also is using Spiriva once daily  Overall she is improved as she had not been using her maintenance inhaler on a daily basis

## 2019-08-07 NOTE — PROGRESS NOTES
Assessment/Plan:     Problem List Items Addressed This Visit        Respiratory    Centrilobular emphysema ([address] )     Samir Go has moderately severe centrilobular emphysema  Pulmonary function tests were done at her initial visit May 13, 2019  Forced vital capacity is 1 85 L or 58% of predicted, FEV1 was 1 36 L or 55% obstruction ratio 74  She is now currently using her air Duo 113/14 mcg 1 puff twice per day she also is using Spiriva once daily  Overall she is improved as she had not been using her maintenance inhaler on a daily basis  Obstructive sleep apnea - Primary     Samir Go has a history of moderate obstructive sleep apnea  She had a home sleep study in June which revealed overall apneic hypopnea index is 16 4  This was increased to 21 5 in the supine position she also had significant hypoxemia  It was recommended that she undergo a in-lab treatment study  On July 10, 2019 she [facility] to the [facility] [address]  She had good response to it CPAP at 10 cm of water pressure  At this pressure almost off her obstructive events were eliminated  A AHI was reduced to 0 9 which also help correct her hypoxemia  She also achieved REM sleep  All stages of sleep were seen  She is agreeable to have CPAP of 10 cm of water pressure  Other    Nicotine dependence     Samir Go is aware that cigarette smoking reduction would be overall beneficial to her health  She is now smoking 3/4 of a pack per day  She does have nickel trial inhaler and did use  However she stopped using soon thereafter and would like to retry again         Alveolar hypoventilation     L likely has alveolar hypoventilation from obesity syndrome  BMI is 41  She is aware that weight loss would be beneficial   Apparently she is considering bariatric surgery in the future  This likely can be contributing to her nocturnal hypoxemia    It was noted in her CPAP titration study that uses CPAP at 10 did correct hypo ventilation obesity hypoxemia  No follow-ups on file  All questions are answered to the patient's satisfaction and understanding  She verbalizes understanding  She is encouraged to call with any further questions or concerns  Portions of the record may have been created with voice recognition software  Occasional wrong word or "sound a like" substitutions may have occurred due to the inherent limitations of voice recognition software  Read the chart carefully and recognize, using context, where substitutions have occurred  HPI:  Lavonne Lau is 60-year-old female who underwent a home sleep study  This was done June 12th  Overall apneic hypopnea index was 16 4 and increased to 21 5 in the supine position  Oxygen justen was 60 and average was 89%  Proxy may was likely related to alveolar hypoventilation  She has a history now of mild-to-moderate obstructive sleep apnea  She went on to have a treatment study  This would was recommended as she had low oxygen in her diagnostic study  She is here today to discuss results  Electronically Signed by LURDES Miller    ______________________________________________________________________    Chief Complaint:   Chief Complaint   Patient presents with    Sleep Apnea     pt is here today to discuss results     Shortness of Breath     pt states a little heavy   no cough    Wheezing     occasional       Patient ID: Wagner Covington is a 60 y o  y o  female has a past medical history of Anxiety, Arthritis, Asthma, Back pain, Breast lump, Carpal tunnel syndrome (03/17/2006), COPD (chronic obstructive pulmonary disease) ([facility] [address] ), Depression, Diabetes mellitus ([facility] [address] ), GERD (gastroesophageal reflux disease), Hypercholesterolemia, Hyperlipidemia, Hypertension, Insomnia, Intolerance to cold, Intolerance to heat, Irregular heart beat, Low back pain, Lumbosacral disc disease, Nodule of tendon sheath, Obesity, Peripheral neuropathy, Shortness of breath, Sleep apnea, Type 2 diabetes mellitus with hyperglycemia ([facility] [address] ), and Varicose vein of leg     8/7/2019  Patient presents today for follow-up visit  Shortness of Breath   This is a chronic problem  The current episode started more than 1 year ago  The problem occurs daily  The problem has been waxing and waning  Associated symptoms include wheezing  The symptoms are aggravated by exercise  Risk factors include smoking  She has tried beta agonist inhalers, rest and steroid inhalers for the symptoms  The treatment provided mild relief  Her past medical history is significant for COPD  Wheezing    Associated symptoms include shortness of breath  Her past medical history is significant for COPD  Review of Systems   Constitutional: Negative  HENT: Negative  Eyes: Negative  Respiratory: Positive for shortness of breath and wheezing  Cardiovascular: Negative  Gastrointestinal: Negative  Endocrine: Negative  Genitourinary: Negative  Musculoskeletal: Negative  Skin: Negative  Allergic/Immunologic: Negative  Neurological: Negative  Hematological: Negative  Psychiatric/Behavioral: Negative  Smoking history: She reports that she has been smoking cigarettes  She has a 30 00 pack-year smoking history   She has never used smokeless tobacco     The following portions of the patient's history were reviewed and updated as appropriate: allergies, current medications, past family history, past medical history, past social history, past surgical history and problem list     Immunization History   Administered Date(s) Administered    Influenza Quadrivalent Preservative Free 3 years and older IM 11/01/2016, 10/02/2017    Influenza TIV (IM) 01/06/2005, 10/07/2010, 10/07/2013, 10/14/2014, 09/16/2015    Influenza, injectable, quadrivalent, preservative free 0 5 mL 10/10/2018    Pneumococcal Conjugate 13-Valent 05/18/2016    Pneumococcal Polysaccharide PPV23 10/07/2013, 10/10/2018    Tdap 09/21/2016 Current Outpatient Medications   Medication Sig Dispense Refill    albuterol (PROVENTIL HFA,VENTOLIN HFA) 90 mcg/act inhaler INHALE TWO PUFFS BY MOUTH EVERY FOUR HOURS AS NEEDED FOR WHEEZING 18 Inhaler 5    atorvastatin (LIPITOR) 80 mg tablet TAKE 1 TABLET BY MOUTH ONCE A DAY 90 tablet 3    buPROPion (WELLBUTRIN) 100 mg tablet Take 1 tablet (100 mg total) by mouth 2 (two) times a day 60 tablet 5    DULoxetine (CYMBALTA) 30 mg delayed release capsule Take 1 capsule (30 mg total) by mouth daily 90 capsule 3    DULoxetine (CYMBALTA) 60 mg delayed release capsule Take 1 capsule (60 mg total) by mouth daily 90 capsule 3    fluticasone (FLONASE) 50 mcg/act nasal spray 2 sprays into each nostril daily (Patient taking differently: 2 sprays into each nostril as needed ) 16 g 3    fluticasone-salmeterol (AIRDUO RESPICLICK) 884-29 mcg/act dry powder inhaler Inhale 1 puff 2 (two) times a day Rinse mouth after use   1 Inhaler 3    glucose blood (ONE TOUCH ULTRA TEST) test strip TEST ONCE A DAY-dx code: E11 9 100 each 3    Insulin Pen Needle 31G X 5 MM MISC Inject as directed daily For use w victoza pen 30 each 3    lisinopril-hydrochlorothiazide (PRINZIDE,ZESTORETIC) 20-25 MG per tablet Take 1 tablet by mouth daily 90 tablet 3    NICOTROL 10 MG inhaler INHALE 1 PUFF AS NEEDED FOR SMOKING CESSATION FOR UP TO 30 DAYS 168 each 1    pregabalin (LYRICA) 100 mg capsule Take 1 capsule (100 mg total) by mouth 3 (three) times a day 90 capsule 0    QUEtiapine (SEROquel) 25 mg tablet TAKE 1 TABLET BY MOUTH TWICE A DAY 60 tablet 5    SPIRIVA HANDIHALER 18 MCG inhalation capsule TAKE 1 INHALATION ONCE A DAY 1 each 5    Tapentadol HCl ER (NUCYNTA ER) 50 MG TB12 Take 1 tablet (50 mg total) by mouth every 12 (twelve) hoursMax Daily Amount: 100 mg 60 tablet 0    VICTOZA injection INJECT 0 3 ML (1 8 MG TOTAL) UNDER THE SKIN DAILY 3 pen 3    metFORMIN (GLUCOPHAGE) 1000 MG tablet Take 2 tablets (2,000 mg total) by mouth daily for 90 days 180 tablet 1    VICTOZA injection INJECT 0 3 ML (1 8 MG TOTAL) UNDER THE SKIN DAILY (Patient not taking: Reported on 7/25/2019) 6 pen 3     No current facility-administered medications for this visit  Allergies: Patient has no known allergies  Objective:  Vitals:    08/07/19 0912   BP: 104/68   BP Location: Left arm   Patient Position: Sitting   Cuff Size: Standard   Pulse: 97   Resp: 12   Temp: (!) 96 5 °F (35 8 °C)   TempSrc: Tympanic   SpO2: 96%   Weight: 107 kg (236 lb)   Height: 5' 3" (1 6 m)   Oxygen Therapy  SpO2: 96 %    Wt Readings from Last 3 Encounters:   08/07/19 107 kg (236 lb)   08/06/19 107 kg (236 lb 12 8 oz)   07/25/19 108 kg (237 lb 6 4 oz)     Body mass index is 41 81 kg/m²  Physical Exam   Constitutional: She is oriented to person, place, and time  She appears well-developed and well-nourished  HENT:   Head: Normocephalic and atraumatic  Mallampati 4   Eyes: Pupils are equal, round, and reactive to light  EOM are normal    Neck: Normal range of motion  Neck supple  Cardiovascular: Normal rate  Pulmonary/Chest: Effort normal and breath sounds normal    Abdominal: Soft  Musculoskeletal: Normal range of motion  Right lower leg: Normal         Left lower leg: Normal    Neurological: She is alert and oriented to person, place, and time  Skin: Skin is warm  Capillary refill takes less than 2 seconds  Rash noted  Psychiatric: She has a normal mood and affect   Her behavior is normal        Lab Review:   Orders Only on 05/09/2019   Component Date Value    White Blood Cell Count 05/09/2019 9 4     Red Blood Cell Count 05/09/2019 4 74     Hemoglobin 05/09/2019 14 7     HCT 05/09/2019 42 9     MCV 05/09/2019 91     MCH 05/09/2019 31 0     MCHC 05/09/2019 34 3     RDW 05/09/2019 13 1     Platelet Count 23/38/6298 313     Neutrophils 05/09/2019 63     Lymphocytes 05/09/2019 27     Monocytes 05/09/2019 7     Eosinophils 05/09/2019 2     Basophils PCT 05/09/2019 0     Neutrophils (Absolute) 05/09/2019 5 9     Lymphocytes (Absolute) 05/09/2019 2 5     Monocytes (Absolute) 05/09/2019 0 7     Eosinophils (Absolute) 05/09/2019 0 2     Basophils ABS 05/09/2019 0 0     Immature Granulocytes 05/09/2019 1     Immature Granulocytes (A* 05/09/2019 0 1     Glucose, Random 05/09/2019 145*    BUN 05/09/2019 16     Creatinine 05/09/2019 1 03*    eGFR Non African American 05/09/2019 60     eGFR  05/09/2019 69     SL AMB BUN/CREATININE RA* 05/09/2019 16     Sodium 05/09/2019 138     Potassium 05/09/2019 4 8     Chloride 05/09/2019 98     CO2 05/09/2019 24     CALCIUM 05/09/2019 9 7     Protein, Total 05/09/2019 6 7     Albumin 05/09/2019 4 3     Globulin, Total 05/09/2019 2 4     Albumin/Globulin Ratio 05/09/2019 1 8     TOTAL BILIRUBIN 05/09/2019 0 3     Alk Phos Isoenzymes 05/09/2019 83     AST 05/09/2019 21     ALT 05/09/2019 24     Cholesterol, Total 05/09/2019 114     Triglycerides 05/09/2019 143     HDL 05/09/2019 36*    LDL Direct 05/09/2019 49     Hemoglobin A1C 05/09/2019 8 5*    HEP C AB 05/09/2019 <0 1     Interpretation 05/09/2019 Note     Comment 05/09/2019 Comment    Orders Only on 05/09/2019   Component Date Value    Hemoglobin A1C 05/09/2019 8 5    Hospital Outpatient Visit on 04/22/2019   Component Date Value    Protocol Name 04/22/2019 LEXISCAN     Time In Exercise Phase 04/22/2019 00:01:00     MAX  SYSTOLIC BP 12/25/9021 320     Max Diastolic Bp 76/83/3490 57     Max Heart Rate 04/22/2019 101     Max Predicted Heart Rate 04/22/2019 161     Reason for Termination 04/22/2019 PROTOCOL COMPLETED     Test Indication 04/22/2019 Dyspnea     Target Hr Formular 04/22/2019 (220 - Age)*100%     Chest Pain Statement 04/22/2019 none        Diagnostics:  I have personally reviewed pertinent reports  Office Spirometry Results:     ESS:    No results found

## 2019-08-07 NOTE — ASSESSMENT & PLAN NOTE
Lavonne Lau has a history of moderate obstructive sleep apnea  She had a home sleep study in June which revealed overall apneic hypopnea index is 16 4  This was increased to 21 5 in the supine position she also had significant hypoxemia  It was recommended that she undergo a in-lab treatment study  On July 10, 2019 she went to the [facility] [address]  She had good response to it CPAP at 10 cm of water pressure  At this pressure almost off her obstructive events were eliminated  A AHI was reduced to 0 9 which also help correct her hypoxemia  She also achieved REM sleep  All stages of sleep were seen  She is agreeable to have CPAP of 10 cm of water pressure

## 2019-08-07 NOTE — ASSESSMENT & PLAN NOTE
L likely has alveolar hypoventilation from obesity syndrome  BMI is 41  She is aware that weight loss would be beneficial   Apparently she is considering bariatric surgery in the future  This likely can be contributing to her nocturnal hypoxemia  It was noted in her CPAP titration study that uses CPAP at 10 did correct hypo ventilation obesity hypoxemia

## 2019-08-07 NOTE — PATIENT INSTRUCTIONS
Sleep Apnea   AMBULATORY CARE:   Sleep apnea  is also called obstructive sleep apnea  It is a condition that causes you to stop breathing for 10 seconds or more while you are sleeping  During normal sleep, your throat is kept open by muscles that let air pass through easily  Sleep apnea causes the muscles and tissues around your throat to relax and block air from passing through  Sleep apnea may happen many times while you are asleep  Common symptoms include the following:   · No signs of breathing for 10 seconds or more while you sleep    · Snoring loudly, snorting, gasping or choking while you sleep, and waking up suddenly because of these    · A hard time thinking, remembering things, or focusing on your tasks the following day    · Headache or nausea    · Bedwetting or waking up often during the night to urinate    · Feeling sleepy, slow, and tired during the day  Seek care immediately if:   · You have chest pain or trouble breathing  Contact your healthcare provider if:   · You feel tired or depressed  · You have trouble staying awake during the day  · You have trouble thinking clearly  · You have questions or concerns about your condition or care  Treatment for sleep apnea  includes using a continuous positive airway pressure (CPAP) machine to keep your airway open during sleep  A mask is placed over your nose and mouth, or just your nose  The mask is hooked to the CPAP machine that blows a gentle stream of air into the mask when you breathe  This helps keep your airway open so you can breathe more regularly  Extra oxygen may be given to you through the machine  You may be given a mouth device  It looks like a mouth guard or dental retainer and stops your tongue and mouth tissues from blocking your throat while you sleep  Surgery may be needed to remove extra tissues that block your mouth, throat, or nose  Manage sleep apnea:   · Do not smoke    Nicotine and other chemicals in cigarettes and cigars can cause lung damage  Ask your healthcare provider for information if you currently smoke and need help to quit  E-cigarettes or smokeless tobacco still contain nicotine  Talk to your healthcare provider before you use these products  · Do not drink alcohol or take sedative medicine before you go to sleep  Alcohol and sedatives can relax the muscles and tissues around your throat  This can block the airflow to your lungs  · Maintain a healthy weight  Excess tissue around your throat may restrict your breathing  Ask your healthcare provider for information if you need to lose weight  · Sleep on your side or use pillows designed to prevent sleep apnea  This prevents your tongue or other tissues from blocking your throat  You can also raise the head of your bed  Follow up with your healthcare provider as directed:  Write down your questions so you remember to ask them during your visits  © 2017 [address] [employer] Information is for End User's use only and may not be sold, redistributed or otherwise used for commercial purposes  All illustrations and images included in CareNotes® are the copyrighted property of [employer] M [employer]  or [employer] Jimenez  The above information is an  only  It is not intended as medical advice for individual conditions or treatments  Talk to your doctor, nurse or pharmacist before following any medical regimen to see if it is safe and effective for you

## 2019-08-08 ENCOUNTER — OFFICE VISIT (OUTPATIENT)
Dept: CARDIOLOGY CLINIC | Facility: CLINIC | Age: 59
End: 2019-08-08
Payer: COMMERCIAL

## 2019-08-08 VITALS
HEIGHT: 63 IN | HEART RATE: 88 BPM | WEIGHT: 239 LBS | BODY MASS INDEX: 42.35 KG/M2 | DIASTOLIC BLOOD PRESSURE: 70 MMHG | OXYGEN SATURATION: 96 % | SYSTOLIC BLOOD PRESSURE: 104 MMHG

## 2019-08-08 DIAGNOSIS — I10 ESSENTIAL HYPERTENSION: ICD-10-CM

## 2019-08-08 DIAGNOSIS — R06.02 SHORTNESS OF BREATH: ICD-10-CM

## 2019-08-08 DIAGNOSIS — G89.4 CHRONIC PAIN SYNDROME: ICD-10-CM

## 2019-08-08 DIAGNOSIS — G47.33 OBSTRUCTIVE SLEEP APNEA: ICD-10-CM

## 2019-08-08 DIAGNOSIS — R94.39 ABNORMAL STRESS TEST: ICD-10-CM

## 2019-08-08 DIAGNOSIS — J44.9 CHRONIC OBSTRUCTIVE PULMONARY DISEASE, UNSPECIFIED COPD TYPE (HCC): ICD-10-CM

## 2019-08-08 DIAGNOSIS — E66.01 MORBID OBESITY (HCC): ICD-10-CM

## 2019-08-08 DIAGNOSIS — F17.210 CIGARETTE NICOTINE DEPENDENCE WITHOUT COMPLICATION: ICD-10-CM

## 2019-08-08 DIAGNOSIS — E78.2 MIXED HYPERLIPIDEMIA: ICD-10-CM

## 2019-08-08 PROCEDURE — 99214 OFFICE O/P EST MOD 30 MIN: CPT | Performed by: INTERNAL MEDICINE

## 2019-08-20 ENCOUNTER — TELEPHONE (OUTPATIENT)
Dept: CARDIOLOGY CLINIC | Facility: CLINIC | Age: 59
End: 2019-08-20

## 2019-08-20 LAB
BASOPHILS # BLD AUTO: 0 X10E3/UL (ref 0–0.2)
BASOPHILS NFR BLD AUTO: 0 %
BUN SERPL-MCNC: 14 MG/DL (ref 6–24)
BUN/CREAT SERPL: 14 (ref 9–23)
CALCIUM SERPL-MCNC: 9.5 MG/DL (ref 8.7–10.2)
CHLORIDE SERPL-SCNC: 96 MMOL/L (ref 96–106)
CO2 SERPL-SCNC: 25 MMOL/L (ref 20–29)
CREAT SERPL-MCNC: 0.98 MG/DL (ref 0.57–1)
EOSINOPHIL # BLD AUTO: 0.2 X10E3/UL (ref 0–0.4)
EOSINOPHIL NFR BLD AUTO: 2 %
ERYTHROCYTE [DISTWIDTH] IN BLOOD BY AUTOMATED COUNT: 13.8 % (ref 12.3–15.4)
GLUCOSE SERPL-MCNC: 188 MG/DL (ref 65–99)
HCT VFR BLD AUTO: 41.9 % (ref 34–46.6)
HGB BLD-MCNC: 14 G/DL (ref 11.1–15.9)
IMM GRANULOCYTES # BLD: 0.1 X10E3/UL (ref 0–0.1)
IMM GRANULOCYTES NFR BLD: 1 %
INR PPP: 0.9 (ref 0.8–1.2)
LYMPHOCYTES # BLD AUTO: 2.3 X10E3/UL (ref 0.7–3.1)
LYMPHOCYTES NFR BLD AUTO: 23 %
MCH RBC QN AUTO: 30.8 PG (ref 26.6–33)
MCHC RBC AUTO-ENTMCNC: 33.4 G/DL (ref 31.5–35.7)
MCV RBC AUTO: 92 FL (ref 79–97)
MONOCYTES # BLD AUTO: 0.6 X10E3/UL (ref 0.1–0.9)
MONOCYTES NFR BLD AUTO: 6 %
NEUTROPHILS # BLD AUTO: 6.7 X10E3/UL (ref 1.4–7)
NEUTROPHILS NFR BLD AUTO: 68 %
PLATELET # BLD AUTO: 310 X10E3/UL (ref 150–450)
POTASSIUM SERPL-SCNC: 4.8 MMOL/L (ref 3.5–5.2)
PROTHROMBIN TIME: 9.7 SEC (ref 9.1–12)
RBC # BLD AUTO: 4.55 X10E6/UL (ref 3.77–5.28)
SL AMB EGFR AFRICAN AMERICAN: 73 ML/MIN/1.73
SL AMB EGFR NON AFRICAN AMERICAN: 63 ML/MIN/1.73
SODIUM SERPL-SCNC: 139 MMOL/L (ref 134–144)
WBC # BLD AUTO: 9.9 X10E3/UL (ref 3.4–10.8)

## 2019-08-20 NOTE — TELEPHONE ENCOUNTER
----- Message from Jonas Gordon MD sent at 8/20/2019 10:52 AM EDT -----  Patient labs are acceptable  Continue same medications  Patient is advised to follow up  appointment regularly  Please call patient about the  about results

## 2019-08-22 ENCOUNTER — HOSPITAL ENCOUNTER (OUTPATIENT)
Dept: NON INVASIVE DIAGNOSTICS | Facility: HOSPITAL | Age: 59
Discharge: HOME | End: 2019-08-22
Attending: INTERNAL MEDICINE | Admitting: INTERNAL MEDICINE
Payer: COMMERCIAL

## 2019-08-22 VITALS
TEMPERATURE: 98.1 F | HEART RATE: 84 BPM | RESPIRATION RATE: 18 BRPM | DIASTOLIC BLOOD PRESSURE: 76 MMHG | OXYGEN SATURATION: 92 % | SYSTOLIC BLOOD PRESSURE: 122 MMHG

## 2019-08-22 DIAGNOSIS — R94.39 ABNORMAL STRESS TEST: ICD-10-CM

## 2019-08-22 DIAGNOSIS — F17.210 CIGARETTE NICOTINE DEPENDENCE WITHOUT COMPLICATION: ICD-10-CM

## 2019-08-22 DIAGNOSIS — R06.02 SHORTNESS OF BREATH: ICD-10-CM

## 2019-08-22 DIAGNOSIS — E78.2 MIXED HYPERLIPIDEMIA: ICD-10-CM

## 2019-08-22 DIAGNOSIS — E66.01 MORBID OBESITY (HCC): ICD-10-CM

## 2019-08-22 PROCEDURE — 99152 MOD SED SAME PHYS/QHP 5/>YRS: CPT | Performed by: INTERNAL MEDICINE

## 2019-08-22 PROCEDURE — C1769 GUIDE WIRE: HCPCS

## 2019-08-22 PROCEDURE — 93458 L HRT ARTERY/VENTRICLE ANGIO: CPT | Performed by: INTERNAL MEDICINE

## 2019-08-22 PROCEDURE — 93458 L HRT ARTERY/VENTRICLE ANGIO: CPT

## 2019-08-22 PROCEDURE — C1894 INTRO/SHEATH, NON-LASER: HCPCS

## 2019-08-22 RX ORDER — HEPARIN SODIUM 1000 [USP'U]/ML
INJECTION, SOLUTION INTRAVENOUS; SUBCUTANEOUS CODE/TRAUMA/SEDATION MEDICATION
Status: COMPLETED | OUTPATIENT
Start: 2019-08-22 | End: 2019-08-22

## 2019-08-22 RX ORDER — NITROGLYCERIN 20 MG/100ML
INJECTION INTRAVENOUS CODE/TRAUMA/SEDATION MEDICATION
Status: COMPLETED | OUTPATIENT
Start: 2019-08-22 | End: 2019-08-22

## 2019-08-22 RX ORDER — SODIUM CHLORIDE 9 MG/ML
100 INJECTION, SOLUTION INTRAVENOUS CONTINUOUS
Status: DISPENSED | OUTPATIENT
Start: 2019-08-22 | End: 2019-08-22

## 2019-08-22 RX ORDER — MIDAZOLAM HYDROCHLORIDE 1 MG/ML
INJECTION INTRAMUSCULAR; INTRAVENOUS CODE/TRAUMA/SEDATION MEDICATION
Status: COMPLETED | OUTPATIENT
Start: 2019-08-22 | End: 2019-08-22

## 2019-08-22 RX ORDER — FENTANYL CITRATE 50 UG/ML
INJECTION, SOLUTION INTRAMUSCULAR; INTRAVENOUS CODE/TRAUMA/SEDATION MEDICATION
Status: COMPLETED | OUTPATIENT
Start: 2019-08-22 | End: 2019-08-22

## 2019-08-22 RX ORDER — LIDOCAINE HYDROCHLORIDE 10 MG/ML
INJECTION, SOLUTION INFILTRATION; PERINEURAL CODE/TRAUMA/SEDATION MEDICATION
Status: COMPLETED | OUTPATIENT
Start: 2019-08-22 | End: 2019-08-22

## 2019-08-22 RX ORDER — VERAPAMIL HYDROCHLORIDE 2.5 MG/ML
INJECTION, SOLUTION INTRAVENOUS CODE/TRAUMA/SEDATION MEDICATION
Status: COMPLETED | OUTPATIENT
Start: 2019-08-22 | End: 2019-08-22

## 2019-08-22 RX ORDER — SODIUM CHLORIDE 9 MG/ML
INJECTION, SOLUTION INTRAVENOUS
Status: COMPLETED | OUTPATIENT
Start: 2019-08-22 | End: 2019-08-22

## 2019-08-22 RX ADMIN — HEPARIN SODIUM 4000 UNITS: 1000 INJECTION INTRAVENOUS; SUBCUTANEOUS at 08:44

## 2019-08-22 RX ADMIN — SODIUM CHLORIDE 50 ML/HR: 0.9 INJECTION, SOLUTION INTRAVENOUS at 08:13

## 2019-08-22 RX ADMIN — MIDAZOLAM HYDROCHLORIDE 2 MG: 1 INJECTION, SOLUTION INTRAMUSCULAR; INTRAVENOUS at 08:38

## 2019-08-22 RX ADMIN — LIDOCAINE HYDROCHLORIDE 2 ML: 10 INJECTION, SOLUTION INFILTRATION; PERINEURAL at 08:38

## 2019-08-22 RX ADMIN — FENTANYL CITRATE 100 MCG: 50 INJECTION, SOLUTION INTRAMUSCULAR; INTRAVENOUS at 08:38

## 2019-08-22 RX ADMIN — IOHEXOL 75 ML: 350 INJECTION, SOLUTION INTRAVENOUS at 08:55

## 2019-08-22 RX ADMIN — NITROGLYCERIN 200 MCG: 20 INJECTION INTRAVENOUS at 08:44

## 2019-08-22 RX ADMIN — VERAPAMIL HYDROCHLORIDE 2.5 MG: 2.5 INJECTION, SOLUTION INTRAVENOUS at 08:44

## 2019-08-22 NOTE — PERIOPERATIVE NURSING NOTE
Received from pacu  Awake and alert  Taking po fluids  Right pressure band intact  No blleding or hematoma noted  Fingers warm with brisk capillary refill

## 2019-08-22 NOTE — SEDATION DOCUMENTATION
Cardiac cath completed without incidence  Patient tolerated well and transferred to PACU for recovery, Dr Hamlet Ibarra present at bedside and reviewed results with patient and family   Stable for transfer, report given to primary RN>

## 2019-08-22 NOTE — PROCEDURES
Cardiac Catheterization Operative Report    Kayleen Aleman  [id number]  8/22/2019  Shahla Garcias MD    Procedure performed:    Right coronary angiography  Left coronary angiography  LV gram   Fluoroscopy    Indication:  Abnormal stress test    Interventionist Cardiologist : Dr Fareed See MD [facility]    Brief history:  Patient is a 50-year-old woman with history of tobacco abuse, hypertension strong family history of coronary artery dyslipidemia had abnormal stress test and need bariatric surgery  Due to persistent shortness of breath patient was scheduled for cardiac catheterization  Procedure Details: The risks, benefits, complications, including risk of stroke, heart attack, bleeding and even death but not limited to it discussed with the patient  Other treatment options, alternatives and expected outcomes were discussed with the patient and family  The patient and/or family concurred with the proposed plan, giving informed consent  Patient was brought to the cath lab after IV hydration was begun and oral premedication was given  Patient was further sedated with midazolam and fentanyl  Patient was prepped and draped in the usual manner  Using the modified Seldinger access technique, a 5 Afghan sheath was placed in the right radial artery without any complications  Patient was given intra-arterial nitroglycerin and IV verapamil  IV Heparin was administered  A left heart catheterization was performed  Left and right coronary angiograms were  done  End of the procedure patient was transferred to holding area without any complications  Patient tolerated the procedure well  After the procedure was completed, sedation was stopped and the sheaths and catheters were all removed  Hemostasis was achieved with vasc band as per protocol  Equipment used:     1  JR4 for right coronary angiography  2  Tig for left coronary angiography  3  LV gram  with JR4    Findings:  1   Dominance: Right dominant coronary system    2  Left main Coronary artery: Left main is a normal-size vessel  It bifurcates into large LAD and a nondominant circumflex system  It is angiographically patent  3  Left anterior descending artery: LAD is a large-size vessel and it has proximally around 20% mid around 35% stenosis  Distal branches have mild luminal regularities no focal stenosis seen  4  Circumflex Coronary artery: Circumflex is non dominant but medium to large size artery  It has mild luminal regularities  No focal stenosis seen  5  Right coronary artery: RCA is large dominant artery it has mid around 55% stenosis  Distal branches has mild luminal regularities  Plaque is irregular  6  Left ventriculogram: LV gram done in ABRAMS view shows normal LV systolic function LVEDP is mildly elevated around 50 mmHg  There was no gradient across aortic valve  Estimated Blood Loss:  Minimal         Complications:  None; patient tolerated the procedure well  Impression: Cardiac catheterization shows patient has normal LV systolic function and nonobstructive coronary artery disease with moderate stenosis of RCA  Recommendation: Continue medical therapy  Continue risk factor modification and patient has tolerated commended to quit smoking  Consider statin therapy  Disposition: Patient transferred to holding area/monitoring in hemodynamically stable condition  Condition: Stable      Dr Sveta Russell MD [facility]        "This note has been constructed using a voice recognition system  Therefore there may be syntax, spelling, and/or grammatical errors   Please call if you have any questions  "

## 2019-08-30 DIAGNOSIS — E11.8 TYPE 2 DIABETES MELLITUS WITH COMPLICATION, WITHOUT LONG-TERM CURRENT USE OF INSULIN (HCC): ICD-10-CM

## 2019-08-30 RX ORDER — PREGABALIN 100 MG/1
100 CAPSULE ORAL 3 TIMES DAILY
Qty: 90 CAPSULE | Refills: 0 | Status: SHIPPED | OUTPATIENT
Start: 2019-08-30 | End: 2020-08-25 | Stop reason: ALTCHOICE

## 2019-09-01 DIAGNOSIS — J31.0 RHINITIS, UNSPECIFIED TYPE: ICD-10-CM

## 2019-09-01 RX ORDER — FLUTICASONE PROPIONATE 50 MCG
SPRAY, SUSPENSION (ML) NASAL
Qty: 16 ML | Refills: 3 | Status: SHIPPED | OUTPATIENT
Start: 2019-09-01 | End: 2019-12-21 | Stop reason: SDUPTHER

## 2019-09-03 ENCOUNTER — OFFICE VISIT (OUTPATIENT)
Dept: FAMILY MEDICINE CLINIC | Facility: CLINIC | Age: 59
End: 2019-09-03
Payer: COMMERCIAL

## 2019-09-03 VITALS
HEART RATE: 78 BPM | SYSTOLIC BLOOD PRESSURE: 118 MMHG | BODY MASS INDEX: 42.88 KG/M2 | HEIGHT: 63 IN | TEMPERATURE: 98.6 F | DIASTOLIC BLOOD PRESSURE: 70 MMHG | RESPIRATION RATE: 18 BRPM | WEIGHT: 242 LBS

## 2019-09-03 DIAGNOSIS — I10 ESSENTIAL HYPERTENSION: ICD-10-CM

## 2019-09-03 DIAGNOSIS — F17.209 NICOTINE DEPENDENCE WITH NICOTINE-INDUCED DISORDER, UNSPECIFIED NICOTINE PRODUCT TYPE: ICD-10-CM

## 2019-09-03 DIAGNOSIS — E11.65 TYPE 2 DIABETES MELLITUS WITH HYPERGLYCEMIA, WITHOUT LONG-TERM CURRENT USE OF INSULIN (HCC): Primary | ICD-10-CM

## 2019-09-03 LAB
SL AMB  POCT GLUCOSE, UA: NORMAL
SL AMB LEUKOCYTE ESTERASE,UA: NORMAL
SL AMB POCT BILIRUBIN,UA: NORMAL
SL AMB POCT BLOOD,UA: NORMAL
SL AMB POCT CLARITY,UA: CLEAR
SL AMB POCT COLOR,UA: YELLOW
SL AMB POCT KETONES,UA: NORMAL
SL AMB POCT NITRITE,UA: NORMAL
SL AMB POCT PH,UA: 5
SL AMB POCT SPECIFIC GRAVITY,UA: 1.01
SL AMB POCT URINE PROTEIN: NORMAL
SL AMB POCT UROBILINOGEN: NORMAL

## 2019-09-03 PROCEDURE — 99214 OFFICE O/P EST MOD 30 MIN: CPT | Performed by: FAMILY MEDICINE

## 2019-09-03 PROCEDURE — 81003 URINALYSIS AUTO W/O SCOPE: CPT | Performed by: FAMILY MEDICINE

## 2019-09-03 RX ORDER — NICOTINE 21 MG/24HR
1 PATCH, TRANSDERMAL 24 HOURS TRANSDERMAL EVERY 24 HOURS
Qty: 28 PATCH | Refills: 0 | Status: SHIPPED | OUTPATIENT
Start: 2019-09-03 | End: 2019-09-29 | Stop reason: SDUPTHER

## 2019-09-04 ENCOUNTER — TELEPHONE (OUTPATIENT)
Dept: FAMILY MEDICINE CLINIC | Facility: CLINIC | Age: 59
End: 2019-09-04

## 2019-09-04 LAB
ALBUMIN/CREAT UR: 7.1 MG/G CREAT (ref 0–30)
CREAT UR-MCNC: 66.4 MG/DL
LABORATORY COMMENT REPORT: NORMAL
MICROALBUMIN UR-MCNC: 4.7 UG/ML

## 2019-09-04 NOTE — TELEPHONE ENCOUNTER
Per Pharmacist patient's insurance will not pay for Spiriva  They are requesting an order for Incruse Elipta   Patient uses [facility] in [place]

## 2019-09-05 DIAGNOSIS — J44.9 CHRONIC OBSTRUCTIVE PULMONARY DISEASE, UNSPECIFIED COPD TYPE (HCC): Primary | ICD-10-CM

## 2019-09-08 DIAGNOSIS — E11.8 TYPE 2 DIABETES MELLITUS WITH COMPLICATION, WITHOUT LONG-TERM CURRENT USE OF INSULIN (HCC): ICD-10-CM

## 2019-09-08 NOTE — TELEPHONE ENCOUNTER
Please check if pt continuing to take -we had started victoza and she was going to try d/c metformin secondary to GI side effects

## 2019-09-09 ENCOUNTER — OFFICE VISIT (OUTPATIENT)
Dept: BARIATRICS | Facility: CLINIC | Age: 59
End: 2019-09-09

## 2019-09-09 ENCOUNTER — OFFICE VISIT (OUTPATIENT)
Dept: PAIN MEDICINE | Facility: CLINIC | Age: 59
End: 2019-09-09
Payer: OTHER MISCELLANEOUS

## 2019-09-09 VITALS — WEIGHT: 240.6 LBS | BODY MASS INDEX: 42.63 KG/M2 | HEIGHT: 63 IN

## 2019-09-09 VITALS
HEIGHT: 63 IN | WEIGHT: 243 LBS | HEART RATE: 92 BPM | SYSTOLIC BLOOD PRESSURE: 117 MMHG | BODY MASS INDEX: 43.05 KG/M2 | DIASTOLIC BLOOD PRESSURE: 81 MMHG

## 2019-09-09 DIAGNOSIS — Z98.84 BARIATRIC SURGERY STATUS: ICD-10-CM

## 2019-09-09 DIAGNOSIS — M54.16 LUMBAR RADICULOPATHY: ICD-10-CM

## 2019-09-09 DIAGNOSIS — M96.1 POSTLAMINECTOMY SYNDROME, NOT ELSEWHERE CLASSIFIED: ICD-10-CM

## 2019-09-09 DIAGNOSIS — G89.29 CHRONIC BILATERAL LOW BACK PAIN WITH LEFT-SIDED SCIATICA: ICD-10-CM

## 2019-09-09 DIAGNOSIS — E66.01 MORBID (SEVERE) OBESITY DUE TO EXCESS CALORIES (HCC): Primary | ICD-10-CM

## 2019-09-09 DIAGNOSIS — E11.8 TYPE 2 DIABETES MELLITUS WITH COMPLICATION, WITHOUT LONG-TERM CURRENT USE OF INSULIN (HCC): ICD-10-CM

## 2019-09-09 DIAGNOSIS — G89.4 CHRONIC PAIN SYNDROME: ICD-10-CM

## 2019-09-09 DIAGNOSIS — E66.01 OBESITY, CLASS III, BMI 40-49.9 (MORBID OBESITY) (HCC): Primary | ICD-10-CM

## 2019-09-09 DIAGNOSIS — M54.42 CHRONIC BILATERAL LOW BACK PAIN WITH LEFT-SIDED SCIATICA: ICD-10-CM

## 2019-09-09 PROCEDURE — RECHECK

## 2019-09-09 PROCEDURE — 99214 OFFICE O/P EST MOD 30 MIN: CPT | Performed by: ANESTHESIOLOGY

## 2019-09-09 NOTE — TELEPHONE ENCOUNTER
Duplicate-please find out if pt still taking  We started Victoza at a previous visit and she was going to hold metformin to see if sugar ok without as she was having GI side effects w t metformin

## 2019-09-09 NOTE — PROGRESS NOTES
Patient meets with DEISI, talks about some eating behaviors that have interfered with her success such as not logging or portioning food  She has food logs from [address] [place] [address] appointment today and will start to log, DEISI encouraged pre-tracking food which patient agreed to  She plans to keep it on her kitchen table and when eating breakfast she will plan and write out what she's having for lunch as well  Patient is trying to be more active, walking as often as she can within limits of back and hip pain, she was given the pass for the gym  She was reminded about support group and given a schedule as well as the flyer for [employer] rallies  Lastly she plans to go on the nicotine patch, DEISI praised her efforts to quit smoking but encouraged her to try to wean off of all nicotine products at least two months before nicotine test to avoid a positive test result  Patient scheduled to return in a month to meet with surgeon

## 2019-09-09 NOTE — PROGRESS NOTES
Pain Medicine Follow-Up Note    Assessment:  1  Chronic pain syndrome    2  Chronic bilateral low back pain with left-sided sciatica    3  Lumbar radiculopathy    4  Postlaminectomy syndrome, not elsewhere classified        Plan:  New Medications Ordered This Visit   Medications    Tapentadol HCl ER (NUCYNTA ER) 50 MG TB12     Sig: Take 1 tablet (50 mg total) by mouth every 12 (twelve) hoursMax Daily Amount: 100 mg     Dispense:  60 tablet     Refill:  0     My impressions and treatment recommendations were discussed in detail with the patient who verbalized understanding and had no further questions  The patient is reporting that she continues to use the tip entered all ER 50 mg every 12 hours, but states that there are some days where she uses 0 tablets of the Nucynta ER and other days when she uses both tablets of the Nucynta ER  She still has some Nucynta ER remaining, but her is requesting a refill at today's visit  As such, I felt a reasonable to have the patient continue Nucynta ER 50 mg every 12 hours  I sent in the E-prescription to the pharmacy dated September 13, 2019  The risks and side effects of chronic opioid treatment were discussed in detail with the patient  Side effects include but are not limited to nausea, vomiting, GI intolerance, sedation, constipation, mental clouding, opioid-induced hyperalgesia, endocrine dysfunction, addiction, dependence, and tolerance  The patient was asked to take his medications only as prescribed and directed, never in excess, and never for any other reason other than for pain control  The patient was also asked to keep his medications out of the reach of others and away from children, preferably in a locked drawer  The patient verbalized understanding and wished to use these opioid medications  [place] Prescription Drug Monitoring Program report was reviewed and was appropriate     Follow-up is planned in 2 months time or sooner as warranted  Discharge instructions were provided  I personally saw and examined the patient and I agree with the above discussed plan of care  History of Present Illness:    Gerard Parikh is a 60 y o  female who presents to [facility] for interval re-evaluation of the above stated pain complaints  The patient has a past medical and chronic pain history as outlined in the assessment section  She was last seen on July 9, 2019  At today's office visit, the patient's pain score is 2/10 on the verbal numerical pain rating scale  The patient states that her pain is primarily involving her left hip  She states that her pain is intermittent in nature and reports the quality of her pain as dull/aching and sharp    She is reporting 75% relief of symptoms with the Nucynta ER 50 mg every 12 hours  She denies opioid induced constipation  Pain Contract Signed:  07/09/2019  Last Urine Drug Screen:  07/09/2019    Other than as stated above, the patient denies any interval changes in medications, medical condition, mental condition, symptoms, or allergies since the last office visit  Review of Systems:    Review of Systems   Respiratory: Negative for shortness of breath  Cardiovascular: Negative for chest pain  Gastrointestinal: Negative for constipation, diarrhea, nausea and vomiting  Musculoskeletal: Positive for gait problem  Negative for arthralgias, joint swelling and myalgias  Skin: Negative for rash  Neurological: Negative for dizziness, seizures and weakness  All other systems reviewed and are negative          Patient Active Problem List   Diagnosis    Chronic pain syndrome    Chronic low back pain    Postlaminectomy syndrome, not elsewhere classified    Adjacent segment disease with spinal stenosis    Spondylolisthesis of lumbar region    Groin pain, left    Chronic obstructive pulmonary disease (HCC)    Depression with anxiety    Type 2 diabetes mellitus with hyperglycemia, without long-term current use of insulin (HCC)    Disc degeneration, lumbosacral    Hypertension    Hyperlipidemia    Insomnia    Lumbar radiculopathy    Nicotine dependence    Complication of surgical procedure    Varicose veins of both lower extremities with pain    Varicose veins with inflammation    Cervical pain (neck)    Myofascial pain syndrome    Primary osteoarthritis of one hip, left    Pain in left hip    Morbid obesity (HCC)    Postlaminectomy syndrome, lumbar region    Low back pain    Shortness of breath    Centrilobular emphysema (HCC)    Daytime hypersomnolence    Obstructive sleep apnea    Alveolar hypoventilation    Abnormal stress test       Past Medical History:   Diagnosis Date    Anxiety     Arthritis     Asthma     Back pain     Breast lump     last assessed 10/14/14     Carpal tunnel syndrome 03/17/2006    unspecifiedd laterality / last assessed 10/14/14     COPD [facility]ch[facility] obstructive [facility] disease) ([facility] )     with exacerbation / last assessed 6/25/14     Depression     Diabetes mellitus ([facility] )     GERD (gastroesophageal reflux disease)     Hypercholesterolemia     Hyperlipidemia     Hypertension     Insomnia     Intolerance to cold     Intolerance to heat     Irregular heart beat     Low back pain     Lumbosacral disc disease     Nodule of tendon sheath     last assessed 2/5/15     Obesity     Peripheral neuropathy     Shortness of breath     Sleep apnea     Type 2 diabetes mellitus with hyperglycemia ([facility] )     last assessed 6/8/17     Varicose vein of leg        Past Surgical History:   Procedure Laterality Date    BACK SURGERY  2005    lower fusion    CAUDAL BLOCK N/A 11/2/2017    Procedure: BLOCK / INJECTION CAUDAL;  Surgeon: Hansa Cuba MD;  Location: [place] [address] MAIN OR;  Service: Pain Management     CAUDAL BLOCK N/A 12/20/2018    Procedure: Caudal Epidural Steroid Injection (19897);   Surgeon: Hansa Cuba MD;  Location: [facility] MAIN OR;  Service: Pain Management      SECTION      LAMINECTOMY      Lumbar 2005       Family History   Problem Relation Age of Onset    Diabetes Mother     Heart disease Mother     Dementia Father     Heart disease Father     Heart disease Sister     Diabetes Sister     Diabetes Brother        Social History     Occupational History     Comment: unemployed   Tobacco Use    Smoking status: Current Every Day Smoker     Packs/day: 0 25     Years: 30 00     Pack years: 7 50     Types: Cigarettes    Smokeless tobacco: Never Used    Tobacco comment: 10-12 cigs   a day   Substance and Sexual Activity    Alcohol use: No    Drug use: No    Sexual activity: Not on file         Current Outpatient Medications:     albuterol (PROVENTIL HFA,VENTOLIN HFA) 90 mcg/act inhaler, INHALE TWO PUFFS BY MOUTH EVERY FOUR HOURS AS NEEDED FOR WHEEZING, Disp: 18 Inhaler, Rfl: 5    atorvastatin (LIPITOR) 80 mg tablet, TAKE 1 TABLET BY MOUTH ONCE A DAY, Disp: 90 tablet, Rfl: 3    buPROPion (WELLBUTRIN) 100 mg tablet, Take 1 tablet (100 mg total) by mouth 2 (two) times a day, Disp: 60 tablet, Rfl: 5    DULoxetine (CYMBALTA) 30 mg delayed release capsule, Take 1 capsule (30 mg total) by mouth daily, Disp: 90 capsule, Rfl: 3    DULoxetine (CYMBALTA) 60 mg delayed release capsule, Take 1 capsule (60 mg total) by mouth daily, Disp: 90 capsule, Rfl: 3    fluticasone (FLONASE) 50 mcg/act nasal spray, SPRAY 2 SPRAYS INTO EACH NOSTRIL EVERY DAY, Disp: 16 mL, Rfl: 3    fluticasone-salmeterol (AIRDUO RESPICLICK) 608-89 mcg/act dry powder inhaler, Inhale 1 puff 2 (two) times a day Rinse mouth after use , Disp: 1 Inhaler, Rfl: 3    glucose blood (ONE TOUCH ULTRA TEST) test strip, TEST ONCE A DAY-dx code: E11 9, Disp: 100 each, Rfl: 3    Insulin Pen Needle 31G X 5 MM MISC, Inject as directed daily For use w victoza pen, Disp: 30 each, Rfl: 3    lisinopril-hydrochlorothiazide (PRINZIDE,ZESTORETIC) 20-25 MG per tablet, Take 1 tablet by mouth daily, Disp: 90 tablet, Rfl: 3    nicotine (NICODERM CQ) 21 mg/24 hr TD 24 hr patch, Place 1 patch on the skin every 24 hours, Disp: 28 patch, Rfl: 0    pregabalin (LYRICA) 100 mg capsule, Take 1 capsule (100 mg total) by mouth 3 (three) times a day, Disp: 90 capsule, Rfl: 0    QUEtiapine (SEROquel) 25 mg tablet, TAKE 1 TABLET BY MOUTH TWICE A DAY, Disp: 60 tablet, Rfl: 5    [START ON 9/13/2019] Tapentadol HCl ER (NUCYNTA ER) 50 MG TB12, Take 1 tablet (50 mg total) by mouth every 12 (twelve) hoursMax Daily Amount: 100 mg, Disp: 60 tablet, Rfl: 0    umeclidinium bromide (INCRUSE ELLIPTA) 62 5 mcg/inh AEPB inhaler, Inhale 1 puff daily, Disp: 30 each, Rfl: 3    VICTOZA injection, INJECT 0 3 ML (1 8 MG TOTAL) UNDER THE SKIN DAILY, Disp: 3 pen, Rfl: 3    VICTOZA injection, INJECT 0 3 ML (1 8 MG TOTAL) UNDER THE SKIN DAILY, Disp: 6 pen, Rfl: 3    metFORMIN (GLUCOPHAGE) 1000 MG tablet, Take 2 tablets (2,000 mg total) by mouth daily for 90 days, Disp: 180 tablet, Rfl: 1    NICOTROL 10 MG inhaler, INHALE 1 PUFF AS NEEDED FOR SMOKING CESSATION FOR UP TO 30 DAYS (Patient not taking: Reported on 8/8/2019), Disp: 168 each, Rfl: 1    No Known Allergies    Physical Exam:    /81   Pulse 92   Ht 5' 3" (1 6 m)   Wt 110 kg (243 lb)   BMI 43 05 kg/m²     Constitutional:obese  Eyes:anicteric  HEENT:grossly intact  Neck:supple, symmetric, trachea midline and no masses   Pulmonary:even and unlabored  Cardiovascular:No edema or pitting edema present  Skin:Normal without rashes or lesions and well hydrated  Psychiatric:Mood and affect appropriate  Neurologic:Cranial Nerves II-XII grossly intact  Musculoskeletal:normal

## 2019-09-09 NOTE — PROGRESS NOTES
Bariatric Nutrition Assessment Note    Type of surgery    Preop 6 months weight checks-today 2 of 6  Surgery Date: TBD  Surgeon: Dr Chey Walker  60 y o   female   Wt with BMI of 25: 141lbs  Pre-Op Excess Wt: 99 6lbs  Height 5' 3" (1 6 m), weight 109 kg (240 lb 9 6 oz)  Body mass index is 42 62 kg/m²  Weight change:  +3 3lbs from initial eval    Review of History and Medications   Past Medical History:   Diagnosis Date    Anxiety     Arthritis     Asthma     Back pain     Breast lump     last assessed 10/14/14     Carpal tunnel syndrome 2006    unspecifiedd laterality / last assessed 10/14/14     COPD [facility]chronic obstructive pulmonary disease) ([facility] )     with exacerbation / last assessed 14     Depression     Diabetes mellitus ([facility] )     GERD (gastroesophageal reflux disease)     Hypercholesterolemia     Hyperlipidemia     Hypertension     Insomnia     Intolerance to cold     Intolerance to heat     Irregular heart beat     Low back pain     Lumbosacral disc disease     Nodule of tendon sheath     last assessed 2/5/15     Obesity     Peripheral neuropathy     Shortness of breath     Sleep apnea     Type 2 diabetes mellitus with hyperglycemia ([facility] )     last assessed 17     Varicose vein of leg      Past Surgical History:   Procedure Laterality Date    BACK SURGERY  2005    lower fusion    CAUDAL BLOCK N/A 2017    Procedure: BLOCK / INJECTION CAUDAL;  Surgeon: Artie Tay MD;  [facility] MAIN OR;  Service: Pain Management     CAUDAL BLOCK N/A 2018    Procedure: Caudal Epidural Steroid Injection (14295);   Surgeon: Artie Tay MD;  Location: [facility] MAIN OR;  Service: Pain Management      SECTION      LAMINECTOMY      Lumbar      Social History     Socioeconomic History    Marital status: Single     Spouse name: Not on file    Number of children: Not on file    Years of education: Not on file    Highest education level: Not on file   Occupational History     Comment: unemployed   Social Needs    Financial resource strain: Not on file    Food insecurity:     Worry: Not on file     Inability: Not on file    Transportation needs:     Medical: Not on file     Non-medical: Not on file   Tobacco Use    Smoking status: Current Every Day Smoker     Packs/day: 0 25     Years: 30 00     Pack years: 7 50     Types: Cigarettes    Smokeless tobacco: Never Used    Tobacco comment: 10-12 cigs   a day   Substance and Sexual Activity    Alcohol use: No    Drug use: No    Sexual activity: Not on file   Lifestyle    Physical activity:     Days per week: Not on file     Minutes per session: Not on file    Stress: Not on file   Relationships    Social connections:     Talks on phone: Not on file     Gets together: Not on file     Attends Mosque service: Not on file     Active member of club or organization: Not on file     Attends meetings of clubs or organizations: Not on file     Relationship status: Not on file    Intimate partner violence:     Fear of current or ex partner: Not on file     Emotionally abused: Not on file     Physically abused: Not on file     Forced sexual activity: Not on file   Other Topics Concern    Not on file   Social History Narrative     per allscript     Lives alone without help available       Current Outpatient Medications:     albuterol (PROVENTIL HFA,VENTOLIN HFA) 90 mcg/act inhaler, INHALE TWO PUFFS BY MOUTH EVERY FOUR HOURS AS NEEDED FOR WHEEZING, Disp: 18 Inhaler, Rfl: 5    atorvastatin (LIPITOR) 80 mg tablet, TAKE 1 TABLET BY MOUTH ONCE A DAY, Disp: 90 tablet, Rfl: 3    buPROPion (WELLBUTRIN) 100 mg tablet, Take 1 tablet (100 mg total) by mouth 2 (two) times a day, Disp: 60 tablet, Rfl: 5    DULoxetine (CYMBALTA) 30 mg delayed release capsule, Take 1 capsule (30 mg total) by mouth daily, Disp: 90 capsule, Rfl: 3    DULoxetine (CYMBALTA) 60 mg delayed release capsule, Take 1 capsule (60 mg total) by mouth daily, Disp: 90 capsule, Rfl: 3    fluticasone (FLONASE) 50 mcg/act nasal spray, SPRAY 2 SPRAYS INTO EACH NOSTRIL EVERY DAY, Disp: 16 mL, Rfl: 3    fluticasone-salmeterol (AIRDUO RESPICLICK) 736-73 mcg/act dry powder inhaler, Inhale 1 puff 2 (two) times a day Rinse mouth after use , Disp: 1 Inhaler, Rfl: 3    glucose blood (ONE TOUCH ULTRA TEST) test strip, TEST ONCE A DAY-dx code: E11 9, Disp: 100 each, Rfl: 3    Insulin Pen Needle 31G X 5 MM MISC, Inject as directed daily For use w victoza pen, Disp: 30 each, Rfl: 3    lisinopril-hydrochlorothiazide (PRINZIDE,ZESTORETIC) 20-25 MG per tablet, Take 1 tablet by mouth daily, Disp: 90 tablet, Rfl: 3    metFORMIN (GLUCOPHAGE) 1000 MG tablet, Take 2 tablets (2,000 mg total) by mouth daily for 90 days, Disp: 180 tablet, Rfl: 1    nicotine (NICODERM CQ) 21 mg/24 hr TD 24 hr patch, Place 1 patch on the skin every 24 hours, Disp: 28 patch, Rfl: 0    NICOTROL 10 MG inhaler, INHALE 1 PUFF AS NEEDED FOR SMOKING CESSATION FOR UP TO 30 DAYS (Patient not taking: Reported on 8/8/2019), Disp: 168 each, Rfl: 1    pregabalin (LYRICA) 100 mg capsule, Take 1 capsule (100 mg total) by mouth 3 (three) times a day, Disp: 90 capsule, Rfl: 0    QUEtiapine (SEROquel) 25 mg tablet, TAKE 1 TABLET BY MOUTH TWICE A DAY, Disp: 60 tablet, Rfl: 5    [START ON 9/13/2019] Tapentadol HCl ER (NUCYNTA ER) 50 MG TB12, Take 1 tablet (50 mg total) by mouth every 12 (twelve) hoursMax Daily Amount: 100 mg, Disp: 60 tablet, Rfl: 0    umeclidinium bromide (INCRUSE ELLIPTA) 62 5 mcg/inh AEPB inhaler, Inhale 1 puff daily, Disp: 30 each, Rfl: 3    VICTOZA injection, INJECT 0 3 ML (1 8 MG TOTAL) UNDER THE SKIN DAILY, Disp: 3 pen, Rfl: 3    VICTOZA injection, INJECT 0 3 ML (1 8 MG TOTAL) UNDER THE SKIN DAILY, Disp: 6 pen, Rfl: 3  Food Intake and Lifestyle Assessment   Food Intake Assessment completed via food log brought by patient  Breakfast:has changed to special k with berry instead of sugar cereal  Has with whole milk, aware needs to change to low fat milk  If doesn't have cereal will have a greek yogurt  Snack: -   Lunch: skips or will have 2 light and fit yogurts or egg salad sandwich on whole wheat bread or tomato and tuna salad or salad with 2 boiled eggs and dressing or salad  Snack: -  Dinner: corn on the cob with butter and a cheeseburger or eggplant parm and salad or 2 hot dogs without the bun and green beans or corn and chicken and peas or salad with chicken  Snack: yogurt or gold fish  Beverage intake: water, sugar free beverages and coffee/tea-has changed from half and half to milk in her coffee and decreased to 2tsp sugar per day  Protein supplement: tried the premier chocolate and didn't like it too much   provided with sample of vanilla  Estimated protein intake per day: 60gm  Estimated fluid intake per day: 16oz water, 48-64oz of unsweet tea, 6-8oz of OJ  Meals eaten away from home: not often, maybe once a month  Typical meal pattern: 2-3 meals per day and 1-2 snacks per day  Eating Behaviors: Consumption of high calorie/ high fat foods, Large portion sizes and Mindless eating  Food allergies or intolerances: No Known Allergies  Cultural or Jew considerations: none    Physical Assessment  Physical Activity  Types of exercise: None, very active in her daily activities, but no specific exercise routine  Current physical limitations: had back surgery    Psychosocial Assessment   Support systems: parent(s) friend(s) relative(s)  Socioeconomic factors: none    Nutrition Diagnosis  Diagnosis: Overweight / Obesity (NC-3 3)  Related to: Physical inactivity and Excessive energy intake  As Evidenced by: BMI >25     Nutrition Prescription: Recommend the following diet  Regular    Interventions and Teaching   Discussed pre-op and post-op nutrition guidelines  Patient educated and handouts provided    Adequate hydration  Exercise  Protein supplements  Meal planning and preparation  Dietary and lifestyle changes  Intuitive eating  Techniques for self monitoring and keeping daily food journal-provided with paper journals to use  Education provided to: patient    Barriers to learning: No barriers identified  Readiness to change: preparation  Comprehension: verbalizes understanding   Expected Compliance: good  6 weight checks (2 of 6 as of today)   Labs   PCP-pt had appt 9/3   Sleep:  had sleep study on 7/10  Was positive, getting CPAP   Cardio: had consult 8/8/19, hadr cardiac cath on 8/22-f/u with cardio sept 16  advised to have them state in their note if she is cleared for surgery or what the plan is  Meet w/surgeon:  srinivasan on 10/10 with [facility]   Support Group:     Weight loss:  5% by day of surgery: -12lbs (225lbs)                        1/2 in order to submit: -6lbs (231lbs)    Other:  Nicotine     Evaluation / Monitoring  Body weight Physical activity   Pt has made a few changes as far as choosing lower sugar cereals, has been eating more salads and changed to milk in her coffee rather than half and half  Pt states this past weekend she had a lot of parties/picnics and "wasn't good"  Discussed with to eat three meals per day therefore should bring a protein shake along with her when she is running errands      Pre-op weight loss goals:  5% by day of surgery:  -12lbs (225lbs)  1/2 in order to submit:  -6lbs (231lbs)  Goals  Food journal via food journal packet provided  Exercise 30 minutes 5 times per week-stationary bike  Complete lession plans 1-6  Eat 3 meals per day with protein at each meal  Eliminate mindless snacking  Use protein shake as a meal replacement    Time Spent:   30 mins

## 2019-09-10 ENCOUNTER — CLINICAL SUPPORT (OUTPATIENT)
Dept: FAMILY MEDICINE CLINIC | Facility: CLINIC | Age: 59
End: 2019-09-10
Payer: COMMERCIAL

## 2019-09-10 DIAGNOSIS — Z23 ENCOUNTER FOR IMMUNIZATION: Primary | ICD-10-CM

## 2019-09-10 PROCEDURE — 90471 IMMUNIZATION ADMIN: CPT

## 2019-09-10 PROCEDURE — 90682 RIV4 VACC RECOMBINANT DNA IM: CPT

## 2019-09-12 NOTE — TELEPHONE ENCOUNTER
Please advise,  Was the metformin sent to the pharmacy?  Patient states she is taking metformin and victoza

## 2019-09-21 PROBLEM — I25.119 CORONARY ARTERY DISEASE INVOLVING NATIVE CORONARY ARTERY OF NATIVE HEART WITH ANGINA PECTORIS (HCC): Status: ACTIVE | Noted: 2019-09-21

## 2019-09-21 NOTE — PROGRESS NOTES
Consultation - Cardiology Office  [facility]  Batsheva Fontanez 60 y o  female MRN: [id number]  : 1960  Unit/Bed#:  Encounter: [id number]      Assessment:     1  Shortness of breath    2  Essential hypertension    3  Chronic obstructive pulmonary disease, unspecified COPD type ([place] [address] )    4  Type 2 diabetes mellitus with hyperglycemia, without long-term current use of insulin ([facility])    5  Mixed hyperlipidemia    6  Morbid obesity ([place] [address] )    7  Obstructive sleep apnea    8  Coronary artery disease involving native coronary artery of native heart with angina pectoris ([place] [address] )    9  Tobacco abuse    10  Colon cancer screening        Discussion summary and Plan:  1  Shortness of breath  Patient has shortness of breath which may be multifactorial   She still smokes pack a day and has been smoking for about 35 years  Pulmonary note noted  She has moderate to severe COPD with restrictive and obstructive defect on pulmonary function test   She has responded well to inhaler treatment  Nuclear stress test reviewed  She underwent cardiac catheterization found to have mild nonobstructive coronary artery disease  She is already on statin therapy  Her shortness of breath his most likely no secondary to lung disease as well as her obesity and deconditioning  This was discussed with patient at length  Cath film reviewed with her  2  Essential hypertension  Patient blood pressure is very well controlled  Currently she is taking lisinopril and hydrochlorothiazide 20/25 mg daily  Her blood test done on 2019 at the time of cardiac catheterization were acceptable  3  Dyslipidemia  Continue high-intensity statin  Her cholesterol is 114  HDL is low  LFTs were acceptable  4  Type 2 diabetes mellitus  Not pretty well controlled as per patient  She is diabetic for more than 10 years    Issues related to uncontrolled diabetes mellitus including high risk for heart attack and stroke discussed with her  She has mild atherosclerotic plaque on cardiac catheterization  5  Post laminectomy syndrome in the lumbar region as well as chronic pain  Advised to lose weight  She is considering bariatric surgery  6  Obesity with BMI around 43 and thick neck and with snoring  She was diagnosed to have sleep apnea  She still to see her CPAP machine  Follow up with Pulmonary    7  COPD on inhalers management as per Pulmonary  Advised her to quit smoking  She already have known COPD and restrictive lung disease  Discussed with patient at length    8  Continues tobacco    Patient still smokes about half pack sometime to full pack a day  Need to quit smoking  She understand it very well she had lung disease, she had plaque in her arteries if he continues to smoke she is high risk for heart attack stroke and death  9  Colon cancer screening  patient has not done colon cancer screening  She will be referred to GI  Referral was made  Thank you for your consultation  If he have any question please call me at [phone]  Counseling :  A description of the counseling  Goals and Barriers  Patient's ability to self care: Yes  Medication side effect reviewed with patient in detail and all their questions answered to their satisfaction  Primary Care Physician :Joselo Smith MD    HPI :     Tenzin Olivares is a 60y o  year old female who was referred by primary care doctor for shortness of breath  She has past medical history diabetes mellitus for the last 10 years, COPD, tobacco abuse, dyslipidemia, obesity with BMI around 43, family history of coronary artery disease who was having episodes of shortness of breath  Her mother had episodes of shortness of breath when she was around 80 and during cardiac catheterization found to have three-vessel disease and needed stents  Patient is very concerned about that    She has chronic back pain and because of that she is not much active and not able to lose weight  As mentioned earlier she is diabetic for about 10 years now she started recently on Victoza  Her blood sugar are running little high  She has been taking her cholesterol medication for a while  Lately she was feeling little bit dizzy her primary care doctor decrease her lisinopril hydrochlorothiazide as she is feeling little bit better  Blood pressure is 106/70 today  Heart rate is elevated  She denies any chest pain  She short of breath when she walks and does some stuff  But she also has short of breath due to COPD  No leg pain    She smokes about pack a day has been smoking for 35 years  She is single now  She lives with her mom  She has son was 28-year-old  No recent surgery no other issues  She takes a sleeping pill  She snores a lot at night  She has no recent sleep apnea study  09/29/2019  Above reviewed  Patient came for follow-up  She still have shortness of breath she also had moderate to severe emphysema and her FEV1 was 58% of predicted  She underwent cardiac catheterization as she had a equivocal stress test which shows she has no significant obstructive coronary artery disease and she has normal LV systolic function  She is trying to quit smoking  She used to smoke 1 5 pack a day for about 30 years now she is trying to smoke less than half pack to pack  She denies any chest pain  Cardiac cath film reviewed with her  She is motivated to lose weight and quit smoking  No nausea no vomiting no fever no chills  Shortness of breath exist as mentioned earlier  She does get easily anxious  Review of Systems   Constitutional: Negative for activity change, chills, diaphoresis, fever and unexpected weight change  HENT: Negative for congestion  Eyes: Negative for discharge and redness  Respiratory: Positive for shortness of breath  Negative for cough, chest tightness and wheezing  Chronic not changed   Cardiovascular: Negative  Negative for chest pain, palpitations and leg swelling  Gastrointestinal: Negative for abdominal pain, diarrhea and nausea  Endocrine: Negative  Genitourinary: Negative for decreased urine volume and urgency  Musculoskeletal: Positive for back pain and gait problem  Negative for arthralgias  Skin: Negative for rash and wound  Allergic/Immunologic: Negative  Neurological: Negative for dizziness, seizures, syncope, weakness, light-headedness and headaches  Hematological: Negative  Psychiatric/Behavioral: Negative for agitation and confusion  The patient is nervous/anxious          Historical Information   Past Medical History:   Diagnosis Date    Anxiety     Arthritis     Asthma     Back pain     Breast lump     last assessed 10/14/14     Carpal tunnel syndrome 03/17/2006    unspecifiedd laterality / last assessed 10/14/14     COPD [facility]chronic obstructive pulmonary disease) [facility] )     with exacerbation / last assessed 6/25/14     Coronary artery disease involving native coronary artery of native heart with angina pectoris ([facility] ) 9/21/2019    Depression     Diabetes mellitus ([facility] )     GERD (gastroesophageal reflux disease)     Hypercholesterolemia     Hyperlipidemia     Hypertension     Insomnia     Intolerance to cold     Intolerance to heat     Irregular heart beat     Low back pain     Lumbosacral disc disease     Nodule of tendon sheath     last assessed 2/5/15     Obesity     Peripheral neuropathy     Shortness of breath     Sleep apnea     Type 2 diabetes mellitus with hyperglycemia ([facility] )     last assessed 6/8/17     Varicose vein of leg      Past Surgical History:   Procedure Laterality Date    BACK SURGERY  2005    lower fusion    CAUDAL BLOCK N/A 11/2/2017    Procedure: BLOCK / INJECTION CAUDAL;  Surgeon: Tamara Marie MD;  Location: [address];  Service: Pain Management     CAUDAL BLOCK N/A 12/20/2018    Procedure: Caudal Epidural Steroid Injection (20239); Surgeon: Vida Jacobson MD;  Location: [facility] MAIN OR;  Service: Pain Management      SECTION      LAMINECTOMY      Lumbar 2005     Social History     Substance and Sexual Activity   Alcohol Use No     Social History     Substance and Sexual Activity   Drug Use No     Social History     Tobacco Use   Smoking Status Current Every Day Smoker    Packs/day: 0 25    Years: 30 00    Pack years: 7 50    Types: Cigarettes   Smokeless Tobacco Never Used   Tobacco Comment    10-12 cigs   a day     Family History:   Family History   Problem Relation Age of Onset    Diabetes Mother     Heart disease Mother     Dementia Father     Heart disease Father     Heart disease Sister     Diabetes Sister     Diabetes Brother        Meds/Allergies     No Known Allergies    Current Outpatient Medications:     albuterol (PROVENTIL HFA,VENTOLIN HFA) 90 mcg/act inhaler, INHALE TWO PUFFS BY MOUTH EVERY FOUR HOURS AS NEEDED FOR WHEEZING, Disp: 18 Inhaler, Rfl: 5    atorvastatin (LIPITOR) 80 mg tablet, TAKE 1 TABLET BY MOUTH ONCE A DAY, Disp: 90 tablet, Rfl: 3    buPROPion (WELLBUTRIN) 100 mg tablet, Take 1 tablet (100 mg total) by mouth 2 (two) times a day, Disp: 60 tablet, Rfl: 5    DULoxetine (CYMBALTA) 30 mg delayed release capsule, Take 1 capsule (30 mg total) by mouth daily, Disp: 90 capsule, Rfl: 3    DULoxetine (CYMBALTA) 60 mg delayed release capsule, Take 1 capsule (60 mg total) by mouth daily, Disp: 90 capsule, Rfl: 3    fluticasone (FLONASE) 50 mcg/act nasal spray, SPRAY 2 SPRAYS INTO EACH NOSTRIL EVERY DAY, Disp: 16 mL, Rfl: 3    fluticasone-salmeterol (AIRDUO RESPICLICK) 036-19 mcg/act dry powder inhaler, Inhale 1 puff 2 (two) times a day Rinse mouth after use , Disp: 1 Inhaler, Rfl: 3    glucose blood (ONE TOUCH ULTRA TEST) test strip, TEST ONCE A DAY-dx code: E11 9, Disp: 100 each, Rfl: 3    Insulin Pen Needle 31G X 5 MM MISC, Inject as directed daily For use w victoza pen, Disp: 30 each, Rfl: 3   lisinopril-hydrochlorothiazide (PRINZIDE,ZESTORETIC) 20-25 MG per tablet, Take 1 tablet by mouth daily, Disp: 90 tablet, Rfl: 3    metFORMIN (GLUCOPHAGE) 1000 MG tablet, TAKE 2 TABLETS (2,000 MG TOTAL) BY MOUTH DAILY FOR 90 DAYS, Disp: 180 tablet, Rfl: 1    pregabalin (LYRICA) 100 mg capsule, Take 1 capsule (100 mg total) by mouth 3 (three) times a day, Disp: 90 capsule, Rfl: 0    QUEtiapine (SEROquel) 25 mg tablet, TAKE 1 TABLET BY MOUTH TWICE A DAY, Disp: 60 tablet, Rfl: 5    Tapentadol HCl ER (NUCYNTA ER) 50 MG TB12, Take 1 tablet (50 mg total) by mouth every 12 (twelve) hoursMax Daily Amount: 100 mg, Disp: 60 tablet, Rfl: 0    umeclidinium bromide (INCRUSE ELLIPTA) 62 5 mcg/inh AEPB inhaler, Inhale 1 puff daily, Disp: 30 each, Rfl: 3    VICTOZA injection, INJECT 0 3 ML (1 8 MG TOTAL) UNDER THE SKIN DAILY, Disp: 3 pen, Rfl: 3    nicotine (NICODERM CQ) 21 mg/24 hr TD 24 hr patch, Place 1 patch on the skin every 24 hours (Patient not taking: Reported on 9/26/2019), Disp: 28 patch, Rfl: 0    NICOTROL 10 MG inhaler, INHALE 1 PUFF AS NEEDED FOR SMOKING CESSATION FOR UP TO 30 DAYS (Patient not taking: Reported on 8/8/2019), Disp: 168 each, Rfl: 1    VICTOZA injection, INJECT 0 3 ML (1 8 MG TOTAL) UNDER THE SKIN DAILY (Patient not taking: Reported on 9/26/2019), Disp: 6 pen, Rfl: 3    Vitals: Blood pressure 100/70, pulse 101, height 5' 3" (1 6 m), weight 110 kg (243 lb 8 oz), SpO2 95 %  Body mass index is 43 13 kg/m²  Vitals:    09/26/19 1431   Weight: 110 kg (243 lb 8 oz)     BP Readings from Last 3 Encounters:   09/26/19 100/70   09/09/19 117/81   09/03/19 118/70         Physical Exam   Constitutional: She is oriented to person, place, and time  She appears well-developed and well-nourished  No distress  HENT:   Head: Normocephalic and atraumatic  Eyes: Pupils are equal, round, and reactive to light  Neck: Neck supple  No JVD present  No tracheal deviation present  No thyromegaly present  Cardiovascular: Normal rate, regular rhythm, S1 normal and S2 normal  Exam reveals no gallop, no S3, no S4, no distant heart sounds and no friction rub  Murmur heard  Systolic (ejection) murmur is present with a grade of 2/6  S1-S2 regular with 2/6 ejection systolic murmur  Pulmonary/Chest: Effort normal  No respiratory distress  She has no wheezes  She has no rales  She exhibits no tenderness  Abdominal: Soft  Bowel sounds are normal  She exhibits no distension  There is no tenderness  Musculoskeletal: She exhibits no edema or deformity  Neurological: She is alert and oriented to person, place, and time  Skin: Skin is warm and dry  No rash noted  She is not diaphoretic  No pallor  Psychiatric: She has a normal mood and affect  Her behavior is normal  Judgment normal        Diagnostic Studies Review Cardio:    Echo Doppler  Echo Doppler in April of 2019 shows EF 60 percent, no significant valvular disease  Tricuspid jet was not sufficient to main pulmonary artery pressure  Mild LVH noted  Grade 1 diastolic dysfunction  Nuclear stress test shows mild apical ischemia  No large reversible defect was noted  Her EF was preserved  Stress test done April of 2019  Cardiac catheterization  Cardiac catheterization on 08/22/2019    1  Dominance: Right dominant coronary system     2  Left main Coronary artery: Left main is a normal-size vessel  It bifurcates into large LAD and a nondominant circumflex system  It is angiographically patent        3  Left anterior descending artery: LAD is a large-size vessel and it has proximally around 20% mid around 35% stenosis  Distal branches have mild luminal regularities no focal stenosis seen        4  Circumflex Coronary artery: Circumflex is non dominant but medium to large size artery  It has mild luminal regularities  No focal stenosis seen      5  Right coronary artery: RCA is large dominant artery it has mid around 55% stenosis    Distal branches has mild luminal regularities  Plaque is irregular      6  Left ventriculogram: LV gram done in ABRAMS view shows normal LV systolic function LVEDP is mildly elevated around 15 mmHg  There was no gradient across aortic valve  EKG:  Twelve lead EKG done in our office shows normal sinus rhythm  There is a mild persistent elevation noted in inferior leads most likely due to early repolarization changes  Heart rate 96 beats per minute  No other significant ST changes  Imaging:  Chest X-Ray:   Xr Chest Pa & Lateral    Result Date: 7/6/2018  Impression No acute cardiopulmonary disease  Workstation performed: [id number]     Xr Chest Pa & Lateral    =ent  Lab Review   Lab Results   Component Value Date    WBC 9 9 08/19/2019    HGB 14 0 08/19/2019    HCT 41 9 08/19/2019    MCV 92 08/19/2019    RDW 13 8 08/19/2019     08/19/2019     BMP:  Lab Results   Component Value Date    SODIUM 139 08/19/2019    K 4 8 08/19/2019    CL 96 08/19/2019    CO2 25 08/19/2019    BUN 14 08/19/2019    CREATININE 0 98 08/19/2019    GLUC 188 (H) 08/19/2019    CALCIUM 9 1 10/09/2017     LFT:  Lab Results   Component Value Date    AST 21 05/09/2019    ALT 24 05/09/2019    ALKPHOS 69 10/09/2017    TP 6 7 05/09/2019    ALB 4 3 05/09/2019      Lab Results   Component Value Date    BSC1GTQQKLPP 2 350 10/09/2017     Lab Results   Component Value Date    HGBA1C 8 5 (H) 05/09/2019     Lipid Profile:   Lab Results   Component Value Date    CHOLESTEROL 114 05/09/2019    HDL 36 (L) 05/09/2019    LDLCALC 43 10/09/2017    TRIG 143 05/09/2019     Lab Results   Component Value Date    CHOLESTEROL 114 05/09/2019    CHOLESTEROL 133 09/28/2018     Lab Results   Component Value Date    CKTOTAL 131 10/09/2017       Dr Tavo Tierney MD [facility]      "This note has been constructed using a voice recognition system  Therefore there may be syntax, spelling, and/or grammatical errors   Please call if you have any questions  "

## 2019-09-26 ENCOUNTER — OFFICE VISIT (OUTPATIENT)
Dept: CARDIOLOGY CLINIC | Facility: CLINIC | Age: 59
End: 2019-09-26
Payer: COMMERCIAL

## 2019-09-26 VITALS
HEART RATE: 101 BPM | BODY MASS INDEX: 43.14 KG/M2 | WEIGHT: 243.5 LBS | OXYGEN SATURATION: 95 % | SYSTOLIC BLOOD PRESSURE: 100 MMHG | HEIGHT: 63 IN | DIASTOLIC BLOOD PRESSURE: 70 MMHG

## 2019-09-26 DIAGNOSIS — G47.33 OBSTRUCTIVE SLEEP APNEA: ICD-10-CM

## 2019-09-26 DIAGNOSIS — E78.2 MIXED HYPERLIPIDEMIA: ICD-10-CM

## 2019-09-26 DIAGNOSIS — Z12.11 COLON CANCER SCREENING: ICD-10-CM

## 2019-09-26 DIAGNOSIS — I25.119 CORONARY ARTERY DISEASE INVOLVING NATIVE CORONARY ARTERY OF NATIVE HEART WITH ANGINA PECTORIS (HCC): ICD-10-CM

## 2019-09-26 DIAGNOSIS — E66.01 MORBID OBESITY (HCC): ICD-10-CM

## 2019-09-26 DIAGNOSIS — Z72.0 TOBACCO ABUSE: ICD-10-CM

## 2019-09-26 DIAGNOSIS — R06.02 SHORTNESS OF BREATH: ICD-10-CM

## 2019-09-26 DIAGNOSIS — E11.65 TYPE 2 DIABETES MELLITUS WITH HYPERGLYCEMIA, WITHOUT LONG-TERM CURRENT USE OF INSULIN (HCC): ICD-10-CM

## 2019-09-26 DIAGNOSIS — J44.9 CHRONIC OBSTRUCTIVE PULMONARY DISEASE, UNSPECIFIED COPD TYPE (HCC): ICD-10-CM

## 2019-09-26 DIAGNOSIS — I10 ESSENTIAL HYPERTENSION: ICD-10-CM

## 2019-09-26 PROCEDURE — 3074F SYST BP LT 130 MM HG: CPT | Performed by: INTERNAL MEDICINE

## 2019-09-26 PROCEDURE — 99214 OFFICE O/P EST MOD 30 MIN: CPT | Performed by: INTERNAL MEDICINE

## 2019-09-26 PROCEDURE — 3078F DIAST BP <80 MM HG: CPT | Performed by: INTERNAL MEDICINE

## 2019-09-26 NOTE — PATIENT INSTRUCTIONS

## 2019-09-29 DIAGNOSIS — F17.209 NICOTINE DEPENDENCE WITH NICOTINE-INDUCED DISORDER, UNSPECIFIED NICOTINE PRODUCT TYPE: ICD-10-CM

## 2019-09-29 RX ORDER — NICOTINE 21 MG/24HR
1 PATCH, TRANSDERMAL 24 HOURS TRANSDERMAL EVERY 24 HOURS
Qty: 28 PATCH | Refills: 0 | Status: SHIPPED | OUTPATIENT
Start: 2019-09-29 | End: 2019-12-27

## 2019-09-29 NOTE — ASSESSMENT & PLAN NOTE
BMI Counseling: Body mass index is 42 87 kg/m²  Discussed the patient's BMI with her  The BMI is above normal  Nutrition recommendations include reducing portion sizes, decreasing overall calorie intake, 3-5 servings of fruits/vegetables daily, reducing fast food intake, consuming healthier snacks and decreasing soda and/or juice intake  Exercise recommendations include exercising 3-5 times per week and strength training exercises

## 2019-10-01 ENCOUNTER — TELEPHONE (OUTPATIENT)
Dept: GASTROENTEROLOGY | Facility: AMBULARY SURGERY CENTER | Age: 59
End: 2019-10-01

## 2019-10-01 DIAGNOSIS — F34.1 DYSTHYMIA: ICD-10-CM

## 2019-10-01 DIAGNOSIS — F32.A DEPRESSION, UNSPECIFIED DEPRESSION TYPE: ICD-10-CM

## 2019-10-01 RX ORDER — DULOXETIN HYDROCHLORIDE 30 MG/1
CAPSULE, DELAYED RELEASE ORAL
Qty: 30 CAPSULE | Refills: 11 | Status: SHIPPED | OUTPATIENT
Start: 2019-10-01 | End: 2020-09-25

## 2019-10-01 RX ORDER — BUPROPION HYDROCHLORIDE 100 MG/1
TABLET ORAL
Qty: 60 TABLET | Refills: 5 | Status: SHIPPED | OUTPATIENT
Start: 2019-10-01 | End: 2019-11-26 | Stop reason: DRUGHIGH

## 2019-10-01 NOTE — TELEPHONE ENCOUNTER
Called patient    Left v/m for her to call back to schedule consult in [place] as per her referral

## 2019-10-09 DIAGNOSIS — E11.40 TYPE 2 DIABETES MELLITUS WITH DIABETIC NEUROPATHY, WITHOUT LONG-TERM CURRENT USE OF INSULIN (HCC): ICD-10-CM

## 2019-10-09 RX ORDER — PEN NEEDLE, DIABETIC 31 GX5/16"
NEEDLE, DISPOSABLE MISCELLANEOUS
Qty: 100 EACH | Refills: 3 | Status: SHIPPED | OUTPATIENT
Start: 2019-10-09 | End: 2020-12-28

## 2019-10-10 ENCOUNTER — OFFICE VISIT (OUTPATIENT)
Dept: BARIATRICS | Facility: CLINIC | Age: 59
End: 2019-10-10
Payer: COMMERCIAL

## 2019-10-10 VITALS
WEIGHT: 242.13 LBS | DIASTOLIC BLOOD PRESSURE: 78 MMHG | RESPIRATION RATE: 16 BRPM | BODY MASS INDEX: 42.9 KG/M2 | HEIGHT: 63 IN | HEART RATE: 98 BPM | TEMPERATURE: 99.3 F | SYSTOLIC BLOOD PRESSURE: 118 MMHG

## 2019-10-10 DIAGNOSIS — Z82.49 FAMILY HISTORY OF DVT: ICD-10-CM

## 2019-10-10 DIAGNOSIS — E66.01 OBESITY, CLASS III, BMI 40-49.9 (MORBID OBESITY) (HCC): Primary | ICD-10-CM

## 2019-10-10 PROCEDURE — 99203 OFFICE O/P NEW LOW 30 MIN: CPT | Performed by: SURGERY

## 2019-10-10 NOTE — LETTER
October 10, 2019     Bennett Ferreira MD  [address] [facility]    Patient: Crystal Bonner   YOB: 1960   Date of Visit: 10/10/2019       Dear Dr Kevon Lopez: Thank you for referring Ang Mayer to me for evaluation for bariatric surgery  Below are my notes for this consultation  If you have questions, please do not hesitate to call me  I look forward to following your patient along with you  Sincerely,      NOBLE Rodriguez   10/10/2019  11:50 AM          CC: No Recipients  Gillian Lucio MD  10/10/2019 11:49 AM  Sign at close encounter      [address] [place] [address] 60 y o  female MRN: [id number]  Unit/Bed#:  Encounter: [id number]      HPI:  Crystal Bonner is a 60 y o  female who presents with a longstanding history of morbid obesity and inability to sustain a meaningful weight loss  Here today to discuss bariatric options  She is a retired  on disability  Body mass index is 42 89 kg/m²      ++Suffers from CAD, COPD, HTN, DM2 (10+ yrs on metformin/victoza), LESLIE on CPAP, asthma, dyslipidemia, varicosities, DJD lumbar, depression/anxiety  S/p lumbar laminectomy (ALIF), C-sxn (vertical)  +Smokes 1/2ppd but starting patch today - explained the increased risk of wound complications  *Mother with LE DVT, Grandmother with death from VTE after hysterectomy - will refer to [facility] for panel    Visit type: initial visit    Symptoms: excess weight, weight increase, dysnea, fatigue and back pain    Associated Symptoms: depressed mood and anxiety    Associated Conditions: glucose intolerance, hyperlipidemia, sleep apnea, abdominal obesity and hypergycemia  Disease Complications: diabetes, hypertension, sleep apnea, osteoarthritis and coronary artery disease  Weight Loss Interest: high  Previous Diet Trials: low calorie     Exercise Frequency:sedentary  Types of Exercise: walking      Review of Systems   Constitutional: Negative  Respiratory: Negative  Cardiovascular: Negative  Gastrointestinal: Negative  Musculoskeletal: Negative  Neurological: Negative  All other systems reviewed and are negative  Historical Information   Past Medical History:   Diagnosis Date    Anxiety     Arthritis     Asthma     Back pain     Breast lump     last assessed 10/14/14     Carpal tunnel syndrome 2006    unspecifiedd laterality / last assessed 10/14/14     COPD (chronic obstructive pulmonary disease) ([facility] )     with exacerbation / last assessed 14     Coronary artery disease involving native coronary artery of native heart with angina pectoris ([facility] ) 2019    Depression     Diabetes mellitus ([facility] )     GERD (gastroesophageal reflux disease)     Hypercholesterolemia     Hyperlipidemia     Hypertension     Insomnia     Intolerance to cold     Intolerance to heat     Irregular heart beat     Low back pain     Lumbosacral disc disease     Nodule of tendon sheath     last assessed 2/5/15     Obesity     Peripheral neuropathy     Shortness of breath     Sleep apnea     Type 2 diabetes mellitus with hyperglycemia ([facility] )     last assessed 17     Varicose vein of leg      Past Surgical History:   Procedure Laterality Date    BACK SURGERY  2005    lower fusion    CAUDAL BLOCK N/A 2017    Procedure: BLOCK / INJECTION CAUDAL;  Surgeon: Michele Gomez MD;  Location: [facility] MAIN OR;  Service: Pain Management     CAUDAL BLOCK N/A 2018    Procedure: Caudal Epidural Steroid Injection (81718);   Surgeon: Michele Gomez MD;  [facility] MAIN OR;  Service: Pain Management      SECTION      LAMINECTOMY      Lumbar      Social History   Social History     Substance and Sexual Activity   Alcohol Use No     Social History     Substance and Sexual Activity   Drug Use No     Social History     Tobacco Use   Smoking Status Current Every Day Smoker    Packs/day: 0 25    Years: 30 00    Pack years: 7 50    Types: Cigarettes   Smokeless Tobacco Never Used   Tobacco Comment    10-12 cigs  a day     Family History:   Family History   Problem Relation Age of Onset    Diabetes Mother     Heart disease Mother     Dementia Father     Heart disease Father     Heart disease Sister     Diabetes Sister     Diabetes Brother        Meds/Allergies   all medications and allergies reviewed  No Known Allergies    Objective       Current Vitals:   /78 (BP Location: Right arm, Patient Position: Sitting, Cuff Size: Large)   Pulse 98   Temp 99 3 °F (37 4 °C) (Tympanic)   Resp 16   Ht 5' 3" (1 6 m)   Wt 110 kg (242 lb 2 oz)   BMI 42 89 kg/m²        Physical Exam   Constitutional: She is oriented to person, place, and time  She appears well-developed  HENT:   Head: Normocephalic  Eyes: EOM are normal    Neck: Normal range of motion  Cardiovascular: Normal rate  Pulmonary/Chest: Effort normal    Abdominal: She exhibits no distension  Musculoskeletal: Normal range of motion  Neurological: She is alert and oriented to person, place, and time  Skin: Skin is warm and dry  Psychiatric: She has a normal mood and affect  Her behavior is normal  Judgment and thought content normal        Lab Results: I have personally reviewed pertinent lab results  Imaging: I have personally reviewed pertinent reports  EKG, Pathology, and Other Studies: I have personally reviewed pertinent reports  Assessment/PLAN:    60 y o  yo female with a long standing h/o of obesity and inability to sustain any meaningful weight loss on her own despite several attempts  She is interested in the Laparoscopic Sleeve gastrectomy      ++Suffers from CAD, COPD, HTN, DM2 (10+ yrs on metformin/victoza), LESLIE on CPAP, asthma, dyslipidemia, varicosities, DJD lumbar, depression/anxiety  S/p lumbar laminectomy (ALIF), C-sxn (vertical)  +Smokes 1/2ppd but starting patch today - explained the increased risk of wound complications  *Mother with LE DVT, Grandmother with death from VTE after hysterectomy - will refer to [facility] for panel      I have explained our Enhanced Recovery After Bariatric Surgery (ERABS) protocol and benefits including preoperative, intraoperative and postoperative elements  As a part of her pre op evaluation, she will be referred to a cardiologist for risk stratification, a hematologist for evaluation of clotting profile, pulmonologist for COPD optimization for surgery  She needs an EGD to evaluate the anatomy of her GI tract prior to the operation  I have spent over 45 minutes with her face to face in the office today discussing her options and details of the surgery  We have seen an animation of the surgery on the computer that illustrates how the operation is done and how the anatomy will be altered with the procedure  Over 50% of this was coordinating care  She was given the opportunity to ask questions and I have answered all of them  I have discussed and educated the patient with regards to the components of our multidisciplinary program and the importance of compliance and follow up in the post operative period  The patient was also instructed with regards to the importance of behavior modification, nutritional counseling, support meeting attendance and lifestyle changes that are important to ensure success  Although there is a great statistical chance of improvement or even resolution of most of her associated comorbidities, the results vary from patient to patient and they largely depend on her commitment and compliance  She needs to be at   225 lbs prior to the operation      NOBLE Stein   10/10/2019  11:36 AM

## 2019-10-10 NOTE — PROGRESS NOTES
BARIATRIC INITIAL CONSULT - BARIATRIC SURGERY    Sahil Mendez 60 y o  female MRN: [id number]  Unit/Bed#:  Encounter: [id number]      HPI:  Sahil Mendez is a 60 y o  female who presents with a longstanding history of morbid obesity and inability to sustain a meaningful weight loss  Here today to discuss bariatric options  She is a retired  on disability  Body mass index is 42 89 kg/m²  ++Suffers from CAD, COPD, HTN, DM2 (10+ yrs on metformin/victoza), LESLIE on CPAP, asthma, dyslipidemia, varicosities, DJD lumbar, depression/anxiety  S/p lumbar laminectomy (ALIF), C-sxn (vertical)  +Smokes 1/2ppd but starting patch today - explained the increased risk of wound complications  *Mother with LE DVT, Grandmother with death from VTE after hysterectomy - will refer to [facility] panel    Visit type: initial visit    Symptoms: excess weight, weight increase, dysnea, fatigue and back pain    Associated Symptoms: depressed mood and anxiety    Associated Conditions: glucose intolerance, hyperlipidemia, sleep apnea, abdominal obesity and hypergycemia  Disease Complications: diabetes, hypertension, sleep apnea, osteoarthritis and coronary artery disease  Weight Loss Interest: high  Previous Diet Trials: low calorie     Exercise Frequency:sedentary  Types of Exercise: walking      Review of Systems   Constitutional: Negative  Respiratory: Negative  Cardiovascular: Negative  Gastrointestinal: Negative  Musculoskeletal: Negative  Neurological: Negative  All other systems reviewed and are negative        Historical Information   Past Medical History:   Diagnosis Date    Anxiety     Arthritis     Asthma     Back pain     Breast lump     last assessed 10/14/14     Carpal tunnel syndrome 03/17/2006    unspecifiedd laterality / last assessed 10/14/14     COPD (chronic obstructive pulmonary disease) ([facility] )     with exacerbation / last assessed 6/25/14     Coronary artery disease involving native coronary artery of native heart with angina pectoris ([facility] [address] ) 2019    Depression     [facility]     GERD (gastroesophageal reflux disease)     Hypercholesterolemia     Hyperlipidemia     Hypertension     Insomnia     Intolerance to cold     Intolerance to heat     Irregular heart beat     Low back pain     Lumbosacral disc disease     Nodule of tendon sheath     last assessed 2/5/15     Obesity     Peripheral neuropathy     Shortness of breath     Sleep apnea     Type 2 diabetes mellitus with hyperglycemia [facility]     last assessed 17     Varicose vein of leg      Past Surgical History:   Procedure Laterality Date    BACK SURGERY  2005    lower fusion    CAUDAL BLOCK N/A 2017    Procedure: BLOCK / INJECTION CAUDAL;  Surgeon: Elizabeth Alfonso MD;  Location: [facility] MAIN OR;  Service: Pain Management     CAUDAL BLOCK N/A 2018    Procedure: Caudal Epidural Steroid Injection (17404); Surgeon: Elizabeth Alfonso MD;  Location: [facility] MAIN OR;  Service: Pain Management      SECTION      LAMINECTOMY      Lumbar      Social History   Social History     Substance and Sexual Activity   Alcohol Use No     Social History     Substance and Sexual Activity   Drug Use No     Social History     Tobacco Use   Smoking Status Current Every Day Smoker    Packs/day: 0 25    Years: 30 00    Pack years: 7 50    Types: Cigarettes   Smokeless Tobacco Never Used   Tobacco Comment    10-12 cigs   a day     Family History:   Family History   Problem Relation Age of Onset    Diabetes Mother     Heart disease Mother     Dementia Father     Heart disease Father     Heart disease Sister     Diabetes Sister     Diabetes Brother        Meds/Allergies   all medications and allergies reviewed  No Known Allergies    Objective       Current Vitals:   /78 (BP Location: Right arm, Patient Position: Sitting, Cuff Size: Large)   Pulse 98   Temp 99 3 °F (37 4 °C) (Tympanic) Resp 16   Ht 5' 3" (1 6 m)   Wt 110 kg (242 lb 2 oz)   BMI 42 89 kg/m²       Physical Exam   Constitutional: She is oriented to person, place, and time  She appears well-developed  HENT:   Head: Normocephalic  Eyes: EOM are normal    Neck: Normal range of motion  Cardiovascular: Normal rate  Pulmonary/Chest: Effort normal    Abdominal: She exhibits no distension  Musculoskeletal: Normal range of motion  Neurological: She is alert and oriented to person, place, and time  Skin: Skin is warm and dry  Psychiatric: She has a normal mood and affect  Her behavior is normal  Judgment and thought content normal        Lab Results: I have personally reviewed pertinent lab results  Imaging: I have personally reviewed pertinent reports  EKG, Pathology, and Other Studies: I have personally reviewed pertinent reports  Assessment/PLAN:    60 y o  yo female with a long standing h/o of obesity and inability to sustain any meaningful weight loss on her own despite several attempts  She is interested in the Laparoscopic Sleeve gastrectomy  ++Suffers from CAD, COPD, HTN, DM2 (10+ yrs on metformin/victoza), LESLIE on CPAP, asthma, dyslipidemia, varicosities, DJD lumbar, depression/anxiety  S/p lumbar laminectomy (ALIF), C-sxn (vertical)  +Smokes 1/2ppd but starting patch today - explained the increased risk of wound complications  *Mother with LE DVT, Grandmother with death from VTE after hysterectomy - will refer to [facility] for panel      I have explained our Enhanced Recovery After Bariatric Surgery (ERABS) protocol and benefits including preoperative, intraoperative and postoperative elements  As a part of her pre op evaluation, she will be referred to a cardiologist for risk stratification, a hematologist for evaluation of clotting profile, pulmonologist for COPD optimization for surgery  She needs an EGD to evaluate the anatomy of her GI tract prior to the operation    I have spent over 45 minutes with her face to face in the office today discussing her options and details of the surgery  We have seen an animation of the surgery on the computer that illustrates how the operation is done and how the anatomy will be altered with the procedure  Over 50% of this was coordinating care  She was given the opportunity to ask questions and I have answered all of them  I have discussed and educated the patient with regards to the components of our multidisciplinary program and the importance of compliance and follow up in the post operative period  The patient was also instructed with regards to the importance of behavior modification, nutritional counseling, support meeting attendance and lifestyle changes that are important to ensure success  Although there is a great statistical chance of improvement or even resolution of most of her associated comorbidities, the results vary from patient to patient and they largely depend on her commitment and compliance  She needs to be at   225 lbs prior to the operation      NOBLE Barahona   10/10/2019  11:36 AM

## 2019-10-16 DIAGNOSIS — K21.9 GASTROESOPHAGEAL REFLUX DISEASE, ESOPHAGITIS PRESENCE NOT SPECIFIED: Primary | ICD-10-CM

## 2019-10-16 RX ORDER — FAMOTIDINE 20 MG/1
20 TABLET, FILM COATED ORAL 2 TIMES DAILY
Qty: 60 TABLET | Refills: 1 | Status: SHIPPED | OUTPATIENT
Start: 2019-10-16 | End: 2019-12-21 | Stop reason: SDUPTHER

## 2019-10-17 ENCOUNTER — CONSULT (OUTPATIENT)
Dept: HEMATOLOGY ONCOLOGY | Facility: MEDICAL CENTER | Age: 59
End: 2019-10-17
Payer: COMMERCIAL

## 2019-10-17 VITALS
HEIGHT: 63 IN | DIASTOLIC BLOOD PRESSURE: 68 MMHG | BODY MASS INDEX: 42.7 KG/M2 | WEIGHT: 241 LBS | TEMPERATURE: 96.3 F | RESPIRATION RATE: 16 BRPM | HEART RATE: 95 BPM | SYSTOLIC BLOOD PRESSURE: 108 MMHG | OXYGEN SATURATION: 94 %

## 2019-10-17 DIAGNOSIS — E66.01 OBESITY, CLASS III, BMI 40-49.9 (MORBID OBESITY) (HCC): ICD-10-CM

## 2019-10-17 DIAGNOSIS — Z72.0 TOBACCO ABUSE: Primary | ICD-10-CM

## 2019-10-17 DIAGNOSIS — Z82.49 FAMILY HISTORY OF DVT: ICD-10-CM

## 2019-10-17 PROCEDURE — 99204 OFFICE O/P NEW MOD 45 MIN: CPT | Performed by: INTERNAL MEDICINE

## 2019-10-17 NOTE — PROGRESS NOTES
Gamaliel Price  1960  [facility] [address] [address]  HEMATOLOGY/ONCOLOGY CONSULTATION REPORT    DISCUSSION/SUMMARY:    77-year-old female in need of gastric (banding) surgery  Mrs Binta Sosa feels relatively well, patient is dealing with the sleep apnea and CPAP issues  There is concern for a familial/inherited thrombophilia  Patient is to undergo a complete thrombophilia panel  Results will help dictate pre/post surgical manipulation as far as anticoagulation  At this time, Mrs Binta Sosa is to return to the office in a few weeks when the results are available  Patient knows to call the hematology/oncology office if there are any other questions or concerns  Carefully review your medication list and verify that the list is accurate and up-to-date  Please call the hematology/oncology office if there are medications missing from the list, medications on the list that you are not currently taking or if there is a dosage or instruction that is different from how you're taking that medication  Patient goals and areas of care: Thrombophilia evaluation  Barriers to care:  None  Patient is able to self-care   ______________________________________________________________________________________    Chief Complaint   Patient presents with    Consult     Rule out a thrombophilia       History of Present Illness:  77-year-old female recently seen by bariatric surgery for evaluation of a gastric procedure for weight reduction  Mrs Binta Sosa' mother was previously treated for a lower extremity DVT  Patient's maternal grandmother also  from a thrombotic complication (specifics not presently available)  Patient was referred to rule out any thrombophilia  Patient states feeling okay, about the same as before  Mrs Binta Sosa recently started CPAP because of sleep apnea - having difficulty getting used to the mask at night   No shortness of breath at rest, mild dyspnea on exertion  No chest pain or pressure  No headaches, blurred vision or dizziness  No lower extremity swelling or pain  Activities are baseline  Appetite is good, weight is obviously an issue  No  or GYN issues, no other GI issues or bleeding  Patient has never suffered a thrombotic event  Review of Systems   Constitutional: Positive for unexpected weight change  HENT: Negative  Eyes: Negative  Respiratory: Negative  Dyspnea on exertion   Cardiovascular: Negative  Gastrointestinal: Negative  Endocrine: Negative  Genitourinary: Negative  Musculoskeletal: Negative  Skin: Negative  Allergic/Immunologic: Negative  Neurological: Negative  Hematological: Negative  Psychiatric/Behavioral: Negative  All other systems reviewed and are negative      Patient Active Problem List   Diagnosis    Chronic pain syndrome    Chronic low back pain    Postlaminectomy syndrome, not elsewhere classified    Adjacent segment disease with spinal stenosis    Spondylolisthesis of lumbar region    Groin pain, left    [facility]    Depression with anxiety    Type 2 diabetes mellitus with hyperglycemia, without long-term current use of insulin ([facility])    Disc degeneration, lumbosacral    Hypertension    Hyperlipidemia    Insomnia    Lumbar radiculopathy    Nicotine dependence    Complication of surgical procedure    Varicose veins of both lower extremities with pain    Varicose veins with inflammation    Cervical pain (neck)    Myofascial pain syndrome    Primary osteoarthritis of one hip, left    Pain in left hip    Obesity, Class III, BMI 40-49 9 (morbid obesity) ([facility]    Postlaminectomy syndrome, lumbar region    Low back pain    Shortness of breath    Centrilobular emphysema ([facility]    Daytime hypersomnolence    Obstructive sleep apnea    Alveolar hypoventilation    Abnormal stress test    Coronary artery disease involving native coronary artery of native heart with angina pectoris ([facility] [address] [facility]    Tobacco abuse    Colon cancer screening    BMI 40 0-44 9, adult [facility])     Past Medical History:   Diagnosis Date    Anxiety     Arthritis     Asthma     Back pain     Breast lump     last assessed 10/14/14     Carpal tunnel syndrome 2006    unspecifiedd laterality / last assessed 10/14/14     COPD (chronic obstructive pulmonary disease) ([facility] [facility] )     with exacerbation / last assessed 14     Coronary artery disease involving native coronary artery of native heart with angina pectoris ([facility] [address] ) 2019    Depression     Diabetes mellitus ([facility]     GERD (gastroesophageal reflux disease)     Hypercholesterolemia     Hyperlipidemia     Hypertension     Insomnia     Intolerance to cold     Intolerance to heat     Irregular heart beat     Low back pain     Lumbosacral disc disease     Nodule of tendon sheath     last assessed 2/5/15     Obesity     Peripheral neuropathy     Shortness of breath     Sleep apnea     Type 2 diabetes mellitus with hyperglycemia ([facility] [address] )     last assessed 17     Varicose vein of leg      Past Surgical History:   Procedure Laterality Date    BACK SURGERY  2005    lower fusion    CAUDAL BLOCK N/A 2017    Procedure: BLOCK / INJECTION CAUDAL;  Surgeon: Xuan Vidal MD;  Location: [place] [address] MAIN OR;  Service: Pain Management     CAUDAL BLOCK N/A 2018    Procedure: Caudal Epidural Steroid Injection (52252); Surgeon: Xuan Vidal MD;  Location: [facility] MAIN OR;  Service: Pain Management      SECTION      LAMINECTOMY      Lumbar      Family History   Problem Relation Age of Onset    Diabetes Mother     Heart disease Mother     Dementia Father     Heart disease Father     Heart disease Sister     Diabetes Sister     Diabetes Brother     Family history:   Mother with lower extremity DVT, grandmother reportedly underwent hysterectomy many years ago and  suddenly in postop - patient states that the etiology was due to a thrombotic event    Social History     Socioeconomic History    Marital status: Single     Spouse name: Not on file    Number of children: Not on file    Years of education: Not on file    Highest education level: Not on file   Occupational History     Comment: unemployed   Social Needs    Financial resource strain: Not on file    Food insecurity:     Worry: Not on file     Inability: Not on file    Transportation needs:     Medical: Not on file     Non-medical: Not on file   Tobacco Use    Smoking status: Current Every Day Smoker     Packs/day: 0 25     Years: 30 00     Pack years: 7 50     Types: Cigarettes    Smokeless tobacco: Never Used    Tobacco comment: 10-12 cigs   a day   Substance and Sexual Activity    Alcohol use: No    Drug use: No    Sexual activity: Not on file   Lifestyle    Physical activity:     Days per week: Not on file     Minutes per session: Not on file    Stress: Not on file   Relationships    Social connections:     Talks on phone: Not on file     Gets together: Not on file     Attends Taoist service: Not on file     Active member of club or organization: Not on file     Attends meetings of clubs or organizations: Not on file     Relationship status: Not on file    Intimate partner violence:     Fear of current or ex partner: Not on file     Emotionally abused: Not on file     Physically abused: Not on file     Forced sexual activity: Not on file   Other Topics Concern    Not on file   Social History Narrative     per allscript     Lives alone without help available    Social history:  +active cigarette smoker, no drug or alcohol abuse, no toxic exposure    Current Outpatient Medications:     albuterol (PROVENTIL HFA,VENTOLIN HFA) 90 mcg/act inhaler, INHALE TWO PUFFS BY MOUTH EVERY FOUR HOURS AS NEEDED FOR WHEEZING, Disp: 18 Inhaler, Rfl: 5   atorvastatin (LIPITOR) 80 mg tablet, TAKE 1 TABLET BY MOUTH ONCE A DAY, Disp: 90 tablet, Rfl: 3    B-D UF III MINI PEN NEEDLES 31G X 5 MM MISC, 1 ONCE A DAY WITH VICTOZA PEN, Disp: 100 each, Rfl: 3    buPROPion (WELLBUTRIN) 100 mg tablet, TAKE 1 TABLET BY MOUTH TWICE A DAY, Disp: 60 tablet, Rfl: 5    DULoxetine (CYMBALTA) 30 mg delayed release capsule, TAKE 1 CAPSULE BY MOUTH ONCE A DAY, Disp: 30 capsule, Rfl: 11    DULoxetine (CYMBALTA) 60 mg delayed release capsule, Take 1 capsule (60 mg total) by mouth daily, Disp: 90 capsule, Rfl: 3    famotidine (PEPCID) 20 mg tablet, Take 1 tablet (20 mg total) by mouth 2 (two) times a day, Disp: 60 tablet, Rfl: 1    fluticasone (FLONASE) 50 mcg/act nasal spray, SPRAY 2 SPRAYS INTO EACH NOSTRIL EVERY DAY, Disp: 16 mL, Rfl: 3    fluticasone-salmeterol (AIRDUO RESPICLICK) 401-00 mcg/act dry powder inhaler, Inhale 1 puff 2 (two) times a day Rinse mouth after use , Disp: 1 Inhaler, Rfl: 3    glucose blood (ONE TOUCH ULTRA TEST) test strip, TEST ONCE A DAY-dx code: E11 9, Disp: 100 each, Rfl: 3    lisinopril-hydrochlorothiazide (PRINZIDE,ZESTORETIC) 20-25 MG per tablet, Take 1 tablet by mouth daily, Disp: 90 tablet, Rfl: 3    metFORMIN (GLUCOPHAGE) 1000 MG tablet, TAKE 2 TABLETS (2,000 MG TOTAL) BY MOUTH DAILY FOR 90 DAYS, Disp: 180 tablet, Rfl: 1    nicotine (NICODERM CQ) 21 mg/24 hr TD 24 hr patch, PLACE 1 PATCH ON THE SKIN EVERY 24 HOURS, Disp: 28 patch, Rfl: 0    pregabalin (LYRICA) 100 mg capsule, Take 1 capsule (100 mg total) by mouth 3 (three) times a day, Disp: 90 capsule, Rfl: 0    QUEtiapine (SEROquel) 25 mg tablet, TAKE 1 TABLET BY MOUTH TWICE A DAY, Disp: 60 tablet, Rfl: 5    Tapentadol HCl ER (NUCYNTA ER) 50 MG TB12, Take 1 tablet (50 mg total) by mouth every 12 (twelve) hoursMax Daily Amount: 100 mg, Disp: 60 tablet, Rfl: 0    umeclidinium bromide (INCRUSE ELLIPTA) 62 5 mcg/inh AEPB inhaler, Inhale 1 puff daily, Disp: 30 each, Rfl: 3    Jose Wang injection, INJECT 0 3 ML (1 8 MG TOTAL) UNDER THE SKIN DAILY, Disp: 3 pen, Rfl: 3    VICTOZA injection, INJECT 0 3 ML (1 8 MG TOTAL) UNDER THE SKIN DAILY, Disp: 6 pen, Rfl: 3    NICOTROL 10 MG inhaler, INHALE 1 PUFF AS NEEDED FOR SMOKING CESSATION FOR UP TO 30 DAYS (Patient not taking: Reported on 8/8/2019), Disp: 168 each, Rfl: 1    No Known Allergies    Vitals:    10/17/19 1000   Pulse: 95   Resp: 16   Temp: (!) 96 3 °F (35 7 °C)   SpO2: 94%     Physical Exam   Constitutional: She is oriented to person, place, and time  She appears well-developed and well-nourished  Obese female, no respiratory distress, + sajan appearance to the face and neck   HENT:   Head: Normocephalic and atraumatic  Right Ear: External ear normal    Left Ear: External ear normal    Mouth/Throat: Oropharynx is clear and moist    Eyes: Pupils are equal, round, and reactive to light  Conjunctivae and EOM are normal    Neck: Normal range of motion  Neck supple  Cardiovascular: Normal rate, regular rhythm, normal heart sounds and intact distal pulses  Pulmonary/Chest: Effort normal and breath sounds normal    Fair to good air entry bilaterally, scattered bilateral rhonchi   Abdominal: Soft  Bowel sounds are normal    Soft, nontender, obese cannot palpate liver or spleen, +bowel sounds, no guarding   Musculoskeletal: Normal range of motion  Neurological: She is alert and oriented to person, place, and time  She has normal reflexes  Skin: Skin is warm  Good color, warm, moist, no petechiae or ecchymoses, as above, slight sajan appearance in the face and neck   Psychiatric: She has a normal mood and affect   Her behavior is normal  Judgment and thought content normal    Extremities:  No lower extremity edema bilaterally, no cords, pulses are 1+  Lymphatics:  No adenopathy in the neck, supraclavicular region or axilla bilaterally    Labs    08/19/2019 WBC = 9 9 hemoglobin = 14 hematocrit = 41 9 MCV = 92 platelet = 134 neutrophil = 60% lymphocytes = 23% monocyte = 6% BUN = 14 creatinine = 0 98 glucose = 188 calcium = 9 5 PT = 9 7 INR = 0 9

## 2019-11-01 ENCOUNTER — ANESTHESIA (OUTPATIENT)
Dept: GASTROENTEROLOGY | Facility: AMBULARY SURGERY CENTER | Age: 59
End: 2019-11-01

## 2019-11-01 ENCOUNTER — HOSPITAL ENCOUNTER (OUTPATIENT)
Dept: GASTROENTEROLOGY | Facility: AMBULARY SURGERY CENTER | Age: 59
Setting detail: OUTPATIENT SURGERY
Discharge: HOME | End: 2019-11-01
Attending: SURGERY
Payer: COMMERCIAL

## 2019-11-01 ENCOUNTER — ANESTHESIA EVENT (OUTPATIENT)
Dept: GASTROENTEROLOGY | Facility: AMBULARY SURGERY CENTER | Age: 59
End: 2019-11-01

## 2019-11-01 VITALS
TEMPERATURE: 96.2 F | HEIGHT: 63 IN | DIASTOLIC BLOOD PRESSURE: 53 MMHG | SYSTOLIC BLOOD PRESSURE: 119 MMHG | HEART RATE: 97 BPM | BODY MASS INDEX: 41.85 KG/M2 | RESPIRATION RATE: 16 BRPM | WEIGHT: 236.2 LBS | OXYGEN SATURATION: 96 %

## 2019-11-01 DIAGNOSIS — E66.01 MORBID OBESITY (HCC): ICD-10-CM

## 2019-11-01 LAB — GLUCOSE SERPL-MCNC: 164 MG/DL (ref 65–140)

## 2019-11-01 PROCEDURE — 82948 REAGENT STRIP/BLOOD GLUCOSE: CPT

## 2019-11-01 PROCEDURE — 88305 TISSUE EXAM BY PATHOLOGIST: CPT | Performed by: PATHOLOGY

## 2019-11-01 PROCEDURE — 43239 EGD BIOPSY SINGLE/MULTIPLE: CPT | Performed by: SURGERY

## 2019-11-01 RX ORDER — SODIUM CHLORIDE, SODIUM LACTATE, POTASSIUM CHLORIDE, CALCIUM CHLORIDE 600; 310; 30; 20 MG/100ML; MG/100ML; MG/100ML; MG/100ML
125 INJECTION, SOLUTION INTRAVENOUS CONTINUOUS
Status: DISCONTINUED | OUTPATIENT
Start: 2019-11-01 | End: 2019-11-05 | Stop reason: HOSPADM

## 2019-11-01 RX ORDER — SODIUM CHLORIDE, SODIUM LACTATE, POTASSIUM CHLORIDE, CALCIUM CHLORIDE 600; 310; 30; 20 MG/100ML; MG/100ML; MG/100ML; MG/100ML
INJECTION, SOLUTION INTRAVENOUS CONTINUOUS PRN
Status: DISCONTINUED | OUTPATIENT
Start: 2019-11-01 | End: 2019-11-01 | Stop reason: SURG

## 2019-11-01 RX ORDER — PROPOFOL 10 MG/ML
INJECTION, EMULSION INTRAVENOUS AS NEEDED
Status: DISCONTINUED | OUTPATIENT
Start: 2019-11-01 | End: 2019-11-01 | Stop reason: SURG

## 2019-11-01 RX ADMIN — PROPOFOL 100 MG: 10 INJECTION, EMULSION INTRAVENOUS at 08:52

## 2019-11-01 RX ADMIN — PROPOFOL 50 MG: 10 INJECTION, EMULSION INTRAVENOUS at 08:54

## 2019-11-01 RX ADMIN — SODIUM CHLORIDE, SODIUM LACTATE, POTASSIUM CHLORIDE, AND CALCIUM CHLORIDE: .6; .31; .03; .02 INJECTION, SOLUTION INTRAVENOUS at 08:48

## 2019-11-01 RX ADMIN — PROPOFOL 50 MG: 10 INJECTION, EMULSION INTRAVENOUS at 08:56

## 2019-11-01 RX ADMIN — SODIUM CHLORIDE, SODIUM LACTATE, POTASSIUM CHLORIDE, AND CALCIUM CHLORIDE 125 ML/HR: .6; .31; .03; .02 INJECTION, SOLUTION INTRAVENOUS at 08:45

## 2019-11-01 NOTE — H&P
This is a 60 y o  female with a history of morbid obesity and Body mass index is 41 84 kg/m²  Here for an EGD to evaluate the anatomy of the GI tract and to rule out the presence of H  pylori  Physical Exam    /68   Pulse 88   Temp (!) 96 2 °F (35 7 °C) (Tympanic)   Resp 20   Ht 5' 3" (1 6 m)   Wt 107 kg (236 lb 3 2 oz)   SpO2 96%   BMI 41 84 kg/m²    AAOx3  RRR  CTA B  Abdomen obese  Benign  A/P:    This is a 60 y o  female with a history of morbid obesity and Body mass index is 41 84 kg/m²       Will proceed with the EGD and biopsies        NOBLE Barahona   11/1/2019  8:40 AM

## 2019-11-01 NOTE — ANESTHESIA PREPROCEDURE EVALUATION
Review of Systems/Medical History  Patient summary reviewed  Chart reviewed  No history of anesthetic complications     Cardiovascular  Hyperlipidemia, Hypertension , CAD , Angina ,    Pulmonary  COPD , Asthma , Sleep apnea CPAP,        GI/Hepatic    GERD ,             Endo/Other  Diabetes ,      GYN       Hematology   Musculoskeletal    Arthritis     Neurology   Psychology   Anxiety, Depression ,              Physical Exam    Airway    Mallampati score: II  TM Distance: >3 FB  Neck ROM: full     Dental   No notable dental hx     Cardiovascular  Cardiovascular exam normal    Pulmonary  Pulmonary exam normal     Other Findings        Anesthesia Plan  ASA Score- 3     Anesthesia Type- IV sedation with anesthesia with ASA Monitors  Additional Monitors:   Airway Plan:         Plan Factors-    Induction-     Postoperative Plan-     Informed Consent- Anesthetic plan and risks discussed with patient  I personally reviewed this patient with the CRNA  Discussed and agreed on the Anesthesia Plan with the CRNA  Collins Gan

## 2019-11-01 NOTE — ANESTHESIA POSTPROCEDURE EVALUATION
Post-Op Assessment Note    CV Status:  Stable  Pain Score: 0    Pain management: adequate     Mental Status:  Alert and awake   Hydration Status:  Stable and euvolemic   PONV Controlled:  Controlled   Airway Patency:  Patent and adequate   Post Op Vitals Reviewed: Yes      Staff: CRNA           BP   145/82   Temp     Pulse  94   Resp 20   SpO2   92

## 2019-11-06 ENCOUNTER — TELEPHONE (OUTPATIENT)
Dept: HEMATOLOGY ONCOLOGY | Facility: MEDICAL CENTER | Age: 59
End: 2019-11-06

## 2019-11-06 ENCOUNTER — TELEPHONE (OUTPATIENT)
Dept: BARIATRICS | Facility: CLINIC | Age: 59
End: 2019-11-06

## 2019-11-06 NOTE — TELEPHONE ENCOUNTER
Left message informing patient that I have submitted all information to her insurance company  I am waiting for an answer as to whether or not the testing will be covered

## 2019-11-06 NOTE — TELEPHONE ENCOUNTER
SW spoke with patient about scheduling her next weight check for November    Patient was able to schedule with SW for 11/11 at 9:30am

## 2019-11-11 ENCOUNTER — OFFICE VISIT (OUTPATIENT)
Dept: BARIATRICS | Facility: CLINIC | Age: 59
End: 2019-11-11

## 2019-11-11 VITALS — WEIGHT: 244.8 LBS | BODY MASS INDEX: 43.36 KG/M2

## 2019-11-11 PROCEDURE — RECHECK

## 2019-11-11 NOTE — PROGRESS NOTES
Patient met with SW, reported struggles with food choices, logging food and not planning  Patient has gone out to eat more the past few weeks and was anticipating that she may be up in weight  She reports that she only sticks with logging her food for a few days and doesn't often plan ahead  She does tend to eat the same foods each day but she is busy around lunch time which leads to her getting food on the go  SW spoke with patient about necessity to lose weight in order to qualify for surgery and suggested increased tracking and planning along with activity  Patient stated she could try to track at least two days per week until her next weight check in a month, planning out her meals more  She continues to walk daily and just got an exercise bike that she plans to use more often  Patient is eager to have surgery, DEISI reviewed her workflow and [facility]  Patient doesn't want to "chicken out" of the surgery, SW pointed out that it's more about finding the option that is right for her  Patient will return in a month for her 5 out of 6 weight check

## 2019-11-12 ENCOUNTER — OFFICE VISIT (OUTPATIENT)
Dept: PAIN MEDICINE | Facility: CLINIC | Age: 59
End: 2019-11-12
Payer: COMMERCIAL

## 2019-11-12 VITALS
BODY MASS INDEX: 43.77 KG/M2 | HEART RATE: 89 BPM | DIASTOLIC BLOOD PRESSURE: 72 MMHG | HEIGHT: 63 IN | WEIGHT: 247 LBS | SYSTOLIC BLOOD PRESSURE: 108 MMHG

## 2019-11-12 DIAGNOSIS — M96.1 POSTLAMINECTOMY SYNDROME, NOT ELSEWHERE CLASSIFIED: ICD-10-CM

## 2019-11-12 DIAGNOSIS — M54.42 CHRONIC BILATERAL LOW BACK PAIN WITH LEFT-SIDED SCIATICA: ICD-10-CM

## 2019-11-12 DIAGNOSIS — G89.29 CHRONIC BILATERAL LOW BACK PAIN WITH LEFT-SIDED SCIATICA: ICD-10-CM

## 2019-11-12 DIAGNOSIS — Z79.891 LONG-TERM CURRENT USE OF OPIATE ANALGESIC: ICD-10-CM

## 2019-11-12 DIAGNOSIS — F11.20 UNCOMPLICATED OPIOID DEPENDENCE (HCC): ICD-10-CM

## 2019-11-12 DIAGNOSIS — M54.16 LUMBAR RADICULOPATHY: ICD-10-CM

## 2019-11-12 DIAGNOSIS — G89.4 CHRONIC PAIN SYNDROME: Primary | ICD-10-CM

## 2019-11-12 PROCEDURE — 80305 DRUG TEST PRSMV DIR OPT OBS: CPT | Performed by: ANESTHESIOLOGY

## 2019-11-12 PROCEDURE — 99214 OFFICE O/P EST MOD 30 MIN: CPT | Performed by: ANESTHESIOLOGY

## 2019-11-12 NOTE — PROGRESS NOTES
Pain Medicine Follow-Up Note    Assessment:  1  Chronic pain syndrome    2  Long-term current use of opiate analgesic    3  Uncomplicated opioid dependence ([address] )    4  Chronic bilateral low back pain with left-sided sciatica    5  Lumbar radiculopathy    6   Postlaminectomy syndrome, not elsewhere classified        Plan:  Orders Placed This Encounter   Procedures    MM ALL_Prescribed Meds and Special Instructions    MM DT_Alprazolam Definitive Test    MM DT_Amphetamine Definitive Test    MM DT_Aripiprazole Definitive Test    MM DT_Bath Salts Definitive Test    MM DT_Buprenorphine Definitive Test    MM DT_Butalbital Definitive Test    MM DT_Clonazepam Definitive Test    MM DT_Clozapine Definitive Test    MM DT_Cocaine Definitive Test    MM DT_Codeine Definitive Test    MM DT_Desipramine Definitive Test    MM DT_Dextromethorphan Definitive Test    MM Diazepam Definitive Test    MM DT_Ethyl Glucuronide/Ethyl Sulfate Definitive Test    MM DT_Fentanyl Definitive Test    MM DT_Haloperidol Definitive Test    MM DT_Heroin Definitive Test    MM DT_Hydrocodone Definitive Test    MM DT_Hydromorphone Definitive Test    MM DT_Imipramine Definitive Test    MM DT_Kratom Definitive Test    MM DT_Levorphanol Definitive Test    MM DT_MDMA Definitive Test    MM Lorazepam Definitive Test    MM DT_Meperidine Definitive Test    MM DT_Methadone Definitive Test    MM DT_Methamphetamine Definitive Test    MM DT_Methylphenidate Definitive Test    MM DT_Morphine Definitive Test    MM DT_Oxazepam Definitive Test    MM DT_Oxycodone Definitive Test    MM DT_Oxymorphone Definitive Test    MM DT_Phencyclidine Definitive Test    MM DT_Phenobarbital Definitive Test    MM DT_Phentermine Definitive Test    MM DT_Secobarbital Definitive Test    MM DT_Spice Definitive Test    MM DT_Tapentadol Definitive Test    MM DT_Temazapam Definitive Test    MM DT_THC Definitive Test    MM DT_Tramadol Definitive Test     My impressions and treatment recommendations were discussed in detail with the patient who verbalized understanding and had no further questions  Given that the patient reports overall reduced pain and improved level functioning without significant side effects, I felt a reasonable to continue the patient on Nucynta ER 50 mg every 12 hours  I sent E prescriptions to the pharmacy dated November 12, 2019 and December 12, 2019  The risks and side effects of chronic opioid treatment were discussed in detail with the patient  Side effects include but are not limited to nausea, vomiting, GI intolerance, sedation, constipation, mental clouding, opioid-induced hyperalgesia, endocrine dysfunction, addiction, dependence, and tolerance  The patient was asked to take his medications only as prescribed and directed, never in excess, and never for any other reason other than for pain control  The patient was also asked to keep his medications out of the reach of others and away from children, preferably in a locked drawer  The patient verbalized understanding and wished to use these opioid medications  A urine drug test was collected at today's office visit as part of our medication management protocol  The point of care testing results were appropriate for what was being prescribed  The specimen will be sent for confirmatory testing  The drug screen is medically necessary because the patient is either dependent on opioid medication or is being considered for opioid medication therapy and the results could impact ongoing or future treatment  The drug screen is to evaluate for the presence or absence of prescribed, non-prescribed, and/or illicit drugs and substances  [place] Prescription Drug Monitoring Program report was reviewed and was appropriate     Follow-up is planned in 2 months time or sooner as warranted  Discharge instructions were provided   I personally saw and examined the patient and I agree with the above discussed plan of care  History of Present Illness:    Coby Delacruz is a 60 y o  female who presents to [facility] for interval re-evaluation of the above stated pain complaints  The patient has a past medical and chronic pain history as outlined in the assessment section  She was last seen on September 9, 2019 at which time she was maintained on Nucynta ER 50 mg every 12 hours  At today's office visit, the patient's pain score is 2/10 on the verbal numerical pain rating scale  The patient states that her pain is worse in the morning, evening, and night  Her pain is intermittent in nature  She reports the quality of her pain as dull/aching and sharp    She reports excellent pain relief with Nucynta ER 50 mg every 12 hours  She denies opioid induced constipation  She would like to continue her medication as prescribed at today's visit  Other than as stated above, the patient denies any interval changes in medications, medical condition, mental condition, symptoms, or allergies since the last office visit  Review of Systems:    Review of Systems   Constitutional: Negative for chills and unexpected weight change  HENT: Negative for postnasal drip, rhinorrhea, sinus pressure, sneezing, tinnitus and voice change  Eyes: Negative for discharge and itching  Respiratory: Positive for shortness of breath  Gastrointestinal: Positive for diarrhea  Endocrine: Negative for polydipsia  Genitourinary: Negative for flank pain, genital sores, menstrual problem and pelvic pain  Musculoskeletal: Positive for back pain and gait problem  Skin: Negative for color change  Allergic/Immunologic: Negative for food allergies  Neurological: Negative for seizures, facial asymmetry, speech difficulty, light-headedness and numbness  Hematological: Does not bruise/bleed easily  Psychiatric/Behavioral: Negative for agitation, confusion and dysphoric mood  Patient Active Problem List   Diagnosis    Chronic pain syndrome    Chronic low back pain    Postlaminectomy syndrome, not elsewhere classified    Adjacent segment disease with spinal stenosis    Spondylolisthesis of lumbar region    Groin pain, left    Chronic obstructive pulmonary disease (HCC)    Depression with anxiety    Type 2 diabetes mellitus with hyperglycemia, without long-term current use of insulin (HCC)    Disc degeneration, lumbosacral    Hypertension    Hyperlipidemia    Insomnia    Lumbar radiculopathy    Nicotine dependence    Complication of surgical procedure    Varicose veins of both lower extremities with pain    Varicose veins with inflammation    Cervical pain (neck)    Myofascial pain syndrome    Primary osteoarthritis of one hip, left    Pain in left hip    Obesity, Class III, BMI 40-49 9 (morbid obesity) (HCC)    Postlaminectomy syndrome, lumbar region    Low back pain    Shortness of breath    Centrilobular emphysema ([facility])    Daytime hypersomnolence    Obstructive sleep apnea    Alveolar hypoventilation    Abnormal stress test    Coronary artery disease involving native coronary artery of native heart with angina pectoris ([facility])    Tobacco abuse    Colon cancer screening    BMI 40 0-44 9, adult [facility])       Past Medical History:   Diagnosis Date    Anxiety     Arthritis     Asthma     Back pain     Breast lump     last assessed 10/14/14     Carpal tunnel syndrome 03/17/2006    unspecifiedd laterality / last assessed 10/14/14     Chronic pain disorder     COPD (chronic obstructive pulmonary disease) ([facility] )     with exacerbation / last assessed 6/25/14     Coronary artery disease involving native coronary artery of native heart with angina pectoris ([facility] ) 9/21/2019    CPAP (continuous positive airway pressure) dependence     Depression     Diabetes mellitus (HCC)     GERD (gastroesophageal reflux disease)     Hypercholesterolemia     Hyperlipidemia     Hypertension     Insomnia     Intolerance to cold     Intolerance to heat     Irregular heart beat     Joint pain     Low back pain     Lumbosacral disc disease     Neck pain     Nodule of tendon sheath     last assessed 2/5/15     Obesity     Osteoarthritis     Peripheral neuropathy     Shortness of breath     Sleep apnea     Spinal stenosis     Type 2 diabetes mellitus with hyperglycemia ([facility] )     last assessed 17     Varicose vein of leg        Past Surgical History:   Procedure Laterality Date    BACK SURGERY  2005    lower fusion   [address]  2019    had a cardiac cath    CAUDAL BLOCK N/A 2017    Procedure: BLOCK / INJECTION CAUDAL;  Surgeon: Ashtyn Jimenez MD;  Location: [place] [address] MAIN OR;  Service: Pain Management     CAUDAL BLOCK N/A 2018    Procedure: Caudal Epidural Steroid Injection (36166); Surgeon: Ashtyn Jimenez MD;  Location: [facility] MAIN OR;  Service: Pain Management      SECTION      LAMINECTOMY      Lumbar        Family History   Problem Relation Age of Onset    Diabetes Mother     Heart disease Mother     Dementia Father     Heart disease Father     Heart disease Sister     Diabetes Sister     Diabetes Brother     No Known Problems Daughter     No Known Problems Son     No Known Problems Maternal Aunt     No Known Problems Maternal Uncle     No Known Problems Paternal Aunt     No Known Problems Paternal Uncle     No Known Problems Maternal Grandmother     No Known Problems Maternal Grandfather     No Known Problems Paternal Grandmother     No Known Problems Paternal Grandfather        Social History     Occupational History     Comment: unemployed   Tobacco Use    Smoking status: Current Every Day Smoker     Packs/day: 0 50     Years: 30 00     Pack years: 15 00     Types: Cigarettes    Smokeless tobacco: Never Used    Tobacco comment: 10-12 cigs   a day   Substance and Sexual Activity    Alcohol use: No    Drug use: No    Sexual activity: Not on file         Current Outpatient Medications:     albuterol (PROVENTIL HFA,VENTOLIN HFA) 90 mcg/act inhaler, INHALE TWO PUFFS BY MOUTH EVERY FOUR HOURS AS NEEDED FOR WHEEZING, Disp: 18 Inhaler, Rfl: 5    atorvastatin (LIPITOR) 80 mg tablet, TAKE 1 TABLET BY MOUTH ONCE A DAY, Disp: 90 tablet, Rfl: 3    B-D UF III MINI PEN NEEDLES 31G X 5 MM MISC, 1 ONCE A DAY WITH VICTOZA PEN, Disp: 100 each, Rfl: 3    buPROPion (WELLBUTRIN) 100 mg tablet, TAKE 1 TABLET BY MOUTH TWICE A DAY, Disp: 60 tablet, Rfl: 5    DULoxetine (CYMBALTA) 30 mg delayed release capsule, TAKE 1 CAPSULE BY MOUTH ONCE A DAY, Disp: 30 capsule, Rfl: 11    DULoxetine (CYMBALTA) 60 mg delayed release capsule, Take 1 capsule (60 mg total) by mouth daily, Disp: 90 capsule, Rfl: 3    famotidine (PEPCID) 20 mg tablet, Take 1 tablet (20 mg total) by mouth 2 (two) times a day, Disp: 60 tablet, Rfl: 1    fluticasone (FLONASE) 50 mcg/act nasal spray, SPRAY 2 SPRAYS INTO EACH NOSTRIL EVERY DAY, Disp: 16 mL, Rfl: 3    fluticasone-salmeterol (AIRDUO RESPICLICK) 047-55 mcg/act dry powder inhaler, Inhale 1 puff 2 (two) times a day Rinse mouth after use , Disp: 1 Inhaler, Rfl: 3    glucose blood (ONE TOUCH ULTRA TEST) test strip, TEST ONCE A DAY-dx code: E11 9, Disp: 100 each, Rfl: 3    lisinopril-hydrochlorothiazide (PRINZIDE,ZESTORETIC) 20-25 MG per tablet, Take 1 tablet by mouth daily, Disp: 90 tablet, Rfl: 3    metFORMIN (GLUCOPHAGE) 1000 MG tablet, TAKE 2 TABLETS (2,000 MG TOTAL) BY MOUTH DAILY FOR 90 DAYS, Disp: 180 tablet, Rfl: 1    nicotine (NICODERM CQ) 21 mg/24 hr TD 24 hr patch, PLACE 1 PATCH ON THE SKIN EVERY 24 HOURS, Disp: 28 patch, Rfl: 0    NICOTROL 10 MG inhaler, INHALE 1 PUFF AS NEEDED FOR SMOKING CESSATION FOR UP TO 30 DAYS (Patient not taking: Reported on 8/8/2019), Disp: 168 each, Rfl: 1    pregabalin (LYRICA) 100 mg capsule, Take 1 capsule (100 mg total) by mouth 3 (three) times a day (Patient taking differently: Take 100 mg by mouth 2 (two) times a day ), Disp: 90 capsule, Rfl: 0    QUEtiapine (SEROquel) 25 mg tablet, TAKE 1 TABLET BY MOUTH TWICE A DAY, Disp: 60 tablet, Rfl: 5    Tapentadol HCl ER (NUCYNTA ER) 50 MG TB12, Take 1 tablet (50 mg total) by mouth every 12 (twelve) hoursMax Daily Amount: 100 mg, Disp: 60 tablet, Rfl: 0    umeclidinium bromide (INCRUSE ELLIPTA) 62 5 mcg/inh AEPB inhaler, Inhale 1 puff daily, Disp: 30 each, Rfl: 3    VICTOZA injection, INJECT 0 3 ML (1 8 MG TOTAL) UNDER THE SKIN DAILY, Disp: 3 pen, Rfl: 3    VICTOZA injection, INJECT 0 3 ML (1 8 MG TOTAL) UNDER THE SKIN DAILY, Disp: 6 pen, Rfl: 3    No Known Allergies    Physical Exam:    /72   Pulse 89   Ht 5' 3" (1 6 m)   Wt 112 kg (247 lb)   BMI 43 75 kg/m²     Constitutional:obese  Eyes:anicteric  HEENT:grossly intact  Neck:supple, symmetric, trachea midline and no masses   Pulmonary:even and unlabored  Cardiovascular:No edema or pitting edema present  Skin:Normal without rashes or lesions and well hydrated  Psychiatric:Mood and affect appropriate  Neurologic:Cranial Nerves II-XII grossly intact  Musculoskeletal:antalgic      Imaging  No orders to display         Orders Placed This Encounter   Procedures    MM ALL_Prescribed Meds and Special Instructions    MM DT_Alprazolam Definitive Test    MM DT_Amphetamine Definitive Test    MM DT_Aripiprazole Definitive Test    MM DT_Bath Salts Definitive Test    MM DT_Buprenorphine Definitive Test    MM DT_Butalbital Definitive Test    MM DT_Clonazepam Definitive Test    MM DT_Clozapine Definitive Test    MM DT_Cocaine Definitive Test    MM DT_Codeine Definitive Test    MM DT_Desipramine Definitive Test    MM DT_Dextromethorphan Definitive Test    MM Diazepam Definitive Test    MM DT_Ethyl Glucuronide/Ethyl Sulfate Definitive Test    MM DT_Fentanyl Definitive Test    MM DT_Haloperidol Definitive Test    MM DT_Heroin Definitive Test    MM DT_Hydrocodone Definitive Test    MM DT_Hydromorphone Definitive Test    MM DT_Imipramine Definitive Test    MM DT_Kratom Definitive Test    MM DT_Levorphanol Definitive Test    MM DT_MDMA Definitive Test    MM Lorazepam Definitive Test    MM DT_Meperidine Definitive Test    MM DT_Methadone Definitive Test    MM DT_Methamphetamine Definitive Test    MM DT_Methylphenidate Definitive Test    MM DT_Morphine Definitive Test    MM DT_Oxazepam Definitive Test    MM DT_Oxycodone Definitive Test    MM DT_Oxymorphone Definitive Test    MM DT_Phencyclidine Definitive Test    MM DT_Phenobarbital Definitive Test    MM DT_Phentermine Definitive Test    MM DT_Secobarbital Definitive Test    MM DT_Spice Definitive Test    MM DT_Tapentadol Definitive Test    MM DT_Temazapam Definitive Test    MM DT_THC Definitive Test    MM DT_Tramadol Definitive Test

## 2019-11-13 ENCOUNTER — TELEPHONE (OUTPATIENT)
Dept: BARIATRICS | Facility: CLINIC | Age: 59
End: 2019-11-13

## 2019-11-13 NOTE — TELEPHONE ENCOUNTER
DEISI left message for patient offering to schedule appointment sooner so she can get additional support and check in on her weight management progress  DEISI left return phone number for her to call back when she can  Update: Patient called back almost immediately and agreed to come in for an additional appointment  Patient is scheduled to meet with RD on 11/25

## 2019-11-14 ENCOUNTER — DOCUMENTATION (OUTPATIENT)
Dept: HEMATOLOGY ONCOLOGY | Facility: MEDICAL CENTER | Age: 59
End: 2019-11-14

## 2019-11-14 ENCOUNTER — TRANSCRIBE ORDERS (OUTPATIENT)
Dept: HEMATOLOGY ONCOLOGY | Facility: MEDICAL CENTER | Age: 59
End: 2019-11-14

## 2019-11-14 DIAGNOSIS — Z82.49 FAMILY HISTORY OF DVT: Primary | ICD-10-CM

## 2019-11-14 LAB
6MAM UR QL CFM: NEGATIVE NG/ML
7-OH-MITRAGYNINE (KRATOM ALKALOID) QUANTIFICATION: NEGATIVE NG/ML
7AMINOCLONAZEPAM UR QL CFM: NEGATIVE NG/ML
A-OH ALPRAZ UR QL CFM: NEGATIVE NG/ML
AMPHET UR QL CFM: NEGATIVE NG/ML
AMPHET UR QL CFM: NEGATIVE NG/ML
B-HCG UR QL: NEGATIVE NG/ML
BUPRENORPHINE UR QL CFM: NEGATIVE NG/ML
BUTALBITAL UR QL CFM: NEGATIVE NG/ML
BZE UR QL CFM: NEGATIVE NG/ML
CODEINE UR QL CFM: NEGATIVE NG/ML
CONFIRM APTT STACLOT: NORMAL
DEPRECATED SCALLOP IGE RAST QL: NEGATIVE NG/ML
DESIPRAMINE UR QL CFM: NEGATIVE NG/ML
DESIPRAMINE UR QL CFM: NEGATIVE NG/ML
EDDP UR QL CFM: NEGATIVE NG/ML
ETHYL GLUCURONIDE UR QL CFM: NEGATIVE NG/ML
ETHYL SULFATE UR QL SCN: NEGATIVE NG/ML
FENTANYL UR QL CFM: NEGATIVE NG/ML
GLUCOSE 30M P 50 G LAC PO SERPL-MCNC: NEGATIVE NG/ML
HYDROCODONE UR QL CFM: NEGATIVE NG/ML
HYDROCODONE UR QL CFM: NEGATIVE NG/ML
HYDROMORPHONE UR QL CFM: NEGATIVE NG/ML
IMIPRAMINE UR QL CFM: NEGATIVE NG/ML
LORAZEPAM UR QL CFM: NEGATIVE NG/ML
M TB TUBERC IGNF/MITOGEN IGNF CONTROL: NEGATIVE NG/ML
MDMA UR QL CFM: NEGATIVE NG/ML
MDPV UR CFM-MCNC: NEGATIVE NG/ML
ME-PHENIDATE UR QL CFM: NEGATIVE NG/ML
MEPERIDINE UR QL CFM: NEGATIVE NG/ML
MEPHEDRONE UR QL CFM: NEGATIVE NG/ML
METHADONE UR QL CFM: NEGATIVE NG/ML
METHAMPHET UR QL CFM: NEGATIVE NG/ML
MITOCHONDRIA AB TITR SER IF: NEGATIVE NG/ML
MORPHINE UR QL CFM: NEGATIVE NG/ML
MORPHINE UR QL CFM: NEGATIVE NG/ML
NORBUPRENORPHINE UR QL CFM: NEGATIVE NG/ML
NORDIAZEPAM UR QL CFM: NEGATIVE NG/ML
NORFENTANYL UR QL CFM: NEGATIVE NG/ML
NORHYDROCODONE UR QL CFM: NEGATIVE NG/ML
NORHYDROCODONE UR QL CFM: NEGATIVE NG/ML
NORMEPERIDINE UR QL CFM: NEGATIVE NG/ML
NOROXYCODONE UR QL CFM: NEGATIVE NG/ML
OPC-3373 QUANTIFICATION: NEGATIVE
OXAZEPAM UR QL CFM: NEGATIVE NG/ML
OXYCODONE UR QL CFM: NEGATIVE NG/ML
OXYMORPHONE UR QL CFM: NEGATIVE NG/ML
OXYMORPHONE UR QL CFM: NEGATIVE NG/ML
PCP UR QL CFM: NEGATIVE NG/ML
PHENOBARB UR QL CFM: NEGATIVE NG/ML
PHENTERMINE UR QL CFM: NEGATIVE NG/ML
SECOBARBITAL UR QL CFM: NEGATIVE NG/ML
SL AMB 3-METHYL-FENTANYL QUANTIFICATION: NORMAL NG/ML
SL AMB 4-ANPP QUANTIFICATION: NORMAL NG/ML
SL AMB 4-FIBF QUANTIFICATION: NORMAL NG/ML
SL AMB 5F-ADB-M7 METABOLITE QUANTIFICATION: NEGATIVE NG/ML
SL AMB AB-FUBINACA-M3 METABOLITE QUANTIFICATION: NEGATIVE NG/ML
SL AMB ACETYL FENTANYL QUANTIFICATION: NORMAL NG/ML
SL AMB ACETYL NORFENTANYL QUANTIFICATION: NORMAL NG/ML
SL AMB ACRYL FENTANYL QUANTIFICATION: NORMAL NG/ML
SL AMB BUTRYL FENTANYL QUANTIFICATION: NORMAL NG/ML
SL AMB CARFENTANIL QUANTIFICATION: NORMAL NG/ML
SL AMB CLOZAPINE QUANTIFICATION: NEGATIVE NG/ML
SL AMB CYCLOPROPYL FENTANYL QUANTIFICATION: NORMAL NG/ML
SL AMB DEXTROMETHORPHAN QUANTIFICATION: NEGATIVE NG/ML
SL AMB DEXTRORPHAN (DEXTROMETHORPHAN METABOLITE) QUANT: NEGATIVE NG/ML
SL AMB DEXTRORPHAN (DEXTROMETHORPHAN METABOLITE) QUANT: NEGATIVE NG/ML
SL AMB FURANYL FENTANYL QUANTIFICATION: NORMAL NG/ML
SL AMB HALOPERIDOL  QUANTIFICATION: NEGATIVE NG/ML
SL AMB HALOPERIDOL METABOLITE QUANTIFICATION: NEGATIVE NG/ML
SL AMB JWH073 METABOLITE QUANTIFICATION: NEGATIVE NG/ML
SL AMB MDMB-FUBINACA-M1 METABOLITE QUANTIFICATION: NEGATIVE NG/ML
SL AMB METHOXYACETYL FENTANYL QUANTIFICATION: NORMAL NG/ML
SL AMB N-DESMETHYL U-47700 QUANTIFICATION: NORMAL NG/ML
SL AMB N-DESMETHYLCLOZAPINE QUANTIFICATION: NEGATIVE NG/ML
SL AMB PHENTERMINE QUANTIFICATION-CREATININE NORMALIZED: NEGATIVE NG/ML
SL AMB PHENTERMINE QUANTIFICATION: NEGATIVE NG/ML
SL AMB RISPERIDONE QUANTIFICATION: NEGATIVE NG/ML
SL AMB U-47700 QUANTIFICATION: NORMAL NG/ML
SPECIMEN DRAWN SERPL: NEGATIVE NG/ML
TAPENTADOL UR CFM-MCNC: ABNORMAL NG/ML
TEMAZEPAM UR QL CFM: NEGATIVE NG/ML
TEMAZEPAM UR QL CFM: NEGATIVE NG/ML
TRAMADOL UR QL CFM: NEGATIVE NG/ML
URATE/CREAT 24H UR: NEGATIVE NG/ML

## 2019-11-14 NOTE — PROGRESS NOTES
Per Kayden Hernandez at [employer] thrombophilia panel has been approved  Auth # [id number]  Call reference # [id number]  Left message informing patient and asked that she contact me to confirm my message was received  I also need to know what [facility] facility to send orders to

## 2019-11-19 LAB
ALBUMIN SERPL-MCNC: 4.4 G/DL (ref 3.5–5.5)
ALBUMIN/GLOB SERPL: 1.8 {RATIO} (ref 1.2–2.2)
ALP SERPL-CCNC: 90 IU/L (ref 39–117)
ALT SERPL-CCNC: 41 IU/L (ref 0–32)
AST SERPL-CCNC: 36 IU/L (ref 0–40)
BILIRUB SERPL-MCNC: 0.3 MG/DL (ref 0–1.2)
BUN SERPL-MCNC: 14 MG/DL (ref 6–24)
BUN/CREAT SERPL: 13 (ref 9–23)
CALCIUM SERPL-MCNC: 10.3 MG/DL (ref 8.7–10.2)
CHLORIDE SERPL-SCNC: 94 MMOL/L (ref 96–106)
CHOLEST SERPL-MCNC: 166 MG/DL (ref 100–199)
CHOLEST/HDLC SERPL: 4.9 RATIO (ref 0–4.4)
CO2 SERPL-SCNC: 26 MMOL/L (ref 20–29)
CREAT SERPL-MCNC: 1.09 MG/DL (ref 0.57–1)
ERYTHROCYTE [DISTWIDTH] IN BLOOD BY AUTOMATED COUNT: 13.1 % (ref 12.3–15.4)
EST. AVERAGE GLUCOSE BLD GHB EST-MCNC: 200 MG/DL
GLOBULIN SER-MCNC: 2.5 G/DL (ref 1.5–4.5)
GLUCOSE SERPL-MCNC: 208 MG/DL (ref 65–99)
HBA1C MFR BLD: 8.6 % (ref 4.8–5.6)
HCT VFR BLD AUTO: 43.3 % (ref 34–46.6)
HDLC SERPL-MCNC: 34 MG/DL
HGB BLD-MCNC: 14.7 G/DL (ref 11.1–15.9)
LDLC SERPL CALC-MCNC: ABNORMAL MG/DL (ref 0–99)
LDLC SERPL DIRECT ASSAY-MCNC: 86 MG/DL (ref 0–99)
MCH RBC QN AUTO: 32.2 PG (ref 26.6–33)
MCHC RBC AUTO-ENTMCNC: 33.9 G/DL (ref 31.5–35.7)
MCV RBC AUTO: 95 FL (ref 79–97)
PLATELET # BLD AUTO: 310 X10E3/UL (ref 150–450)
POTASSIUM SERPL-SCNC: 5.2 MMOL/L (ref 3.5–5.2)
PROT SERPL-MCNC: 6.9 G/DL (ref 6–8.5)
RBC # BLD AUTO: 4.57 X10E6/UL (ref 3.77–5.28)
SL AMB EGFR AFRICAN AMERICAN: 64 ML/MIN/1.73
SL AMB EGFR NON AFRICAN AMERICAN: 56 ML/MIN/1.73
SL AMB VLDL CHOLESTEROL CALC: ABNORMAL MG/DL (ref 5–40)
SODIUM SERPL-SCNC: 137 MMOL/L (ref 134–144)
TRIGL SERPL-MCNC: 414 MG/DL (ref 0–149)
TSH SERPL DL<=0.005 MIU/L-ACNC: 2.89 UIU/ML (ref 0.45–4.5)
WBC # BLD AUTO: 9.4 X10E3/UL (ref 3.4–10.8)

## 2019-11-21 LAB — F5 GENE MUT ANL BLD/T: NORMAL

## 2019-11-25 ENCOUNTER — OFFICE VISIT (OUTPATIENT)
Dept: BARIATRICS | Facility: CLINIC | Age: 59
End: 2019-11-25

## 2019-11-25 VITALS — WEIGHT: 241 LBS | HEIGHT: 63 IN | BODY MASS INDEX: 42.7 KG/M2

## 2019-11-25 DIAGNOSIS — E66.01 MORBID (SEVERE) OBESITY DUE TO EXCESS CALORIES (HCC): Primary | ICD-10-CM

## 2019-11-25 DIAGNOSIS — Z98.84 BARIATRIC SURGERY STATUS: ICD-10-CM

## 2019-11-25 LAB
APCR PPP: 2.7 RATIO (ref 2.2–3.5)
APTT SCREEN TO CONFIRM RATIO: 1 RATIO (ref 0–1.4)
AT III ACT/NOR PPP CHRO: 121 % (ref 75–135)
B2 GLYCOPROT1 IGA SER-ACNC: <9 GPI IGA UNITS (ref 0–25)
B2 GLYCOPROT1 IGG SER-ACNC: <9 GPI IGG UNITS (ref 0–20)
B2 GLYCOPROT1 IGM SER-ACNC: <9 GPI IGM UNITS (ref 0–32)
CARDIOLIPIN IGA SER IA-ACNC: <9 APL U/ML (ref 0–11)
CARDIOLIPIN IGG SER IA-ACNC: <9 GPL U/ML (ref 0–14)
CARDIOLIPIN IGM SER IA-ACNC: 15 MPL U/ML (ref 0–12)
CONFIRM APTT/NORMAL: 30.3 SEC (ref 0–55)
F2 GENE MUT ANL BLD/T: ABNORMAL
HCYS SERPL-SCNC: 10.3 UMOL/L (ref 0–15)
LA PPP-IMP: ABNORMAL
PLG PPP CHRO-ACNC: 164 % (ref 70–150)
PROT C ACT/NOR PPP: 155 % (ref 73–180)
PROT S ACT/NOR PPP: 138 % (ref 57–157)
PROTHROM IGG SERPL-ACNC: 2 G UNITS (ref 0–20)
PS IGA SER-ACNC: 4 APS IGA (ref 0–20)
PS IGG SER-ACNC: 2 GPS IGG (ref 0–11)
PS IGM SER-ACNC: 15 MPS IGM (ref 0–25)
SCREEN APTT: 35.2 SEC (ref 0–51.9)
SCREEN DRVVT: 30.8 SEC (ref 0–47)

## 2019-11-25 PROCEDURE — RECHECK

## 2019-11-25 NOTE — PROGRESS NOTES
Bariatric Nutrition Assessment Note    Type of surgery    Preop 6 months weight checks-today 4 of 6 (5th weight check in December)  Surgery Date: TBD  Surgeon: Dr Fransisco Love  60 y o   female   Wt with BMI of 25: 141lbs  Pre-Op Excess Wt: 99 6lbs  Height 5' 3" (1 6 m), weight 109 kg (241 lb)  Body mass index is 42 69 kg/m²      Weight change: -3 8lbs over the past 2 weeks, but still needs to lose 10lbs to get to first pre-op weight loss goal    Review of History and Medications   Past Medical History:   Diagnosis Date    Anxiety     Arthritis     Asthma     Back pain     Breast lump     last assessed 10/14/14     Carpal tunnel syndrome 03/17/2006    unspecifiedd laterality / last assessed 10/14/14     Chronic pain disorder     COPD (chronic obstructive pulmonary disease) ([facility] )     with exacerbation / last assessed 6/25/14     Coronary artery disease involving native coronary artery of native heart with angina pectoris ([facility] ) 9/21/2019    CPAP (continuous positive airway pressure) dependence     Depression     Diabetes mellitus ([facility]     GERD (gastroesophageal reflux disease)     Hypercholesterolemia     Hyperlipidemia     Hypertension     Insomnia     Intolerance to cold     Intolerance to heat     Irregular heart beat     Joint pain     Low back pain     Lumbosacral disc disease     Neck pain     Nodule of tendon sheath     last assessed 2/5/15     Obesity     Osteoarthritis     Peripheral neuropathy     Shortness of breath     Sleep apnea     Spinal stenosis     Type 2 diabetes mellitus with hyperglycemia ([facility] )     last assessed 6/8/17     Varicose vein of leg      Past Surgical History:   Procedure Laterality Date    BACK SURGERY  2005    lower fusion    CARDIAC SURGERY  09/2019    had a cardiac cath    CAUDAL BLOCK N/A 11/2/2017    Procedure: BLOCK / INJECTION CAUDAL;  Surgeon: Princess Slim MD;  Location: [place] [address] MAIN OR;  Service: Pain Management     CAUDAL BLOCK N/A 2018    Procedure: Caudal Epidural Steroid Injection (05774); Surgeon: Marijean Lesches, MD;  Location: [facility] MAIN OR;  Service: Pain Management      SECTION      LAMINECTOMY      Lumbar 2005     Social History     Socioeconomic History    Marital status: Single     Spouse name: Not on file    Number of children: Not on file    Years of education: Not on file    Highest education level: Not on file   Occupational History     Comment: unemployed   Social Needs    Financial resource strain: Not on file    Food insecurity:     Worry: Not on file     Inability: Not on file    Transportation needs:     Medical: Not on file     Non-medical: Not on file   Tobacco Use    Smoking status: Current Every Day Smoker     Packs/day: 0 50     Years: 30 00     Pack years: 15 00     Types: Cigarettes    Smokeless tobacco: Never Used    Tobacco comment: 10-12 cigs   a day   Substance and Sexual Activity    Alcohol use: No    Drug use: No    Sexual activity: Not on file   Lifestyle    Physical activity:     Days per week: Not on file     Minutes per session: Not on file    Stress: Not on file   Relationships    Social connections:     Talks on phone: Not on file     Gets together: Not on file     Attends Gnosticist service: Not on file     Active member of club or organization: Not on file     Attends meetings of clubs or organizations: Not on file     Relationship status: Not on file    Intimate partner violence:     Fear of current or ex partner: Not on file     Emotionally abused: Not on file     Physically abused: Not on file     Forced sexual activity: Not on file   Other Topics Concern    Not on file   Social History Narrative     per allscript     Lives alone without help available       Current Outpatient Medications:     albuterol (PROVENTIL HFA,VENTOLIN HFA) 90 mcg/act inhaler, INHALE TWO PUFFS BY MOUTH EVERY FOUR HOURS AS NEEDED FOR WHEEZING, Disp: 18 Inhaler, Rfl: 5    atorvastatin (LIPITOR) 80 mg tablet, TAKE 1 TABLET BY MOUTH ONCE A DAY, Disp: 90 tablet, Rfl: 3    B-D UF III MINI PEN NEEDLES 31G X 5 MM MISC, 1 ONCE A DAY WITH VICTOZA PEN, Disp: 100 each, Rfl: 3    buPROPion (WELLBUTRIN) 100 mg tablet, TAKE 1 TABLET BY MOUTH TWICE A DAY, Disp: 60 tablet, Rfl: 5    DULoxetine (CYMBALTA) 30 mg delayed release capsule, TAKE 1 CAPSULE BY MOUTH ONCE A DAY, Disp: 30 capsule, Rfl: 11    DULoxetine (CYMBALTA) 60 mg delayed release capsule, Take 1 capsule (60 mg total) by mouth daily, Disp: 90 capsule, Rfl: 3    famotidine (PEPCID) 20 mg tablet, Take 1 tablet (20 mg total) by mouth 2 (two) times a day, Disp: 60 tablet, Rfl: 1    fluticasone (FLONASE) 50 mcg/act nasal spray, SPRAY 2 SPRAYS INTO EACH NOSTRIL EVERY DAY, Disp: 16 mL, Rfl: 3    fluticasone-salmeterol (AIRDUO RESPICLICK) 733-70 mcg/act dry powder inhaler, Inhale 1 puff 2 (two) times a day Rinse mouth after use , Disp: 1 Inhaler, Rfl: 3    glucose blood (ONE TOUCH ULTRA TEST) test strip, TEST ONCE A DAY-dx code: E11 9, Disp: 100 each, Rfl: 3    lisinopril-hydrochlorothiazide (PRINZIDE,ZESTORETIC) 20-25 MG per tablet, Take 1 tablet by mouth daily, Disp: 90 tablet, Rfl: 3    metFORMIN (GLUCOPHAGE) 1000 MG tablet, TAKE 2 TABLETS (2,000 MG TOTAL) BY MOUTH DAILY FOR 90 DAYS, Disp: 180 tablet, Rfl: 1    nicotine (NICODERM CQ) 21 mg/24 hr TD 24 hr patch, PLACE 1 PATCH ON THE SKIN EVERY 24 HOURS, Disp: 28 patch, Rfl: 0    NICOTROL 10 MG inhaler, INHALE 1 PUFF AS NEEDED FOR SMOKING CESSATION FOR UP TO 30 DAYS (Patient not taking: Reported on 8/8/2019), Disp: 168 each, Rfl: 1    pregabalin (LYRICA) 100 mg capsule, Take 1 capsule (100 mg total) by mouth 3 (three) times a day (Patient taking differently: Take 100 mg by mouth 2 (two) times a day ), Disp: 90 capsule, Rfl: 0    QUEtiapine (SEROquel) 25 mg tablet, TAKE 1 TABLET BY MOUTH TWICE A DAY, Disp: 60 tablet, Rfl: 5    Tapentadol HCl ER (NUCYNTA ER) 50 MG TB12, Take 1 tablet (50 mg total) by mouth every 12 (twelve) hoursMax Daily Amount: 100 mg, Disp: 60 tablet, Rfl: 0    [START ON 12/12/2019] Tapentadol HCl ER (NUCYNTA ER) 50 MG TB12, Take 1 tablet (50 mg total) by mouth every 12 (twelve) hoursMax Daily Amount: 100 mg, Disp: 60 tablet, Rfl: 0    umeclidinium bromide (INCRUSE ELLIPTA) 62 5 mcg/inh AEPB inhaler, Inhale 1 puff daily, Disp: 30 each, Rfl: 3    VICTOZA injection, INJECT 0 3 ML (1 8 MG TOTAL) UNDER THE SKIN DAILY, Disp: 3 pen, Rfl: 3    VICTOZA injection, INJECT 0 3 ML (1 8 MG TOTAL) UNDER THE SKIN DAILY, Disp: 6 pen, Rfl: 3  Food Intake and Lifestyle Assessment   Food Intake Assessment completed via usual recall  Has been more mindful of food choices and cut down the sweets  Breakfast:  has changed to special k with berry instead of sugar cereal and changed to 2% milk instead of whole milk OR tomato juice and 1-2 eggs or Protein shake or [place] yogurt  Snack: -   Lunch (sometimes becomes a late lunch/early dinner): V8, cheese stick,5-6 olives, 3 celery sticks and mini rice cakes 8-9 OR Ensure Protein Max   Snack: -  Dinner: tomato juice and cottage cheese OR lean cuisine OR cottage cheese or meat,starch and veggie  Snack: was a lot of sweets, but has cut down greatly  Beverage intake: water, sugar free beverages (unsweet tea) and coffee/tea-has changed from half and half to milk in her coffee and decreased to 1 5tsp sugar per day, now will try switching to tea because adds much less stuff to it      Protein supplement: likes the ensure protein max  Estimated protein intake per day: 60gm  Estimated fluid intake per day: 16oz water, 48-64oz of unsweet tea, 6-8oz of OJ  Meals eaten away from home: not often, maybe once a month  Typical meal pattern: 2-3 meals per day and 1-2 snacks per day  Eating Behaviors: Consumption of high calorie/ high fat foods, Large portion sizes and Mindless eating  Food allergies or intolerances: No Known Allergies  Cultural or Mu-ism considerations: none    Physical Assessment  Physical Activity  Types of exercise: None, very active in her daily activities, but no specific exercise routine  Current physical limitations: had back surgery    Psychosocial Assessment   Support systems: parent(s) friend(s) relative(s)  Socioeconomic factors: none    Nutrition Diagnosis  Diagnosis: Overweight / Obesity (NC-3 3)  Related to: Physical inactivity and Excessive energy intake  As Evidenced by: BMI >25     Nutrition Prescription: Recommend the following diet  Regular    Interventions and Teaching   Discussed pre-op and post-op nutrition guidelines  Patient educated and handouts provided  Adequate hydration  Exercise  Protein supplements  Meal planning and preparation  Dietary and lifestyle changes  Intuitive eating  Techniques for self monitoring and keeping daily food journal-provided with paper journals to use  Education provided to: patient    Barriers to learning: No barriers identified  Readiness to change: preparation  Comprehension: verbalizes understanding   Expected Compliance: good  6 weight checks (4 of 6 as of today)   Labs:  completed   PCP-pt had appt 9/3   Sleep:  had sleep study on 7/10  Was positive, getting CPAP-wearing CPAP   Cardio: had consult 8/8/19, had cardiac cath on 8/22-f/u with cardio sept 16  advised to have them state in their note if she is cleared for surgery or what the plan is  Meet w/surgeon:  10/10 with Owen   EGD:  Completed 11/1   Support Group: Will try to go in december   Weight loss:  5% by day of surgery: -12lbs (225lbs)                        1/2 in order to submit: -6lbs (231lbs)    Other:  Nicotine-down to less than 1/2 pack per day     Evaluation / Monitoring  Body weight Physical activity   Pt has gained weight since her initial eval and just recently started making some diet changes  She is down about 4lbs in the past 2 weeks    She has been eating more protein, decreased the sweets and has been decreasing her portions  Reviewed workflow  Pt states she is down to less than a 1/2 pack of cigarettes per day   Is aware she needs to quit at least 6 weeks prior to nicotine test     Pre-op weight loss goals:  5% by day of surgery:  -12lbs (225lbs)  1/2 in order to submit:  -6lbs (231lbs)  Goals  Exercise 30 minutes 5 times per week-stationary bike  Complete lession plans 1-6  Eat 3 meals per day with protein at each meal  Eliminate mindless snacking  Continue to use protein shake as a meal replacement    Time Spent:   30 mins

## 2019-11-26 ENCOUNTER — OFFICE VISIT (OUTPATIENT)
Dept: FAMILY MEDICINE CLINIC | Facility: CLINIC | Age: 59
End: 2019-11-26
Payer: COMMERCIAL

## 2019-11-26 VITALS
DIASTOLIC BLOOD PRESSURE: 72 MMHG | TEMPERATURE: 98.6 F | HEIGHT: 63 IN | BODY MASS INDEX: 42.88 KG/M2 | RESPIRATION RATE: 16 BRPM | WEIGHT: 242 LBS | HEART RATE: 80 BPM | SYSTOLIC BLOOD PRESSURE: 114 MMHG

## 2019-11-26 DIAGNOSIS — E11.40 TYPE 2 DIABETES MELLITUS WITH DIABETIC NEUROPATHY, WITHOUT LONG-TERM CURRENT USE OF INSULIN (HCC): Primary | ICD-10-CM

## 2019-11-26 DIAGNOSIS — Z12.31 BREAST CANCER SCREENING BY MAMMOGRAM: ICD-10-CM

## 2019-11-26 DIAGNOSIS — J45.909 ASTHMA, UNSPECIFIED ASTHMA SEVERITY, UNSPECIFIED WHETHER COMPLICATED, UNSPECIFIED WHETHER PERSISTENT: ICD-10-CM

## 2019-11-26 DIAGNOSIS — F34.1 DYSTHYMIA: ICD-10-CM

## 2019-11-26 DIAGNOSIS — F17.209 NICOTINE DEPENDENCE WITH NICOTINE-INDUCED DISORDER, UNSPECIFIED NICOTINE PRODUCT TYPE: ICD-10-CM

## 2019-11-26 PROCEDURE — 99214 OFFICE O/P EST MOD 30 MIN: CPT | Performed by: FAMILY MEDICINE

## 2019-11-26 RX ORDER — BUPROPION HYDROCHLORIDE 300 MG/1
300 TABLET ORAL EVERY MORNING
Qty: 90 TABLET | Refills: 3 | Status: SHIPPED | OUTPATIENT
Start: 2019-11-26 | End: 2020-12-19

## 2019-12-04 NOTE — PROGRESS NOTES
Chief Complaint   Patient presents with    Obesity        Patient ID: Nessa Mays is a 60 y o  female  61 yo pt in for follow-up  Considering bariatric surgery   Undergoing various specialist assessments as part of required work-up prior to any procedure  BP wnl  Last [id number] 6%  Cont  t smoke--is trying to quit  Denies cp, abd pain  Past Medical History:   Diagnosis Date    Anxiety     Arthritis     Asthma     Back pain     Breast lump     last assessed 10/14/14     Carpal tunnel syndrome 03/17/2006    unspecifiedd laterality / last assessed 10/14/14     Chronic pain disorder     COPD (chronic obstructive pulmonary disease) ([facility] [address] )     with exacerbation / last assessed 6/25/14     Coronary artery disease involving native coronary artery of native heart with angina pectoris [facility] 9/21/2019    CPAP (continuous positive airway pressure) dependence     Depression     Diabetes mellitus ([facility] )     GERD (gastroesophageal reflux disease)     Hypercholesterolemia     Hyperlipidemia     Hypertension     Insomnia     Intolerance to cold     Intolerance to heat     Irregular heart beat     Joint pain     Low back pain     Lumbosacral disc disease     Neck pain     Nodule of tendon sheath     last assessed 2/5/15     Obesity     Osteoarthritis     Peripheral neuropathy     Shortness of breath     Sleep apnea     Spinal stenosis     Type 2 diabetes mellitus with hyperglycemia ([facility] )     last assessed 6/8/17     Varicose vein of leg        Past Surgical History:   Procedure Laterality Date    BACK SURGERY  2005    lower fusion   [address]  09/2019    had a cardiac cath    CAUDAL BLOCK N/A 11/2/2017    Procedure: BLOCK / INJECTION CAUDAL;  Surgeon: Katerina Mathew MD;  [facility] MAIN OR;  Service: Pain Management     CAUDAL BLOCK N/A 12/20/2018    Procedure: Caudal Epidural Steroid Injection (97640);   Surgeon: Katerina Mathew MD;  Location: [facility] MAIN OR;  Service: Pain Management      SECTION      LAMINECTOMY      Lumbar        Patient Active Problem List   Diagnosis    Chronic pain syndrome    Chronic low back pain    Postlaminectomy syndrome, not elsewhere classified    Adjacent segment disease with spinal stenosis    Spondylolisthesis of lumbar region    Groin pain, left    Chronic obstructive pulmonary disease (HCC)    Depression with anxiety    Type 2 diabetes mellitus with hyperglycemia, without long-term current use of insulin ([facility])    Disc degeneration, lumbosacral    Hypertension    Hyperlipidemia    Insomnia    Lumbar radiculopathy    Nicotine dependence    Complication of surgical procedure    Varicose veins of both lower extremities with pain    Varicose veins with inflammation    Cervical pain (neck)    Myofascial pain syndrome    Primary osteoarthritis of one hip, left    Pain in left hip    Obesity, Class III, BMI 40-49 9 (morbid obesity) ([facility])    Postlaminectomy syndrome, lumbar region    Low back pain    Shortness of breath    Centrilobular emphysema ([facility])    Daytime hypersomnolence    Obstructive sleep apnea    Alveolar hypoventilation    Abnormal stress test    Coronary artery disease involving native coronary artery of native heart with angina pectoris ([facility])    Tobacco abuse    Colon cancer screening    BMI 40 0-44 9, adult ([facility] [address] )       Family History   Problem Relation Age of Onset    Diabetes Mother     Heart disease Mother     Dementia Father     Heart disease Father     Heart disease Sister     Diabetes Sister     Diabetes Brother     No Known Problems Daughter     No Known Problems Son     No Known Problems Maternal Aunt     No Known Problems Maternal Uncle     No Known Problems Paternal Aunt     No Known Problems Paternal Uncle     No Known Problems Maternal Grandmother     No Known Problems Maternal Grandfather     No Known Problems Paternal Grandmother     No Known Problems Paternal Grandfather        Immunization History   Administered Date(s) Administered    Influenza Quadrivalent Preservative Free 3 years and older IM 11/01/2016, 10/02/2017    Influenza TIV (IM) 01/06/2005, 10/07/2010, 10/07/2013, 10/14/2014, 09/16/2015    Influenza, injectable, quadrivalent, preservative free 0 5 mL 10/10/2018    Influenza, recombinant, quadrivalent,injectable, preservative free 09/10/2019    Pneumococcal Conjugate 13-Valent 05/18/2016    Pneumococcal Polysaccharide PPV23 10/07/2013, 10/10/2018    Tdap 09/21/2016       No Known Allergies    Current Outpatient Medications   Medication Sig Dispense Refill    albuterol (PROVENTIL HFA,VENTOLIN HFA) 90 mcg/act inhaler INHALE TWO PUFFS BY MOUTH EVERY FOUR HOURS AS NEEDED FOR WHEEZING 18 Inhaler 5    atorvastatin (LIPITOR) 80 mg tablet TAKE 1 TABLET BY MOUTH ONCE A DAY 90 tablet 3    DULoxetine (CYMBALTA) 30 mg delayed release capsule TAKE 1 CAPSULE BY MOUTH ONCE A DAY 30 capsule 11    DULoxetine (CYMBALTA) 60 mg delayed release capsule Take 1 capsule (60 mg total) by mouth daily 90 capsule 3    famotidine (PEPCID) 20 mg tablet Take 1 tablet (20 mg total) by mouth 2 (two) times a day 60 tablet 1    fluticasone (FLONASE) 50 mcg/act nasal spray SPRAY 2 SPRAYS INTO EACH NOSTRIL EVERY DAY 16 mL 3    glucose blood (ONE TOUCH ULTRA TEST) test strip TEST ONCE A DAY-dx code: E11 9 100 each 3    lisinopril-hydrochlorothiazide (PRINZIDE,ZESTORETIC) 20-25 MG per tablet Take 1 tablet by mouth daily 90 tablet 3    metFORMIN (GLUCOPHAGE) 1000 MG tablet TAKE 2 TABLETS (2,000 MG TOTAL) BY MOUTH DAILY FOR 90 DAYS 180 tablet 1    nicotine (NICODERM CQ) 21 mg/24 hr TD 24 hr patch PLACE 1 PATCH ON THE SKIN EVERY 24 HOURS 28 patch 0    pregabalin (LYRICA) 100 mg capsule Take 1 capsule (100 mg total) by mouth 3 (three) times a day (Patient taking differently: Take 100 mg by mouth 2 (two) times a day ) 90 capsule 0    QUEtiapine (SEROquel) 25 mg tablet TAKE 1 TABLET BY MOUTH TWICE A DAY 60 tablet 5    Tapentadol HCl ER (NUCYNTA ER) 50 MG TB12 Take 1 tablet (50 mg total) by mouth every 12 (twelve) hoursMax Daily Amount: 100 mg 60 tablet 0    [START ON 12/12/2019] Tapentadol HCl ER (NUCYNTA ER) 50 MG TB12 Take 1 tablet (50 mg total) by mouth every 12 (twelve) hoursMax Daily Amount: 100 mg 60 tablet 0    umeclidinium bromide (INCRUSE ELLIPTA) 62 5 mcg/inh AEPB inhaler Inhale 1 puff daily 30 each 3    VICTOZA injection INJECT 0 3 ML (1 8 MG TOTAL) UNDER THE SKIN DAILY 3 pen 3    VICTOZA injection INJECT 0 3 ML (1 8 MG TOTAL) UNDER THE SKIN DAILY 6 pen 3    B-D UF III MINI PEN NEEDLES 31G X 5 MM MISC 1 ONCE A DAY WITH VICTOZA  each 3    buPROPion (WELLBUTRIN XL) 300 mg 24 hr tablet Take 1 tablet (300 mg total) by mouth every morning 90 tablet 3    fluticasone-salmeterol (ADVAIR, WIXELA) 250-50 mcg/dose inhaler Inhale 1 puff 2 (two) times a day Rinse mouth after use  1 Inhaler 3    NICOTROL 10 MG inhaler INHALE 1 PUFF AS NEEDED FOR SMOKING CESSATION FOR UP TO 30 DAYS (Patient not taking: Reported on 8/8/2019) 168 each 1     No current facility-administered medications for this visit  Social History     Socioeconomic History    Marital status: Single     Spouse name: None    Number of children: None    Years of education: None    Highest education level: None   Occupational History     Comment: unemployed   Social Needs    Financial resource strain: None    Food insecurity:     Worry: None     Inability: None    Transportation needs:     Medical: None     Non-medical: None   Tobacco Use    Smoking status: Current Every Day Smoker     Packs/day: 0 50     Years: 30 00     Pack years: 15 00     Types: Cigarettes    Smokeless tobacco: Never Used    Tobacco comment: 10-12 cigs   a day   Substance and Sexual Activity    Alcohol use: No    Drug use: No    Sexual activity: None   Lifestyle    Physical activity:     Days per week: None     Minutes per session: None    Stress: None   Relationships    Social connections:     Talks on phone: None     Gets together: None     Attends Hindu service: None     Active member of club or organization: None     Attends meetings of clubs or organizations: None     Relationship status: None    Intimate partner violence:     Fear of current or ex partner: None     Emotionally abused: None     Physically abused: None     Forced sexual activity: None   Other Topics Concern    None   Social History Narrative     per allscript     Lives alone without help available       Review of Systems   Constitutional: Positive for fatigue  Respiratory:        GOLDSTEIN   Cardiovascular: Negative for chest pain  Gastrointestinal:        Autumnherleen Kras   Endocrine:        Uncontrolled DM   Musculoskeletal: Positive for arthralgias, back pain and myalgias  Psychiatric/Behavioral: Positive for sleep disturbance  The patient is nervous/anxious  Objective:    /72 (BP Location: Left arm, Patient Position: Sitting, Cuff Size: Large)   Pulse 80   Temp 98 6 °F (37 °C) (Tympanic)   Resp 16   Ht 5' 2 5" (1 588 m)   Wt 110 kg (242 lb)   BMI 43 56 kg/m²        Physical Exam   Constitutional: She is oriented to person, place, and time  Obese, chronically ill   Eyes: Conjunctivae are normal  No scleral icterus  Neck: Neck supple  Cardiovascular: Normal rate, regular rhythm and intact distal pulses  Pulmonary/Chest: Effort normal  No respiratory distress  She has wheezes (few scattered exp, no localization)  Abdominal: Soft  Bowel sounds are normal  There is no tenderness  Musculoskeletal: She exhibits tenderness  Neurological: She is alert and oriented to person, place, and time  No cranial nerve deficit  Skin: Skin is warm and dry  No rash noted  Psychiatric: She has a normal mood and affect  Nursing note and vitals reviewed              Assessment/Plan:     Diagnoses and all orders for this visit:    Type 2 diabetes mellitus with diabetic neuropathy, without long-term current use of insulin ([facility] )  Comments:  uncontrolled  pt will be seeing endocrinologist as part of bariatric/wgt mgt assessment  Orders:  -     Ambulatory referral to Endocrinology; Future    Asthma, unspecified asthma severity, unspecified whether complicated, unspecified whether persistent  Comments:  rxs for Advair and albuterol as directed  Orders:  -     fluticasone-salmeterol (Samir Kelly) 250-50 mcg/dose inhaler; Inhale 1 puff 2 (two) times a day Rinse mouth after use  Dysthymia  Comments:  rx Wellbutrin  Orders:  -     buPROPion (WELLBUTRIN XL) 300 mg 24 hr tablet; Take 1 tablet (300 mg total) by mouth every morning    Nicotine dependence with nicotine-induced disorder, unspecified nicotine product type  Comments:  smoking cessation strongly encouraged  Rx for Wellbutrin given  Orders:  -     buPROPion (WELLBUTRIN XL) 300 mg 24 hr tablet; Take 1 tablet (300 mg total) by mouth every morning    Breast cancer screening by mammogram  Comments:  ref for mammogram given    Orders:  -     Mammo screening bilateral w 3d & cad; Future          Melissa Stringer MD

## 2019-12-05 ENCOUNTER — CONSULT (OUTPATIENT)
Dept: ENDOCRINOLOGY | Facility: CLINIC | Age: 59
End: 2019-12-05
Payer: COMMERCIAL

## 2019-12-05 VITALS
WEIGHT: 239.4 LBS | BODY MASS INDEX: 42.42 KG/M2 | HEART RATE: 92 BPM | SYSTOLIC BLOOD PRESSURE: 120 MMHG | DIASTOLIC BLOOD PRESSURE: 82 MMHG | HEIGHT: 63 IN

## 2019-12-05 DIAGNOSIS — E78.2 MIXED HYPERLIPIDEMIA: ICD-10-CM

## 2019-12-05 DIAGNOSIS — E11.65 TYPE 2 DIABETES MELLITUS WITH HYPERGLYCEMIA, WITHOUT LONG-TERM CURRENT USE OF INSULIN (HCC): Primary | ICD-10-CM

## 2019-12-05 DIAGNOSIS — E66.01 OBESITY, CLASS III, BMI 40-49.9 (MORBID OBESITY) (HCC): ICD-10-CM

## 2019-12-05 DIAGNOSIS — I10 HYPERTENSION GOAL BP (BLOOD PRESSURE) < 140/90: ICD-10-CM

## 2019-12-05 PROCEDURE — 99244 OFF/OP CNSLTJ NEW/EST MOD 40: CPT | Performed by: INTERNAL MEDICINE

## 2019-12-05 NOTE — PROGRESS NOTES
ENDOCRINOLOGY  NEW PATIENT H&P     ? Reason for Endocrine Consult/Chief Complaint:  Diabetes management    Referring Provider:Consults   Dr Tracey Marques MD        Medical Decision Making:     Impression  1  DM2  2  LESLIE  3  Obesity BMI 43   4  HTN  5  HLD  6  COPD  7  CAD  8  Fatty liver    Recommendations:    I discussed the pathophysiology of type 2 diabetes and reviewed therapy options based on ADA 2019 guidelines  She is not checking her blood sugars very often, instructed to check her blood sugars every day fasting in alternate pre meals and at bedtime  I instructed her to send me a blood sugar log in 2 weeks for review to see if she is having a lot of fasting hyperglycemia or postprandial hyperglycemia  For now continue metformin 2000 mg taken once a day and Victoza at the maximum dose of 1 8 mg taken daily  She has no side effects from the Victoza  I counseled on the risk of pancreatitis, medullary thyroid cancer, and nausea and vomiting  No family history of MEN 2     Based on her blood sugars in 2 weeks will add another oral diabetes agent if she is having significant hyperglycemia  ? HTN-controlled continue ACE-inhibitor and HCTZ    HLD mixed- triglycerides were very elevated at 414 in 2019, emphasized compliance with atorvastatin 80 mg taken once a day, if no improvement in 3 months we consider fibrate    Obesity- BMI 43 she is considering gastric sleeve surgery in March or 2020    Return to clinic in 2 months    Dexter BOWER  History of Present Illness:   Mrs Veronique Moran is a 76-year-old female who presents for diabetes management  She is considering bariatric surgery- March/2020    ?   PMH-type 2 diabetes, sleep apnea, obesity, hypertension, hyperlipidemia, COPD, coronary artery disease  PSH-back surgery,   FHx-mother with diabetes, brother with diabetes  SHx-retired      Type of DM: 2  Age of onset:19 years   Most recent A1C: 8 6% Nov 2019  Present home regimen:  Metformin a 2000 mg taken once a day, Victoza 1 8 mg taken daily---has been on metformin for years, Yuni Watts has been on a few years   SMBG at home:a few times a week, 200s in morning when checks  Hypoglycemic events:none   Microvascular complications:none known   Macrovascular complications:  CAD  Nutrition: 1- 2 meals a day, yesterday turkey soup last night   Exercise:walking all the time      ? Review of Systems:   Review of Systems   Constitutional: Negative for appetite change, chills, diaphoresis, fatigue, fever and unexpected weight change  HENT: Negative for congestion, ear pain, hearing loss, rhinorrhea, sinus pressure, sinus pain, sore throat, trouble swallowing and voice change  Eyes: Negative for photophobia, redness and visual disturbance  Respiratory: Negative for apnea, cough, chest tightness, shortness of breath, wheezing and stridor  Cardiovascular: Negative for chest pain, palpitations and leg swelling  Gastrointestinal: Negative for abdominal distention, abdominal pain, constipation, diarrhea, nausea and vomiting  Endocrine: Negative for cold intolerance, heat intolerance, polydipsia, polyphagia and polyuria  Genitourinary: Negative for difficulty urinating, dysuria, flank pain, frequency, hematuria and urgency  Musculoskeletal: Negative for arthralgias, back pain, gait problem, joint swelling and myalgias  Skin: Negative for color change, pallor, rash and wound  Allergic/Immunologic: Negative for immunocompromised state  Neurological: Negative for dizziness, tremors, syncope, weakness, light-headedness and headaches  Hematological: Negative for adenopathy  Does not bruise/bleed easily  Psychiatric/Behavioral: Negative for confusion and sleep disturbance  The patient is not nervous/anxious  ?   Patient History:     Past Medical History:   Diagnosis Date    Anxiety     Arthritis     Asthma     Back pain     Breast lump     last assessed 10/14/14     Carpal tunnel syndrome 2006    unspecifiedd laterality / last assessed 10/14/14     Chronic pain disorder     COPD (chronic obstructive pulmonary disease) ([facility] )     with exacerbation / last assessed 14     Coronary artery disease involving native coronary artery of native heart with angina pectoris ([facility] ) 2019    CPAP (continuous positive airway pressure) dependence     Depression     Diabetes mellitus [facility]     GERD (gastroesophageal reflux disease)     Hypercholesterolemia     Hyperlipidemia     Hypertension     Insomnia     Intolerance to cold     Intolerance to heat     Irregular heart beat     Joint pain     Low back pain     Lumbosacral disc disease     Neck pain     Nodule of tendon sheath     last assessed 2/5/15     Obesity     Osteoarthritis     Peripheral neuropathy     Shortness of breath     Sleep apnea     Spinal stenosis     Type 2 diabetes mellitus with hyperglycemia ([facility] )     last assessed 17     Varicose vein of leg      Past Surgical History:   Procedure Laterality Date    BACK SURGERY  2005    lower fusion   [address]  2019    had a cardiac cath    CAUDAL BLOCK N/A 2017    Procedure: BLOCK / INJECTION CAUDAL;  Surgeon: Xuan Vidal MD;  Location: [place] [address] MAIN OR;  Service: Pain Management     CAUDAL BLOCK N/A 2018    Procedure: Caudal Epidural Steroid Injection (30645);   Surgeon: Xuan Vidal MD;  Location: [facility] MAIN OR;  Service: Pain Management      SECTION      LAMINECTOMY      Lumbar 2005     Social History     Socioeconomic History    Marital status: Single     Spouse name: Not on file    Number of children: Not on file    Years of education: Not on file    Highest education level: Not on file   Occupational History     Comment: unemployed   Social Needs    Financial resource strain: Not on file    Food insecurity:     Worry: Not on file     Inability: Not on file   Jemal Montgomery Transportation needs:     Medical: Not on file     Non-medical: Not on file   Tobacco Use    Smoking status: Current Every Day Smoker     Packs/day: 0 50     Years: 30 00     Pack years: 15 00     Types: Cigarettes    Smokeless tobacco: Never Used    Tobacco comment: 10-12 cigs  a day   Substance and Sexual Activity    Alcohol use: No    Drug use: No    Sexual activity: Not on file   Lifestyle    Physical activity:     Days per week: Not on file     Minutes per session: Not on file    Stress: Not on file   Relationships    Social connections:     Talks on phone: Not on file     Gets together: Not on file     Attends Muslim service: Not on file     Active member of club or organization: Not on file     Attends meetings of clubs or organizations: Not on file     Relationship status: Not on file    Intimate partner violence:     Fear of current or ex partner: Not on file     Emotionally abused: Not on file     Physically abused: Not on file     Forced sexual activity: Not on file   Other Topics Concern    Not on file   Social History Narrative     per allscript     Lives alone without help available     Family History   Problem Relation Age of Onset    Diabetes Mother     Heart disease Mother     Dementia Father     Heart disease Father     Heart disease Sister     Diabetes Sister     Diabetes Brother     No Known Problems Daughter     No Known Problems Son     No Known Problems Maternal Aunt     No Known Problems Maternal Uncle     No Known Problems Paternal Aunt     No Known Problems Paternal Uncle     No Known Problems Maternal Grandmother     No Known Problems Maternal Grandfather     No Known Problems Paternal Grandmother     No Known Problems Paternal Grandfather        Current Medications: At the time this note was written these were the medications the patient was on    Current Outpatient Medications   Medication Sig Dispense Refill    albuterol (PROVENTIL HFA,VENTOLIN HFA) 90 mcg/act inhaler INHALE TWO PUFFS BY MOUTH EVERY FOUR HOURS AS NEEDED FOR WHEEZING 18 Inhaler 5    atorvastatin (LIPITOR) 80 mg tablet TAKE 1 TABLET BY MOUTH ONCE A DAY 90 tablet 3    B-D UF III MINI PEN NEEDLES 31G X 5 MM MISC 1 ONCE A DAY WITH VICTOZA  each 3    buPROPion (WELLBUTRIN XL) 300 mg 24 hr tablet Take 1 tablet (300 mg total) by mouth every morning 90 tablet 3    DULoxetine (CYMBALTA) 30 mg delayed release capsule TAKE 1 CAPSULE BY MOUTH ONCE A DAY 30 capsule 11    DULoxetine (CYMBALTA) 60 mg delayed release capsule Take 1 capsule (60 mg total) by mouth daily 90 capsule 3    famotidine (PEPCID) 20 mg tablet Take 1 tablet (20 mg total) by mouth 2 (two) times a day 60 tablet 1    fluticasone (FLONASE) 50 mcg/act nasal spray SPRAY 2 SPRAYS INTO EACH NOSTRIL EVERY DAY 16 mL 3    fluticasone-salmeterol (ADVAIR, WIXELA) 250-50 mcg/dose inhaler Inhale 1 puff 2 (two) times a day Rinse mouth after use   1 Inhaler 3    glucose blood (ONE TOUCH ULTRA TEST) test strip TEST ONCE A DAY-dx code: E11 9 100 each 3    lisinopril-hydrochlorothiazide (PRINZIDE,ZESTORETIC) 20-25 MG per tablet Take 1 tablet by mouth daily 90 tablet 3    metFORMIN (GLUCOPHAGE) 1000 MG tablet TAKE 2 TABLETS (2,000 MG TOTAL) BY MOUTH DAILY FOR 90 DAYS 180 tablet 1    nicotine (NICODERM CQ) 21 mg/24 hr TD 24 hr patch PLACE 1 PATCH ON THE SKIN EVERY 24 HOURS 28 patch 0    NICOTROL 10 MG inhaler INHALE 1 PUFF AS NEEDED FOR SMOKING CESSATION FOR UP TO 30 DAYS (Patient not taking: Reported on 8/8/2019) 168 each 1    pregabalin (LYRICA) 100 mg capsule Take 1 capsule (100 mg total) by mouth 3 (three) times a day (Patient taking differently: Take 100 mg by mouth 2 (two) times a day ) 90 capsule 0    QUEtiapine (SEROquel) 25 mg tablet TAKE 1 TABLET BY MOUTH TWICE A DAY 60 tablet 5    Tapentadol HCl ER (NUCYNTA ER) 50 MG TB12 Take 1 tablet (50 mg total) by mouth every 12 (twelve) hoursMax Daily Amount: 100 mg 60 tablet 0    [START ON 12/12/2019] Tapentadol HCl ER (NUCYNTA ER) 50 MG TB12 Take 1 tablet (50 mg total) by mouth every 12 (twelve) hoursMax Daily Amount: 100 mg 60 tablet 0    umeclidinium bromide (INCRUSE ELLIPTA) 62 5 mcg/inh AEPB inhaler Inhale 1 puff daily 30 each 3    VICTOZA injection INJECT 0 3 ML (1 8 MG TOTAL) UNDER THE SKIN DAILY 3 pen 3    VICTOZA injection INJECT 0 3 ML (1 8 MG TOTAL) UNDER THE SKIN DAILY 6 pen 3     No current facility-administered medications for this visit  Allergies: Patient has no known allergies  Physical Exam:   Vital Signs:   /82   Pulse 92   Ht 5' 2 5" (1 588 m)   Wt 109 kg (239 lb 6 4 oz)   BMI 43 09 kg/m²     Physical Exam     Labs and Imaging:      Component      Latest Ref Rng & Units 11/18/2019   Glucose, Random      65 - 99 mg/dL 208 (H)   BUN      6 - 24 mg/dL 14   Creatinine      0 57 - 1 00 mg/dL 1 09 (H)   eGFR Non       >59 mL/min/1 73 56 (L)   eGFR       >59 mL/min/1 73 64   SL AMB BUN/CREATININE RATIO      9 - 23 13   Sodium      134 - 144 mmol/L 137   Potassium      3 5 - 5 2 mmol/L 5 2   Chloride      96 - 106 mmol/L 94 (L)   CO2      20 - 29 mmol/L 26   CALCIUM      8 7 - 10 2 mg/dL 10 3 (H)   Total Protein      6 0 - 8 5 g/dL 6 9   Albumin      3 5 - 5 5 g/dL 4 4   Globulin, Total      1 5 - 4 5 g/dL 2 5   Albumin/Globulin Ratio      1 2 - 2 2 1 8   TOTAL BILIRUBIN      0 0 - 1 2 mg/dL 0 3   ALKALINE PHOSPHATASE ISOENZYMES      39 - 117 IU/L 90   AST      0 - 40 IU/L 36   ALT      0 - 32 IU/L 41 (H)   Cholesterol      100 - 199 mg/dL 166   Triglycerides      0 - 149 mg/dL 414 (H)   HDL      >39 mg/dL 34 (L)   VLDL Cholesterol Sandip      5 - 40 mg/dL Comment   LDL Direct      0 - 99 mg/dL Comment   T  Chol/HDL Ratio      0 0 - 4 4 ratio 4 9 (H)   Hemoglobin A1C      4 8 - 5 6 % 8 6 (H)   EAG      mg/dL 200   TSH, POC      0 450 - 4 500 uIU/mL 2 890   LDL CHOLESTEROL      0 - 99 mg/dL 86     ?

## 2019-12-06 ENCOUNTER — TELEPHONE (OUTPATIENT)
Dept: GASTROENTEROLOGY | Facility: CLINIC | Age: 59
End: 2019-12-06

## 2019-12-07 NOTE — TELEPHONE ENCOUNTER
Referral complete  Provided in [facility]  Referral # [id number]   Also in patient's chart under documents

## 2019-12-11 ENCOUNTER — TELEPHONE (OUTPATIENT)
Dept: GASTROENTEROLOGY | Facility: AMBULARY SURGERY CENTER | Age: 59
End: 2019-12-11

## 2019-12-11 ENCOUNTER — OFFICE VISIT (OUTPATIENT)
Dept: GASTROENTEROLOGY | Facility: CLINIC | Age: 59
End: 2019-12-11
Payer: COMMERCIAL

## 2019-12-11 VITALS
SYSTOLIC BLOOD PRESSURE: 112 MMHG | HEART RATE: 76 BPM | WEIGHT: 239.4 LBS | RESPIRATION RATE: 18 BRPM | TEMPERATURE: 96.4 F | BODY MASS INDEX: 42.42 KG/M2 | HEIGHT: 63 IN | DIASTOLIC BLOOD PRESSURE: 70 MMHG

## 2019-12-11 DIAGNOSIS — R10.13 DYSPEPSIA: ICD-10-CM

## 2019-12-11 DIAGNOSIS — Z12.11 COLON CANCER SCREENING: ICD-10-CM

## 2019-12-11 DIAGNOSIS — R10.10 PAIN OF UPPER ABDOMEN: Primary | ICD-10-CM

## 2019-12-11 DIAGNOSIS — R19.7 DIARRHEA, UNSPECIFIED TYPE: ICD-10-CM

## 2019-12-11 PROCEDURE — 99244 OFF/OP CNSLTJ NEW/EST MOD 40: CPT | Performed by: INTERNAL MEDICINE

## 2019-12-11 RX ORDER — OMEPRAZOLE 20 MG/1
20 CAPSULE, DELAYED RELEASE ORAL
Qty: 60 CAPSULE | Refills: 3 | Status: SHIPPED | OUTPATIENT
Start: 2019-12-11 | End: 2020-04-08 | Stop reason: SDUPTHER

## 2019-12-11 RX ORDER — DICYCLOMINE HYDROCHLORIDE 10 MG/1
10 CAPSULE ORAL 3 TIMES DAILY PRN
Qty: 90 CAPSULE | Refills: 3 | Status: SHIPPED | OUTPATIENT
Start: 2019-12-11 | End: 2021-07-15

## 2019-12-11 NOTE — LETTER
December 11, 2019     MD [address]  [address] [facility]    Patient: Kike Hernandez   YOB: 1960   Date of Visit: 12/11/2019       Dear Dr Donna Barnett: Thank you for referring Anabela Suresh to me for evaluation  Below are my notes for this consultation  If you have questions, please do not hesitate to call me  I look forward to following your patient along with you  Sincerely,        Kisha Cardenas MD        CC: MD Kisha Addison MD  12/11/2019 11:14 AM  Sign at close encounter  Consultation - [facility]  Kike Grade 60 y o  female MRN: [id number]  Unit/Bed#:  Encounter: [id number]        Consults    ASSESSMENT/PLAN:       1  Epigastric/right upper quadrant abdominal pain-suspect this may be secondary to gastritis as noted on EGD last month, patient has not responded with b i d  Dosing of Pepcid  We will switch to omeprazole 20 mg b i d  Instead  Given presence of sludge on ultrasound several years ago, I do suspect symptoms could be biliary in nature as well, would recommend repeating repeat right upper quadrant ultrasound  -meanwhile, Patient was explained about the lifestyle and dietary modifications  Advised to avoid fatty foods, chocolates, caffeine, alcohol and any other triggering foods  Avoid eating for at least 3 hours before going to bed   -discussed limiting the use of NSAIDs  Use acetaminophen instead   -check celiac serologies  2  Diarrhea and abdominal pain-differential includes malabsorption secondary to celiac disease versus functional versus medication related as patient is on metformin and Victoza versus less likely inflammatory bowel disease or microscopic colitis  Patient has never undergone a colonoscopy for colon cancer screening evaluation therefore would recommend colonoscopy to assess for IBD and to assess for microscopic colitis given change in bowel habits    -will check stool studies to assess for infectious etiology, check C diff, stool ova and parasite and PCR  -start on over-the-counter probiotic   -recommend Bentyl 10 mg t i d  On as-needed basis only for symptomatic relief   -Patient was explained about  the risks and benefits of the procedure  Risks including but not limited to bleeding, infection, perforation were explained in detail  Also explained about less than 100% sensitivity with the exam and other alternatives  -her creatinine was 1 08 on the most recent labs, will proceed with MiraLax/Gatorade prep                 ______________________________________________________________________    Reason for Consult / Principal Problem: [unfilled]    HPI: Dario Hernandez is a 60y o  year old female with obesity, hypertension, hyperlipidemia, CAD with nonobstructive coronary artery disease, COPD with restrictive and obstructive defect, diabetes, on metformin and Victoza, sleep apnea on CPAP, GERD on Pepcid 20 mg b i d , presents for colon cancer screening evaluation  Patient is currently undergoing workup for possible bariatric surgery  She underwent EGD last month which was notable for moderate gastritis  Biopsies were negative for H pylori  Patient reports that she was started on Pepcid 20 mg b i d  Which has not been helpful with abdominal pain symptoms that she has been experiencing for the past 1 week year  Patient reports she has been having epigastric/right upper quadrant abdominal pain along with diarrhea  She states that she has been having loose stools for almost 1 year  She has 3-4 loose watery bowel movements which can sometimes occur at nighttime as well  Denies any unintentional weight loss  No NSAID use on regular basis  Denies hematemesis, coffee-ground emesis, melena or loss of appetite  No family history of colon cancer  Patient is unable to identify any trigger foods that may be bringing on the symptomatology    She had undergone a right upper quadrant ultrasound several years ago which was notable for gallbladder sludge  Labs are reviewed and are notable for AST of 36, ALT 41, normal bilirubin, hemoglobin is 14 7, platelets 199  Review of Systems: The remainder of the review of systems was negative except for the pertinent positives noted in HPI  Historical Information   Past Medical History:   Diagnosis Date    Anxiety     Arthritis     Asthma     Back pain     Breast lump     last assessed 10/14/14     Carpal tunnel syndrome 03/17/2006    unspecifiedd laterality / last assessed 10/14/14     Chronic pain disorder     [facility]     with exacerbation / last assessed 6/25/14     Coronary artery disease involving native coronary artery of native heart with angina pectoris ([facility] ) 9/21/2019    CPAP (continuous positive airway pressure) dependence     Depression     Diabetes mellitus ([facility]     GERD (gastroesophageal reflux disease)     Hypercholesterolemia     Hyperlipidemia     Hypertension     Insomnia     Intolerance to cold     Intolerance to heat     Irregular heart beat     Joint pain     Low back pain     Lumbosacral disc disease     Neck pain     Nodule of tendon sheath     last assessed 2/5/15     Obesity     Osteoarthritis     Peripheral neuropathy     Shortness of breath     Sleep apnea     Spinal stenosis     Type 2 diabetes mellitus with hyperglycemia [facility]     last assessed 6/8/17     Varicose vein of leg      Past Surgical History:   Procedure Laterality Date    BACK SURGERY  2005    lower fusion   [address]  09/2019    had a cardiac cath    CAUDAL BLOCK N/A 11/2/2017    Procedure: BLOCK / INJECTION CAUDAL;  Surgeon: Ju Castro MD;  [facility] MAIN OR;  Service: Pain Management     CAUDAL BLOCK N/A 12/20/2018    Procedure: Caudal Epidural Steroid Injection (99216);   Surgeon: Ju Castro MD;  Location: [facility] MAIN OR;  Service: Pain Management      SECTION      LAMINECTOMY      Lumbar 2005     Social History   Social History     Substance and Sexual Activity   Alcohol Use No     Social History     Substance and Sexual Activity   Drug Use No     Social History     Tobacco Use   Smoking Status Current Every Day Smoker    Packs/day: 0 50    Years: 30 00    Pack years: 15 00    Types: Cigarettes   Smokeless Tobacco Never Used   Tobacco Comment    10-12 cigs  a day     Family History   Problem Relation Age of Onset    Diabetes Mother     Heart disease Mother     Dementia Father     Heart disease Father     Heart disease Sister     Diabetes Sister     Diabetes Brother     No Known Problems Daughter     No Known Problems Son     No Known Problems Maternal Aunt     No Known Problems Maternal Uncle     No Known Problems Paternal Aunt     No Known Problems Paternal Uncle     No Known Problems Maternal Grandmother     No Known Problems Maternal Grandfather     No Known Problems Paternal Grandmother     No Known Problems Paternal Grandfather        Meds/Allergies       (Not in a hospital admission)  No current facility-administered medications for this visit  No Known Allergies    Objective     There were no vitals taken for this visit  [unfilled]    PHYSICAL EXAM     GEN: well nourished, well developed, no acute distress  HEENT: anicteric, MMM, no cervical or supraclavicular lymphadenopathy  CV: RRR, no m/r/g  CHEST: CTA b/l, no WRR  ABD: +BS, soft, NT/ND, no hepatosplenomegaly  EXT: no c/c/e  SKIN: no rashes,  NEURO: aaox3    Lab Results:   No visits with results within 1 Day(s) from this visit     Latest known visit with results is:   Orders Only on 2019   Component Date Value    Homocyst(e)ine, P/S 2019 10 3     Plasminogen 2019 164*    Antithrombin Activity 2019 121     Protein C Functional 2019 155     Protein S Ag, Free 2019 138     Activated Protein C Resi* 2019 2 7     PTT Lupus Anticoagulant 11/18/2019 35 2     Dilute Viper Venom Time 11/18/2019 30 8     LUPUS REFLEX INTERPRETAT* 11/18/2019 Comment:     DILUTE PROTHROMBIN TIME(* 11/18/2019 30 3     DPT CONFIRM RATIO 11/18/2019 1 00     Anticardiolipin IgG 11/18/2019 <9     Anticardiolipin IgM 11/18/2019 15*    Anticardiolipin IgA 11/18/2019 <9     Beta-2 Glyco 1 IgG 11/18/2019 <9     Beta-2 Glyco 1 IgA 11/18/2019 <9     Beta-2 Glyco 1 IgM 11/18/2019 <9     Prothrombin IgG Antibodi* 11/18/2019 2     Antiphosphatidylserine I* 11/18/2019 15     Antiphosphatidylserine I* 11/18/2019 4     Antiphosphatidylserine I* 11/18/2019 2     Factor II, DNA Analysis 11/18/2019 Comment     Factor V Leiden 11/18/2019 Comment      Imaging Studies: I have personally reviewed pertinent films in PACS

## 2019-12-11 NOTE — PROGRESS NOTES
Consultation - [facility]  Soledad Wilder 60 y o  female MRN: [id number]  Unit/Bed#:  Encounter: [id number]        Consults    ASSESSMENT/PLAN:       1  Epigastric/right upper quadrant abdominal pain-suspect this may be secondary to gastritis as noted on EGD last month, patient has not responded with b i d  Dosing of Pepcid  We will switch to omeprazole 20 mg b i d  Instead  Given presence of sludge on ultrasound several years ago, I do suspect symptoms could be biliary in nature as well, would recommend repeating repeat right upper quadrant ultrasound  -meanwhile, Patient was explained about the lifestyle and dietary modifications  Advised to avoid fatty foods, chocolates, caffeine, alcohol and any other triggering foods  Avoid eating for at least 3 hours before going to bed   -discussed limiting the use of NSAIDs  Use acetaminophen instead   -check celiac serologies  2  Diarrhea and abdominal pain-differential includes malabsorption secondary to celiac disease versus functional versus medication related as patient is on metformin and Victoza versus less likely inflammatory bowel disease or microscopic colitis  Patient has never undergone a colonoscopy for colon cancer screening evaluation therefore would recommend colonoscopy to assess for IBD and to assess for microscopic colitis given change in bowel habits  -will check stool studies to assess for infectious etiology, check C diff, stool ova and parasite and PCR  -start on over-the-counter probiotic   -recommend Bentyl 10 mg t i d  On as-needed basis only for symptomatic relief   -Patient was explained about  the risks and benefits of the procedure  Risks including but not limited to bleeding, infection, perforation were explained in detail  Also explained about less than 100% sensitivity with the exam and other alternatives    -her creatinine was 1 08 on the most recent labs, will proceed with MiraLax/Gatorade prep                 ______________________________________________________________________    Reason for Consult / Principal Problem: [unfilled]    HPI: Iftikhar Kolb is a 60y o  year old female with obesity, hypertension, hyperlipidemia, CAD with nonobstructive coronary artery disease, COPD with restrictive and obstructive defect, diabetes, on metformin and Victoza, sleep apnea on CPAP, GERD on Pepcid 20 mg b i d , presents for colon cancer screening evaluation  Patient is currently undergoing workup for possible bariatric surgery  She underwent EGD last month which was notable for moderate gastritis  Biopsies were negative for H pylori  Patient reports that she was started on Pepcid 20 mg b i d  Which has not been helpful with abdominal pain symptoms that she has been experiencing for the past 1 week year  Patient reports she has been having epigastric/right upper quadrant abdominal pain along with diarrhea  She states that she has been having loose stools for almost 1 year  She has 3-4 loose watery bowel movements which can sometimes occur at nighttime as well  Denies any unintentional weight loss  No NSAID use on regular basis  Denies hematemesis, coffee-ground emesis, melena or loss of appetite  No family history of colon cancer  Patient is unable to identify any trigger foods that may be bringing on the symptomatology  She had undergone a right upper quadrant ultrasound several years ago which was notable for gallbladder sludge  Labs are reviewed and are notable for AST of 36, ALT 41, normal bilirubin, hemoglobin is 14 7, platelets 483  Review of Systems: The remainder of the review of systems was negative except for the pertinent positives noted in HPI       Historical Information   Past Medical History:   Diagnosis Date    Anxiety     Arthritis     Asthma     Back pain     Breast lump     last assessed 10/14/14     Carpal tunnel syndrome 03/17/2006    unspecifiedd laterality / last assessed 10/14/14     Chronic pain disorder     COPD [facility]chronic obstructive pulmonary disease) ([facility])     with exacerbation / last assessed 14     Coronary artery disease involving native coronary artery of native heart with angina pectoris [facility]    CPAP (continuous positive airway pressure) dependence     Depression     [facility]     GERD (gastroesophageal reflux disease)     Hypercholesterolemia     Hyperlipidemia     Hypertension     Insomnia     Intolerance to cold     Intolerance to heat     Irregular heart beat     Joint pain     Low back pain     Lumbosacral disc disease     Neck pain     Nodule of tendon sheath     last assessed 2/5/15     Obesity     Osteoarthritis     Peripheral neuropathy     Shortness of breath     Sleep apnea     Spinal stenosis     Type 2 diabetes mellitus with hyperglycemia ([facility] )     last assessed 17     Varicose vein of leg      Past Surgical History:   Procedure Laterality Date    BACK SURGERY  2005    lower fusion   [address]  2019    had a cardiac cath    CAUDAL BLOCK N/A 2017    Procedure: BLOCK / INJECTION CAUDAL;  Surgeon: Peri Rivera MD;  Location: [place] [address] MAIN OR;  Service: Pain Management     CAUDAL BLOCK N/A 2018    Procedure: Caudal Epidural Steroid Injection (06201); Surgeon: Peri Rivera MD;  Location: [facility] MAIN OR;  Service: Pain Management      SECTION      LAMINECTOMY      Lumbar      Social History   Social History     Substance and Sexual Activity   Alcohol Use No     Social History     Substance and Sexual Activity   Drug Use No     Social History     Tobacco Use   Smoking Status Current Every Day Smoker    Packs/day: 0 50    Years: 30 00    Pack years: 15 00    Types: Cigarettes   Smokeless Tobacco Never Used   Tobacco Comment    10-12 cigs   a day     Family History   Problem Relation Age of Onset    Diabetes Mother     Heart disease Mother     Dementia Father     Heart disease Father     Heart disease Sister     Diabetes Sister     Diabetes Brother     No Known Problems Daughter     No Known Problems Son     No Known Problems Maternal Aunt     No Known Problems Maternal Uncle     No Known Problems Paternal Aunt     No Known Problems Paternal Uncle     No Known Problems Maternal Grandmother     No Known Problems Maternal Grandfather     No Known Problems Paternal Grandmother     No Known Problems Paternal Grandfather        Meds/Allergies       (Not in a hospital admission)  No current facility-administered medications for this visit  No Known Allergies    Objective     There were no vitals taken for this visit  [unfilled]    PHYSICAL EXAM     GEN: well nourished, well developed, no acute distress  HEENT: anicteric, MMM, no cervical or supraclavicular lymphadenopathy  CV: RRR, no m/r/g  CHEST: CTA b/l, no WRR  ABD: +BS, soft, NT/ND, no hepatosplenomegaly  EXT: no c/c/e  SKIN: no rashes,  NEURO: aaox3    Lab Results:   No visits with results within 1 Day(s) from this visit     Latest known visit with results is:   Orders Only on 11/18/2019   Component Date Value    Homocyst(e)ine, P/S 11/18/2019 10 3     Plasminogen 11/18/2019 164*    Antithrombin Activity 11/18/2019 121     Protein C Functional 11/18/2019 155     Protein S Ag, Free 11/18/2019 138     Activated Protein C Resi* 11/18/2019 2 7     PTT Lupus Anticoagulant 11/18/2019 35 2     Dilute Viper Venom Time 11/18/2019 30 8     LUPUS REFLEX INTERPRETAT* 11/18/2019 Comment:     DILUTE PROTHROMBIN TIME(* 11/18/2019 30 3     DPT CONFIRM RATIO 11/18/2019 1 00     Anticardiolipin IgG 11/18/2019 <9     Anticardiolipin IgM 11/18/2019 15*    Anticardiolipin IgA 11/18/2019 <9     Beta-2 Glyco 1 IgG 11/18/2019 <9     Beta-2 Glyco 1 IgA 11/18/2019 <9     Beta-2 Glyco 1 IgM 11/18/2019 <9     Prothrombin IgG Antibodi* 11/18/2019 2     Antiphosphatidylserine I* 11/18/2019 15     Antiphosphatidylserine I* 11/18/2019 4     Antiphosphatidylserine I* 11/18/2019 2     Factor II, DNA Analysis 11/18/2019 Comment     Factor V Leiden 11/18/2019 Comment      Imaging Studies: I have personally reviewed pertinent films in PACS

## 2019-12-11 NOTE — H&P (VIEW-ONLY)
Consultation - [facility]  Nessa Mays 60 y o  female MRN: [id number]  Unit/Bed#:  Encounter: [id number]        Consults    ASSESSMENT/PLAN:       1  Epigastric/right upper quadrant abdominal pain-suspect this may be secondary to gastritis as noted on EGD last month, patient has not responded with b i d  Dosing of Pepcid  We will switch to omeprazole 20 mg b i d  Instead  Given presence of sludge on ultrasound several years ago, I do suspect symptoms could be biliary in nature as well, would recommend repeating repeat right upper quadrant ultrasound  -meanwhile, Patient was explained about the lifestyle and dietary modifications  Advised to avoid fatty foods, chocolates, caffeine, alcohol and any other triggering foods  Avoid eating for at least 3 hours before going to bed   -discussed limiting the use of NSAIDs  Use acetaminophen instead   -check celiac serologies  2  Diarrhea and abdominal pain-differential includes malabsorption secondary to celiac disease versus functional versus medication related as patient is on metformin and Victoza versus less likely inflammatory bowel disease or microscopic colitis  Patient has never undergone a colonoscopy for colon cancer screening evaluation therefore would recommend colonoscopy to assess for IBD and to assess for microscopic colitis given change in bowel habits  -will check stool studies to assess for infectious etiology, check C diff, stool ova and parasite and PCR  -start on over-the-counter probiotic   -recommend Bentyl 10 mg t i d  On as-needed basis only for symptomatic relief   -Patient was explained about  the risks and benefits of the procedure  Risks including but not limited to bleeding, infection, perforation were explained in detail  Also explained about less than 100% sensitivity with the exam and other alternatives    -her creatinine was 1 08 on the most recent labs, will proceed with MiraLax/Gatorade prep                 ______________________________________________________________________    Reason for Consult / Principal Problem: [unfilled]    HPI: Beckie Dubin is a 60y o  year old female with obesity, hypertension, hyperlipidemia, CAD with nonobstructive coronary artery disease, COPD with restrictive and obstructive defect, diabetes, on metformin and Victoza, sleep apnea on CPAP, GERD on Pepcid 20 mg b i d , presents for colon cancer screening evaluation  Patient is currently undergoing workup for possible bariatric surgery  She underwent EGD last month which was notable for moderate gastritis  Biopsies were negative for H pylori  Patient reports that she was started on Pepcid 20 mg b i d  Which has not been helpful with abdominal pain symptoms that she has been experiencing for the past 1 week year  Patient reports she has been having epigastric/right upper quadrant abdominal pain along with diarrhea  She states that she has been having loose stools for almost 1 year  She has 3-4 loose watery bowel movements which can sometimes occur at nighttime as well  Denies any unintentional weight loss  No NSAID use on regular basis  Denies hematemesis, coffee-ground emesis, melena or loss of appetite  No family history of colon cancer  Patient is unable to identify any trigger foods that may be bringing on the symptomatology  She had undergone a right upper quadrant ultrasound several years ago which was notable for gallbladder sludge  Labs are reviewed and are notable for AST of 36, ALT 41, normal bilirubin, hemoglobin is 14 7, platelets 159  Review of Systems: The remainder of the review of systems was negative except for the pertinent positives noted in HPI       Historical Information   Past Medical History:   Diagnosis Date    Anxiety     Arthritis     Asthma     Back pain     Breast lump     last assessed 10/14/14     Carpal tunnel syndrome 03/17/2006    unspecifiedd laterality / last assessed 10/14/14     Chronic pain disorder     [facility]ch[facility]) ([facility])     with exacerbation / last assessed 14     Coronary artery disease involving native coronary artery of native heart [facility]ina p[facility]    CPAP (continuous positive airway pressure) dependence     Depression     Diabetes mellitus [facility]     GERD (gastroesophageal reflux disease)     Hypercholesterolemia     Hyperlipidemia     Hypertension     Insomnia     Intolerance to cold     Intolerance to heat     Irregular heart beat     Joint pain     Low back pain     Lumbosacral disc disease     Neck pain     Nodule of tendon sheath     last assessed 2/5/15     Obesity     Osteoarthritis     Peripheral neuropathy     Shortness of breath     Sleep apnea     Spinal stenosis     Type 2 diabetes mellitus with hyperglycemia [facility]     last assessed 17     Varicose vein of leg      Past Surgical History:   Procedure Laterality Date    BACK SURGERY  2005    lower fusion   [address]  2019    had a cardiac cath    CAUDAL BLOCK N/A 2017    Procedure: BLOCK / INJECTION CAUDAL;  Surgeon: Abe Seaman MD;  Location: [place] [address] MAIN OR;  Service: Pain Management     CAUDAL BLOCK N/A 2018    Procedure: Caudal Epidural Steroid Injection (86336); Surgeon: Abe Seaman MD;  Location: [facility] MAIN OR;  Service: Pain Management      SECTION      LAMINECTOMY      Lumbar      Social History   Social History     Substance and Sexual Activity   Alcohol Use No     Social History     Substance and Sexual Activity   Drug Use No     Social History     Tobacco Use   Smoking Status Current Every Day Smoker    Packs/day: 0 50    Years: 30 00    Pack years: 15 00    Types: Cigarettes   Smokeless Tobacco Never Used   Tobacco Comment    10-12 cigs   a day     Family History   Problem Relation Age of Onset    Diabetes Mother     Heart disease Mother     Dementia Father     Heart disease Father     Heart disease Sister     Diabetes Sister     Diabetes Brother     No Known Problems Daughter     No Known Problems Son     No Known Problems Maternal Aunt     No Known Problems Maternal Uncle     No Known Problems Paternal Aunt     No Known Problems Paternal Uncle     No Known Problems Maternal Grandmother     No Known Problems Maternal Grandfather     No Known Problems Paternal Grandmother     No Known Problems Paternal Grandfather        Meds/Allergies       (Not in a hospital admission)  No current facility-administered medications for this visit  No Known Allergies    Objective     There were no vitals taken for this visit  [unfilled]    PHYSICAL EXAM     GEN: well nourished, well developed, no acute distress  HEENT: anicteric, MMM, no cervical or supraclavicular lymphadenopathy  CV: RRR, no m/r/g  CHEST: CTA b/l, no WRR  ABD: +BS, soft, NT/ND, no hepatosplenomegaly  EXT: no c/c/e  SKIN: no rashes,  NEURO: aaox3    Lab Results:   No visits with results within 1 Day(s) from this visit     Latest known visit with results is:   Orders Only on 11/18/2019   Component Date Value    Homocyst(e)ine, P/S 11/18/2019 10 3     Plasminogen 11/18/2019 164*    Antithrombin Activity 11/18/2019 121     Protein C Functional 11/18/2019 155     Protein S Ag, Free 11/18/2019 138     Activated Protein C Resi* 11/18/2019 2 7     PTT Lupus Anticoagulant 11/18/2019 35 2     Dilute Viper Venom Time 11/18/2019 30 8     LUPUS REFLEX INTERPRETAT* 11/18/2019 Comment:     DILUTE PROTHROMBIN TIME(* 11/18/2019 30 3     DPT CONFIRM RATIO 11/18/2019 1 00     Anticardiolipin IgG 11/18/2019 <9     Anticardiolipin IgM 11/18/2019 15*    Anticardiolipin IgA 11/18/2019 <9     Beta-2 Glyco 1 IgG 11/18/2019 <9     Beta-2 Glyco 1 IgA 11/18/2019 <9     Beta-2 Glyco 1 IgM 11/18/2019 <9     Prothrombin IgG Antibodi* 11/18/2019 2     Antiphosphatidylserine I* 11/18/2019 15     Antiphosphatidylserine I* 11/18/2019 4     Antiphosphatidylserine I* 11/18/2019 2     Factor II, DNA Analysis 11/18/2019 Comment     Factor V Leiden 11/18/2019 Comment      Imaging Studies: I have personally reviewed pertinent films in PACS

## 2019-12-11 NOTE — TELEPHONE ENCOUNTER
Patients GI provider:  Dr OCONNOR     Number to return call: (    Reason for call: [facility] called requesting a prior auth for upcoming ultrasound  They are also faxing the info      Scheduled procedure/appointment date if applicable: 18/90/18 procedure

## 2019-12-12 NOTE — TELEPHONE ENCOUNTER
Daksha Garrison, called [facility] @ [phone]  Clinical review required  Faxing clinicals to [phone]  Tracking number [id number]

## 2019-12-13 ENCOUNTER — OFFICE VISIT (OUTPATIENT)
Dept: BARIATRICS | Facility: CLINIC | Age: 59
End: 2019-12-13

## 2019-12-13 VITALS — HEIGHT: 63 IN | WEIGHT: 238.2 LBS | BODY MASS INDEX: 42.21 KG/M2

## 2019-12-13 DIAGNOSIS — Z98.84 BARIATRIC SURGERY STATUS: ICD-10-CM

## 2019-12-13 DIAGNOSIS — E66.01 MORBID (SEVERE) OBESITY DUE TO EXCESS CALORIES (HCC): Primary | ICD-10-CM

## 2019-12-13 PROCEDURE — RECHECK

## 2019-12-13 NOTE — PROGRESS NOTES
Patient met with DEISI, discussing the progress she has made with managing her eating habits but still struggling to quit smoking  Patient has associations with smoking, having a cigarette when she gets in the car or after eating  She was receptive to suggestions on how to break those associations, having something in her hand while driving or drinking decaf tea or water instead of having a cigarette  Patient has heard poor reviews of starting Chantix or other smoking cessation aids, SW stated that they could be last resort options she can talk to her provider about if she really cannot stop smoking on her own  Patient is aware that if she experiences side effects she can work with provider on weaning off of medications  Patient will be working on trying to manage calorie intake during the holidays  She will return in January for next weight check with ALEJANDRA and DEISI

## 2019-12-13 NOTE — PROGRESS NOTES
Bariatric Nutrition Assessment Note    Type of surgery    Preop 6 months weight checks-today 5 of 6 (6th weight check in January)  Surgery Date: TBD  Surgeon: Dr Lala Regalado  60 y o   female   Wt with BMI of 25: 141lbs  Pre-Op Excess Wt: 97 2s  Height 5' 3" (1 6 m), weight 108 kg (238 lb 3 2 oz)  Body mass index is 42 2 kg/m²      Weight change: -2 8lbs in the past 2 weeks  Net weight change since eval:  +0 8lbs    US of gallbaldder on Dec 26, colonoscopy Dec 31, following with endo to track FS for a possible change in meds, started on omeprazole, started on Bentyl for diarrhea   Review of History and Medications   Past Medical History:   Diagnosis Date    Anxiety     Arthritis     Asthma     Back pain     Breast lump     last assessed 10/14/14     Carpal tunnel syndrome 03/17/2006    unspecifiedd laterality / last assessed 10/14/14     Chronic pain disorder     COPD (chronic obstructive pulmonary disease) ([facility] [address] )     with exacerbation / last assessed 6/25/14     Coronary artery disease involving native coronary artery of native heart with angina pectoris ([address] ) 9/21/2019    CPAP (continuous positive airway pressure) dependence     Depression     Diabetes mellitus ([address] )     GERD (gastroesophageal reflux disease)     Hypercholesterolemia     Hyperlipidemia     Hypertension     Insomnia     Intolerance to cold     Intolerance to heat     Irregular heart beat     Joint pain     Low back pain     Lumbosacral disc disease     Neck pain     Nodule of tendon sheath     last assessed 2/5/15     Obesity     Osteoarthritis     Peripheral neuropathy     Shortness of breath     Sleep apnea     Spinal stenosis     Type 2 diabetes mellitus with hyperglycemia ([facility] [address] )     last assessed 6/8/17     Varicose vein of leg      Past Surgical History:   Procedure Laterality Date    BACK SURGERY  2005    lower fusion    CARDIAC SURGERY  09/2019    had a cardiac cath    CAUDAL BLOCK N/A 2017    Procedure: BLOCK / INJECTION CAUDAL;  Surgeon: Xuan Vidal MD;  Location: [place] [address] MAIN OR;  Service: Pain Management     CAUDAL BLOCK N/A 2018    Procedure: Caudal Epidural Steroid Injection (97443); Surgeon: Xuan Vidal MD;  Location: [facility] MAIN OR;  Service: Pain Management      SECTION      LAMINECTOMY      Lumbar 2005     Social History     Socioeconomic History    Marital status: Single     Spouse name: Not on file    Number of children: Not on file    Years of education: Not on file    Highest education level: Not on file   Occupational History     Comment: unemployed   Social Needs    Financial resource strain: Not on file    Food insecurity:     Worry: Not on file     Inability: Not on file    Transportation needs:     Medical: Not on file     Non-medical: Not on file   Tobacco Use    Smoking status: Current Every Day Smoker     Packs/day: 0 50     Years: 30 00     Pack years: 15 00     Types: Cigarettes    Smokeless tobacco: Never Used    Tobacco comment: 10-12 cigs   a day   Substance and Sexual Activity    Alcohol use: No    Drug use: No    Sexual activity: Not on file   Lifestyle    Physical activity:     Days per week: Not on file     Minutes per session: Not on file    Stress: Not on file   Relationships    Social connections:     Talks on phone: Not on file     Gets together: Not on file     Attends [employer] service: Not on file     Active member of club or organization: Not on file     Attends meetings of clubs or organizations: Not on file     Relationship status: Not on file    Intimate partner violence:     Fear of current or ex partner: Not on file     Emotionally abused: Not on file     Physically abused: Not on file     Forced sexual activity: Not on file   Other Topics Concern    Not on file   Social History Narrative     per allscript     Lives alone without help available       Current Outpatient Medications:     albuterol (PROVENTIL HFA,VENTOLIN HFA) 90 mcg/act inhaler, INHALE TWO PUFFS BY MOUTH EVERY FOUR HOURS AS NEEDED FOR WHEEZING, Disp: 18 Inhaler, Rfl: 5    atorvastatin (LIPITOR) 80 mg tablet, TAKE 1 TABLET BY MOUTH ONCE A DAY, Disp: 90 tablet, Rfl: 3    B-D UF III MINI PEN NEEDLES 31G X 5 MM MISC, 1 ONCE A DAY WITH VICTOZA PEN, Disp: 100 each, Rfl: 3    buPROPion (WELLBUTRIN XL) 300 mg 24 hr tablet, Take 1 tablet (300 mg total) by mouth every morning, Disp: 90 tablet, Rfl: 3    dicyclomine (BENTYL) 10 mg capsule, Take 1 capsule (10 mg total) by mouth 3 (three) times a day as needed (diarrhea and abdominal pain), Disp: 90 capsule, Rfl: 3    DULoxetine (CYMBALTA) 30 mg delayed release capsule, TAKE 1 CAPSULE BY MOUTH ONCE A DAY, Disp: 30 capsule, Rfl: 11    DULoxetine (CYMBALTA) 60 mg delayed release capsule, Take 1 capsule (60 mg total) by mouth daily, Disp: 90 capsule, Rfl: 3    famotidine (PEPCID) 20 mg tablet, Take 1 tablet (20 mg total) by mouth 2 (two) times a day, Disp: 60 tablet, Rfl: 1    fluticasone (FLONASE) 50 mcg/act nasal spray, SPRAY 2 SPRAYS INTO EACH NOSTRIL EVERY DAY, Disp: 16 mL, Rfl: 3    fluticasone-salmeterol (ADVAIR, WIXELA) 250-50 mcg/dose inhaler, Inhale 1 puff 2 (two) times a day Rinse mouth after use , Disp: 1 Inhaler, Rfl: 3    glucose blood (ONE TOUCH ULTRA TEST) test strip, TEST ONCE A DAY-dx code: E11 9, Disp: 100 each, Rfl: 3    lisinopril-hydrochlorothiazide (PRINZIDE,ZESTORETIC) 20-25 MG per tablet, Take 1 tablet by mouth daily, Disp: 90 tablet, Rfl: 3    metFORMIN (GLUCOPHAGE) 1000 MG tablet, TAKE 2 TABLETS (2,000 MG TOTAL) BY MOUTH DAILY FOR 90 DAYS, Disp: 180 tablet, Rfl: 1    nicotine (NICODERM CQ) 21 mg/24 hr TD 24 hr patch, PLACE 1 PATCH ON THE SKIN EVERY 24 HOURS (Patient not taking: Reported on 12/5/2019), Disp: 28 patch, Rfl: 0    NICOTROL 10 MG inhaler, INHALE 1 PUFF AS NEEDED FOR SMOKING CESSATION FOR UP TO 30 DAYS (Patient not taking: Reported on 8/8/2019), Disp: 168 each, Rfl: 1    omeprazole (PriLOSEC) 20 mg delayed release capsule, Take 1 capsule (20 mg total) by mouth 2 (two) times a day before meals, Disp: 60 capsule, Rfl: 3    pregabalin (LYRICA) 100 mg capsule, Take 1 capsule (100 mg total) by mouth 3 (three) times a day (Patient taking differently: Take 100 mg by mouth 2 (two) times a day ), Disp: 90 capsule, Rfl: 0    QUEtiapine (SEROquel) 25 mg tablet, TAKE 1 TABLET BY MOUTH TWICE A DAY, Disp: 60 tablet, Rfl: 5    Tapentadol HCl ER (NUCYNTA ER) 50 MG TB12, Take 1 tablet (50 mg total) by mouth every 12 (twelve) hoursMax Daily Amount: 100 mg, Disp: 60 tablet, Rfl: 0    umeclidinium bromide (INCRUSE ELLIPTA) 62 5 mcg/inh AEPB inhaler, Inhale 1 puff daily, Disp: 30 each, Rfl: 3    VICTOZA injection, INJECT 0 3 ML (1 8 MG TOTAL) UNDER THE SKIN DAILY, Disp: 6 pen, Rfl: 3  Food Intake and Lifestyle Assessment   Food Intake Assessment completed via usual recall  Has been more mindful of food choices and cut down the sweets  Breakfast:  has changed to special k with berry instead of sugar cereal and changed to 2% milk instead of whole milk OR tomato juice and 1-2 eggs or Protein shake or [place] yogurt or cottage cheese or just fruit  Snack: rice cakes  Lunch (sometimes becomes a late lunch/early dinner): V8, cheese stick,5-6 olives, 3 celery sticks and mini rice cakes 8-9 OR Ensure Protein Max   Snack: -  Dinner: tomato juice and cottage cheese OR lean cuisine OR cottage cheese or meat,starch and veggie  Snack: was a lot of sweets, but has cut down greatly  Beverage intake: water, sugar free beverages (unsweet tea) and coffee/tea-has changed from half and half to milk in her coffee and decreased to 1 5tsp sugar per day, now will try switching to tea because adds much less stuff to it  Pt has also notice that coffee irritates her stomach therefore starting cutting down      Protein supplement: likes the ensure protein max  Estimated protein intake per day: 60gm  Estimated fluid intake per day: 16oz water, 48-64oz of unsweet tea, tomato juice  Meals eaten away from home: not often, maybe once a month  Typical meal pattern: 2-3 meals per day and 1-2 snacks per day  Eating Behaviors: Consumption of high calorie/ high fat foods, Large portion sizes and Mindless eating  Food allergies or intolerances: No Known Allergies  Cultural or [employer] considerations: none  Physical Assessment  Physical Activity  Types of exercise:15-20mins on the bike at home 4 days per week  Current physical limitations: had back surgery    Psychosocial Assessment   Support systems: parent(s) friend(s) relative(s)  Socioeconomic factors: none    Nutrition Diagnosis  Diagnosis: Overweight / Obesity (NC-3 3)  Related to: Physical inactivity and Excessive energy intake  As Evidenced by: BMI >25     Nutrition Prescription: Recommend the following diet  Regular    Interventions and Teaching   Discussed pre-op and post-op nutrition guidelines  Patient educated and handouts provided  Adequate hydration  Exercise  Protein supplements  Meal planning and preparation  Dietary and lifestyle changes  Intuitive eating  Techniques for self monitoring and keeping daily food journal-provided with paper journals to use  Education provided to: patient  Barriers to learning: No barriers identified  Readiness to change: preparation  Comprehension: verbalizes understanding   Expected Compliance: good  6 weight checks (5 of 6 as of today)   Labs:  completed   PCP-pt had appt 9/3   Sleep:  had sleep study on 7/10  Was positive, getting CPAP-wearing CPAP   Cardio: had consult 8/8/19, had cardiac cath on 8/22-f/u with cardio sept 16  advised to have them state in their note if she is cleared for surgery or what the plan is      Meet w/surgeon:  10/10 with Ronnie Laser   EGD:  Completed 11/1   Support Group:  december   Weight loss:  5% by day of surgery: -12lbs (225lbs)                        1/2 in order to submit: -6lbs (231lbs)    Other:  Nicotine-down to less than 1/2 pack per day     Evaluation / Monitoring  Body weight Physical activity   Pt has gained weight since her initial eval and just recently started making some diet changes  She is now 0 8lbs away from her start eval weight  Reminded pt that she needs to achieve 231lbs to submit to the insurance company  She is still working on a few things in her workflow and has one more weight check in January  She has been eating more protein, decreased the sweets and has been decreasing her portions  Her biggest struggle is cutting out the smoking  She is down to 1/2 pack per day    Reminded pt she needs to be nicotine free for 6 weeks before we can send her for the blood test      Pre-op weight loss goals:  5% by day of surgery:  -12lbs (225lbs)  1/2 in order to submit:  -6lbs (231lbs)  Goals  Exercise 30 minutes 5 times per week-stationary bike  Complete lesson plans 1-6  Eat 3 meals per day with protein at each meal  Eliminate mindless snacking  Continue to use protein shake as a meal replacement for one meal per day  Continue to work on quitting smoking  F/U jan 13th for last weight check  Time Spent:   30 mins

## 2019-12-18 DIAGNOSIS — J44.9 CHRONIC OBSTRUCTIVE PULMONARY DISEASE, UNSPECIFIED COPD TYPE (HCC): ICD-10-CM

## 2019-12-18 RX ORDER — ALBUTEROL SULFATE 90 UG/1
2 AEROSOL, METERED RESPIRATORY (INHALATION) EVERY 4 HOURS PRN
Qty: 18 INHALER | Refills: 5 | Status: SHIPPED | OUTPATIENT
Start: 2019-12-18 | End: 2021-07-06

## 2019-12-18 RX ORDER — UMECLIDINIUM 62.5 UG/1
AEROSOL, POWDER ORAL
Qty: 1 INHALER | Refills: 3 | Status: SHIPPED | OUTPATIENT
Start: 2019-12-18 | End: 2020-04-16

## 2019-12-21 DIAGNOSIS — K21.9 GASTROESOPHAGEAL REFLUX DISEASE, ESOPHAGITIS PRESENCE NOT SPECIFIED: ICD-10-CM

## 2019-12-21 DIAGNOSIS — J31.0 RHINITIS, UNSPECIFIED TYPE: ICD-10-CM

## 2019-12-21 RX ORDER — FLUTICASONE PROPIONATE 50 MCG
SPRAY, SUSPENSION (ML) NASAL
Qty: 16 ML | Refills: 3 | Status: SHIPPED | OUTPATIENT
Start: 2019-12-21 | End: 2020-04-24

## 2019-12-21 RX ORDER — FAMOTIDINE 20 MG/1
TABLET, FILM COATED ORAL
Qty: 60 TABLET | Refills: 1 | Status: SHIPPED | OUTPATIENT
Start: 2019-12-21 | End: 2019-12-27

## 2019-12-23 ENCOUNTER — OFFICE VISIT (OUTPATIENT)
Dept: HEMATOLOGY ONCOLOGY | Facility: MEDICAL CENTER | Age: 59
End: 2019-12-23
Payer: COMMERCIAL

## 2019-12-23 VITALS
BODY MASS INDEX: 40.8 KG/M2 | DIASTOLIC BLOOD PRESSURE: 74 MMHG | WEIGHT: 239 LBS | SYSTOLIC BLOOD PRESSURE: 110 MMHG | OXYGEN SATURATION: 97 % | RESPIRATION RATE: 18 BRPM | HEART RATE: 87 BPM | HEIGHT: 64 IN | TEMPERATURE: 97.7 F

## 2019-12-23 DIAGNOSIS — E66.01 OBESITY, CLASS III, BMI 40-49.9 (MORBID OBESITY) (HCC): Primary | ICD-10-CM

## 2019-12-23 PROCEDURE — 99213 OFFICE O/P EST LOW 20 MIN: CPT | Performed by: INTERNAL MEDICINE

## 2019-12-23 NOTE — PROGRESS NOTES
Iftikhar Kolb  1960  [facility] [address] [address]    DISCUSSION/SUMMARY:    70-year-old female in need of gastric (banding) surgery  Mrs Morgan Patel feels well and clinically there are no troubling signs  The thrombophilia evaluation is listed below  There were no abnormalities that would be very concerning for thrombotic complications for the gastric sleeve surgery  That being said, patient's mother and grandmother have had thrombotic complications in the past - there is always the possibility patient has a thrombophilia that is not testable  We discussed options  The thrombophilia panel results are listed below  The slightly elevated plasminogen level is not a prothrombotic risk factor  The anti cardiolipin IgM level that is also slightly elevated may actually be in the indeterminate range (50-20 GPL), also likely not significant from a thrombophilic standpoint  From a hematology standpoint, Mrs Morgan Patel is cleared for the surgery (scheduled for April 2020)  Patient should have a CBC in a PT/PTT before (4 months from now)  Patient should be prophylaxed postoperatively with Lovenox 1 0 mg/kg subcutaneous q 12 hours x 7 days  Mrs Morgan Patel is to call the Hematology office a few weeks prior to the surgical date (not yet determined) so that the Lovenox can be ordered  We discussed what to monitor for in regard to excessive bruising/bleeding after surgery  Return to Hematology is on a prn basis but patient knows to call if she has any other questions or concerns  Carefully review your medication list and verify that the list is accurate and up-to-date  Please call the hematology/oncology office if there are medications missing from the list, medications on the list that you are not currently taking or if there is a dosage or instruction that is different from how you're taking that medication      Patient goals and areas of care:  Go for gastric sleeve surgery  Barriers to care:  None  Patient is able to self-care   ______________________________________________________________________________________    Chief Complaint   Patient presents with    Follow-up     Concern for thrombophilia       History of Present Illness:  60-year-old female recently seen by bariatric surgery for evaluation of a gastric procedure for weight reduction  Mrs Tyrone Vuong' mother was previously treated for a lower extremity DVT  Patient's maternal grandmother also  from a thrombotic complication (specifics not presently available) after hysterectomy in the 1980s  Patient was referred to rule out any thrombophilia  Mrs May states feeling okay, about the same as before  Generalized body aches are the same as before, activities are baseline  No problems with excessive bruising or bleeding  No shortness of breath or dyspnea on exertion  No other GI,  or gyn issues  Review of Systems   Constitutional: Negative for unexpected weight change  HENT: Negative  Eyes: Negative  Respiratory: Negative  Cardiovascular: Negative  Gastrointestinal: Negative  Endocrine: Negative  Genitourinary: Negative  Musculoskeletal: Negative  Skin: Negative  Allergic/Immunologic: Negative  Neurological: Negative  Hematological: Negative  Psychiatric/Behavioral: Negative  All other systems reviewed and are negative      Patient Active Problem List   Diagnosis    Chronic pain syndrome    Chronic low back pain    Postlaminectomy syndrome, not elsewhere classified    Adjacent segment disease with spinal stenosis    Spondylolisthesis of lumbar region    Groin pain, left    Chronic obstructive pulmonary disease (HCC)    Depression with anxiety    Type 2 diabetes mellitus with hyperglycemia, without long-term current use of insulin ([facility])    Disc degeneration, lumbosacral    Hypertension    Hyperlipidemia    Insomnia    Lumbar radiculopathy    Nicotine dependence    Complication of surgical procedure    Varicose veins of both lower extremities with pain    Varicose veins with inflammation    Cervical pain (neck)    Myofascial pain syndrome    Primary osteoarthritis of one hip, left    Pain in left hip    Obesity, Class III, BMI 40-49 9 (morbid obesity) (HCC)    Postlaminectomy syndrome, lumbar region    Low back pain    Shortness of breath    Centrilobular emphysema (HCC)    Daytime hypersomnolence    Obstructive sleep apnea    Alveolar hypoventilation    Abnormal stress test    Coronary artery disease involving native coronary artery of native heart with angina pectoris ([facility])    Tobacco abuse    Colon cancer screening    BMI 40 0-44 9, adult [facility])     Past Medical History:   Diagnosis Date    Anxiety     Arthritis     Asthma     Back pain     Breast lump     last assessed 10/14/14     Carpal tunnel syndrome 03/17/2006    unspecifiedd laterality / last assessed 10/14/14     Chronic pain disorder     COPD (chronic obstructive pulmonary disease) ([facility] [address] )     with exacerbation / last assessed 6/25/14     Coronary artery disease involving native coronary artery of native heart with angina pectoris ([facility] [address] ) 9/21/2019    CPAP (continuous positive airway pressure) dependence     Depression     Diabetes mellitus ([facility]     GERD (gastroesophageal reflux disease)     Hypercholesterolemia     Hyperlipidemia     Hypertension     Insomnia     Intolerance to cold     Intolerance to heat     Irregular heart beat     Joint pain     Low back pain     Lumbosacral disc disease     Neck pain     Nodule of tendon sheath     last assessed 2/5/15     Obesity     Osteoarthritis     Peripheral neuropathy     Shortness of breath     Sleep apnea     Spinal stenosis     Type 2 diabetes mellitus with hyperglycemia ([facility] [address] )     last assessed 6/8/17     Varicose vein of leg      Past Surgical History: Procedure Laterality Date    BACK SURGERY  2005    lower fusion   [address]  2019    had a cardiac cath    CAUDAL BLOCK N/A 2017    Procedure: BLOCK / INJECTION CAUDAL;  Surgeon: Fernando Mckeon MD;  [facility];  Service: Pain Management     CAUDAL BLOCK N/A 2018    Procedure: Caudal Epidural Steroid Injection (36563); Surgeon: Fernnado Mckeon MD;  [facility] MA[facility];  Service: Pain Management      SECTION      LAMINECTOMY      Lumbar      Family History   Problem Relation Age of Onset    Diabetes Mother     Heart disease Mother     Dementia Father     Heart disease Father     Heart disease Sister     Diabetes Sister     Diabetes Brother     No Known Problems Daughter     No Known Problems Son     No Known Problems Maternal Aunt     No Known Problems Maternal Uncle     No Known Problems Paternal Aunt     No Known Problems Paternal Uncle     No Known Problems Maternal Grandmother     No Known Problems Maternal Grandfather     No Known Problems Paternal Grandmother     No Known Problems Paternal Grandfather     Family history:   Mother with lower extremity DVT, grandmother reportedly underwent hysterectomy many years ago and  suddenly in postop - patient states that the etiology was due to a thrombotic event    Social History     Socioeconomic History    Marital status: Single     Spouse name: Not on file    Number of children: Not on file    Years of education: Not on file    Highest education level: Not on file   Occupational History     Comment: unemployed   Social Needs    Financial resource strain: Not on file    Food insecurity:     Worry: Not on file     Inability: Not on file    Transportation needs:     Medical: Not on file     Non-medical: Not on file   Tobacco Use    Smoking status: Current Every Day Smoker     Packs/day: 0 50     Years: 30 00     Pack years: 15 00     Types: Cigarettes    Smokeless tobacco: Never Used  Tobacco comment: 10-12 cigs   a day   Substance and Sexual Activity    Alcohol use: No    Drug use: No    Sexual activity: Not on file   Lifestyle    Physical activity:     Days per week: Not on file     Minutes per session: Not on file    Stress: Not on file   Relationships    Social connections:     Talks on phone: Not on file     Gets together: Not on file     Attends Protestant service: Not on file     Active member of club or organization: Not on file     Attends meetings of clubs or organizations: Not on file     Relationship status: Not on file    Intimate partner violence:     Fear of current or ex partner: Not on file     Emotionally abused: Not on file     Physically abused: Not on file     Forced sexual activity: Not on file   Other Topics Concern    Not on file   Social History Narrative     per allscript     Lives alone without help available    Social history:  +active cigarette smoker, no drug or alcohol abuse, no toxic exposure    Current Outpatient Medications:     albuterol (PROVENTIL HFA,VENTOLIN HFA) 90 mcg/act inhaler, Inhale 2 puffs every 4 (four) hours as needed for wheezing, Disp: 18 Inhaler, Rfl: 5    atorvastatin (LIPITOR) 80 mg tablet, TAKE 1 TABLET BY MOUTH ONCE A DAY, Disp: 90 tablet, Rfl: 3    buPROPion (WELLBUTRIN XL) 300 mg 24 hr tablet, Take 1 tablet (300 mg total) by mouth every morning, Disp: 90 tablet, Rfl: 3    dicyclomine (BENTYL) 10 mg capsule, Take 1 capsule (10 mg total) by mouth 3 (three) times a day as needed (diarrhea and abdominal pain), Disp: 90 capsule, Rfl: 3    DULoxetine (CYMBALTA) 30 mg delayed release capsule, TAKE 1 CAPSULE BY MOUTH ONCE A DAY, Disp: 30 capsule, Rfl: 11    DULoxetine (CYMBALTA) 60 mg delayed release capsule, Take 1 capsule (60 mg total) by mouth daily, Disp: 90 capsule, Rfl: 3    fluticasone (FLONASE) 50 mcg/act nasal spray, SPRAY 2 SPRAYS INTO EACH NOSTRIL EVERY DAY, Disp: 16 mL, Rfl: 3    fluticasone-salmeterol (ADVAIR, Mejia [address]) 250-50 mcg/dose inhaler, Inhale 1 puff 2 (two) times a day Rinse mouth after use , Disp: 1 Inhaler, Rfl: 3    glucose blood (ONE TOUCH ULTRA TEST) test strip, TEST ONCE A DAY-dx code: E11 9, Disp: 100 each, Rfl: 3    INCRUSE ELLIPTA 62 5 MCG/INH AEPB inhaler, TAKE 1 PUFF BY MOUTH EVERY DAY, Disp: 1 Inhaler, Rfl: 3    lisinopril-hydrochlorothiazide (PRINZIDE,ZESTORETIC) 20-25 MG per tablet, Take 1 tablet by mouth daily, Disp: 90 tablet, Rfl: 3    omeprazole (PriLOSEC) 20 mg delayed release capsule, Take 1 capsule (20 mg total) by mouth 2 (two) times a day before meals, Disp: 60 capsule, Rfl: 3    pregabalin (LYRICA) 100 mg capsule, Take 1 capsule (100 mg total) by mouth 3 (three) times a day (Patient taking differently: Take 100 mg by mouth 2 (two) times a day ), Disp: 90 capsule, Rfl: 0    QUEtiapine (SEROquel) 25 mg tablet, TAKE 1 TABLET BY MOUTH TWICE A DAY, Disp: 60 tablet, Rfl: 5    Tapentadol HCl ER (NUCYNTA ER) 50 MG TB12, Take 1 tablet (50 mg total) by mouth every 12 (twelve) hoursMax Daily Amount: 100 mg, Disp: 60 tablet, Rfl: 0    VICTOZA injection, INJECT 0 3 ML (1 8 MG TOTAL) UNDER THE SKIN DAILY, Disp: 6 pen, Rfl: 3    B-D UF III MINI PEN NEEDLES 31G X 5 MM MISC, 1 ONCE A DAY WITH VICTOZA PEN, Disp: 100 each, Rfl: 3    famotidine (PEPCID) 20 mg tablet, TAKE 1 TABLET BY MOUTH TWICE A DAY (Patient not taking: Reported on 12/23/2019), Disp: 60 tablet, Rfl: 1    metFORMIN (GLUCOPHAGE) 1000 MG tablet, TAKE 2 TABLETS (2,000 MG TOTAL) BY MOUTH DAILY FOR 90 DAYS, Disp: 180 tablet, Rfl: 1    nicotine (NICODERM CQ) 21 mg/24 hr TD 24 hr patch, PLACE 1 PATCH ON THE SKIN EVERY 24 HOURS (Patient not taking: Reported on 12/5/2019), Disp: 28 patch, Rfl: 0    NICOTROL 10 MG inhaler, INHALE 1 PUFF AS NEEDED FOR SMOKING CESSATION FOR UP TO 30 DAYS (Patient not taking: Reported on 8/8/2019), Disp: 168 each, Rfl: 1    No Known Allergies    Vitals:    12/23/19 0933   BP: 110/74   Pulse: 87   Resp: 18 Temp: 97 7 °F (36 5 °C)   SpO2: 97%     Physical Exam   Constitutional: She is oriented to person, place, and time  She appears well-developed and well-nourished  Obese female, no respiratory distress, + sajan appearance to the face and neck - same as before   HENT:   Head: Normocephalic and atraumatic  Right Ear: External ear normal    Left Ear: External ear normal    Mouth/Throat: Oropharynx is clear and moist    Eyes: Pupils are equal, round, and reactive to light  Conjunctivae and EOM are normal    Neck: Normal range of motion  Neck supple  Cardiovascular: Normal rate, regular rhythm, normal heart sounds and intact distal pulses  Pulmonary/Chest: Effort normal and breath sounds normal    Fair to good air entry bilaterally, clear   Abdominal: Soft  Bowel sounds are normal    Soft, +bowel sounds, nontender, cannot palpate liver or spleen   Musculoskeletal: Normal range of motion  Neurological: She is alert and oriented to person, place, and time  She has normal reflexes  Skin: Skin is warm  Warm, good color, no petechiae or ecchymoses, slight sajan appearance in face and upper trunk   Psychiatric: She has a normal mood and affect   Her behavior is normal  Judgment and thought content normal    Extremities:  No lower extremity edema bilaterally, no cords, pulses are 1+  Lymphatics:  No adenopathy in the neck, supraclavicular region or axilla bilaterally    Labs     08/19/2019 WBC = 9 9 hemoglobin = 14 hematocrit = 41 9 MCV = 92 platelet = 499 neutrophil = 60% lymphocytes = 23% monocyte = 6% BUN = 14 creatinine = 0 98 glucose = 188 calcium = 9 5 PT = 9 7 INR = 0 9    Pathology    11/18/2019 thrombophilia evaluation  Factor 5 Leiden = no mutation  Homocystine = 10 3  Plasminogen = 164% (%)  Antithrombin activity = 121%  Protein C functional = 155%  Free protein S antigen = 130%  Activated protein C resistance = 2 7  Lupus anticoagulant profile:  No LA identified  Anti cardiolipin IgG and IgA within normal limits, IgM = 15 (0-12 MPL U/ml)  Beta 2 glycoprotein 1 IgG, IgA, IgM within normal limits  Prothrombin IgG antibodies = 2  antiphosphatidylserine IgM, IgA, IgG within normal limits  Factor 2 DNA analysis = no mutation identified

## 2019-12-26 ENCOUNTER — HOSPITAL ENCOUNTER (OUTPATIENT)
Dept: RADIOLOGY | Facility: HOSPITAL | Age: 59
Discharge: HOME | End: 2019-12-26
Attending: INTERNAL MEDICINE
Payer: COMMERCIAL

## 2019-12-26 DIAGNOSIS — R10.10 PAIN OF UPPER ABDOMEN: ICD-10-CM

## 2019-12-26 PROCEDURE — 76705 ECHO EXAM OF ABDOMEN: CPT

## 2019-12-27 RX ORDER — POLYETHYLENE GLYCOL 3350 17 G/17G
17 POWDER, FOR SOLUTION ORAL ONCE
COMMUNITY
End: 2021-07-15

## 2019-12-27 RX ORDER — DIPHENOXYLATE HYDROCHLORIDE AND ATROPINE SULFATE 2.5; .025 MG/1; MG/1
1 TABLET ORAL EVERY MORNING
COMMUNITY
End: 2021-07-15

## 2019-12-27 NOTE — PRE-PROCEDURE INSTRUCTIONS
Pre-Surgery Instructions:   Medication Instructions    albuterol (PROVENTIL HFA,VENTOLIN HFA) 90 mcg/act inhaler Patient was instructed by Physician and understands   atorvastatin (LIPITOR) 80 mg tablet Patient was instructed by Physician and understands   B-D UF III MINI PEN NEEDLES 31G X 5 MM MISC Patient was instructed by Physician and understands   bisacodyl (DULCOLAX) 5 mg EC tablet Patient was instructed by Physician and understands   buPROPion (WELLBUTRIN XL) 300 mg 24 hr tablet Patient was instructed by Physician and understands   dicyclomine (BENTYL) 10 mg capsule Patient was instructed by Physician and understands   DULoxetine (CYMBALTA) 30 mg delayed release capsule Patient was instructed by Physician and understands   DULoxetine (CYMBALTA) 60 mg delayed release capsule Patient was instructed by Physician and understands   fluticasone (FLONASE) 50 mcg/act nasal spray Patient was instructed by Physician and understands   fluticasone-salmeterol (ADVAIR, Randalyn Daniel) 250-50 mcg/dose inhaler Patient was instructed by Physician and understands   glucose blood (ONE TOUCH ULTRA TEST) test strip Patient was instructed by Physician and understands   INCRUSE ELLIPTA 62 5 MCG/INH AEPB inhaler Patient was instructed by Physician and understands   lisinopril-hydrochlorothiazide (PRINZIDE,ZESTORETIC) 20-25 MG per tablet Patient was instructed by Physician and understands   metFORMIN (GLUCOPHAGE) 1000 MG tablet Patient was instructed by Physician and understands   multivitamin [facility]) TABS Patient was instructed by Physician and understands   omeprazole (PriLOSEC) 20 mg delayed release capsule Patient was instructed by Physician and understands   polyethylene glycol (GLYCOLAX) powder Patient was instructed by Physician and understands   pregabalin (LYRICA) 100 mg capsule Patient was instructed by Physician and understands      QUEtiapine (SEROquel) 25 mg tablet Patient was instructed by Physician and understands   Tapentadol HCl ER (NUCYNTA ER) 50 MG TB12 Patient was instructed by Physician and understands   VICTOZA injection Patient was instructed by Physician and understands  Pt to follow Dr Fremont Dubin instructions  Pt to take blood sugar, lisinopril/HCTZ, advair, incruse ellipta the am of the procedure   [employer]

## 2019-12-28 LAB
ENDOMYSIUM IGA SER QL: NEGATIVE
GLIADIN PEPTIDE IGA SER-ACNC: 9 UNITS (ref 0–19)
GLIADIN PEPTIDE IGG SER-ACNC: 2 UNITS (ref 0–19)
IGA SERPL-MCNC: 194 MG/DL (ref 87–352)
TTG IGA SER-ACNC: <2 U/ML (ref 0–3)
TTG IGG SER-ACNC: <2 U/ML (ref 0–5)

## 2019-12-30 ENCOUNTER — ANESTHESIA EVENT (OUTPATIENT)
Dept: GASTROENTEROLOGY | Facility: AMBULARY SURGERY CENTER | Age: 59
End: 2019-12-30

## 2019-12-30 ENCOUNTER — TELEPHONE (OUTPATIENT)
Dept: GASTROENTEROLOGY | Facility: AMBULARY SURGERY CENTER | Age: 59
End: 2019-12-30

## 2019-12-30 NOTE — TELEPHONE ENCOUNTER
----- Message from Ranjith Diaz MD sent at 12/30/2019 10:11 AM EST -----  Celiac serologies are normal

## 2019-12-31 ENCOUNTER — ANESTHESIA (OUTPATIENT)
Dept: GASTROENTEROLOGY | Facility: AMBULARY SURGERY CENTER | Age: 59
End: 2019-12-31

## 2019-12-31 ENCOUNTER — HOSPITAL ENCOUNTER (OUTPATIENT)
Dept: GASTROENTEROLOGY | Facility: AMBULARY SURGERY CENTER | Age: 59
Setting detail: OUTPATIENT SURGERY
Discharge: HOME | End: 2019-12-31
Attending: INTERNAL MEDICINE | Admitting: INTERNAL MEDICINE
Payer: COMMERCIAL

## 2019-12-31 VITALS
TEMPERATURE: 96.4 F | RESPIRATION RATE: 18 BRPM | BODY MASS INDEX: 40.8 KG/M2 | HEIGHT: 64 IN | DIASTOLIC BLOOD PRESSURE: 74 MMHG | SYSTOLIC BLOOD PRESSURE: 117 MMHG | WEIGHT: 239 LBS | OXYGEN SATURATION: 99 % | HEART RATE: 78 BPM

## 2019-12-31 DIAGNOSIS — R19.7 DIARRHEA, UNSPECIFIED TYPE: ICD-10-CM

## 2019-12-31 DIAGNOSIS — R10.10 PAIN OF UPPER ABDOMEN: ICD-10-CM

## 2019-12-31 PROCEDURE — 88305 TISSUE EXAM BY PATHOLOGIST: CPT | Performed by: PATHOLOGY

## 2019-12-31 PROCEDURE — 45380 COLONOSCOPY AND BIOPSY: CPT | Performed by: INTERNAL MEDICINE

## 2019-12-31 PROCEDURE — 45385 COLONOSCOPY W/LESION REMOVAL: CPT | Performed by: INTERNAL MEDICINE

## 2019-12-31 RX ORDER — LIDOCAINE HYDROCHLORIDE 10 MG/ML
INJECTION, SOLUTION EPIDURAL; INFILTRATION; INTRACAUDAL; PERINEURAL AS NEEDED
Status: DISCONTINUED | OUTPATIENT
Start: 2019-12-31 | End: 2019-12-31 | Stop reason: SURG

## 2019-12-31 RX ORDER — SODIUM CHLORIDE, SODIUM LACTATE, POTASSIUM CHLORIDE, CALCIUM CHLORIDE 600; 310; 30; 20 MG/100ML; MG/100ML; MG/100ML; MG/100ML
INJECTION, SOLUTION INTRAVENOUS CONTINUOUS PRN
Status: DISCONTINUED | OUTPATIENT
Start: 2019-12-31 | End: 2019-12-31 | Stop reason: SURG

## 2019-12-31 RX ORDER — PROPOFOL 10 MG/ML
INJECTION, EMULSION INTRAVENOUS AS NEEDED
Status: DISCONTINUED | OUTPATIENT
Start: 2019-12-31 | End: 2019-12-31 | Stop reason: SURG

## 2019-12-31 RX ORDER — SODIUM CHLORIDE, SODIUM LACTATE, POTASSIUM CHLORIDE, CALCIUM CHLORIDE 600; 310; 30; 20 MG/100ML; MG/100ML; MG/100ML; MG/100ML
75 INJECTION, SOLUTION INTRAVENOUS CONTINUOUS
Status: DISCONTINUED | OUTPATIENT
Start: 2019-12-31 | End: 2020-01-04 | Stop reason: HOSPADM

## 2019-12-31 RX ADMIN — PROPOFOL 20 MG: 10 INJECTION, EMULSION INTRAVENOUS at 08:41

## 2019-12-31 RX ADMIN — LIDOCAINE HYDROCHLORIDE 40 MG: 10 INJECTION, SOLUTION EPIDURAL; INFILTRATION; INTRACAUDAL; PERINEURAL at 08:23

## 2019-12-31 RX ADMIN — PROPOFOL 30 MG: 10 INJECTION, EMULSION INTRAVENOUS at 08:45

## 2019-12-31 RX ADMIN — SODIUM CHLORIDE, SODIUM LACTATE, POTASSIUM CHLORIDE, AND CALCIUM CHLORIDE: .6; .31; .03; .02 INJECTION, SOLUTION INTRAVENOUS at 08:20

## 2019-12-31 RX ADMIN — PROPOFOL 50 MG: 10 INJECTION, EMULSION INTRAVENOUS at 08:23

## 2019-12-31 RX ADMIN — PROPOFOL 20 MG: 10 INJECTION, EMULSION INTRAVENOUS at 08:26

## 2019-12-31 RX ADMIN — PROPOFOL 30 MG: 10 INJECTION, EMULSION INTRAVENOUS at 08:38

## 2019-12-31 RX ADMIN — PROPOFOL 20 MG: 10 INJECTION, EMULSION INTRAVENOUS at 08:35

## 2019-12-31 RX ADMIN — PROPOFOL 50 MG: 10 INJECTION, EMULSION INTRAVENOUS at 08:49

## 2019-12-31 NOTE — ANESTHESIA PREPROCEDURE EVALUATION
Review of Systems/Medical History  Patient summary reviewed  Chart reviewed  No history of anesthetic complications     Cardiovascular  Hyperlipidemia, Hypertension , CAD ,    Pulmonary  Smoker cigarette smoker  , COPD , Asthma Asthma type of rescue: daily inhaler, Sleep apnea CPAP,        GI/Hepatic    GERD ,             Endo/Other  Diabetes type 2 ,   Obesity  morbid obesity   GYN       Hematology   Musculoskeletal  Back pain , lumbar pain and spinal stenosis,   Arthritis     Neurology   Psychology   Anxiety, Depression ,   Chronic pain,            Physical Exam    Airway    Mallampati score: II  TM Distance: >3 FB  Neck ROM: full     Dental       Cardiovascular  Rhythm: regular, Rate: normal,     Pulmonary  Breath sounds clear to auscultation,     Other Findings        Anesthesia Plan  ASA Score- 3     Anesthesia Type- IV sedation with anesthesia with ASA Monitors  Additional Monitors:   Airway Plan:         Plan Factors-    Induction- intravenous  Postoperative Plan-     Informed Consent- Anesthetic plan and risks discussed with patient  I personally reviewed this patient with the CRNA  Discussed and agreed on the Anesthesia Plan with the CRNA  Krystina Stahl

## 2019-12-31 NOTE — ANESTHESIA POSTPROCEDURE EVALUATION
Post-Op Assessment Note    CV Status:  Stable  Pain Score: 0    Pain management: adequate     Mental Status:  Awake   Hydration Status:  Stable   PONV Controlled:  None   Airway Patency:  Patent   Post Op Vitals Reviewed: Yes      Staff: CRNA   Comments: vss, spontaneously breathing, HOB @ 30 deegrees, fully endorsed to recovery w/o AC          BP   153/67   Temp     Pulse  82   Resp   12   SpO2   100

## 2019-12-31 NOTE — INTERVAL H&P NOTE
H&P reviewed  After examining the patient I find no changes in the patients condition since the H&P had been written      Vitals:    12/31/19 0816   BP: 118/65   Pulse: 85   Resp: 18   Temp: (!) 96 4 °F (35 8 °C)   SpO2: 95%

## 2020-01-01 LAB
ADV 40+41 DNA STL QL NAA+NON-PROBE: NOT DETECTED
ASTRO TYP 1-8 RNA STL QL NAA+NON-PROBE: NOT DETECTED
C CAYETANENSIS DNA STL QL NAA+NON-PROBE: NOT DETECTED
C COLI+JEJ+UPSA DNA STL QL NAA+NON-PROBE: NOT DETECTED
C DIF TOX TCDA+TCDB STL QL NAA+NON-PROBE: NOT DETECTED
C DIFF TOX GENS STL QL NAA+PROBE: NEGATIVE
CRYPTOSP DNA STL QL NAA+NON-PROBE: NOT DETECTED
E COLI O157 DNA STL QL NAA+NON-PROBE: NORMAL
E HISTOLYT DNA STL QL NAA+NON-PROBE: NOT DETECTED
EAEC PAA PLAS AGGR+AATA ST NAA+NON-PRB: NOT DETECTED
EC STX1+STX2 GENES STL QL NAA+NON-PROBE: NOT DETECTED
EPEC EAE GENE STL QL NAA+NON-PROBE: NOT DETECTED
ETEC LTA+ST1A+ST1B TOX ST NAA+NON-PROBE: NOT DETECTED
G LAMBLIA DNA STL QL NAA+NON-PROBE: NOT DETECTED
Lab: NORMAL
NOROVIRUS GI+II RNA STL QL NAA+NON-PROBE: NOT DETECTED
O+P STL CONC: NORMAL
P SHIGELLOIDES DNA STL QL NAA+NON-PROBE: NOT DETECTED
RVA RNA STL QL NAA+NON-PROBE: NOT DETECTED
S ENT+BONG DNA STL QL NAA+NON-PROBE: NOT DETECTED
SAPO I+II+IV+V RNA STL QL NAA+NON-PROBE: NOT DETECTED
SHIGELLA SP+EIEC IPAH ST NAA+NON-PROBE: NOT DETECTED
V CHOL+PARA+VUL DNA STL QL NAA+NON-PROBE: NOT DETECTED
V CHOLERAE DNA STL QL NAA+NON-PROBE: NOT DETECTED
Y ENTEROCOL DNA STL QL NAA+NON-PROBE: NOT DETECTED

## 2020-01-02 ENCOUNTER — TRANSCRIBE ORDERS (OUTPATIENT)
Dept: GASTROENTEROLOGY | Facility: CLINIC | Age: 60
End: 2020-01-02

## 2020-01-06 ENCOUNTER — TRANSCRIBE ORDERS (OUTPATIENT)
Dept: GASTROENTEROLOGY | Facility: CLINIC | Age: 60
End: 2020-01-06

## 2020-01-06 ENCOUNTER — TELEPHONE (OUTPATIENT)
Dept: GASTROENTEROLOGY | Facility: CLINIC | Age: 60
End: 2020-01-06

## 2020-01-06 NOTE — TELEPHONE ENCOUNTER
----- Message from Francois Mason MD sent at 1/3/2020  2:22 PM EST -----  Please inform the patient that the stool studies are negative for infection

## 2020-01-06 NOTE — TELEPHONE ENCOUNTER
----- Message from Mau Florence MD sent at 12/31/2019  4:41 PM EST -----  Please inform the patient that there is no evidence of gallstones on the ultrasound, no evidence of bile duct dilation or stones within the common bile duct  This is essentially an unremarkable study however liver is enlarged  Would recommend healthy weight loss and diet

## 2020-01-08 ENCOUNTER — TELEPHONE (OUTPATIENT)
Dept: GASTROENTEROLOGY | Facility: CLINIC | Age: 60
End: 2020-01-08

## 2020-01-08 NOTE — LETTER
January 8, 2020     [address] [place] [id number]      Dear Ms  Cherie Jm:        Our office has attempted to call you in regards to your results, however, we have been unable to get a hold of you  Please give our office a call so we can review results and answer any questions you may have in regards to your recent Colonoscopy Biopsies                   Sincerely,        [facility]

## 2020-01-08 NOTE — TELEPHONE ENCOUNTER
----- Message from Seth Odonnell MD sent at 1/8/2020  2:16 PM EST -----  Please inform the patient that she had at least 6 polyps, of which most are tubular adenomas and 1 is sessile serrated adenoma  There is no high-grade dysplasia  Would recommend repeat colonoscopy in 1 year with 2 day prep

## 2020-01-13 ENCOUNTER — OFFICE VISIT (OUTPATIENT)
Dept: PAIN MEDICINE | Facility: CLINIC | Age: 60
End: 2020-01-13
Payer: OTHER MISCELLANEOUS

## 2020-01-13 ENCOUNTER — OFFICE VISIT (OUTPATIENT)
Dept: BARIATRICS | Facility: CLINIC | Age: 60
End: 2020-01-13

## 2020-01-13 VITALS
HEART RATE: 85 BPM | BODY MASS INDEX: 40.67 KG/M2 | WEIGHT: 238.2 LBS | DIASTOLIC BLOOD PRESSURE: 69 MMHG | HEIGHT: 64 IN | SYSTOLIC BLOOD PRESSURE: 102 MMHG

## 2020-01-13 VITALS — HEIGHT: 63 IN | BODY MASS INDEX: 42.06 KG/M2 | WEIGHT: 237.4 LBS

## 2020-01-13 DIAGNOSIS — M96.1 POSTLAMINECTOMY SYNDROME, NOT ELSEWHERE CLASSIFIED: ICD-10-CM

## 2020-01-13 DIAGNOSIS — G89.29 CHRONIC BILATERAL LOW BACK PAIN WITH LEFT-SIDED SCIATICA: ICD-10-CM

## 2020-01-13 DIAGNOSIS — E66.01 MORBID (SEVERE) OBESITY DUE TO EXCESS CALORIES (HCC): Primary | ICD-10-CM

## 2020-01-13 DIAGNOSIS — Z98.84 BARIATRIC SURGERY STATUS: ICD-10-CM

## 2020-01-13 DIAGNOSIS — M54.16 LUMBAR RADICULOPATHY: ICD-10-CM

## 2020-01-13 DIAGNOSIS — M54.42 CHRONIC BILATERAL LOW BACK PAIN WITH LEFT-SIDED SCIATICA: ICD-10-CM

## 2020-01-13 DIAGNOSIS — G89.4 CHRONIC PAIN SYNDROME: Primary | ICD-10-CM

## 2020-01-13 PROCEDURE — 99214 OFFICE O/P EST MOD 30 MIN: CPT | Performed by: ANESTHESIOLOGY

## 2020-01-13 PROCEDURE — RECHECK
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
## 2020-01-15 ENCOUNTER — OFFICE VISIT (OUTPATIENT)
Dept: PULMONOLOGY | Facility: MEDICAL CENTER | Age: 60
End: 2020-01-15
Payer: COMMERCIAL

## 2020-01-15 VITALS
RESPIRATION RATE: 12 BRPM | HEIGHT: 63 IN | BODY MASS INDEX: 41.99 KG/M2 | SYSTOLIC BLOOD PRESSURE: 122 MMHG | DIASTOLIC BLOOD PRESSURE: 64 MMHG | WEIGHT: 237 LBS | HEART RATE: 103 BPM | TEMPERATURE: 97.8 F | OXYGEN SATURATION: 96 %

## 2020-01-15 DIAGNOSIS — G47.33 OBSTRUCTIVE SLEEP APNEA: Primary | ICD-10-CM

## 2020-01-15 DIAGNOSIS — G47.34 NOCTURNAL HYPOXEMIA: ICD-10-CM

## 2020-01-15 PROCEDURE — 99214 OFFICE O/P EST MOD 30 MIN: CPT | Performed by: INTERNAL MEDICINE
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
## 2020-01-19 NOTE — ASSESSMENT & PLAN NOTE
She is interested and per so bariatric surgery sometime in spring of this year  Prior spirometry done in our office showed moderate restrictive impairment with forced vital capacity 1 85 L or 58% of predicted  She may have some element of chronic bronchitis-COPD  She has been using Wixella 250 mcg 1 puff b i d  And Incruse 1 puff daily with good benefit  She will continue with these medications  She also has albuterol inhaler she can use as needed

## 2020-01-19 NOTE — ASSESSMENT & PLAN NOTE
Jazmin Penny has moderate LESLIE  She has been using CPAP at a setting of 10 cm water and has been benefited been compliant with using it  I did review compliance data for the past 1 month  Her average usage was 5-1/2 hours per night  This resulted in overall AHI 1 1 which is satisfactory  Her medical supply company is [id number]   She does have a new auto dream station CPAP machine and does used to dreamware full cushion mask  Her present CPAP setting is satisfactory  She will continue use of the CPAP and she has noticed benefit from it and has been compliant with using it  At present her mask is medium size and she felt she needed a large size    She will talk to Khushi Jin  respiratory technician who is here today to see if she get a large dreamware full cushion
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
## 2020-01-23 DIAGNOSIS — F32.A DEPRESSION, UNSPECIFIED DEPRESSION TYPE: ICD-10-CM

## 2020-01-23 RX ORDER — QUETIAPINE FUMARATE 25 MG/1
TABLET, FILM COATED ORAL
Qty: 120 TABLET | Refills: 2 | Status: SHIPPED | OUTPATIENT
Start: 2020-01-23 | End: 2020-06-21

## 2020-01-23 NOTE — TELEPHONE ENCOUNTER
Rajesh Baker isn't in the office today  Please verify the dosing, and I will refill for her   Two dosing on chart

## 2020-01-30 ENCOUNTER — TELEPHONE (OUTPATIENT)
Dept: FAMILY MEDICINE CLINIC | Facility: CLINIC | Age: 60
End: 2020-01-30

## 2020-01-30 DIAGNOSIS — J44.9 CHRONIC OBSTRUCTIVE PULMONARY DISEASE, UNSPECIFIED COPD TYPE (HCC): Primary | ICD-10-CM

## 2020-01-30 RX ORDER — FLUTICASONE PROPIONATE AND SALMETEROL 232; 14 UG/1; UG/1
1 POWDER, METERED RESPIRATORY (INHALATION) 2 TIMES DAILY
Qty: 1 INHALER | Refills: 3 | Status: ON HOLD | OUTPATIENT
Start: 2020-01-30 | End: 2022-01-27
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
## 2020-02-06 ENCOUNTER — OFFICE VISIT (OUTPATIENT)
Dept: CARDIOLOGY CLINIC | Facility: CLINIC | Age: 60
End: 2020-02-06
Payer: COMMERCIAL

## 2020-02-06 VITALS
WEIGHT: 235 LBS | SYSTOLIC BLOOD PRESSURE: 120 MMHG | OXYGEN SATURATION: 94 % | HEIGHT: 63 IN | DIASTOLIC BLOOD PRESSURE: 82 MMHG | BODY MASS INDEX: 41.64 KG/M2 | HEART RATE: 93 BPM

## 2020-02-06 DIAGNOSIS — J44.9 CHRONIC OBSTRUCTIVE PULMONARY DISEASE, UNSPECIFIED COPD TYPE (HCC): ICD-10-CM

## 2020-02-06 DIAGNOSIS — G47.33 OBSTRUCTIVE SLEEP APNEA: ICD-10-CM

## 2020-02-06 DIAGNOSIS — I10 ESSENTIAL HYPERTENSION: ICD-10-CM

## 2020-02-06 DIAGNOSIS — F17.209 NICOTINE DEPENDENCE WITH NICOTINE-INDUCED DISORDER, UNSPECIFIED NICOTINE PRODUCT TYPE: ICD-10-CM

## 2020-02-06 DIAGNOSIS — E11.65 TYPE 2 DIABETES MELLITUS WITH HYPERGLYCEMIA, WITHOUT LONG-TERM CURRENT USE OF INSULIN (HCC): ICD-10-CM

## 2020-02-06 DIAGNOSIS — I25.119 CORONARY ARTERY DISEASE INVOLVING NATIVE CORONARY ARTERY OF NATIVE HEART WITH ANGINA PECTORIS (HCC): ICD-10-CM

## 2020-02-06 DIAGNOSIS — E66.01 OBESITY, CLASS III, BMI 40-49.9 (MORBID OBESITY) (HCC): ICD-10-CM

## 2020-02-06 DIAGNOSIS — E78.2 MIXED HYPERLIPIDEMIA: ICD-10-CM

## 2020-02-06 DIAGNOSIS — R06.02 SHORTNESS OF BREATH: ICD-10-CM

## 2020-02-06 PROCEDURE — 3074F SYST BP LT 130 MM HG: CPT | Performed by: INTERNAL MEDICINE

## 2020-02-06 PROCEDURE — 4004F PT TOBACCO SCREEN RCVD TLK: CPT | Performed by: INTERNAL MEDICINE

## 2020-02-06 PROCEDURE — 99214 OFFICE O/P EST MOD 30 MIN: CPT | Performed by: INTERNAL MEDICINE

## 2020-02-06 PROCEDURE — 93000 ELECTROCARDIOGRAM COMPLETE: CPT | Performed by: INTERNAL MEDICINE

## 2020-02-06 PROCEDURE — 3079F DIAST BP 80-89 MM HG: CPT | Performed by: INTERNAL MEDICINE

## 2020-02-06 PROCEDURE — 3008F BODY MASS INDEX DOCD: CPT | Performed by: INTERNAL MEDICINE

## 2020-02-07 ENCOUNTER — TELEPHONE (OUTPATIENT)
Dept: ENDOCRINOLOGY | Facility: CLINIC | Age: 60
End: 2020-02-07

## 2020-02-07 ENCOUNTER — OFFICE VISIT (OUTPATIENT)
Dept: ENDOCRINOLOGY | Facility: CLINIC | Age: 60
End: 2020-02-07
Payer: COMMERCIAL

## 2020-02-07 VITALS
WEIGHT: 235 LBS | HEIGHT: 63 IN | SYSTOLIC BLOOD PRESSURE: 118 MMHG | DIASTOLIC BLOOD PRESSURE: 82 MMHG | HEART RATE: 89 BPM | BODY MASS INDEX: 41.64 KG/M2

## 2020-02-07 DIAGNOSIS — E11.65 TYPE 2 DIABETES MELLITUS WITH HYPERGLYCEMIA, WITHOUT LONG-TERM CURRENT USE OF INSULIN (HCC): Primary | ICD-10-CM

## 2020-02-07 DIAGNOSIS — I10 HYPERTENSION GOAL BP (BLOOD PRESSURE) < 140/90: ICD-10-CM

## 2020-02-07 DIAGNOSIS — E78.2 MIXED HYPERLIPIDEMIA: ICD-10-CM

## 2020-02-07 DIAGNOSIS — Z79.4 TYPE 2 DIABETES MELLITUS WITH HYPERGLYCEMIA, WITH LONG-TERM CURRENT USE OF INSULIN (HCC): Primary | ICD-10-CM

## 2020-02-07 DIAGNOSIS — E66.01 OBESITY, CLASS III, BMI 40-49.9 (MORBID OBESITY) (HCC): ICD-10-CM

## 2020-02-07 DIAGNOSIS — E11.65 TYPE 2 DIABETES MELLITUS WITH HYPERGLYCEMIA, WITH LONG-TERM CURRENT USE OF INSULIN (HCC): Primary | ICD-10-CM

## 2020-02-07 PROCEDURE — 3074F SYST BP LT 130 MM HG: CPT | Performed by: INTERNAL MEDICINE

## 2020-02-07 PROCEDURE — 4004F PT TOBACCO SCREEN RCVD TLK: CPT | Performed by: INTERNAL MEDICINE

## 2020-02-07 PROCEDURE — 3079F DIAST BP 80-89 MM HG: CPT | Performed by: INTERNAL MEDICINE

## 2020-02-07 PROCEDURE — 3008F BODY MASS INDEX DOCD: CPT | Performed by: INTERNAL MEDICINE

## 2020-02-07 PROCEDURE — 99214 OFFICE O/P EST MOD 30 MIN: CPT | Performed by: INTERNAL MEDICINE

## 2020-02-07 NOTE — TELEPHONE ENCOUNTER
Roger Lin ordered basaglar  Can you let patient know basaglar is covered and not lantus  It is the same type of insulin  Dexter BOWER    Endocrinology

## 2020-02-07 NOTE — TELEPHONE ENCOUNTER
Pt needs a refill on her Lantus but it is not covered  They want to switch to [address]    Please call her pharmacy  [phone]

## 2020-02-07 NOTE — PATIENT INSTRUCTIONS
Start lantus at bedtime 12 units    Continue metformin and victoza    Have labs done in 2 months  Follow up in 2 months before surgery    Send me your blood sugar log every 2-3 weeks

## 2020-02-07 NOTE — PROGRESS NOTES
ENDOCRINOLOGY  FOLLOW UP VISIT      Reason for Endocrine Consult/Chief Complaint: DM management     ? Medical Decision Making:     Impression  1  DM2  2  LESLIE  3  Obesity BMI 43   4  HTN  5  HLD  6  COPD  7  CAD  8  Fatty liver     Recommendations:     Still having fasting hyperglycemia and occasional post-prandial hyperglycemia  Will start basal insulin to optimize her for future bariatric surgery  Start lantus 12 units at bedtime  continue metformin 2000 mg taken once a day and Victoza at the maximum dose of 1 8 mg taken daily  She has no side effects from the Victoza  I counseled on the risk of pancreatitis, medullary thyroid cancer, and nausea and vomiting  No family history of MEN 2      Instructed to send me BG log in 1-2 weeks for review       HTN-controlled continue ACE-inhibitor and HCTZ     HLD mixed- triglycerides were very elevated at 414 in 2019, emphasized compliance with atorvastatin 80 mg taken once a day, if no improvement in 3 months we consider fibrate     Obesity- BMI 43 she is considering gastric sleeve surgery in April or May 2020     Return to clinic in 2 months  Yves BOWER            History of Present Illness:   Mrs Thu Shields is a 50-year-old female who presents for diabetes management  She is considering bariatric surgery- March/2020    ?   PMH-type 2 diabetes, sleep apnea, obesity, hypertension, hyperlipidemia, COPD, coronary artery disease  PSH-back surgery,   FHx-mother with diabetes, brother with diabetes  SHx-retired      Type of DM: 2  Age of onset:19 years   Most recent A1C: 8 6% 2019  Present home regimen:  Metformin a 2000 mg taken once a day, Victoza 1 8 mg taken daily---has been on metformin for years, Cherri Randall has been on a few years   SMBG at home:a few times a week, 200s in morning when checks  Hypoglycemic events:none   Microvascular complications:none known   Macrovascular complications:  CAD  Nutrition: 1- 2 meals a day, yesterday turkey soup last night   Exercise:walking all the time     Events since last visit:   metformin 2000 mg taken once a day and Victoza at the maximum dose of 1 8 mg taken daily  FBG-high 100s  Premeal/qHS BG-low 100s to high 100s, occasional 200s  hypogylcemia-none     ? Review of Systems:     Review of Systems   Constitutional: Negative for appetite change, chills, diaphoresis, fatigue, fever and unexpected weight change  HENT: Negative for congestion, ear pain, hearing loss, rhinorrhea, sinus pressure, sinus pain, sore throat, trouble swallowing and voice change  Eyes: Negative for photophobia, redness and visual disturbance  Respiratory: Negative for apnea, cough, chest tightness, shortness of breath, wheezing and stridor  Cardiovascular: Negative for chest pain, palpitations and leg swelling  Gastrointestinal: Negative for abdominal distention, abdominal pain, constipation, diarrhea, nausea and vomiting  Endocrine: Negative for cold intolerance, heat intolerance, polydipsia, polyphagia and polyuria  Genitourinary: Negative for difficulty urinating, dysuria, flank pain, frequency, hematuria and urgency  Musculoskeletal: Negative for arthralgias, back pain, gait problem, joint swelling and myalgias  Skin: Negative for color change, pallor, rash and wound  Allergic/Immunologic: Negative for immunocompromised state  Neurological: Negative for dizziness, tremors, syncope, weakness, light-headedness and headaches  Hematological: Negative for adenopathy  Does not bruise/bleed easily  Psychiatric/Behavioral: Negative for confusion and sleep disturbance  The patient is not nervous/anxious  ?   Patient History:     Past Medical History:   Diagnosis Date    Anxiety     Arthritis     Asthma     Breast lump     last assessed 10/14/14     Chronic [facility] disorder     [facility]    [facility]ch[facility]     with exacerbation / last assessed 14     Coronary artery disease involving native coronary artery of native heart with angina pectoris [facility] 2019    CPAP (continuous positive airway pressure) dependence     Depression     Diabetes mellitus [facility]     Diarrhea     GERD (gastroesophageal reflux disease)     Hypercholesterolemia     Hyperlipidemia     Hypertension     Intolerance to heat     Irregular heart beat     Joint pain     Low back pain     Neck pain     Nodule of tendon sheath     last assessed 2/5/15     Obesity     Osteoarthritis     Peripheral neuropathy     Shortness of breath     Cardiac cath-30% blockage    Sleep apnea     Spinal stenosis     Type 2 diabetes mellitus with hyperglycemia ([facility] )     last assessed 17     Varicose vein of leg     bilateral     Past Surgical History:   Procedure Laterality Date    BACK SURGERY  2005    lower fusion   [address] 42  2019    had a cardiac cath    CARPAL TUNNEL RELEASE Right     CAUDAL BLOCK N/A 2017    Procedure: BLOCK / [address];  Surgeon: Marisol Prieto MD;  [facility] MAIN OR;  Service: Pain Management     CAUDAL BLOCK N/A 2018    Procedure: Caudal Epidural Steroid Injection (32721);   Surgeon: Marisol Prieto MD;  Location: [facility] MAIN OR;  Service: Pain Management      SECTION  1984    EGD  10/2019    for bariatric surgery    LAMINECTOMY      Lumbar 2005     Social History     Socioeconomic History    Marital status: Single     Spouse name: Not on file    Number of children: Not on file    Years of education: Not on file    Highest education level: Not on file   Occupational History     Comment: unemployed   Social Needs    Financial resource strain: Not on file    Food insecurity:     Worry: Not on file     Inability: Not on file    Transportation needs:     Medical: Not on file     Non-medical: Not on file   Tobacco Use    Smoking status: Current Every Day Smoker     Packs/day: 0 50     Years: 30 00 Pack years: 15 00     Types: Cigarettes    Smokeless tobacco: Never Used    Tobacco comment: 10-12 cigs  a day   Substance and Sexual Activity    Alcohol use: No    Drug use: No    Sexual activity: Not on file   Lifestyle    Physical activity:     Days per week: Not on file     Minutes per session: Not on file    Stress: Not on file   Relationships    Social connections:     Talks on phone: Not on file     Gets together: Not on file     Attends [employer] service: Not on file     Active member of club or organization: Not on file     Attends meetings of clubs or organizations: Not on file     Relationship status: Not on file    Intimate partner violence:     Fear of current or ex partner: Not on file     Emotionally abused: Not on file     Physically abused: Not on file     Forced sexual activity: Not on file   Other Topics Concern    Not on file   Social History Narrative     per allscript     Lives alone without help available     Family History   Problem Relation Age of Onset    Diabetes Mother     Heart disease Mother         CAD-3 stents    Polycythemia Mother     Hemochromatosis Mother     Dementia Father     Alzheimer's disease Father     No Known Problems Brother     No Known Problems Son     No Known Problems Maternal Grandmother     No Known Problems Maternal Grandfather     No Known Problems Paternal Grandmother     No Known Problems Paternal Grandfather     No Known Problems Daughter     No Known Problems Maternal Aunt     No Known Problems Maternal Uncle     No Known Problems Paternal Aunt     No Known Problems Paternal Uncle        Current Medications: At the time this note was written these were the medications the patient was on    Current Outpatient Medications   Medication Sig Dispense Refill    albuterol (PROVENTIL HFA,VENTOLIN HFA) 90 mcg/act inhaler Inhale 2 puffs every 4 (four) hours as needed for wheezing 18 Inhaler 5    atorvastatin (LIPITOR) 80 mg tablet TAKE 1 TABLET BY MOUTH ONCE A DAY (Patient taking differently: every morning ) 90 tablet 3    B-D UF III MINI PEN NEEDLES 31G X 5 MM MISC 1 ONCE A DAY WITH VICTOZA  each 3    bisacodyl (DULCOLAX) 5 mg EC tablet Take 10 mg by mouth once      buPROPion (WELLBUTRIN XL) 300 mg 24 hr tablet Take 1 tablet (300 mg total) by mouth every morning 90 tablet 3    dicyclomine (BENTYL) 10 mg capsule Take 1 capsule (10 mg total) by mouth 3 (three) times a day as needed (diarrhea and abdominal pain) 90 capsule 3    DULoxetine (CYMBALTA) 30 mg delayed release capsule TAKE 1 CAPSULE BY MOUTH ONCE A DAY (Patient taking differently: every morning ) 30 capsule 11    DULoxetine (CYMBALTA) 60 mg delayed release capsule Take 1 capsule (60 mg total) by mouth daily (Patient taking differently: Take 60 mg by mouth every morning ) 90 capsule 3    fluticasone (FLONASE) 50 mcg/act nasal spray SPRAY 2 SPRAYS INTO EACH NOSTRIL EVERY DAY (Patient taking differently: as needed ) 16 mL 3    fluticasone-salmeterol (AIRDUO RESPICLICK 892/66) 656-25 mcg/act dry powder inhaler Inhale 1 puff 2 (two) times a day Rinse mouth after use   1 Inhaler 3    glucose blood (ONE TOUCH ULTRA TEST) test strip TEST ONCE A DAY-dx code: E11 9 100 each 3    INCRUSE ELLIPTA 62 5 MCG/INH AEPB inhaler TAKE 1 PUFF BY MOUTH EVERY DAY (Patient taking differently: every morning ) 1 Inhaler 3    lisinopril-hydrochlorothiazide (PRINZIDE,ZESTORETIC) 20-25 MG per tablet Take 1 tablet by mouth daily (Patient taking differently: Take 1 tablet by mouth every morning ) 90 tablet 3    metFORMIN (GLUCOPHAGE) 1000 MG tablet TAKE 2 TABLETS (2,000 MG TOTAL) BY MOUTH DAILY FOR 90 DAYS (Patient taking differently: Take 2,000 mg by mouth every morning ) 180 tablet 1    multivitamin (THERAGRAN) TABS Take 1 tablet by mouth every morning      omeprazole (PriLOSEC) 20 mg delayed release capsule Take 1 capsule (20 mg total) by mouth 2 (two) times a day before meals (Patient taking differently: Take 20 mg by mouth every morning ) 60 capsule 3    polyethylene glycol (GLYCOLAX) powder Take 17 g by mouth once      pregabalin (LYRICA) 100 mg capsule Take 1 capsule (100 mg total) by mouth 3 (three) times a day (Patient taking differently: Take 100 mg by mouth every morning ) 90 capsule 0    QUEtiapine (SEROquel) 25 mg tablet TAKE 1 TABLET BY MOUTH TWICE A  tablet 2    Tapentadol HCl ER (NUCYNTA ER) 50 MG TB12 Take 1 tablet (50 mg total) by mouth every 12 (twelve) hoursMax Daily Amount: 100 mg 60 tablet 0    [START ON 2/19/2020] Tapentadol HCl ER 50 MG TB12 Take 1 tablet (50 mg total) by mouth every 12 (twelve) hoursMax Daily Amount: 100 mg (Patient not taking: Reported on 2/6/2020) 60 tablet 0    VICTOZA injection INJECT 0 3 ML (1 8 MG TOTAL) UNDER THE SKIN DAILY (Patient taking differently: every morning ) 6 pen 3     No current facility-administered medications for this visit  Allergies: Patient has no known allergies  Physical Exam:   Vital Signs:   /82   Pulse 89   Ht 5' 3" (1 6 m)   Wt 107 kg (235 lb)   BMI 41 63 kg/m²     Physical Exam   Constitutional: She is oriented to person, place, and time  She appears well-developed and well-nourished  HENT:   Head: Normocephalic and atraumatic  Nose: Nose normal    Mouth/Throat: Oropharynx is clear and moist  No oropharyngeal exudate  Eyes: Pupils are equal, round, and reactive to light  Conjunctivae and EOM are normal  No scleral icterus  Neck: Normal range of motion  Neck supple  No thyromegaly present  Cardiovascular: Normal rate, regular rhythm and normal heart sounds  Exam reveals no gallop and no friction rub  No murmur heard  Pulmonary/Chest: Effort normal and breath sounds normal  No stridor  No respiratory distress  She has no wheezes  She has no rales  Abdominal: Soft  Bowel sounds are normal  She exhibits no distension and no mass  There is no tenderness   There is no rebound and no guarding  Musculoskeletal: Normal range of motion  She exhibits no edema  Lymphadenopathy:     She has no cervical adenopathy  Neurological: She is alert and oriented to person, place, and time  Skin: Skin is warm and dry  No rash noted  No erythema  No pallor  Psychiatric: She has a normal mood and affect   Her behavior is normal  Judgment and thought content normal         Labs and Imaging:    No recent labs

## 2020-02-17 ENCOUNTER — OFFICE VISIT (OUTPATIENT)
Dept: BARIATRICS | Facility: CLINIC | Age: 60
End: 2020-02-17

## 2020-02-17 VITALS — WEIGHT: 234.6 LBS | HEIGHT: 63 IN | BODY MASS INDEX: 41.57 KG/M2

## 2020-02-17 DIAGNOSIS — E66.01 MORBID (SEVERE) OBESITY DUE TO EXCESS CALORIES (HCC): Primary | ICD-10-CM

## 2020-02-17 DIAGNOSIS — Z98.84 BARIATRIC SURGERY STATUS: ICD-10-CM

## 2020-02-17 PROCEDURE — RECHECK

## 2020-02-17 NOTE — PROGRESS NOTES
Patient met with SW to review continued efforts she's making to quit smoking, making positive eating choices and staying active  She has reduced the amount of cigarettes she smokes a day with positive self talk and planning that would be effective with eating habits  She has been able to avoid high fat foods and is interested in increasing her vegetable intake  She is active on her bike at times and feels confident about her ability to meet her submit weight  Patient will return in a month to follow up on progress
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
## 2020-02-24 ENCOUNTER — OFFICE VISIT (OUTPATIENT)
Dept: FAMILY MEDICINE CLINIC | Facility: CLINIC | Age: 60
End: 2020-02-24
Payer: COMMERCIAL

## 2020-02-24 VITALS
HEART RATE: 104 BPM | DIASTOLIC BLOOD PRESSURE: 74 MMHG | HEIGHT: 60 IN | SYSTOLIC BLOOD PRESSURE: 112 MMHG | TEMPERATURE: 97.8 F | WEIGHT: 183 LBS | BODY MASS INDEX: 35.93 KG/M2 | RESPIRATION RATE: 20 BRPM | OXYGEN SATURATION: 95 %

## 2020-02-24 DIAGNOSIS — D22.9 ATYPICAL NEVI: ICD-10-CM

## 2020-02-24 DIAGNOSIS — R35.0 URINE FREQUENCY: ICD-10-CM

## 2020-02-24 DIAGNOSIS — J40 BRONCHITIS: Primary | ICD-10-CM

## 2020-02-24 LAB
SL AMB  POCT GLUCOSE, UA: >1000
SL AMB LEUKOCYTE ESTERASE,UA: ABNORMAL
SL AMB POCT BILIRUBIN,UA: ABNORMAL
SL AMB POCT BLOOD,UA: ABNORMAL
SL AMB POCT CLARITY,UA: CLEAR
SL AMB POCT COLOR,UA: YELLOW
SL AMB POCT KETONES,UA: ABNORMAL
SL AMB POCT NITRITE,UA: ABNORMAL
SL AMB POCT PH,UA: 5
SL AMB POCT SPECIFIC GRAVITY,UA: 1.01
SL AMB POCT URINE PROTEIN: ABNORMAL
SL AMB POCT UROBILINOGEN: ABNORMAL

## 2020-02-24 PROCEDURE — 3078F DIAST BP <80 MM HG: CPT | Performed by: FAMILY MEDICINE

## 2020-02-24 PROCEDURE — 81003 URINALYSIS AUTO W/O SCOPE: CPT | Performed by: FAMILY MEDICINE

## 2020-02-24 PROCEDURE — 4004F PT TOBACCO SCREEN RCVD TLK: CPT | Performed by: FAMILY MEDICINE

## 2020-02-24 PROCEDURE — 3074F SYST BP LT 130 MM HG: CPT | Performed by: FAMILY MEDICINE

## 2020-02-24 PROCEDURE — 99213 OFFICE O/P EST LOW 20 MIN: CPT | Performed by: FAMILY MEDICINE

## 2020-02-24 RX ORDER — PREDNISONE 20 MG/1
TABLET ORAL
Qty: 7 TABLET | Refills: 1 | Status: ON HOLD | OUTPATIENT
Start: 2020-02-24 | End: 2020-08-14 | Stop reason: ALTCHOICE

## 2020-02-24 RX ORDER — AZITHROMYCIN 250 MG/1
TABLET, FILM COATED ORAL
Qty: 6 TABLET | Refills: 0 | Status: SHIPPED | OUTPATIENT
Start: 2020-02-24 | End: 2020-02-29

## 2020-02-24 RX ORDER — DEXTROMETHORPHAN HYDROBROMIDE AND PROMETHAZINE HYDROCHLORIDE 15; 6.25 MG/5ML; MG/5ML
5 SOLUTION ORAL 4 TIMES DAILY PRN
Qty: 118 ML | Refills: 1 | Status: SHIPPED | OUTPATIENT
Start: 2020-02-24 | End: 2020-08-03 | Stop reason: SDUPTHER

## 2020-02-28 ENCOUNTER — TELEPHONE (OUTPATIENT)
Dept: FAMILY MEDICINE CLINIC | Facility: CLINIC | Age: 60
End: 2020-02-28

## 2020-02-29 DIAGNOSIS — E11.8 TYPE 2 DIABETES MELLITUS WITH COMPLICATION, WITHOUT LONG-TERM CURRENT USE OF INSULIN (HCC): ICD-10-CM

## 2020-03-01 DIAGNOSIS — G89.4 CHRONIC PAIN SYNDROME: ICD-10-CM

## 2020-03-01 DIAGNOSIS — E78.01 FAMILIAL HYPERCHOLESTEROLEMIA: ICD-10-CM

## 2020-03-01 DIAGNOSIS — F17.200 NICOTINE DEPENDENCE, UNCOMPLICATED, UNSPECIFIED NICOTINE PRODUCT TYPE: Primary | ICD-10-CM

## 2020-03-01 RX ORDER — ATORVASTATIN CALCIUM 80 MG/1
TABLET, FILM COATED ORAL
Qty: 30 TABLET | Refills: 3 | Status: SHIPPED | OUTPATIENT
Start: 2020-03-01 | End: 2020-06-23

## 2020-03-01 RX ORDER — DULOXETIN HYDROCHLORIDE 60 MG/1
CAPSULE, DELAYED RELEASE ORAL
Qty: 30 CAPSULE | Refills: 3 | Status: SHIPPED | OUTPATIENT
Start: 2020-03-01 | End: 2020-06-23

## 2020-03-01 RX ORDER — VARENICLINE TARTRATE 25 MG
KIT ORAL
Qty: 53 TABLET | Refills: 0 | Status: SHIPPED | OUTPATIENT
Start: 2020-03-01 | End: 2020-03-26

## 2020-03-04 ENCOUNTER — OFFICE VISIT (OUTPATIENT)
Dept: PULMONOLOGY | Facility: MEDICAL CENTER | Age: 60
End: 2020-03-04
Payer: COMMERCIAL

## 2020-03-04 VITALS
TEMPERATURE: 98.4 F | OXYGEN SATURATION: 95 % | BODY MASS INDEX: 46.33 KG/M2 | SYSTOLIC BLOOD PRESSURE: 122 MMHG | RESPIRATION RATE: 12 BRPM | HEART RATE: 105 BPM | HEIGHT: 60 IN | WEIGHT: 236 LBS | DIASTOLIC BLOOD PRESSURE: 62 MMHG

## 2020-03-04 DIAGNOSIS — J41.0 SIMPLE CHRONIC BRONCHITIS (HCC): ICD-10-CM

## 2020-03-04 DIAGNOSIS — E66.01 OBESITY, CLASS III, BMI 40-49.9 (MORBID OBESITY) (HCC): ICD-10-CM

## 2020-03-04 DIAGNOSIS — G47.33 OBSTRUCTIVE SLEEP APNEA: Primary | ICD-10-CM

## 2020-03-04 DIAGNOSIS — R06.02 SHORTNESS OF BREATH: ICD-10-CM

## 2020-03-04 PROCEDURE — 99214 OFFICE O/P EST MOD 30 MIN: CPT | Performed by: INTERNAL MEDICINE

## 2020-03-04 PROCEDURE — 4004F PT TOBACCO SCREEN RCVD TLK: CPT | Performed by: INTERNAL MEDICINE

## 2020-03-04 PROCEDURE — 3074F SYST BP LT 130 MM HG: CPT | Performed by: INTERNAL MEDICINE

## 2020-03-04 PROCEDURE — 94640 AIRWAY INHALATION TREATMENT: CPT | Performed by: INTERNAL MEDICINE

## 2020-03-04 PROCEDURE — 3078F DIAST BP <80 MM HG: CPT | Performed by: INTERNAL MEDICINE

## 2020-03-04 RX ORDER — ALBUTEROL SULFATE 2.5 MG/3ML
2.5 SOLUTION RESPIRATORY (INHALATION)
Qty: 120 VIAL | Refills: 5 | Status: SHIPPED | OUTPATIENT
Start: 2020-03-04 | End: 2020-04-03

## 2020-03-04 RX ORDER — ALBUTEROL SULFATE 2.5 MG/3ML
2.5 SOLUTION RESPIRATORY (INHALATION) ONCE
Status: COMPLETED | OUTPATIENT
Start: 2020-03-04 | End: 2020-03-04

## 2020-03-04 RX ADMIN — ALBUTEROL SULFATE 2.5 MG: 2.5 SOLUTION RESPIRATORY (INHALATION) at 11:08
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
## 2020-03-08 NOTE — ASSESSMENT & PLAN NOTE
Butch Rangel does have mild chronic bronchitis  She will continue with AirDuo 232 mcg 1 puff b i d  And Incruse 1 puff daily  She has history smoking half pack of cigarettes per day for 30 years  Still smoking    I did encourage her to quit smoking

## 2020-03-08 NOTE — ASSESSMENT & PLAN NOTE
She complained of some mild shortness of breath today related to her recent viral URI which is improving  She has for nebulizer treatment  I did not hear any wheezing  We did administer neb treatment with albuterol  She does have nebulizer at home  There is no sign of any bacterial infection

## 2020-03-08 NOTE — ASSESSMENT & PLAN NOTE
She does have shortness of breath and poor related to her restrictive impairment from obesity  She still considering having bariatric surgery later this year

## 2020-03-08 NOTE — ASSESSMENT & PLAN NOTE
Christelle has moderate obstructive sleep apnea and has been compliant with using her CPAP  I reviewed compliance data from February 1 to March 1, 2020  She is set at a CPAP pressure of 10 cm water     She used to CPAP almost every night and average just over 4 hours of usage per night  Her average AHI with the CPAP was 0 7 which is satisfactory  I did encourage her to use the CPAP more  She station she does not sleep that many hours at night  I did encourage her to lose weight  Her medical supply company is [employer]       She did have nocturnal pulse oximetry recording with her using her CPAP therapy 1 01/22/2020  This showed mean O2 saturation 93% with justen of 83%  She spent 9 5 minutes < 88%  I did encourage her to lose weight    This minimal amount of mild hypoxemia is  related to alveolar hypoventilation from obesity

## 2020-03-17 ENCOUNTER — OFFICE VISIT (OUTPATIENT)
Dept: PAIN MEDICINE | Facility: CLINIC | Age: 60
End: 2020-03-17
Payer: OTHER MISCELLANEOUS

## 2020-03-17 VITALS
DIASTOLIC BLOOD PRESSURE: 79 MMHG | WEIGHT: 238 LBS | HEIGHT: 60 IN | BODY MASS INDEX: 46.72 KG/M2 | HEART RATE: 83 BPM | SYSTOLIC BLOOD PRESSURE: 129 MMHG

## 2020-03-17 DIAGNOSIS — M96.1 POSTLAMINECTOMY SYNDROME, NOT ELSEWHERE CLASSIFIED: ICD-10-CM

## 2020-03-17 DIAGNOSIS — Z79.891 LONG-TERM CURRENT USE OF OPIATE ANALGESIC: ICD-10-CM

## 2020-03-17 DIAGNOSIS — M54.42 CHRONIC BILATERAL LOW BACK PAIN WITH LEFT-SIDED SCIATICA: ICD-10-CM

## 2020-03-17 DIAGNOSIS — G89.29 CHRONIC BILATERAL LOW BACK PAIN WITH LEFT-SIDED SCIATICA: ICD-10-CM

## 2020-03-17 DIAGNOSIS — F11.20 UNCOMPLICATED OPIOID DEPENDENCE (HCC): ICD-10-CM

## 2020-03-17 DIAGNOSIS — M54.16 LUMBAR RADICULOPATHY: ICD-10-CM

## 2020-03-17 DIAGNOSIS — G89.4 CHRONIC PAIN SYNDROME: Primary | ICD-10-CM

## 2020-03-17 PROCEDURE — 99214 OFFICE O/P EST MOD 30 MIN: CPT | Performed by: ANESTHESIOLOGY

## 2020-03-17 PROCEDURE — 80305 DRUG TEST PRSMV DIR OPT OBS: CPT | Performed by: ANESTHESIOLOGY
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
## 2020-03-23 ENCOUNTER — TELEPHONE (OUTPATIENT)
Dept: BARIATRICS | Facility: CLINIC | Age: 60
End: 2020-03-23

## 2020-03-23 NOTE — TELEPHONE ENCOUNTER
DEISI left message for patient regarding office appointment on 3/24/20 and offering to do a phone visit if she would prefer  DEISI reminded her that she is welcome to come to the office but DEISI is covering at another office on 3/24/20, she will be available at the 11am appointment time to talk by phone or to reschedule to another day  Return phone number was left for patient to call back

## 2020-03-24 ENCOUNTER — OFFICE VISIT (OUTPATIENT)
Dept: BARIATRICS | Facility: CLINIC | Age: 60
End: 2020-03-24

## 2020-03-24 DIAGNOSIS — E66.01 MORBID (SEVERE) OBESITY DUE TO EXCESS CALORIES (HCC): Primary | ICD-10-CM

## 2020-03-24 DIAGNOSIS — Z98.84 BARIATRIC SURGERY STATUS: ICD-10-CM

## 2020-03-24 PROCEDURE — RECHECK

## 2020-03-26 DIAGNOSIS — F17.200 NICOTINE DEPENDENCE, UNCOMPLICATED, UNSPECIFIED NICOTINE PRODUCT TYPE: ICD-10-CM

## 2020-03-26 DIAGNOSIS — F17.200 NICOTINE DEPENDENCE, UNCOMPLICATED, UNSPECIFIED NICOTINE PRODUCT TYPE: Primary | ICD-10-CM

## 2020-03-26 RX ORDER — VARENICLINE TARTRATE 1 MG/1
1 TABLET, FILM COATED ORAL 2 TIMES DAILY
Qty: 60 TABLET | Refills: 1 | Status: SHIPPED | OUTPATIENT
Start: 2020-03-26 | End: 2020-05-18

## 2020-03-26 RX ORDER — VARENICLINE TARTRATE 25 MG
KIT ORAL
Qty: 53 TABLET | Refills: 0 | Status: ON HOLD | OUTPATIENT
Start: 2020-03-26 | End: 2020-08-14 | Stop reason: ALTCHOICE

## 2020-04-06 ENCOUNTER — TELEMEDICINE (OUTPATIENT)
Dept: ENDOCRINOLOGY | Facility: CLINIC | Age: 60
End: 2020-04-06
Payer: COMMERCIAL

## 2020-04-06 ENCOUNTER — TELEPHONE (OUTPATIENT)
Dept: ENDOCRINOLOGY | Facility: CLINIC | Age: 60
End: 2020-04-06

## 2020-04-06 DIAGNOSIS — E78.2 MIXED HYPERLIPIDEMIA: ICD-10-CM

## 2020-04-06 DIAGNOSIS — E66.01 OBESITY, CLASS III, BMI 40-49.9 (MORBID OBESITY) (HCC): ICD-10-CM

## 2020-04-06 DIAGNOSIS — E11.65 TYPE 2 DIABETES MELLITUS WITH HYPERGLYCEMIA, WITHOUT LONG-TERM CURRENT USE OF INSULIN (HCC): Primary | ICD-10-CM

## 2020-04-06 DIAGNOSIS — I10 HYPERTENSION GOAL BP (BLOOD PRESSURE) < 140/90: ICD-10-CM

## 2020-04-06 PROCEDURE — 99212 OFFICE O/P EST SF 10 MIN: CPT | Performed by: INTERNAL MEDICINE

## 2020-04-08 DIAGNOSIS — R10.13 DYSPEPSIA: ICD-10-CM

## 2020-04-08 RX ORDER — OMEPRAZOLE 20 MG/1
20 CAPSULE, DELAYED RELEASE ORAL
Qty: 60 CAPSULE | Refills: 3 | Status: SHIPPED | OUTPATIENT
Start: 2020-04-08 | End: 2020-12-02

## 2020-04-16 DIAGNOSIS — J44.9 CHRONIC OBSTRUCTIVE PULMONARY DISEASE, UNSPECIFIED COPD TYPE (HCC): ICD-10-CM

## 2020-04-16 RX ORDER — UMECLIDINIUM 62.5 UG/1
AEROSOL, POWDER ORAL
Qty: 1 INHALER | Refills: 3 | Status: SHIPPED | OUTPATIENT
Start: 2020-04-16 | End: 2020-08-27

## 2020-04-23 DIAGNOSIS — I10 ESSENTIAL HYPERTENSION: ICD-10-CM

## 2020-04-23 RX ORDER — LISINOPRIL AND HYDROCHLOROTHIAZIDE 25; 20 MG/1; MG/1
TABLET ORAL
Qty: 90 TABLET | Refills: 1 | Status: SHIPPED | OUTPATIENT
Start: 2020-04-23 | End: 2020-07-07

## 2020-04-24 DIAGNOSIS — J31.0 RHINITIS, UNSPECIFIED TYPE: ICD-10-CM

## 2020-04-24 RX ORDER — FLUTICASONE PROPIONATE 50 MCG
SPRAY, SUSPENSION (ML) NASAL
Qty: 16 ML | Refills: 3 | Status: SHIPPED | OUTPATIENT
Start: 2020-04-24 | End: 2020-08-26

## 2020-04-29 ENCOUNTER — OFFICE VISIT (OUTPATIENT)
Dept: BARIATRICS | Facility: CLINIC | Age: 60
End: 2020-04-29

## 2020-04-29 ENCOUNTER — TELEPHONE (OUTPATIENT)
Dept: BARIATRICS | Facility: CLINIC | Age: 60
End: 2020-04-29

## 2020-04-29 VITALS — HEIGHT: 60 IN | BODY MASS INDEX: 46.33 KG/M2 | WEIGHT: 236 LBS

## 2020-04-29 DIAGNOSIS — E66.01 MORBID (SEVERE) OBESITY DUE TO EXCESS CALORIES (HCC): Primary | ICD-10-CM

## 2020-04-29 DIAGNOSIS — Z98.84 BARIATRIC SURGERY STATUS: ICD-10-CM

## 2020-04-29 DIAGNOSIS — E66.01 OBESITY, CLASS III, BMI 40-49.9 (MORBID OBESITY) (HCC): Primary | ICD-10-CM

## 2020-04-29 PROCEDURE — RECHECK

## 2020-05-04 PROBLEM — J40 BRONCHITIS: Status: ACTIVE | Noted: 2020-05-04

## 2020-05-04 PROBLEM — R35.0 URINE FREQUENCY: Status: ACTIVE | Noted: 2020-05-04

## 2020-05-04 PROBLEM — D22.9 ATYPICAL NEVI: Status: ACTIVE | Noted: 2020-05-04

## 2020-05-18 DIAGNOSIS — F17.200 NICOTINE DEPENDENCE, UNCOMPLICATED, UNSPECIFIED NICOTINE PRODUCT TYPE: ICD-10-CM

## 2020-05-18 RX ORDER — VARENICLINE TARTRATE 1 MG/1
TABLET, FILM COATED ORAL
Qty: 56 TABLET | Refills: 1 | Status: SHIPPED | OUTPATIENT
Start: 2020-05-18 | End: 2020-07-17

## 2020-05-19 ENCOUNTER — OFFICE VISIT (OUTPATIENT)
Dept: PAIN MEDICINE | Facility: CLINIC | Age: 60
End: 2020-05-19
Payer: OTHER MISCELLANEOUS

## 2020-05-19 VITALS
WEIGHT: 242 LBS | HEIGHT: 60 IN | HEART RATE: 91 BPM | DIASTOLIC BLOOD PRESSURE: 76 MMHG | SYSTOLIC BLOOD PRESSURE: 113 MMHG | TEMPERATURE: 99.5 F | BODY MASS INDEX: 47.51 KG/M2

## 2020-05-19 DIAGNOSIS — G89.4 CHRONIC PAIN SYNDROME: ICD-10-CM

## 2020-05-19 DIAGNOSIS — G89.29 CHRONIC BILATERAL LOW BACK PAIN WITH LEFT-SIDED SCIATICA: ICD-10-CM

## 2020-05-19 DIAGNOSIS — M96.1 POSTLAMINECTOMY SYNDROME, NOT ELSEWHERE CLASSIFIED: ICD-10-CM

## 2020-05-19 DIAGNOSIS — M54.42 CHRONIC BILATERAL LOW BACK PAIN WITH LEFT-SIDED SCIATICA: ICD-10-CM

## 2020-05-19 DIAGNOSIS — M54.16 LUMBAR RADICULOPATHY: ICD-10-CM

## 2020-05-19 PROCEDURE — 4004F PT TOBACCO SCREEN RCVD TLK: CPT | Performed by: ANESTHESIOLOGY

## 2020-05-19 PROCEDURE — 3008F BODY MASS INDEX DOCD: CPT | Performed by: ANESTHESIOLOGY

## 2020-05-19 PROCEDURE — 99214 OFFICE O/P EST MOD 30 MIN: CPT | Performed by: ANESTHESIOLOGY

## 2020-05-27 ENCOUNTER — OFFICE VISIT (OUTPATIENT)
Dept: BARIATRICS | Facility: CLINIC | Age: 60
End: 2020-05-27

## 2020-05-27 DIAGNOSIS — E66.01 OBESITY, CLASS III, BMI 40-49.9 (MORBID OBESITY) (HCC): Primary | ICD-10-CM

## 2020-05-27 PROCEDURE — RECHECK

## 2020-06-02 ENCOUNTER — TELEPHONE (OUTPATIENT)
Dept: ENDOCRINOLOGY | Facility: CLINIC | Age: 60
End: 2020-06-02

## 2020-06-02 ENCOUNTER — TELEMEDICINE (OUTPATIENT)
Dept: ENDOCRINOLOGY | Facility: CLINIC | Age: 60
End: 2020-06-02
Payer: COMMERCIAL

## 2020-06-02 DIAGNOSIS — E11.65 TYPE 2 DIABETES MELLITUS WITH HYPERGLYCEMIA, WITHOUT LONG-TERM CURRENT USE OF INSULIN (HCC): Primary | ICD-10-CM

## 2020-06-02 DIAGNOSIS — I10 HYPERTENSION GOAL BP (BLOOD PRESSURE) < 140/90: ICD-10-CM

## 2020-06-02 DIAGNOSIS — E78.2 MIXED HYPERLIPIDEMIA: ICD-10-CM

## 2020-06-02 PROCEDURE — 99214 OFFICE O/P EST MOD 30 MIN: CPT | Performed by: INTERNAL MEDICINE

## 2020-06-20 DIAGNOSIS — F32.A DEPRESSION, UNSPECIFIED DEPRESSION TYPE: ICD-10-CM

## 2020-06-21 RX ORDER — QUETIAPINE FUMARATE 25 MG/1
TABLET, FILM COATED ORAL
Qty: 120 TABLET | Refills: 2 | Status: SHIPPED | OUTPATIENT
Start: 2020-06-21 | End: 2021-01-05

## 2020-06-23 ENCOUNTER — TELEPHONE (OUTPATIENT)
Dept: BARIATRICS | Facility: CLINIC | Age: 60
End: 2020-06-23

## 2020-06-23 DIAGNOSIS — G89.4 CHRONIC PAIN SYNDROME: ICD-10-CM

## 2020-06-23 DIAGNOSIS — E78.01 FAMILIAL HYPERCHOLESTEROLEMIA: ICD-10-CM

## 2020-06-23 RX ORDER — DULOXETIN HYDROCHLORIDE 60 MG/1
CAPSULE, DELAYED RELEASE ORAL
Qty: 30 CAPSULE | Refills: 3 | Status: SHIPPED | OUTPATIENT
Start: 2020-06-23 | End: 2020-10-29

## 2020-06-23 RX ORDER — ATORVASTATIN CALCIUM 80 MG/1
TABLET, FILM COATED ORAL
Qty: 30 TABLET | Refills: 3 | Status: SHIPPED | OUTPATIENT
Start: 2020-06-23 | End: 2020-10-28

## 2020-06-24 ENCOUNTER — OFFICE VISIT (OUTPATIENT)
Dept: BARIATRICS | Facility: CLINIC | Age: 60
End: 2020-06-24

## 2020-06-24 VITALS — HEIGHT: 63 IN | BODY MASS INDEX: 42.52 KG/M2 | WEIGHT: 240 LBS

## 2020-06-24 VITALS — BODY MASS INDEX: 42.51 KG/M2 | WEIGHT: 240 LBS

## 2020-06-24 DIAGNOSIS — E66.01 MORBID (SEVERE) OBESITY DUE TO EXCESS CALORIES (HCC): Primary | ICD-10-CM

## 2020-06-24 DIAGNOSIS — Z98.84 BARIATRIC SURGERY STATUS: ICD-10-CM

## 2020-06-24 PROCEDURE — RECHECK

## 2020-06-25 ENCOUNTER — OFFICE VISIT (OUTPATIENT)
Dept: PAIN MEDICINE | Facility: CLINIC | Age: 60
End: 2020-06-25
Payer: OTHER MISCELLANEOUS

## 2020-06-25 VITALS
TEMPERATURE: 98.2 F | HEIGHT: 61 IN | WEIGHT: 240 LBS | DIASTOLIC BLOOD PRESSURE: 69 MMHG | HEART RATE: 92 BPM | BODY MASS INDEX: 45.31 KG/M2 | SYSTOLIC BLOOD PRESSURE: 101 MMHG

## 2020-06-25 DIAGNOSIS — G89.4 CHRONIC PAIN SYNDROME: ICD-10-CM

## 2020-06-25 DIAGNOSIS — M54.16 LUMBAR RADICULOPATHY: ICD-10-CM

## 2020-06-25 DIAGNOSIS — M54.42 CHRONIC BILATERAL LOW BACK PAIN WITH LEFT-SIDED SCIATICA: ICD-10-CM

## 2020-06-25 DIAGNOSIS — F11.20 UNCOMPLICATED OPIOID DEPENDENCE (HCC): ICD-10-CM

## 2020-06-25 DIAGNOSIS — M96.1 POSTLAMINECTOMY SYNDROME, NOT ELSEWHERE CLASSIFIED: ICD-10-CM

## 2020-06-25 DIAGNOSIS — G89.29 CHRONIC BILATERAL LOW BACK PAIN WITH LEFT-SIDED SCIATICA: ICD-10-CM

## 2020-06-25 DIAGNOSIS — Z79.891 LONG-TERM CURRENT USE OF OPIATE ANALGESIC: Primary | ICD-10-CM

## 2020-06-25 PROCEDURE — 3008F BODY MASS INDEX DOCD: CPT | Performed by: ANESTHESIOLOGY

## 2020-06-25 PROCEDURE — 99214 OFFICE O/P EST MOD 30 MIN: CPT | Performed by: ANESTHESIOLOGY

## 2020-06-25 PROCEDURE — 80305 DRUG TEST PRSMV DIR OPT OBS: CPT | Performed by: ANESTHESIOLOGY

## 2020-06-25 PROCEDURE — 1036F TOBACCO NON-USER: CPT | Performed by: ANESTHESIOLOGY
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
## 2020-06-27 LAB
6MAM UR QL CFM: NEGATIVE NG/ML
7AMINOCLONAZEPAM UR QL CFM: NEGATIVE NG/ML
A-OH ALPRAZ UR QL CFM: NEGATIVE NG/ML
ACCEPTABLE CREAT UR QL: NORMAL MG/DL
ACCEPTIBLE SP GR UR QL: NORMAL
AMPHET UR QL CFM: NEGATIVE NG/ML
AMPHET UR QL CFM: NEGATIVE NG/ML
BUPRENORPHINE UR QL CFM: NEGATIVE NG/ML
BUTALBITAL UR QL CFM: NEGATIVE NG/ML
BZE UR QL CFM: NEGATIVE NG/ML
CODEINE UR QL CFM: NEGATIVE NG/ML
DESIPRAMINE UR QL CFM: NEGATIVE NG/ML
DESIPRAMINE UR QL CFM: NEGATIVE NG/ML
EDDP UR QL CFM: NEGATIVE NG/ML
ETHYL GLUCURONIDE UR QL CFM: NEGATIVE NG/ML
ETHYL SULFATE UR QL SCN: NEGATIVE NG/ML
FENTANYL UR QL CFM: NEGATIVE NG/ML
GLIADIN IGG SER IA-ACNC: NEGATIVE NG/ML
GLUCOSE 30M P 50 G LAC PO SERPL-MCNC: NEGATIVE NG/ML
HYDROCODONE UR QL CFM: NEGATIVE NG/ML
HYDROCODONE UR QL CFM: NEGATIVE NG/ML
HYDROMORPHONE UR QL CFM: NEGATIVE NG/ML
IMIPRAMINE UR QL CFM: NEGATIVE NG/ML
LORAZEPAM UR QL CFM: NEGATIVE NG/ML
MDMA UR QL CFM: NEGATIVE NG/ML
ME-PHENIDATE UR QL CFM: NEGATIVE NG/ML
MEPERIDINE UR QL CFM: NEGATIVE NG/ML
MEPHEDRONE UR QL CFM: NEGATIVE NG/ML
METHADONE UR QL CFM: NEGATIVE NG/ML
METHAMPHET UR QL CFM: NEGATIVE NG/ML
MORPHINE UR QL CFM: NEGATIVE NG/ML
MORPHINE UR QL CFM: NEGATIVE NG/ML
NITRITE UR QL: NORMAL UG/ML
NORBUPRENORPHINE UR QL CFM: NEGATIVE NG/ML
NORDIAZEPAM UR QL CFM: NEGATIVE NG/ML
NORFENTANYL UR QL CFM: NEGATIVE NG/ML
NORHYDROCODONE UR QL CFM: NEGATIVE NG/ML
NORHYDROCODONE UR QL CFM: NEGATIVE NG/ML
NORMEPERIDINE UR QL CFM: NEGATIVE NG/ML
NOROXYCODONE UR QL CFM: NEGATIVE NG/ML
OPC-3373 QUANTIFICATION: NEGATIVE
OXAZEPAM UR QL CFM: NEGATIVE NG/ML
OXYCODONE UR QL CFM: NEGATIVE NG/ML
OXYMORPHONE UR QL CFM: NEGATIVE NG/ML
OXYMORPHONE UR QL CFM: NEGATIVE NG/ML
PCP UR QL CFM: NEGATIVE NG/ML
PHENOBARB UR QL CFM: NEGATIVE NG/ML
RESULT ALL_PRESCRIBED MEDS AND SPECIAL INSTRUCTIONS: NORMAL
SECOBARBITAL UR QL CFM: NEGATIVE NG/ML
SL AMB 3-METHYL-FENTANYL QUANTIFICATION: NORMAL NG/ML
SL AMB 4-ANPP QUANTIFICATION: NORMAL NG/ML
SL AMB 4-FIBF QUANTIFICATION: NORMAL NG/ML
SL AMB 5F-ADB-M7 METABOLITE QUANTIFICATION: NEGATIVE NG/ML
SL AMB 7-OH-MITRAGYNINE (KRATOM ALKALOID) QUANTIFICATION: NEGATIVE NG/ML
SL AMB AB-FUBINACA-M3 METABOLITE QUANTIFICATION: NEGATIVE NG/ML
SL AMB ACETYL FENTANYL QUANTIFICATION: NORMAL NG/ML
SL AMB ACETYL NORFENTANYL QUANTIFICATION: NORMAL NG/ML
SL AMB ACRYL FENTANYL QUANTIFICATION: NORMAL NG/ML
SL AMB BATH SALTS: NEGATIVE NG/ML
SL AMB BUTRYL FENTANYL QUANTIFICATION: NORMAL NG/ML
SL AMB CARFENTANIL QUANTIFICATION: NORMAL NG/ML
SL AMB CLOZAPINE QUANTIFICATION: NEGATIVE NG/ML
SL AMB CTHC (MARIJUANA METABOLITE) QUANTIFICATION: NEGATIVE NG/ML
SL AMB CYCLOPROPYL FENTANYL QUANTIFICATION: NORMAL NG/ML
SL AMB DEXTROMETHORPHAN QUANTIFICATION: NEGATIVE NG/ML
SL AMB DEXTRORPHAN (DEXTROMETHORPHAN METABOLITE) QUANT: NEGATIVE NG/ML
SL AMB DEXTRORPHAN (DEXTROMETHORPHAN METABOLITE) QUANT: NEGATIVE NG/ML
SL AMB FURANYL FENTANYL QUANTIFICATION: NORMAL NG/ML
SL AMB HALOPERIDOL  QUANTIFICATION: NEGATIVE NG/ML
SL AMB HALOPERIDOL METABOLITE QUANTIFICATION: NEGATIVE NG/ML
SL AMB JWH018 METABOLITE QUANTIFICATION: NEGATIVE NG/ML
SL AMB JWH073 METABOLITE QUANTIFICATION: NEGATIVE NG/ML
SL AMB MDMB-FUBINACA-M1 METABOLITE QUANTIFICATION: NEGATIVE NG/ML
SL AMB METHOXYACETYL FENTANYL QUANTIFICATION: NORMAL NG/ML
SL AMB METHYLONE QUANTIFICATION: NEGATIVE NG/ML
SL AMB N-DESMETHYL U-47700 QUANTIFICATION: NORMAL NG/ML
SL AMB N-DESMETHYL-TRAMADOL QUANTIFICATION: NEGATIVE NG/ML
SL AMB N-DESMETHYLCLOZAPINE QUANTIFICATION: NEGATIVE NG/ML
SL AMB PHENTERMINE QUANTIFICATION: NEGATIVE NG/ML
SL AMB RCS4 METABOLITE QUANTIFICATION: NEGATIVE NG/ML
SL AMB RITALINIC ACID QUANTIFICATION: NEGATIVE NG/ML
SL AMB U-47700 QUANTIFICATION: NORMAL NG/ML
SPECIMEN DRAWN SERPL: NEGATIVE NG/ML
SPECIMEN PH ACCEPTABLE UR: NORMAL
TAPENTADOL UR CFM-MCNC: 4370.78 NG/ML
TEMAZEPAM UR QL CFM: NEGATIVE NG/ML
TEMAZEPAM UR QL CFM: NEGATIVE NG/ML
TRAMADOL UR QL CFM: NEGATIVE NG/ML
URATE/CREAT 24H UR: NEGATIVE NG/ML

## 2020-07-01 ENCOUNTER — OFFICE VISIT (OUTPATIENT)
Dept: CARDIOLOGY CLINIC | Facility: CLINIC | Age: 60
End: 2020-07-01
Payer: COMMERCIAL

## 2020-07-01 VITALS
HEART RATE: 67 BPM | TEMPERATURE: 98 F | BODY MASS INDEX: 46.44 KG/M2 | WEIGHT: 246 LBS | HEIGHT: 61 IN | SYSTOLIC BLOOD PRESSURE: 120 MMHG | OXYGEN SATURATION: 95 % | DIASTOLIC BLOOD PRESSURE: 60 MMHG

## 2020-07-01 DIAGNOSIS — G89.4 CHRONIC PAIN SYNDROME: ICD-10-CM

## 2020-07-01 DIAGNOSIS — G47.33 OBSTRUCTIVE SLEEP APNEA: ICD-10-CM

## 2020-07-01 DIAGNOSIS — E11.65 TYPE 2 DIABETES MELLITUS WITH HYPERGLYCEMIA, WITHOUT LONG-TERM CURRENT USE OF INSULIN (HCC): ICD-10-CM

## 2020-07-01 DIAGNOSIS — I10 ESSENTIAL HYPERTENSION: ICD-10-CM

## 2020-07-01 DIAGNOSIS — E78.2 MIXED HYPERLIPIDEMIA: ICD-10-CM

## 2020-07-01 DIAGNOSIS — R06.02 SHORTNESS OF BREATH: ICD-10-CM

## 2020-07-01 DIAGNOSIS — J43.2 CENTRILOBULAR EMPHYSEMA (HCC): ICD-10-CM

## 2020-07-01 DIAGNOSIS — F17.200 NICOTINE DEPENDENCE, UNCOMPLICATED, UNSPECIFIED NICOTINE PRODUCT TYPE: ICD-10-CM

## 2020-07-01 DIAGNOSIS — I25.119 CORONARY ARTERY DISEASE INVOLVING NATIVE CORONARY ARTERY OF NATIVE HEART WITH ANGINA PECTORIS (HCC): ICD-10-CM

## 2020-07-01 LAB
ALBUMIN SERPL-MCNC: 4.3 G/DL (ref 3.8–4.9)
ALBUMIN/CREAT UR: 17 MG/G CREAT (ref 0–29)
ALBUMIN/GLOB SERPL: 1.9 {RATIO} (ref 1.2–2.2)
ALP SERPL-CCNC: 77 IU/L (ref 39–117)
ALT SERPL-CCNC: 23 IU/L (ref 0–32)
AST SERPL-CCNC: 17 IU/L (ref 0–40)
BILIRUB SERPL-MCNC: <0.2 MG/DL (ref 0–1.2)
BUN SERPL-MCNC: 15 MG/DL (ref 8–27)
BUN/CREAT SERPL: 14 (ref 12–28)
CALCIUM SERPL-MCNC: 9.7 MG/DL (ref 8.7–10.3)
CHLORIDE SERPL-SCNC: 100 MMOL/L (ref 96–106)
CHOLEST SERPL-MCNC: 157 MG/DL (ref 100–199)
CHOLEST/HDLC SERPL: 3.9 RATIO (ref 0–4.4)
CO2 SERPL-SCNC: 26 MMOL/L (ref 20–29)
CREAT SERPL-MCNC: 1.04 MG/DL (ref 0.57–1)
CREAT UR-MCNC: 135 MG/DL
EST. AVERAGE GLUCOSE BLD GHB EST-MCNC: 209 MG/DL
GLOBULIN SER-MCNC: 2.3 G/DL (ref 1.5–4.5)
GLUCOSE SERPL-MCNC: 232 MG/DL (ref 65–99)
HBA1C MFR BLD: 8.9 % (ref 4.8–5.6)
HDLC SERPL-MCNC: 40 MG/DL
LDLC SERPL CALC-MCNC: 66 MG/DL (ref 0–99)
LDLC SERPL DIRECT ASSAY-MCNC: 79 MG/DL (ref 0–99)
MICROALBUMIN UR-MCNC: 22.3 UG/ML
POTASSIUM SERPL-SCNC: 5.4 MMOL/L (ref 3.5–5.2)
PROT SERPL-MCNC: 6.6 G/DL (ref 6–8.5)
SL AMB EGFR AFRICAN AMERICAN: 67 ML/MIN/1.73
SL AMB EGFR NON AFRICAN AMERICAN: 59 ML/MIN/1.73
SL AMB VLDL CHOLESTEROL CALC: 51 MG/DL (ref 5–40)
SODIUM SERPL-SCNC: 139 MMOL/L (ref 134–144)
TRIGL SERPL-MCNC: 253 MG/DL (ref 0–149)

## 2020-07-01 PROCEDURE — 3078F DIAST BP <80 MM HG: CPT | Performed by: INTERNAL MEDICINE

## 2020-07-01 PROCEDURE — 3074F SYST BP LT 130 MM HG: CPT | Performed by: INTERNAL MEDICINE

## 2020-07-01 PROCEDURE — 99214 OFFICE O/P EST MOD 30 MIN: CPT | Performed by: INTERNAL MEDICINE

## 2020-07-01 PROCEDURE — 1036F TOBACCO NON-USER: CPT | Performed by: INTERNAL MEDICINE

## 2020-07-01 PROCEDURE — 3052F HG A1C>EQUAL 8.0%<EQUAL 9.0%: CPT | Performed by: ANESTHESIOLOGY

## 2020-07-01 PROCEDURE — 3008F BODY MASS INDEX DOCD: CPT | Performed by: INTERNAL MEDICINE

## 2020-07-01 PROCEDURE — 3061F NEG MICROALBUMINURIA REV: CPT | Performed by: ANESTHESIOLOGY

## 2020-07-02 ENCOUNTER — TELEPHONE (OUTPATIENT)
Dept: OTHER | Facility: OTHER | Age: 60
End: 2020-07-02

## 2020-07-02 ENCOUNTER — TELEPHONE (OUTPATIENT)
Dept: ENDOCRINOLOGY | Facility: CLINIC | Age: 60
End: 2020-07-02

## 2020-07-02 DIAGNOSIS — E87.5 HYPERKALEMIA: Primary | ICD-10-CM

## 2020-07-02 NOTE — TELEPHONE ENCOUNTER
Patient is returning a missed call from Dr Elijah Orozco regarding her BG log being reviewed  Please call patient back ASAP to go over the info

## 2020-07-02 NOTE — TELEPHONE ENCOUNTER
Left message BG log reviewed  Dr Kelsey Jones has made changes to Basaglar dose  Please call the office to obtain the info

## 2020-07-02 NOTE — TELEPHONE ENCOUNTER
Please let her know I reviewed BG log    -having mostly morning high numbers in 200s, lets increase basaglar to 32 units at bedtime    Continue same doses of metformin and victoza     Send me another BG log in 2-3 weeks

## 2020-07-02 NOTE — TELEPHONE ENCOUNTER
Please let patient know her labs    -her LDL bad cholesterol was good at 66, her triglycerides improved to 253 from 414 before (normal <150)  -her urine test shows no protein in the urine which is good  -her A1C is still high at 8 9%  -her potassium is high at 5 4----please stop the blood pressure pill prinzide (lisinopril, hydrochlorothiazide)---is she eating a lot of bananas, spinach, potatoes, tomatoes?  Please reduce consumption if so, please repeat CMP to check potassium level on Monday (ordered) to make sure it comes down    Please send me BG log so we can work on reducing her A1C

## 2020-07-06 DIAGNOSIS — E11.65 TYPE 2 DIABETES MELLITUS WITH HYPERGLYCEMIA, WITH LONG-TERM CURRENT USE OF INSULIN (HCC): ICD-10-CM

## 2020-07-06 DIAGNOSIS — Z79.4 TYPE 2 DIABETES MELLITUS WITH HYPERGLYCEMIA, WITH LONG-TERM CURRENT USE OF INSULIN (HCC): ICD-10-CM

## 2020-07-06 LAB
ALBUMIN SERPL-MCNC: 4.2 G/DL (ref 3.8–4.9)
ALBUMIN/GLOB SERPL: 2.3 {RATIO} (ref 1.2–2.2)
ALP SERPL-CCNC: 77 IU/L (ref 39–117)
ALT SERPL-CCNC: 29 IU/L (ref 0–32)
AST SERPL-CCNC: 21 IU/L (ref 0–40)
BILIRUB SERPL-MCNC: <0.2 MG/DL (ref 0–1.2)
BUN SERPL-MCNC: 18 MG/DL (ref 8–27)
BUN/CREAT SERPL: 18 (ref 12–28)
CALCIUM SERPL-MCNC: 8.9 MG/DL (ref 8.7–10.3)
CHLORIDE SERPL-SCNC: 100 MMOL/L (ref 96–106)
CO2 SERPL-SCNC: 23 MMOL/L (ref 20–29)
COTININE SERPL-MCNC: <1 NG/ML
CREAT SERPL-MCNC: 1 MG/DL (ref 0.57–1)
ERYTHROCYTE [DISTWIDTH] IN BLOOD BY AUTOMATED COUNT: 12.1 % (ref 11.7–15.4)
GLOBULIN SER-MCNC: 1.8 G/DL (ref 1.5–4.5)
GLUCOSE SERPL-MCNC: 214 MG/DL (ref 65–99)
HCT VFR BLD AUTO: 37.5 % (ref 34–46.6)
HGB BLD-MCNC: 12.5 G/DL (ref 11.1–15.9)
MCH RBC QN AUTO: 31.1 PG (ref 26.6–33)
MCHC RBC AUTO-ENTMCNC: 33.3 G/DL (ref 31.5–35.7)
MCV RBC AUTO: 93 FL (ref 79–97)
NICOTINE SERPL-MCNC: <1 NG/ML
PLATELET # BLD AUTO: 312 X10E3/UL (ref 150–450)
POTASSIUM SERPL-SCNC: 4.7 MMOL/L (ref 3.5–5.2)
PROT SERPL-MCNC: 6 G/DL (ref 6–8.5)
RBC # BLD AUTO: 4.02 X10E6/UL (ref 3.77–5.28)
SL AMB EGFR AFRICAN AMERICAN: 71 ML/MIN/1.73
SL AMB EGFR NON AFRICAN AMERICAN: 61 ML/MIN/1.73
SODIUM SERPL-SCNC: 139 MMOL/L (ref 134–144)
TSH SERPL DL<=0.005 MIU/L-ACNC: 2.83 UIU/ML (ref 0.45–4.5)
WBC # BLD AUTO: 7.7 X10E3/UL (ref 3.4–10.8)

## 2020-07-07 ENCOUNTER — TELEPHONE (OUTPATIENT)
Dept: ENDOCRINOLOGY | Facility: CLINIC | Age: 60
End: 2020-07-07

## 2020-07-07 ENCOUNTER — TELEPHONE (OUTPATIENT)
Dept: BARIATRICS | Facility: CLINIC | Age: 60
End: 2020-07-07

## 2020-07-07 DIAGNOSIS — I10 HYPERTENSION GOAL BP (BLOOD PRESSURE) < 140/90: Primary | ICD-10-CM

## 2020-07-07 RX ORDER — HYDROCHLOROTHIAZIDE 25 MG/1
25 TABLET ORAL DAILY
Qty: 90 TABLET | Refills: 1 | Status: SHIPPED | OUTPATIENT
Start: 2020-07-07 | End: 2021-02-16

## 2020-07-07 NOTE — TELEPHONE ENCOUNTER
Spoke to patient, advised to Continue off prinzide (lisinopril-hydrochlorathiazide) her blood pressure pill for now-I replaced with hydrochlorothiazide 25mg qday instead, prescription sent to pharmacy  Pt understood

## 2020-07-07 NOTE — TELEPHONE ENCOUNTER
1  Do you presently have a fever or flu-like symptoms? NO  2  Do you have symptoms of an upper respiratory infection like runny nose, sore throat, or cough? NO  3  Are you suffering from new headache that you have not had in the past?  NO  4  Do you have/have you experienced any new shortness of breath recently? NO  5  Do you have any new diarrhea, nausea or vomiting? NO  6  Have you been in contact with anyone who has been sick or diagnosed with COVID-19?  NO

## 2020-07-07 NOTE — TELEPHONE ENCOUNTER
Please let patient know her potassium is normal 4 7 now  Continue off prinzide (lisinopril-hydrochlorathiazide) her blood pressure pill for now-I replaced with hydrochlorothiazide 25mg qday instead

## 2020-07-10 ENCOUNTER — OFFICE VISIT (OUTPATIENT)
Dept: OBGYN CLINIC | Facility: CLINIC | Age: 60
End: 2020-07-10
Payer: COMMERCIAL

## 2020-07-10 ENCOUNTER — TELEPHONE (OUTPATIENT)
Dept: PAIN MEDICINE | Facility: CLINIC | Age: 60
End: 2020-07-10

## 2020-07-10 ENCOUNTER — APPOINTMENT (OUTPATIENT)
Dept: RADIOLOGY | Facility: CLINIC | Age: 60
End: 2020-07-10
Payer: COMMERCIAL

## 2020-07-10 VITALS
SYSTOLIC BLOOD PRESSURE: 130 MMHG | HEIGHT: 61 IN | BODY MASS INDEX: 46.41 KG/M2 | TEMPERATURE: 97 F | HEART RATE: 92 BPM | WEIGHT: 245.8 LBS | DIASTOLIC BLOOD PRESSURE: 83 MMHG

## 2020-07-10 DIAGNOSIS — G89.29 CHRONIC PAIN OF RIGHT KNEE: ICD-10-CM

## 2020-07-10 DIAGNOSIS — M25.561 CHRONIC PAIN OF RIGHT KNEE: ICD-10-CM

## 2020-07-10 DIAGNOSIS — M17.11 PRIMARY OSTEOARTHRITIS OF RIGHT KNEE: Primary | ICD-10-CM

## 2020-07-10 DIAGNOSIS — E11.65 TYPE 2 DIABETES MELLITUS WITH HYPERGLYCEMIA, WITHOUT LONG-TERM CURRENT USE OF INSULIN (HCC): ICD-10-CM

## 2020-07-10 DIAGNOSIS — M25.561 RIGHT KNEE PAIN, UNSPECIFIED CHRONICITY: ICD-10-CM

## 2020-07-10 PROCEDURE — 3075F SYST BP GE 130 - 139MM HG: CPT | Performed by: ORTHOPAEDIC SURGERY

## 2020-07-10 PROCEDURE — 73562 X-RAY EXAM OF KNEE 3: CPT

## 2020-07-10 PROCEDURE — 20610 DRAIN/INJ JOINT/BURSA W/O US: CPT | Performed by: ORTHOPAEDIC SURGERY

## 2020-07-10 PROCEDURE — 3079F DIAST BP 80-89 MM HG: CPT | Performed by: ORTHOPAEDIC SURGERY

## 2020-07-10 PROCEDURE — 1036F TOBACCO NON-USER: CPT | Performed by: ORTHOPAEDIC SURGERY

## 2020-07-10 PROCEDURE — 3008F BODY MASS INDEX DOCD: CPT | Performed by: ORTHOPAEDIC SURGERY

## 2020-07-10 PROCEDURE — 99213 OFFICE O/P EST LOW 20 MIN: CPT | Performed by: ORTHOPAEDIC SURGERY

## 2020-07-10 RX ORDER — BUPIVACAINE HYDROCHLORIDE 5 MG/ML
6 INJECTION, SOLUTION EPIDURAL; INTRACAUDAL
Status: COMPLETED | OUTPATIENT
Start: 2020-07-10 | End: 2020-07-10

## 2020-07-10 RX ORDER — TRIAMCINOLONE ACETONIDE 40 MG/ML
80 INJECTION, SUSPENSION INTRA-ARTICULAR; INTRAMUSCULAR
Status: COMPLETED | OUTPATIENT
Start: 2020-07-10 | End: 2020-07-10

## 2020-07-10 RX ADMIN — BUPIVACAINE HYDROCHLORIDE 6 ML: 5 INJECTION, SOLUTION EPIDURAL; INTRACAUDAL at 11:35

## 2020-07-10 RX ADMIN — TRIAMCINOLONE ACETONIDE 80 MG: 40 INJECTION, SUSPENSION INTRA-ARTICULAR; INTRAMUSCULAR at 11:35
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
## 2020-07-10 NOTE — TELEPHONE ENCOUNTER
Pt was here to day to see Dr Tay Pereyra  She wanted to know if she could schedule an injection  She said you offered it last appointment  I do not see anything in the note   Please advise

## 2020-07-10 NOTE — TELEPHONE ENCOUNTER
S/w pt, advised of the same  Pt verbalized understanding and appreciation  Pt aware someone will reach out to schedule procedure, per pt please be advised that it must be 2+ weeks away as she just had an injection in her knee  Thank you

## 2020-07-10 NOTE — TELEPHONE ENCOUNTER
I s/w pt for update of current symptoms: pt reports pain of the middle of her LB which radiates towards the left side and into her left groin, occasionally she gets a little radiating to the right side  Pt said its the same pain as a year ago and whatever inj he did a year ago helped  Pt denies numbness/tingling or any leg pain  Pt said she had a steroid inj in her Rt knee today by Dr Jose Rafael Mayberry and he told her she could not have a steroid inj from Dr Milan Aj for at least 2 wks  Told pt I would forward update of symptoms and make him aware that she had a steroid inj in her knee today  Someone will contact her which what the next step will be

## 2020-07-16 ENCOUNTER — TELEPHONE (OUTPATIENT)
Dept: BARIATRICS | Facility: CLINIC | Age: 60
End: 2020-07-16

## 2020-07-16 NOTE — TELEPHONE ENCOUNTER
COVID Pre-Visit Screening     1  Is this a family member screening? Yes  2  Have you traveled outside of your state in the past 2 weeks? No  3  Do you presently have a fever or flu-like symptoms? No  4  Do you have symptoms of an upper respiratory infection like runny nose, sore throat, or cough? No  5  Are you suffering from new headache that you have not had in the past?  No  6  Do you have/have you experienced any new shortness of breath recently? Yes  7  Do you have any new diarrhea, nausea or vomiting? No  8  Have you been in contact with anyone who has been sick or diagnosed with COVID-19? No  9  Do you have any new loss of taste or smell? NO  10  Are you able to wear a mask without a valve for the entire visit?  Yes

## 2020-07-17 ENCOUNTER — TELEPHONE (OUTPATIENT)
Dept: FAMILY MEDICINE CLINIC | Facility: CLINIC | Age: 60
End: 2020-07-17

## 2020-07-17 ENCOUNTER — TELEPHONE (OUTPATIENT)
Dept: CARDIOLOGY CLINIC | Facility: CLINIC | Age: 60
End: 2020-07-17

## 2020-07-17 ENCOUNTER — OFFICE VISIT (OUTPATIENT)
Dept: BARIATRICS | Facility: CLINIC | Age: 60
End: 2020-07-17

## 2020-07-17 ENCOUNTER — TELEPHONE (OUTPATIENT)
Dept: BARIATRICS | Facility: CLINIC | Age: 60
End: 2020-07-17

## 2020-07-17 ENCOUNTER — TELEPHONE (OUTPATIENT)
Dept: PULMONOLOGY | Facility: MEDICAL CENTER | Age: 60
End: 2020-07-17

## 2020-07-17 VITALS — WEIGHT: 242 LBS | BODY MASS INDEX: 45.69 KG/M2 | HEIGHT: 61 IN

## 2020-07-17 DIAGNOSIS — F17.200 NICOTINE DEPENDENCE, UNCOMPLICATED, UNSPECIFIED NICOTINE PRODUCT TYPE: ICD-10-CM

## 2020-07-17 DIAGNOSIS — E66.01 MORBID (SEVERE) OBESITY DUE TO EXCESS CALORIES (HCC): Primary | ICD-10-CM

## 2020-07-17 DIAGNOSIS — Z98.84 BARIATRIC SURGERY STATUS: ICD-10-CM

## 2020-07-17 PROCEDURE — RECHECK

## 2020-07-17 RX ORDER — VARENICLINE TARTRATE 1 MG/1
TABLET, FILM COATED ORAL
Qty: 56 TABLET | Refills: 1 | Status: ON HOLD | OUTPATIENT
Start: 2020-07-17 | End: 2020-08-14 | Stop reason: ALTCHOICE

## 2020-07-17 NOTE — TELEPHONE ENCOUNTER
Pre  Op  Clearance note- Cardiology    Danny Sainz   60 y o   female  1960      [id number] Jf :   Patient's chart was reviewed for preop clearance  Patient was seen in our office on 7/1/2020  Patient has past medical history significant for distant exertion, history of emphysema, history of sleep apnea, nonobstructive CAD by cardiac catheterization, history of tobacco abuse now quit smoking, morbid obesity  Patient is now scheduled for bariatric surgery  Patient has no clinical evidence of heart failure or ischemia or active arrhythmia  Patient's last cardiac workup including cardiac catheterization and echo reports were reviewed and it shows mild nonobstructive coronary artery disease and normal LV systolic function  In my opinion patient is in optimum condition for the procedure as planned  Patient is low risk for the surgery as planned from cardiac point of view  Continue current cardiac medications  Patient is not any blood thinners  If you have any question please do not hesitate to call us at our office of [facility]  Phone # [phone]  Lab Results   Component Value Date    WBC 7 7 06/30/2020    HGB 12 5 06/30/2020    HCT 37 5 06/30/2020    MCV 93 06/30/2020     06/30/2020     Lab Results   Component Value Date    CREATININE 1 00 07/06/2020     No results found for: GLUF    DR Teresa Watson   7/17/2020  2:24 PM

## 2020-07-17 NOTE — TELEPHONE ENCOUNTER
Dr Alejandro Yeh:    Patient called stating that she needs a  referral from you for her to schedule bariatric surgery for her insurance company to approve the surgery  Please c/b patient to discuss further

## 2020-07-20 ENCOUNTER — OFFICE VISIT (OUTPATIENT)
Dept: PULMONOLOGY | Facility: MEDICAL CENTER | Age: 60
End: 2020-07-20
Payer: COMMERCIAL

## 2020-07-20 VITALS
RESPIRATION RATE: 16 BRPM | HEIGHT: 61 IN | BODY MASS INDEX: 45.5 KG/M2 | TEMPERATURE: 98.8 F | WEIGHT: 241 LBS | OXYGEN SATURATION: 95 % | HEART RATE: 90 BPM

## 2020-07-20 DIAGNOSIS — G47.33 OBSTRUCTIVE SLEEP APNEA: ICD-10-CM

## 2020-07-20 DIAGNOSIS — E66.01 OBESITY, CLASS III, BMI 40-49.9 (MORBID OBESITY) (HCC): ICD-10-CM

## 2020-07-20 DIAGNOSIS — J41.0 SIMPLE CHRONIC BRONCHITIS (HCC): Primary | ICD-10-CM

## 2020-07-20 PROCEDURE — 99214 OFFICE O/P EST MOD 30 MIN: CPT | Performed by: INTERNAL MEDICINE

## 2020-07-20 PROCEDURE — 3075F SYST BP GE 130 - 139MM HG: CPT | Performed by: INTERNAL MEDICINE

## 2020-07-20 PROCEDURE — 3079F DIAST BP 80-89 MM HG: CPT | Performed by: INTERNAL MEDICINE

## 2020-07-20 PROCEDURE — 1036F TOBACCO NON-USER: CPT | Performed by: INTERNAL MEDICINE

## 2020-07-20 PROCEDURE — 3008F BODY MASS INDEX DOCD: CPT | Performed by: INTERNAL MEDICINE
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
## 2020-07-21 NOTE — ASSESSMENT & PLAN NOTE
Essie Juarez has moderate sleep apnea as diagnosed by home sleep study June 2019  Her average AHI was 16 4 and this increased to 21 5 in the supine position  Her average O2 saturation was 89% with justen of 60%  She spent 55% of time less than 90% saturation  She is not using her CPAP consistently  I did encourage used to CPAP  She is interested and possible weight loss surgery and this would help significantly  She is set on CPAP at 10 cm water  Her average AHI when she uses CPAP is 0 7 which is satisfactory

## 2020-07-21 NOTE — ASSESSMENT & PLAN NOTE
We did discuss history of her weight in that weight loss would help her greatly  Likely improve her lung function and help with her diabetes mellitus  She is considering weight loss surgery  She has been under there evaluation for this

## 2020-07-21 NOTE — ASSESSMENT & PLAN NOTE
Stacy Amaya is doing well in regard to her COPD  Last spirometry showed moderate restrictive impairment and cannot exclude airflow obstruction is this was not complete PFT  This was done May 2019  Her forced vital capacity was 1 85 L or 58% of predicted, FEV1 1 36 L or 55% of predicted obstructive index 74%    She quit smoking April 30 of this year  She smoke 1/2 pack per day for 30 years    I did order complete PFT to better differentiate if her restrictive impairment is due to obesity or from air trapping from COPD  She is doing well on her present regimen of AirDuo 232 mcg 1 puff b i d  And Incruse 1 puff daily  She really needs to use any rescue albuterol inhaler or her nebulizer with albuterol

## 2020-07-29 ENCOUNTER — OFFICE VISIT (OUTPATIENT)
Dept: BARIATRICS | Facility: CLINIC | Age: 60
End: 2020-07-29

## 2020-07-29 VITALS — TEMPERATURE: 98 F | BODY MASS INDEX: 46.1 KG/M2 | WEIGHT: 244 LBS

## 2020-07-29 PROCEDURE — RECHECK

## 2020-07-29 NOTE — PROGRESS NOTES
Patient reports continued struggles with her blood sugar and medications which she believes is impacting her weight management journey  She is up 2lbs from her last visit two weeks ago, she reports being mindful of her eating habits, having fruits, vegetables and lean meats but she does have some higher fat meats, snacks such as pretzels or chocolate on occasion  Patient is looking for healthier recipes on [employer], SW talked with her about ways to prepare food with lower fat such as cutting marinades with low sodium chicken broth and using the air fryer  Patient is staying active by using her stationary bike every other day for about 30 mins  She will be more conscientious about portions and decreasing high fat meats more  Patient will return in two weeks to meet with ALEJANDRA

## 2020-07-30 NOTE — TELEPHONE ENCOUNTER
Scheduled pt for Caudal JESSIE for 8/14/20   Went over pre-procedure instructions below:  Pt denies rx blood thinners  Nothing to eat or drink 1 hr prior to procedure  Need to arrange transportation  Proper clothing for procedure  If ill or placed on antibiotics please call to reschedule  Pt instructed to go for a covid test on 8/8/20 at [facility] address and phone provided  Covid disclaimer complete

## 2020-08-01 ENCOUNTER — TELEMEDICINE (OUTPATIENT)
Dept: FAMILY MEDICINE CLINIC | Facility: CLINIC | Age: 60
End: 2020-08-01
Payer: COMMERCIAL

## 2020-08-01 DIAGNOSIS — J02.9 SORE THROAT: Primary | ICD-10-CM

## 2020-08-01 PROCEDURE — 3079F DIAST BP 80-89 MM HG: CPT | Performed by: NURSE PRACTITIONER

## 2020-08-01 PROCEDURE — 3075F SYST BP GE 130 - 139MM HG: CPT | Performed by: NURSE PRACTITIONER

## 2020-08-01 PROCEDURE — 99213 OFFICE O/P EST LOW 20 MIN: CPT | Performed by: NURSE PRACTITIONER

## 2020-08-01 PROCEDURE — 1036F TOBACCO NON-USER: CPT | Performed by: NURSE PRACTITIONER
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
## 2020-08-03 ENCOUNTER — TELEPHONE (OUTPATIENT)
Dept: FAMILY MEDICINE CLINIC | Facility: CLINIC | Age: 60
End: 2020-08-03

## 2020-08-03 ENCOUNTER — TELEMEDICINE (OUTPATIENT)
Dept: FAMILY MEDICINE CLINIC | Facility: CLINIC | Age: 60
End: 2020-08-03
Payer: COMMERCIAL

## 2020-08-03 DIAGNOSIS — J40 BRONCHITIS: ICD-10-CM

## 2020-08-03 PROCEDURE — 3079F DIAST BP 80-89 MM HG: CPT | Performed by: FAMILY MEDICINE

## 2020-08-03 PROCEDURE — 3075F SYST BP GE 130 - 139MM HG: CPT | Performed by: FAMILY MEDICINE

## 2020-08-03 PROCEDURE — 99213 OFFICE O/P EST LOW 20 MIN: CPT | Performed by: FAMILY MEDICINE

## 2020-08-03 PROCEDURE — 1036F TOBACCO NON-USER: CPT | Performed by: FAMILY MEDICINE

## 2020-08-03 RX ORDER — PREDNISONE 20 MG/1
20 TABLET ORAL DAILY
Qty: 5 TABLET | Refills: 0 | Status: ON HOLD | OUTPATIENT
Start: 2020-08-03 | End: 2020-08-14 | Stop reason: ALTCHOICE

## 2020-08-03 RX ORDER — DEXTROMETHORPHAN HYDROBROMIDE AND PROMETHAZINE HYDROCHLORIDE 15; 6.25 MG/5ML; MG/5ML
5 SOLUTION ORAL 4 TIMES DAILY PRN
Qty: 118 ML | Refills: 1 | Status: SHIPPED | OUTPATIENT
Start: 2020-08-03 | End: 2020-08-25 | Stop reason: ALTCHOICE

## 2020-08-03 RX ORDER — AZITHROMYCIN 250 MG/1
TABLET, FILM COATED ORAL
Qty: 6 TABLET | Refills: 0 | Status: SHIPPED | OUTPATIENT
Start: 2020-08-03 | End: 2020-08-08

## 2020-08-03 NOTE — TELEPHONE ENCOUNTER
Ngozi Lane states her sore throat has gone away but now has nasal and chest congestion  She is concerned about getting bronchitis which is common  Requesting abx,prednisone and a cough med

## 2020-08-04 ENCOUNTER — TELEPHONE (OUTPATIENT)
Dept: ENDOCRINOLOGY | Facility: CLINIC | Age: 60
End: 2020-08-04

## 2020-08-04 NOTE — TELEPHONE ENCOUNTER
Please let her know I reviewed BG log     -having morning high numbers in 200s along with later in the day, lets increase basaglar to 38 units at bedtime     Continue same doses of metformin and victoza      Send me another BG log in 2-3 weeks

## 2020-08-05 DIAGNOSIS — E78.2 MIXED HYPERLIPIDEMIA: ICD-10-CM

## 2020-08-05 DIAGNOSIS — E11.65 TYPE 2 DIABETES MELLITUS WITH HYPERGLYCEMIA, WITHOUT LONG-TERM CURRENT USE OF INSULIN (HCC): ICD-10-CM

## 2020-08-05 DIAGNOSIS — I10 HYPERTENSION GOAL BP (BLOOD PRESSURE) < 140/90: ICD-10-CM

## 2020-08-05 DIAGNOSIS — E66.01 OBESITY, CLASS III, BMI 40-49.9 (MORBID OBESITY) (HCC): ICD-10-CM

## 2020-08-05 RX ORDER — PEN NEEDLE, DIABETIC 32GX 5/32"
NEEDLE, DISPOSABLE MISCELLANEOUS
Qty: 100 EACH | Refills: 1 | Status: SHIPPED | OUTPATIENT
Start: 2020-08-05 | End: 2020-12-24

## 2020-08-05 NOTE — TELEPHONE ENCOUNTER
Spoke with patient and reviewed bs log and changes to medication  She understood  She wanted to let you know that she is getting Cortizone shots  That could increase her sugar numbers
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
## 2020-08-08 DIAGNOSIS — Z11.59 SPECIAL SCREENING EXAMINATION FOR UNSPECIFIED VIRAL DISEASE: ICD-10-CM

## 2020-08-08 PROCEDURE — U0003 INFECTIOUS AGENT DETECTION BY NUCLEIC ACID (DNA OR RNA); SEVERE ACUTE RESPIRATORY SYNDROME CORONAVIRUS 2 (SARS-COV-2) (CORONAVIRUS DISEASE [COVID-19]), AMPLIFIED PROBE TECHNIQUE, MAKING USE OF HIGH THROUGHPUT TECHNOLOGIES AS DESCRIBED BY CMS-2020-01-R: HCPCS | Performed by: ANESTHESIOLOGY

## 2020-08-10 LAB — SARS-COV-2 RNA SPEC QL NAA+PROBE: NOT DETECTED

## 2020-08-14 ENCOUNTER — APPOINTMENT (OUTPATIENT)
Dept: RADIOLOGY | Facility: HOSPITAL | Age: 60
End: 2020-08-14
Payer: COMMERCIAL

## 2020-08-14 ENCOUNTER — HOSPITAL ENCOUNTER (OUTPATIENT)
Facility: AMBULARY SURGERY CENTER | Age: 60
Setting detail: OUTPATIENT SURGERY
Discharge: HOME | End: 2020-08-14
Attending: ANESTHESIOLOGY | Admitting: ANESTHESIOLOGY
Payer: COMMERCIAL

## 2020-08-14 VITALS
HEART RATE: 74 BPM | BODY MASS INDEX: 46.07 KG/M2 | OXYGEN SATURATION: 95 % | HEIGHT: 61 IN | RESPIRATION RATE: 16 BRPM | SYSTOLIC BLOOD PRESSURE: 188 MMHG | DIASTOLIC BLOOD PRESSURE: 89 MMHG | WEIGHT: 244 LBS | TEMPERATURE: 97.5 F

## 2020-08-14 PROCEDURE — 72220 X-RAY EXAM SACRUM TAILBONE: CPT

## 2020-08-14 PROCEDURE — 62323 NJX INTERLAMINAR LMBR/SAC: CPT | Performed by: ANESTHESIOLOGY

## 2020-08-14 RX ORDER — LIDOCAINE WITH 8.4% SOD BICARB 0.9%(10ML)
SYRINGE (ML) INJECTION AS NEEDED
Status: DISCONTINUED | OUTPATIENT
Start: 2020-08-14 | End: 2020-08-14 | Stop reason: HOSPADM

## 2020-08-14 RX ORDER — METHYLPREDNISOLONE ACETATE 80 MG/ML
INJECTION, SUSPENSION INTRA-ARTICULAR; INTRALESIONAL; INTRAMUSCULAR; SOFT TISSUE AS NEEDED
Status: DISCONTINUED | OUTPATIENT
Start: 2020-08-14 | End: 2020-08-14 | Stop reason: HOSPADM

## 2020-08-14 RX ORDER — SODIUM CHLORIDE 9 MG/ML
INJECTION INTRAVENOUS AS NEEDED
Status: DISCONTINUED | OUTPATIENT
Start: 2020-08-14 | End: 2020-08-14 | Stop reason: HOSPADM

## 2020-08-14 NOTE — OP NOTE
ATTENDING PHYSICIAN:  Igor Pozo MD     PROCEDURE: Caudal epidural steroid injection under fluoroscopic guidance  PREPROCEDURE DIAGNOSIS:  Lumbar post-laminectomy syndrome  POSTPROCEDURE DIAGNOSIS:  Lumbar post-laminectomy syndrome  ANESTHESIA:  Local     ESTIMATED BLOOD LOSS:  Minimal     COMPLICATIONS:  None  LOCATION:  [facility]  CONSENT:  Today's procedure, its potential benefits as well as its risks and potential side effects were reviewed  Discussed risks of the procedure including bleeding, infection, nerve irritation or damage, reactions to the medications, headache, failure of the pain to improve, and potential worsening of the pain were explained in detail to the patient who verbalized understanding and who wished to proceed  Written informed consent was thereby obtained  DESCRIPTION OF THE PROCEDURE: After written informed consent was obtained, the patient was taken to the fluoroscopy suite and placed in the prone position  Anatomical landmarks were identified by palpation to identify the sacral hiatus and by way of fluoroscopy in the PA and lateral views  The skin overlying the sacral hiatus region was prepped using antiseptic applied x3 and draped in the usual sterile fashion  Strict aseptic technique was utilized  The skin and subcutaneous tissues at the needle entry site were infiltrated with 3 mL of 1% preservative-free lidocaine using a 25-gauge 1-1/2-inch needle  A 22-gauge spinal needle was then advanced under fluoroscopic guidance in the lateral view at the sacral hiatus and guided to enter the sacral canal and directed towards the epidural space  Proper placement into the epidural space of the sacral canal in the midline was confirmed with fluoroscopy in the PA view  After negative aspiration for CSF or heme, contrast was injected which delineated the epidural space under fluoroscopy in the PA and lateral views      After negative aspiration was reconfirmed, a 13 mL injectate consisting of 1 mL of 40 mg/mL of Depo-Medrol, 1 mL of 80 mg/mL of Depo-Medrol, 3 mL of 1% preservative-free lidocaine, and 8 mL of preservative-free normal saline was slowly injected incrementally with negative aspiration between aliquots  The patient tolerated the procedure well and all needles were removed intact  Hemostasis was obtained and there were no paresthesias or apparent complications  The skin was wiped free of the antiseptic and a Band-Aid was placed as appropriate  The patient was monitored for an appropriate period of time following the procedure and remained hemodynamically stable and neurovascularly intact following the procedure  The patient was ultimately discharged to home with supervision in good condition and instructed to call the office in approximately 7-10 days with an update or sooner as warranted  Discharge and follow up instructions were provided  I was present for and participated in all key and critical portions of this procedure      Oliver Candelaria MD  8/14/2020  8:35 AM

## 2020-08-14 NOTE — DISCHARGE INSTRUCTIONS
Epidural Steroid Injection   WHAT YOU NEED TO KNOW:   An epidural steroid injection (JESSIE) is a procedure to inject steroid medicine into the epidural space  The epidural space is between your spinal cord and vertebrae  Steroids reduce inflammation and fluid buildup in your spine that may be causing pain  You may be given pain medicine along with the steroids  ACTIVITY  · Do not drive or operate machinery today  · No strenuous activity today - bending, lifting, etc   · You may resume normal activites starting tomorrow - start slowly and as tolerated  · You may shower today, but no tub baths or hot tubs  · You may have numbness for several hours from the local anesthetic  Please use caution and common sense, especially with weight-bearing activities  CARE OF THE INJECTION SITE  · If you have soreness or pain, apply ice to the area today (20 minutes on/20 minutes off)  · Starting tomorrow, you may use warm, moist heat or ice if needed  · You may have an increase or change in your discomfort for 36-48 hours after your treatment  · Apply ice and continue with any pain medication you have been prescribed  · Notify [facility] if you have any of the following: redness, drainage, swelling, headache, stiff neck or fever above 100°F     SPECIAL INSTRUCTIONS  · Our office will contact you in approximately 7 days for a progress report  MEDICATIONS  · Continue to take all routine medications  · Our office may have instructed you to hold some medications  If you have a problem specifically related to your procedure, please call our office at [phone]  Problems not related to your procedure should be directed to your primary care physician
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
## 2020-08-17 ENCOUNTER — OFFICE VISIT (OUTPATIENT)
Dept: PAIN MEDICINE | Facility: CLINIC | Age: 60
End: 2020-08-17
Payer: COMMERCIAL

## 2020-08-17 VITALS
DIASTOLIC BLOOD PRESSURE: 87 MMHG | SYSTOLIC BLOOD PRESSURE: 145 MMHG | WEIGHT: 246 LBS | TEMPERATURE: 97.1 F | BODY MASS INDEX: 46.44 KG/M2 | HEART RATE: 94 BPM | HEIGHT: 61 IN

## 2020-08-17 DIAGNOSIS — M54.16 LUMBAR RADICULOPATHY: ICD-10-CM

## 2020-08-17 DIAGNOSIS — G89.4 CHRONIC PAIN SYNDROME: ICD-10-CM

## 2020-08-17 DIAGNOSIS — M96.1 POSTLAMINECTOMY SYNDROME, NOT ELSEWHERE CLASSIFIED: ICD-10-CM

## 2020-08-17 DIAGNOSIS — M54.42 CHRONIC BILATERAL LOW BACK PAIN WITH LEFT-SIDED SCIATICA: ICD-10-CM

## 2020-08-17 DIAGNOSIS — G89.29 CHRONIC BILATERAL LOW BACK PAIN WITH LEFT-SIDED SCIATICA: ICD-10-CM

## 2020-08-17 PROCEDURE — 3079F DIAST BP 80-89 MM HG: CPT | Performed by: ANESTHESIOLOGY

## 2020-08-17 PROCEDURE — 3008F BODY MASS INDEX DOCD: CPT | Performed by: ANESTHESIOLOGY

## 2020-08-17 PROCEDURE — 1036F TOBACCO NON-USER: CPT | Performed by: ANESTHESIOLOGY

## 2020-08-17 PROCEDURE — 99214 OFFICE O/P EST MOD 30 MIN: CPT | Performed by: ANESTHESIOLOGY

## 2020-08-17 PROCEDURE — 3077F SYST BP >= 140 MM HG: CPT | Performed by: ANESTHESIOLOGY

## 2020-08-17 RX ORDER — TAPENTADOL HYDROCHLORIDE 50 MG/1
50 TABLET, FILM COATED, EXTENDED RELEASE ORAL EVERY 12 HOURS
Qty: 60 TABLET | Refills: 0 | Status: SHIPPED | OUTPATIENT
Start: 2020-08-17 | End: 2020-09-28 | Stop reason: SDUPTHER
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
## 2020-08-22 DIAGNOSIS — E11.8 TYPE 2 DIABETES MELLITUS WITH COMPLICATION, WITHOUT LONG-TERM CURRENT USE OF INSULIN (HCC): ICD-10-CM

## 2020-08-25 ENCOUNTER — TELEPHONE (OUTPATIENT)
Dept: ADMINISTRATIVE | Facility: OTHER | Age: 60
End: 2020-08-25

## 2020-08-25 ENCOUNTER — OFFICE VISIT (OUTPATIENT)
Dept: FAMILY MEDICINE CLINIC | Facility: CLINIC | Age: 60
End: 2020-08-25
Payer: COMMERCIAL

## 2020-08-25 ENCOUNTER — TELEPHONE (OUTPATIENT)
Dept: FAMILY MEDICINE CLINIC | Facility: CLINIC | Age: 60
End: 2020-08-25

## 2020-08-25 VITALS
BODY MASS INDEX: 45.54 KG/M2 | SYSTOLIC BLOOD PRESSURE: 122 MMHG | HEART RATE: 88 BPM | RESPIRATION RATE: 93 BRPM | TEMPERATURE: 97.8 F | WEIGHT: 241.2 LBS | HEIGHT: 61 IN | DIASTOLIC BLOOD PRESSURE: 84 MMHG

## 2020-08-25 DIAGNOSIS — I10 ESSENTIAL HYPERTENSION: ICD-10-CM

## 2020-08-25 DIAGNOSIS — E78.2 MIXED HYPERLIPIDEMIA: ICD-10-CM

## 2020-08-25 DIAGNOSIS — E11.8 TYPE 2 DIABETES MELLITUS WITH COMPLICATION, WITHOUT LONG-TERM CURRENT USE OF INSULIN (HCC): Primary | ICD-10-CM

## 2020-08-25 DIAGNOSIS — E66.01 CLASS 3 SEVERE OBESITY DUE TO EXCESS CALORIES WITH SERIOUS COMORBIDITY AND BODY MASS INDEX (BMI) OF 45.0 TO 49.9 IN ADULT (HCC): Primary | ICD-10-CM

## 2020-08-25 PROCEDURE — 3008F BODY MASS INDEX DOCD: CPT | Performed by: ANESTHESIOLOGY

## 2020-08-25 PROCEDURE — 99214 OFFICE O/P EST MOD 30 MIN: CPT | Performed by: FAMILY MEDICINE

## 2020-08-25 NOTE — TELEPHONE ENCOUNTER
----- Message from [place], [place] sent at 8/25/2020 11:41 AM EDT -----  Regarding: foot exam / dm eye exam  Dr Andrew prabhakar for the foot exam , [facility] in [place] dm eye exam  tc/cma

## 2020-08-25 NOTE — TELEPHONE ENCOUNTER
Spoke with Dr Kvng Osorio office [phone] They need either a referral to their office or a note stating that you are aware of this surgery and clear her for it    That needs to be faxed to [phone] tc/cma

## 2020-08-25 NOTE — TELEPHONE ENCOUNTER
Referral in chart-please fax to their office  Regarding clearance--??within 30 days the standard so will they send her PATS for review once date scheduled so clearance can be done then? Or will referral alone due and clearance from the specialists she's seeing-  ie cardio/endo/pulm?

## 2020-08-25 NOTE — LETTER
Diabetic Eye Exam Form    Date Requested: 20  Patient: Ricardo Levin  Patient : 1960   Referring Provider: Cullen Rodriguez MD    Dilated Retinal Exam, Optomap-Iris Exam, or Fundus Photography Done         Yes (Tangirnaq one above)         No     Date of Diabetic Eye Exam ______________________________  Left Eye      Exam did show retinopathy    Exam did not show retinopathy         Mild       Moderate       None       Proliferative       Severe     Right Eye     Exam did show retinopathy    Exam did not show retinopathy         Mild       Moderate       None       Proliferative       Severe     Comments __________________________________________________________    Practice Providing Exam ______________________________________________    Exam Performed By (print name) _______________________________________      Provider Signature ___________________________________________________      These reports are needed for  compliance    Please fax this completed form and a copy of the Diabetic Eye Exam report to our office located at [place] [address] as soon as possible to [phone] angelo Ratliff: Phone [phone]    We thank you for your assistance in treating our mutual patient

## 2020-08-25 NOTE — TELEPHONE ENCOUNTER
Upon review of your request/inquiry, we were not able to locate Dr Andrew Ocampo in [place] [address]  After reviewing the patient's chart, I found that she has been seeing Dr Dai Simpson with [facility] in [place]  I have review the visits and was not able to locate a valid foot exam  If I'm incorrect, please reply to this message the office contact information and I will do my best to locate the request document  Any additional questions or concerns should be emailed to the Practice Liaisons via [email or web address]  org email, please do not reply via In Basket  Thank you  Geovany Clifford     Upon review of the In Basket request and the patient's chart, initial outreach has been made via fax, please see Contacts section for details  A second outreach attempt will be made within 5 business days      Thank you  Geovany Clifford
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
## 2020-08-26 DIAGNOSIS — J31.0 RHINITIS, UNSPECIFIED TYPE: ICD-10-CM

## 2020-08-26 RX ORDER — FLUTICASONE PROPIONATE 50 MCG
SPRAY, SUSPENSION (ML) NASAL
Qty: 16 ML | Refills: 3 | Status: SHIPPED | OUTPATIENT
Start: 2020-08-26 | End: 2021-01-09

## 2020-08-27 DIAGNOSIS — J44.9 CHRONIC OBSTRUCTIVE PULMONARY DISEASE, UNSPECIFIED COPD TYPE (HCC): ICD-10-CM

## 2020-08-27 RX ORDER — UMECLIDINIUM 62.5 UG/1
AEROSOL, POWDER ORAL
Qty: 1 INHALER | Refills: 3 | Status: SHIPPED | OUTPATIENT
Start: 2020-08-27 | End: 2021-01-09

## 2020-09-02 NOTE — TELEPHONE ENCOUNTER
Upon review of your request/inquiry, we recieved  a note from [facility] [place] stating that they referred her to retinal speciialist for her DM eye exam      Any additional questions or concerns should be emailed to the Practice Liaisons via [email or web address] email, please do not reply via In Basket       Thank you  José Og

## 2020-09-21 PROBLEM — E11.8 TYPE 2 DIABETES MELLITUS WITH COMPLICATION, WITHOUT LONG-TERM CURRENT USE OF INSULIN (HCC): Status: ACTIVE | Noted: 2017-10-27

## 2020-09-24 ENCOUNTER — TELEPHONE (OUTPATIENT)
Dept: BARIATRICS | Facility: CLINIC | Age: 60
End: 2020-09-24

## 2020-09-25 DIAGNOSIS — E11.65 TYPE 2 DIABETES MELLITUS WITH HYPERGLYCEMIA, WITH LONG-TERM CURRENT USE OF INSULIN (HCC): ICD-10-CM

## 2020-09-25 DIAGNOSIS — F32.A DEPRESSION, UNSPECIFIED DEPRESSION TYPE: ICD-10-CM

## 2020-09-25 DIAGNOSIS — Z79.4 TYPE 2 DIABETES MELLITUS WITH HYPERGLYCEMIA, WITH LONG-TERM CURRENT USE OF INSULIN (HCC): ICD-10-CM

## 2020-09-25 RX ORDER — DULOXETIN HYDROCHLORIDE 30 MG/1
CAPSULE, DELAYED RELEASE ORAL
Qty: 30 CAPSULE | Refills: 11 | Status: SHIPPED | OUTPATIENT
Start: 2020-09-25

## 2020-09-25 RX ORDER — INSULIN GLARGINE 100 [IU]/ML
INJECTION, SOLUTION SUBCUTANEOUS
Qty: 5 PEN | Refills: 3 | Status: SHIPPED | OUTPATIENT
Start: 2020-09-25 | End: 2020-09-29 | Stop reason: SDUPTHER

## 2020-09-28 ENCOUNTER — OFFICE VISIT (OUTPATIENT)
Dept: PAIN MEDICINE | Facility: CLINIC | Age: 60
End: 2020-09-28
Payer: COMMERCIAL

## 2020-09-28 VITALS — WEIGHT: 241 LBS | BODY MASS INDEX: 45.5 KG/M2 | TEMPERATURE: 96.1 F | HEIGHT: 61 IN

## 2020-09-28 DIAGNOSIS — M54.42 CHRONIC BILATERAL LOW BACK PAIN WITH LEFT-SIDED SCIATICA: ICD-10-CM

## 2020-09-28 DIAGNOSIS — G89.4 CHRONIC PAIN SYNDROME: ICD-10-CM

## 2020-09-28 DIAGNOSIS — G89.29 CHRONIC BILATERAL LOW BACK PAIN WITH LEFT-SIDED SCIATICA: ICD-10-CM

## 2020-09-28 DIAGNOSIS — M96.1 POSTLAMINECTOMY SYNDROME, NOT ELSEWHERE CLASSIFIED: ICD-10-CM

## 2020-09-28 DIAGNOSIS — M54.16 LUMBAR RADICULOPATHY: ICD-10-CM

## 2020-09-28 PROCEDURE — 99214 OFFICE O/P EST MOD 30 MIN: CPT | Performed by: ANESTHESIOLOGY

## 2020-09-28 PROCEDURE — 1036F TOBACCO NON-USER: CPT | Performed by: ANESTHESIOLOGY

## 2020-09-28 RX ORDER — TAPENTADOL HYDROCHLORIDE 50 MG/1
50 TABLET, FILM COATED, EXTENDED RELEASE ORAL EVERY 12 HOURS
Qty: 60 TABLET | Refills: 0 | Status: SHIPPED | OUTPATIENT
Start: 2020-10-30 | End: 2021-05-06 | Stop reason: SDUPTHER
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
## 2020-09-29 ENCOUNTER — TELEPHONE (OUTPATIENT)
Dept: OTHER | Facility: HOSPITAL | Age: 60
End: 2020-09-29

## 2020-09-29 DIAGNOSIS — E11.65 TYPE 2 DIABETES MELLITUS WITH HYPERGLYCEMIA, WITH LONG-TERM CURRENT USE OF INSULIN (HCC): ICD-10-CM

## 2020-09-29 DIAGNOSIS — Z79.4 TYPE 2 DIABETES MELLITUS WITH HYPERGLYCEMIA, WITH LONG-TERM CURRENT USE OF INSULIN (HCC): ICD-10-CM

## 2020-09-29 RX ORDER — INSULIN GLARGINE 100 [IU]/ML
INJECTION, SOLUTION SUBCUTANEOUS
Qty: 5 PEN | Refills: 3 | Status: SHIPPED | OUTPATIENT
Start: 2020-09-29 | End: 2020-10-20 | Stop reason: DRUGHIGH

## 2020-09-29 NOTE — TELEPHONE ENCOUNTER
Please let her know I reviewed BG log     -having morning high numbers in 200s along with later in the day, lets increase basaglar to 42 units at bedtime     Continue same doses of metformin and victoza      Send me another BG log in 2-3 weeks

## 2020-09-29 NOTE — TELEPHONE ENCOUNTER
Pt returned call, went over info regarding BG log  Provided new dosage for basaglar  She understood

## 2020-10-05 DIAGNOSIS — E11.8 TYPE 2 DIABETES MELLITUS WITH COMPLICATION, WITHOUT LONG-TERM CURRENT USE OF INSULIN (HCC): ICD-10-CM

## 2020-10-05 RX ORDER — LIRAGLUTIDE 6 MG/ML
INJECTION SUBCUTANEOUS
Qty: 9 PEN | Refills: 1 | Status: SHIPPED | OUTPATIENT
Start: 2020-10-05 | End: 2021-04-04

## 2020-10-06 ENCOUNTER — OFFICE VISIT (OUTPATIENT)
Dept: FAMILY MEDICINE CLINIC | Facility: CLINIC | Age: 60
End: 2020-10-06
Payer: COMMERCIAL

## 2020-10-06 ENCOUNTER — TELEPHONE (OUTPATIENT)
Dept: PAIN MEDICINE | Facility: CLINIC | Age: 60
End: 2020-10-06

## 2020-10-06 VITALS
HEIGHT: 61 IN | SYSTOLIC BLOOD PRESSURE: 134 MMHG | TEMPERATURE: 97.5 F | HEART RATE: 98 BPM | RESPIRATION RATE: 18 BRPM | OXYGEN SATURATION: 95 % | WEIGHT: 238.6 LBS | DIASTOLIC BLOOD PRESSURE: 84 MMHG | BODY MASS INDEX: 45.05 KG/M2

## 2020-10-06 DIAGNOSIS — M25.512 LEFT SHOULDER PAIN, UNSPECIFIED CHRONICITY: Primary | ICD-10-CM

## 2020-10-06 DIAGNOSIS — M54.2 NECK PAIN: ICD-10-CM

## 2020-10-06 DIAGNOSIS — Z23 NEED FOR INFLUENZA VACCINATION: ICD-10-CM

## 2020-10-06 DIAGNOSIS — E11.8 TYPE 2 DIABETES MELLITUS WITH COMPLICATION, WITHOUT LONG-TERM CURRENT USE OF INSULIN (HCC): ICD-10-CM

## 2020-10-06 LAB
SL AMB  POCT GLUCOSE, UA: NORMAL
SL AMB LEUKOCYTE ESTERASE,UA: NORMAL
SL AMB POCT BILIRUBIN,UA: NORMAL
SL AMB POCT BLOOD,UA: NORMAL
SL AMB POCT CLARITY,UA: CLEAR
SL AMB POCT COLOR,UA: YELLOW
SL AMB POCT HEMOGLOBIN AIC: 9.1 (ref ?–6.5)
SL AMB POCT KETONES,UA: NORMAL
SL AMB POCT NITRITE,UA: NORMAL
SL AMB POCT PH,UA: 5
SL AMB POCT SPECIFIC GRAVITY,UA: 1.02
SL AMB POCT URINE PROTEIN: NORMAL
SL AMB POCT UROBILINOGEN: NORMAL

## 2020-10-06 PROCEDURE — 81003 URINALYSIS AUTO W/O SCOPE: CPT | Performed by: FAMILY MEDICINE

## 2020-10-06 PROCEDURE — 90682 RIV4 VACC RECOMBINANT DNA IM: CPT | Performed by: FAMILY MEDICINE

## 2020-10-06 PROCEDURE — 99214 OFFICE O/P EST MOD 30 MIN: CPT | Performed by: FAMILY MEDICINE

## 2020-10-06 PROCEDURE — 90471 IMMUNIZATION ADMIN: CPT | Performed by: FAMILY MEDICINE

## 2020-10-06 PROCEDURE — 83036 HEMOGLOBIN GLYCOSYLATED A1C: CPT | Performed by: FAMILY MEDICINE

## 2020-10-06 PROCEDURE — 3046F HEMOGLOBIN A1C LEVEL >9.0%: CPT | Performed by: ANESTHESIOLOGY

## 2020-10-07 ENCOUNTER — APPOINTMENT (OUTPATIENT)
Dept: RADIOLOGY | Facility: CLINIC | Age: 60
End: 2020-10-07
Payer: COMMERCIAL

## 2020-10-07 DIAGNOSIS — M54.2 NECK PAIN: ICD-10-CM

## 2020-10-07 DIAGNOSIS — M25.512 LEFT SHOULDER PAIN, UNSPECIFIED CHRONICITY: ICD-10-CM

## 2020-10-07 PROCEDURE — 72040 X-RAY EXAM NECK SPINE 2-3 VW: CPT

## 2020-10-07 PROCEDURE — 73030 X-RAY EXAM OF SHOULDER: CPT

## 2020-10-09 DIAGNOSIS — Z11.59 SPECIAL SCREENING EXAMINATION FOR UNSPECIFIED VIRAL DISEASE: ICD-10-CM

## 2020-10-09 PROCEDURE — U0003 INFECTIOUS AGENT DETECTION BY NUCLEIC ACID (DNA OR RNA); SEVERE ACUTE RESPIRATORY SYNDROME CORONAVIRUS 2 (SARS-COV-2) (CORONAVIRUS DISEASE [COVID-19]), AMPLIFIED PROBE TECHNIQUE, MAKING USE OF HIGH THROUGHPUT TECHNOLOGIES AS DESCRIBED BY CMS-2020-01-R: HCPCS | Performed by: ANESTHESIOLOGY

## 2020-10-11 LAB — SARS-COV-2 RNA SPEC QL NAA+PROBE: NOT DETECTED

## 2020-10-12 ENCOUNTER — TELEPHONE (OUTPATIENT)
Dept: FAMILY MEDICINE CLINIC | Facility: CLINIC | Age: 60
End: 2020-10-12

## 2020-10-15 ENCOUNTER — APPOINTMENT (OUTPATIENT)
Dept: RADIOLOGY | Facility: HOSPITAL | Age: 60
End: 2020-10-15
Payer: COMMERCIAL

## 2020-10-15 ENCOUNTER — HOSPITAL ENCOUNTER (OUTPATIENT)
Facility: AMBULARY SURGERY CENTER | Age: 60
Setting detail: OUTPATIENT SURGERY
Discharge: HOME | End: 2020-10-15
Attending: ANESTHESIOLOGY | Admitting: ANESTHESIOLOGY
Payer: COMMERCIAL

## 2020-10-15 VITALS
OXYGEN SATURATION: 99 % | TEMPERATURE: 95.8 F | DIASTOLIC BLOOD PRESSURE: 92 MMHG | SYSTOLIC BLOOD PRESSURE: 160 MMHG | RESPIRATION RATE: 16 BRPM | HEART RATE: 97 BPM

## 2020-10-15 LAB — GLUCOSE SERPL-MCNC: 149 MG/DL (ref 65–140)

## 2020-10-15 PROCEDURE — 82948 REAGENT STRIP/BLOOD GLUCOSE: CPT

## 2020-10-15 PROCEDURE — 20610 DRAIN/INJ JOINT/BURSA W/O US: CPT | Performed by: ANESTHESIOLOGY

## 2020-10-15 PROCEDURE — 77002 NEEDLE LOCALIZATION BY XRAY: CPT | Performed by: ANESTHESIOLOGY

## 2020-10-15 RX ORDER — LIDOCAINE WITH 8.4% SOD BICARB 0.9%(10ML)
SYRINGE (ML) INJECTION AS NEEDED
Status: DISCONTINUED | OUTPATIENT
Start: 2020-10-15 | End: 2020-10-15 | Stop reason: HOSPADM

## 2020-10-15 RX ORDER — METHYLPREDNISOLONE ACETATE 80 MG/ML
INJECTION, SUSPENSION INTRA-ARTICULAR; INTRALESIONAL; INTRAMUSCULAR; SOFT TISSUE AS NEEDED
Status: DISCONTINUED | OUTPATIENT
Start: 2020-10-15 | End: 2020-10-15 | Stop reason: HOSPADM

## 2020-10-15 RX ORDER — BUPIVACAINE HYDROCHLORIDE 2.5 MG/ML
INJECTION, SOLUTION EPIDURAL; INFILTRATION; INTRACAUDAL AS NEEDED
Status: DISCONTINUED | OUTPATIENT
Start: 2020-10-15 | End: 2020-10-15 | Stop reason: HOSPADM

## 2020-10-20 ENCOUNTER — TELEPHONE (OUTPATIENT)
Dept: ENDOCRINOLOGY | Facility: CLINIC | Age: 60
End: 2020-10-20

## 2020-10-20 DIAGNOSIS — E11.65 TYPE 2 DIABETES MELLITUS WITH HYPERGLYCEMIA, WITH LONG-TERM CURRENT USE OF INSULIN (HCC): ICD-10-CM

## 2020-10-20 DIAGNOSIS — Z79.4 TYPE 2 DIABETES MELLITUS WITH HYPERGLYCEMIA, WITH LONG-TERM CURRENT USE OF INSULIN (HCC): ICD-10-CM

## 2020-10-20 RX ORDER — INSULIN GLARGINE 100 [IU]/ML
INJECTION, SOLUTION SUBCUTANEOUS
Qty: 5 PEN | Refills: 3
Start: 2020-10-20 | End: 2020-11-16 | Stop reason: SDUPTHER

## 2020-10-22 ENCOUNTER — TELEPHONE (OUTPATIENT)
Dept: PAIN MEDICINE | Facility: CLINIC | Age: 60
End: 2020-10-22

## 2020-10-28 DIAGNOSIS — E78.01 FAMILIAL HYPERCHOLESTEROLEMIA: ICD-10-CM

## 2020-10-28 RX ORDER — ATORVASTATIN CALCIUM 80 MG/1
TABLET, FILM COATED ORAL
Qty: 30 TABLET | Refills: 3 | Status: SHIPPED | OUTPATIENT
Start: 2020-10-28 | End: 2021-03-07

## 2020-10-29 DIAGNOSIS — G89.4 CHRONIC PAIN SYNDROME: ICD-10-CM

## 2020-10-29 RX ORDER — DULOXETIN HYDROCHLORIDE 60 MG/1
CAPSULE, DELAYED RELEASE ORAL
Qty: 30 CAPSULE | Refills: 3 | Status: SHIPPED | OUTPATIENT
Start: 2020-10-29 | End: 2021-05-04

## 2020-11-07 PROBLEM — Z23 NEED FOR INFLUENZA VACCINATION: Status: ACTIVE | Noted: 2020-11-07

## 2020-11-07 PROBLEM — M25.512 LEFT SHOULDER PAIN: Status: ACTIVE | Noted: 2020-11-07

## 2020-11-16 ENCOUNTER — OFFICE VISIT (OUTPATIENT)
Dept: ENDOCRINOLOGY | Facility: CLINIC | Age: 60
End: 2020-11-16
Payer: COMMERCIAL

## 2020-11-16 VITALS
BODY MASS INDEX: 46.07 KG/M2 | DIASTOLIC BLOOD PRESSURE: 90 MMHG | HEIGHT: 61 IN | TEMPERATURE: 96 F | HEART RATE: 97 BPM | WEIGHT: 244 LBS | SYSTOLIC BLOOD PRESSURE: 150 MMHG

## 2020-11-16 DIAGNOSIS — E66.01 OBESITY, CLASS III, BMI 40-49.9 (MORBID OBESITY) (HCC): ICD-10-CM

## 2020-11-16 DIAGNOSIS — E11.65 TYPE 2 DIABETES MELLITUS WITH HYPERGLYCEMIA, WITH LONG-TERM CURRENT USE OF INSULIN (HCC): ICD-10-CM

## 2020-11-16 DIAGNOSIS — I10 HYPERTENSION GOAL BP (BLOOD PRESSURE) < 140/90: ICD-10-CM

## 2020-11-16 DIAGNOSIS — E78.2 MIXED HYPERLIPIDEMIA: Primary | ICD-10-CM

## 2020-11-16 DIAGNOSIS — Z79.4 TYPE 2 DIABETES MELLITUS WITH HYPERGLYCEMIA, WITH LONG-TERM CURRENT USE OF INSULIN (HCC): ICD-10-CM

## 2020-11-16 PROBLEM — E11.43 TYPE 2 DIABETES MELLITUS WITH DIABETIC AUTONOMIC NEUROPATHY, WITH LONG-TERM CURRENT USE OF INSULIN (HCC): Status: ACTIVE | Noted: 2017-10-27

## 2020-11-16 PROCEDURE — 99214 OFFICE O/P EST MOD 30 MIN: CPT | Performed by: INTERNAL MEDICINE

## 2020-11-16 RX ORDER — AMLODIPINE BESYLATE 5 MG/1
5 TABLET ORAL DAILY
Qty: 90 TABLET | Refills: 1 | Status: SHIPPED | OUTPATIENT
Start: 2020-11-16 | End: 2021-05-12

## 2020-11-16 RX ORDER — INSULIN GLARGINE 100 [IU]/ML
INJECTION, SOLUTION SUBCUTANEOUS
Qty: 45 ML | Refills: 3
Start: 2020-11-16 | End: 2021-03-01 | Stop reason: SDUPTHER

## 2020-11-19 ENCOUNTER — TELEPHONE (OUTPATIENT)
Dept: BARIATRICS | Facility: CLINIC | Age: 60
End: 2020-11-19

## 2020-11-19 ENCOUNTER — OFFICE VISIT (OUTPATIENT)
Dept: OBGYN CLINIC | Facility: CLINIC | Age: 60
End: 2020-11-19
Payer: COMMERCIAL

## 2020-11-19 ENCOUNTER — TELEPHONE (OUTPATIENT)
Dept: OBGYN CLINIC | Facility: HOSPITAL | Age: 60
End: 2020-11-19

## 2020-11-19 VITALS
SYSTOLIC BLOOD PRESSURE: 136 MMHG | HEIGHT: 61 IN | WEIGHT: 242.6 LBS | HEART RATE: 90 BPM | DIASTOLIC BLOOD PRESSURE: 88 MMHG | BODY MASS INDEX: 45.8 KG/M2

## 2020-11-19 DIAGNOSIS — M25.561 CHRONIC PAIN OF RIGHT KNEE: ICD-10-CM

## 2020-11-19 DIAGNOSIS — M17.11 PRIMARY OSTEOARTHRITIS OF RIGHT KNEE: Primary | ICD-10-CM

## 2020-11-19 DIAGNOSIS — E11.65 TYPE 2 DIABETES MELLITUS WITH HYPERGLYCEMIA, WITH LONG-TERM CURRENT USE OF INSULIN (HCC): ICD-10-CM

## 2020-11-19 DIAGNOSIS — G89.29 CHRONIC PAIN OF RIGHT KNEE: ICD-10-CM

## 2020-11-19 DIAGNOSIS — Z79.4 TYPE 2 DIABETES MELLITUS WITH HYPERGLYCEMIA, WITH LONG-TERM CURRENT USE OF INSULIN (HCC): ICD-10-CM

## 2020-11-19 PROCEDURE — 99214 OFFICE O/P EST MOD 30 MIN: CPT | Performed by: ORTHOPAEDIC SURGERY

## 2020-11-19 PROCEDURE — 3075F SYST BP GE 130 - 139MM HG: CPT | Performed by: ORTHOPAEDIC SURGERY

## 2020-11-19 PROCEDURE — 20610 DRAIN/INJ JOINT/BURSA W/O US: CPT | Performed by: ORTHOPAEDIC SURGERY

## 2020-11-19 PROCEDURE — 1036F TOBACCO NON-USER: CPT | Performed by: ORTHOPAEDIC SURGERY

## 2020-11-19 PROCEDURE — 3079F DIAST BP 80-89 MM HG: CPT | Performed by: ORTHOPAEDIC SURGERY

## 2020-11-19 PROCEDURE — 3008F BODY MASS INDEX DOCD: CPT | Performed by: ORTHOPAEDIC SURGERY

## 2020-11-19 RX ORDER — TRIAMCINOLONE ACETONIDE 40 MG/ML
80 INJECTION, SUSPENSION INTRA-ARTICULAR; INTRAMUSCULAR
Status: COMPLETED | OUTPATIENT
Start: 2020-11-19 | End: 2020-11-19

## 2020-11-19 RX ORDER — BUPIVACAINE HYDROCHLORIDE 5 MG/ML
6 INJECTION, SOLUTION EPIDURAL; INTRACAUDAL
Status: COMPLETED | OUTPATIENT
Start: 2020-11-19 | End: 2020-11-19

## 2020-11-19 RX ADMIN — BUPIVACAINE HYDROCHLORIDE 6 ML: 5 INJECTION, SOLUTION EPIDURAL; INTRACAUDAL at 10:47

## 2020-11-19 RX ADMIN — TRIAMCINOLONE ACETONIDE 80 MG: 40 INJECTION, SUSPENSION INTRA-ARTICULAR; INTRAMUSCULAR at 10:47

## 2020-11-20 ENCOUNTER — TELEPHONE (OUTPATIENT)
Dept: BARIATRICS | Facility: CLINIC | Age: 60
End: 2020-11-20

## 2020-11-30 DIAGNOSIS — R10.13 DYSPEPSIA: ICD-10-CM

## 2020-12-02 RX ORDER — OMEPRAZOLE 20 MG/1
20 CAPSULE, DELAYED RELEASE ORAL
Qty: 60 CAPSULE | Refills: 3 | Status: SHIPPED | OUTPATIENT
Start: 2020-12-02 | End: 2021-07-15

## 2020-12-07 ENCOUNTER — TRANSCRIBE ORDERS (OUTPATIENT)
Dept: ADMINISTRATIVE | Facility: HOSPITAL | Age: 60
End: 2020-12-07

## 2020-12-07 DIAGNOSIS — Z12.31 ENCOUNTER FOR SCREENING MAMMOGRAM FOR MALIGNANT NEOPLASM OF BREAST: Primary | ICD-10-CM

## 2020-12-07 DIAGNOSIS — N95.0 POSTMENOPAUSAL BLEEDING: ICD-10-CM

## 2020-12-11 ENCOUNTER — HOSPITAL ENCOUNTER (OUTPATIENT)
Dept: RADIOLOGY | Facility: HOSPITAL | Age: 60
Discharge: HOME | End: 2020-12-11
Payer: COMMERCIAL

## 2020-12-11 DIAGNOSIS — N95.0 POSTMENOPAUSAL BLEEDING: ICD-10-CM

## 2020-12-11 PROCEDURE — 76856 US EXAM PELVIC COMPLETE: CPT

## 2020-12-11 PROCEDURE — 76830 TRANSVAGINAL US NON-OB: CPT

## 2020-12-18 DIAGNOSIS — F34.1 DYSTHYMIA: ICD-10-CM

## 2020-12-18 DIAGNOSIS — F17.209 NICOTINE DEPENDENCE WITH NICOTINE-INDUCED DISORDER, UNSPECIFIED NICOTINE PRODUCT TYPE: ICD-10-CM

## 2020-12-19 RX ORDER — BUPROPION HYDROCHLORIDE 300 MG/1
TABLET ORAL
Qty: 30 TABLET | Refills: 11 | Status: SHIPPED | OUTPATIENT
Start: 2020-12-19 | End: 2021-12-06

## 2020-12-22 ENCOUNTER — TELEPHONE (OUTPATIENT)
Dept: BARIATRICS | Facility: CLINIC | Age: 60
End: 2020-12-22

## 2020-12-24 DIAGNOSIS — E11.65 TYPE 2 DIABETES MELLITUS WITH HYPERGLYCEMIA, WITHOUT LONG-TERM CURRENT USE OF INSULIN (HCC): ICD-10-CM

## 2020-12-24 DIAGNOSIS — E66.01 OBESITY, CLASS III, BMI 40-49.9 (MORBID OBESITY) (HCC): ICD-10-CM

## 2020-12-24 DIAGNOSIS — E78.2 MIXED HYPERLIPIDEMIA: ICD-10-CM

## 2020-12-24 DIAGNOSIS — I10 HYPERTENSION GOAL BP (BLOOD PRESSURE) < 140/90: ICD-10-CM

## 2020-12-24 RX ORDER — PEN NEEDLE, DIABETIC 32GX 5/32"
NEEDLE, DISPOSABLE MISCELLANEOUS
Qty: 100 EACH | Refills: 1 | Status: SHIPPED | OUTPATIENT
Start: 2020-12-24 | End: 2020-12-28

## 2020-12-28 RX ORDER — PEN NEEDLE, DIABETIC 32GX 5/32"
NEEDLE, DISPOSABLE MISCELLANEOUS
Qty: 200 EACH | Refills: 1 | Status: SHIPPED | OUTPATIENT
Start: 2020-12-28 | End: 2021-08-31

## 2021-01-04 DIAGNOSIS — E11.65 TYPE 2 DIABETES MELLITUS WITH HYPERGLYCEMIA, WITH LONG-TERM CURRENT USE OF INSULIN (HCC): Primary | ICD-10-CM

## 2021-01-04 DIAGNOSIS — E11.65 TYPE 2 DIABETES MELLITUS WITH HYPERGLYCEMIA, WITHOUT LONG-TERM CURRENT USE OF INSULIN (HCC): ICD-10-CM

## 2021-01-04 DIAGNOSIS — Z79.4 TYPE 2 DIABETES MELLITUS WITH HYPERGLYCEMIA, WITH LONG-TERM CURRENT USE OF INSULIN (HCC): Primary | ICD-10-CM

## 2021-01-04 RX ORDER — BLOOD SUGAR DIAGNOSTIC
STRIP MISCELLANEOUS
Qty: 100 EACH | Refills: 1 | Status: SHIPPED | OUTPATIENT
Start: 2021-01-04 | End: 2021-04-12

## 2021-01-05 DIAGNOSIS — F32.A DEPRESSION, UNSPECIFIED DEPRESSION TYPE: ICD-10-CM

## 2021-01-05 RX ORDER — QUETIAPINE FUMARATE 25 MG/1
TABLET, FILM COATED ORAL
Qty: 120 TABLET | Refills: 2 | Status: SHIPPED | OUTPATIENT
Start: 2021-01-05 | End: 2021-12-06

## 2021-01-07 ENCOUNTER — TELEPHONE (OUTPATIENT)
Dept: BARIATRICS | Facility: CLINIC | Age: 61
End: 2021-01-07

## 2021-01-07 NOTE — TELEPHONE ENCOUNTER
Left another message and requested a call back  Left message asking if she is still interested in bariatric surgery or not at this time  If she is, we would like to get her into the office  If not we will halt her process and if she wanted to restart in the future she would have to complete her 6 weight checks over  Requested a call back either way

## 2021-01-09 DIAGNOSIS — J31.0 RHINITIS, UNSPECIFIED TYPE: ICD-10-CM

## 2021-01-09 DIAGNOSIS — J44.9 CHRONIC OBSTRUCTIVE PULMONARY DISEASE, UNSPECIFIED COPD TYPE (HCC): ICD-10-CM

## 2021-01-09 RX ORDER — FLUTICASONE PROPIONATE 50 MCG
SPRAY, SUSPENSION (ML) NASAL
Qty: 16 ML | Refills: 3 | Status: SHIPPED | OUTPATIENT
Start: 2021-01-09 | End: 2021-05-04

## 2021-01-09 RX ORDER — UMECLIDINIUM 62.5 UG/1
AEROSOL, POWDER ORAL
Qty: 1 INHALER | Refills: 3 | Status: SHIPPED | OUTPATIENT
Start: 2021-01-09 | End: 2021-05-04

## 2021-01-12 ENCOUNTER — TELEPHONE (OUTPATIENT)
Dept: BARIATRICS | Facility: CLINIC | Age: 61
End: 2021-01-12

## 2021-01-12 ENCOUNTER — OFFICE VISIT (OUTPATIENT)
Dept: FAMILY MEDICINE CLINIC | Facility: CLINIC | Age: 61
End: 2021-01-12
Payer: COMMERCIAL

## 2021-01-12 VITALS
RESPIRATION RATE: 18 BRPM | BODY MASS INDEX: 45.2 KG/M2 | SYSTOLIC BLOOD PRESSURE: 136 MMHG | HEIGHT: 61 IN | TEMPERATURE: 97.5 F | DIASTOLIC BLOOD PRESSURE: 82 MMHG | HEART RATE: 98 BPM | WEIGHT: 239.4 LBS

## 2021-01-12 DIAGNOSIS — N95.0 POSTMENOPAUSAL BLEEDING: ICD-10-CM

## 2021-01-12 DIAGNOSIS — Z01.818 PREOP EXAMINATION: Primary | ICD-10-CM

## 2021-01-12 DIAGNOSIS — J44.9 CHRONIC OBSTRUCTIVE PULMONARY DISEASE, UNSPECIFIED COPD TYPE (HCC): ICD-10-CM

## 2021-01-12 DIAGNOSIS — N85.00 ENDOMETRIAL HYPERPLASIA: ICD-10-CM

## 2021-01-12 DIAGNOSIS — I10 ESSENTIAL HYPERTENSION: ICD-10-CM

## 2021-01-12 DIAGNOSIS — E11.65 TYPE 2 DIABETES MELLITUS WITH HYPERGLYCEMIA, WITH LONG-TERM CURRENT USE OF INSULIN (HCC): ICD-10-CM

## 2021-01-12 DIAGNOSIS — Z12.31 ENCOUNTER FOR SCREENING MAMMOGRAM FOR MALIGNANT NEOPLASM OF BREAST: ICD-10-CM

## 2021-01-12 DIAGNOSIS — Z79.4 TYPE 2 DIABETES MELLITUS WITH HYPERGLYCEMIA, WITH LONG-TERM CURRENT USE OF INSULIN (HCC): ICD-10-CM

## 2021-01-12 LAB
SL AMB  POCT GLUCOSE, UA: ABNORMAL
SL AMB LEUKOCYTE ESTERASE,UA: ABNORMAL
SL AMB POCT BILIRUBIN,UA: 1
SL AMB POCT BLOOD,UA: ABNORMAL
SL AMB POCT CLARITY,UA: ABNORMAL
SL AMB POCT COLOR,UA: YELLOW
SL AMB POCT HEMOGLOBIN AIC: 8.7 (ref ?–6.5)
SL AMB POCT KETONES,UA: 15
SL AMB POCT NITRITE,UA: ABNORMAL
SL AMB POCT PH,UA: 5
SL AMB POCT SPECIFIC GRAVITY,UA: 1.02
SL AMB POCT URINE PROTEIN: 30
SL AMB POCT UROBILINOGEN: ABNORMAL

## 2021-01-12 PROCEDURE — 99243 OFF/OP CNSLTJ NEW/EST LOW 30: CPT | Performed by: FAMILY MEDICINE

## 2021-01-12 PROCEDURE — 3052F HG A1C>EQUAL 8.0%<EQUAL 9.0%: CPT | Performed by: INTERNAL MEDICINE

## 2021-01-12 PROCEDURE — 83036 HEMOGLOBIN GLYCOSYLATED A1C: CPT | Performed by: FAMILY MEDICINE

## 2021-01-12 PROCEDURE — 81003 URINALYSIS AUTO W/O SCOPE: CPT | Performed by: FAMILY MEDICINE

## 2021-01-12 RX ORDER — HYDROCHLOROTHIAZIDE 25 MG/1
25 TABLET ORAL DAILY
COMMUNITY
Start: 2020-11-13 | End: 2021-02-16

## 2021-01-12 RX ORDER — LIRAGLUTIDE 6 MG/ML
INJECTION SUBCUTANEOUS
COMMUNITY
Start: 2020-11-02 | End: 2021-09-08 | Stop reason: SDUPTHER

## 2021-01-12 RX ORDER — PEN NEEDLE, DIABETIC 32GX 5/32"
NEEDLE, DISPOSABLE MISCELLANEOUS
COMMUNITY
Start: 2020-11-13
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
## 2021-01-12 NOTE — TELEPHONE ENCOUNTER
Left pt another voicemail and sent email below:    Kinsey Ordonez,    I hope you had a great holiday and New Year  I am reaching out to you to see your thoughts on proceeding with bariatric surgery  I left you a voicemail on Jan 7th and today, so I figured I would try email  If you are interested in continuing at this time we need to get you back in the office so please give me a call to make an appointment  If you are not interested in proceeding at this time then we can halt your process  If we halt your process, but you decide in the future you want bariatric surgery you would have to start over with the 3hr eval, 6 months of weight checks and clearances  At this point, if you decide to continue you can avoid having to redo the 6 months of weight checks but you do have some clearances that would need to be repeated  If I dont hear back from you by the end of the week, Friday, Jan 15th, I will assume you do not want to proceed at this time and your process will be halted  Please reach out via phone or email at your earliest convenience        Sheela Rebolledo RD, LDN  Surgical Dietitian

## 2021-01-13 ENCOUNTER — OFFICE VISIT (OUTPATIENT)
Dept: CARDIOLOGY CLINIC | Facility: CLINIC | Age: 61
End: 2021-01-13
Payer: COMMERCIAL

## 2021-01-13 ENCOUNTER — DOCUMENTATION (OUTPATIENT)
Dept: ENDOCRINOLOGY | Facility: CLINIC | Age: 61
End: 2021-01-13

## 2021-01-13 ENCOUNTER — TELEPHONE (OUTPATIENT)
Dept: OTHER | Facility: OTHER | Age: 61
End: 2021-01-13

## 2021-01-13 ENCOUNTER — TELEPHONE (OUTPATIENT)
Dept: ENDOCRINOLOGY | Facility: CLINIC | Age: 61
End: 2021-01-13

## 2021-01-13 VITALS
WEIGHT: 239.9 LBS | OXYGEN SATURATION: 99 % | DIASTOLIC BLOOD PRESSURE: 80 MMHG | SYSTOLIC BLOOD PRESSURE: 128 MMHG | TEMPERATURE: 96.8 F | HEART RATE: 95 BPM | BODY MASS INDEX: 45.29 KG/M2 | RESPIRATION RATE: 18 BRPM | HEIGHT: 61 IN

## 2021-01-13 DIAGNOSIS — I10 ESSENTIAL HYPERTENSION: ICD-10-CM

## 2021-01-13 DIAGNOSIS — I25.119 CORONARY ARTERY DISEASE INVOLVING NATIVE CORONARY ARTERY OF NATIVE HEART WITH ANGINA PECTORIS (HCC): ICD-10-CM

## 2021-01-13 DIAGNOSIS — Z01.810 PREOP CARDIOVASCULAR EXAM: Primary | ICD-10-CM

## 2021-01-13 DIAGNOSIS — E11.65 TYPE 2 DIABETES MELLITUS WITH HYPERGLYCEMIA, WITHOUT LONG-TERM CURRENT USE OF INSULIN (HCC): Primary | ICD-10-CM

## 2021-01-13 PROCEDURE — 93000 ELECTROCARDIOGRAM COMPLETE: CPT | Performed by: INTERNAL MEDICINE

## 2021-01-13 PROCEDURE — 3074F SYST BP LT 130 MM HG: CPT | Performed by: INTERNAL MEDICINE

## 2021-01-13 PROCEDURE — 99214 OFFICE O/P EST MOD 30 MIN: CPT | Performed by: INTERNAL MEDICINE

## 2021-01-13 PROCEDURE — 4004F PT TOBACCO SCREEN RCVD TLK: CPT | Performed by: INTERNAL MEDICINE

## 2021-01-13 PROCEDURE — 3008F BODY MASS INDEX DOCD: CPT | Performed by: INTERNAL MEDICINE

## 2021-01-13 PROCEDURE — 3079F DIAST BP 80-89 MM HG: CPT | Performed by: INTERNAL MEDICINE

## 2021-01-13 RX ORDER — GLIPIZIDE 5 MG/1
5 TABLET, FILM COATED, EXTENDED RELEASE ORAL DAILY
Qty: 30 TABLET | Refills: 2 | Status: SHIPPED | OUTPATIENT
Start: 2021-01-13 | End: 2021-04-07

## 2021-01-13 NOTE — TELEPHONE ENCOUNTER
Patient stopped in office, she is having D&C on 1/21/21 by Dr Jomar Hi @ [facility] she will need surgery clearance and doctor will need to complete clearance form  A1C  Dated 1/12 is 8 7%    Attached is recent BG log  Called Dr Justa De La Rosa office [phone] to see if physician will need to complete any forms, if forms are necessary they will fax over

## 2021-01-13 NOTE — TELEPHONE ENCOUNTER
Spoke to patient, provided info regarding BG log review, submitted prescription for glipizide XL 5 mg, advsied to bring BG next for Dr Margarita Hernandez to review  She understood

## 2021-01-13 NOTE — TELEPHONE ENCOUNTER
Reviewed blood sugars, blood sugars are elevated in 180-230  range  Goal for blood sugar is  before surgery  Please inform patient that she should start glipizide XL, 5 mg daily, with breakfast  Please make prescription ready  She should reduce Lantus to 50 units at bedtime  Send log next week, based on her blood sugars will need to  fill out the form for clearance  Please touch base with Dr Zhang Toth regarding this     Candice Hernández MD

## 2021-01-13 NOTE — TELEPHONE ENCOUNTER
Rafael Carpenter called to get a note faced over from [facility] chart  Please take a look and send the note  Please call Rafael Carpenter at the number provided if issues arise
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
## 2021-01-14 PROBLEM — J44.9 CHRONIC OBSTRUCTIVE PULMONARY DISEASE (HCC): Status: ACTIVE | Noted: 2021-01-14

## 2021-01-14 PROBLEM — N95.0 POSTMENOPAUSAL BLEEDING: Status: ACTIVE | Noted: 2021-01-14

## 2021-01-14 PROBLEM — Z12.31 ENCOUNTER FOR SCREENING MAMMOGRAM FOR MALIGNANT NEOPLASM OF BREAST: Status: ACTIVE | Noted: 2019-09-26

## 2021-01-14 PROBLEM — N85.00 ENDOMETRIAL HYPERPLASIA: Status: ACTIVE | Noted: 2021-01-14

## 2021-01-14 PROBLEM — Z01.818 PREOP EXAMINATION: Status: ACTIVE | Noted: 2021-01-14

## 2021-01-18 NOTE — TELEPHONE ENCOUNTER
Reviewed BGs for past few days    Increase lantus to 58 units    Continue glipizide, Vicotza, metformin    Send me another BG log in 1 week

## 2021-01-18 NOTE — TELEPHONE ENCOUNTER
Spoke to patient, advised A1C too high for surgery  She stated that she has started to take the glipizide on 1/196 that Dr Vignesh Malcolm prescribed  Her BG numbers are coming down some  Took few numbers over the phone  Placed on Dr Dorian Adkins desk   She wanted to know if she should repeat blood work  previous blood work was non-fasting

## 2021-01-18 NOTE — TELEPHONE ENCOUNTER
Called and spoke to patient, advised Dr Nicholas Berry reviewed BG readings that she gave me today  Increase lantus to 58 units, continue glipizide, metformin and Victoza, send BG in 1 week for review  she understood

## 2021-01-18 NOTE — TELEPHONE ENCOUNTER
Called Dr Connie Milner office [phone] and advised that Dr Jose Akbar will no be clearing for surgery  She will let doctor's surgery team know

## 2021-01-18 NOTE — TELEPHONE ENCOUNTER
A1c too high for surgery    Please send me blood sugar log so I can make recommendations to improve blood sugars     Cannot fill out this form until glycemic control is improved

## 2021-02-09 DIAGNOSIS — E11.8 TYPE 2 DIABETES MELLITUS WITH COMPLICATION, WITHOUT LONG-TERM CURRENT USE OF INSULIN (HCC): ICD-10-CM

## 2021-02-12 DIAGNOSIS — I10 HYPERTENSION GOAL BP (BLOOD PRESSURE) < 140/90: ICD-10-CM

## 2021-02-16 RX ORDER — HYDROCHLOROTHIAZIDE 25 MG/1
TABLET ORAL
Qty: 90 TABLET | Refills: 1 | Status: SHIPPED | OUTPATIENT
Start: 2021-02-16 | End: 2021-08-17

## 2021-02-27 ENCOUNTER — TELEPHONE (OUTPATIENT)
Dept: ENDOCRINOLOGY | Facility: CLINIC | Age: 61
End: 2021-02-27

## 2021-02-27 NOTE — TELEPHONE ENCOUNTER
Reviewed BG log    Having high fasting BGs    Continue same regimen except increase basaglar to 64 units

## 2021-03-01 ENCOUNTER — HOSPITAL ENCOUNTER (OUTPATIENT)
Dept: RADIOLOGY | Facility: HOSPITAL | Age: 61
Discharge: HOME | End: 2021-03-01
Payer: COMMERCIAL

## 2021-03-01 DIAGNOSIS — E11.65 TYPE 2 DIABETES MELLITUS WITH HYPERGLYCEMIA, WITH LONG-TERM CURRENT USE OF INSULIN (HCC): ICD-10-CM

## 2021-03-01 DIAGNOSIS — E66.01 OBESITY, CLASS III, BMI 40-49.9 (MORBID OBESITY) (HCC): ICD-10-CM

## 2021-03-01 DIAGNOSIS — Z79.4 TYPE 2 DIABETES MELLITUS WITH HYPERGLYCEMIA, WITH LONG-TERM CURRENT USE OF INSULIN (HCC): ICD-10-CM

## 2021-03-01 DIAGNOSIS — I10 HYPERTENSION GOAL BP (BLOOD PRESSURE) < 140/90: ICD-10-CM

## 2021-03-01 DIAGNOSIS — E78.2 MIXED HYPERLIPIDEMIA: ICD-10-CM

## 2021-03-01 DIAGNOSIS — Z12.31 ENCOUNTER FOR SCREENING MAMMOGRAM FOR MALIGNANT NEOPLASM OF BREAST: ICD-10-CM

## 2021-03-01 PROCEDURE — 77067 SCR MAMMO BI INCL CAD: CPT

## 2021-03-01 PROCEDURE — 77063 BREAST TOMOSYNTHESIS BI: CPT

## 2021-03-01 RX ORDER — INSULIN GLARGINE 100 [IU]/ML
INJECTION, SOLUTION SUBCUTANEOUS
Qty: 45 ML | Refills: 3 | Status: SHIPPED | OUTPATIENT
Start: 2021-03-01 | End: 2021-03-12 | Stop reason: SDUPTHER

## 2021-03-02 ENCOUNTER — TELEPHONE (OUTPATIENT)
Dept: ENDOCRINOLOGY | Facility: CLINIC | Age: 61
End: 2021-03-02

## 2021-03-04 ENCOUNTER — IMMUNIZATIONS (OUTPATIENT)
Dept: FAMILY MEDICINE CLINIC | Facility: HOSPITAL | Age: 61
End: 2021-03-04

## 2021-03-04 DIAGNOSIS — Z23 ENCOUNTER FOR IMMUNIZATION: Primary | ICD-10-CM

## 2021-03-07 DIAGNOSIS — E78.01 FAMILIAL HYPERCHOLESTEROLEMIA: ICD-10-CM

## 2021-03-07 RX ORDER — ATORVASTATIN CALCIUM 80 MG/1
TABLET, FILM COATED ORAL
Qty: 30 TABLET | Refills: 3 | Status: SHIPPED | OUTPATIENT
Start: 2021-03-07 | End: 2021-07-05

## 2021-03-09 ENCOUNTER — TELEPHONE (OUTPATIENT)
Dept: ENDOCRINOLOGY | Facility: CLINIC | Age: 61
End: 2021-03-09

## 2021-03-09 NOTE — TELEPHONE ENCOUNTER
Spoke with patient and reminded her of her appointment and to have her lab work done    She understood

## 2021-03-12 ENCOUNTER — OFFICE VISIT (OUTPATIENT)
Dept: ENDOCRINOLOGY | Facility: CLINIC | Age: 61
End: 2021-03-12
Payer: COMMERCIAL

## 2021-03-12 ENCOUNTER — HOSPITAL ENCOUNTER (OUTPATIENT)
Dept: RADIOLOGY | Facility: HOSPITAL | Age: 61
Discharge: HOME | End: 2021-03-12
Payer: COMMERCIAL

## 2021-03-12 VITALS — BODY MASS INDEX: 45.08 KG/M2 | HEIGHT: 62 IN | WEIGHT: 245 LBS

## 2021-03-12 VITALS
BODY MASS INDEX: 45.08 KG/M2 | WEIGHT: 245 LBS | TEMPERATURE: 97 F | HEIGHT: 62 IN | HEART RATE: 100 BPM | DIASTOLIC BLOOD PRESSURE: 86 MMHG | SYSTOLIC BLOOD PRESSURE: 140 MMHG

## 2021-03-12 DIAGNOSIS — Z79.4 TYPE 2 DIABETES MELLITUS WITH HYPERGLYCEMIA, WITH LONG-TERM CURRENT USE OF INSULIN (HCC): ICD-10-CM

## 2021-03-12 DIAGNOSIS — R92.8 ABNORMAL MAMMOGRAM: ICD-10-CM

## 2021-03-12 DIAGNOSIS — E66.01 OBESITY, CLASS III, BMI 40-49.9 (MORBID OBESITY) (HCC): ICD-10-CM

## 2021-03-12 DIAGNOSIS — E78.2 MIXED HYPERLIPIDEMIA: ICD-10-CM

## 2021-03-12 DIAGNOSIS — E11.65 TYPE 2 DIABETES MELLITUS WITH HYPERGLYCEMIA, WITH LONG-TERM CURRENT USE OF INSULIN (HCC): ICD-10-CM

## 2021-03-12 DIAGNOSIS — I10 HYPERTENSION GOAL BP (BLOOD PRESSURE) < 140/90: ICD-10-CM

## 2021-03-12 PROCEDURE — 99214 OFFICE O/P EST MOD 30 MIN: CPT | Performed by: INTERNAL MEDICINE

## 2021-03-12 PROCEDURE — G0279 TOMOSYNTHESIS, MAMMO: HCPCS

## 2021-03-12 PROCEDURE — 77065 DX MAMMO INCL CAD UNI: CPT

## 2021-03-12 PROCEDURE — 76642 ULTRASOUND BREAST LIMITED: CPT

## 2021-03-12 RX ORDER — INSULIN LISPRO 100 [IU]/ML
10 INJECTION, SOLUTION INTRAVENOUS; SUBCUTANEOUS
Qty: 30 ML | Refills: 1 | Status: SHIPPED | OUTPATIENT
Start: 2021-03-12 | End: 2021-03-15

## 2021-03-12 RX ORDER — INSULIN GLARGINE 100 [IU]/ML
INJECTION, SOLUTION SUBCUTANEOUS
Qty: 60 ML | Refills: 1 | Status: SHIPPED | OUTPATIENT
Start: 2021-03-12 | End: 2021-05-14 | Stop reason: SDUPTHER

## 2021-03-12 NOTE — PATIENT INSTRUCTIONS
Have labs done in 1 month    Send me blood sugar log in 2 weeks    Start humalog 10 units before meals     Follow up in 3 months

## 2021-03-12 NOTE — PROGRESS NOTES
Met with patient and Dr Lauren Kumar regarding recommendation for:     __x___ Right ______Left      __x__Ultrasound guided breast biopsy  _____Stereotactic breast biopsy  __x___Verbalized understanding  Blood thinners:  _____ yes ___x___ no    Date stopped: ___N/A____    Biopsy teaching sheet given:  __x___yes ____no    Tentative biopsy appointment: Monday 03/29/2021 at 11:30am [facility]    Patient was also counseled that the [facility] requires us to have all of our outpatient breast biopsy patients screened for COVID-19 within 6 days prior to their biopsy date  Patient could have COVID screening swab collected on Tuesday 03/23 or Wednesday 03/24 to allow adequate time for her swab results to be processed prior to her biopsy appointment date  Informed patient that the closest testing facility is the [facility] located at [address] [place] [address] in [place], [place]  Patient aware of [facility] [address] hours of operation: Monday- Friday from 8am to 8pm, and Saturday and Sunday from 8am to 4pm  The [facility] asks that patients please call extension [phone] from the parking lot when they arrive, and a staff member will come directly to their vehicle to collect the screening swab; and that scheduling an appointment is not required per current policy

## 2021-03-15 ENCOUNTER — TELEPHONE (OUTPATIENT)
Dept: RADIOLOGY | Facility: HOSPITAL | Age: 61
End: 2021-03-15

## 2021-03-15 DIAGNOSIS — I10 HYPERTENSION GOAL BP (BLOOD PRESSURE) < 140/90: ICD-10-CM

## 2021-03-15 DIAGNOSIS — E11.65 TYPE 2 DIABETES MELLITUS WITH HYPERGLYCEMIA, WITH LONG-TERM CURRENT USE OF INSULIN (HCC): ICD-10-CM

## 2021-03-15 DIAGNOSIS — E78.2 MIXED HYPERLIPIDEMIA: ICD-10-CM

## 2021-03-15 DIAGNOSIS — Z79.4 TYPE 2 DIABETES MELLITUS WITH HYPERGLYCEMIA, WITH LONG-TERM CURRENT USE OF INSULIN (HCC): ICD-10-CM

## 2021-03-15 DIAGNOSIS — E66.01 OBESITY, CLASS III, BMI 40-49.9 (MORBID OBESITY) (HCC): ICD-10-CM

## 2021-03-15 DIAGNOSIS — Z79.4 TYPE 2 DIABETES MELLITUS WITH HYPERGLYCEMIA, WITH LONG-TERM CURRENT USE OF INSULIN (HCC): Primary | ICD-10-CM

## 2021-03-15 DIAGNOSIS — E11.65 TYPE 2 DIABETES MELLITUS WITH HYPERGLYCEMIA, WITH LONG-TERM CURRENT USE OF INSULIN (HCC): Primary | ICD-10-CM

## 2021-03-15 RX ORDER — INSULIN LISPRO 100 [IU]/ML
10 INJECTION, SOLUTION INTRAVENOUS; SUBCUTANEOUS
Qty: 30 ML | Refills: 1 | Status: SHIPPED | OUTPATIENT
Start: 2021-03-15 | End: 2021-03-24

## 2021-03-15 RX ORDER — INSULIN LISPRO 100 [IU]/ML
INJECTION, SOLUTION INTRAVENOUS; SUBCUTANEOUS
Qty: 15 ML | Refills: 1 | OUTPATIENT
Start: 2021-03-15

## 2021-03-17 ENCOUNTER — TELEPHONE (OUTPATIENT)
Dept: ENDOCRINOLOGY | Facility: CLINIC | Age: 61
End: 2021-03-17

## 2021-03-18 ENCOUNTER — TELEPHONE (OUTPATIENT)
Dept: RADIOLOGY | Facility: HOSPITAL | Age: 61
End: 2021-03-18

## 2021-03-18 NOTE — TELEPHONE ENCOUNTER
Admelog pen denied by insurance  Patient has never used vials  She will call insurance and find out if any pen in the same insulin category would be covered

## 2021-03-19 ENCOUNTER — TELEPHONE (OUTPATIENT)
Dept: RADIOLOGY | Facility: HOSPITAL | Age: 61
End: 2021-03-19

## 2021-03-19 ENCOUNTER — TRANSCRIBE ORDERS (OUTPATIENT)
Dept: ADMINISTRATIVE | Facility: HOSPITAL | Age: 61
End: 2021-03-19

## 2021-03-22 ENCOUNTER — TRANSCRIBE ORDERS (OUTPATIENT)
Dept: ADMINISTRATIVE | Facility: HOSPITAL | Age: 61
End: 2021-03-22

## 2021-03-22 DIAGNOSIS — Z01.818 OTHER SPECIFIED PRE-OPERATIVE EXAMINATION: Primary | ICD-10-CM

## 2021-03-23 DIAGNOSIS — E11.65 TYPE 2 DIABETES MELLITUS WITH HYPERGLYCEMIA, WITH LONG-TERM CURRENT USE OF INSULIN (HCC): ICD-10-CM

## 2021-03-23 DIAGNOSIS — Z79.4 TYPE 2 DIABETES MELLITUS WITH HYPERGLYCEMIA, WITH LONG-TERM CURRENT USE OF INSULIN (HCC): ICD-10-CM

## 2021-03-23 DIAGNOSIS — E11.65 TYPE 2 DIABETES MELLITUS WITH HYPERGLYCEMIA, WITH LONG-TERM CURRENT USE OF INSULIN (HCC): Primary | ICD-10-CM

## 2021-03-23 DIAGNOSIS — Z79.4 TYPE 2 DIABETES MELLITUS WITH HYPERGLYCEMIA, WITH LONG-TERM CURRENT USE OF INSULIN (HCC): Primary | ICD-10-CM

## 2021-03-23 RX ORDER — SYRINGE,INSUL U-500,NDL,0.5ML 31GX15/64"
SYRINGE, EMPTY DISPOSABLE MISCELLANEOUS
Qty: 100 EACH | Refills: 1 | Status: SHIPPED | OUTPATIENT
Start: 2021-03-23 | End: 2022-01-28

## 2021-03-24 ENCOUNTER — OFFICE VISIT (OUTPATIENT)
Dept: OTOLARYNGOLOGY | Facility: CLINIC | Age: 61
End: 2021-03-24
Payer: COMMERCIAL

## 2021-03-24 VITALS — TEMPERATURE: 98.1 F | WEIGHT: 244 LBS | HEIGHT: 62 IN | BODY MASS INDEX: 44.9 KG/M2

## 2021-03-24 DIAGNOSIS — Z01.818 OTHER SPECIFIED PRE-OPERATIVE EXAMINATION: ICD-10-CM

## 2021-03-24 DIAGNOSIS — S00.412A ABRASION OF LEFT EAR CANAL, INITIAL ENCOUNTER: Primary | ICD-10-CM

## 2021-03-24 DIAGNOSIS — E11.65 TYPE 2 DIABETES MELLITUS WITH HYPERGLYCEMIA, WITH LONG-TERM CURRENT USE OF INSULIN (HCC): ICD-10-CM

## 2021-03-24 DIAGNOSIS — Z79.4 TYPE 2 DIABETES MELLITUS WITH HYPERGLYCEMIA, WITH LONG-TERM CURRENT USE OF INSULIN (HCC): ICD-10-CM

## 2021-03-24 PROCEDURE — 99243 OFF/OP CNSLTJ NEW/EST LOW 30: CPT | Performed by: NURSE PRACTITIONER

## 2021-03-24 PROCEDURE — U0005 INFEC AGEN DETEC AMPLI PROBE: HCPCS | Performed by: OBSTETRICS & GYNECOLOGY

## 2021-03-24 PROCEDURE — U0003 INFECTIOUS AGENT DETECTION BY NUCLEIC ACID (DNA OR RNA); SEVERE ACUTE RESPIRATORY SYNDROME CORONAVIRUS 2 (SARS-COV-2) (CORONAVIRUS DISEASE [COVID-19]), AMPLIFIED PROBE TECHNIQUE, MAKING USE OF HIGH THROUGHPUT TECHNOLOGIES AS DESCRIBED BY CMS-2020-01-R: HCPCS | Performed by: OBSTETRICS & GYNECOLOGY

## 2021-03-24 PROCEDURE — 4004F PT TOBACCO SCREEN RCVD TLK: CPT | Performed by: NURSE PRACTITIONER

## 2021-03-24 NOTE — PROGRESS NOTES
Assessment/Plan:    Abrasion of left ear canal  Left eac with dried bloody debris  Unable to view TM due to dried blood  Unable determine if TM intact  Do not recommend removal of the debris at this time as this would cause further injury to the eac and TM  Discussed risk of TM perforation  Options include watchful monitoring, use ear drops to soften debris, vs surgical interventions  After discussion agreed to use ear drops  Possible use of peroxide after next visit if not dissolving  Follow up in 3 weeks to monitor             Diagnoses and all orders for this visit:    Abrasion of left ear canal, initial encounter  -     neomycin-polymyxin-hydrocortisone (CORTISPORIN) otic solution; Administer 4 drops into the left ear 2 (two) times a day          Subjective:      Patient ID: Cisco Macario is a 63 y o  female  Presents today as a new patient due to left ear pain  Both ears itching at times  Bleeding on q-tip few days ago  Left ear pain, mild  No other ent concerns  The following portions of the patient's history were reviewed and updated as appropriate: allergies, current medications, past family history, past medical history, past social history, past surgical history and problem list     Review of Systems   Constitutional: Negative  HENT: Positive for ear pain and hearing loss  Negative for congestion, ear discharge, nosebleeds, postnasal drip, rhinorrhea, sinus pressure, sinus pain, sore throat, tinnitus and voice change  Eyes: Negative  Respiratory: Negative for chest tightness and shortness of breath  Cardiovascular: Negative  Gastrointestinal: Negative  Endocrine: Negative  Musculoskeletal: Negative  Skin: Negative for color change  Neurological: Negative for dizziness, numbness and headaches  Psychiatric/Behavioral: Negative            Objective:      Temp 98 1 °F (36 7 °C) (Temporal)   Ht 5' 2" (1 575 m)   Wt 111 kg (244 lb)   BMI 44 63 kg/m² Physical Exam  Constitutional:       Appearance: She is well-developed  HENT:      Head: Normocephalic  Right Ear: Hearing, tympanic membrane, ear canal and external ear normal  No decreased hearing noted  No drainage or tenderness  Tympanic membrane is not perforated or erythematous  Left Ear: Hearing, tympanic membrane, ear canal and external ear normal  No decreased hearing noted  Laceration and drainage present  No tenderness  Tympanic membrane is not perforated or erythematous  Nose: Nose normal  No nasal deformity or septal deviation  Mouth/Throat:      Mouth: Mucous membranes are not pale and not dry  No oral lesions  Dentition: Normal dentition  Pharynx: Uvula midline  No oropharyngeal exudate  Neck:      Musculoskeletal: Full passive range of motion without pain, normal range of motion and neck supple  Trachea: No tracheal deviation  Cardiovascular:      Rate and Rhythm: Normal rate  Pulmonary:      Effort: Pulmonary effort is normal  No accessory muscle usage or respiratory distress  Musculoskeletal:      Right shoulder: She exhibits normal range of motion  Lymphadenopathy:      Cervical: No cervical adenopathy  Skin:     General: Skin is warm and dry  Neurological:      Mental Status: She is alert and oriented to person, place, and time  Cranial Nerves: No cranial nerve deficit  Sensory: No sensory deficit  Psychiatric:         Behavior: Behavior is cooperative

## 2021-03-24 NOTE — ASSESSMENT & PLAN NOTE
Left eac with dried bloody debris  Unable to view TM due to dried blood  Unable determine if TM intact  Do not recommend removal of the debris at this time as this would cause further injury to the eac and TM  Discussed risk of TM perforation  Options include watchful monitoring, use ear drops to soften debris, vs surgical interventions  After discussion agreed to use ear drops  Possible use of peroxide after next visit if not dissolving    Follow up in 3 weeks to monitor

## 2021-03-25 ENCOUNTER — OFFICE VISIT (OUTPATIENT)
Dept: FAMILY MEDICINE CLINIC | Facility: CLINIC | Age: 61
End: 2021-03-25
Payer: COMMERCIAL

## 2021-03-25 VITALS
DIASTOLIC BLOOD PRESSURE: 78 MMHG | OXYGEN SATURATION: 96 % | SYSTOLIC BLOOD PRESSURE: 128 MMHG | WEIGHT: 246.8 LBS | BODY MASS INDEX: 45.42 KG/M2 | HEART RATE: 96 BPM | TEMPERATURE: 98.2 F | HEIGHT: 62 IN | RESPIRATION RATE: 15 BRPM

## 2021-03-25 DIAGNOSIS — M54.9 MID BACK PAIN ON LEFT SIDE: ICD-10-CM

## 2021-03-25 LAB
SARS-COV-2 RNA RESP QL NAA+PROBE: NEGATIVE
SL AMB  POCT GLUCOSE, UA: ABNORMAL
SL AMB LEUKOCYTE ESTERASE,UA: 25
SL AMB POCT BILIRUBIN,UA: 1
SL AMB POCT BLOOD,UA: ABNORMAL
SL AMB POCT CLARITY,UA: CLEAR
SL AMB POCT COLOR,UA: ABNORMAL
SL AMB POCT KETONES,UA: ABNORMAL
SL AMB POCT NITRITE,UA: ABNORMAL
SL AMB POCT PH,UA: 5
SL AMB POCT SPECIFIC GRAVITY,UA: 1.02
SL AMB POCT URINE PROTEIN: 30
SL AMB POCT UROBILINOGEN: 1

## 2021-03-25 PROCEDURE — 3008F BODY MASS INDEX DOCD: CPT | Performed by: NURSE PRACTITIONER

## 2021-03-25 PROCEDURE — 81003 URINALYSIS AUTO W/O SCOPE: CPT | Performed by: FAMILY MEDICINE

## 2021-03-25 PROCEDURE — 99213 OFFICE O/P EST LOW 20 MIN: CPT | Performed by: FAMILY MEDICINE

## 2021-03-25 RX ORDER — QUETIAPINE FUMARATE 25 MG/1
25 TABLET, FILM COATED ORAL 2 TIMES DAILY
Qty: 120 TABLET | Refills: 2 | Status: CANCELLED | OUTPATIENT
Start: 2021-03-25 | End: 2021-04-24

## 2021-03-25 RX ORDER — BACLOFEN 10 MG/1
10 TABLET ORAL 2 TIMES DAILY
Qty: 30 TABLET | Refills: 0 | Status: SHIPPED | OUTPATIENT
Start: 2021-03-25 | End: 2021-04-19

## 2021-03-26 ENCOUNTER — TELEPHONE (OUTPATIENT)
Dept: RADIOLOGY | Facility: HOSPITAL | Age: 61
End: 2021-03-26

## 2021-03-29 ENCOUNTER — HOSPITAL ENCOUNTER (OUTPATIENT)
Dept: RADIOLOGY | Facility: HOSPITAL | Age: 61
Discharge: HOME | End: 2021-03-29

## 2021-03-29 ENCOUNTER — HOSPITAL ENCOUNTER (OUTPATIENT)
Dept: RADIOLOGY | Facility: HOSPITAL | Age: 61
Discharge: HOME | End: 2021-03-29
Payer: COMMERCIAL

## 2021-03-29 VITALS — SYSTOLIC BLOOD PRESSURE: 130 MMHG | DIASTOLIC BLOOD PRESSURE: 72 MMHG

## 2021-03-29 DIAGNOSIS — R92.8 ABNORMAL MAMMOGRAM: ICD-10-CM

## 2021-03-29 PROCEDURE — 19083 BX BREAST 1ST LESION US IMAG: CPT

## 2021-03-29 PROCEDURE — A4648 IMPLANTABLE TISSUE MARKER: HCPCS

## 2021-03-29 PROCEDURE — 88305 TISSUE EXAM BY PATHOLOGIST: CPT | Performed by: PATHOLOGY

## 2021-03-29 RX ORDER — LIDOCAINE HYDROCHLORIDE 10 MG/ML
5 INJECTION, SOLUTION EPIDURAL; INFILTRATION; INTRACAUDAL; PERINEURAL ONCE
Status: COMPLETED | OUTPATIENT
Start: 2021-03-29 | End: 2021-03-29

## 2021-03-29 RX ADMIN — LIDOCAINE HYDROCHLORIDE 4 ML: 10 INJECTION, SOLUTION EPIDURAL; INFILTRATION; INTRACAUDAL; PERINEURAL at 12:27

## 2021-03-29 NOTE — PROGRESS NOTES
Procedure type:    __x___Ultrasound guided  _____Stereotactic    Breast:    _____Left breast biopsy  ___x__Right breast biopsy     Location: Right Breast 7 o'clock 2cm FN    Time-Out @ : 12:25pm    Needle: 12G Helen    # of passes: 3    Clip: Wing clip    Performed by: Jen Funez MD    Pressure held for 5 minutes by: Jesus Manuel Castellon RN    Steri Strips:    ___x__yes _____no    Band aid:    _____yes___x__no  Gauze and tape in place    Tolerated procedure:    __x___yes _____no

## 2021-03-29 NOTE — DISCHARGE INSTR - OTHER ORDERS
POST LARGE CORE BREAST BIOPSY PATIENT INFORMATION      1  Place an ice pack inside your bra over the top of the dressing every hour for 20 minutes (20 minutes on, 60 minutes off)  Do this until bedtime  2  Do not shower or bathe until the following morning  3  You may bathe your breast carefully with the steri-strips in place  Be careful    Not to loosen them  The steri-strips will fall off in 3-5 days  4  You may have mild discomfort, and you may have some bruising where the   Needle entered the skin  This should clear within 5-7 days  5  If you need medicine for discomfort, take acetaminophen products such as   Tylenol  You may also take Advil or Motrin products  6  Do not participate in strenuous activities such as-tennis, aerobics, skiing,  Weight lifting, etc  for 24 hours  Refrain from swimming/soaking for 72 hours  7  Wearing a bra for sleeping may be more comfortable for the first 24-48 hours  8  Watch for continued bleeding, pain or fever over 101; please call with any questions or concerns  For procedures done at the [facility] call:  Shayan Schimdt RN at [facility] RN at [phone]                    *After 4 PM call the Interventional Radiology Department                    [phone] and ask to speak with the nurse on call  For procedures done at the [facility] or [address] [place] [address] call:         Marjorie Santana RN at [phone]  *After 4 PM call your physician, or go to the Emergency Department  For procedures done at [address] [place] [address] call:  Sarah Persaud RN at [phone]  *After 4 PM call your physician, or go to the Emergency Department  9          The final results of your biopsy are usually available within one week

## 2021-03-29 NOTE — PROGRESS NOTES
Ice pack given:    __x___yes _____no    Discharge instructions signed by patient:    __x___yes _____no    Discharge instructions given to patient:    __x___yes _____no    Discharged via:    __X___ambulatory    _____wheelchair    _____stretcher    Stable on discharge:    __x___yes ____no

## 2021-03-30 ENCOUNTER — TELEPHONE (OUTPATIENT)
Dept: RADIOLOGY | Facility: HOSPITAL | Age: 61
End: 2021-03-30

## 2021-03-30 NOTE — PROGRESS NOTES
Post procedure call completed: Tuesday 03/30/2021     Bleeding: _____yes __x___no    Pain: _____yes ___x___no  Patient reports minimal to no pain  She reports using the ice pack yesterday 03/29 did help substantially well with pain, and that she did not require taking any additional OTC medications for pain control  Redness/Swelling: ______yes ___x___no    Band aid removed: _____yes ___x__no  Patient instructed, when she takes her shower later this evening 03/30, to remove the gauze and tape at that time, and allow warm water and soap to rinse over the breast in the shower, and gently pat the area dry  Steri-Strips intact: ___x___yes _____no  Patient instructed to leave steri-strips in place until at least Thursday 04/01 or Friday 04/02   If steri-strips do not fall off on their own at that time, it would be appropriate to remove  Pt with no additional questions at this time  We did discuss her biopsy findings over the phone as they have already resulted back today 03/30, and findings are benign  As Dr Candy Scott was not available at [address] [place] [address] today and was assisting at [address] [place] [address] know I will call back again tomorrow once Dr Candy Scott has had a chance to sign his addendum & put official follow-up imaging recommendations  Once the addendum is signed, I can call her back  to schedule suspected 6 month follow-up studies over the phone, and clarify if that will require just Dx Mammo, or Dx Mammo and US

## 2021-03-31 NOTE — PROGRESS NOTES
Assessment/Plan:    1  Mid back pain on left side  -     POCT urine dip auto non-scope  -     baclofen 10 mg tablet; Take 1 tablet (10 mg total) by mouth 2 (two) times a day    u/a with leuks with no blood or nitrites  Will have patient start baclofen  Side effects and precautions reviewed  If worsening, patient should be evaluated  Agrees with plan  Return in about 2 weeks (around 4/8/2021)  Subjective:      Patient ID: Doris Jimenes is a 63 y o  female  Chief Complaint   Patient presents with    Back Pain     mid back       HPI    21-year-old female presents with left-sided mid back pain  Patient was concerned as she has a history of kidney stones  Pain started a few days ago  Sharp pain  Denies any hematuria or burning on urination  Pain worse with twisting  No fevers or chills  Denies any trauma to the area  No bowel or bladder incontinence  The following portions of the patient's history were reviewed and updated as appropriate: allergies, current medications, past family history, past medical history, past social history, past surgical history and problem list     Review of Systems   Constitutional: Negative for appetite change and fever  HENT: Negative for ear pain and sore throat  Eyes: Negative for visual disturbance  Respiratory: Negative for shortness of breath  Cardiovascular: Negative for chest pain and leg swelling  Gastrointestinal: Negative for abdominal pain, diarrhea, nausea and vomiting  Musculoskeletal: Positive for back pain  Skin: Negative for color change  Neurological: Negative for dizziness, tremors, light-headedness and headaches  Psychiatric/Behavioral: Negative for agitation and behavioral problems           Current Outpatient Medications   Medication Sig Dispense Refill    albuterol (PROVENTIL HFA,VENTOLIN HFA) 90 mcg/act inhaler Inhale 2 puffs every 4 (four) hours as needed for wheezing 18 Inhaler 5    amLODIPine (NORVASC) 5 mg tablet Take 1 tablet (5 mg total) by mouth daily 90 tablet 1    atorvastatin (LIPITOR) 80 mg tablet TAKE 1 TABLET BY MOUTH ONCE A DAY 30 tablet 3    buPROPion (WELLBUTRIN XL) 300 mg 24 hr tablet TAKE 1 TABLET BY MOUTH EVERY DAY IN THE MORNING 30 tablet 11    dicyclomine (BENTYL) 10 mg capsule Take 1 capsule (10 mg total) by mouth 3 (three) times a day as needed (diarrhea and abdominal pain) 90 capsule 3    DULoxetine (CYMBALTA) 30 mg delayed release capsule TAKE 1 CAPSULE BY MOUTH ONCE A DAY 30 capsule 11    DULoxetine (CYMBALTA) 60 mg delayed release capsule TAKE 1 CAPSULE BY MOUTH EVERY DAY 30 capsule 3    fluticasone (FLONASE) 50 mcg/act nasal spray SPRAY 2 SPRAYS INTO EACH NOSTRIL EVERY DAY 16 mL 3    fluticasone-salmeterol (AIRDUO RESPICLICK 057/56) 664-19 mcg/act dry powder inhaler Inhale 1 puff 2 (two) times a day Rinse mouth after use   1 Inhaler 3    glipiZIDE (GLUCOTROL XL) 5 mg 24 hr tablet Take 1 tablet (5 mg total) by mouth daily with breakfast 30 tablet 2    glucose blood (OneTouch Ultra) test strip Use two strips a day to check blood sugar 100 each 1    hydrochlorothiazide (HYDRODIURIL) 25 mg tablet TAKE 1 TABLET BY MOUTH EVERY DAY 90 tablet 1    Incruse Ellipta 62 5 MCG/INH AEPB inhaler INHALE ONE PUFF DAILY 1 Inhaler 3    insulin glargine (Basaglar KwikPen) 100 units/mL injection pen Inject 64 units under the skin daily at bedtime 60 mL 1    insulin lispro (Admelog) 100 units/mL injection Inject 10 Units under the skin 3 (three) times a day with meals 30 mL 1    Insulin Pen Needle (BD Pen Needle Jenn 2nd Gen) 32G X 4 MM MISC USE 1 PEN NEEDLE A DAY TO ADMINISTER INSULIN UNDER THE SKIN      Insulin Pen Needle (BD Pen Needle Jenn U/F) 32G X 4 MM MISC Use 2 pen needles a day to administer insulin and victoza under the skin 200 each 1    Insulin Syringe/Needle U-500 (BD Insulin Syringe U-500) 31G X 6MM 0 5 ML MISC Use to inject insulin daily 100 each 1    metFORMIN (GLUCOPHAGE) 1000 MG tablet TAKE 2 TABLETS (2,000 MG TOTAL) BY MOUTH DAILY FOR 90 DAYS 180 tablet 1    neomycin-polymyxin-hydrocortisone (CORTISPORIN) otic solution Administer 4 drops into the left ear 2 (two) times a day 10 mL 4    baclofen 10 mg tablet Take 1 tablet (10 mg total) by mouth 2 (two) times a day 30 tablet 0    bisacodyl (DULCOLAX) 5 mg EC tablet Take 10 mg by mouth once      liraglutide (Victoza) injection INJECT 1 8 MG UNDER THE SKIN ONCE DAILY      multivitamin (THERAGRAN) TABS Take 1 tablet by mouth every morning      omeprazole (PriLOSEC) 20 mg delayed release capsule TAKE 1 CAPSULE (20 MG TOTAL) BY MOUTH 2 (TWO) TIMES A DAY BEFORE MEALS (Patient not taking: Reported on 1/12/2021) 60 capsule 3    polyethylene glycol (GLYCOLAX) powder Take 17 g by mouth once      QUEtiapine (SEROquel) 25 mg tablet TAKE 1 TABLET BY MOUTH TWICE A  tablet 2    Tapentadol HCl ER (Nucynta ER) 50 MG TB12 Take 1 tablet (50 mg total) by mouth every 12 (twelve) hoursMax Daily Amount: 100 mg 60 tablet 0    Tapentadol HCl ER 50 MG TB12 Take 1 tablet (50 mg total) by mouth every 12 (twelve) hoursMax Daily Amount: 100 mg 60 tablet 0    Victoza injection INJECT 1 8 MG UNDER THE SKIN ONCE DAILY 3 pen 5     No current facility-administered medications for this visit  Objective:    /78 (BP Location: Left arm, Patient Position: Sitting, Cuff Size: Large)   Pulse 96   Temp 98 2 °F (36 8 °C) (Temporal)   Resp 15   Ht 5' 2" (1 575 m)   Wt 112 kg (246 lb 12 8 oz)   SpO2 96%   BMI 45 14 kg/m²        Physical Exam  Constitutional:       General: She is not in acute distress  Appearance: She is well-developed  HENT:      Head: Normocephalic and atraumatic  Nose: Nose normal    Eyes:      General:         Right eye: No discharge  Left eye: No discharge  Neck:      Musculoskeletal: Normal range of motion and neck supple  Cardiovascular:      Rate and Rhythm: Normal rate and regular rhythm        Pulses: Normal pulses  Heart sounds: Normal heart sounds  No murmur  Pulmonary:      Effort: Pulmonary effort is normal  No respiratory distress  Breath sounds: Normal breath sounds  Abdominal:      General: Bowel sounds are normal  There is no distension  Palpations: Abdomen is soft  Tenderness: There is no abdominal tenderness  Musculoskeletal:      Comments: Left mid back muscle strain noted  pain with twisting  Pulses present   Sensation intact   Strength intact       Skin:     General: Skin is warm  Neurological:      Mental Status: She is alert and oriented to person, place, and time     Psychiatric:         Behavior: Behavior normal                 Abram Edge MD

## 2021-04-02 ENCOUNTER — IMMUNIZATIONS (OUTPATIENT)
Dept: FAMILY MEDICINE CLINIC | Facility: HOSPITAL | Age: 61
End: 2021-04-02

## 2021-04-02 DIAGNOSIS — Z23 ENCOUNTER FOR IMMUNIZATION: Primary | ICD-10-CM

## 2021-04-02 PROCEDURE — 91301 SARS-COV-2 / COVID-19 MRNA VACCINE (MODERNA) 100 MCG: CPT

## 2021-04-02 PROCEDURE — 0012A SARS-COV-2 / COVID-19 MRNA VACCINE (MODERNA) 100 MCG: CPT

## 2021-04-04 DIAGNOSIS — E11.8 TYPE 2 DIABETES MELLITUS WITH COMPLICATION, WITHOUT LONG-TERM CURRENT USE OF INSULIN (HCC): ICD-10-CM

## 2021-04-04 RX ORDER — LIRAGLUTIDE 6 MG/ML
INJECTION SUBCUTANEOUS
Qty: 3 PEN | Refills: 5 | Status: SHIPPED | OUTPATIENT
Start: 2021-04-04 | End: 2021-10-04

## 2021-04-07 DIAGNOSIS — E11.65 TYPE 2 DIABETES MELLITUS WITH HYPERGLYCEMIA, WITHOUT LONG-TERM CURRENT USE OF INSULIN (HCC): ICD-10-CM

## 2021-04-07 RX ORDER — GLIPIZIDE 5 MG/1
TABLET, FILM COATED, EXTENDED RELEASE ORAL
Qty: 30 TABLET | Refills: 2 | OUTPATIENT
Start: 2021-04-07

## 2021-04-10 DIAGNOSIS — E11.65 TYPE 2 DIABETES MELLITUS WITH HYPERGLYCEMIA, WITH LONG-TERM CURRENT USE OF INSULIN (HCC): ICD-10-CM

## 2021-04-10 DIAGNOSIS — Z79.4 TYPE 2 DIABETES MELLITUS WITH HYPERGLYCEMIA, WITH LONG-TERM CURRENT USE OF INSULIN (HCC): ICD-10-CM

## 2021-04-12 ENCOUNTER — TELEPHONE (OUTPATIENT)
Dept: PAIN MEDICINE | Facility: CLINIC | Age: 61
End: 2021-04-12

## 2021-04-12 RX ORDER — BLOOD SUGAR DIAGNOSTIC
STRIP MISCELLANEOUS
Qty: 200 EACH | Refills: 1 | Status: SHIPPED | OUTPATIENT
Start: 2021-04-12 | End: 2021-10-25

## 2021-04-12 NOTE — TELEPHONE ENCOUNTER
Pt contacted Call Center requested refill of their medication  Medication Name:    Vin Gonzalez   Dosage of Med:  25 mg    Frequency of Med:  2 x a day    Remaining Medication:  1    Pharmacy and Location:    [address]    Pt  Preferred Callback Phone Number:  [phone]    Thank you

## 2021-04-13 NOTE — TELEPHONE ENCOUNTER
Pt called in asking for a few more tablets to hold her over until her 5/13 OV- pt took last pill today

## 2021-04-14 ENCOUNTER — OFFICE VISIT (OUTPATIENT)
Dept: OTOLARYNGOLOGY | Facility: CLINIC | Age: 61
End: 2021-04-14
Payer: COMMERCIAL

## 2021-04-14 VITALS
DIASTOLIC BLOOD PRESSURE: 88 MMHG | WEIGHT: 246 LBS | HEIGHT: 62 IN | BODY MASS INDEX: 45.27 KG/M2 | TEMPERATURE: 96.9 F | HEART RATE: 89 BPM | SYSTOLIC BLOOD PRESSURE: 139 MMHG

## 2021-04-14 DIAGNOSIS — S00.412A ABRASION OF LEFT EAR CANAL, INITIAL ENCOUNTER: Primary | ICD-10-CM

## 2021-04-14 PROCEDURE — 3079F DIAST BP 80-89 MM HG: CPT | Performed by: NURSE PRACTITIONER

## 2021-04-14 PROCEDURE — 3075F SYST BP GE 130 - 139MM HG: CPT | Performed by: NURSE PRACTITIONER

## 2021-04-14 PROCEDURE — 99213 OFFICE O/P EST LOW 20 MIN: CPT | Performed by: NURSE PRACTITIONER

## 2021-04-14 RX ORDER — SYRING-NEEDL,DISP,INSUL,0.3 ML 31GX15/64"
SYRINGE, EMPTY DISPOSABLE MISCELLANEOUS
COMMUNITY
Start: 2021-03-23

## 2021-04-14 NOTE — TELEPHONE ENCOUNTER
Last script I gave her was in October of last year  Has she not been using the Nucynta as prescribed? I will have to see her for an appointment before prescribing the medication since it has been so long since she received her last prescription

## 2021-04-14 NOTE — PROGRESS NOTES
Assessment/Plan:    Abrasion of left ear canal  Dried bloody debris remains in left eac, very hard consistency  Attempted to remove with irrigation, and suction  Unable to complete removal due to pt discomfort  Portion of TM visible and appears intact, mobile with pneumatic otoscopy  Options include resume ear drops or peroxide to left eac  Discussed discomfort if has TM perforation and "fizzing" noise associated with peroxide  Tylenol, ibuprofen and warm compresses for pain  Pain should resolve once debris is removed  To monitor progression  Follow up in one week after use of peroxide twice daily  Diagnoses and all orders for this visit:    Abrasion of left ear canal, initial encounter    Other orders  -     BD Veo Insulin Syringe U/F 31G X 15/64" 0 5 ML MISC; USE TO INJECT INSULIN DAILY          Subjective:      Patient ID: Mario Linares is a 63 y o  female  Presents today for follow up due to left ear pain  Otalgia in left ear worsening for past few days  No otorrhea  Stopped use drops few days  Hearing improved  Reports a small amount of debris came out of ear few days ago  The following portions of the patient's history were reviewed and updated as appropriate: allergies, current medications, past family history, past medical history, past social history, past surgical history and problem list     Review of Systems   Constitutional: Negative  HENT: Positive for ear pain  Negative for congestion, ear discharge, hearing loss, nosebleeds, postnasal drip, rhinorrhea, sinus pressure, sinus pain, sore throat, tinnitus and voice change  Eyes: Negative  Respiratory: Negative for chest tightness and shortness of breath  Cardiovascular: Negative  Gastrointestinal: Negative  Endocrine: Negative  Musculoskeletal: Negative  Skin: Negative for color change  Neurological: Negative for dizziness, numbness and headaches  Psychiatric/Behavioral: Negative  Objective:      /88   Pulse 89   Temp (!) 96 9 °F (36 1 °C)   Ht 5' 2" (1 575 m)   Wt 112 kg (246 lb)   BMI 44 99 kg/m²          Physical Exam  Constitutional:       Appearance: She is well-developed  HENT:      Head: Normocephalic  Right Ear: Hearing, tympanic membrane, ear canal and external ear normal  No decreased hearing noted  No drainage or tenderness  Tympanic membrane is not perforated or erythematous  Left Ear: Hearing, tympanic membrane, ear canal and external ear normal  No decreased hearing noted  Drainage present  No tenderness  Tympanic membrane is not perforated or erythematous  Nose: Nose normal  No nasal deformity or septal deviation  Mouth/Throat:      Mouth: Mucous membranes are not pale and not dry  No oral lesions  Dentition: Normal dentition  Pharynx: Uvula midline  No oropharyngeal exudate  Neck:      Musculoskeletal: Full passive range of motion without pain, normal range of motion and neck supple  Trachea: No tracheal deviation  Cardiovascular:      Rate and Rhythm: Normal rate  Pulmonary:      Effort: Pulmonary effort is normal  No accessory muscle usage or respiratory distress  Musculoskeletal:      Right shoulder: She exhibits normal range of motion  Lymphadenopathy:      Cervical: No cervical adenopathy  Skin:     General: Skin is warm and dry  Neurological:      Mental Status: She is alert and oriented to person, place, and time  Cranial Nerves: No cranial nerve deficit  Sensory: No sensory deficit  Psychiatric:         Behavior: Behavior is cooperative

## 2021-04-14 NOTE — TELEPHONE ENCOUNTER
SARAHY Roman Pt:    See below request  As detailed, pt not seen since Sept 2020 and last Rx was dated 10/30/20    S/W pt and advised OV needed and notified of above dates/Rx given  Pt states "nobody notified me that I missed an appointment and this is the first month I haven't gotten the Nucynta "   Questioned pt if she has seen anyone else for refills and pt denied  Pt did not have an appointment logged that she NS to or Cancelled       Advised pt again that she will need OV for refill  Do not have access to PDMP to confirm Rx history

## 2021-04-14 NOTE — ASSESSMENT & PLAN NOTE
Dried bloody debris remains in left eac, very hard consistency  Attempted to remove with irrigation, and suction  Unable to complete removal due to pt discomfort  Portion of TM visible and appears intact, mobile with pneumatic otoscopy  Options include resume ear drops or peroxide to left eac  Discussed discomfort if has TM perforation and "fizzing" noise associated with peroxide  Tylenol, ibuprofen and warm compresses for pain  Pain should resolve once debris is removed  To monitor progression  Follow up in one week after use of peroxide twice daily

## 2021-04-17 DIAGNOSIS — M54.9 MID BACK PAIN ON LEFT SIDE: ICD-10-CM

## 2021-04-19 RX ORDER — BACLOFEN 10 MG/1
TABLET ORAL
Qty: 30 TABLET | Refills: 0 | Status: SHIPPED | OUTPATIENT
Start: 2021-04-19 | End: 2021-06-09

## 2021-04-20 ENCOUNTER — OFFICE VISIT (OUTPATIENT)
Dept: OTOLARYNGOLOGY | Facility: CLINIC | Age: 61
End: 2021-04-20
Payer: COMMERCIAL

## 2021-04-20 VITALS — TEMPERATURE: 97.7 F | HEART RATE: 97 BPM | HEIGHT: 62 IN | WEIGHT: 246.91 LBS | BODY MASS INDEX: 45.44 KG/M2

## 2021-04-20 DIAGNOSIS — S00.412A ABRASION OF LEFT EAR CANAL, INITIAL ENCOUNTER: Primary | ICD-10-CM

## 2021-04-20 PROCEDURE — 99213 OFFICE O/P EST LOW 20 MIN: CPT | Performed by: NURSE PRACTITIONER

## 2021-04-20 PROCEDURE — 4004F PT TOBACCO SCREEN RCVD TLK: CPT | Performed by: NURSE PRACTITIONER

## 2021-04-20 NOTE — ASSESSMENT & PLAN NOTE
Dried bloody debris remains left eac  Pt used peroxide as instructed  Removed the dried bloody debris with alligator forceps  Tolerated well  The skin of the eac remains intact  Tm intact  Denies changes in hearing and declined audiogram   Pain resolved with removal of debris  May cease use of drops  Discouraged use of q-tips  May apply HC cream prn to ears for itching    Follow up prn

## 2021-04-20 NOTE — PROGRESS NOTES
Assessment/Plan:    Abrasion of left ear canal  Dried bloody debris remains left eac  Pt used peroxide as instructed  Removed the dried bloody debris with alligator forceps  Tolerated well  The skin of the eac remains intact  Tm intact  Denies changes in hearing and declined audiogram   Pain resolved with removal of debris  May cease use of drops  Discouraged use of q-tips  May apply HC cream prn to ears for itching  Follow up prn         Diagnoses and all orders for this visit:    Abrasion of left ear canal, initial encounter          Subjective:      Patient ID: Lulú Dempsey is a 63 y o  female  Presents today for follow up due to left ear pain  Otalgia in left ear stable  No otorrhea  Hearing improved  Using peroxide as recommended  The following portions of the patient's history were reviewed and updated as appropriate: allergies, current medications, past family history, past medical history, past social history, past surgical history and problem list     Review of Systems   Constitutional: Negative  HENT: Positive for ear pain  Negative for congestion, ear discharge, hearing loss, nosebleeds, postnasal drip, rhinorrhea, sinus pressure, sinus pain, sore throat, tinnitus and voice change  Eyes: Negative  Respiratory: Negative for chest tightness and shortness of breath  Cardiovascular: Negative  Gastrointestinal: Negative  Endocrine: Negative  Musculoskeletal: Negative  Skin: Negative for color change  Neurological: Negative for dizziness, numbness and headaches  Psychiatric/Behavioral: Negative  Objective:      Pulse 97   Temp 97 7 °F (36 5 °C)   Ht 5' 2" (1 575 m)   Wt 112 kg (246 lb 14 6 oz)   BMI 45 16 kg/m²          Physical Exam  Constitutional:       Appearance: She is well-developed  HENT:      Head: Normocephalic  Right Ear: Hearing, tympanic membrane, ear canal and external ear normal  No decreased hearing noted   No drainage or tenderness  Tympanic membrane is not perforated or erythematous  Left Ear: Hearing, tympanic membrane, ear canal and external ear normal  No decreased hearing noted  Drainage present  No tenderness  Tympanic membrane is not perforated or erythematous  Nose: Nose normal  No nasal deformity or septal deviation  Mouth/Throat:      Mouth: Mucous membranes are not pale and not dry  No oral lesions  Dentition: Normal dentition  Pharynx: Uvula midline  No oropharyngeal exudate  Neck:      Musculoskeletal: Full passive range of motion without pain, normal range of motion and neck supple  Trachea: No tracheal deviation  Cardiovascular:      Rate and Rhythm: Normal rate  Pulmonary:      Effort: Pulmonary effort is normal  No accessory muscle usage or respiratory distress  Musculoskeletal:      Right shoulder: She exhibits normal range of motion  Lymphadenopathy:      Cervical: No cervical adenopathy  Skin:     General: Skin is warm and dry  Neurological:      Mental Status: She is alert and oriented to person, place, and time  Cranial Nerves: No cranial nerve deficit  Sensory: No sensory deficit  Psychiatric:         Behavior: Behavior is cooperative

## 2021-05-03 ENCOUNTER — TELEPHONE (OUTPATIENT)
Dept: PAIN MEDICINE | Facility: CLINIC | Age: 61
End: 2021-05-03

## 2021-05-03 LAB
ALBUMIN SERPL-MCNC: 4.6 G/DL (ref 3.8–4.8)
ALBUMIN/GLOB SERPL: 2.2 {RATIO} (ref 1.2–2.2)
ALP SERPL-CCNC: 91 IU/L (ref 39–117)
ALT SERPL-CCNC: 27 IU/L (ref 0–32)
AST SERPL-CCNC: 23 IU/L (ref 0–40)
BILIRUB SERPL-MCNC: 0.3 MG/DL (ref 0–1.2)
BUN SERPL-MCNC: 14 MG/DL (ref 8–27)
BUN/CREAT SERPL: 13 (ref 12–28)
CALCIUM SERPL-MCNC: 10.4 MG/DL (ref 8.7–10.3)
CHLORIDE SERPL-SCNC: 97 MMOL/L (ref 96–106)
CHOLEST SERPL-MCNC: 125 MG/DL (ref 100–199)
CHOLEST/HDLC SERPL: 3.4 RATIO (ref 0–4.4)
CO2 SERPL-SCNC: 28 MMOL/L (ref 20–29)
CREAT SERPL-MCNC: 1.11 MG/DL (ref 0.57–1)
EST. AVERAGE GLUCOSE BLD GHB EST-MCNC: 217 MG/DL
GLOBULIN SER-MCNC: 2.1 G/DL (ref 1.5–4.5)
GLUCOSE SERPL-MCNC: 178 MG/DL (ref 65–99)
HBA1C MFR BLD: 9.2 % (ref 4.8–5.6)
HDLC SERPL-MCNC: 37 MG/DL
LDLC SERPL CALC-MCNC: 64 MG/DL (ref 0–99)
POTASSIUM SERPL-SCNC: 4.5 MMOL/L (ref 3.5–5.2)
PROT SERPL-MCNC: 6.7 G/DL (ref 6–8.5)
SL AMB EGFR AFRICAN AMERICAN: 62 ML/MIN/1.73
SL AMB EGFR NON AFRICAN AMERICAN: 54 ML/MIN/1.73
SL AMB VLDL CHOLESTEROL CALC: 24 MG/DL (ref 5–40)
SODIUM SERPL-SCNC: 140 MMOL/L (ref 134–144)
TRIGL SERPL-MCNC: 134 MG/DL (ref 0–149)

## 2021-05-03 PROCEDURE — 3046F HEMOGLOBIN A1C LEVEL >9.0%: CPT | Performed by: NURSE PRACTITIONER

## 2021-05-03 NOTE — TELEPHONE ENCOUNTER
Patient's insurance does not allow her to go to [place], but I will have Steffi Sandoval open a slot to get her in this Thurs at 10:30

## 2021-05-03 NOTE — TELEPHONE ENCOUNTER
Patient appointment on 5/13 needs to be rescheduled  Patient is out of Nucynta 50mg twice a day  Patient states that she was told she needed an office visit for the refill  Patient asking for enough medication until appointment in June

## 2021-05-04 DIAGNOSIS — J44.9 CHRONIC OBSTRUCTIVE PULMONARY DISEASE, UNSPECIFIED COPD TYPE (HCC): ICD-10-CM

## 2021-05-04 DIAGNOSIS — G89.4 CHRONIC PAIN SYNDROME: ICD-10-CM

## 2021-05-04 DIAGNOSIS — J31.0 RHINITIS, UNSPECIFIED TYPE: ICD-10-CM

## 2021-05-04 RX ORDER — UMECLIDINIUM 62.5 UG/1
AEROSOL, POWDER ORAL
Qty: 1 EACH | Refills: 3 | Status: SHIPPED | OUTPATIENT
Start: 2021-05-04 | End: 2021-09-02

## 2021-05-04 RX ORDER — FLUTICASONE PROPIONATE 50 MCG
SPRAY, SUSPENSION (ML) NASAL
Qty: 16 ML | Refills: 3 | Status: SHIPPED | OUTPATIENT
Start: 2021-05-04 | End: 2021-09-02

## 2021-05-04 RX ORDER — DULOXETIN HYDROCHLORIDE 60 MG/1
CAPSULE, DELAYED RELEASE ORAL
Qty: 30 CAPSULE | Refills: 3 | Status: SHIPPED | OUTPATIENT
Start: 2021-05-04 | End: 2021-11-29

## 2021-05-06 ENCOUNTER — OFFICE VISIT (OUTPATIENT)
Dept: PAIN MEDICINE | Facility: CLINIC | Age: 61
End: 2021-05-06
Payer: COMMERCIAL

## 2021-05-06 ENCOUNTER — TELEPHONE (OUTPATIENT)
Dept: PAIN MEDICINE | Facility: CLINIC | Age: 61
End: 2021-05-06

## 2021-05-06 ENCOUNTER — TELEPHONE (OUTPATIENT)
Dept: ENDOCRINOLOGY | Facility: CLINIC | Age: 61
End: 2021-05-06

## 2021-05-06 VITALS
WEIGHT: 241.8 LBS | SYSTOLIC BLOOD PRESSURE: 122 MMHG | DIASTOLIC BLOOD PRESSURE: 84 MMHG | BODY MASS INDEX: 44.5 KG/M2 | HEART RATE: 103 BPM | HEIGHT: 62 IN

## 2021-05-06 DIAGNOSIS — M96.1 POSTLAMINECTOMY SYNDROME, NOT ELSEWHERE CLASSIFIED: ICD-10-CM

## 2021-05-06 DIAGNOSIS — G89.4 CHRONIC PAIN SYNDROME: ICD-10-CM

## 2021-05-06 DIAGNOSIS — M54.16 LUMBAR RADICULOPATHY: ICD-10-CM

## 2021-05-06 DIAGNOSIS — G89.29 CHRONIC BILATERAL LOW BACK PAIN WITH LEFT-SIDED SCIATICA: ICD-10-CM

## 2021-05-06 DIAGNOSIS — M54.42 CHRONIC BILATERAL LOW BACK PAIN WITH LEFT-SIDED SCIATICA: ICD-10-CM

## 2021-05-06 PROCEDURE — 3079F DIAST BP 80-89 MM HG: CPT | Performed by: ANESTHESIOLOGY

## 2021-05-06 PROCEDURE — 99214 OFFICE O/P EST MOD 30 MIN: CPT | Performed by: ANESTHESIOLOGY

## 2021-05-06 PROCEDURE — 3008F BODY MASS INDEX DOCD: CPT | Performed by: ANESTHESIOLOGY

## 2021-05-06 PROCEDURE — 4004F PT TOBACCO SCREEN RCVD TLK: CPT | Performed by: ANESTHESIOLOGY

## 2021-05-06 PROCEDURE — 3074F SYST BP LT 130 MM HG: CPT | Performed by: ANESTHESIOLOGY

## 2021-05-06 RX ORDER — TAPENTADOL HYDROCHLORIDE 50 MG/1
50 TABLET, FILM COATED, EXTENDED RELEASE ORAL EVERY 12 HOURS
Qty: 60 TABLET | Refills: 0 | Status: SHIPPED | OUTPATIENT
Start: 2021-06-05 | End: 2021-07-15
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
## 2021-05-06 NOTE — TELEPHONE ENCOUNTER
Scheduled pt for Hip injection for 5/21/21    Went over pre-procedure instructions below:  Nothing to eat or drink 1 hr prior to procedure  Need to arrange transportation  Proper clothing for procedure  No vaccines 2 weeks prior or after procedure  If ill or placed on antibiotics please call to reschedule  COVID test schedule for 5/15/21 at [facility]

## 2021-05-12 ENCOUNTER — TELEPHONE (OUTPATIENT)
Dept: ENDOCRINOLOGY | Facility: CLINIC | Age: 61
End: 2021-05-12

## 2021-05-12 DIAGNOSIS — E66.01 OBESITY, CLASS III, BMI 40-49.9 (MORBID OBESITY) (HCC): ICD-10-CM

## 2021-05-12 DIAGNOSIS — I10 HYPERTENSION GOAL BP (BLOOD PRESSURE) < 140/90: ICD-10-CM

## 2021-05-12 DIAGNOSIS — E78.2 MIXED HYPERLIPIDEMIA: ICD-10-CM

## 2021-05-12 DIAGNOSIS — E11.65 TYPE 2 DIABETES MELLITUS WITH HYPERGLYCEMIA, WITH LONG-TERM CURRENT USE OF INSULIN (HCC): ICD-10-CM

## 2021-05-12 DIAGNOSIS — Z79.4 TYPE 2 DIABETES MELLITUS WITH HYPERGLYCEMIA, WITH LONG-TERM CURRENT USE OF INSULIN (HCC): ICD-10-CM

## 2021-05-12 RX ORDER — AMLODIPINE BESYLATE 5 MG/1
TABLET ORAL
Qty: 90 TABLET | Refills: 1 | Status: SHIPPED | OUTPATIENT
Start: 2021-05-12 | End: 2022-01-28

## 2021-05-13 ENCOUNTER — TELEPHONE (OUTPATIENT)
Dept: OBGYN CLINIC | Facility: CLINIC | Age: 61
End: 2021-05-13

## 2021-05-13 DIAGNOSIS — M54.42 CHRONIC BILATERAL LOW BACK PAIN WITH LEFT-SIDED SCIATICA: ICD-10-CM

## 2021-05-13 DIAGNOSIS — G89.29 CHRONIC BILATERAL LOW BACK PAIN WITH LEFT-SIDED SCIATICA: ICD-10-CM

## 2021-05-13 DIAGNOSIS — M54.16 LUMBAR RADICULOPATHY: ICD-10-CM

## 2021-05-13 DIAGNOSIS — M96.1 POSTLAMINECTOMY SYNDROME, NOT ELSEWHERE CLASSIFIED: ICD-10-CM

## 2021-05-13 DIAGNOSIS — G89.4 CHRONIC PAIN SYNDROME: ICD-10-CM

## 2021-05-13 NOTE — TELEPHONE ENCOUNTER
Justin Reinoso stopped by office, There was a lapse in her medication and when she went to the pharmacy to get a refill , They said they would only give her 7 pills every week   Then after that She said that you need to write a letter of necessity to her [address] for her to get the pills   Please Advise

## 2021-05-13 NOTE — TELEPHONE ENCOUNTER
S/W Cecilia Ball at [facility]   This is for the Nucynta ER  Cecilia Ball said the pt said she wasn't on in for a month and Cecilia Ball thinks that is what created the problem  Workman's comp will only cover a 7 day supply for 4 weeks and then would need a letter of necessity  Cecilia Ball stated the pt needs a new script for each week  She said to call workman's comp for prior auth [phone] and ID # [id number]  Cecilia Ball stated they filled it for 14 tablets for 7 days on 5/11  Please advise      Triage-please assist with prior auth

## 2021-05-14 DIAGNOSIS — E66.01 OBESITY, CLASS III, BMI 40-49.9 (MORBID OBESITY) (HCC): ICD-10-CM

## 2021-05-14 DIAGNOSIS — I10 HYPERTENSION GOAL BP (BLOOD PRESSURE) < 140/90: ICD-10-CM

## 2021-05-14 DIAGNOSIS — Z79.4 TYPE 2 DIABETES MELLITUS WITH HYPERGLYCEMIA, WITH LONG-TERM CURRENT USE OF INSULIN (HCC): ICD-10-CM

## 2021-05-14 DIAGNOSIS — E78.2 MIXED HYPERLIPIDEMIA: ICD-10-CM

## 2021-05-14 DIAGNOSIS — E11.65 TYPE 2 DIABETES MELLITUS WITH HYPERGLYCEMIA, WITH LONG-TERM CURRENT USE OF INSULIN (HCC): ICD-10-CM

## 2021-05-14 NOTE — TELEPHONE ENCOUNTER
Spoke with patient and reviewed bg log and changes to medications: Increase basaglar to 68 units and increase admelog to 14 units TID [facility]  She understood

## 2021-05-14 NOTE — TELEPHONE ENCOUNTER
Reviewed BG log, having morning and post-prandial hypergylcemia    Increase basaglar to 68 units and increase admelog to 14 units TID [facility]

## 2021-05-15 DIAGNOSIS — Z11.59 SPECIAL SCREENING EXAMINATION FOR UNSPECIFIED VIRAL DISEASE: ICD-10-CM

## 2021-05-15 PROCEDURE — 87635 SARS-COV-2 COVID-19 AMP PRB: CPT

## 2021-05-17 RX ORDER — INSULIN GLARGINE 100 [IU]/ML
INJECTION, SOLUTION SUBCUTANEOUS
Qty: 60 ML | Refills: 1 | Status: SHIPPED | OUTPATIENT
Start: 2021-05-17 | End: 2021-09-29 | Stop reason: SDUPTHER

## 2021-05-17 NOTE — TELEPHONE ENCOUNTER
Pt called to see if the new script  For 7 day supply for Tuesday and then 2 more script for 7 days supplies for the next 2 weeks     Please send scripts in

## 2021-05-17 NOTE — TELEPHONE ENCOUNTER
Patient said she will call back when she gets home   When she calls back please let her know this is the number that I called [phone]  They advised me that the patient herself would need to contact the  at the [employer] to have them update the information for her to be able to get the prescription

## 2021-05-18 ENCOUNTER — TELEPHONE (OUTPATIENT)
Dept: PAIN MEDICINE | Facility: CLINIC | Age: 61
End: 2021-05-18

## 2021-05-18 LAB — SARS-COV-2 RNA RESP QL NAA+PROBE: NEGATIVE

## 2021-05-18 NOTE — TELEPHONE ENCOUNTER
Her insurance is giving her only 14 pills a week of nucynta  Pt says she was at the pharmacy and  needs to order a new script  Her insurance says he needs to send individual prescriptions   # [phone]

## 2021-05-19 NOTE — TELEPHONE ENCOUNTER
Can we look into why I can only prescribe one week at a time for her? She previously was on Nucynta without any issues and I was able to dispense two months at a time

## 2021-05-21 ENCOUNTER — HOSPITAL ENCOUNTER (OUTPATIENT)
Facility: AMBULARY SURGERY CENTER | Age: 61
Setting detail: OUTPATIENT SURGERY
Discharge: HOME | End: 2021-05-21
Attending: ANESTHESIOLOGY | Admitting: ANESTHESIOLOGY
Payer: COMMERCIAL

## 2021-05-21 ENCOUNTER — TELEPHONE (OUTPATIENT)
Dept: ENDOCRINOLOGY | Facility: CLINIC | Age: 61
End: 2021-05-21

## 2021-05-21 ENCOUNTER — APPOINTMENT (OUTPATIENT)
Dept: RADIOLOGY | Facility: HOSPITAL | Age: 61
End: 2021-05-21
Payer: COMMERCIAL

## 2021-05-21 VITALS
SYSTOLIC BLOOD PRESSURE: 147 MMHG | HEART RATE: 90 BPM | RESPIRATION RATE: 18 BRPM | HEIGHT: 61 IN | BODY MASS INDEX: 45.31 KG/M2 | OXYGEN SATURATION: 97 % | WEIGHT: 240 LBS | DIASTOLIC BLOOD PRESSURE: 81 MMHG | TEMPERATURE: 98.9 F

## 2021-05-21 PROCEDURE — 77002 NEEDLE LOCALIZATION BY XRAY: CPT

## 2021-05-21 PROCEDURE — 77002 NEEDLE LOCALIZATION BY XRAY: CPT | Performed by: ANESTHESIOLOGY

## 2021-05-21 PROCEDURE — 20610 DRAIN/INJ JOINT/BURSA W/O US: CPT | Performed by: ANESTHESIOLOGY

## 2021-05-21 PROCEDURE — 20610 DRAIN/INJ JOINT/BURSA W/O US: CPT

## 2021-05-21 RX ORDER — LIDOCAINE WITH 8.4% SOD BICARB 0.9%(10ML)
SYRINGE (ML) INJECTION AS NEEDED
Status: DISCONTINUED | OUTPATIENT
Start: 2021-05-21 | End: 2021-05-21 | Stop reason: HOSPADM

## 2021-05-21 RX ORDER — METHYLPREDNISOLONE ACETATE 80 MG/ML
INJECTION, SUSPENSION INTRA-ARTICULAR; INTRALESIONAL; INTRAMUSCULAR; SOFT TISSUE AS NEEDED
Status: DISCONTINUED | OUTPATIENT
Start: 2021-05-21 | End: 2021-05-21 | Stop reason: HOSPADM

## 2021-05-21 RX ORDER — BUPIVACAINE HYDROCHLORIDE 2.5 MG/ML
INJECTION, SOLUTION EPIDURAL; INFILTRATION; INTRACAUDAL AS NEEDED
Status: DISCONTINUED | OUTPATIENT
Start: 2021-05-21 | End: 2021-05-21 | Stop reason: HOSPADM

## 2021-05-21 NOTE — DISCHARGE INSTRUCTIONS

## 2021-05-21 NOTE — TELEPHONE ENCOUNTER
S/w pt and asked if she has s/w the  yet  Pt states that she has not yet s/w the  and states that she was told by her pharmacy that since pt had a lapse in refill times greater than a month, the insurance is only going to cover 7 day supplies at this time, and then a letter of medical necessity will be needed after 4 weeks  Pt was advised of Anali's notation about contacting the  directly  Pt verbalized understanding, confirmed phone number to call, and pt states that she "will give it a whirl " However, pt states that she only has enough Nucynta ER until Tuesday, worried about running out of medication, and that until this situation gets fully resolved, she needs Dr Swathi Coker to send in a 7 day supply since that's what her insurance is willing to cover currently  Please advise  Thank you

## 2021-05-21 NOTE — OP NOTE
ATTENDING PHYSICIAN:  Susan Martinez MD      PREPROCEDURE DIAGNOSIS:  Left hip pain  POSTPROCEDURE DIAGNOSIS: Left hip pain  PROCEDURE: Left intraarticular hip injection under fluoroscopic guidance  ANESTHESIA:  Local     ESTIMATED BLOOD LOSS:  Minimal     COMPLICATIONS:  None  [facility]  [facility]  CONSENT:  Today's procedure, its risks, benefits, and alternatives were explained in detail to the patient  Risks include, but are not limited bleeding, infection, hematoma formation, abscess formation, weakness, nerve irritation or damage, failure of the pain to improve, or potential worsening of the pain  The patient verbalized understanding and wished to proceed with the procedure  Written informed consent was thereby obtained  DESCRIPTION OF THE PROCEDURE:  After written informed consent was obtained, the patient was taken to the fluoroscopy suite and placed in the supine position  Anatomical landmarks were identified by way of fluoroscopy in the AP and oblique views  The patient's hip region was prepped using antiseptic solution and draped in the usual sterile fashion  Strict aseptic technique was utilized  The skin and subcutaneous tissues at the needle entry site was infiltrated with a small amount of 1% preservative free Lidocaine using a 25-gauge 1-1/2 inch needle  A 22 gauge 3-1/2 inch needle was then advanced incrementally under fluoroscopic guidance towards the identified point until os was contacted and the joint space was entered  After negative aspiration, 1 ml of contrast solution was injected showing an appropriate arthrogram   Subsequently, a solution consisting of 4 ml of 0 25% Bupivacaine mixed with 1 ml of Depo-Medrol 80 mg/ml was injected slowly  All needles were removed with the tips intact and hemostasis was maintained throughout    The patient tolerated the procedure well, and there were no apparent paresthesias or complications noted during or following this procedure  The antiseptic was wiped clean and Band-Aids were placed as appropriate  The patient was transferred to the recovery area and was observed for an appropriate period of time during which the patient remained hemodynamically stable and neurovascularly intact, as the patient was prior to the procedure  The patient was subsequently discharged to home in stable condition with supervision  The patient was instructed to call the office in a few days for an update  Discharge instructions were provided  I was present and participated in all key and critical portions of this procedure      Sandeep Purvis MD  5/21/2021  11:20 AM

## 2021-05-21 NOTE — INTERVAL H&P NOTE
H&P reviewed  After examining the patient I find no changes in the patients condition since the H&P had been written      Vitals:    05/21/21 1052   BP: 147/81   Pulse: 94   Resp: 18   Temp: 98 9 °F (37 2 °C)   SpO2: 96%

## 2021-05-25 NOTE — TELEPHONE ENCOUNTER
SARAHY  RN s/w pt  Pt spoke with "someone" at [facility] who she states is "in charge of her medications"  Per pt, a letter of necessity needs to be filled out by AS at [facility] dol gov before June 1  Once this is filled out scripts will be able to be filled out for the months of June and July, instead of weekly  Pt states that she had no Nucynta from 5/18 til today(she will  er script today) RN verified that script available at pharmacy  Pt appreciative of AS efforts

## 2021-05-25 NOTE — TELEPHONE ENCOUNTER
RN s/w pt and advised of same  Pt states that she spoke to "quite a few people" yesterday and was told that AS needed to go on to a worker comp website to complete a letter of necessity for her to be able to continue with the [place] [address] ER  She does not have the information at hand but will CB soon with info

## 2021-05-28 ENCOUNTER — TELEPHONE (OUTPATIENT)
Dept: PAIN MEDICINE | Facility: CLINIC | Age: 61
End: 2021-05-28

## 2021-06-08 DIAGNOSIS — M54.9 MID BACK PAIN ON LEFT SIDE: ICD-10-CM

## 2021-06-09 RX ORDER — BACLOFEN 10 MG/1
TABLET ORAL
Qty: 30 TABLET | Refills: 0 | Status: SHIPPED | OUTPATIENT
Start: 2021-06-09 | End: 2021-07-15

## 2021-06-17 ENCOUNTER — HOSPITAL ENCOUNTER (OUTPATIENT)
Dept: RADIOLOGY | Facility: HOSPITAL | Age: 61
Discharge: HOME | End: 2021-06-17
Payer: COMMERCIAL

## 2021-06-17 VITALS — HEIGHT: 61 IN | BODY MASS INDEX: 45.31 KG/M2 | WEIGHT: 240 LBS

## 2021-06-17 DIAGNOSIS — R92.8 ABNORMAL MAMMOGRAM: ICD-10-CM

## 2021-06-17 PROCEDURE — 76642 ULTRASOUND BREAST LIMITED: CPT

## 2021-06-17 PROCEDURE — 77065 DX MAMMO INCL CAD UNI: CPT

## 2021-06-17 PROCEDURE — G0279 TOMOSYNTHESIS, MAMMO: HCPCS

## 2021-06-21 ENCOUNTER — OFFICE VISIT (OUTPATIENT)
Dept: ENDOCRINOLOGY | Facility: CLINIC | Age: 61
End: 2021-06-21
Payer: COMMERCIAL

## 2021-06-21 VITALS
DIASTOLIC BLOOD PRESSURE: 88 MMHG | HEART RATE: 95 BPM | WEIGHT: 238.1 LBS | SYSTOLIC BLOOD PRESSURE: 130 MMHG | TEMPERATURE: 98.6 F | BODY MASS INDEX: 44.95 KG/M2 | HEIGHT: 61 IN

## 2021-06-21 DIAGNOSIS — E66.01 OBESITY, CLASS III, BMI 40-49.9 (MORBID OBESITY) (HCC): ICD-10-CM

## 2021-06-21 DIAGNOSIS — E11.65 TYPE 2 DIABETES MELLITUS WITH HYPERGLYCEMIA, WITH LONG-TERM CURRENT USE OF INSULIN (HCC): Primary | ICD-10-CM

## 2021-06-21 DIAGNOSIS — I10 HYPERTENSION GOAL BP (BLOOD PRESSURE) < 140/90: ICD-10-CM

## 2021-06-21 DIAGNOSIS — Z79.4 TYPE 2 DIABETES MELLITUS WITH HYPERGLYCEMIA, WITH LONG-TERM CURRENT USE OF INSULIN (HCC): Primary | ICD-10-CM

## 2021-06-21 DIAGNOSIS — E78.2 MIXED HYPERLIPIDEMIA: ICD-10-CM

## 2021-06-21 PROCEDURE — 99214 OFFICE O/P EST MOD 30 MIN: CPT | Performed by: INTERNAL MEDICINE
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
## 2021-06-21 NOTE — PATIENT INSTRUCTIONS
Increase basaglar to 74 units at bedtime  Increase admelog to 18 units before meals     Send me blood sugar log monthly    Have labs done in 2 months    Follow up in 2 months

## 2021-06-23 ENCOUNTER — TELEPHONE (OUTPATIENT)
Dept: PAIN MEDICINE | Facility: CLINIC | Age: 61
End: 2021-06-23

## 2021-06-23 NOTE — TELEPHONE ENCOUNTER
RN s/w pt regarding previous  Per pt her medications are covered per W/C and when she called them they told her that [employer] has to fill out a Letter of necessity before they will let the med be filled      RN told pt that she will look into the site and will get back to her tomorrow after AS aware  Pharmacy gave workers comp ph# as [phone]  And pt gave the number she calls at [employer] as [phone]  Case # [id number]

## 2021-06-23 NOTE — TELEPHONE ENCOUNTER
Med refill   Name of medication:Tapentadol HCl ER (Nucynta ER) 50 MG TB12       Frequency:Take 1 tablet (50 mg total) by mouth every 12 (twelve) hoursMax Daily Amount: 100 mg    How many tablets left:0    Pharmacy: [facility], [facility] call back # [phone]     Patient unable to fill this script she needs physician to go on the website to fill out a letter of authenticity form [place]    Website: [email or web address]

## 2021-06-24 NOTE — TELEPHONE ENCOUNTER
Are you able to help with this authorization? The website  [email or web address] was obtained from [facility]  and the patient in order to access the form that is needed to be filled out by [employer] in order for this pt to receive her Nucynta  Unfortunately,  the site has not been able to be accessed in order to print the form that needs to be filled out  Please advise

## 2021-06-24 NOTE — TELEPHONE ENCOUNTER
Could you help with this? Patient arrived ambulatory to ED, awake alert, and oriented times 3, breathing unlabored.  Patient complaining of bilateral lower abdominal pain.  patient was seen yesterday in urology office and needing a catheter due to stricture as per patient, but catheter was unable to be placed due to not having equipment.   dribbling urine as per patient.

## 2021-06-24 NOTE — TELEPHONE ENCOUNTER
RN s/w [employer]  This case falls under the [employer] of [employer] and pt will need a letter of necessity for this medication   They require the physician to go to the website to fill out a letter of authenticity form [place]      Website: [email or web address]    Please advise

## 2021-06-24 NOTE — TELEPHONE ENCOUNTER
I have never filled this out before  What do I need to do? Can you send it to [place] and she can print it for me?

## 2021-07-05 DIAGNOSIS — E78.01 FAMILIAL HYPERCHOLESTEROLEMIA: ICD-10-CM

## 2021-07-05 RX ORDER — ATORVASTATIN CALCIUM 80 MG/1
TABLET, FILM COATED ORAL
Qty: 30 TABLET | Refills: 3 | Status: SHIPPED | OUTPATIENT
Start: 2021-07-05 | End: 2021-12-06

## 2021-07-09 ENCOUNTER — TELEPHONE (OUTPATIENT)
Dept: ENDOCRINOLOGY | Facility: CLINIC | Age: 61
End: 2021-07-09

## 2021-07-12 ENCOUNTER — TELEPHONE (OUTPATIENT)
Dept: FAMILY MEDICINE CLINIC | Facility: CLINIC | Age: 61
End: 2021-07-12

## 2021-07-12 NOTE — TELEPHONE ENCOUNTER
Dr Tricia Ferguson,    Patient is starting with a sinus/cold/bronchotis symptoms  Patient was wondering if you would want to see her or just call in a zpack, prednisone and cough syrup  Pharmacy is [facility] in [place]    Please advise

## 2021-07-13 ENCOUNTER — TELEPHONE (OUTPATIENT)
Dept: ENDOCRINOLOGY | Facility: CLINIC | Age: 61
End: 2021-07-13

## 2021-07-13 NOTE — TELEPHONE ENCOUNTER
Called [facility] [phone], spoke with Tommy Zuniga in surgery scheduling  She stated that surgery was cancelled until end of the month because pt stated she is not feeling well  I advised someone called me earlier this morning stating it was cancelled  Tommy Zuniga advised that from her notes it was cancelled because they received message from lab that glucose was low and also pt is not feeling well       Pt called and stated she has a bad cold and she cancelled the surgery until the end of the month and also wanted to know from Dr Marcelina Gaming if she should go to ER?

## 2021-07-13 NOTE — TELEPHONE ENCOUNTER
received a call from [facility] [phone] they advised that pt's surgery that is scheduled for Wednesday 7/15/21 will be cancelled, due to Hemoglobin value    Dr Leandro Bolivar wants her to go to ER  They have been trying to get in touch with pt to let her know  They have left pt message to call the office but pt has not returned their call  I asked to fax our office copy of lab results, we do not have then on file  I called and was able to speak to pt, let her know the above info and to call [employer]  She stated she will give them a call

## 2021-07-13 NOTE — TELEPHONE ENCOUNTER
I tried calling patient twice there was no     The blood sugar on the lab report was very low 7 -- unclear if this is a true value    Please ask patient what her Blood sugars have been last few days and if she has experienced any recent hypoglycemia    How much insulin did she also take the night before getting labs done?     Repeat CMP in 1 week (please order)

## 2021-07-13 NOTE — TELEPHONE ENCOUNTER
I spoke with patient and she could only give me this morning's sugar was 196  She said she is having no hypoglycemia  She took Victoza and metformin and did not eat anything all day  Labs were drawn around 1:30-2:00 at the doctor's office

## 2021-07-15 ENCOUNTER — OFFICE VISIT (OUTPATIENT)
Dept: CARDIOLOGY CLINIC | Facility: CLINIC | Age: 61
End: 2021-07-15
Payer: COMMERCIAL

## 2021-07-15 VITALS
TEMPERATURE: 97.6 F | DIASTOLIC BLOOD PRESSURE: 76 MMHG | OXYGEN SATURATION: 90 % | SYSTOLIC BLOOD PRESSURE: 120 MMHG | BODY MASS INDEX: 44.18 KG/M2 | WEIGHT: 234 LBS | HEIGHT: 61 IN | HEART RATE: 91 BPM

## 2021-07-15 DIAGNOSIS — I10 ESSENTIAL HYPERTENSION: ICD-10-CM

## 2021-07-15 DIAGNOSIS — E78.2 MIXED HYPERLIPIDEMIA: ICD-10-CM

## 2021-07-15 DIAGNOSIS — E11.65 TYPE 2 DIABETES MELLITUS WITH HYPERGLYCEMIA, WITH LONG-TERM CURRENT USE OF INSULIN (HCC): ICD-10-CM

## 2021-07-15 DIAGNOSIS — F17.200 NICOTINE DEPENDENCE, UNCOMPLICATED, UNSPECIFIED NICOTINE PRODUCT TYPE: ICD-10-CM

## 2021-07-15 DIAGNOSIS — Z79.4 TYPE 2 DIABETES MELLITUS WITH HYPERGLYCEMIA, WITH LONG-TERM CURRENT USE OF INSULIN (HCC): ICD-10-CM

## 2021-07-15 DIAGNOSIS — I25.119 CORONARY ARTERY DISEASE INVOLVING NATIVE CORONARY ARTERY OF NATIVE HEART WITH ANGINA PECTORIS (HCC): ICD-10-CM

## 2021-07-15 DIAGNOSIS — J43.2 CENTRILOBULAR EMPHYSEMA (HCC): ICD-10-CM

## 2021-07-15 DIAGNOSIS — J44.9 CHRONIC OBSTRUCTIVE PULMONARY DISEASE, UNSPECIFIED COPD TYPE (HCC): ICD-10-CM

## 2021-07-15 DIAGNOSIS — R06.02 SHORTNESS OF BREATH: ICD-10-CM

## 2021-07-15 PROCEDURE — 93000 ELECTROCARDIOGRAM COMPLETE: CPT | Performed by: INTERNAL MEDICINE

## 2021-07-15 PROCEDURE — 4004F PT TOBACCO SCREEN RCVD TLK: CPT | Performed by: INTERNAL MEDICINE

## 2021-07-15 PROCEDURE — 3078F DIAST BP <80 MM HG: CPT | Performed by: INTERNAL MEDICINE

## 2021-07-15 PROCEDURE — 99214 OFFICE O/P EST MOD 30 MIN: CPT | Performed by: INTERNAL MEDICINE

## 2021-07-15 PROCEDURE — 3008F BODY MASS INDEX DOCD: CPT | Performed by: INTERNAL MEDICINE

## 2021-07-15 PROCEDURE — 3074F SYST BP LT 130 MM HG: CPT | Performed by: INTERNAL MEDICINE

## 2021-07-15 PROCEDURE — 4010F ACE/ARB THERAPY RXD/TAKEN: CPT | Performed by: INTERNAL MEDICINE

## 2021-07-15 RX ORDER — LISINOPRIL 2.5 MG/1
2.5 TABLET ORAL DAILY
Qty: 30 TABLET | Refills: 4 | Status: SHIPPED | OUTPATIENT
Start: 2021-07-15 | End: 2021-12-05
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
## 2021-08-04 ENCOUNTER — TELEPHONE (OUTPATIENT)
Dept: ENDOCRINOLOGY | Facility: CLINIC | Age: 61
End: 2021-08-04

## 2021-08-04 ENCOUNTER — TELEPHONE (OUTPATIENT)
Dept: PAIN MEDICINE | Facility: CLINIC | Age: 61
End: 2021-08-04

## 2021-08-04 NOTE — TELEPHONE ENCOUNTER
Patient would like to speak to a nurse regarding her medication status  Last she spoke her medications required prior authorization & that was being worked on in June   Please advise, rosanne    Call back# [phone]

## 2021-08-04 NOTE — TELEPHONE ENCOUNTER
Please see task from 6/23/21, this pt is WC  Pt needed a letter of necessity filled out by the physician online to get her Vene [address] approved, was this ever completed?

## 2021-08-05 DIAGNOSIS — E11.8 TYPE 2 DIABETES MELLITUS WITH COMPLICATION, WITHOUT LONG-TERM CURRENT USE OF INSULIN (HCC): ICD-10-CM

## 2021-08-05 NOTE — TELEPHONE ENCOUNTER
I have not completed this  I went to the website mentioned in previous tasks, but I could not find any forms to fill out

## 2021-08-05 NOTE — TELEPHONE ENCOUNTER
Please inform patient that calcium improved to 10 2, creatinine is slightly elevated  She should keep herself hydrated     fasting blood sugar is elevated up to 140 mg per dL   rest of the electrolytes are within normal range     she should send the log for review  Lab Results   Component Value Date    HGBA1C 9 2 (H) 05/03/2021      she will need follow-up appointment 3 months      Germania Ivy MD

## 2021-08-06 ENCOUNTER — TELEPHONE (OUTPATIENT)
Dept: OBGYN CLINIC | Facility: CLINIC | Age: 61
End: 2021-08-06

## 2021-08-06 ENCOUNTER — OFFICE VISIT (OUTPATIENT)
Dept: OBGYN CLINIC | Facility: CLINIC | Age: 61
End: 2021-08-06
Payer: COMMERCIAL

## 2021-08-06 VITALS
HEIGHT: 61 IN | SYSTOLIC BLOOD PRESSURE: 124 MMHG | BODY MASS INDEX: 43.43 KG/M2 | WEIGHT: 230 LBS | HEART RATE: 90 BPM | DIASTOLIC BLOOD PRESSURE: 84 MMHG

## 2021-08-06 DIAGNOSIS — G89.29 CHRONIC PAIN OF RIGHT KNEE: ICD-10-CM

## 2021-08-06 DIAGNOSIS — M25.561 CHRONIC PAIN OF RIGHT KNEE: ICD-10-CM

## 2021-08-06 DIAGNOSIS — M17.11 PRIMARY OSTEOARTHRITIS OF RIGHT KNEE: Primary | ICD-10-CM

## 2021-08-06 DIAGNOSIS — M25.461 EFFUSION OF RIGHT KNEE: ICD-10-CM

## 2021-08-06 PROCEDURE — 3079F DIAST BP 80-89 MM HG: CPT | Performed by: ORTHOPAEDIC SURGERY

## 2021-08-06 PROCEDURE — 3008F BODY MASS INDEX DOCD: CPT | Performed by: ORTHOPAEDIC SURGERY

## 2021-08-06 PROCEDURE — 3074F SYST BP LT 130 MM HG: CPT | Performed by: ORTHOPAEDIC SURGERY

## 2021-08-06 PROCEDURE — 20610 DRAIN/INJ JOINT/BURSA W/O US: CPT | Performed by: ORTHOPAEDIC SURGERY

## 2021-08-06 PROCEDURE — 4004F PT TOBACCO SCREEN RCVD TLK: CPT | Performed by: ORTHOPAEDIC SURGERY

## 2021-08-06 PROCEDURE — 99214 OFFICE O/P EST MOD 30 MIN: CPT | Performed by: ORTHOPAEDIC SURGERY

## 2021-08-06 RX ORDER — BUPIVACAINE HYDROCHLORIDE 5 MG/ML
6 INJECTION, SOLUTION EPIDURAL; INTRACAUDAL
Status: COMPLETED | OUTPATIENT
Start: 2021-08-06 | End: 2021-08-06

## 2021-08-06 RX ORDER — TRIAMCINOLONE ACETONIDE 40 MG/ML
80 INJECTION, SUSPENSION INTRA-ARTICULAR; INTRAMUSCULAR
Status: COMPLETED | OUTPATIENT
Start: 2021-08-06 | End: 2021-08-06

## 2021-08-06 RX ADMIN — BUPIVACAINE HYDROCHLORIDE 6 ML: 5 INJECTION, SOLUTION EPIDURAL; INTRACAUDAL at 11:28

## 2021-08-06 RX ADMIN — TRIAMCINOLONE ACETONIDE 80 MG: 40 INJECTION, SUSPENSION INTRA-ARTICULAR; INTRAMUSCULAR at 11:28

## 2021-08-06 NOTE — TELEPHONE ENCOUNTER
S/w the patient to clarify and she stated that there was a " lull" in her scripts and after that point she could only receive scripts for 14 days worth of medication at a time  Since then a letter of medical necessity was supposed to be filled out and it was unable to be completed correct? The patient has no idea of the process or what is needed to be done  I encouraged her to follow up with her  and possible get the paperwork that is needed to be completed and faxed to [facility] to fill out  Fax number provided  She has been off of the medication since Paty
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
## 2021-08-06 NOTE — TELEPHONE ENCOUNTER
Pt here for appt today w/ Dr Mcelroy and was asking for follow up on status of medication. Pt also scheduled follow up appt, 8/30 @ 1115am. Pt can be reached at [phone].

## 2021-08-09 NOTE — TELEPHONE ENCOUNTER
GILDA    RN attempted to reach  using information provided below by patient-    [phone]  Case #[id number]  Ref # [id number]  RN attempted to reach     Per Oscar Goltz at [place] (3rd party representative for [employer]) and was advised that didn't pass provider security, they have a different name on file for our business other than [facility] but she could not disclose the name they have  RN was also advised claimant security was not passed due to wrong case number  Per Oscar Goltz case numbers have 9 digits  Pt was advised of all of above and advised to call [employer] to verify our practice name and her case number so that we can try to get her [address] authorized  Pt verbalized understanding

## 2021-08-13 NOTE — TELEPHONE ENCOUNTER
S/W pt  Pt called [employer] [phone] and press # 5  She talked to Remy Lowers  Pt stated she needs a letter of necessity  She stated they don't have [facility] and that you log on with username and password  [facility] for a provider search to see how [facility] is listed    Please assist

## 2021-08-13 NOTE — TELEPHONE ENCOUNTER
S/W pt  She stated she left a message twice and have not heard back  She will call [employer] again in a few minutes  She stated it is a form that needs to be filled out online for a prior auth  Pt will call back once she talks to them      Triage-please assist pt

## 2021-08-15 DIAGNOSIS — I10 HYPERTENSION GOAL BP (BLOOD PRESSURE) < 140/90: ICD-10-CM

## 2021-08-17 RX ORDER — HYDROCHLOROTHIAZIDE 25 MG/1
TABLET ORAL
Qty: 90 TABLET | Refills: 1 | Status: SHIPPED | OUTPATIENT
Start: 2021-08-17 | End: 2022-03-07

## 2021-08-26 NOTE — TELEPHONE ENCOUNTER
Shannon said we are not listed under there providers  We would have to submit paperwork which I have previously sent to see if they would accept us first and then accepted we would be able to submit the prior auth   Completing paperwork again since they said it was never received

## 2021-08-27 ENCOUNTER — RA CDI HCC (OUTPATIENT)
Dept: OTHER | Facility: HOSPITAL | Age: 61
End: 2021-08-27

## 2021-08-27 NOTE — PROGRESS NOTES
[facility]  coding opportunities       Chart reviewed, no opportunity found: CHART REVIEWED, NO OPPORTUNITY FOUND                        Patients insurance company: [employer] (Medicare and Commercial for [employer] and [employer])

## 2021-08-30 ENCOUNTER — OFFICE VISIT (OUTPATIENT)
Dept: PAIN MEDICINE | Facility: CLINIC | Age: 61
End: 2021-08-30
Payer: OTHER MISCELLANEOUS

## 2021-08-30 VITALS
HEART RATE: 89 BPM | HEIGHT: 61 IN | BODY MASS INDEX: 43.61 KG/M2 | WEIGHT: 231 LBS | SYSTOLIC BLOOD PRESSURE: 115 MMHG | DIASTOLIC BLOOD PRESSURE: 80 MMHG

## 2021-08-30 DIAGNOSIS — M51.37 DISC DEGENERATION, LUMBOSACRAL: ICD-10-CM

## 2021-08-30 DIAGNOSIS — M96.1 POSTLAMINECTOMY SYNDROME, NOT ELSEWHERE CLASSIFIED: ICD-10-CM

## 2021-08-30 DIAGNOSIS — M54.16 LUMBAR RADICULOPATHY: ICD-10-CM

## 2021-08-30 DIAGNOSIS — M43.16 SPONDYLOLISTHESIS OF LUMBAR REGION: ICD-10-CM

## 2021-08-30 DIAGNOSIS — G89.4 CHRONIC PAIN SYNDROME: Primary | ICD-10-CM

## 2021-08-30 PROCEDURE — 3008F BODY MASS INDEX DOCD: CPT | Performed by: ORTHOPAEDIC SURGERY

## 2021-08-30 PROCEDURE — 99214 OFFICE O/P EST MOD 30 MIN: CPT | Performed by: ANESTHESIOLOGY

## 2021-08-30 RX ORDER — TAPENTADOL HYDROCHLORIDE 50 MG/1
50 TABLET, FILM COATED, EXTENDED RELEASE ORAL EVERY 12 HOURS
Qty: 60 TABLET | Refills: 0 | Status: SHIPPED | OUTPATIENT
Start: 2021-08-30 | End: 2021-09-21 | Stop reason: HOSPADM
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
## 2021-08-30 NOTE — TELEPHONE ENCOUNTER
Patient was in for her appointment today and was asking for a status on her medication  She states she spoke with someone at [employer] who said that to access the forms needed you must use [employer]  I explained to her that [employer] has been working on it and that ppw was submitted to [employer] to add as a provider  She would like a call back with a status please    [phone]

## 2021-08-31 ENCOUNTER — TELEPHONE (OUTPATIENT)
Dept: PAIN MEDICINE | Facility: CLINIC | Age: 61
End: 2021-08-31

## 2021-08-31 DIAGNOSIS — M51.37 DISC DEGENERATION, LUMBOSACRAL: ICD-10-CM

## 2021-08-31 DIAGNOSIS — M43.16 SPONDYLOLISTHESIS OF LUMBAR REGION: Chronic | ICD-10-CM

## 2021-08-31 DIAGNOSIS — G89.4 CHRONIC PAIN SYNDROME: Primary | ICD-10-CM

## 2021-08-31 DIAGNOSIS — M54.16 LUMBAR RADICULOPATHY: ICD-10-CM

## 2021-08-31 DIAGNOSIS — M96.1 POSTLAMINECTOMY SYNDROME, LUMBAR REGION: ICD-10-CM

## 2021-08-31 NOTE — TELEPHONE ENCOUNTER
Pharmacy calling for Prior Auth     Tapentadol HCl ER (Nucynta ER) 50 MG TB12     RX BIN [id number]  [phone]

## 2021-09-01 DIAGNOSIS — E11.65 TYPE 2 DIABETES MELLITUS WITH HYPERGLYCEMIA, WITHOUT LONG-TERM CURRENT USE OF INSULIN (HCC): Primary | ICD-10-CM

## 2021-09-01 RX ORDER — LANCETS 30 GAUGE
EACH MISCELLANEOUS
Qty: 100 EACH | Refills: 3 | Status: SHIPPED | OUTPATIENT
Start: 2021-09-01

## 2021-09-02 DIAGNOSIS — J31.0 RHINITIS, UNSPECIFIED TYPE: ICD-10-CM

## 2021-09-02 DIAGNOSIS — J44.9 CHRONIC OBSTRUCTIVE PULMONARY DISEASE, UNSPECIFIED COPD TYPE (HCC): ICD-10-CM

## 2021-09-02 RX ORDER — FLUTICASONE PROPIONATE 50 MCG
SPRAY, SUSPENSION (ML) NASAL
Qty: 16 ML | Refills: 3 | Status: SHIPPED | OUTPATIENT
Start: 2021-09-02 | End: 2022-01-13

## 2021-09-02 RX ORDER — UMECLIDINIUM 62.5 UG/1
AEROSOL, POWDER ORAL
Qty: 30 BLISTER | Refills: 3 | Status: SHIPPED | OUTPATIENT
Start: 2021-09-02 | End: 2022-01-13

## 2021-09-02 NOTE — TELEPHONE ENCOUNTER
I spoke with the patients insurance  They said a letter of medical necessity needs to be sent in order for the medication to be approved

## 2021-09-08 ENCOUNTER — OFFICE VISIT (OUTPATIENT)
Dept: FAMILY MEDICINE CLINIC | Facility: CLINIC | Age: 61
End: 2021-09-08
Payer: COMMERCIAL

## 2021-09-08 VITALS
HEIGHT: 61 IN | RESPIRATION RATE: 16 BRPM | OXYGEN SATURATION: 97 % | WEIGHT: 232 LBS | BODY MASS INDEX: 43.8 KG/M2 | SYSTOLIC BLOOD PRESSURE: 122 MMHG | HEART RATE: 80 BPM | DIASTOLIC BLOOD PRESSURE: 86 MMHG | TEMPERATURE: 98.2 F

## 2021-09-08 DIAGNOSIS — E11.65 TYPE 2 DIABETES MELLITUS WITH HYPERGLYCEMIA, WITH LONG-TERM CURRENT USE OF INSULIN (HCC): ICD-10-CM

## 2021-09-08 DIAGNOSIS — E66.01 OBESITY, CLASS III, BMI 40-49.9 (MORBID OBESITY) (HCC): ICD-10-CM

## 2021-09-08 DIAGNOSIS — I10 ESSENTIAL HYPERTENSION: ICD-10-CM

## 2021-09-08 DIAGNOSIS — F17.200 TOBACCO DEPENDENCE: ICD-10-CM

## 2021-09-08 DIAGNOSIS — Z79.4 TYPE 2 DIABETES MELLITUS WITH HYPERGLYCEMIA, WITH LONG-TERM CURRENT USE OF INSULIN (HCC): ICD-10-CM

## 2021-09-08 DIAGNOSIS — Z00.00 PHYSICAL EXAM: Primary | ICD-10-CM

## 2021-09-08 LAB
SL AMB  POCT GLUCOSE, UA: ABNORMAL
SL AMB LEUKOCYTE ESTERASE,UA: ABNORMAL
SL AMB POCT BILIRUBIN,UA: ABNORMAL
SL AMB POCT BLOOD,UA: ABNORMAL
SL AMB POCT CLARITY,UA: CLEAR
SL AMB POCT COLOR,UA: YELLOW
SL AMB POCT HEMOGLOBIN AIC: 7.5 (ref ?–6.5)
SL AMB POCT KETONES,UA: ABNORMAL
SL AMB POCT NITRITE,UA: ABNORMAL
SL AMB POCT PH,UA: 5
SL AMB POCT SPECIFIC GRAVITY,UA: 1.01
SL AMB POCT URINE PROTEIN: ABNORMAL
SL AMB POCT UROBILINOGEN: ABNORMAL

## 2021-09-08 PROCEDURE — 99396 PREV VISIT EST AGE 40-64: CPT | Performed by: FAMILY MEDICINE

## 2021-09-08 PROCEDURE — 83036 HEMOGLOBIN GLYCOSYLATED A1C: CPT | Performed by: FAMILY MEDICINE

## 2021-09-08 PROCEDURE — 3051F HG A1C>EQUAL 7.0%<8.0%: CPT | Performed by: FAMILY MEDICINE

## 2021-09-08 PROCEDURE — 81003 URINALYSIS AUTO W/O SCOPE: CPT | Performed by: FAMILY MEDICINE
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
## 2021-09-08 NOTE — ASSESSMENT & PLAN NOTE
Lab Results   Component Value Date    HGBA1C 7 5 (A) 09/08/2021   much improved from 9 2% in May 2021    Urine MA sent  sched endoc follow-up--forward blood sugar logs and hga1c to specialist  sched eye exam

## 2021-09-08 NOTE — TELEPHONE ENCOUNTER
Pt called in stating for the status of the letter of medical necessity in order for her to receive the Tapentadol HCl ER (Nucynta ER) 50 MG TB12         pt has no medication and is in a lot of pain        [phone]

## 2021-09-09 LAB
ALBUMIN/CREAT UR: 166 MG/G CREAT (ref 0–29)
CREAT UR-MCNC: 50.6 MG/DL
MICROALBUMIN UR-MCNC: 84.2 UG/ML

## 2021-09-09 PROCEDURE — 3060F POS MICROALBUMINURIA REV: CPT | Performed by: FAMILY MEDICINE

## 2021-09-15 NOTE — TELEPHONE ENCOUNTER
Suhas Campos,  Please let me know where I can send/fax this letter    I will need a "send to" name to include in the letter and a Case#  I believe the Member ID is already listed below  Thank you

## 2021-09-16 NOTE — TELEPHONE ENCOUNTER
On the website it states if a medication is not covered to mail letter of medical necessity to:      S  [employer], [address], [address] [facility], [facility]

## 2021-09-16 NOTE — TELEPHONE ENCOUNTER
Mailed letter to below address  There is no case number available  Included RX BIN  Copied letter to scan into chart

## 2021-09-21 NOTE — TELEPHONE ENCOUNTER
Pt called stating that she wants to send to this to her personal insurance instead   Pt is asking that the prior Auth be sent to [employer]       Pt can be reached at [phone]

## 2021-09-23 NOTE — TELEPHONE ENCOUNTER
Auth was denied   Submitting more information  Erythromycin Counseling:  I discussed with the patient the risks of erythromycin including but not limited to GI upset, allergic reaction, drug rash, diarrhea, increase in liver enzymes, and yeast infections. Tazorac Counseling:  Patient advised that medication is irritating and drying.  Patient may need to apply sparingly and wash off after an hour before eventually leaving it on overnight.  The patient verbalized understanding of the proper use and possible adverse effects of tazorac.  All of the patient's questions and concerns were addressed. High Dose Vitamin A Counseling: Side effects reviewed, pt to contact office should one occur. Sarecycline Pregnancy And Lactation Text: This medication is Pregnancy Category D and not consider safe during pregnancy. It is also excreted in breast milk. Topical Sulfur Applications Counseling: Topical Sulfur Counseling: Patient counseled that this medication may cause skin irritation or allergic reactions.  In the event of skin irritation, the patient was advised to reduce the amount of the drug applied or use it less frequently.   The patient verbalized understanding of the proper use and possible adverse effects of topical sulfur application.  All of the patient's questions and concerns were addressed. Birth Control Pills Pregnancy And Lactation Text: This medication should be avoided if pregnant and for the first 30 days post-partum. Doxycycline Pregnancy And Lactation Text: This medication is Pregnancy Category D and not consider safe during pregnancy. It is also excreted in breast milk but is considered safe for shorter treatment courses. Azithromycin Counseling:  I discussed with the patient the risks of azithromycin including but not limited to GI upset, allergic reaction, drug rash, diarrhea, and yeast infections. Bactrim Counseling:  I discussed with the patient the risks of sulfa antibiotics including but not limited to GI upset, allergic reaction, drug rash, diarrhea, dizziness, photosensitivity, and yeast infections.  Rarely, more serious reactions can occur including but not limited to aplastic anemia, agranulocytosis, methemoglobinemia, blood dyscrasias, liver or kidney failure, lung infiltrates or desquamative/blistering drug rashes. Dapsone Counseling: I discussed with the patient the risks of dapsone including but not limited to hemolytic anemia, agranulocytosis, rashes, methemoglobinemia, kidney failure, peripheral neuropathy, headaches, GI upset, and liver toxicity.  Patients who start dapsone require monitoring including baseline LFTs and weekly CBCs for the first month, then every month thereafter.  The patient verbalized understanding of the proper use and possible adverse effects of dapsone.  All of the patient's questions and concerns were addressed. Spironolactone Counseling: Patient advised regarding risks of diarrhea, abdominal pain, hyperkalemia, birth defects (for female patients), liver toxicity and renal toxicity. The patient may need blood work to monitor liver and kidney function and potassium levels while on therapy. The patient verbalized understanding of the proper use and possible adverse effects of spironolactone.  All of the patient's questions and concerns were addressed. Erythromycin Pregnancy And Lactation Text: This medication is Pregnancy Category B and is considered safe during pregnancy. It is also excreted in breast milk. Benzoyl Peroxide Counseling: Patient counseled that medicine may cause skin irritation and bleach clothing.  In the event of skin irritation, the patient was advised to reduce the amount of the drug applied or use it less frequently.   The patient verbalized understanding of the proper use and possible adverse effects of benzoyl peroxide.  All of the patient's questions and concerns were addressed. High Dose Vitamin A Pregnancy And Lactation Text: High dose vitamin A therapy is contraindicated during pregnancy and breast feeding. Tazorac Pregnancy And Lactation Text: This medication is not safe during pregnancy. It is unknown if this medication is excreted in breast milk. Azithromycin Pregnancy And Lactation Text: This medication is considered safe during pregnancy and is also secreted in breast milk. Topical Sulfur Applications Pregnancy And Lactation Text: This medication is Pregnancy Category C and has an unknown safety profile during pregnancy. It is unknown if this topical medication is excreted in breast milk. Include Pregnancy/Lactation Warning?: No Topical Retinoid counseling:  Patient advised to apply a pea-sized amount only at bedtime and wait 30 minutes after washing their face before applying.  If too drying, patient may add a non-comedogenic moisturizer. The patient verbalized understanding of the proper use and possible adverse effects of retinoids.  All of the patient's questions and concerns were addressed. Isotretinoin Counseling: Patient should get monthly blood tests, not donate blood, not drive at night if vision affected, not share medication, and not undergo elective surgery for 6 months after tx completed. Side effects reviewed, pt to contact office should one occur. Bactrim Pregnancy And Lactation Text: This medication is Pregnancy Category D and is known to cause fetal risk.  It is also excreted in breast milk. Minocycline Counseling: Patient advised regarding possible photosensitivity and discoloration of the teeth, skin, lips, tongue and gums.  Patient instructed to avoid sunlight, if possible.  When exposed to sunlight, patients should wear protective clothing, sunglasses, and sunscreen.  The patient was instructed to call the office immediately if the following severe adverse effects occur:  hearing changes, easy bruising/bleeding, severe headache, or vision changes.  The patient verbalized understanding of the proper use and possible adverse effects of minocycline.  All of the patient's questions and concerns were addressed. Detail Level: Detailed Spironolactone Pregnancy And Lactation Text: This medication can cause feminization of the male fetus and should be avoided during pregnancy. The active metabolite is also found in breast milk. Dapsone Pregnancy And Lactation Text: This medication is Pregnancy Category C and is not considered safe during pregnancy or breast feeding. Benzoyl Peroxide Pregnancy And Lactation Text: This medication is Pregnancy Category C. It is unknown if benzoyl peroxide is excreted in breast milk. Topical Clindamycin Counseling: Patient counseled that this medication may cause skin irritation or allergic reactions.  In the event of skin irritation, the patient was advised to reduce the amount of the drug applied or use it less frequently.   The patient verbalized understanding of the proper use and possible adverse effects of clindamycin.  All of the patient's questions and concerns were addressed. Topical Retinoid Pregnancy And Lactation Text: This medication is Pregnancy Category C. It is unknown if this medication is excreted in breast milk. Sarecycline Counseling: Patient advised regarding possible photosensitivity and discoloration of the teeth, skin, lips, tongue and gums.  Patient instructed to avoid sunlight, if possible.  When exposed to sunlight, patients should wear protective clothing, sunglasses, and sunscreen.  The patient was instructed to call the office immediately if the following severe adverse effects occur:  hearing changes, easy bruising/bleeding, severe headache, or vision changes.  The patient verbalized understanding of the proper use and possible adverse effects of sarecycline.  All of the patient's questions and concerns were addressed. Birth Control Pills Counseling: Birth Control Pill Counseling: I discussed with the patient the potential side effects of OCPs including but not limited to increased risk of stroke, heart attack, thrombophlebitis, deep venous thrombosis, hepatic adenomas, breast changes, GI upset, headaches, and depression.  The patient verbalized understanding of the proper use and possible adverse effects of OCPs. All of the patient's questions and concerns were addressed. Isotretinoin Pregnancy And Lactation Text: This medication is Pregnancy Category X and is considered extremely dangerous during pregnancy. It is unknown if it is excreted in breast milk. Topical Clindamycin Pregnancy And Lactation Text: This medication is Pregnancy Category B and is considered safe during pregnancy. It is unknown if it is excreted in breast milk. Doxycycline Counseling:  Patient counseled regarding possible photosensitivity and increased risk for sunburn.  Patient instructed to avoid sunlight, if possible.  When exposed to sunlight, patients should wear protective clothing, sunglasses, and sunscreen.  The patient was instructed to call the office immediately if the following severe adverse effects occur:  hearing changes, easy bruising/bleeding, severe headache, or vision changes.  The patient verbalized understanding of the proper use and possible adverse effects of doxycycline.  All of the patient's questions and concerns were addressed. Tetracycline Counseling: Patient counseled regarding possible photosensitivity and increased risk for sunburn.  Patient instructed to avoid sunlight, if possible.  When exposed to sunlight, patients should wear protective clothing, sunglasses, and sunscreen.  The patient was instructed to call the office immediately if the following severe adverse effects occur:  hearing changes, easy bruising/bleeding, severe headache, or vision changes.  The patient verbalized understanding of the proper use and possible adverse effects of tetracycline.  All of the patient's questions and concerns were addressed. Patient understands to avoid pregnancy while on therapy due to potential birth defects.

## 2021-09-24 ENCOUNTER — TELEPHONE (OUTPATIENT)
Dept: ENDOCRINOLOGY | Facility: CLINIC | Age: 61
End: 2021-09-24

## 2021-09-24 DIAGNOSIS — E78.2 MIXED HYPERLIPIDEMIA: ICD-10-CM

## 2021-09-24 DIAGNOSIS — Z79.4 TYPE 2 DIABETES MELLITUS WITH HYPERGLYCEMIA, WITH LONG-TERM CURRENT USE OF INSULIN (HCC): ICD-10-CM

## 2021-09-24 DIAGNOSIS — I10 HYPERTENSION GOAL BP (BLOOD PRESSURE) < 140/90: ICD-10-CM

## 2021-09-24 DIAGNOSIS — E11.65 TYPE 2 DIABETES MELLITUS WITH HYPERGLYCEMIA, WITH LONG-TERM CURRENT USE OF INSULIN (HCC): ICD-10-CM

## 2021-09-24 DIAGNOSIS — E66.01 OBESITY, CLASS III, BMI 40-49.9 (MORBID OBESITY) (HCC): ICD-10-CM

## 2021-09-24 PROBLEM — M96.1 POSTLAMINECTOMY SYNDROME, NOT ELSEWHERE CLASSIFIED: Status: RESOLVED | Noted: 2017-11-02 | Resolved: 2021-09-24

## 2021-09-24 NOTE — TELEPHONE ENCOUNTER
Dr Loly Laguna patient   Variable blood sugars which are likely related to diet   Has fasting hyperglycemia on most days   Recommend increasing Basaglar to 82 units at bedtime   Continue rest of current regimen

## 2021-09-24 NOTE — TELEPHONE ENCOUNTER
Prior Martha Rothman was approved through 9/22/2022   I spoke with [facility]   They need a new prescription sent they said they don't have a recent one on file  Informed pt

## 2021-09-28 ENCOUNTER — OFFICE VISIT (OUTPATIENT)
Dept: FAMILY MEDICINE CLINIC | Facility: CLINIC | Age: 61
End: 2021-09-28
Payer: COMMERCIAL

## 2021-09-28 VITALS
HEART RATE: 88 BPM | SYSTOLIC BLOOD PRESSURE: 140 MMHG | TEMPERATURE: 98.4 F | BODY MASS INDEX: 44.63 KG/M2 | DIASTOLIC BLOOD PRESSURE: 86 MMHG | OXYGEN SATURATION: 98 % | HEIGHT: 61 IN | WEIGHT: 236.4 LBS | RESPIRATION RATE: 16 BRPM

## 2021-09-28 DIAGNOSIS — Z79.4 TYPE 2 DIABETES MELLITUS WITH HYPERGLYCEMIA, WITH LONG-TERM CURRENT USE OF INSULIN (HCC): ICD-10-CM

## 2021-09-28 DIAGNOSIS — F17.200 TOBACCO DEPENDENCE: ICD-10-CM

## 2021-09-28 DIAGNOSIS — J44.1 CHRONIC OBSTRUCTIVE PULMONARY DISEASE WITH ACUTE EXACERBATION (HCC): Primary | ICD-10-CM

## 2021-09-28 DIAGNOSIS — I10 ESSENTIAL HYPERTENSION: ICD-10-CM

## 2021-09-28 DIAGNOSIS — E11.65 TYPE 2 DIABETES MELLITUS WITH HYPERGLYCEMIA, WITH LONG-TERM CURRENT USE OF INSULIN (HCC): ICD-10-CM

## 2021-09-28 PROCEDURE — 3077F SYST BP >= 140 MM HG: CPT | Performed by: FAMILY MEDICINE

## 2021-09-28 PROCEDURE — 3079F DIAST BP 80-89 MM HG: CPT | Performed by: FAMILY MEDICINE

## 2021-09-28 PROCEDURE — 3008F BODY MASS INDEX DOCD: CPT | Performed by: FAMILY MEDICINE

## 2021-09-28 PROCEDURE — 4004F PT TOBACCO SCREEN RCVD TLK: CPT | Performed by: FAMILY MEDICINE

## 2021-09-28 PROCEDURE — 99214 OFFICE O/P EST MOD 30 MIN: CPT | Performed by: FAMILY MEDICINE

## 2021-09-28 RX ORDER — AZITHROMYCIN 250 MG/1
TABLET, FILM COATED ORAL
Qty: 6 TABLET | Refills: 0 | Status: SHIPPED | OUTPATIENT
Start: 2021-09-28 | End: 2021-10-01

## 2021-09-28 RX ORDER — PREDNISONE 20 MG/1
40 TABLET ORAL DAILY
Qty: 10 TABLET | Refills: 0 | Status: SHIPPED | OUTPATIENT
Start: 2021-09-28 | End: 2021-10-03
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
## 2021-09-28 NOTE — LETTER
September 28, 2021     Patient: Danny Beltrán   YOB: 1960   Date of Visit: 9/28/2021       To Whom it May Concern:    Jose Benny is under my professional care  She was seen in my office on 9/28/2021  She may return to work on 9/30/21  If you have any questions or concerns, please don't hesitate to call           Sincerely,          Jacek Gonzalez MD        CC: No Recipients

## 2021-09-29 ENCOUNTER — TELEPHONE (OUTPATIENT)
Dept: FAMILY MEDICINE CLINIC | Facility: CLINIC | Age: 61
End: 2021-09-29

## 2021-09-29 LAB
SARS-COV-2 RNA SPEC QL NAA+PROBE: NOT DETECTED
SARS-COV-2, NAA 2 DAY TAT: NORMAL

## 2021-09-29 NOTE — TELEPHONE ENCOUNTER
----- Message from Stevie Olszewski, MD sent at 9/29/2021  1:12 PM EDT -----  Please advise patient that their COVID is negative

## 2021-09-29 NOTE — TELEPHONE ENCOUNTER
Spoke with patient and reviewed bg log and change to medication    She understood    Updated med list

## 2021-10-01 ENCOUNTER — TELEPHONE (OUTPATIENT)
Dept: FAMILY MEDICINE CLINIC | Facility: CLINIC | Age: 61
End: 2021-10-01

## 2021-10-03 DIAGNOSIS — E11.8 TYPE 2 DIABETES MELLITUS WITH COMPLICATION, WITHOUT LONG-TERM CURRENT USE OF INSULIN (HCC): ICD-10-CM

## 2021-10-04 RX ORDER — LIRAGLUTIDE 6 MG/ML
INJECTION SUBCUTANEOUS
Qty: 15 ML | Refills: 3 | Status: SHIPPED | OUTPATIENT
Start: 2021-10-04 | End: 2022-04-26

## 2021-10-04 RX ORDER — INSULIN GLARGINE 100 [IU]/ML
INJECTION, SOLUTION SUBCUTANEOUS
Qty: 60 ML | Refills: 1
Start: 2021-10-04 | End: 2022-03-08

## 2021-10-04 NOTE — ADDENDUM NOTE
Addended byPaulo Stapleton on: 10/4/2021 09:07 AM     Modules accepted: Orders no abdominal pain, no bloating, no constipation, no diarrhea, no nausea and no vomiting.

## 2021-10-08 ENCOUNTER — CLINICAL SUPPORT (OUTPATIENT)
Dept: FAMILY MEDICINE CLINIC | Facility: CLINIC | Age: 61
End: 2021-10-08
Payer: COMMERCIAL

## 2021-10-08 DIAGNOSIS — Z23 NEED FOR INFLUENZA VACCINATION: Primary | ICD-10-CM

## 2021-10-08 PROCEDURE — 90471 IMMUNIZATION ADMIN: CPT

## 2021-10-08 PROCEDURE — 90682 RIV4 VACC RECOMBINANT DNA IM: CPT

## 2021-10-13 ENCOUNTER — OFFICE VISIT (OUTPATIENT)
Dept: GASTROENTEROLOGY | Facility: CLINIC | Age: 61
End: 2021-10-13
Payer: COMMERCIAL

## 2021-10-13 VITALS
HEART RATE: 92 BPM | SYSTOLIC BLOOD PRESSURE: 124 MMHG | TEMPERATURE: 98 F | DIASTOLIC BLOOD PRESSURE: 79 MMHG | WEIGHT: 232.2 LBS | BODY MASS INDEX: 43.84 KG/M2 | HEIGHT: 61 IN

## 2021-10-13 DIAGNOSIS — Z86.010 HISTORY OF COLON POLYPS: ICD-10-CM

## 2021-10-13 DIAGNOSIS — R19.7 DIARRHEA, UNSPECIFIED TYPE: Primary | ICD-10-CM

## 2021-10-13 PROCEDURE — 3078F DIAST BP <80 MM HG: CPT | Performed by: PHYSICIAN ASSISTANT

## 2021-10-13 PROCEDURE — 4004F PT TOBACCO SCREEN RCVD TLK: CPT | Performed by: PHYSICIAN ASSISTANT

## 2021-10-13 PROCEDURE — 99214 OFFICE O/P EST MOD 30 MIN: CPT | Performed by: PHYSICIAN ASSISTANT

## 2021-10-13 PROCEDURE — 3074F SYST BP LT 130 MM HG: CPT | Performed by: PHYSICIAN ASSISTANT

## 2021-10-13 RX ORDER — DICYCLOMINE HYDROCHLORIDE 10 MG/1
10 CAPSULE ORAL 2 TIMES DAILY
Qty: 90 CAPSULE | Refills: 1 | Status: SHIPPED | OUTPATIENT
Start: 2021-10-13 | End: 2022-01-26

## 2021-10-23 DIAGNOSIS — Z79.4 TYPE 2 DIABETES MELLITUS WITH HYPERGLYCEMIA, WITH LONG-TERM CURRENT USE OF INSULIN (HCC): ICD-10-CM

## 2021-10-23 DIAGNOSIS — E11.65 TYPE 2 DIABETES MELLITUS WITH HYPERGLYCEMIA, WITH LONG-TERM CURRENT USE OF INSULIN (HCC): ICD-10-CM

## 2021-10-25 RX ORDER — BLOOD SUGAR DIAGNOSTIC
STRIP MISCELLANEOUS
Qty: 100 STRIP | Refills: 3 | Status: SHIPPED | OUTPATIENT
Start: 2021-10-25 | End: 2022-05-16

## 2021-10-28 ENCOUNTER — OFFICE VISIT (OUTPATIENT)
Dept: PAIN MEDICINE | Facility: CLINIC | Age: 61
End: 2021-10-28
Payer: OTHER MISCELLANEOUS

## 2021-10-28 VITALS
HEART RATE: 93 BPM | SYSTOLIC BLOOD PRESSURE: 127 MMHG | DIASTOLIC BLOOD PRESSURE: 84 MMHG | BODY MASS INDEX: 43.8 KG/M2 | HEIGHT: 61 IN | WEIGHT: 232 LBS

## 2021-10-28 DIAGNOSIS — G89.4 CHRONIC PAIN SYNDROME: Primary | ICD-10-CM

## 2021-10-28 DIAGNOSIS — M54.16 LUMBAR RADICULOPATHY: ICD-10-CM

## 2021-10-28 DIAGNOSIS — M96.1 POSTLAMINECTOMY SYNDROME, LUMBAR REGION: ICD-10-CM

## 2021-10-28 DIAGNOSIS — Z79.891 LONG-TERM CURRENT USE OF OPIATE ANALGESIC: ICD-10-CM

## 2021-10-28 DIAGNOSIS — M43.16 SPONDYLOLISTHESIS OF LUMBAR REGION: Chronic | ICD-10-CM

## 2021-10-28 DIAGNOSIS — M51.37 DISC DEGENERATION, LUMBOSACRAL: ICD-10-CM

## 2021-10-28 DIAGNOSIS — F11.20 UNCOMPLICATED OPIOID DEPENDENCE (HCC): ICD-10-CM

## 2021-10-28 PROCEDURE — 80305 DRUG TEST PRSMV DIR OPT OBS: CPT | Performed by: ANESTHESIOLOGY

## 2021-10-28 PROCEDURE — 3008F BODY MASS INDEX DOCD: CPT | Performed by: PHYSICIAN ASSISTANT

## 2021-10-28 PROCEDURE — 99214 OFFICE O/P EST MOD 30 MIN: CPT | Performed by: ANESTHESIOLOGY

## 2021-10-30 LAB
6MAM UR QL CFM: NEGATIVE NG/ML
7AMINOCLONAZEPAM UR QL CFM: NEGATIVE NG/ML
A-OH ALPRAZ UR QL CFM: NEGATIVE NG/ML
ACCEPTABLE CREAT UR QL: NORMAL MG/DL
ACCEPTIBLE SP GR UR QL: NORMAL
AMPHET UR QL CFM: NEGATIVE NG/ML
AMPHET UR QL CFM: NEGATIVE NG/ML
BUPRENORPHINE UR QL CFM: NEGATIVE NG/ML
BUTALBITAL UR QL CFM: NEGATIVE NG/ML
BZE UR QL CFM: NEGATIVE NG/ML
CODEINE UR QL CFM: NEGATIVE NG/ML
DESIPRAMINE UR QL CFM: NEGATIVE NG/ML
DESIPRAMINE UR QL CFM: NEGATIVE NG/ML
EDDP UR QL CFM: NEGATIVE NG/ML
ETHYL GLUCURONIDE UR QL CFM: NEGATIVE NG/ML
ETHYL SULFATE UR QL SCN: NEGATIVE NG/ML
FENTANYL UR QL CFM: NEGATIVE NG/ML
GLIADIN IGG SER IA-ACNC: NEGATIVE NG/ML
GLUCOSE 30M P 50 G LAC PO SERPL-MCNC: NEGATIVE NG/ML
HYDROCODONE UR QL CFM: NEGATIVE NG/ML
HYDROCODONE UR QL CFM: NEGATIVE NG/ML
HYDROMORPHONE UR QL CFM: NEGATIVE NG/ML
IMIPRAMINE UR QL CFM: NEGATIVE NG/ML
LORAZEPAM UR QL CFM: NEGATIVE NG/ML
MDMA UR QL CFM: NEGATIVE NG/ML
ME-PHENIDATE UR QL CFM: NEGATIVE NG/ML
MEPERIDINE UR QL CFM: NEGATIVE NG/ML
MEPHEDRONE UR QL CFM: NEGATIVE NG/ML
METHADONE UR QL CFM: NEGATIVE NG/ML
METHAMPHET UR QL CFM: NEGATIVE NG/ML
MORPHINE UR QL CFM: NEGATIVE NG/ML
MORPHINE UR QL CFM: NEGATIVE NG/ML
NITRITE UR QL: NORMAL UG/ML
NORBUPRENORPHINE UR QL CFM: NEGATIVE NG/ML
NORDIAZEPAM UR QL CFM: NEGATIVE NG/ML
NORFENTANYL UR QL CFM: NEGATIVE NG/ML
NORHYDROCODONE UR QL CFM: NEGATIVE NG/ML
NORHYDROCODONE UR QL CFM: NEGATIVE NG/ML
NORMEPERIDINE UR QL CFM: NEGATIVE NG/ML
NOROXYCODONE UR QL CFM: NEGATIVE NG/ML
OPC-3373 QUANTIFICATION: NEGATIVE
OXAZEPAM UR QL CFM: NEGATIVE NG/ML
OXYCODONE UR QL CFM: NEGATIVE NG/ML
OXYMORPHONE UR QL CFM: NEGATIVE NG/ML
OXYMORPHONE UR QL CFM: NEGATIVE NG/ML
PCP UR QL CFM: NEGATIVE NG/ML
PHENOBARB UR QL CFM: NEGATIVE NG/ML
RESULT ALL_PRESCRIBED MEDS AND SPECIAL INSTRUCTIONS: NORMAL
SECOBARBITAL UR QL CFM: NEGATIVE NG/ML
SL AMB 3-METHYL-FENTANYL QUANTIFICATION: NORMAL NG/ML
SL AMB 4-ANPP QUANTIFICATION: NORMAL NG/ML
SL AMB 4-FIBF QUANTIFICATION: NORMAL NG/ML
SL AMB 5F-ADB-M7 METABOLITE QUANTIFICATION: NEGATIVE NG/ML
SL AMB 7-OH-MITRAGYNINE (KRATOM ALKALOID) QUANTIFICATION: NEGATIVE NG/ML
SL AMB AB-FUBINACA-M3 METABOLITE QUANTIFICATION: NEGATIVE NG/ML
SL AMB ACETYL FENTANYL QUANTIFICATION: NORMAL NG/ML
SL AMB ACETYL NORFENTANYL QUANTIFICATION: NORMAL NG/ML
SL AMB ACRYL FENTANYL QUANTIFICATION: NORMAL NG/ML
SL AMB BATH SALTS: NEGATIVE NG/ML
SL AMB BUTRYL FENTANYL QUANTIFICATION: NORMAL NG/ML
SL AMB CARFENTANIL QUANTIFICATION: NORMAL NG/ML
SL AMB CLOZAPINE QUANTIFICATION: NEGATIVE NG/ML
SL AMB CTHC (MARIJUANA METABOLITE) QUANTIFICATION: NEGATIVE NG/ML
SL AMB CYCLOPROPYL FENTANYL QUANTIFICATION: NORMAL NG/ML
SL AMB DEXTROMETHORPHAN QUANTIFICATION: NEGATIVE NG/ML
SL AMB DEXTRORPHAN (DEXTROMETHORPHAN METABOLITE) QUANT: NEGATIVE NG/ML
SL AMB DEXTRORPHAN (DEXTROMETHORPHAN METABOLITE) QUANT: NEGATIVE NG/ML
SL AMB FURANYL FENTANYL QUANTIFICATION: NORMAL NG/ML
SL AMB HALOPERIDOL  QUANTIFICATION: NEGATIVE NG/ML
SL AMB HALOPERIDOL METABOLITE QUANTIFICATION: NEGATIVE NG/ML
SL AMB JWH018 METABOLITE QUANTIFICATION: NEGATIVE NG/ML
SL AMB JWH073 METABOLITE QUANTIFICATION: NEGATIVE NG/ML
SL AMB MDMB-FUBINACA-M1 METABOLITE QUANTIFICATION: NEGATIVE NG/ML
SL AMB METHOXYACETYL FENTANYL QUANTIFICATION: NORMAL NG/ML
SL AMB METHYLONE QUANTIFICATION: NEGATIVE NG/ML
SL AMB N-DESMETHYL U-47700 QUANTIFICATION: NORMAL NG/ML
SL AMB N-DESMETHYL-TRAMADOL QUANTIFICATION: NEGATIVE NG/ML
SL AMB N-DESMETHYLCLOZAPINE QUANTIFICATION: NEGATIVE NG/ML
SL AMB PHENTERMINE QUANTIFICATION: NEGATIVE NG/ML
SL AMB RCS4 METABOLITE QUANTIFICATION: NEGATIVE NG/ML
SL AMB RITALINIC ACID QUANTIFICATION: NEGATIVE NG/ML
SL AMB U-47700 QUANTIFICATION: NORMAL NG/ML
SPECIMEN DRAWN SERPL: NEGATIVE NG/ML
SPECIMEN PH ACCEPTABLE UR: NORMAL
TAPENTADOL UR QL CFM: NORMAL NG/ML
TEMAZEPAM UR QL CFM: NEGATIVE NG/ML
TEMAZEPAM UR QL CFM: NEGATIVE NG/ML
TRAMADOL UR QL CFM: NEGATIVE NG/ML
URATE/CREAT 24H UR: NEGATIVE NG/ML

## 2021-11-05 ENCOUNTER — VBI (OUTPATIENT)
Dept: ADMINISTRATIVE | Facility: OTHER | Age: 61
End: 2021-11-05

## 2021-11-29 DIAGNOSIS — G89.4 CHRONIC PAIN SYNDROME: ICD-10-CM

## 2021-11-29 RX ORDER — DULOXETIN HYDROCHLORIDE 60 MG/1
CAPSULE, DELAYED RELEASE ORAL
Qty: 30 CAPSULE | Refills: 3 | Status: SHIPPED | OUTPATIENT
Start: 2021-11-29 | End: 2022-03-07

## 2021-12-05 DIAGNOSIS — E11.65 TYPE 2 DIABETES MELLITUS WITH HYPERGLYCEMIA, WITH LONG-TERM CURRENT USE OF INSULIN (HCC): ICD-10-CM

## 2021-12-05 DIAGNOSIS — E78.01 FAMILIAL HYPERCHOLESTEROLEMIA: ICD-10-CM

## 2021-12-05 DIAGNOSIS — I10 ESSENTIAL HYPERTENSION: ICD-10-CM

## 2021-12-05 DIAGNOSIS — F17.209 NICOTINE DEPENDENCE WITH NICOTINE-INDUCED DISORDER, UNSPECIFIED NICOTINE PRODUCT TYPE: ICD-10-CM

## 2021-12-05 DIAGNOSIS — F34.1 DYSTHYMIA: ICD-10-CM

## 2021-12-05 DIAGNOSIS — Z79.4 TYPE 2 DIABETES MELLITUS WITH HYPERGLYCEMIA, WITH LONG-TERM CURRENT USE OF INSULIN (HCC): ICD-10-CM

## 2021-12-05 PROCEDURE — 4010F ACE/ARB THERAPY RXD/TAKEN: CPT | Performed by: FAMILY MEDICINE

## 2021-12-05 RX ORDER — LISINOPRIL 2.5 MG/1
TABLET ORAL
Qty: 30 TABLET | Refills: 4 | Status: SHIPPED | OUTPATIENT
Start: 2021-12-05 | End: 2022-04-08

## 2021-12-06 DIAGNOSIS — F32.A DEPRESSION, UNSPECIFIED DEPRESSION TYPE: ICD-10-CM

## 2021-12-06 RX ORDER — ATORVASTATIN CALCIUM 80 MG/1
TABLET, FILM COATED ORAL
Qty: 30 TABLET | Refills: 3 | Status: SHIPPED | OUTPATIENT
Start: 2021-12-06 | End: 2022-04-08

## 2021-12-06 RX ORDER — BUPROPION HYDROCHLORIDE 300 MG/1
TABLET ORAL
Qty: 30 TABLET | Refills: 11 | Status: SHIPPED | OUTPATIENT
Start: 2021-12-06

## 2021-12-06 RX ORDER — QUETIAPINE FUMARATE 25 MG/1
TABLET, FILM COATED ORAL
Qty: 60 TABLET | Refills: 5 | Status: SHIPPED | OUTPATIENT
Start: 2021-12-06 | End: 2022-06-15

## 2021-12-20 ENCOUNTER — OFFICE VISIT (OUTPATIENT)
Dept: PAIN MEDICINE | Facility: CLINIC | Age: 61
End: 2021-12-20
Payer: OTHER MISCELLANEOUS

## 2021-12-20 ENCOUNTER — TELEPHONE (OUTPATIENT)
Dept: PAIN MEDICINE | Facility: CLINIC | Age: 61
End: 2021-12-20

## 2021-12-20 VITALS
WEIGHT: 235 LBS | DIASTOLIC BLOOD PRESSURE: 79 MMHG | HEIGHT: 61 IN | SYSTOLIC BLOOD PRESSURE: 122 MMHG | BODY MASS INDEX: 44.37 KG/M2 | HEART RATE: 88 BPM

## 2021-12-20 DIAGNOSIS — M51.37 DISC DEGENERATION, LUMBOSACRAL: ICD-10-CM

## 2021-12-20 DIAGNOSIS — G89.4 CHRONIC PAIN SYNDROME: Primary | ICD-10-CM

## 2021-12-20 DIAGNOSIS — M54.16 LUMBAR RADICULOPATHY: ICD-10-CM

## 2021-12-20 DIAGNOSIS — M43.16 SPONDYLOLISTHESIS OF LUMBAR REGION: Chronic | ICD-10-CM

## 2021-12-20 DIAGNOSIS — M96.1 POSTLAMINECTOMY SYNDROME, LUMBAR REGION: ICD-10-CM

## 2021-12-20 PROCEDURE — 3008F BODY MASS INDEX DOCD: CPT | Performed by: FAMILY MEDICINE

## 2021-12-20 PROCEDURE — 99214 OFFICE O/P EST MOD 30 MIN: CPT | Performed by: ANESTHESIOLOGY

## 2021-12-23 ENCOUNTER — TELEMEDICINE (OUTPATIENT)
Dept: FAMILY MEDICINE CLINIC | Facility: CLINIC | Age: 61
End: 2021-12-23
Payer: COMMERCIAL

## 2021-12-23 DIAGNOSIS — B34.9 VIRAL INFECTION, UNSPECIFIED: ICD-10-CM

## 2021-12-23 DIAGNOSIS — J44.1 CHRONIC OBSTRUCTIVE PULMONARY DISEASE WITH ACUTE EXACERBATION (HCC): Primary | ICD-10-CM

## 2021-12-23 PROCEDURE — 99212 OFFICE O/P EST SF 10 MIN: CPT | Performed by: FAMILY MEDICINE

## 2021-12-23 PROCEDURE — 4004F PT TOBACCO SCREEN RCVD TLK: CPT | Performed by: FAMILY MEDICINE

## 2021-12-23 RX ORDER — BENZONATATE 200 MG/1
200 CAPSULE ORAL 3 TIMES DAILY PRN
Qty: 20 CAPSULE | Refills: 0 | Status: ON HOLD | OUTPATIENT
Start: 2021-12-23 | End: 2022-01-27

## 2021-12-23 RX ORDER — PREDNISONE 20 MG/1
40 TABLET ORAL DAILY
Qty: 10 TABLET | Refills: 0 | Status: SHIPPED | OUTPATIENT
Start: 2021-12-23 | End: 2021-12-28

## 2021-12-23 RX ORDER — AZITHROMYCIN 250 MG/1
TABLET, FILM COATED ORAL
Qty: 6 TABLET | Refills: 0 | Status: SHIPPED | OUTPATIENT
Start: 2021-12-23 | End: 2021-12-27

## 2022-01-01 ENCOUNTER — APPOINTMENT (INPATIENT)
Dept: RADIOLOGY | Facility: HOSPITAL | Age: 62
End: 2022-01-01

## 2022-01-01 ENCOUNTER — HOSPITAL ENCOUNTER (INPATIENT)
Facility: HOSPITAL | Age: 62
LOS: 16 days | End: 2022-11-02
Attending: EMERGENCY MEDICINE | Admitting: ANESTHESIOLOGY

## 2022-01-01 ENCOUNTER — APPOINTMENT (INPATIENT)
Dept: NON INVASIVE DIAGNOSTICS | Facility: HOSPITAL | Age: 62
End: 2022-01-01

## 2022-01-01 ENCOUNTER — APPOINTMENT (EMERGENCY)
Dept: RADIOLOGY | Facility: HOSPITAL | Age: 62
End: 2022-01-01

## 2022-01-01 ENCOUNTER — TELEPHONE (OUTPATIENT)
Dept: PAIN MEDICINE | Facility: CLINIC | Age: 62
End: 2022-01-01

## 2022-01-01 ENCOUNTER — APPOINTMENT (OUTPATIENT)
Dept: PERIOP | Facility: HOSPITAL | Age: 62
End: 2022-01-01
Attending: ANESTHESIOLOGY

## 2022-01-01 ENCOUNTER — APPOINTMENT (INPATIENT)
Dept: NEUROLOGY | Facility: HOSPITAL | Age: 62
End: 2022-01-01

## 2022-01-01 VITALS
DIASTOLIC BLOOD PRESSURE: 86 MMHG | BODY MASS INDEX: 42.77 KG/M2 | SYSTOLIC BLOOD PRESSURE: 176 MMHG | HEART RATE: 101 BPM | OXYGEN SATURATION: 95 % | HEIGHT: 63 IN | TEMPERATURE: 98.9 F | WEIGHT: 241.4 LBS | RESPIRATION RATE: 40 BRPM

## 2022-01-01 DIAGNOSIS — G92.8 TOXIC METABOLIC ENCEPHALOPATHY: ICD-10-CM

## 2022-01-01 DIAGNOSIS — J44.1 COPD WITH ACUTE EXACERBATION (HCC): Primary | ICD-10-CM

## 2022-01-01 DIAGNOSIS — J96.01 ACUTE RESPIRATORY FAILURE WITH HYPOXIA AND HYPERCAPNIA (HCC): ICD-10-CM

## 2022-01-01 DIAGNOSIS — J44.1 COPD EXACERBATION (HCC): Primary | ICD-10-CM

## 2022-01-01 DIAGNOSIS — I50.9 CHF (CONGESTIVE HEART FAILURE) (HCC): ICD-10-CM

## 2022-01-01 DIAGNOSIS — J96.02 ACUTE RESPIRATORY FAILURE WITH HYPOXIA AND HYPERCAPNIA (HCC): ICD-10-CM

## 2022-01-01 DIAGNOSIS — F17.209 NICOTINE DEPENDENCE WITH NICOTINE-INDUCED DISORDER, UNSPECIFIED NICOTINE PRODUCT TYPE: Primary | ICD-10-CM

## 2022-01-01 DIAGNOSIS — I46.9 CARDIAC ARREST (HCC): ICD-10-CM

## 2022-01-01 DIAGNOSIS — J18.9 PNEUMONIA: ICD-10-CM

## 2022-01-01 DIAGNOSIS — J96.90 RESPIRATORY FAILURE (HCC): ICD-10-CM

## 2022-01-01 LAB
2HR DELTA HS TROPONIN: 1 NG/L
4HR DELTA HS TROPONIN: 1 NG/L
ALBUMIN SERPL BCP-MCNC: 2.6 G/DL (ref 3.5–5)
ALBUMIN SERPL BCP-MCNC: 2.9 G/DL (ref 3.5–5)
ALP SERPL-CCNC: 90 U/L (ref 46–116)
ALP SERPL-CCNC: 97 U/L (ref 46–116)
ALT SERPL W P-5'-P-CCNC: 22 U/L (ref 12–78)
ALT SERPL W P-5'-P-CCNC: 50 U/L (ref 12–78)
ANION GAP SERPL CALCULATED.3IONS-SCNC: 1 MMOL/L (ref 4–13)
ANION GAP SERPL CALCULATED.3IONS-SCNC: 10 MMOL/L (ref 4–13)
ANION GAP SERPL CALCULATED.3IONS-SCNC: 10 MMOL/L (ref 4–13)
ANION GAP SERPL CALCULATED.3IONS-SCNC: 11 MMOL/L (ref 4–13)
ANION GAP SERPL CALCULATED.3IONS-SCNC: 2 MMOL/L (ref 4–13)
ANION GAP SERPL CALCULATED.3IONS-SCNC: 2 MMOL/L (ref 4–13)
ANION GAP SERPL CALCULATED.3IONS-SCNC: 4 MMOL/L (ref 4–13)
ANION GAP SERPL CALCULATED.3IONS-SCNC: 4 MMOL/L (ref 4–13)
ANION GAP SERPL CALCULATED.3IONS-SCNC: 5 MMOL/L (ref 4–13)
ANION GAP SERPL CALCULATED.3IONS-SCNC: 6 MMOL/L (ref 4–13)
ANION GAP SERPL CALCULATED.3IONS-SCNC: 7 MMOL/L (ref 4–13)
ANION GAP SERPL CALCULATED.3IONS-SCNC: 8 MMOL/L (ref 4–13)
ANION GAP SERPL CALCULATED.3IONS-SCNC: 9 MMOL/L (ref 4–13)
ANISOCYTOSIS BLD QL SMEAR: PRESENT
AORTIC ROOT: 2.6 CM
APICAL FOUR CHAMBER EJECTION FRACTION: 67 %
APTT PPP: 27 SECONDS (ref 23–37)
APTT PPP: 28 SECONDS (ref 23–37)
ARTERIAL PATENCY WRIST A: NO
ARTERIAL PATENCY WRIST A: YES
AST SERPL W P-5'-P-CCNC: 10 U/L (ref 5–45)
AST SERPL W P-5'-P-CCNC: 48 U/L (ref 5–45)
ATRIAL RATE: 105 BPM
ATRIAL RATE: 106 BPM
ATRIAL RATE: 130 BPM
ATRIAL RATE: 136 BPM
ATRIAL RATE: 84 BPM
ATRIAL RATE: 86 BPM
ATRIAL RATE: 88 BPM
ATRIAL RATE: 90 BPM
ATRIAL RATE: 91 BPM
BACTERIA BLD CULT: ABNORMAL
BACTERIA BLD CULT: NORMAL
BACTERIA SPT RESP CULT: ABNORMAL
BACTERIA SPT RESP CULT: ABNORMAL
BACTERIA SPT RESP CULT: NO GROWTH
BACTERIA SPT RESP CULT: NORMAL
BACTERIA UR QL AUTO: NORMAL /HPF
BASE EXCESS BLDA CALC-SCNC: -1 MMOL/L
BASE EXCESS BLDA CALC-SCNC: 1 MMOL/L
BASE EXCESS BLDA CALC-SCNC: 2.2 MMOL/L
BASE EXCESS BLDA CALC-SCNC: 2.9 MMOL/L
BASE EXCESS BLDA CALC-SCNC: 3.3 MMOL/L
BASE EXCESS BLDA CALC-SCNC: 3.4 MMOL/L
BASE EXCESS BLDA CALC-SCNC: 3.5 MMOL/L
BASE EXCESS BLDA CALC-SCNC: 3.7 MMOL/L
BASE EXCESS BLDA CALC-SCNC: 3.7 MMOL/L
BASE EXCESS BLDA CALC-SCNC: 3.8 MMOL/L
BASE EXCESS BLDA CALC-SCNC: 4.2 MMOL/L
BASE EXCESS BLDA CALC-SCNC: 4.3 MMOL/L
BASE EXCESS BLDA CALC-SCNC: 8 MMOL/L (ref -2–3)
BASOPHILS # BLD AUTO: 0.05 THOUSANDS/ÂΜL (ref 0–0.1)
BASOPHILS # BLD AUTO: 0.05 THOUSANDS/ÂΜL (ref 0–0.1)
BASOPHILS # BLD AUTO: 0.06 THOUSANDS/ÂΜL (ref 0–0.1)
BASOPHILS # BLD AUTO: 0.07 THOUSANDS/ÂΜL (ref 0–0.1)
BASOPHILS # BLD AUTO: 0.11 THOUSANDS/ÂΜL (ref 0–0.1)
BASOPHILS # BLD MANUAL: 0 THOUSAND/UL (ref 0–0.1)
BASOPHILS # BLD MANUAL: 0.31 THOUSAND/UL (ref 0–0.1)
BASOPHILS NFR BLD AUTO: 0 % (ref 0–1)
BASOPHILS NFR BLD AUTO: 1 % (ref 0–1)
BASOPHILS NFR MAR MANUAL: 0 % (ref 0–1)
BASOPHILS NFR MAR MANUAL: 2 % (ref 0–1)
BILIRUB SERPL-MCNC: 0.19 MG/DL (ref 0.2–1)
BILIRUB SERPL-MCNC: 0.56 MG/DL (ref 0.2–1)
BILIRUB UR QL STRIP: NEGATIVE
BODY TEMPERATURE: 96.4 DEGREES FEHRENHEIT
BODY TEMPERATURE: 96.6 DEGREES FEHRENHEIT
BODY TEMPERATURE: 97 DEGREES FEHRENHEIT
BODY TEMPERATURE: 97.2 DEGREES FEHRENHEIT
BODY TEMPERATURE: 97.7 DEGREES FEHRENHEIT
BODY TEMPERATURE: 97.8 DEGREES FEHRENHEIT
BODY TEMPERATURE: 98.9 DEGREES FEHRENHEIT
BODY TEMPERATURE: 99 DEGREES FEHRENHEIT
BODY TEMPERATURE: 99 DEGREES FEHRENHEIT
BODY TEMPERATURE: 99.5 DEGREES FEHRENHEIT
BODY TEMPERATURE: 99.5 DEGREES FEHRENHEIT
BODY TEMPERATURE: 99.7 DEGREES FEHRENHEIT
BUN SERPL-MCNC: 15 MG/DL (ref 5–25)
BUN SERPL-MCNC: 21 MG/DL (ref 5–25)
BUN SERPL-MCNC: 25 MG/DL (ref 5–25)
BUN SERPL-MCNC: 27 MG/DL (ref 5–25)
BUN SERPL-MCNC: 29 MG/DL (ref 5–25)
BUN SERPL-MCNC: 32 MG/DL (ref 5–25)
BUN SERPL-MCNC: 33 MG/DL (ref 5–25)
BUN SERPL-MCNC: 34 MG/DL (ref 5–25)
BUN SERPL-MCNC: 34 MG/DL (ref 5–25)
BUN SERPL-MCNC: 35 MG/DL (ref 5–25)
BUN SERPL-MCNC: 37 MG/DL (ref 5–25)
BUN SERPL-MCNC: 39 MG/DL (ref 5–25)
BUN SERPL-MCNC: 40 MG/DL (ref 5–25)
BUN SERPL-MCNC: 41 MG/DL (ref 5–25)
BUN SERPL-MCNC: 42 MG/DL (ref 5–25)
CA-I BLD-SCNC: 1.17 MMOL/L (ref 1.12–1.32)
CA-I BLD-SCNC: 1.24 MMOL/L (ref 1.12–1.32)
CA-I BLD-SCNC: 1.26 MMOL/L (ref 1.12–1.32)
CA-I BLD-SCNC: 1.26 MMOL/L (ref 1.12–1.32)
CALCIUM ALBUM COR SERPL-MCNC: 9.5 MG/DL (ref 8.3–10.1)
CALCIUM ALBUM COR SERPL-MCNC: 9.8 MG/DL (ref 8.3–10.1)
CALCIUM SERPL-MCNC: 8 MG/DL (ref 8.3–10.1)
CALCIUM SERPL-MCNC: 8.2 MG/DL (ref 8.3–10.1)
CALCIUM SERPL-MCNC: 8.3 MG/DL (ref 8.3–10.1)
CALCIUM SERPL-MCNC: 8.4 MG/DL (ref 8.3–10.1)
CALCIUM SERPL-MCNC: 8.6 MG/DL (ref 8.3–10.1)
CALCIUM SERPL-MCNC: 8.7 MG/DL (ref 8.3–10.1)
CALCIUM SERPL-MCNC: 8.7 MG/DL (ref 8.3–10.1)
CALCIUM SERPL-MCNC: 8.9 MG/DL (ref 8.3–10.1)
CALCIUM SERPL-MCNC: 8.9 MG/DL (ref 8.3–10.1)
CALCIUM SERPL-MCNC: 9 MG/DL (ref 8.3–10.1)
CALCIUM SERPL-MCNC: 9 MG/DL (ref 8.3–10.1)
CALCIUM SERPL-MCNC: 9.1 MG/DL (ref 8.3–10.1)
CARDIAC TROPONIN I PNL SERPL HS: 8 NG/L
CARDIAC TROPONIN I PNL SERPL HS: 9 NG/L
CARDIAC TROPONIN I PNL SERPL HS: 9 NG/L
CHLORIDE SERPL-SCNC: 100 MMOL/L (ref 96–108)
CHLORIDE SERPL-SCNC: 101 MMOL/L (ref 96–108)
CHLORIDE SERPL-SCNC: 102 MMOL/L (ref 96–108)
CHLORIDE SERPL-SCNC: 103 MMOL/L (ref 96–108)
CHLORIDE SERPL-SCNC: 103 MMOL/L (ref 96–108)
CHLORIDE SERPL-SCNC: 104 MMOL/L (ref 96–108)
CHLORIDE SERPL-SCNC: 105 MMOL/L (ref 96–108)
CHLORIDE SERPL-SCNC: 97 MMOL/L (ref 96–108)
CHLORIDE SERPL-SCNC: 97 MMOL/L (ref 96–108)
CHLORIDE SERPL-SCNC: 98 MMOL/L (ref 96–108)
CHLORIDE SERPL-SCNC: 99 MMOL/L (ref 96–108)
CLARITY UR: CLEAR
CO2 SERPL-SCNC: 25 MMOL/L (ref 21–32)
CO2 SERPL-SCNC: 27 MMOL/L (ref 21–32)
CO2 SERPL-SCNC: 27 MMOL/L (ref 21–32)
CO2 SERPL-SCNC: 28 MMOL/L (ref 21–32)
CO2 SERPL-SCNC: 29 MMOL/L (ref 21–32)
CO2 SERPL-SCNC: 31 MMOL/L (ref 21–32)
CO2 SERPL-SCNC: 32 MMOL/L (ref 21–32)
CO2 SERPL-SCNC: 33 MMOL/L (ref 21–32)
CO2 SERPL-SCNC: 33 MMOL/L (ref 21–32)
CO2 SERPL-SCNC: 34 MMOL/L (ref 21–32)
CO2 SERPL-SCNC: 34 MMOL/L (ref 21–32)
CO2 SERPL-SCNC: 37 MMOL/L (ref 21–32)
COLOR UR: ABNORMAL
CREAT SERPL-MCNC: 0.8 MG/DL (ref 0.6–1.3)
CREAT SERPL-MCNC: 0.83 MG/DL (ref 0.6–1.3)
CREAT SERPL-MCNC: 0.88 MG/DL (ref 0.6–1.3)
CREAT SERPL-MCNC: 0.95 MG/DL (ref 0.6–1.3)
CREAT SERPL-MCNC: 1 MG/DL (ref 0.6–1.3)
CREAT SERPL-MCNC: 1.01 MG/DL (ref 0.6–1.3)
CREAT SERPL-MCNC: 1.02 MG/DL (ref 0.6–1.3)
CREAT SERPL-MCNC: 1.06 MG/DL (ref 0.6–1.3)
CREAT SERPL-MCNC: 1.07 MG/DL (ref 0.6–1.3)
CREAT SERPL-MCNC: 1.08 MG/DL (ref 0.6–1.3)
CREAT SERPL-MCNC: 1.11 MG/DL (ref 0.6–1.3)
CREAT SERPL-MCNC: 1.12 MG/DL (ref 0.6–1.3)
CREAT SERPL-MCNC: 1.17 MG/DL (ref 0.6–1.3)
CREAT SERPL-MCNC: 1.21 MG/DL (ref 0.6–1.3)
CREAT SERPL-MCNC: 1.28 MG/DL (ref 0.6–1.3)
CREAT SERPL-MCNC: 1.37 MG/DL (ref 0.6–1.3)
CREAT SERPL-MCNC: 1.47 MG/DL (ref 0.6–1.3)
DACRYOCYTES BLD QL SMEAR: PRESENT
E WAVE DECELERATION TIME: 229 MS
EOSINOPHIL # BLD AUTO: 0 THOUSAND/ÂΜL (ref 0–0.61)
EOSINOPHIL # BLD AUTO: 0.01 THOUSAND/ÂΜL (ref 0–0.61)
EOSINOPHIL # BLD AUTO: 0.05 THOUSAND/ÂΜL (ref 0–0.61)
EOSINOPHIL # BLD AUTO: 0.05 THOUSAND/ÂΜL (ref 0–0.61)
EOSINOPHIL # BLD AUTO: 0.06 THOUSAND/ÂΜL (ref 0–0.61)
EOSINOPHIL # BLD AUTO: 0.1 THOUSAND/ÂΜL (ref 0–0.61)
EOSINOPHIL # BLD AUTO: 0.24 THOUSAND/ÂΜL (ref 0–0.61)
EOSINOPHIL # BLD MANUAL: 0 THOUSAND/UL (ref 0–0.4)
EOSINOPHIL # BLD MANUAL: 0.31 THOUSAND/UL (ref 0–0.4)
EOSINOPHIL NFR BLD AUTO: 0 % (ref 0–6)
EOSINOPHIL NFR BLD AUTO: 1 % (ref 0–6)
EOSINOPHIL NFR BLD AUTO: 2 % (ref 0–6)
EOSINOPHIL NFR BLD MANUAL: 0 % (ref 0–6)
EOSINOPHIL NFR BLD MANUAL: 2 % (ref 0–6)
ERYTHROCYTE [DISTWIDTH] IN BLOOD BY AUTOMATED COUNT: 13.3 % (ref 11.6–15.1)
ERYTHROCYTE [DISTWIDTH] IN BLOOD BY AUTOMATED COUNT: 13.5 % (ref 11.6–15.1)
ERYTHROCYTE [DISTWIDTH] IN BLOOD BY AUTOMATED COUNT: 13.6 % (ref 11.6–15.1)
ERYTHROCYTE [DISTWIDTH] IN BLOOD BY AUTOMATED COUNT: 13.6 % (ref 11.6–15.1)
ERYTHROCYTE [DISTWIDTH] IN BLOOD BY AUTOMATED COUNT: 13.7 % (ref 11.6–15.1)
ERYTHROCYTE [DISTWIDTH] IN BLOOD BY AUTOMATED COUNT: 13.7 % (ref 11.6–15.1)
ERYTHROCYTE [DISTWIDTH] IN BLOOD BY AUTOMATED COUNT: 13.8 % (ref 11.6–15.1)
ERYTHROCYTE [DISTWIDTH] IN BLOOD BY AUTOMATED COUNT: 13.9 % (ref 11.6–15.1)
ERYTHROCYTE [DISTWIDTH] IN BLOOD BY AUTOMATED COUNT: 13.9 % (ref 11.6–15.1)
ERYTHROCYTE [DISTWIDTH] IN BLOOD BY AUTOMATED COUNT: 14 % (ref 11.6–15.1)
EST. AVERAGE GLUCOSE BLD GHB EST-MCNC: 223 MG/DL
FIO2 GAS DIL.REBREATH: 100 L
FIO2 GAS DIL.REBREATH: 15 L
FIO2 GAS DIL.REBREATH: 15 L
FLUAV RNA RESP QL NAA+PROBE: NEGATIVE
FLUBV RNA RESP QL NAA+PROBE: NEGATIVE
FRACTIONAL SHORTENING: 36 % (ref 28–44)
GFR SERPL CREATININE-BSD FRML MDRD: 37 ML/MIN/1.73SQ M
GFR SERPL CREATININE-BSD FRML MDRD: 41 ML/MIN/1.73SQ M
GFR SERPL CREATININE-BSD FRML MDRD: 44 ML/MIN/1.73SQ M
GFR SERPL CREATININE-BSD FRML MDRD: 48 ML/MIN/1.73SQ M
GFR SERPL CREATININE-BSD FRML MDRD: 50 ML/MIN/1.73SQ M
GFR SERPL CREATININE-BSD FRML MDRD: 52 ML/MIN/1.73SQ M
GFR SERPL CREATININE-BSD FRML MDRD: 53 ML/MIN/1.73SQ M
GFR SERPL CREATININE-BSD FRML MDRD: 55 ML/MIN/1.73SQ M
GFR SERPL CREATININE-BSD FRML MDRD: 55 ML/MIN/1.73SQ M
GFR SERPL CREATININE-BSD FRML MDRD: 56 ML/MIN/1.73SQ M
GFR SERPL CREATININE-BSD FRML MDRD: 59 ML/MIN/1.73SQ M
GFR SERPL CREATININE-BSD FRML MDRD: 60 ML/MIN/1.73SQ M
GFR SERPL CREATININE-BSD FRML MDRD: 64 ML/MIN/1.73SQ M
GFR SERPL CREATININE-BSD FRML MDRD: 70 ML/MIN/1.73SQ M
GFR SERPL CREATININE-BSD FRML MDRD: 75 ML/MIN/1.73SQ M
GFR SERPL CREATININE-BSD FRML MDRD: 79 ML/MIN/1.73SQ M
GLUCOSE SERPL-MCNC: 101 MG/DL (ref 65–140)
GLUCOSE SERPL-MCNC: 101 MG/DL (ref 65–140)
GLUCOSE SERPL-MCNC: 107 MG/DL (ref 65–140)
GLUCOSE SERPL-MCNC: 108 MG/DL (ref 65–140)
GLUCOSE SERPL-MCNC: 112 MG/DL (ref 65–140)
GLUCOSE SERPL-MCNC: 113 MG/DL (ref 65–140)
GLUCOSE SERPL-MCNC: 115 MG/DL (ref 65–140)
GLUCOSE SERPL-MCNC: 116 MG/DL (ref 65–140)
GLUCOSE SERPL-MCNC: 118 MG/DL (ref 65–140)
GLUCOSE SERPL-MCNC: 125 MG/DL (ref 65–140)
GLUCOSE SERPL-MCNC: 126 MG/DL (ref 65–140)
GLUCOSE SERPL-MCNC: 127 MG/DL (ref 65–140)
GLUCOSE SERPL-MCNC: 128 MG/DL (ref 65–140)
GLUCOSE SERPL-MCNC: 130 MG/DL (ref 65–140)
GLUCOSE SERPL-MCNC: 134 MG/DL (ref 65–140)
GLUCOSE SERPL-MCNC: 140 MG/DL (ref 65–140)
GLUCOSE SERPL-MCNC: 143 MG/DL (ref 65–140)
GLUCOSE SERPL-MCNC: 144 MG/DL (ref 65–140)
GLUCOSE SERPL-MCNC: 145 MG/DL (ref 65–140)
GLUCOSE SERPL-MCNC: 147 MG/DL (ref 65–140)
GLUCOSE SERPL-MCNC: 148 MG/DL (ref 65–140)
GLUCOSE SERPL-MCNC: 150 MG/DL (ref 65–140)
GLUCOSE SERPL-MCNC: 151 MG/DL (ref 65–140)
GLUCOSE SERPL-MCNC: 154 MG/DL (ref 65–140)
GLUCOSE SERPL-MCNC: 155 MG/DL (ref 65–140)
GLUCOSE SERPL-MCNC: 155 MG/DL (ref 65–140)
GLUCOSE SERPL-MCNC: 158 MG/DL (ref 65–140)
GLUCOSE SERPL-MCNC: 159 MG/DL (ref 65–140)
GLUCOSE SERPL-MCNC: 164 MG/DL (ref 65–140)
GLUCOSE SERPL-MCNC: 165 MG/DL (ref 65–140)
GLUCOSE SERPL-MCNC: 166 MG/DL (ref 65–140)
GLUCOSE SERPL-MCNC: 167 MG/DL (ref 65–140)
GLUCOSE SERPL-MCNC: 169 MG/DL (ref 65–140)
GLUCOSE SERPL-MCNC: 171 MG/DL (ref 65–140)
GLUCOSE SERPL-MCNC: 171 MG/DL (ref 65–140)
GLUCOSE SERPL-MCNC: 172 MG/DL (ref 65–140)
GLUCOSE SERPL-MCNC: 173 MG/DL (ref 65–140)
GLUCOSE SERPL-MCNC: 173 MG/DL (ref 65–140)
GLUCOSE SERPL-MCNC: 174 MG/DL (ref 65–140)
GLUCOSE SERPL-MCNC: 177 MG/DL (ref 65–140)
GLUCOSE SERPL-MCNC: 178 MG/DL (ref 65–140)
GLUCOSE SERPL-MCNC: 179 MG/DL (ref 65–140)
GLUCOSE SERPL-MCNC: 180 MG/DL (ref 65–140)
GLUCOSE SERPL-MCNC: 180 MG/DL (ref 65–140)
GLUCOSE SERPL-MCNC: 182 MG/DL (ref 65–140)
GLUCOSE SERPL-MCNC: 183 MG/DL (ref 65–140)
GLUCOSE SERPL-MCNC: 185 MG/DL (ref 65–140)
GLUCOSE SERPL-MCNC: 186 MG/DL (ref 65–140)
GLUCOSE SERPL-MCNC: 187 MG/DL (ref 65–140)
GLUCOSE SERPL-MCNC: 188 MG/DL (ref 65–140)
GLUCOSE SERPL-MCNC: 189 MG/DL (ref 65–140)
GLUCOSE SERPL-MCNC: 190 MG/DL (ref 65–140)
GLUCOSE SERPL-MCNC: 191 MG/DL (ref 65–140)
GLUCOSE SERPL-MCNC: 191 MG/DL (ref 65–140)
GLUCOSE SERPL-MCNC: 193 MG/DL (ref 65–140)
GLUCOSE SERPL-MCNC: 193 MG/DL (ref 65–140)
GLUCOSE SERPL-MCNC: 194 MG/DL (ref 65–140)
GLUCOSE SERPL-MCNC: 195 MG/DL (ref 65–140)
GLUCOSE SERPL-MCNC: 196 MG/DL (ref 65–140)
GLUCOSE SERPL-MCNC: 196 MG/DL (ref 65–140)
GLUCOSE SERPL-MCNC: 197 MG/DL (ref 65–140)
GLUCOSE SERPL-MCNC: 199 MG/DL (ref 65–140)
GLUCOSE SERPL-MCNC: 199 MG/DL (ref 65–140)
GLUCOSE SERPL-MCNC: 200 MG/DL (ref 65–140)
GLUCOSE SERPL-MCNC: 203 MG/DL (ref 65–140)
GLUCOSE SERPL-MCNC: 204 MG/DL (ref 65–140)
GLUCOSE SERPL-MCNC: 205 MG/DL (ref 65–140)
GLUCOSE SERPL-MCNC: 206 MG/DL (ref 65–140)
GLUCOSE SERPL-MCNC: 209 MG/DL (ref 65–140)
GLUCOSE SERPL-MCNC: 209 MG/DL (ref 65–140)
GLUCOSE SERPL-MCNC: 210 MG/DL (ref 65–140)
GLUCOSE SERPL-MCNC: 210 MG/DL (ref 65–140)
GLUCOSE SERPL-MCNC: 211 MG/DL (ref 65–140)
GLUCOSE SERPL-MCNC: 212 MG/DL (ref 65–140)
GLUCOSE SERPL-MCNC: 215 MG/DL (ref 65–140)
GLUCOSE SERPL-MCNC: 217 MG/DL (ref 65–140)
GLUCOSE SERPL-MCNC: 217 MG/DL (ref 65–140)
GLUCOSE SERPL-MCNC: 220 MG/DL (ref 65–140)
GLUCOSE SERPL-MCNC: 221 MG/DL (ref 65–140)
GLUCOSE SERPL-MCNC: 225 MG/DL (ref 65–140)
GLUCOSE SERPL-MCNC: 230 MG/DL (ref 65–140)
GLUCOSE SERPL-MCNC: 231 MG/DL (ref 65–140)
GLUCOSE SERPL-MCNC: 233 MG/DL (ref 65–140)
GLUCOSE SERPL-MCNC: 239 MG/DL (ref 65–140)
GLUCOSE SERPL-MCNC: 240 MG/DL (ref 65–140)
GLUCOSE SERPL-MCNC: 247 MG/DL (ref 65–140)
GLUCOSE SERPL-MCNC: 251 MG/DL (ref 65–140)
GLUCOSE SERPL-MCNC: 252 MG/DL (ref 65–140)
GLUCOSE SERPL-MCNC: 254 MG/DL (ref 65–140)
GLUCOSE SERPL-MCNC: 269 MG/DL (ref 65–140)
GLUCOSE SERPL-MCNC: 273 MG/DL (ref 65–140)
GLUCOSE SERPL-MCNC: 275 MG/DL (ref 65–140)
GLUCOSE SERPL-MCNC: 285 MG/DL (ref 65–140)
GLUCOSE SERPL-MCNC: 286 MG/DL (ref 65–140)
GLUCOSE SERPL-MCNC: 296 MG/DL (ref 65–140)
GLUCOSE SERPL-MCNC: 297 MG/DL (ref 65–140)
GLUCOSE SERPL-MCNC: 297 MG/DL (ref 65–140)
GLUCOSE SERPL-MCNC: 305 MG/DL (ref 65–140)
GLUCOSE SERPL-MCNC: 327 MG/DL (ref 65–140)
GLUCOSE SERPL-MCNC: 346 MG/DL (ref 65–140)
GLUCOSE SERPL-MCNC: 347 MG/DL (ref 65–140)
GLUCOSE SERPL-MCNC: 371 MG/DL (ref 65–140)
GLUCOSE SERPL-MCNC: 388 MG/DL (ref 65–140)
GLUCOSE SERPL-MCNC: 410 MG/DL (ref 65–140)
GLUCOSE UR STRIP-MCNC: ABNORMAL MG/DL
GRAM STN SPEC: ABNORMAL
GRAM STN SPEC: NORMAL
HBA1C MFR BLD: 9.4 %
HCO3 BLDA-SCNC: 26 MMOL/L (ref 22–28)
HCO3 BLDA-SCNC: 26.2 MMOL/L (ref 22–28)
HCO3 BLDA-SCNC: 26.8 MMOL/L (ref 22–28)
HCO3 BLDA-SCNC: 27.9 MMOL/L (ref 22–28)
HCO3 BLDA-SCNC: 28 MMOL/L (ref 22–28)
HCO3 BLDA-SCNC: 28.9 MMOL/L (ref 22–28)
HCO3 BLDA-SCNC: 29 MMOL/L (ref 22–28)
HCO3 BLDA-SCNC: 29.4 MMOL/L (ref 22–28)
HCO3 BLDA-SCNC: 29.5 MMOL/L (ref 22–28)
HCO3 BLDA-SCNC: 30.1 MMOL/L (ref 22–28)
HCO3 BLDA-SCNC: 30.6 MMOL/L (ref 22–28)
HCO3 BLDA-SCNC: 33.4 MMOL/L (ref 22–28)
HCO3 BLDA-SCNC: 38.9 MMOL/L (ref 22–28)
HCT VFR BLD AUTO: 33.7 % (ref 34.8–46.1)
HCT VFR BLD AUTO: 35.4 % (ref 34.8–46.1)
HCT VFR BLD AUTO: 36.7 % (ref 34.8–46.1)
HCT VFR BLD AUTO: 36.7 % (ref 34.8–46.1)
HCT VFR BLD AUTO: 36.8 % (ref 34.8–46.1)
HCT VFR BLD AUTO: 37.5 % (ref 34.8–46.1)
HCT VFR BLD AUTO: 37.5 % (ref 34.8–46.1)
HCT VFR BLD AUTO: 38.4 % (ref 34.8–46.1)
HCT VFR BLD AUTO: 39.4 % (ref 34.8–46.1)
HCT VFR BLD AUTO: 40 % (ref 34.8–46.1)
HCT VFR BLD AUTO: 41 % (ref 34.8–46.1)
HCT VFR BLD AUTO: 41.1 % (ref 34.8–46.1)
HCT VFR BLD AUTO: 41.4 % (ref 34.8–46.1)
HCT VFR BLD AUTO: 42.6 % (ref 34.8–46.1)
HCT VFR BLD AUTO: 43.4 % (ref 34.8–46.1)
HCT VFR BLD CALC: 45 % (ref 34.8–46.1)
HCT VFR BLD CALC: 46 % (ref 34.8–46.1)
HCT VFR BLD CALC: 50 % (ref 34.8–46.1)
HGB BLD-MCNC: 10.7 G/DL (ref 11.5–15.4)
HGB BLD-MCNC: 11.2 G/DL (ref 11.5–15.4)
HGB BLD-MCNC: 11.6 G/DL (ref 11.5–15.4)
HGB BLD-MCNC: 11.7 G/DL (ref 11.5–15.4)
HGB BLD-MCNC: 11.7 G/DL (ref 11.5–15.4)
HGB BLD-MCNC: 11.8 G/DL (ref 11.5–15.4)
HGB BLD-MCNC: 12 G/DL (ref 11.5–15.4)
HGB BLD-MCNC: 12.1 G/DL (ref 11.5–15.4)
HGB BLD-MCNC: 12.8 G/DL (ref 11.5–15.4)
HGB BLD-MCNC: 13.2 G/DL (ref 11.5–15.4)
HGB BLD-MCNC: 13.4 G/DL (ref 11.5–15.4)
HGB BLD-MCNC: 13.4 G/DL (ref 11.5–15.4)
HGB BLD-MCNC: 13.5 G/DL (ref 11.5–15.4)
HGB BLDA-MCNC: 15.3 G/DL (ref 11.5–15.4)
HGB BLDA-MCNC: 15.6 G/DL (ref 11.5–15.4)
HGB BLDA-MCNC: 17 G/DL (ref 11.5–15.4)
HGB UR QL STRIP.AUTO: NEGATIVE
HOROWITZ INDEX BLDA+IHG-RTO: 40 MM[HG]
HOROWITZ INDEX BLDA+IHG-RTO: 50 MM[HG]
HOROWITZ INDEX BLDA+IHG-RTO: 50 MM[HG]
HOROWITZ INDEX BLDA+IHG-RTO: 60 MM[HG]
HOROWITZ INDEX BLDA+IHG-RTO: 70 MM[HG]
HOROWITZ INDEX BLDA+IHG-RTO: 80 MM[HG]
HOROWITZ INDEX BLDA+IHG-RTO: 80 MM[HG]
IMM GRANULOCYTES # BLD AUTO: 0.08 THOUSAND/UL (ref 0–0.2)
IMM GRANULOCYTES # BLD AUTO: 0.1 THOUSAND/UL (ref 0–0.2)
IMM GRANULOCYTES # BLD AUTO: 0.12 THOUSAND/UL (ref 0–0.2)
IMM GRANULOCYTES # BLD AUTO: 0.13 THOUSAND/UL (ref 0–0.2)
IMM GRANULOCYTES # BLD AUTO: 0.24 THOUSAND/UL (ref 0–0.2)
IMM GRANULOCYTES # BLD AUTO: 0.42 THOUSAND/UL (ref 0–0.2)
IMM GRANULOCYTES # BLD AUTO: 0.42 THOUSAND/UL (ref 0–0.2)
IMM GRANULOCYTES NFR BLD AUTO: 1 % (ref 0–2)
IMM GRANULOCYTES NFR BLD AUTO: 2 % (ref 0–2)
IMM GRANULOCYTES NFR BLD AUTO: 2 % (ref 0–2)
INR PPP: 0.98 (ref 0.84–1.19)
INR PPP: 1.04 (ref 0.84–1.19)
INTERVENTRICULAR SEPTUM IN DIASTOLE (PARASTERNAL SHORT AXIS VIEW): 1.2 CM
INTERVENTRICULAR SEPTUM: 1.2 CM (ref 0.6–1.1)
KETONES UR STRIP-MCNC: NEGATIVE MG/DL
L PNEUMO1 AG UR QL IA.RAPID: NEGATIVE
LAAS-AP2: 16 CM2
LAAS-AP4: 17.1 CM2
LACTATE SERPL-SCNC: 0.7 MMOL/L (ref 0.5–2)
LACTATE SERPL-SCNC: 1 MMOL/L (ref 0.5–2)
LEFT ATRIUM SIZE: 4.1 CM
LEFT INTERNAL DIMENSION IN SYSTOLE: 2.8 CM (ref 2.1–4)
LEFT VENTRICULAR INTERNAL DIMENSION IN DIASTOLE: 4.4 CM (ref 3.5–6)
LEFT VENTRICULAR POSTERIOR WALL IN END DIASTOLE: 1.2 CM
LEFT VENTRICULAR STROKE VOLUME: 60 ML
LEUKOCYTE ESTERASE UR QL STRIP: ABNORMAL
LG PLATELETS BLD QL SMEAR: PRESENT
LVSV (TEICH): 60 ML
LYMPHOCYTES # BLD AUTO: 0.21 THOUSAND/UL (ref 0.6–4.47)
LYMPHOCYTES # BLD AUTO: 0.77 THOUSANDS/ÂΜL (ref 0.6–4.47)
LYMPHOCYTES # BLD AUTO: 1 % (ref 14–44)
LYMPHOCYTES # BLD AUTO: 1.16 THOUSANDS/ÂΜL (ref 0.6–4.47)
LYMPHOCYTES # BLD AUTO: 1.23 THOUSAND/UL (ref 0.6–4.47)
LYMPHOCYTES # BLD AUTO: 1.29 THOUSANDS/ÂΜL (ref 0.6–4.47)
LYMPHOCYTES # BLD AUTO: 1.38 THOUSANDS/ÂΜL (ref 0.6–4.47)
LYMPHOCYTES # BLD AUTO: 1.42 THOUSANDS/ÂΜL (ref 0.6–4.47)
LYMPHOCYTES # BLD AUTO: 1.68 THOUSANDS/ÂΜL (ref 0.6–4.47)
LYMPHOCYTES # BLD AUTO: 1.97 THOUSANDS/ÂΜL (ref 0.6–4.47)
LYMPHOCYTES # BLD AUTO: 8 % (ref 14–44)
LYMPHOCYTES NFR BLD AUTO: 12 % (ref 14–44)
LYMPHOCYTES NFR BLD AUTO: 14 % (ref 14–44)
LYMPHOCYTES NFR BLD AUTO: 14 % (ref 14–44)
LYMPHOCYTES NFR BLD AUTO: 5 % (ref 14–44)
LYMPHOCYTES NFR BLD AUTO: 6 % (ref 14–44)
LYMPHOCYTES NFR BLD AUTO: 6 % (ref 14–44)
LYMPHOCYTES NFR BLD AUTO: 70 %
LYMPHOCYTES NFR BLD AUTO: 9 % (ref 14–44)
MACROPHAGES NFR FLD: 1 %
MAGNESIUM SERPL-MCNC: 1.7 MG/DL (ref 1.6–2.6)
MAGNESIUM SERPL-MCNC: 1.9 MG/DL (ref 1.6–2.6)
MAGNESIUM SERPL-MCNC: 2 MG/DL (ref 1.6–2.6)
MAGNESIUM SERPL-MCNC: 2 MG/DL (ref 1.6–2.6)
MAGNESIUM SERPL-MCNC: 2.1 MG/DL (ref 1.6–2.6)
MAGNESIUM SERPL-MCNC: 2.2 MG/DL (ref 1.6–2.6)
MAGNESIUM SERPL-MCNC: 2.5 MG/DL (ref 1.6–2.6)
MCH RBC QN AUTO: 29.5 PG (ref 26.8–34.3)
MCH RBC QN AUTO: 29.7 PG (ref 26.8–34.3)
MCH RBC QN AUTO: 29.8 PG (ref 26.8–34.3)
MCH RBC QN AUTO: 29.9 PG (ref 26.8–34.3)
MCH RBC QN AUTO: 29.9 PG (ref 26.8–34.3)
MCH RBC QN AUTO: 30 PG (ref 26.8–34.3)
MCH RBC QN AUTO: 30 PG (ref 26.8–34.3)
MCH RBC QN AUTO: 30.1 PG (ref 26.8–34.3)
MCH RBC QN AUTO: 30.2 PG (ref 26.8–34.3)
MCH RBC QN AUTO: 30.3 PG (ref 26.8–34.3)
MCH RBC QN AUTO: 30.4 PG (ref 26.8–34.3)
MCH RBC QN AUTO: 30.5 PG (ref 26.8–34.3)
MCH RBC QN AUTO: 30.9 PG (ref 26.8–34.3)
MCHC RBC AUTO-ENTMCNC: 30.3 G/DL (ref 31.4–37.4)
MCHC RBC AUTO-ENTMCNC: 30.5 G/DL (ref 31.4–37.4)
MCHC RBC AUTO-ENTMCNC: 30.7 G/DL (ref 31.4–37.4)
MCHC RBC AUTO-ENTMCNC: 30.9 G/DL (ref 31.4–37.4)
MCHC RBC AUTO-ENTMCNC: 31.2 G/DL (ref 31.4–37.4)
MCHC RBC AUTO-ENTMCNC: 31.2 G/DL (ref 31.4–37.4)
MCHC RBC AUTO-ENTMCNC: 31.5 G/DL (ref 31.4–37.4)
MCHC RBC AUTO-ENTMCNC: 31.6 G/DL (ref 31.4–37.4)
MCHC RBC AUTO-ENTMCNC: 31.6 G/DL (ref 31.4–37.4)
MCHC RBC AUTO-ENTMCNC: 31.7 G/DL (ref 31.4–37.4)
MCHC RBC AUTO-ENTMCNC: 31.8 G/DL (ref 31.4–37.4)
MCHC RBC AUTO-ENTMCNC: 31.9 G/DL (ref 31.4–37.4)
MCHC RBC AUTO-ENTMCNC: 31.9 G/DL (ref 31.4–37.4)
MCHC RBC AUTO-ENTMCNC: 32.1 G/DL (ref 31.4–37.4)
MCHC RBC AUTO-ENTMCNC: 32.6 G/DL (ref 31.4–37.4)
MCV RBC AUTO: 93 FL (ref 82–98)
MCV RBC AUTO: 94 FL (ref 82–98)
MCV RBC AUTO: 94 FL (ref 82–98)
MCV RBC AUTO: 95 FL (ref 82–98)
MCV RBC AUTO: 96 FL (ref 82–98)
MCV RBC AUTO: 98 FL (ref 82–98)
MCV RBC AUTO: 99 FL (ref 82–98)
METAMYELOCYTES NFR BLD MANUAL: 1 % (ref 0–1)
METAMYELOCYTES NFR BLD MANUAL: 2 % (ref 0–1)
MONOCYTES # BLD AUTO: 0.7 THOUSAND/ÂΜL (ref 0.17–1.22)
MONOCYTES # BLD AUTO: 0.74 THOUSAND/ÂΜL (ref 0.17–1.22)
MONOCYTES # BLD AUTO: 0.77 THOUSAND/UL (ref 0–1.22)
MONOCYTES # BLD AUTO: 0.84 THOUSAND/ÂΜL (ref 0.17–1.22)
MONOCYTES # BLD AUTO: 0.86 THOUSAND/ÂΜL (ref 0.17–1.22)
MONOCYTES # BLD AUTO: 0.87 THOUSAND/ÂΜL (ref 0.17–1.22)
MONOCYTES # BLD AUTO: 0.9 THOUSAND/ÂΜL (ref 0.17–1.22)
MONOCYTES # BLD AUTO: 1.06 THOUSAND/ÂΜL (ref 0.17–1.22)
MONOCYTES # BLD AUTO: 1.24 THOUSAND/UL (ref 0–1.22)
MONOCYTES NFR BLD AUTO: 4 % (ref 4–12)
MONOCYTES NFR BLD AUTO: 4 % (ref 4–12)
MONOCYTES NFR BLD AUTO: 5 % (ref 4–12)
MONOCYTES NFR BLD AUTO: 6 % (ref 4–12)
MONOCYTES NFR BLD AUTO: 8 % (ref 4–12)
MONOCYTES NFR BLD: 5 % (ref 4–12)
MONOCYTES NFR BLD: 6 % (ref 4–12)
MRSA NOSE QL CULT: NORMAL
MV E'TISSUE VEL-SEP: 9 CM/S
MV PEAK A VEL: 0.85 M/S
MV PEAK E VEL: 79 CM/S
MV STENOSIS PRESSURE HALF TIME: 66 MS
MV VALVE AREA P 1/2 METHOD: 3.33 CM2
MYELOCYTES NFR BLD MANUAL: 3 % (ref 0–1)
NEUTROPHILS # BLD AUTO: 11.04 THOUSANDS/ÂΜL (ref 1.85–7.62)
NEUTROPHILS # BLD AUTO: 11.45 THOUSANDS/ÂΜL (ref 1.85–7.62)
NEUTROPHILS # BLD AUTO: 12.76 THOUSANDS/ÂΜL (ref 1.85–7.62)
NEUTROPHILS # BLD AUTO: 15.35 THOUSANDS/ÂΜL (ref 1.85–7.62)
NEUTROPHILS # BLD AUTO: 16.45 THOUSANDS/ÂΜL (ref 1.85–7.62)
NEUTROPHILS # BLD AUTO: 21.94 THOUSANDS/ÂΜL (ref 1.85–7.62)
NEUTROPHILS # BLD AUTO: 7.16 THOUSANDS/ÂΜL (ref 1.85–7.62)
NEUTROPHILS # BLD MANUAL: 12.1 THOUSAND/UL (ref 1.85–7.62)
NEUTROPHILS # BLD MANUAL: 18.81 THOUSAND/UL (ref 1.85–7.62)
NEUTS BAND NFR BLD MANUAL: 21 % (ref 0–8)
NEUTS BAND NFR BLD MANUAL: 4 % (ref 0–8)
NEUTS SEG NFR BLD AUTO: 29 %
NEUTS SEG NFR BLD AUTO: 70 % (ref 43–75)
NEUTS SEG NFR BLD AUTO: 75 % (ref 43–75)
NEUTS SEG NFR BLD AUTO: 75 % (ref 43–75)
NEUTS SEG NFR BLD AUTO: 76 % (ref 43–75)
NEUTS SEG NFR BLD AUTO: 80 % (ref 43–75)
NEUTS SEG NFR BLD AUTO: 84 % (ref 43–75)
NEUTS SEG NFR BLD AUTO: 88 % (ref 43–75)
NEUTS SEG NFR BLD AUTO: 88 % (ref 43–75)
NEUTS SEG NFR BLD AUTO: 89 % (ref 43–75)
NITRITE UR QL STRIP: NEGATIVE
NON-SQ EPI CELLS URNS QL MICRO: NORMAL /HPF
NRBC BLD AUTO-RTO: 0 /100 WBCS
NT-PROBNP SERPL-MCNC: 313 PG/ML
O2 CT BLDA-SCNC: 16.5 ML/DL (ref 16–23)
O2 CT BLDA-SCNC: 16.9 ML/DL (ref 16–23)
O2 CT BLDA-SCNC: 17.2 ML/DL (ref 16–23)
O2 CT BLDA-SCNC: 17.4 ML/DL (ref 16–23)
O2 CT BLDA-SCNC: 17.6 ML/DL (ref 16–23)
O2 CT BLDA-SCNC: 18.2 ML/DL (ref 16–23)
O2 CT BLDA-SCNC: 18.2 ML/DL (ref 16–23)
O2 CT BLDA-SCNC: 18.3 ML/DL (ref 16–23)
O2 CT BLDA-SCNC: 18.9 ML/DL (ref 16–23)
O2 CT BLDA-SCNC: 19.2 ML/DL (ref 16–23)
O2 CT BLDA-SCNC: 19.5 ML/DL (ref 16–23)
O2 CT BLDA-SCNC: 21 ML/DL (ref 16–23)
OXYHGB MFR BLDA: 92.5 % (ref 94–97)
OXYHGB MFR BLDA: 92.8 % (ref 94–97)
OXYHGB MFR BLDA: 94 % (ref 94–97)
OXYHGB MFR BLDA: 94.4 % (ref 94–97)
OXYHGB MFR BLDA: 94.6 % (ref 94–97)
OXYHGB MFR BLDA: 94.8 % (ref 94–97)
OXYHGB MFR BLDA: 95.2 % (ref 94–97)
OXYHGB MFR BLDA: 95.2 % (ref 94–97)
OXYHGB MFR BLDA: 95.4 % (ref 94–97)
OXYHGB MFR BLDA: 96.4 % (ref 94–97)
OXYHGB MFR BLDA: 97.7 % (ref 94–97)
OXYHGB MFR BLDA: 97.7 % (ref 94–97)
P AXIS: 61 DEGREES
P AXIS: 63 DEGREES
P AXIS: 67 DEGREES
P AXIS: 68 DEGREES
P AXIS: 70 DEGREES
P AXIS: 70 DEGREES
P AXIS: 72 DEGREES
P AXIS: 74 DEGREES
P AXIS: 75 DEGREES
PCO2 BLD: 41 MMOL/L (ref 21–32)
PCO2 BLD: 71 MM HG
PCO2 BLD: 71 MM HG
PCO2 BLD: 87.5 MM HG (ref 36–44)
PCO2 BLD: >103 MM HG (ref 42–50)
PCO2 BLDA: 37.9 MM HG (ref 36–44)
PCO2 BLDA: 40.5 MM HG (ref 36–44)
PCO2 BLDA: 43.8 MM HG (ref 36–44)
PCO2 BLDA: 44.1 MM HG (ref 36–44)
PCO2 BLDA: 45.5 MM HG (ref 36–44)
PCO2 BLDA: 46.8 MM HG (ref 36–44)
PCO2 BLDA: 47.7 MM HG (ref 36–44)
PCO2 BLDA: 49.7 MM HG (ref 36–44)
PCO2 BLDA: 52.5 MM HG (ref 36–44)
PCO2 BLDA: 53.2 MM HG (ref 36–44)
PCO2 BLDA: 58.4 MM HG (ref 36–44)
PCO2 BLDA: 78 MM HG (ref 36–44)
PCO2 BLDA: 98.3 MM HG
PCO2 TEMP ADJ BLDA: 38.2 MM HG (ref 36–44)
PCO2 TEMP ADJ BLDA: 41.6 MM HG (ref 36–44)
PCO2 TEMP ADJ BLDA: 41.6 MM HG (ref 36–44)
PCO2 TEMP ADJ BLDA: 43.7 MM HG (ref 36–44)
PCO2 TEMP ADJ BLDA: 44.5 MM HG (ref 36–44)
PCO2 TEMP ADJ BLDA: 44.6 MM HG (ref 36–44)
PCO2 TEMP ADJ BLDA: 46.9 MM HG (ref 36–44)
PCO2 TEMP ADJ BLDA: 48 MM HG (ref 36–44)
PCO2 TEMP ADJ BLDA: 51.4 MM HG (ref 36–44)
PCO2 TEMP ADJ BLDA: 54.4 MM HG (ref 36–44)
PCO2 TEMP ADJ BLDA: 58.9 MM HG (ref 36–44)
PCO2 TEMP ADJ BLDA: 79.7 MM HG (ref 36–44)
PEEP RESPIRATORY: 10 CM[H2O]
PEEP RESPIRATORY: 10 CM[H2O]
PEEP RESPIRATORY: 12 CM[H2O]
PEEP RESPIRATORY: 12 CM[H2O]
PEEP RESPIRATORY: 14 CM[H2O]
PEEP RESPIRATORY: 6 CM[H2O]
PEEP RESPIRATORY: 8 CM[H2O]
PEEP RESPIRATORY: 8 CM[H2O]
PH BLD: 7.04 [PH]
PH BLD: 7.05 [PH] (ref 7.3–7.4)
PH BLD: 7.22 [PH]
PH BLD: 7.24 [PH] (ref 7.35–7.45)
PH BLD: 7.26 [PH] (ref 7.35–7.45)
PH BLD: 7.28 [PH] (ref 7.35–7.45)
PH BLD: 7.36 [PH] (ref 7.35–7.45)
PH BLD: 7.39 [PH] (ref 7.35–7.45)
PH BLD: 7.4 [PH] (ref 7.35–7.45)
PH BLD: 7.41 [PH] (ref 7.35–7.45)
PH BLD: 7.41 [PH] (ref 7.35–7.45)
PH BLD: 7.42 [PH] (ref 7.35–7.45)
PH BLD: 7.43 [PH] (ref 7.35–7.45)
PH BLD: 7.43 [PH] (ref 7.35–7.45)
PH BLD: 7.45 [PH] (ref 7.35–7.45)
PH BLD: 7.45 [PH] (ref 7.35–7.45)
PH BLDA: 7.25 [PH] (ref 7.35–7.45)
PH BLDA: 7.28 [PH] (ref 7.35–7.45)
PH BLDA: 7.37 [PH] (ref 7.35–7.45)
PH BLDA: 7.38 [PH] (ref 7.35–7.45)
PH BLDA: 7.39 [PH] (ref 7.35–7.45)
PH BLDA: 7.39 [PH] (ref 7.35–7.45)
PH BLDA: 7.4 [PH] (ref 7.35–7.45)
PH BLDA: 7.42 [PH] (ref 7.35–7.45)
PH BLDA: 7.45 [PH] (ref 7.35–7.45)
PH BLDA: 7.46 [PH] (ref 7.35–7.45)
PH UR STRIP.AUTO: 5.5 [PH]
PHOSPHATE SERPL-MCNC: 2.9 MG/DL (ref 2.3–4.1)
PHOSPHATE SERPL-MCNC: 3.3 MG/DL (ref 2.3–4.1)
PHOSPHATE SERPL-MCNC: 3.4 MG/DL (ref 2.3–4.1)
PHOSPHATE SERPL-MCNC: 3.5 MG/DL (ref 2.3–4.1)
PHOSPHATE SERPL-MCNC: 3.6 MG/DL (ref 2.3–4.1)
PHOSPHATE SERPL-MCNC: 3.7 MG/DL (ref 2.3–4.1)
PHOSPHATE SERPL-MCNC: 4 MG/DL (ref 2.3–4.1)
PHOSPHATE SERPL-MCNC: 4.4 MG/DL (ref 2.3–4.1)
PHOSPHATE SERPL-MCNC: 5 MG/DL (ref 2.3–4.1)
PLATELET # BLD AUTO: 270 THOUSANDS/UL (ref 149–390)
PLATELET # BLD AUTO: 284 THOUSANDS/UL (ref 149–390)
PLATELET # BLD AUTO: 286 THOUSANDS/UL (ref 149–390)
PLATELET # BLD AUTO: 289 THOUSANDS/UL (ref 149–390)
PLATELET # BLD AUTO: 307 THOUSANDS/UL (ref 149–390)
PLATELET # BLD AUTO: 308 THOUSANDS/UL (ref 149–390)
PLATELET # BLD AUTO: 316 THOUSANDS/UL (ref 149–390)
PLATELET # BLD AUTO: 317 THOUSANDS/UL (ref 149–390)
PLATELET # BLD AUTO: 318 THOUSANDS/UL (ref 149–390)
PLATELET # BLD AUTO: 324 THOUSANDS/UL (ref 149–390)
PLATELET # BLD AUTO: 325 THOUSANDS/UL (ref 149–390)
PLATELET # BLD AUTO: 344 THOUSANDS/UL (ref 149–390)
PLATELET # BLD AUTO: 349 THOUSANDS/UL (ref 149–390)
PLATELET # BLD AUTO: 357 THOUSANDS/UL (ref 149–390)
PLATELET # BLD AUTO: 370 THOUSANDS/UL (ref 149–390)
PLATELET BLD QL SMEAR: ADEQUATE
PLATELET BLD QL SMEAR: ADEQUATE
PMV BLD AUTO: 10.1 FL (ref 8.9–12.7)
PMV BLD AUTO: 10.4 FL (ref 8.9–12.7)
PMV BLD AUTO: 10.6 FL (ref 8.9–12.7)
PMV BLD AUTO: 10.7 FL (ref 8.9–12.7)
PMV BLD AUTO: 10.8 FL (ref 8.9–12.7)
PMV BLD AUTO: 10.9 FL (ref 8.9–12.7)
PMV BLD AUTO: 11 FL (ref 8.9–12.7)
PMV BLD AUTO: 11.1 FL (ref 8.9–12.7)
PMV BLD AUTO: 11.2 FL (ref 8.9–12.7)
PMV BLD AUTO: 11.4 FL (ref 8.9–12.7)
PMV BLD AUTO: 11.5 FL (ref 8.9–12.7)
PO2 BLD: 102 MM HG (ref 75–129)
PO2 BLD: 107.5 MM HG (ref 75–129)
PO2 BLD: 59 MM HG (ref 75–129)
PO2 BLD: 59 MM HG (ref 75–129)
PO2 BLD: 63.4 MM HG (ref 75–129)
PO2 BLD: 67 MM HG (ref 75–129)
PO2 BLD: 71.4 MM HG (ref 75–129)
PO2 BLD: 73.5 MM HG (ref 75–129)
PO2 BLD: 78.2 MM HG (ref 75–129)
PO2 BLD: 79.7 MM HG (ref 75–129)
PO2 BLD: 80.3 MM HG (ref 75–129)
PO2 BLD: 85.5 MM HG (ref 75–129)
PO2 BLD: 88.2 MM HG (ref 75–129)
PO2 BLD: 88.3 MM HG (ref 75–129)
PO2 BLD: <13 MM HG (ref 35–45)
PO2 BLDA: 109.1 MM HG (ref 75–129)
PO2 BLDA: 110.5 MM HG (ref 75–129)
PO2 BLDA: 66.1 MM HG (ref 75–129)
PO2 BLDA: 67.4 MM HG (ref 75–129)
PO2 BLDA: 72.5 MM HG (ref 75–129)
PO2 BLDA: 75.1 MM HG (ref 75–129)
PO2 BLDA: 75.3 MM HG (ref 75–129)
PO2 BLDA: 77.1 MM HG (ref 75–129)
PO2 BLDA: 82.4 MM HG (ref 75–129)
PO2 BLDA: 85.5 MM HG (ref 75–129)
PO2 BLDA: 87.1 MM HG (ref 75–129)
PO2 BLDA: 91.6 MM HG (ref 75–129)
POLYCHROMASIA BLD QL SMEAR: PRESENT
POTASSIUM BLD-SCNC: 4.3 MMOL/L (ref 3.5–5.3)
POTASSIUM BLD-SCNC: 4.4 MMOL/L (ref 3.5–5.3)
POTASSIUM BLD-SCNC: 5.3 MMOL/L (ref 3.5–5.3)
POTASSIUM SERPL-SCNC: 3.2 MMOL/L (ref 3.5–5.3)
POTASSIUM SERPL-SCNC: 3.6 MMOL/L (ref 3.5–5.3)
POTASSIUM SERPL-SCNC: 3.8 MMOL/L (ref 3.5–5.3)
POTASSIUM SERPL-SCNC: 3.9 MMOL/L (ref 3.5–5.3)
POTASSIUM SERPL-SCNC: 3.9 MMOL/L (ref 3.5–5.3)
POTASSIUM SERPL-SCNC: 4 MMOL/L (ref 3.5–5.3)
POTASSIUM SERPL-SCNC: 4.1 MMOL/L (ref 3.5–5.3)
POTASSIUM SERPL-SCNC: 4.1 MMOL/L (ref 3.5–5.3)
POTASSIUM SERPL-SCNC: 4.2 MMOL/L (ref 3.5–5.3)
POTASSIUM SERPL-SCNC: 4.2 MMOL/L (ref 3.5–5.3)
POTASSIUM SERPL-SCNC: 4.3 MMOL/L (ref 3.5–5.3)
POTASSIUM SERPL-SCNC: 4.3 MMOL/L (ref 3.5–5.3)
POTASSIUM SERPL-SCNC: 4.6 MMOL/L (ref 3.5–5.3)
POTASSIUM SERPL-SCNC: 4.6 MMOL/L (ref 3.5–5.3)
POTASSIUM SERPL-SCNC: 5 MMOL/L (ref 3.5–5.3)
POTASSIUM SERPL-SCNC: 5.1 MMOL/L (ref 3.5–5.3)
POTASSIUM SERPL-SCNC: 5.3 MMOL/L (ref 3.5–5.3)
PR INTERVAL: 138 MS
PR INTERVAL: 142 MS
PR INTERVAL: 146 MS
PR INTERVAL: 146 MS
PR INTERVAL: 150 MS
PR INTERVAL: 154 MS
PR INTERVAL: 156 MS
PROCALCITONIN SERPL-MCNC: 0.28 NG/ML
PROCALCITONIN SERPL-MCNC: 1.11 NG/ML
PROCALCITONIN SERPL-MCNC: 10.49 NG/ML
PROCALCITONIN SERPL-MCNC: 12.09 NG/ML
PROCALCITONIN SERPL-MCNC: 3.39 NG/ML
PROCALCITONIN SERPL-MCNC: 5.21 NG/ML
PROCALCITONIN SERPL-MCNC: 5.78 NG/ML
PROCALCITONIN SERPL-MCNC: 8.28 NG/ML
PROT SERPL-MCNC: 6.5 G/DL (ref 6.4–8.4)
PROT SERPL-MCNC: 7 G/DL (ref 6.4–8.4)
PROT UR STRIP-MCNC: ABNORMAL MG/DL
PROTHROMBIN TIME: 13.1 SECONDS (ref 11.6–14.5)
PROTHROMBIN TIME: 13.7 SECONDS (ref 11.6–14.5)
QRS AXIS: 47 DEGREES
QRS AXIS: 54 DEGREES
QRS AXIS: 56 DEGREES
QRS AXIS: 59 DEGREES
QRS AXIS: 59 DEGREES
QRS AXIS: 60 DEGREES
QRS AXIS: 61 DEGREES
QRS AXIS: 62 DEGREES
QRS AXIS: 68 DEGREES
QRSD INTERVAL: 100 MS
QRSD INTERVAL: 100 MS
QRSD INTERVAL: 76 MS
QRSD INTERVAL: 78 MS
QRSD INTERVAL: 80 MS
QRSD INTERVAL: 80 MS
QRSD INTERVAL: 84 MS
QRSD INTERVAL: 86 MS
QRSD INTERVAL: 86 MS
QT INTERVAL: 284 MS
QT INTERVAL: 308 MS
QT INTERVAL: 364 MS
QT INTERVAL: 368 MS
QT INTERVAL: 368 MS
QT INTERVAL: 384 MS
QT INTERVAL: 398 MS
QT INTERVAL: 402 MS
QT INTERVAL: 406 MS
QTC INTERVAL: 427 MS
QTC INTERVAL: 440 MS
QTC INTERVAL: 453 MS
QTC INTERVAL: 453 MS
QTC INTERVAL: 481 MS
QTC INTERVAL: 483 MS
QTC INTERVAL: 486 MS
QTC INTERVAL: 491 MS
QTC INTERVAL: 499 MS
RA PRESSURE ESTIMATED: 8 MMHG
RBC # BLD AUTO: 3.54 MILLION/UL (ref 3.81–5.12)
RBC # BLD AUTO: 3.76 MILLION/UL (ref 3.81–5.12)
RBC # BLD AUTO: 3.82 MILLION/UL (ref 3.81–5.12)
RBC # BLD AUTO: 3.83 MILLION/UL (ref 3.81–5.12)
RBC # BLD AUTO: 3.91 MILLION/UL (ref 3.81–5.12)
RBC # BLD AUTO: 3.91 MILLION/UL (ref 3.81–5.12)
RBC # BLD AUTO: 3.92 MILLION/UL (ref 3.81–5.12)
RBC # BLD AUTO: 3.97 MILLION/UL (ref 3.81–5.12)
RBC # BLD AUTO: 4 MILLION/UL (ref 3.81–5.12)
RBC # BLD AUTO: 4.1 MILLION/UL (ref 3.81–5.12)
RBC # BLD AUTO: 4.27 MILLION/UL (ref 3.81–5.12)
RBC # BLD AUTO: 4.34 MILLION/UL (ref 3.81–5.12)
RBC # BLD AUTO: 4.4 MILLION/UL (ref 3.81–5.12)
RBC # BLD AUTO: 4.42 MILLION/UL (ref 3.81–5.12)
RBC # BLD AUTO: 4.45 MILLION/UL (ref 3.81–5.12)
RBC #/AREA URNS AUTO: NORMAL /HPF
RBC MORPH BLD: PRESENT
RBC MORPH BLD: PRESENT
RIGHT ATRIUM AREA SYSTOLE A4C: 9.4 CM2
RIGHT VENTRICLE ID DIMENSION: 3.2 CM
RSV RNA RESP QL NAA+PROBE: NEGATIVE
RV PSP: 41 MMHG
S AUREUS+CONS DNA BLD POS NAA+NON-PROBE: DETECTED
S PNEUM AG UR QL: NEGATIVE
SAO2 % BLD FROM PO2: 84 % (ref 60–85)
SARS-COV-2 RNA RESP QL NAA+PROBE: NEGATIVE
SIMV VENT: ABNORMAL
SL CV LEFT ATRIUM LENGTH A2C: 4.9 CM
SL CV LV EF: 55
SL CV LV EF: 67
SL CV PED ECHO LEFT VENTRICLE DIASTOLIC VOLUME (MOD BIPLANE) 2D: 89 ML
SL CV PED ECHO LEFT VENTRICLE SYSTOLIC VOLUME (MOD BIPLANE) 2D: 29 ML
SODIUM BLD-SCNC: 137 MMOL/L (ref 136–145)
SODIUM BLD-SCNC: 137 MMOL/L (ref 136–145)
SODIUM BLD-SCNC: 142 MMOL/L (ref 136–145)
SODIUM SERPL-SCNC: 133 MMOL/L (ref 135–147)
SODIUM SERPL-SCNC: 133 MMOL/L (ref 135–147)
SODIUM SERPL-SCNC: 135 MMOL/L (ref 135–147)
SODIUM SERPL-SCNC: 136 MMOL/L (ref 135–147)
SODIUM SERPL-SCNC: 136 MMOL/L (ref 135–147)
SODIUM SERPL-SCNC: 137 MMOL/L (ref 135–147)
SODIUM SERPL-SCNC: 138 MMOL/L (ref 135–147)
SODIUM SERPL-SCNC: 139 MMOL/L (ref 135–147)
SODIUM SERPL-SCNC: 140 MMOL/L (ref 135–147)
SODIUM SERPL-SCNC: 140 MMOL/L (ref 135–147)
SODIUM SERPL-SCNC: 141 MMOL/L (ref 135–147)
SODIUM SERPL-SCNC: 141 MMOL/L (ref 135–147)
SP GR UR STRIP.AUTO: 1.01 (ref 1–1.03)
SPECIMEN SOURCE: ABNORMAL
T WAVE AXIS: 102 DEGREES
T WAVE AXIS: 56 DEGREES
T WAVE AXIS: 61 DEGREES
T WAVE AXIS: 72 DEGREES
T WAVE AXIS: 75 DEGREES
T WAVE AXIS: 76 DEGREES
T WAVE AXIS: 76 DEGREES
T WAVE AXIS: 77 DEGREES
T WAVE AXIS: 85 DEGREES
TOTAL CELLS COUNTED SPEC: 100
TR MAX PG: 33 MMHG
TR PEAK VELOCITY: 2.9 M/S
TRICUSPID VALVE PEAK REGURGITATION VELOCITY: 2.86 M/S
UROBILINOGEN UR QL STRIP.AUTO: 0.2 E.U./DL
VANCOMYCIN TROUGH SERPL-MCNC: 14.3 UG/ML (ref 10–20)
VENT AC: 20
VENT AC: 24
VENT AC: 28
VENT AC: 43
VENT- AC: AC
VENTRICULAR RATE: 105 BPM
VENTRICULAR RATE: 106 BPM
VENTRICULAR RATE: 130 BPM
VENTRICULAR RATE: 136 BPM
VENTRICULAR RATE: 84 BPM
VENTRICULAR RATE: 86 BPM
VENTRICULAR RATE: 88 BPM
VENTRICULAR RATE: 90 BPM
VENTRICULAR RATE: 91 BPM
VT SETTING VENT: 320 ML
VT SETTING VENT: 360 ML
VT SETTING VENT: 400 ML
WBC # BLD AUTO: 14.14 THOUSAND/UL (ref 4.31–10.16)
WBC # BLD AUTO: 14.27 THOUSAND/UL (ref 4.31–10.16)
WBC # BLD AUTO: 14.32 THOUSAND/UL (ref 4.31–10.16)
WBC # BLD AUTO: 14.87 THOUSAND/UL (ref 4.31–10.16)
WBC # BLD AUTO: 15.26 THOUSAND/UL (ref 4.31–10.16)
WBC # BLD AUTO: 15.32 THOUSAND/UL (ref 4.31–10.16)
WBC # BLD AUTO: 16.89 THOUSAND/UL (ref 4.31–10.16)
WBC # BLD AUTO: 17.3 THOUSAND/UL (ref 4.31–10.16)
WBC # BLD AUTO: 18.76 THOUSAND/UL (ref 4.31–10.16)
WBC # BLD AUTO: 18.95 THOUSAND/UL (ref 4.31–10.16)
WBC # BLD AUTO: 19.65 THOUSAND/UL (ref 4.31–10.16)
WBC # BLD AUTO: 19.72 THOUSAND/UL (ref 4.31–10.16)
WBC # BLD AUTO: 20.67 THOUSAND/UL (ref 4.31–10.16)
WBC # BLD AUTO: 25.01 THOUSAND/UL (ref 4.31–10.16)
WBC # BLD AUTO: 9.37 THOUSAND/UL (ref 4.31–10.16)
WBC #/AREA URNS AUTO: NORMAL /HPF

## 2022-01-01 PROCEDURE — 5A1955Z RESPIRATORY VENTILATION, GREATER THAN 96 CONSECUTIVE HOURS: ICD-10-PCS | Performed by: EMERGENCY MEDICINE

## 2022-01-01 PROCEDURE — 5A1955Z RESPIRATORY VENTILATION, GREATER THAN 96 CONSECUTIVE HOURS: ICD-10-PCS | Performed by: INTERNAL MEDICINE

## 2022-01-01 PROCEDURE — 03HY32Z INSERTION OF MONITORING DEVICE INTO UPPER ARTERY, PERCUTANEOUS APPROACH: ICD-10-PCS | Performed by: INTERNAL MEDICINE

## 2022-01-01 PROCEDURE — 0BH17EZ INSERTION OF ENDOTRACHEAL AIRWAY INTO TRACHEA, VIA NATURAL OR ARTIFICIAL OPENING: ICD-10-PCS | Performed by: EMERGENCY MEDICINE

## 2022-01-01 PROCEDURE — 4A133J1 MONITORING OF ARTERIAL PULSE, PERIPHERAL, PERCUTANEOUS APPROACH: ICD-10-PCS | Performed by: INTERNAL MEDICINE

## 2022-01-01 PROCEDURE — 5A12012 PERFORMANCE OF CARDIAC OUTPUT, SINGLE, MANUAL: ICD-10-PCS | Performed by: INTERNAL MEDICINE

## 2022-01-01 PROCEDURE — 4A133B1 MONITORING OF ARTERIAL PRESSURE, PERIPHERAL, PERCUTANEOUS APPROACH: ICD-10-PCS | Performed by: INTERNAL MEDICINE

## 2022-01-01 PROCEDURE — 0BH18EZ INSERTION OF ENDOTRACHEAL AIRWAY INTO TRACHEA, VIA NATURAL OR ARTIFICIAL OPENING ENDOSCOPIC: ICD-10-PCS | Performed by: INTERNAL MEDICINE

## 2022-01-01 PROCEDURE — 0B968ZZ DRAINAGE OF RIGHT LOWER LOBE BRONCHUS, VIA NATURAL OR ARTIFICIAL OPENING ENDOSCOPIC: ICD-10-PCS | Performed by: ANESTHESIOLOGY

## 2022-01-01 PROCEDURE — 0B9B8ZZ DRAINAGE OF LEFT LOWER LOBE BRONCHUS, VIA NATURAL OR ARTIFICIAL OPENING ENDOSCOPIC: ICD-10-PCS | Performed by: ANESTHESIOLOGY

## 2022-01-01 RX ORDER — PROPOFOL 10 MG/ML
5-50 INJECTION, EMULSION INTRAVENOUS
Status: DISCONTINUED | OUTPATIENT
Start: 2022-01-01 | End: 2022-01-01

## 2022-01-01 RX ORDER — SODIUM CHLORIDE FOR INHALATION 3 %
4 VIAL, NEBULIZER (ML) INHALATION
Status: DISCONTINUED | OUTPATIENT
Start: 2022-01-01 | End: 2022-01-01

## 2022-01-01 RX ORDER — ETOMIDATE 2 MG/ML
0.3 INJECTION INTRAVENOUS ONCE
Status: COMPLETED | OUTPATIENT
Start: 2022-01-01 | End: 2022-01-01

## 2022-01-01 RX ORDER — VECURONIUM BROMIDE 1 MG/ML
10 INJECTION, POWDER, LYOPHILIZED, FOR SOLUTION INTRAVENOUS ONCE
Status: COMPLETED | OUTPATIENT
Start: 2022-01-01 | End: 2022-01-01

## 2022-01-01 RX ORDER — INSULIN GLARGINE 100 [IU]/ML
18 INJECTION, SOLUTION SUBCUTANEOUS
Status: DISCONTINUED | OUTPATIENT
Start: 2022-01-01 | End: 2022-01-01

## 2022-01-01 RX ORDER — POTASSIUM CHLORIDE 20MEQ/15ML
20 LIQUID (ML) ORAL ONCE
Status: COMPLETED | OUTPATIENT
Start: 2022-01-01 | End: 2022-01-01

## 2022-01-01 RX ORDER — CEFEPIME HYDROCHLORIDE 2 G/50ML
2000 INJECTION, SOLUTION INTRAVENOUS EVERY 8 HOURS
Status: DISCONTINUED | OUTPATIENT
Start: 2022-01-01 | End: 2022-01-01

## 2022-01-01 RX ORDER — INSULIN GLARGINE 100 [IU]/ML
22 INJECTION, SOLUTION SUBCUTANEOUS
Status: DISCONTINUED | OUTPATIENT
Start: 2022-01-01 | End: 2022-01-01

## 2022-01-01 RX ORDER — FENTANYL CITRATE-0.9 % NACL/PF 10 MCG/ML
75 PLASTIC BAG, INJECTION (ML) INTRAVENOUS CONTINUOUS
Status: DISCONTINUED | OUTPATIENT
Start: 2022-01-01 | End: 2022-01-01

## 2022-01-01 RX ORDER — HYDRALAZINE HYDROCHLORIDE 20 MG/ML
5 INJECTION INTRAMUSCULAR; INTRAVENOUS ONCE
Status: COMPLETED | OUTPATIENT
Start: 2022-01-01 | End: 2022-01-01

## 2022-01-01 RX ORDER — HYDRALAZINE HYDROCHLORIDE 20 MG/ML
5 INJECTION INTRAMUSCULAR; INTRAVENOUS EVERY 6 HOURS PRN
Status: DISCONTINUED | OUTPATIENT
Start: 2022-01-01 | End: 2022-01-01

## 2022-01-01 RX ORDER — QUETIAPINE FUMARATE 25 MG/1
25 TABLET, FILM COATED ORAL 2 TIMES DAILY
Status: DISCONTINUED | OUTPATIENT
Start: 2022-01-01 | End: 2022-01-01

## 2022-01-01 RX ORDER — SENNOSIDES 8.8 MG/5ML
8.8 LIQUID ORAL 2 TIMES DAILY
Status: DISCONTINUED | OUTPATIENT
Start: 2022-01-01 | End: 2022-01-01

## 2022-01-01 RX ORDER — BISACODYL 10 MG
10 SUPPOSITORY, RECTAL RECTAL DAILY PRN
Status: DISCONTINUED | OUTPATIENT
Start: 2022-01-01 | End: 2022-11-03 | Stop reason: HOSPADM

## 2022-01-01 RX ORDER — FENTANYL CITRATE 50 UG/ML
50 INJECTION, SOLUTION INTRAMUSCULAR; INTRAVENOUS EVERY 2 HOUR PRN
Status: DISCONTINUED | OUTPATIENT
Start: 2022-01-01 | End: 2022-01-01

## 2022-01-01 RX ORDER — CHLORHEXIDINE GLUCONATE 0.12 MG/ML
15 RINSE ORAL EVERY 12 HOURS SCHEDULED
Status: DISCONTINUED | OUTPATIENT
Start: 2022-01-01 | End: 2022-01-01

## 2022-01-01 RX ORDER — VECURONIUM BROMIDE 1 MG/ML
10 INJECTION, POWDER, LYOPHILIZED, FOR SOLUTION INTRAVENOUS ONCE
Status: CANCELLED | OUTPATIENT
Start: 2022-01-01 | End: 2022-01-01

## 2022-01-01 RX ORDER — ETOMIDATE 2 MG/ML
20 INJECTION INTRAVENOUS ONCE
Status: COMPLETED | OUTPATIENT
Start: 2022-01-01 | End: 2022-01-01

## 2022-01-01 RX ORDER — ACETAMINOPHEN 325 MG/1
650 TABLET ORAL ONCE
Status: DISCONTINUED | OUTPATIENT
Start: 2022-01-01 | End: 2022-01-01

## 2022-01-01 RX ORDER — DEXMEDETOMIDINE 100 UG/ML
.1-.7 INJECTION, SOLUTION, CONCENTRATE INTRAVENOUS
Status: DISCONTINUED | OUTPATIENT
Start: 2022-01-01 | End: 2022-01-01

## 2022-01-01 RX ORDER — DILTIAZEM HYDROCHLORIDE 120 MG/1
120 CAPSULE, COATED, EXTENDED RELEASE ORAL DAILY
Status: DISCONTINUED | OUTPATIENT
Start: 2022-01-01 | End: 2022-01-01

## 2022-01-01 RX ORDER — MINERAL OIL AND PETROLATUM 150; 830 MG/G; MG/G
OINTMENT OPHTHALMIC
Status: DISCONTINUED | OUTPATIENT
Start: 2022-01-01 | End: 2022-01-01

## 2022-01-01 RX ORDER — METHYLPREDNISOLONE SODIUM SUCCINATE 40 MG/ML
40 INJECTION, POWDER, LYOPHILIZED, FOR SOLUTION INTRAMUSCULAR; INTRAVENOUS DAILY
Status: DISCONTINUED | OUTPATIENT
Start: 2022-01-01 | End: 2022-01-01

## 2022-01-01 RX ORDER — INSULIN GLARGINE 100 [IU]/ML
50 INJECTION, SOLUTION SUBCUTANEOUS
Status: DISCONTINUED | OUTPATIENT
Start: 2022-01-01 | End: 2022-01-01

## 2022-01-01 RX ORDER — METHYLPREDNISOLONE SODIUM SUCCINATE 40 MG/ML
40 INJECTION, POWDER, LYOPHILIZED, FOR SOLUTION INTRAMUSCULAR; INTRAVENOUS EVERY 8 HOURS SCHEDULED
Status: DISCONTINUED | OUTPATIENT
Start: 2022-01-01 | End: 2022-01-01

## 2022-01-01 RX ORDER — FUROSEMIDE 10 MG/ML
40 INJECTION INTRAMUSCULAR; INTRAVENOUS ONCE
Status: COMPLETED | OUTPATIENT
Start: 2022-01-01 | End: 2022-01-01

## 2022-01-01 RX ORDER — HALOPERIDOL 5 MG/ML
0.5 INJECTION INTRAMUSCULAR EVERY 2 HOUR PRN
Status: DISCONTINUED | OUTPATIENT
Start: 2022-01-01 | End: 2022-11-03 | Stop reason: HOSPADM

## 2022-01-01 RX ORDER — INSULIN GLARGINE 100 [IU]/ML
25 INJECTION, SOLUTION SUBCUTANEOUS
Status: DISCONTINUED | OUTPATIENT
Start: 2022-01-01 | End: 2022-01-01

## 2022-01-01 RX ORDER — IPRATROPIUM BROMIDE AND ALBUTEROL SULFATE 2.5; .5 MG/3ML; MG/3ML
3 SOLUTION RESPIRATORY (INHALATION)
Status: DISCONTINUED | OUTPATIENT
Start: 2022-01-01 | End: 2022-01-01

## 2022-01-01 RX ORDER — POTASSIUM CHLORIDE 20MEQ/15ML
40 LIQUID (ML) ORAL 2 TIMES DAILY
Status: COMPLETED | OUTPATIENT
Start: 2022-01-01 | End: 2022-01-01

## 2022-01-01 RX ORDER — POLYETHYLENE GLYCOL 3350 17 G/17G
17 POWDER, FOR SOLUTION ORAL DAILY
Status: DISCONTINUED | OUTPATIENT
Start: 2022-01-01 | End: 2022-01-01

## 2022-01-01 RX ORDER — INSULIN LISPRO 100 [IU]/ML
2-12 INJECTION, SOLUTION INTRAVENOUS; SUBCUTANEOUS EVERY 6 HOURS SCHEDULED
Status: DISCONTINUED | OUTPATIENT
Start: 2022-01-01 | End: 2022-01-01

## 2022-01-01 RX ORDER — ENOXAPARIN SODIUM 100 MG/ML
40 INJECTION SUBCUTANEOUS DAILY
Status: DISCONTINUED | OUTPATIENT
Start: 2022-01-01 | End: 2022-01-01

## 2022-01-01 RX ORDER — INSULIN GLARGINE 100 [IU]/ML
74 INJECTION, SOLUTION SUBCUTANEOUS
Status: DISCONTINUED | OUTPATIENT
Start: 2022-01-01 | End: 2022-01-01

## 2022-01-01 RX ORDER — PROPOFOL 10 MG/ML
INJECTION, EMULSION INTRAVENOUS
Status: COMPLETED
Start: 2022-01-01 | End: 2022-01-01

## 2022-01-01 RX ORDER — LORAZEPAM 2 MG/ML
2 INJECTION INTRAMUSCULAR
Status: COMPLETED | OUTPATIENT
Start: 2022-01-01 | End: 2022-01-01

## 2022-01-01 RX ORDER — MIDAZOLAM HYDROCHLORIDE 2 MG/2ML
2 INJECTION, SOLUTION INTRAMUSCULAR; INTRAVENOUS ONCE
Status: COMPLETED | OUTPATIENT
Start: 2022-01-01 | End: 2022-01-01

## 2022-01-01 RX ORDER — FENTANYL CITRATE 50 UG/ML
100 INJECTION, SOLUTION INTRAMUSCULAR; INTRAVENOUS ONCE
Status: COMPLETED | OUTPATIENT
Start: 2022-01-01 | End: 2022-01-01

## 2022-01-01 RX ORDER — ACETAMINOPHEN 160 MG/5ML
650 SUSPENSION, ORAL (FINAL DOSE FORM) ORAL EVERY 6 HOURS PRN
Status: DISCONTINUED | OUTPATIENT
Start: 2022-01-01 | End: 2022-01-01

## 2022-01-01 RX ORDER — VANCOMYCIN HYDROCHLORIDE 1 G/200ML
15 INJECTION, SOLUTION INTRAVENOUS EVERY 12 HOURS
Status: DISCONTINUED | OUTPATIENT
Start: 2022-01-01 | End: 2022-01-01

## 2022-01-01 RX ORDER — FUROSEMIDE 10 MG/ML
20 INJECTION INTRAMUSCULAR; INTRAVENOUS ONCE
Status: COMPLETED | OUTPATIENT
Start: 2022-01-01 | End: 2022-01-01

## 2022-01-01 RX ORDER — DULOXETIN HYDROCHLORIDE 30 MG/1
30 CAPSULE, DELAYED RELEASE ORAL DAILY
Status: DISCONTINUED | OUTPATIENT
Start: 2022-01-01 | End: 2022-01-01

## 2022-01-01 RX ORDER — POTASSIUM CHLORIDE 14.9 MG/ML
20 INJECTION INTRAVENOUS ONCE
Status: COMPLETED | OUTPATIENT
Start: 2022-01-01 | End: 2022-01-01

## 2022-01-01 RX ORDER — BUPROPION HYDROCHLORIDE 100 MG/1
100 TABLET ORAL 3 TIMES DAILY
Status: DISCONTINUED | OUTPATIENT
Start: 2022-01-01 | End: 2022-01-01

## 2022-01-01 RX ORDER — FENTANYL CITRATE 50 UG/ML
50 INJECTION, SOLUTION INTRAMUSCULAR; INTRAVENOUS ONCE
Status: COMPLETED | OUTPATIENT
Start: 2022-01-01 | End: 2022-01-01

## 2022-01-01 RX ORDER — ALBUMIN, HUMAN INJ 5% 5 %
SOLUTION INTRAVENOUS
Status: COMPLETED
Start: 2022-01-01 | End: 2022-01-01

## 2022-01-01 RX ORDER — LORAZEPAM 2 MG/ML
1 INJECTION INTRAMUSCULAR
Status: DISCONTINUED | OUTPATIENT
Start: 2022-01-01 | End: 2022-01-01

## 2022-01-01 RX ORDER — ENOXAPARIN SODIUM 100 MG/ML
40 INJECTION SUBCUTANEOUS
Status: DISCONTINUED | OUTPATIENT
Start: 2022-01-01 | End: 2022-01-01

## 2022-01-01 RX ORDER — SENNOSIDES 8.8 MG/5ML
8.8 LIQUID ORAL DAILY
Status: DISCONTINUED | OUTPATIENT
Start: 2022-01-01 | End: 2022-01-01

## 2022-01-01 RX ORDER — NICOTINE 21 MG/24HR
1 PATCH, TRANSDERMAL 24 HOURS TRANSDERMAL EVERY 24 HOURS
Qty: 28 PATCH | Refills: 0 | Status: SHIPPED | OUTPATIENT
Start: 2022-01-01 | End: 2022-01-01 | Stop reason: ALTCHOICE

## 2022-01-01 RX ORDER — ACETAMINOPHEN 650 MG/1
650 SUPPOSITORY RECTAL ONCE
Status: COMPLETED | OUTPATIENT
Start: 2022-01-01 | End: 2022-01-01

## 2022-01-01 RX ORDER — CEFEPIME HYDROCHLORIDE 1 G/50ML
1000 INJECTION, SOLUTION INTRAVENOUS EVERY 12 HOURS
Status: DISCONTINUED | OUTPATIENT
Start: 2022-01-01 | End: 2022-01-01

## 2022-01-01 RX ORDER — INSULIN LISPRO 100 [IU]/ML
4-20 INJECTION, SOLUTION INTRAVENOUS; SUBCUTANEOUS EVERY 6 HOURS SCHEDULED
Status: DISCONTINUED | OUTPATIENT
Start: 2022-01-01 | End: 2022-01-01

## 2022-01-01 RX ORDER — FENTANYL CITRATE 50 UG/ML
50 INJECTION, SOLUTION INTRAMUSCULAR; INTRAVENOUS
Status: DISCONTINUED | OUTPATIENT
Start: 2022-01-01 | End: 2022-01-01

## 2022-01-01 RX ORDER — ACETAMINOPHEN 160 MG/5ML
650 SUSPENSION, ORAL (FINAL DOSE FORM) ORAL EVERY 6 HOURS
Status: DISCONTINUED | OUTPATIENT
Start: 2022-01-01 | End: 2022-01-01

## 2022-01-01 RX ORDER — FENTANYL CITRATE-0.9 % NACL/PF 10 MCG/ML
50 PLASTIC BAG, INJECTION (ML) INTRAVENOUS CONTINUOUS
Status: DISCONTINUED | OUTPATIENT
Start: 2022-01-01 | End: 2022-01-01

## 2022-01-01 RX ORDER — BUPROPION HYDROCHLORIDE 300 MG/1
300 TABLET ORAL EVERY MORNING
Status: DISCONTINUED | OUTPATIENT
Start: 2022-01-01 | End: 2022-01-01

## 2022-01-01 RX ORDER — SUCCINYLCHOLINE/SOD CL,ISO/PF 100 MG/5ML
100 SYRINGE (ML) INTRAVENOUS ONCE
Status: COMPLETED | OUTPATIENT
Start: 2022-01-01 | End: 2022-01-01

## 2022-01-01 RX ORDER — ATORVASTATIN CALCIUM 40 MG/1
80 TABLET, FILM COATED ORAL
Status: DISCONTINUED | OUTPATIENT
Start: 2022-01-01 | End: 2022-01-01

## 2022-01-01 RX ORDER — GLYCOPYRROLATE 0.2 MG/ML
0.1 INJECTION INTRAMUSCULAR; INTRAVENOUS EVERY 4 HOURS PRN
Status: DISCONTINUED | OUTPATIENT
Start: 2022-01-01 | End: 2022-11-03 | Stop reason: HOSPADM

## 2022-01-01 RX ORDER — INSULIN GLARGINE 100 [IU]/ML
15 INJECTION, SOLUTION SUBCUTANEOUS
Status: DISCONTINUED | OUTPATIENT
Start: 2022-01-01 | End: 2022-01-01

## 2022-01-01 RX ORDER — EPINEPHRINE 0.1 MG/ML
SYRINGE (ML) INJECTION CODE/TRAUMA/SEDATION MEDICATION
Status: COMPLETED | OUTPATIENT
Start: 2022-01-01 | End: 2022-01-01

## 2022-01-01 RX ORDER — SODIUM CHLORIDE, SODIUM GLUCONATE, SODIUM ACETATE, POTASSIUM CHLORIDE, MAGNESIUM CHLORIDE, SODIUM PHOSPHATE, DIBASIC, AND POTASSIUM PHOSPHATE .53; .5; .37; .037; .03; .012; .00082 G/100ML; G/100ML; G/100ML; G/100ML; G/100ML; G/100ML; G/100ML
50 INJECTION, SOLUTION INTRAVENOUS CONTINUOUS
Status: DISPENSED | OUTPATIENT
Start: 2022-01-01 | End: 2022-01-01

## 2022-01-01 RX ORDER — PREDNISONE 10 MG/1
10 TABLET ORAL DAILY
Qty: 30 TABLET | Refills: 0 | Status: SHIPPED | OUTPATIENT
Start: 2022-01-01 | End: 2022-01-01

## 2022-01-01 RX ORDER — SCOLOPAMINE TRANSDERMAL SYSTEM 1 MG/1
1 PATCH, EXTENDED RELEASE TRANSDERMAL
Status: DISCONTINUED | OUTPATIENT
Start: 2022-01-01 | End: 2022-11-03 | Stop reason: HOSPADM

## 2022-01-01 RX ORDER — MAGNESIUM SULFATE HEPTAHYDRATE 40 MG/ML
2 INJECTION, SOLUTION INTRAVENOUS ONCE
Status: COMPLETED | OUTPATIENT
Start: 2022-01-01 | End: 2022-01-01

## 2022-01-01 RX ORDER — HEPARIN SODIUM 5000 [USP'U]/ML
5000 INJECTION, SOLUTION INTRAVENOUS; SUBCUTANEOUS EVERY 8 HOURS SCHEDULED
Status: DISCONTINUED | OUTPATIENT
Start: 2022-01-01 | End: 2022-01-01

## 2022-01-01 RX ORDER — SODIUM CHLORIDE, SODIUM GLUCONATE, SODIUM ACETATE, POTASSIUM CHLORIDE, MAGNESIUM CHLORIDE, SODIUM PHOSPHATE, DIBASIC, AND POTASSIUM PHOSPHATE .53; .5; .37; .037; .03; .012; .00082 G/100ML; G/100ML; G/100ML; G/100ML; G/100ML; G/100ML; G/100ML
1000 INJECTION, SOLUTION INTRAVENOUS ONCE
Status: COMPLETED | OUTPATIENT
Start: 2022-01-01 | End: 2022-01-01

## 2022-01-01 RX ORDER — ALBUMIN, HUMAN INJ 5% 5 %
25 SOLUTION INTRAVENOUS ONCE
Status: COMPLETED | OUTPATIENT
Start: 2022-01-01 | End: 2022-01-01

## 2022-01-01 RX ORDER — METHYLPREDNISOLONE SODIUM SUCCINATE 40 MG/ML
40 INJECTION, POWDER, LYOPHILIZED, FOR SOLUTION INTRAMUSCULAR; INTRAVENOUS EVERY 12 HOURS SCHEDULED
Status: DISCONTINUED | OUTPATIENT
Start: 2022-01-01 | End: 2022-01-01

## 2022-01-01 RX ORDER — AZITHROMYCIN 250 MG/1
TABLET, FILM COATED ORAL
Qty: 6 TABLET | Refills: 0 | Status: ON HOLD | OUTPATIENT
Start: 2022-01-01 | End: 2022-01-01

## 2022-01-01 RX ADMIN — INSULIN LISPRO 4 UNITS: 100 INJECTION, SOLUTION INTRAVENOUS; SUBCUTANEOUS at 18:00

## 2022-01-01 RX ADMIN — INSULIN LISPRO 4 UNITS: 100 INJECTION, SOLUTION INTRAVENOUS; SUBCUTANEOUS at 18:07

## 2022-01-01 RX ADMIN — FENTANYL CITRATE 50 MCG: 50 INJECTION INTRAMUSCULAR; INTRAVENOUS at 20:18

## 2022-01-01 RX ADMIN — PROPOFOL 30 MCG/KG/MIN: 10 INJECTION, EMULSION INTRAVENOUS at 16:26

## 2022-01-01 RX ADMIN — ETOMIDATE 20 MG: 20 INJECTION, SOLUTION INTRAVENOUS at 10:00

## 2022-01-01 RX ADMIN — INSULIN LISPRO 2 UNITS: 100 INJECTION, SOLUTION INTRAVENOUS; SUBCUTANEOUS at 05:25

## 2022-01-01 RX ADMIN — SODIUM CHLORIDE SOLN NEBU 3% 4 ML: 3 NEBU SOLN at 13:22

## 2022-01-01 RX ADMIN — FENTANYL CITRATE 75 MCG/HR: 50 INJECTION INTRAVENOUS at 08:42

## 2022-01-01 RX ADMIN — PROPOFOL 45 MCG/KG/MIN: 10 INJECTION, EMULSION INTRAVENOUS at 06:10

## 2022-01-01 RX ADMIN — PROPOFOL 30 MCG/KG/MIN: 10 INJECTION, EMULSION INTRAVENOUS at 20:09

## 2022-01-01 RX ADMIN — CHLORHEXIDINE GLUCONATE 15 ML: 1.2 RINSE ORAL at 09:15

## 2022-01-01 RX ADMIN — ATORVASTATIN CALCIUM 80 MG: 40 TABLET, FILM COATED ORAL at 17:27

## 2022-01-01 RX ADMIN — SODIUM CHLORIDE SOLN NEBU 3% 4 ML: 3 NEBU SOLN at 07:40

## 2022-01-01 RX ADMIN — BUPROPION HYDROCHLORIDE TABLETS 100 MG: 100 TABLET, FILM COATED ORAL at 16:54

## 2022-01-01 RX ADMIN — DULOXETINE HYDROCHLORIDE 30 MG: 30 CAPSULE, DELAYED RELEASE ORAL at 08:14

## 2022-01-01 RX ADMIN — FENTANYL CITRATE 50 MCG: 50 INJECTION INTRAMUSCULAR; INTRAVENOUS at 00:38

## 2022-01-01 RX ADMIN — ACETAMINOPHEN 650 MG: 650 SUSPENSION ORAL at 11:34

## 2022-01-01 RX ADMIN — SODIUM CHLORIDE SOLN NEBU 3% 4 ML: 3 NEBU SOLN at 14:22

## 2022-01-01 RX ADMIN — SODIUM CHLORIDE 12 UNITS/HR: 9 INJECTION, SOLUTION INTRAVENOUS at 16:54

## 2022-01-01 RX ADMIN — ALBUMIN, HUMAN INJ 5% 25 G: 5 SOLUTION at 05:39

## 2022-01-01 RX ADMIN — FENTANYL CITRATE 75 MCG/HR: 50 INJECTION INTRAVENOUS at 17:41

## 2022-01-01 RX ADMIN — SODIUM CHLORIDE SOLN NEBU 3% 4 ML: 3 NEBU SOLN at 01:30

## 2022-01-01 RX ADMIN — SODIUM CHLORIDE SOLN NEBU 3% 4 ML: 3 NEBU SOLN at 07:35

## 2022-01-01 RX ADMIN — POLYETHYLENE GLYCOL 3350 17 G: 17 POWDER, FOR SOLUTION ORAL at 08:21

## 2022-01-01 RX ADMIN — PROPOFOL 30 MCG/KG/MIN: 10 INJECTION, EMULSION INTRAVENOUS at 14:26

## 2022-01-01 RX ADMIN — INSULIN GLARGINE 25 UNITS: 100 INJECTION, SOLUTION SUBCUTANEOUS at 21:21

## 2022-01-01 RX ADMIN — SODIUM CHLORIDE 8 UNITS/HR: 9 INJECTION, SOLUTION INTRAVENOUS at 10:19

## 2022-01-01 RX ADMIN — SODIUM CHLORIDE SOLN NEBU 3% 4 ML: 3 NEBU SOLN at 07:55

## 2022-01-01 RX ADMIN — INSULIN LISPRO 4 UNITS: 100 INJECTION, SOLUTION INTRAVENOUS; SUBCUTANEOUS at 06:08

## 2022-01-01 RX ADMIN — IPRATROPIUM BROMIDE AND ALBUTEROL SULFATE 3 ML: 2.5; .5 SOLUTION RESPIRATORY (INHALATION) at 13:23

## 2022-01-01 RX ADMIN — PROPOFOL 45 MCG/KG/MIN: 10 INJECTION, EMULSION INTRAVENOUS at 09:47

## 2022-01-01 RX ADMIN — MIDAZOLAM 2 MG: 1 INJECTION INTRAMUSCULAR; INTRAVENOUS at 00:17

## 2022-01-01 RX ADMIN — SODIUM CHLORIDE SOLN NEBU 3% 4 ML: 3 NEBU SOLN at 01:28

## 2022-01-01 RX ADMIN — PROPOFOL 10 MCG/KG/MIN: 10 INJECTION, EMULSION INTRAVENOUS at 16:34

## 2022-01-01 RX ADMIN — Medication 20 MG: at 09:04

## 2022-01-01 RX ADMIN — VANCOMYCIN HYDROCHLORIDE 1000 MG: 1 INJECTION, SOLUTION INTRAVENOUS at 18:31

## 2022-01-01 RX ADMIN — IPRATROPIUM BROMIDE AND ALBUTEROL SULFATE 3 ML: 2.5; .5 SOLUTION RESPIRATORY (INHALATION) at 13:29

## 2022-01-01 RX ADMIN — IPRATROPIUM BROMIDE AND ALBUTEROL SULFATE 3 ML: 2.5; .5 SOLUTION RESPIRATORY (INHALATION) at 07:37

## 2022-01-01 RX ADMIN — FENTANYL CITRATE 50 MCG: 50 INJECTION INTRAMUSCULAR; INTRAVENOUS at 05:33

## 2022-01-01 RX ADMIN — IPRATROPIUM BROMIDE AND ALBUTEROL SULFATE 3 ML: 2.5; .5 SOLUTION RESPIRATORY (INHALATION) at 19:38

## 2022-01-01 RX ADMIN — METHYLPREDNISOLONE SODIUM SUCCINATE 40 MG: 40 INJECTION, POWDER, FOR SOLUTION INTRAMUSCULAR; INTRAVENOUS at 08:08

## 2022-01-01 RX ADMIN — PROPOFOL 20 MCG/KG/MIN: 10 INJECTION, EMULSION INTRAVENOUS at 00:20

## 2022-01-01 RX ADMIN — CHLORHEXIDINE GLUCONATE 15 ML: 1.2 RINSE ORAL at 21:57

## 2022-01-01 RX ADMIN — IPRATROPIUM BROMIDE AND ALBUTEROL SULFATE 3 ML: 2.5; .5 SOLUTION RESPIRATORY (INHALATION) at 07:51

## 2022-01-01 RX ADMIN — SODIUM CHLORIDE SOLN NEBU 3% 4 ML: 3 NEBU SOLN at 13:15

## 2022-01-01 RX ADMIN — SODIUM CHLORIDE SOLN NEBU 3% 4 ML: 3 NEBU SOLN at 02:55

## 2022-01-01 RX ADMIN — METHYLPREDNISOLONE SODIUM SUCCINATE 40 MG: 40 INJECTION, POWDER, FOR SOLUTION INTRAMUSCULAR; INTRAVENOUS at 08:42

## 2022-01-01 RX ADMIN — INSULIN LISPRO 4 UNITS: 100 INJECTION, SOLUTION INTRAVENOUS; SUBCUTANEOUS at 05:52

## 2022-01-01 RX ADMIN — POTASSIUM CHLORIDE 20 MEQ: 14.9 INJECTION, SOLUTION INTRAVENOUS at 08:04

## 2022-01-01 RX ADMIN — IPRATROPIUM BROMIDE AND ALBUTEROL SULFATE 3 ML: 2.5; .5 SOLUTION RESPIRATORY (INHALATION) at 20:01

## 2022-01-01 RX ADMIN — QUETIAPINE FUMARATE 25 MG: 25 TABLET ORAL at 12:17

## 2022-01-01 RX ADMIN — HEPARIN SODIUM 5000 UNITS: 5000 INJECTION INTRAVENOUS; SUBCUTANEOUS at 05:45

## 2022-01-01 RX ADMIN — INSULIN LISPRO 2 UNITS: 100 INJECTION, SOLUTION INTRAVENOUS; SUBCUTANEOUS at 06:12

## 2022-01-01 RX ADMIN — INSULIN GLARGINE 50 UNITS: 100 INJECTION, SOLUTION SUBCUTANEOUS at 21:13

## 2022-01-01 RX ADMIN — INSULIN LISPRO 4 UNITS: 100 INJECTION, SOLUTION INTRAVENOUS; SUBCUTANEOUS at 12:31

## 2022-01-01 RX ADMIN — PROPOFOL 40 MCG/KG/MIN: 10 INJECTION, EMULSION INTRAVENOUS at 02:22

## 2022-01-01 RX ADMIN — INSULIN LISPRO 4 UNITS: 100 INJECTION, SOLUTION INTRAVENOUS; SUBCUTANEOUS at 23:19

## 2022-01-01 RX ADMIN — LORAZEPAM 2 MG: 2 INJECTION INTRAMUSCULAR; INTRAVENOUS at 21:04

## 2022-01-01 RX ADMIN — ACETAMINOPHEN 650 MG: 650 SUSPENSION ORAL at 05:41

## 2022-01-01 RX ADMIN — CHLORHEXIDINE GLUCONATE 15 ML: 1.2 RINSE ORAL at 08:00

## 2022-01-01 RX ADMIN — CHLORHEXIDINE GLUCONATE 15 ML: 1.2 RINSE ORAL at 08:16

## 2022-01-01 RX ADMIN — PIPERACILLIN SODIUM,TAZOBACTAM SODIUM 4.5 G: 4; .5 INJECTION, POWDER, FOR SOLUTION INTRAVENOUS at 05:50

## 2022-01-01 RX ADMIN — ACETAMINOPHEN 650 MG: 650 SUSPENSION ORAL at 12:34

## 2022-01-01 RX ADMIN — VANCOMYCIN HYDROCHLORIDE 1250 MG: 10 INJECTION, POWDER, LYOPHILIZED, FOR SOLUTION INTRAVENOUS at 05:43

## 2022-01-01 RX ADMIN — SODIUM CHLORIDE SOLN NEBU 3% 4 ML: 3 NEBU SOLN at 07:42

## 2022-01-01 RX ADMIN — PIPERACILLIN SODIUM,TAZOBACTAM SODIUM 4.5 G: 4; .5 INJECTION, POWDER, FOR SOLUTION INTRAVENOUS at 12:45

## 2022-01-01 RX ADMIN — DULOXETINE HYDROCHLORIDE 30 MG: 30 CAPSULE, DELAYED RELEASE ORAL at 09:03

## 2022-01-01 RX ADMIN — CHLORHEXIDINE GLUCONATE 15 ML: 1.2 RINSE ORAL at 20:35

## 2022-01-01 RX ADMIN — HYDRALAZINE HYDROCHLORIDE 5 MG: 20 INJECTION INTRAMUSCULAR; INTRAVENOUS at 03:28

## 2022-01-01 RX ADMIN — FENTANYL CITRATE 50 MCG: 50 INJECTION INTRAMUSCULAR; INTRAVENOUS at 03:16

## 2022-01-01 RX ADMIN — CHLORHEXIDINE GLUCONATE 15 ML: 1.2 RINSE ORAL at 08:14

## 2022-01-01 RX ADMIN — IPRATROPIUM BROMIDE AND ALBUTEROL SULFATE 3 ML: 2.5; .5 SOLUTION RESPIRATORY (INHALATION) at 13:22

## 2022-01-01 RX ADMIN — IPRATROPIUM BROMIDE AND ALBUTEROL SULFATE 3 ML: 2.5; .5 SOLUTION RESPIRATORY (INHALATION) at 02:49

## 2022-01-01 RX ADMIN — INSULIN LISPRO 4 UNITS: 100 INJECTION, SOLUTION INTRAVENOUS; SUBCUTANEOUS at 06:12

## 2022-01-01 RX ADMIN — BUPROPION HYDROCHLORIDE TABLETS 100 MG: 100 TABLET, FILM COATED ORAL at 08:46

## 2022-01-01 RX ADMIN — IPRATROPIUM BROMIDE AND ALBUTEROL SULFATE 3 ML: 2.5; .5 SOLUTION RESPIRATORY (INHALATION) at 07:33

## 2022-01-01 RX ADMIN — IPRATROPIUM BROMIDE AND ALBUTEROL SULFATE 3 ML: 2.5; .5 SOLUTION RESPIRATORY (INHALATION) at 08:06

## 2022-01-01 RX ADMIN — IPRATROPIUM BROMIDE AND ALBUTEROL SULFATE 3 ML: 2.5; .5 SOLUTION RESPIRATORY (INHALATION) at 02:55

## 2022-01-01 RX ADMIN — PIPERACILLIN SODIUM,TAZOBACTAM SODIUM 4.5 G: 4; .5 INJECTION, POWDER, FOR SOLUTION INTRAVENOUS at 17:41

## 2022-01-01 RX ADMIN — INSULIN LISPRO 2 UNITS: 100 INJECTION, SOLUTION INTRAVENOUS; SUBCUTANEOUS at 17:12

## 2022-01-01 RX ADMIN — PROPOFOL 40 MCG/KG/MIN: 10 INJECTION, EMULSION INTRAVENOUS at 22:57

## 2022-01-01 RX ADMIN — IPRATROPIUM BROMIDE AND ALBUTEROL SULFATE 3 ML: 2.5; .5 SOLUTION RESPIRATORY (INHALATION) at 01:30

## 2022-01-01 RX ADMIN — VECURONIUM BROMIDE 10 MG: 1 INJECTION, POWDER, LYOPHILIZED, FOR SOLUTION INTRAVENOUS at 15:24

## 2022-01-01 RX ADMIN — POLYETHYLENE GLYCOL 3350 17 G: 17 POWDER, FOR SOLUTION ORAL at 08:29

## 2022-01-01 RX ADMIN — ATORVASTATIN CALCIUM 80 MG: 40 TABLET, FILM COATED ORAL at 17:32

## 2022-01-01 RX ADMIN — IPRATROPIUM BROMIDE AND ALBUTEROL SULFATE 3 ML: 2.5; .5 SOLUTION RESPIRATORY (INHALATION) at 14:41

## 2022-01-01 RX ADMIN — BUSPIRONE HYDROCHLORIDE 15 MG: 10 TABLET ORAL at 05:50

## 2022-01-01 RX ADMIN — Medication 20 MG: at 08:54

## 2022-01-01 RX ADMIN — PERFLUTREN 0.7 ML/MIN: 6.52 INJECTION, SUSPENSION INTRAVENOUS at 08:21

## 2022-01-01 RX ADMIN — VANCOMYCIN HYDROCHLORIDE 1250 MG: 10 INJECTION, POWDER, LYOPHILIZED, FOR SOLUTION INTRAVENOUS at 18:06

## 2022-01-01 RX ADMIN — Medication 20 MG: at 09:29

## 2022-01-01 RX ADMIN — DULOXETINE HYDROCHLORIDE 30 MG: 30 CAPSULE, DELAYED RELEASE ORAL at 08:06

## 2022-01-01 RX ADMIN — PIPERACILLIN SODIUM,TAZOBACTAM SODIUM 4.5 G: 4; .5 INJECTION, POWDER, FOR SOLUTION INTRAVENOUS at 18:03

## 2022-01-01 RX ADMIN — SODIUM CHLORIDE 0.4 MCG/KG/HR: 9 INJECTION, SOLUTION INTRAVENOUS at 00:26

## 2022-01-01 RX ADMIN — SODIUM CHLORIDE SOLN NEBU 3% 4 ML: 3 NEBU SOLN at 08:09

## 2022-01-01 RX ADMIN — PIPERACILLIN SODIUM,TAZOBACTAM SODIUM 4.5 G: 4; .5 INJECTION, POWDER, FOR SOLUTION INTRAVENOUS at 18:38

## 2022-01-01 RX ADMIN — FUROSEMIDE 20 MG: 10 INJECTION, SOLUTION INTRAMUSCULAR; INTRAVENOUS at 18:32

## 2022-01-01 RX ADMIN — ENOXAPARIN SODIUM 40 MG: 40 INJECTION SUBCUTANEOUS at 08:52

## 2022-01-01 RX ADMIN — SODIUM CHLORIDE SOLN NEBU 3% 4 ML: 3 NEBU SOLN at 20:03

## 2022-01-01 RX ADMIN — ACETAMINOPHEN 650 MG: 650 SUSPENSION ORAL at 23:22

## 2022-01-01 RX ADMIN — CEFEPIME 2000 MG: 2 INJECTION, POWDER, FOR SOLUTION INTRAVENOUS at 21:45

## 2022-01-01 RX ADMIN — VANCOMYCIN HYDROCHLORIDE 1000 MG: 1 INJECTION, SOLUTION INTRAVENOUS at 18:05

## 2022-01-01 RX ADMIN — PIPERACILLIN SODIUM,TAZOBACTAM SODIUM 4.5 G: 4; .5 INJECTION, POWDER, FOR SOLUTION INTRAVENOUS at 23:29

## 2022-01-01 RX ADMIN — CEFEPIME 2000 MG: 2 INJECTION, POWDER, FOR SOLUTION INTRAVENOUS at 05:18

## 2022-01-01 RX ADMIN — Medication 20 MG: at 12:31

## 2022-01-01 RX ADMIN — IPRATROPIUM BROMIDE AND ALBUTEROL SULFATE 3 ML: 2.5; .5 SOLUTION RESPIRATORY (INHALATION) at 19:27

## 2022-01-01 RX ADMIN — DULOXETINE HYDROCHLORIDE 30 MG: 30 CAPSULE, DELAYED RELEASE ORAL at 08:08

## 2022-01-01 RX ADMIN — ACETAMINOPHEN 650 MG: 650 SUSPENSION ORAL at 23:35

## 2022-01-01 RX ADMIN — CEFEPIME HYDROCHLORIDE 1000 MG: 1 INJECTION, SOLUTION INTRAVENOUS at 17:02

## 2022-01-01 RX ADMIN — FENTANYL CITRATE 50 MCG: 50 INJECTION INTRAMUSCULAR; INTRAVENOUS at 05:41

## 2022-01-01 RX ADMIN — FENTANYL CITRATE 100 MCG: 50 INJECTION INTRAMUSCULAR; INTRAVENOUS at 16:07

## 2022-01-01 RX ADMIN — METHYLPREDNISOLONE SODIUM SUCCINATE 40 MG: 40 INJECTION, POWDER, FOR SOLUTION INTRAMUSCULAR; INTRAVENOUS at 08:48

## 2022-01-01 RX ADMIN — SODIUM CHLORIDE SOLN NEBU 3% 4 ML: 3 NEBU SOLN at 20:01

## 2022-01-01 RX ADMIN — INSULIN GLARGINE 50 UNITS: 100 INJECTION, SOLUTION SUBCUTANEOUS at 21:55

## 2022-01-01 RX ADMIN — METHYLPREDNISOLONE SODIUM SUCCINATE 40 MG: 40 INJECTION, POWDER, FOR SOLUTION INTRAMUSCULAR; INTRAVENOUS at 13:37

## 2022-01-01 RX ADMIN — BUSPIRONE HYDROCHLORIDE 15 MG: 10 TABLET ORAL at 21:05

## 2022-01-01 RX ADMIN — Medication 20 MG: at 08:52

## 2022-01-01 RX ADMIN — BUPROPION HYDROCHLORIDE TABLETS 100 MG: 100 TABLET, FILM COATED ORAL at 20:45

## 2022-01-01 RX ADMIN — IPRATROPIUM BROMIDE AND ALBUTEROL SULFATE 3 ML: 2.5; .5 SOLUTION RESPIRATORY (INHALATION) at 19:35

## 2022-01-01 RX ADMIN — EPINEPHRINE 1 MG: 0.1 INJECTION, SOLUTION ENDOTRACHEAL; INTRACARDIAC; INTRAVENOUS at 10:16

## 2022-01-01 RX ADMIN — IPRATROPIUM BROMIDE AND ALBUTEROL SULFATE 3 ML: 2.5; .5 SOLUTION RESPIRATORY (INHALATION) at 07:42

## 2022-01-01 RX ADMIN — INSULIN LISPRO 2 UNITS: 100 INJECTION, SOLUTION INTRAVENOUS; SUBCUTANEOUS at 17:33

## 2022-01-01 RX ADMIN — SODIUM CHLORIDE SOLN NEBU 3% 4 ML: 3 NEBU SOLN at 13:28

## 2022-01-01 RX ADMIN — BUPROPION HYDROCHLORIDE TABLETS 100 MG: 100 TABLET, FILM COATED ORAL at 08:03

## 2022-01-01 RX ADMIN — FENTANYL CITRATE 25 MCG/HR: 50 INJECTION INTRAVENOUS at 21:08

## 2022-01-01 RX ADMIN — CHLORHEXIDINE GLUCONATE 15 ML: 1.2 RINSE ORAL at 20:20

## 2022-01-01 RX ADMIN — INSULIN LISPRO 4 UNITS: 100 INJECTION, SOLUTION INTRAVENOUS; SUBCUTANEOUS at 05:34

## 2022-01-01 RX ADMIN — SODIUM CHLORIDE SOLN NEBU 3% 4 ML: 3 NEBU SOLN at 13:29

## 2022-01-01 RX ADMIN — POLYETHYLENE GLYCOL 3350 17 G: 17 POWDER, FOR SOLUTION ORAL at 09:04

## 2022-01-01 RX ADMIN — CEFEPIME 2000 MG: 2 INJECTION, POWDER, FOR SOLUTION INTRAVENOUS at 12:25

## 2022-01-01 RX ADMIN — CHLORHEXIDINE GLUCONATE 15 ML: 1.2 RINSE ORAL at 21:59

## 2022-01-01 RX ADMIN — PROPOFOL 40 MCG/KG/MIN: 10 INJECTION, EMULSION INTRAVENOUS at 16:12

## 2022-01-01 RX ADMIN — SODIUM CHLORIDE SOLN NEBU 3% 4 ML: 3 NEBU SOLN at 08:06

## 2022-01-01 RX ADMIN — ATORVASTATIN CALCIUM 80 MG: 40 TABLET, FILM COATED ORAL at 17:41

## 2022-01-01 RX ADMIN — IPRATROPIUM BROMIDE AND ALBUTEROL SULFATE 3 ML: 2.5; .5 SOLUTION RESPIRATORY (INHALATION) at 19:37

## 2022-01-01 RX ADMIN — SODIUM CHLORIDE SOLN NEBU 3% 4 ML: 3 NEBU SOLN at 07:50

## 2022-01-01 RX ADMIN — ACETAMINOPHEN 650 MG: 650 SUSPENSION ORAL at 12:36

## 2022-01-01 RX ADMIN — SODIUM CHLORIDE SOLN NEBU 3% 4 ML: 3 NEBU SOLN at 13:18

## 2022-01-01 RX ADMIN — SODIUM CHLORIDE 0.2 MCG/KG/HR: 9 INJECTION, SOLUTION INTRAVENOUS at 17:33

## 2022-01-01 RX ADMIN — ATORVASTATIN CALCIUM 80 MG: 40 TABLET, FILM COATED ORAL at 16:34

## 2022-01-01 RX ADMIN — HEPARIN SODIUM 5000 UNITS: 5000 INJECTION INTRAVENOUS; SUBCUTANEOUS at 14:38

## 2022-01-01 RX ADMIN — INSULIN LISPRO 4 UNITS: 100 INJECTION, SOLUTION INTRAVENOUS; SUBCUTANEOUS at 11:53

## 2022-01-01 RX ADMIN — QUETIAPINE FUMARATE 25 MG: 25 TABLET ORAL at 20:35

## 2022-01-01 RX ADMIN — BUPROPION HYDROCHLORIDE TABLETS 100 MG: 100 TABLET, FILM COATED ORAL at 08:06

## 2022-01-01 RX ADMIN — BUPROPION HYDROCHLORIDE TABLETS 100 MG: 100 TABLET, FILM COATED ORAL at 21:13

## 2022-01-01 RX ADMIN — PROPOFOL 20 MCG/KG/MIN: 10 INJECTION, EMULSION INTRAVENOUS at 09:51

## 2022-01-01 RX ADMIN — FENTANYL CITRATE 50 MCG: 50 INJECTION INTRAMUSCULAR; INTRAVENOUS at 06:33

## 2022-01-01 RX ADMIN — Medication 20 MG: at 08:01

## 2022-01-01 RX ADMIN — ACETAMINOPHEN 650 MG: 650 SUSPENSION ORAL at 15:34

## 2022-01-01 RX ADMIN — IPRATROPIUM BROMIDE AND ALBUTEROL SULFATE 3 ML: 2.5; .5 SOLUTION RESPIRATORY (INHALATION) at 01:21

## 2022-01-01 RX ADMIN — QUETIAPINE FUMARATE 25 MG: 25 TABLET ORAL at 08:42

## 2022-01-01 RX ADMIN — IPRATROPIUM BROMIDE AND ALBUTEROL SULFATE 3 ML: 2.5; .5 SOLUTION RESPIRATORY (INHALATION) at 07:54

## 2022-01-01 RX ADMIN — ACETAMINOPHEN 650 MG: 650 SUSPENSION ORAL at 06:29

## 2022-01-01 RX ADMIN — PROPOFOL 50 MCG/KG/MIN: 10 INJECTION, EMULSION INTRAVENOUS at 18:14

## 2022-01-01 RX ADMIN — CHLORHEXIDINE GLUCONATE 15 ML: 1.2 RINSE ORAL at 20:22

## 2022-01-01 RX ADMIN — SODIUM CHLORIDE SOLN NEBU 3% 4 ML: 3 NEBU SOLN at 14:04

## 2022-01-01 RX ADMIN — METHYLPREDNISOLONE SODIUM SUCCINATE 40 MG: 40 INJECTION, POWDER, FOR SOLUTION INTRAMUSCULAR; INTRAVENOUS at 08:19

## 2022-01-01 RX ADMIN — PROPOFOL 20 MCG/KG/MIN: 10 INJECTION, EMULSION INTRAVENOUS at 01:52

## 2022-01-01 RX ADMIN — QUETIAPINE FUMARATE 25 MG: 25 TABLET ORAL at 08:04

## 2022-01-01 RX ADMIN — FENTANYL CITRATE 50 MCG/HR: 50 INJECTION, SOLUTION INTRAMUSCULAR; INTRAVENOUS at 00:14

## 2022-01-01 RX ADMIN — QUETIAPINE FUMARATE 25 MG: 25 TABLET ORAL at 08:13

## 2022-01-01 RX ADMIN — SODIUM CHLORIDE SOLN NEBU 3% 4 ML: 3 NEBU SOLN at 20:54

## 2022-01-01 RX ADMIN — IPRATROPIUM BROMIDE AND ALBUTEROL SULFATE 3 ML: 2.5; .5 SOLUTION RESPIRATORY (INHALATION) at 13:35

## 2022-01-01 RX ADMIN — METHYLPREDNISOLONE SODIUM SUCCINATE 40 MG: 40 INJECTION, POWDER, FOR SOLUTION INTRAMUSCULAR; INTRAVENOUS at 20:43

## 2022-01-01 RX ADMIN — SODIUM CHLORIDE SOLN NEBU 3% 4 ML: 3 NEBU SOLN at 08:11

## 2022-01-01 RX ADMIN — FENTANYL CITRATE 50 MCG: 50 INJECTION INTRAMUSCULAR; INTRAVENOUS at 08:29

## 2022-01-01 RX ADMIN — CEFEPIME 2000 MG: 2 INJECTION, POWDER, FOR SOLUTION INTRAVENOUS at 21:28

## 2022-01-01 RX ADMIN — ENOXAPARIN SODIUM 40 MG: 40 INJECTION SUBCUTANEOUS at 08:18

## 2022-01-01 RX ADMIN — ENOXAPARIN SODIUM 40 MG: 40 INJECTION SUBCUTANEOUS at 09:04

## 2022-01-01 RX ADMIN — LORAZEPAM 2 MG: 2 INJECTION INTRAMUSCULAR; INTRAVENOUS at 22:24

## 2022-01-01 RX ADMIN — DULOXETINE HYDROCHLORIDE 30 MG: 30 CAPSULE, DELAYED RELEASE ORAL at 08:04

## 2022-01-01 RX ADMIN — BUPROPION HYDROCHLORIDE TABLETS 100 MG: 100 TABLET, FILM COATED ORAL at 20:35

## 2022-01-01 RX ADMIN — ENOXAPARIN SODIUM 40 MG: 40 INJECTION SUBCUTANEOUS at 09:15

## 2022-01-01 RX ADMIN — BUPROPION HYDROCHLORIDE TABLETS 100 MG: 100 TABLET, FILM COATED ORAL at 15:50

## 2022-01-01 RX ADMIN — PROPOFOL 35 MCG/KG/MIN: 10 INJECTION, EMULSION INTRAVENOUS at 09:49

## 2022-01-01 RX ADMIN — SODIUM CHLORIDE SOLN NEBU 3% 4 ML: 3 NEBU SOLN at 03:17

## 2022-01-01 RX ADMIN — QUETIAPINE FUMARATE 25 MG: 25 TABLET ORAL at 08:18

## 2022-01-01 RX ADMIN — SODIUM CHLORIDE SOLN NEBU 3% 4 ML: 3 NEBU SOLN at 07:33

## 2022-01-01 RX ADMIN — PROPOFOL 50 MCG/KG/MIN: 10 INJECTION, EMULSION INTRAVENOUS at 08:40

## 2022-01-01 RX ADMIN — INSULIN LISPRO 4 UNITS: 100 INJECTION, SOLUTION INTRAVENOUS; SUBCUTANEOUS at 00:04

## 2022-01-01 RX ADMIN — IPRATROPIUM BROMIDE AND ALBUTEROL SULFATE 3 ML: 2.5; .5 SOLUTION RESPIRATORY (INHALATION) at 01:10

## 2022-01-01 RX ADMIN — MINERAL OIL AND PETROLATUM 1 APPLICATION: 150; 830 OINTMENT OPHTHALMIC at 07:48

## 2022-01-01 RX ADMIN — VECURONIUM BROMIDE 10 MG: 1 INJECTION, POWDER, LYOPHILIZED, FOR SOLUTION INTRAVENOUS at 00:17

## 2022-01-01 RX ADMIN — FUROSEMIDE 40 MG: 10 INJECTION, SOLUTION INTRAMUSCULAR; INTRAVENOUS at 10:55

## 2022-01-01 RX ADMIN — POTASSIUM CHLORIDE 40 MEQ: 20 SOLUTION ORAL at 15:20

## 2022-01-01 RX ADMIN — FENTANYL CITRATE 100 MCG: 50 INJECTION INTRAMUSCULAR; INTRAVENOUS at 08:56

## 2022-01-01 RX ADMIN — PROPOFOL 40 MCG/KG/MIN: 10 INJECTION, EMULSION INTRAVENOUS at 19:05

## 2022-01-01 RX ADMIN — BUPROPION HYDROCHLORIDE TABLETS 100 MG: 100 TABLET, FILM COATED ORAL at 20:22

## 2022-01-01 RX ADMIN — HEPARIN SODIUM 5000 UNITS: 5000 INJECTION INTRAVENOUS; SUBCUTANEOUS at 05:19

## 2022-01-01 RX ADMIN — FENTANYL CITRATE 50 MCG/HR: 50 INJECTION, SOLUTION INTRAMUSCULAR; INTRAVENOUS at 21:57

## 2022-01-01 RX ADMIN — INSULIN LISPRO 2 UNITS: 100 INJECTION, SOLUTION INTRAVENOUS; SUBCUTANEOUS at 23:26

## 2022-01-01 RX ADMIN — INSULIN LISPRO 4 UNITS: 100 INJECTION, SOLUTION INTRAVENOUS; SUBCUTANEOUS at 13:15

## 2022-01-01 RX ADMIN — SODIUM CHLORIDE 8 UNITS/HR: 9 INJECTION, SOLUTION INTRAVENOUS at 18:02

## 2022-01-01 RX ADMIN — ACETAMINOPHEN 650 MG: 650 SUSPENSION ORAL at 23:33

## 2022-01-01 RX ADMIN — PROPOFOL 30 MCG/KG/MIN: 10 INJECTION, EMULSION INTRAVENOUS at 18:07

## 2022-01-01 RX ADMIN — IPRATROPIUM BROMIDE AND ALBUTEROL SULFATE 3 ML: 2.5; .5 SOLUTION RESPIRATORY (INHALATION) at 01:28

## 2022-01-01 RX ADMIN — IPRATROPIUM BROMIDE AND ALBUTEROL SULFATE 3 ML: 2.5; .5 SOLUTION RESPIRATORY (INHALATION) at 03:17

## 2022-01-01 RX ADMIN — SODIUM CHLORIDE 1.5 UNITS/HR: 9 INJECTION, SOLUTION INTRAVENOUS at 17:24

## 2022-01-01 RX ADMIN — ATORVASTATIN CALCIUM 80 MG: 40 TABLET, FILM COATED ORAL at 15:56

## 2022-01-01 RX ADMIN — QUETIAPINE FUMARATE 25 MG: 25 TABLET ORAL at 21:13

## 2022-01-01 RX ADMIN — IPRATROPIUM BROMIDE AND ALBUTEROL SULFATE 3 ML: 2.5; .5 SOLUTION RESPIRATORY (INHALATION) at 13:19

## 2022-01-01 RX ADMIN — POTASSIUM CHLORIDE 20 MEQ: 14.9 INJECTION, SOLUTION INTRAVENOUS at 09:50

## 2022-01-01 RX ADMIN — BUPROPION HYDROCHLORIDE TABLETS 100 MG: 100 TABLET, FILM COATED ORAL at 16:18

## 2022-01-01 RX ADMIN — METHYLPREDNISOLONE SODIUM SUCCINATE 40 MG: 40 INJECTION, POWDER, FOR SOLUTION INTRAMUSCULAR; INTRAVENOUS at 21:45

## 2022-01-01 RX ADMIN — FENTANYL CITRATE 50 MCG: 50 INJECTION INTRAMUSCULAR; INTRAVENOUS at 10:54

## 2022-01-01 RX ADMIN — POLYETHYLENE GLYCOL 3350 17 G: 17 POWDER, FOR SOLUTION ORAL at 11:41

## 2022-01-01 RX ADMIN — QUETIAPINE FUMARATE 25 MG: 25 TABLET ORAL at 08:47

## 2022-01-01 RX ADMIN — SODIUM CHLORIDE, SODIUM GLUCONATE, SODIUM ACETATE, POTASSIUM CHLORIDE, MAGNESIUM CHLORIDE, SODIUM PHOSPHATE, DIBASIC, AND POTASSIUM PHOSPHATE 50 ML/HR: .53; .5; .37; .037; .03; .012; .00082 INJECTION, SOLUTION INTRAVENOUS at 05:16

## 2022-01-01 RX ADMIN — METHYLPREDNISOLONE SODIUM SUCCINATE 40 MG: 40 INJECTION, POWDER, FOR SOLUTION INTRAMUSCULAR; INTRAVENOUS at 08:20

## 2022-01-01 RX ADMIN — PROPOFOL 45 MCG/KG/MIN: 10 INJECTION, EMULSION INTRAVENOUS at 15:32

## 2022-01-01 RX ADMIN — GLYCOPYRROLATE 0.1 MG: 0.2 INJECTION, SOLUTION INTRAMUSCULAR; INTRAVENOUS at 19:59

## 2022-01-01 RX ADMIN — ATORVASTATIN CALCIUM 80 MG: 40 TABLET, FILM COATED ORAL at 15:36

## 2022-01-01 RX ADMIN — PIPERACILLIN SODIUM,TAZOBACTAM SODIUM 4.5 G: 4; .5 INJECTION, POWDER, FOR SOLUTION INTRAVENOUS at 17:15

## 2022-01-01 RX ADMIN — IOHEXOL 100 ML: 350 INJECTION, SOLUTION INTRAVENOUS at 17:12

## 2022-01-01 RX ADMIN — IPRATROPIUM BROMIDE AND ALBUTEROL SULFATE 3 ML: 2.5; .5 SOLUTION RESPIRATORY (INHALATION) at 20:03

## 2022-01-01 RX ADMIN — SODIUM CHLORIDE SOLN NEBU 3% 4 ML: 3 NEBU SOLN at 14:27

## 2022-01-01 RX ADMIN — VANCOMYCIN HYDROCHLORIDE 1000 MG: 1 INJECTION, SOLUTION INTRAVENOUS at 06:20

## 2022-01-01 RX ADMIN — CHLORHEXIDINE GLUCONATE 15 ML: 1.2 RINSE ORAL at 08:02

## 2022-01-01 RX ADMIN — IPRATROPIUM BROMIDE AND ALBUTEROL SULFATE 3 ML: 2.5; .5 SOLUTION RESPIRATORY (INHALATION) at 19:43

## 2022-01-01 RX ADMIN — INSULIN LISPRO 4 UNITS: 100 INJECTION, SOLUTION INTRAVENOUS; SUBCUTANEOUS at 00:07

## 2022-01-01 RX ADMIN — SODIUM CHLORIDE SOLN NEBU 3% 4 ML: 3 NEBU SOLN at 19:33

## 2022-01-01 RX ADMIN — IPRATROPIUM BROMIDE AND ALBUTEROL SULFATE 3 ML: 2.5; .5 SOLUTION RESPIRATORY (INHALATION) at 20:54

## 2022-01-01 RX ADMIN — SODIUM CHLORIDE SOLN NEBU 3% 4 ML: 3 NEBU SOLN at 13:19

## 2022-01-01 RX ADMIN — BUPROPION HYDROCHLORIDE TABLETS 100 MG: 100 TABLET, FILM COATED ORAL at 21:28

## 2022-01-01 RX ADMIN — SENNOSIDES 8.8 MG: 8.8 SYRUP ORAL at 17:40

## 2022-01-01 RX ADMIN — ENOXAPARIN SODIUM 40 MG: 40 INJECTION SUBCUTANEOUS at 08:02

## 2022-01-01 RX ADMIN — ACETAMINOPHEN 650 MG: 650 SUSPENSION ORAL at 00:37

## 2022-01-01 RX ADMIN — CEFEPIME HYDROCHLORIDE 1000 MG: 1 INJECTION, SOLUTION INTRAVENOUS at 05:17

## 2022-01-01 RX ADMIN — IPRATROPIUM BROMIDE AND ALBUTEROL SULFATE 3 ML: 2.5; .5 SOLUTION RESPIRATORY (INHALATION) at 08:11

## 2022-01-01 RX ADMIN — FENTANYL CITRATE 50 MCG/HR: 50 INJECTION, SOLUTION INTRAMUSCULAR; INTRAVENOUS at 18:30

## 2022-01-01 RX ADMIN — IPRATROPIUM BROMIDE AND ALBUTEROL SULFATE 3 ML: 2.5; .5 SOLUTION RESPIRATORY (INHALATION) at 13:15

## 2022-01-01 RX ADMIN — IPRATROPIUM BROMIDE AND ALBUTEROL SULFATE 3 ML: 2.5; .5 SOLUTION RESPIRATORY (INHALATION) at 13:55

## 2022-01-01 RX ADMIN — BUSPIRONE HYDROCHLORIDE 15 MG: 10 TABLET ORAL at 15:36

## 2022-01-01 RX ADMIN — SODIUM CHLORIDE SOLN NEBU 3% 4 ML: 3 NEBU SOLN at 19:18

## 2022-01-01 RX ADMIN — QUETIAPINE FUMARATE 25 MG: 25 TABLET ORAL at 20:22

## 2022-01-01 RX ADMIN — PROPOFOL 20 MCG/KG/MIN: 10 INJECTION, EMULSION INTRAVENOUS at 06:28

## 2022-01-01 RX ADMIN — SODIUM CHLORIDE SOLN NEBU 3% 4 ML: 3 NEBU SOLN at 19:21

## 2022-01-01 RX ADMIN — CHLORHEXIDINE GLUCONATE 15 ML: 1.2 RINSE ORAL at 09:04

## 2022-01-01 RX ADMIN — BUPROPION HYDROCHLORIDE TABLETS 100 MG: 100 TABLET, FILM COATED ORAL at 21:35

## 2022-01-01 RX ADMIN — INSULIN LISPRO 4 UNITS: 100 INJECTION, SOLUTION INTRAVENOUS; SUBCUTANEOUS at 12:33

## 2022-01-01 RX ADMIN — SODIUM CHLORIDE SOLN NEBU 3% 4 ML: 3 NEBU SOLN at 20:06

## 2022-01-01 RX ADMIN — INSULIN LISPRO 4 UNITS: 100 INJECTION, SOLUTION INTRAVENOUS; SUBCUTANEOUS at 18:42

## 2022-01-01 RX ADMIN — ATORVASTATIN CALCIUM 80 MG: 40 TABLET, FILM COATED ORAL at 17:25

## 2022-01-01 RX ADMIN — Medication 20 MG: at 08:23

## 2022-01-01 RX ADMIN — FUROSEMIDE 40 MG: 10 INJECTION, SOLUTION INTRAMUSCULAR; INTRAVENOUS at 11:33

## 2022-01-01 RX ADMIN — ENOXAPARIN SODIUM 40 MG: 40 INJECTION SUBCUTANEOUS at 08:29

## 2022-01-01 RX ADMIN — PROPOFOL 50 MCG/KG/MIN: 10 INJECTION, EMULSION INTRAVENOUS at 09:02

## 2022-01-01 RX ADMIN — PIPERACILLIN SODIUM,TAZOBACTAM SODIUM 4.5 G: 4; .5 INJECTION, POWDER, FOR SOLUTION INTRAVENOUS at 13:15

## 2022-01-01 RX ADMIN — CHLORHEXIDINE GLUCONATE 15 ML: 1.2 RINSE ORAL at 20:21

## 2022-01-01 RX ADMIN — PROPOFOL 40 MCG/KG/MIN: 10 INJECTION, EMULSION INTRAVENOUS at 23:02

## 2022-01-01 RX ADMIN — BUPROPION HYDROCHLORIDE TABLETS 100 MG: 100 TABLET, FILM COATED ORAL at 17:32

## 2022-01-01 RX ADMIN — INSULIN GLARGINE 22 UNITS: 100 INJECTION, SOLUTION SUBCUTANEOUS at 21:40

## 2022-01-01 RX ADMIN — PROPOFOL 30 MCG/KG/MIN: 10 INJECTION, EMULSION INTRAVENOUS at 00:07

## 2022-01-01 RX ADMIN — PROPOFOL 40 MCG/KG/MIN: 10 INJECTION, EMULSION INTRAVENOUS at 19:12

## 2022-01-01 RX ADMIN — FENTANYL CITRATE 50 MCG/HR: 50 INJECTION INTRAVENOUS at 16:14

## 2022-01-01 RX ADMIN — MORPHINE SULFATE 2 MG: 2 INJECTION, SOLUTION INTRAMUSCULAR; INTRAVENOUS at 19:36

## 2022-01-01 RX ADMIN — POLYETHYLENE GLYCOL 3350 17 G: 17 POWDER, FOR SOLUTION ORAL at 08:00

## 2022-01-01 RX ADMIN — INSULIN LISPRO 4 UNITS: 100 INJECTION, SOLUTION INTRAVENOUS; SUBCUTANEOUS at 11:27

## 2022-01-01 RX ADMIN — PIPERACILLIN SODIUM,TAZOBACTAM SODIUM 4.5 G: 4; .5 INJECTION, POWDER, FOR SOLUTION INTRAVENOUS at 05:25

## 2022-01-01 RX ADMIN — Medication 100 MG: at 16:17

## 2022-01-01 RX ADMIN — SODIUM CHLORIDE SOLN NEBU 3% 4 ML: 3 NEBU SOLN at 07:37

## 2022-01-01 RX ADMIN — POTASSIUM CHLORIDE 40 MEQ: 20 SOLUTION ORAL at 21:35

## 2022-01-01 RX ADMIN — INSULIN GLARGINE 18 UNITS: 100 INJECTION, SOLUTION SUBCUTANEOUS at 23:00

## 2022-01-01 RX ADMIN — FENTANYL CITRATE 50 MCG: 50 INJECTION INTRAMUSCULAR; INTRAVENOUS at 14:20

## 2022-01-01 RX ADMIN — SODIUM CHLORIDE 0.4 MCG/KG/HR: 9 INJECTION, SOLUTION INTRAVENOUS at 17:02

## 2022-01-01 RX ADMIN — QUETIAPINE FUMARATE 25 MG: 25 TABLET ORAL at 21:45

## 2022-01-01 RX ADMIN — SODIUM CHLORIDE 12 UNITS/HR: 9 INJECTION, SOLUTION INTRAVENOUS at 18:27

## 2022-01-01 RX ADMIN — IPRATROPIUM BROMIDE AND ALBUTEROL SULFATE 3 ML: 2.5; .5 SOLUTION RESPIRATORY (INHALATION) at 02:56

## 2022-01-01 RX ADMIN — SODIUM CHLORIDE 0.4 MCG/KG/HR: 9 INJECTION, SOLUTION INTRAVENOUS at 01:18

## 2022-01-01 RX ADMIN — PROPOFOL 50 MCG/KG/MIN: 10 INJECTION, EMULSION INTRAVENOUS at 20:21

## 2022-01-01 RX ADMIN — PROPOFOL 20 MCG/KG/MIN: 10 INJECTION, EMULSION INTRAVENOUS at 08:35

## 2022-01-01 RX ADMIN — SODIUM CHLORIDE SOLN NEBU 3% 4 ML: 3 NEBU SOLN at 19:38

## 2022-01-01 RX ADMIN — INSULIN LISPRO 4 UNITS: 100 INJECTION, SOLUTION INTRAVENOUS; SUBCUTANEOUS at 17:46

## 2022-01-01 RX ADMIN — SODIUM CHLORIDE SOLN NEBU 3% 4 ML: 3 NEBU SOLN at 13:23

## 2022-01-01 RX ADMIN — PROPOFOL 45 MCG/KG/MIN: 10 INJECTION, EMULSION INTRAVENOUS at 12:28

## 2022-01-01 RX ADMIN — SODIUM CHLORIDE SOLN NEBU 3% 4 ML: 3 NEBU SOLN at 13:31

## 2022-01-01 RX ADMIN — PROPOFOL 40 MCG/KG/MIN: 10 INJECTION, EMULSION INTRAVENOUS at 03:25

## 2022-01-01 RX ADMIN — CHLORHEXIDINE GLUCONATE 15 ML: 1.2 RINSE ORAL at 21:27

## 2022-01-01 RX ADMIN — METHYLPREDNISOLONE SODIUM SUCCINATE 40 MG: 40 INJECTION, POWDER, FOR SOLUTION INTRAMUSCULAR; INTRAVENOUS at 08:29

## 2022-01-01 RX ADMIN — CHLORHEXIDINE GLUCONATE 15 ML: 1.2 RINSE ORAL at 08:28

## 2022-01-01 RX ADMIN — ENOXAPARIN SODIUM 40 MG: 40 INJECTION SUBCUTANEOUS at 08:07

## 2022-01-01 RX ADMIN — CEFEPIME 2000 MG: 2 INJECTION, POWDER, FOR SOLUTION INTRAVENOUS at 12:00

## 2022-01-01 RX ADMIN — PIPERACILLIN SODIUM,TAZOBACTAM SODIUM 4.5 G: 4; .5 INJECTION, POWDER, FOR SOLUTION INTRAVENOUS at 12:17

## 2022-01-01 RX ADMIN — PROPOFOL 50 MCG/KG/MIN: 10 INJECTION, EMULSION INTRAVENOUS at 05:41

## 2022-01-01 RX ADMIN — IPRATROPIUM BROMIDE AND ALBUTEROL SULFATE 3 ML: 2.5; .5 SOLUTION RESPIRATORY (INHALATION) at 20:39

## 2022-01-01 RX ADMIN — BUPROPION HYDROCHLORIDE TABLETS 100 MG: 100 TABLET, FILM COATED ORAL at 21:45

## 2022-01-01 RX ADMIN — PROPOFOL 50 MCG/KG/MIN: 10 INJECTION, EMULSION INTRAVENOUS at 05:46

## 2022-01-01 RX ADMIN — CISATRACURIUM BESYLATE 0.3 MCG/KG/MIN: 10 INJECTION, SOLUTION INTRAVENOUS at 01:52

## 2022-01-01 RX ADMIN — FENTANYL CITRATE 50 MCG/HR: 50 INJECTION, SOLUTION INTRAMUSCULAR; INTRAVENOUS at 23:18

## 2022-01-01 RX ADMIN — FENTANYL CITRATE 50 MCG/HR: 50 INJECTION, SOLUTION INTRAMUSCULAR; INTRAVENOUS at 10:13

## 2022-01-01 RX ADMIN — CHLORHEXIDINE GLUCONATE 15 ML: 1.2 RINSE ORAL at 21:12

## 2022-01-01 RX ADMIN — PERFLUTREN 0.4 ML/MIN: 6.52 INJECTION, SUSPENSION INTRAVENOUS at 11:52

## 2022-01-01 RX ADMIN — ACETAMINOPHEN 650 MG: 650 SUSPENSION ORAL at 06:23

## 2022-01-01 RX ADMIN — PROPOFOL 45 MCG/KG/MIN: 10 INJECTION, EMULSION INTRAVENOUS at 05:42

## 2022-01-01 RX ADMIN — QUETIAPINE FUMARATE 25 MG: 25 TABLET ORAL at 09:02

## 2022-01-01 RX ADMIN — PIPERACILLIN SODIUM,TAZOBACTAM SODIUM 4.5 G: 4; .5 INJECTION, POWDER, FOR SOLUTION INTRAVENOUS at 17:39

## 2022-01-01 RX ADMIN — SODIUM CHLORIDE 14 UNITS/HR: 9 INJECTION, SOLUTION INTRAVENOUS at 11:27

## 2022-01-01 RX ADMIN — FENTANYL CITRATE 75 MCG/HR: 50 INJECTION INTRAVENOUS at 12:40

## 2022-01-01 RX ADMIN — SODIUM CHLORIDE SOLN NEBU 3% 4 ML: 3 NEBU SOLN at 02:50

## 2022-01-01 RX ADMIN — SODIUM CHLORIDE SOLN NEBU 3% 4 ML: 3 NEBU SOLN at 03:01

## 2022-01-01 RX ADMIN — SCOPALAMINE 1 PATCH: 1 PATCH, EXTENDED RELEASE TRANSDERMAL at 19:35

## 2022-01-01 RX ADMIN — INSULIN LISPRO 2 UNITS: 100 INJECTION, SOLUTION INTRAVENOUS; SUBCUTANEOUS at 05:50

## 2022-01-01 RX ADMIN — FENTANYL CITRATE 50 MCG: 50 INJECTION INTRAMUSCULAR; INTRAVENOUS at 22:37

## 2022-01-01 RX ADMIN — INSULIN LISPRO 2 UNITS: 100 INJECTION, SOLUTION INTRAVENOUS; SUBCUTANEOUS at 11:50

## 2022-01-01 RX ADMIN — QUETIAPINE FUMARATE 25 MG: 25 TABLET ORAL at 18:19

## 2022-01-01 RX ADMIN — FENTANYL CITRATE 50 MCG: 50 INJECTION INTRAMUSCULAR; INTRAVENOUS at 21:57

## 2022-01-01 RX ADMIN — ENOXAPARIN SODIUM 40 MG: 40 INJECTION SUBCUTANEOUS at 08:17

## 2022-01-01 RX ADMIN — ENOXAPARIN SODIUM 40 MG: 40 INJECTION SUBCUTANEOUS at 08:00

## 2022-01-01 RX ADMIN — PROPOFOL 45 MCG/KG/MIN: 10 INJECTION, EMULSION INTRAVENOUS at 13:11

## 2022-01-01 RX ADMIN — BUPROPION HYDROCHLORIDE TABLETS 100 MG: 100 TABLET, FILM COATED ORAL at 08:17

## 2022-01-01 RX ADMIN — PIPERACILLIN AND TAZOBACTAM 3.38 G: 3; .375 INJECTION, POWDER, LYOPHILIZED, FOR SOLUTION INTRAVENOUS at 15:30

## 2022-01-01 RX ADMIN — PROPOFOL 50 MCG/KG/MIN: 10 INJECTION, EMULSION INTRAVENOUS at 17:31

## 2022-01-01 RX ADMIN — LORAZEPAM 1 MG: 2 INJECTION INTRAMUSCULAR; INTRAVENOUS at 19:48

## 2022-01-01 RX ADMIN — ETOMIDATE 30.6 MG: 2 INJECTION INTRAVENOUS at 16:16

## 2022-01-01 RX ADMIN — SODIUM CHLORIDE 14 UNITS/HR: 9 INJECTION, SOLUTION INTRAVENOUS at 03:44

## 2022-01-01 RX ADMIN — SODIUM CHLORIDE SOLN NEBU 3% 4 ML: 3 NEBU SOLN at 13:47

## 2022-01-01 RX ADMIN — CHLORHEXIDINE GLUCONATE 15 ML: 1.2 RINSE ORAL at 08:52

## 2022-01-01 RX ADMIN — IPRATROPIUM BROMIDE AND ALBUTEROL SULFATE 3 ML: 2.5; .5 SOLUTION RESPIRATORY (INHALATION) at 13:47

## 2022-01-01 RX ADMIN — BUPROPION HYDROCHLORIDE TABLETS 100 MG: 100 TABLET, FILM COATED ORAL at 15:45

## 2022-01-01 RX ADMIN — EPINEPHRINE 1 MG: 0.1 INJECTION, SOLUTION ENDOTRACHEAL; INTRACARDIAC; INTRAVENOUS at 10:19

## 2022-01-01 RX ADMIN — ATORVASTATIN CALCIUM 80 MG: 40 TABLET, FILM COATED ORAL at 15:50

## 2022-01-01 RX ADMIN — METHYLPREDNISOLONE SODIUM SUCCINATE 40 MG: 40 INJECTION, POWDER, FOR SOLUTION INTRAMUSCULAR; INTRAVENOUS at 08:04

## 2022-01-01 RX ADMIN — CHLORHEXIDINE GLUCONATE 15 ML: 1.2 RINSE ORAL at 21:30

## 2022-01-01 RX ADMIN — FENTANYL CITRATE 50 MCG: 50 INJECTION INTRAMUSCULAR; INTRAVENOUS at 03:29

## 2022-01-01 RX ADMIN — SODIUM CHLORIDE SOLN NEBU 3% 4 ML: 3 NEBU SOLN at 19:27

## 2022-01-01 RX ADMIN — IPRATROPIUM BROMIDE AND ALBUTEROL SULFATE 3 ML: 2.5; .5 SOLUTION RESPIRATORY (INHALATION) at 03:01

## 2022-01-01 RX ADMIN — IPRATROPIUM BROMIDE AND ALBUTEROL SULFATE 3 ML: 2.5; .5 SOLUTION RESPIRATORY (INHALATION) at 20:50

## 2022-01-01 RX ADMIN — BUPROPION HYDROCHLORIDE TABLETS 100 MG: 100 TABLET, FILM COATED ORAL at 15:57

## 2022-01-01 RX ADMIN — PROPOFOL 30 MCG/KG/MIN: 10 INJECTION, EMULSION INTRAVENOUS at 02:24

## 2022-01-01 RX ADMIN — SODIUM CHLORIDE SOLN NEBU 3% 4 ML: 3 NEBU SOLN at 13:55

## 2022-01-01 RX ADMIN — IPRATROPIUM BROMIDE AND ALBUTEROL SULFATE 3 ML: 2.5; .5 SOLUTION RESPIRATORY (INHALATION) at 02:50

## 2022-01-01 RX ADMIN — INSULIN LISPRO 4 UNITS: 100 INJECTION, SOLUTION INTRAVENOUS; SUBCUTANEOUS at 06:29

## 2022-01-01 RX ADMIN — IPRATROPIUM BROMIDE AND ALBUTEROL SULFATE 3 ML: 2.5; .5 SOLUTION RESPIRATORY (INHALATION) at 03:35

## 2022-01-01 RX ADMIN — FENTANYL CITRATE 50 MCG: 50 INJECTION INTRAMUSCULAR; INTRAVENOUS at 23:22

## 2022-01-01 RX ADMIN — INSULIN LISPRO 2 UNITS: 100 INJECTION, SOLUTION INTRAVENOUS; SUBCUTANEOUS at 23:00

## 2022-01-01 RX ADMIN — PIPERACILLIN SODIUM,TAZOBACTAM SODIUM 4.5 G: 4; .5 INJECTION, POWDER, FOR SOLUTION INTRAVENOUS at 23:31

## 2022-01-01 RX ADMIN — SODIUM CHLORIDE 8 UNITS/HR: 9 INJECTION, SOLUTION INTRAVENOUS at 09:26

## 2022-01-01 RX ADMIN — MINERAL OIL AND PETROLATUM 1 APPLICATION: 150; 830 OINTMENT OPHTHALMIC at 02:00

## 2022-01-01 RX ADMIN — DULOXETINE HYDROCHLORIDE 30 MG: 30 CAPSULE, DELAYED RELEASE ORAL at 16:57

## 2022-01-01 RX ADMIN — SODIUM CHLORIDE, SODIUM GLUCONATE, SODIUM ACETATE, POTASSIUM CHLORIDE, MAGNESIUM CHLORIDE, SODIUM PHOSPHATE, DIBASIC, AND POTASSIUM PHOSPHATE 1000 ML: .53; .5; .37; .037; .03; .012; .00082 INJECTION, SOLUTION INTRAVENOUS at 19:21

## 2022-01-01 RX ADMIN — SODIUM CHLORIDE 0.4 MCG/KG/HR: 9 INJECTION, SOLUTION INTRAVENOUS at 14:35

## 2022-01-01 RX ADMIN — PROPOFOL 50 MCG/KG/MIN: 10 INJECTION, EMULSION INTRAVENOUS at 00:14

## 2022-01-01 RX ADMIN — ATORVASTATIN CALCIUM 80 MG: 40 TABLET, FILM COATED ORAL at 15:57

## 2022-01-01 RX ADMIN — INSULIN GLARGINE 22 UNITS: 100 INJECTION, SOLUTION SUBCUTANEOUS at 21:30

## 2022-01-01 RX ADMIN — QUETIAPINE FUMARATE 25 MG: 25 TABLET ORAL at 18:14

## 2022-01-01 RX ADMIN — CHLORHEXIDINE GLUCONATE 15 ML: 1.2 RINSE ORAL at 21:45

## 2022-01-01 RX ADMIN — IPRATROPIUM BROMIDE AND ALBUTEROL SULFATE 3 ML: 2.5; .5 SOLUTION RESPIRATORY (INHALATION) at 20:06

## 2022-01-01 RX ADMIN — FUROSEMIDE 40 MG: 10 INJECTION, SOLUTION INTRAMUSCULAR; INTRAVENOUS at 11:53

## 2022-01-01 RX ADMIN — SODIUM CHLORIDE SOLN NEBU 3% 4 ML: 3 NEBU SOLN at 03:35

## 2022-01-01 RX ADMIN — SODIUM CHLORIDE 0.4 MCG/KG/HR: 9 INJECTION, SOLUTION INTRAVENOUS at 05:29

## 2022-01-01 RX ADMIN — HYDRALAZINE HYDROCHLORIDE 5 MG: 20 INJECTION INTRAMUSCULAR; INTRAVENOUS at 21:53

## 2022-01-01 RX ADMIN — SODIUM CHLORIDE SOLN NEBU 3% 4 ML: 3 NEBU SOLN at 02:56

## 2022-01-01 RX ADMIN — PIPERACILLIN SODIUM,TAZOBACTAM SODIUM 4.5 G: 4; .5 INJECTION, POWDER, FOR SOLUTION INTRAVENOUS at 11:33

## 2022-01-01 RX ADMIN — ATORVASTATIN CALCIUM 80 MG: 40 TABLET, FILM COATED ORAL at 16:26

## 2022-01-01 RX ADMIN — SODIUM CHLORIDE, SODIUM GLUCONATE, SODIUM ACETATE, POTASSIUM CHLORIDE, MAGNESIUM CHLORIDE, SODIUM PHOSPHATE, DIBASIC, AND POTASSIUM PHOSPHATE 50 ML/HR: .53; .5; .37; .037; .03; .012; .00082 INJECTION, SOLUTION INTRAVENOUS at 11:20

## 2022-01-01 RX ADMIN — CHLORHEXIDINE GLUCONATE 15 ML: 1.2 RINSE ORAL at 08:17

## 2022-01-01 RX ADMIN — HYDRALAZINE HYDROCHLORIDE 5 MG: 20 INJECTION INTRAMUSCULAR; INTRAVENOUS at 02:55

## 2022-01-01 RX ADMIN — ACETAMINOPHEN 650 MG: 650 SUSPENSION ORAL at 17:57

## 2022-01-01 RX ADMIN — INSULIN LISPRO 2 UNITS: 100 INJECTION, SOLUTION INTRAVENOUS; SUBCUTANEOUS at 23:53

## 2022-01-01 RX ADMIN — FENTANYL CITRATE 75 MCG/HR: 50 INJECTION INTRAVENOUS at 01:05

## 2022-01-01 RX ADMIN — ACETAMINOPHEN 650 MG: 650 SUSPENSION ORAL at 14:30

## 2022-01-01 RX ADMIN — CHLORHEXIDINE GLUCONATE 15 ML: 1.2 RINSE ORAL at 08:42

## 2022-01-01 RX ADMIN — CEFEPIME 2000 MG: 2 INJECTION, POWDER, FOR SOLUTION INTRAVENOUS at 12:17

## 2022-01-01 RX ADMIN — SODIUM CHLORIDE 8 UNITS/HR: 9 INJECTION, SOLUTION INTRAVENOUS at 23:20

## 2022-01-01 RX ADMIN — INSULIN GLARGINE 15 UNITS: 100 INJECTION, SOLUTION SUBCUTANEOUS at 21:04

## 2022-01-01 RX ADMIN — BUPROPION HYDROCHLORIDE TABLETS 100 MG: 100 TABLET, FILM COATED ORAL at 15:58

## 2022-01-01 RX ADMIN — PROPOFOL 50 MCG/KG/MIN: 10 INJECTION, EMULSION INTRAVENOUS at 12:16

## 2022-01-01 RX ADMIN — IPRATROPIUM BROMIDE AND ALBUTEROL SULFATE 3 ML: 2.5; .5 SOLUTION RESPIRATORY (INHALATION) at 14:27

## 2022-01-01 RX ADMIN — BUPROPION HYDROCHLORIDE TABLETS 100 MG: 100 TABLET, FILM COATED ORAL at 09:01

## 2022-01-01 RX ADMIN — ATORVASTATIN CALCIUM 80 MG: 40 TABLET, FILM COATED ORAL at 15:45

## 2022-01-01 RX ADMIN — ATORVASTATIN CALCIUM 80 MG: 40 TABLET, FILM COATED ORAL at 16:55

## 2022-01-01 RX ADMIN — INSULIN LISPRO 2 UNITS: 100 INJECTION, SOLUTION INTRAVENOUS; SUBCUTANEOUS at 06:07

## 2022-01-01 RX ADMIN — INSULIN LISPRO 4 UNITS: 100 INJECTION, SOLUTION INTRAVENOUS; SUBCUTANEOUS at 17:03

## 2022-01-01 RX ADMIN — METHYLPREDNISOLONE SODIUM SUCCINATE 40 MG: 40 INJECTION, POWDER, FOR SOLUTION INTRAMUSCULAR; INTRAVENOUS at 05:19

## 2022-01-01 RX ADMIN — IPRATROPIUM BROMIDE AND ALBUTEROL SULFATE 3 ML: 2.5; .5 SOLUTION RESPIRATORY (INHALATION) at 14:04

## 2022-01-01 RX ADMIN — ENOXAPARIN SODIUM 40 MG: 40 INJECTION SUBCUTANEOUS at 09:29

## 2022-01-01 RX ADMIN — PROPOFOL 50 MCG/KG/MIN: 10 INJECTION, EMULSION INTRAVENOUS at 15:50

## 2022-01-01 RX ADMIN — IPRATROPIUM BROMIDE AND ALBUTEROL SULFATE 3 ML: 2.5; .5 SOLUTION RESPIRATORY (INHALATION) at 19:54

## 2022-01-01 RX ADMIN — CHLORHEXIDINE GLUCONATE 15 ML: 1.2 RINSE ORAL at 21:35

## 2022-01-01 RX ADMIN — IPRATROPIUM BROMIDE AND ALBUTEROL SULFATE 3 ML: 2.5; .5 SOLUTION RESPIRATORY (INHALATION) at 19:51

## 2022-01-01 RX ADMIN — PROPOFOL 45 MCG/KG/MIN: 10 INJECTION, EMULSION INTRAVENOUS at 00:39

## 2022-01-01 RX ADMIN — BUPROPION HYDROCHLORIDE TABLETS 100 MG: 100 TABLET, FILM COATED ORAL at 08:41

## 2022-01-01 RX ADMIN — INSULIN GLARGINE 50 UNITS: 100 INJECTION, SOLUTION SUBCUTANEOUS at 21:12

## 2022-01-01 RX ADMIN — INSULIN LISPRO 4 UNITS: 100 INJECTION, SOLUTION INTRAVENOUS; SUBCUTANEOUS at 17:02

## 2022-01-01 RX ADMIN — SODIUM CHLORIDE SOLN NEBU 3% 4 ML: 3 NEBU SOLN at 02:49

## 2022-01-01 RX ADMIN — SODIUM CHLORIDE SOLN NEBU 3% 4 ML: 3 NEBU SOLN at 19:35

## 2022-01-01 RX ADMIN — PROPOFOL 40 MCG/KG/MIN: 10 INJECTION, EMULSION INTRAVENOUS at 22:35

## 2022-01-01 RX ADMIN — PROPOFOL 45 MCG/KG/MIN: 10 INJECTION, EMULSION INTRAVENOUS at 20:17

## 2022-01-01 RX ADMIN — ACETAMINOPHEN 650 MG: 650 SUSPENSION ORAL at 05:50

## 2022-01-01 RX ADMIN — IPRATROPIUM BROMIDE AND ALBUTEROL SULFATE 3 ML: 2.5; .5 SOLUTION RESPIRATORY (INHALATION) at 14:22

## 2022-01-01 RX ADMIN — PROPOFOL 50 MCG/KG/MIN: 10 INJECTION, EMULSION INTRAVENOUS at 20:44

## 2022-01-01 RX ADMIN — MORPHINE SULFATE 2 MG: 2 INJECTION, SOLUTION INTRAMUSCULAR; INTRAVENOUS at 21:00

## 2022-01-01 RX ADMIN — POLYETHYLENE GLYCOL 3350 17 G: 17 POWDER, FOR SOLUTION ORAL at 08:43

## 2022-01-01 RX ADMIN — IPRATROPIUM BROMIDE AND ALBUTEROL SULFATE 3 ML: 2.5; .5 SOLUTION RESPIRATORY (INHALATION) at 07:27

## 2022-01-01 RX ADMIN — IPRATROPIUM BROMIDE AND ALBUTEROL SULFATE 3 ML: 2.5; .5 SOLUTION RESPIRATORY (INHALATION) at 01:46

## 2022-01-01 RX ADMIN — PROPOFOL 50 MCG/KG/MIN: 10 INJECTION, EMULSION INTRAVENOUS at 10:12

## 2022-01-01 RX ADMIN — BUPROPION HYDROCHLORIDE TABLETS 100 MG: 100 TABLET, FILM COATED ORAL at 08:36

## 2022-01-01 RX ADMIN — POLYETHYLENE GLYCOL 3350 17 G: 17 POWDER, FOR SOLUTION ORAL at 08:45

## 2022-01-01 RX ADMIN — ATORVASTATIN CALCIUM 80 MG: 40 TABLET, FILM COATED ORAL at 16:13

## 2022-01-01 RX ADMIN — PROPOFOL 40 MCG/KG/MIN: 10 INJECTION, EMULSION INTRAVENOUS at 16:58

## 2022-01-01 RX ADMIN — INSULIN LISPRO 4 UNITS: 100 INJECTION, SOLUTION INTRAVENOUS; SUBCUTANEOUS at 12:16

## 2022-01-01 RX ADMIN — MINERAL OIL AND PETROLATUM 1 APPLICATION: 150; 830 OINTMENT OPHTHALMIC at 04:10

## 2022-01-01 RX ADMIN — PROPOFOL 45 MCG/KG/MIN: 10 INJECTION, EMULSION INTRAVENOUS at 02:22

## 2022-01-01 RX ADMIN — IPRATROPIUM BROMIDE AND ALBUTEROL SULFATE 3 ML: 2.5; .5 SOLUTION RESPIRATORY (INHALATION) at 07:35

## 2022-01-01 RX ADMIN — PROPOFOL 50 MCG/KG/MIN: 10 INJECTION, EMULSION INTRAVENOUS at 03:29

## 2022-01-01 RX ADMIN — INSULIN LISPRO 2 UNITS: 100 INJECTION, SOLUTION INTRAVENOUS; SUBCUTANEOUS at 11:24

## 2022-01-01 RX ADMIN — ENOXAPARIN SODIUM 40 MG: 40 INJECTION SUBCUTANEOUS at 08:41

## 2022-01-01 RX ADMIN — IPRATROPIUM BROMIDE AND ALBUTEROL SULFATE 3 ML: 2.5; .5 SOLUTION RESPIRATORY (INHALATION) at 03:00

## 2022-01-01 RX ADMIN — SODIUM CHLORIDE SOLN NEBU 3% 4 ML: 3 NEBU SOLN at 08:07

## 2022-01-01 RX ADMIN — PIPERACILLIN SODIUM,TAZOBACTAM SODIUM 4.5 G: 4; .5 INJECTION, POWDER, FOR SOLUTION INTRAVENOUS at 23:33

## 2022-01-01 RX ADMIN — SODIUM CHLORIDE SOLN NEBU 3% 4 ML: 3 NEBU SOLN at 19:37

## 2022-01-01 RX ADMIN — PIPERACILLIN SODIUM,TAZOBACTAM SODIUM 4.5 G: 4; .5 INJECTION, POWDER, FOR SOLUTION INTRAVENOUS at 12:34

## 2022-01-01 RX ADMIN — Medication 20 MG: at 08:30

## 2022-01-01 RX ADMIN — IPRATROPIUM BROMIDE AND ALBUTEROL SULFATE 3 ML: 2.5; .5 SOLUTION RESPIRATORY (INHALATION) at 07:40

## 2022-01-01 RX ADMIN — PIPERACILLIN SODIUM,TAZOBACTAM SODIUM 4.5 G: 4; .5 INJECTION, POWDER, FOR SOLUTION INTRAVENOUS at 05:41

## 2022-01-01 RX ADMIN — ACETAMINOPHEN 650 MG: 650 SUSPENSION ORAL at 18:03

## 2022-01-01 RX ADMIN — CEFEPIME HYDROCHLORIDE 1000 MG: 1 INJECTION, SOLUTION INTRAVENOUS at 05:02

## 2022-01-01 RX ADMIN — FENTANYL CITRATE 100 MCG: 50 INJECTION INTRAMUSCULAR; INTRAVENOUS at 09:02

## 2022-01-01 RX ADMIN — IPRATROPIUM BROMIDE AND ALBUTEROL SULFATE 3 ML: 2.5; .5 SOLUTION RESPIRATORY (INHALATION) at 07:50

## 2022-01-01 RX ADMIN — PROPOFOL 40 MCG/KG/MIN: 10 INJECTION, EMULSION INTRAVENOUS at 13:37

## 2022-01-01 RX ADMIN — Medication 20 MG: at 08:48

## 2022-01-01 RX ADMIN — FENTANYL CITRATE 50 MCG: 50 INJECTION INTRAMUSCULAR; INTRAVENOUS at 03:17

## 2022-01-01 RX ADMIN — CHLORHEXIDINE GLUCONATE 15 ML: 1.2 RINSE ORAL at 08:07

## 2022-01-01 RX ADMIN — SODIUM CHLORIDE SOLN NEBU 3% 4 ML: 3 NEBU SOLN at 01:10

## 2022-01-01 RX ADMIN — CEFEPIME HYDROCHLORIDE 1000 MG: 1 INJECTION, SOLUTION INTRAVENOUS at 17:52

## 2022-01-01 RX ADMIN — IPRATROPIUM BROMIDE AND ALBUTEROL SULFATE 3 ML: 2.5; .5 SOLUTION RESPIRATORY (INHALATION) at 07:55

## 2022-01-01 RX ADMIN — SODIUM CHLORIDE SOLN NEBU 3% 4 ML: 3 NEBU SOLN at 19:52

## 2022-01-01 RX ADMIN — ENOXAPARIN SODIUM 40 MG: 40 INJECTION SUBCUTANEOUS at 11:19

## 2022-01-01 RX ADMIN — METHYLPREDNISOLONE SODIUM SUCCINATE 40 MG: 40 INJECTION, POWDER, FOR SOLUTION INTRAMUSCULAR; INTRAVENOUS at 20:21

## 2022-01-01 RX ADMIN — CHLORHEXIDINE GLUCONATE 15 ML: 1.2 RINSE ORAL at 09:28

## 2022-01-01 RX ADMIN — INSULIN GLARGINE 15 UNITS: 100 INJECTION, SOLUTION SUBCUTANEOUS at 21:27

## 2022-01-01 RX ADMIN — Medication 20 MG: at 09:15

## 2022-01-01 RX ADMIN — EPINEPHRINE 1 MG: 0.1 INJECTION, SOLUTION ENDOTRACHEAL; INTRACARDIAC; INTRAVENOUS at 10:12

## 2022-01-01 RX ADMIN — PROPOFOL 10 MCG/KG/MIN: 10 INJECTION, EMULSION INTRAVENOUS at 17:51

## 2022-01-01 RX ADMIN — HEPARIN SODIUM 5000 UNITS: 5000 INJECTION INTRAVENOUS; SUBCUTANEOUS at 05:18

## 2022-01-01 RX ADMIN — HEPARIN SODIUM 5000 UNITS: 5000 INJECTION INTRAVENOUS; SUBCUTANEOUS at 21:28

## 2022-01-01 RX ADMIN — SENNOSIDES 8.8 MG: 8.8 SYRUP ORAL at 11:46

## 2022-01-01 RX ADMIN — PROPOFOL 30 MCG/KG/MIN: 10 INJECTION, EMULSION INTRAVENOUS at 14:13

## 2022-01-01 RX ADMIN — PROPOFOL 30 MCG/KG/MIN: 10 INJECTION, EMULSION INTRAVENOUS at 05:06

## 2022-01-01 RX ADMIN — PROPOFOL 40 MCG/KG/MIN: 10 INJECTION, EMULSION INTRAVENOUS at 12:36

## 2022-01-01 RX ADMIN — IPRATROPIUM BROMIDE AND ALBUTEROL SULFATE 3 ML: 2.5; .5 SOLUTION RESPIRATORY (INHALATION) at 13:30

## 2022-01-01 RX ADMIN — SODIUM CHLORIDE SOLN NEBU 3% 4 ML: 3 NEBU SOLN at 14:42

## 2022-01-01 RX ADMIN — ACETAMINOPHEN 650 MG: 650 SUPPOSITORY RECTAL at 15:34

## 2022-01-01 RX ADMIN — ACETAMINOPHEN 650 MG: 650 SUSPENSION ORAL at 22:00

## 2022-01-01 RX ADMIN — INSULIN LISPRO 2 UNITS: 100 INJECTION, SOLUTION INTRAVENOUS; SUBCUTANEOUS at 17:36

## 2022-01-01 RX ADMIN — PIPERACILLIN SODIUM,TAZOBACTAM SODIUM 4.5 G: 4; .5 INJECTION, POWDER, FOR SOLUTION INTRAVENOUS at 23:22

## 2022-01-01 RX ADMIN — PROPOFOL 35 MCG/KG/MIN: 10 INJECTION, EMULSION INTRAVENOUS at 13:38

## 2022-01-01 RX ADMIN — Medication 20 MG: at 08:29

## 2022-01-01 RX ADMIN — QUETIAPINE FUMARATE 25 MG: 25 TABLET ORAL at 17:30

## 2022-01-01 RX ADMIN — SODIUM CHLORIDE SOLN NEBU 3% 4 ML: 3 NEBU SOLN at 01:20

## 2022-01-01 RX ADMIN — MINERAL OIL AND PETROLATUM 1 APPLICATION: 150; 830 OINTMENT OPHTHALMIC at 06:07

## 2022-01-01 RX ADMIN — IPRATROPIUM BROMIDE AND ALBUTEROL SULFATE 3 ML: 2.5; .5 SOLUTION RESPIRATORY (INHALATION) at 02:57

## 2022-01-01 RX ADMIN — FENTANYL CITRATE 50 MCG: 50 INJECTION INTRAMUSCULAR; INTRAVENOUS at 15:56

## 2022-01-01 RX ADMIN — HEPARIN SODIUM 5000 UNITS: 5000 INJECTION INTRAVENOUS; SUBCUTANEOUS at 13:38

## 2022-01-01 RX ADMIN — IPRATROPIUM BROMIDE AND ALBUTEROL SULFATE 3 ML: 2.5; .5 SOLUTION RESPIRATORY (INHALATION) at 19:22

## 2022-01-01 RX ADMIN — PROPOFOL 40 MCG/KG/MIN: 10 INJECTION, EMULSION INTRAVENOUS at 06:44

## 2022-01-01 RX ADMIN — PROPOFOL 50 MCG/KG/MIN: 10 INJECTION, EMULSION INTRAVENOUS at 23:23

## 2022-01-01 RX ADMIN — INSULIN GLARGINE 25 UNITS: 100 INJECTION, SOLUTION SUBCUTANEOUS at 21:08

## 2022-01-01 RX ADMIN — PROPOFOL 40 MCG/KG/MIN: 10 INJECTION, EMULSION INTRAVENOUS at 19:06

## 2022-01-01 RX ADMIN — PROPOFOL 50 MCG/KG/MIN: 10 INJECTION, EMULSION INTRAVENOUS at 02:48

## 2022-01-01 RX ADMIN — CHLORHEXIDINE GLUCONATE 15 ML: 1.2 RINSE ORAL at 20:43

## 2022-01-01 RX ADMIN — PROPOFOL 50 MCG/KG/MIN: 10 INJECTION, EMULSION INTRAVENOUS at 05:18

## 2022-01-01 RX ADMIN — POTASSIUM CHLORIDE 20 MEQ: 20 SOLUTION ORAL at 11:46

## 2022-01-01 RX ADMIN — SODIUM CHLORIDE 16 UNITS/HR: 9 INJECTION, SOLUTION INTRAVENOUS at 12:18

## 2022-01-01 RX ADMIN — Medication 20 MG: at 08:04

## 2022-01-01 RX ADMIN — METHYLPREDNISOLONE SODIUM SUCCINATE 40 MG: 40 INJECTION, POWDER, FOR SOLUTION INTRAMUSCULAR; INTRAVENOUS at 05:07

## 2022-01-01 RX ADMIN — FENTANYL CITRATE 100 MCG: 50 INJECTION INTRAMUSCULAR; INTRAVENOUS at 20:42

## 2022-01-01 RX ADMIN — PIPERACILLIN SODIUM,TAZOBACTAM SODIUM 4.5 G: 4; .5 INJECTION, POWDER, FOR SOLUTION INTRAVENOUS at 05:53

## 2022-01-01 RX ADMIN — CEFEPIME 2000 MG: 2 INJECTION, POWDER, FOR SOLUTION INTRAVENOUS at 21:12

## 2022-01-01 RX ADMIN — Medication 20 MG: at 08:07

## 2022-01-01 RX ADMIN — MINERAL OIL AND PETROLATUM 1 APPLICATION: 150; 830 OINTMENT OPHTHALMIC at 00:44

## 2022-01-01 RX ADMIN — METHYLPREDNISOLONE SODIUM SUCCINATE 40 MG: 40 INJECTION, POWDER, FOR SOLUTION INTRAMUSCULAR; INTRAVENOUS at 21:35

## 2022-01-01 RX ADMIN — CHLORHEXIDINE GLUCONATE 15 ML: 1.2 RINSE ORAL at 20:23

## 2022-01-01 RX ADMIN — DULOXETINE HYDROCHLORIDE 30 MG: 30 CAPSULE, DELAYED RELEASE ORAL at 08:46

## 2022-01-01 RX ADMIN — SENNOSIDES 8.8 MG: 8.8 SYRUP ORAL at 08:30

## 2022-01-01 RX ADMIN — ACETAMINOPHEN 650 MG: 650 SUSPENSION ORAL at 06:07

## 2022-01-01 RX ADMIN — CHLORHEXIDINE GLUCONATE 15 ML: 1.2 RINSE ORAL at 08:04

## 2022-01-01 RX ADMIN — BUPROPION HYDROCHLORIDE TABLETS 100 MG: 100 TABLET, FILM COATED ORAL at 16:57

## 2022-01-01 RX ADMIN — ALBUMIN (HUMAN) 25 G: 12.5 SOLUTION INTRAVENOUS at 05:39

## 2022-01-01 RX ADMIN — Medication 20 MG: at 08:16

## 2022-01-01 RX ADMIN — CEFEPIME 2000 MG: 2 INJECTION, POWDER, FOR SOLUTION INTRAVENOUS at 05:28

## 2022-01-01 RX ADMIN — CEFEPIME 2000 MG: 2 INJECTION, POWDER, FOR SOLUTION INTRAVENOUS at 05:11

## 2022-01-01 RX ADMIN — SODIUM CHLORIDE 11 UNITS/HR: 9 INJECTION, SOLUTION INTRAVENOUS at 04:05

## 2022-01-01 RX ADMIN — INSULIN GLARGINE 50 UNITS: 100 INJECTION, SOLUTION SUBCUTANEOUS at 22:12

## 2022-01-01 RX ADMIN — FUROSEMIDE 40 MG: 10 INJECTION, SOLUTION INTRAMUSCULAR; INTRAVENOUS at 11:19

## 2022-01-01 RX ADMIN — INSULIN LISPRO 2 UNITS: 100 INJECTION, SOLUTION INTRAVENOUS; SUBCUTANEOUS at 11:42

## 2022-01-01 RX ADMIN — DULOXETINE HYDROCHLORIDE 30 MG: 30 CAPSULE, DELAYED RELEASE ORAL at 08:18

## 2022-01-01 RX ADMIN — VANCOMYCIN HYDROCHLORIDE 1000 MG: 1 INJECTION, SOLUTION INTRAVENOUS at 05:43

## 2022-01-01 RX ADMIN — PROPOFOL 45 MCG/KG/MIN: 10 INJECTION, EMULSION INTRAVENOUS at 16:04

## 2022-01-01 RX ADMIN — PROPOFOL 30 MCG/KG/MIN: 10 INJECTION, EMULSION INTRAVENOUS at 08:08

## 2022-01-01 RX ADMIN — ACETAMINOPHEN 650 MG: 650 SUSPENSION ORAL at 17:25

## 2022-01-01 RX ADMIN — CHLORHEXIDINE GLUCONATE 15 ML: 1.2 RINSE ORAL at 08:30

## 2022-01-01 RX ADMIN — QUETIAPINE FUMARATE 25 MG: 25 TABLET ORAL at 08:08

## 2022-01-01 RX ADMIN — SODIUM CHLORIDE SOLN NEBU 3% 4 ML: 3 NEBU SOLN at 20:50

## 2022-01-01 RX ADMIN — MAGNESIUM SULFATE HEPTAHYDRATE 2 G: 40 INJECTION, SOLUTION INTRAVENOUS at 06:49

## 2022-01-01 RX ADMIN — CHLORHEXIDINE GLUCONATE 15 ML: 1.2 RINSE ORAL at 21:08

## 2022-01-01 RX ADMIN — IPRATROPIUM BROMIDE AND ALBUTEROL SULFATE 3 ML: 2.5; .5 SOLUTION RESPIRATORY (INHALATION) at 13:18

## 2022-01-01 RX ADMIN — METHYLPREDNISOLONE SODIUM SUCCINATE 40 MG: 40 INJECTION, POWDER, FOR SOLUTION INTRAMUSCULAR; INTRAVENOUS at 17:49

## 2022-01-01 RX ADMIN — FENTANYL CITRATE 50 MCG: 50 INJECTION INTRAMUSCULAR; INTRAVENOUS at 23:09

## 2022-01-01 RX ADMIN — CEFEPIME HYDROCHLORIDE 1000 MG: 1 INJECTION, SOLUTION INTRAVENOUS at 05:07

## 2022-01-01 RX ADMIN — MINERAL OIL AND PETROLATUM 1 APPLICATION: 150; 830 OINTMENT OPHTHALMIC at 09:50

## 2022-01-01 RX ADMIN — PROPOFOL 30 MCG/KG/MIN: 10 INJECTION, EMULSION INTRAVENOUS at 21:53

## 2022-01-01 RX ADMIN — PROPOFOL 45 MCG/KG/MIN: 10 INJECTION, EMULSION INTRAVENOUS at 03:10

## 2022-01-01 RX ADMIN — IPRATROPIUM BROMIDE AND ALBUTEROL SULFATE 3 ML: 2.5; .5 SOLUTION RESPIRATORY (INHALATION) at 08:05

## 2022-01-01 RX ADMIN — EPINEPHRINE 1 MG: 0.1 INJECTION, SOLUTION ENDOTRACHEAL; INTRACARDIAC; INTRAVENOUS at 10:22

## 2022-01-01 RX ADMIN — CEFEPIME HYDROCHLORIDE 1000 MG: 1 INJECTION, SOLUTION INTRAVENOUS at 17:30

## 2022-01-01 RX ADMIN — IPRATROPIUM BROMIDE AND ALBUTEROL SULFATE 3 ML: 2.5; .5 SOLUTION RESPIRATORY (INHALATION) at 13:28

## 2022-01-01 RX ADMIN — ACETAMINOPHEN 650 MG: 650 SUSPENSION ORAL at 11:56

## 2022-01-01 RX ADMIN — INSULIN LISPRO 4 UNITS: 100 INJECTION, SOLUTION INTRAVENOUS; SUBCUTANEOUS at 11:13

## 2022-01-01 RX ADMIN — POLYETHYLENE GLYCOL 3350 17 G: 17 POWDER, FOR SOLUTION ORAL at 08:30

## 2022-01-01 RX ADMIN — ENOXAPARIN SODIUM 40 MG: 40 INJECTION SUBCUTANEOUS at 08:30

## 2022-01-01 RX ADMIN — PROPOFOL 5 MCG/KG/MIN: 10 INJECTION, EMULSION INTRAVENOUS at 04:21

## 2022-01-01 RX ADMIN — HEPARIN SODIUM 5000 UNITS: 5000 INJECTION INTRAVENOUS; SUBCUTANEOUS at 22:06

## 2022-01-01 RX ADMIN — IPRATROPIUM BROMIDE AND ALBUTEROL SULFATE 3 ML: 2.5; .5 SOLUTION RESPIRATORY (INHALATION) at 13:31

## 2022-01-01 RX ADMIN — ACETAMINOPHEN 650 MG: 650 SUSPENSION ORAL at 00:07

## 2022-01-01 RX ADMIN — CHLORHEXIDINE GLUCONATE 15 ML: 1.2 RINSE ORAL at 21:04

## 2022-01-01 RX ADMIN — CEFEPIME 2000 MG: 2 INJECTION, POWDER, FOR SOLUTION INTRAVENOUS at 20:25

## 2022-01-01 RX ADMIN — POLYETHYLENE GLYCOL 3350 17 G: 17 POWDER, FOR SOLUTION ORAL at 09:29

## 2022-01-01 RX ADMIN — IPRATROPIUM BROMIDE AND ALBUTEROL SULFATE 3 ML: 2.5; .5 SOLUTION RESPIRATORY (INHALATION) at 19:18

## 2022-01-01 RX ADMIN — PROPOFOL 30 MCG/KG/MIN: 10 INJECTION, EMULSION INTRAVENOUS at 09:53

## 2022-01-01 RX ADMIN — Medication 20 MG: at 08:14

## 2022-01-01 RX ADMIN — IPRATROPIUM BROMIDE AND ALBUTEROL SULFATE 3 ML: 2.5; .5 SOLUTION RESPIRATORY (INHALATION) at 19:33

## 2022-01-01 RX ADMIN — CEFEPIME 2000 MG: 2 INJECTION, POWDER, FOR SOLUTION INTRAVENOUS at 12:31

## 2022-01-01 RX ADMIN — METHYLPREDNISOLONE SODIUM SUCCINATE 40 MG: 40 INJECTION, POWDER, FOR SOLUTION INTRAMUSCULAR; INTRAVENOUS at 09:01

## 2022-01-01 RX ADMIN — INSULIN GLARGINE 22 UNITS: 100 INJECTION, SOLUTION SUBCUTANEOUS at 21:59

## 2022-01-01 RX ADMIN — METHYLPREDNISOLONE SODIUM SUCCINATE 40 MG: 40 INJECTION, POWDER, FOR SOLUTION INTRAMUSCULAR; INTRAVENOUS at 21:57

## 2022-01-01 RX ADMIN — ATORVASTATIN CALCIUM 80 MG: 40 TABLET, FILM COATED ORAL at 15:44

## 2022-01-01 RX ADMIN — SODIUM CHLORIDE SOLN NEBU 3% 4 ML: 3 NEBU SOLN at 19:53

## 2022-01-01 RX ADMIN — SODIUM CHLORIDE SOLN NEBU 3% 4 ML: 3 NEBU SOLN at 13:30

## 2022-01-01 RX ADMIN — MORPHINE SULFATE 2 MG: 2 INJECTION, SOLUTION INTRAMUSCULAR; INTRAVENOUS at 22:16

## 2022-01-05 ENCOUNTER — OFFICE VISIT (OUTPATIENT)
Dept: OBGYN CLINIC | Facility: CLINIC | Age: 62
End: 2022-01-05
Payer: COMMERCIAL

## 2022-01-05 VITALS
SYSTOLIC BLOOD PRESSURE: 120 MMHG | HEIGHT: 61 IN | DIASTOLIC BLOOD PRESSURE: 80 MMHG | HEART RATE: 80 BPM | BODY MASS INDEX: 43.99 KG/M2 | WEIGHT: 233 LBS

## 2022-01-05 DIAGNOSIS — E11.65 TYPE 2 DIABETES MELLITUS WITH HYPERGLYCEMIA, WITH LONG-TERM CURRENT USE OF INSULIN (HCC): ICD-10-CM

## 2022-01-05 DIAGNOSIS — G89.29 CHRONIC PAIN OF RIGHT KNEE: ICD-10-CM

## 2022-01-05 DIAGNOSIS — Z79.4 TYPE 2 DIABETES MELLITUS WITH HYPERGLYCEMIA, WITH LONG-TERM CURRENT USE OF INSULIN (HCC): ICD-10-CM

## 2022-01-05 DIAGNOSIS — M17.11 PRIMARY OSTEOARTHRITIS OF RIGHT KNEE: Primary | ICD-10-CM

## 2022-01-05 DIAGNOSIS — M25.561 CHRONIC PAIN OF RIGHT KNEE: ICD-10-CM

## 2022-01-05 DIAGNOSIS — M25.461 EFFUSION OF RIGHT KNEE: ICD-10-CM

## 2022-01-05 PROCEDURE — 3079F DIAST BP 80-89 MM HG: CPT | Performed by: ORTHOPAEDIC SURGERY

## 2022-01-05 PROCEDURE — 20610 DRAIN/INJ JOINT/BURSA W/O US: CPT | Performed by: ORTHOPAEDIC SURGERY

## 2022-01-05 PROCEDURE — 99213 OFFICE O/P EST LOW 20 MIN: CPT | Performed by: ORTHOPAEDIC SURGERY

## 2022-01-05 PROCEDURE — 3074F SYST BP LT 130 MM HG: CPT | Performed by: ORTHOPAEDIC SURGERY

## 2022-01-05 RX ORDER — BUPIVACAINE HYDROCHLORIDE 5 MG/ML
6 INJECTION, SOLUTION EPIDURAL; INTRACAUDAL
Status: COMPLETED | OUTPATIENT
Start: 2022-01-05 | End: 2022-01-05

## 2022-01-05 RX ORDER — TRIAMCINOLONE ACETONIDE 40 MG/ML
80 INJECTION, SUSPENSION INTRA-ARTICULAR; INTRAMUSCULAR
Status: COMPLETED | OUTPATIENT
Start: 2022-01-05 | End: 2022-01-05

## 2022-01-05 RX ADMIN — BUPIVACAINE HYDROCHLORIDE 6 ML: 5 INJECTION, SOLUTION EPIDURAL; INTRACAUDAL at 10:58

## 2022-01-05 RX ADMIN — TRIAMCINOLONE ACETONIDE 80 MG: 40 INJECTION, SUSPENSION INTRA-ARTICULAR; INTRAMUSCULAR at 10:58
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
## 2022-01-10 ENCOUNTER — TELEPHONE (OUTPATIENT)
Dept: PULMONOLOGY | Facility: CLINIC | Age: 62
End: 2022-01-10

## 2022-01-10 NOTE — TELEPHONE ENCOUNTER
Pt called stating she had bronchitis 2 weeks and completed a course of steroids that was given to her by her PCP  She said her breathing is still not back to normal and is wheezing    Please advise

## 2022-01-10 NOTE — TELEPHONE ENCOUNTER
Since patient has not been seen in over a year and she already took medication, please schedule a virtual sick visit

## 2022-01-11 ENCOUNTER — TELEMEDICINE (OUTPATIENT)
Dept: PULMONOLOGY | Facility: CLINIC | Age: 62
End: 2022-01-11
Payer: COMMERCIAL

## 2022-01-11 VITALS — BODY MASS INDEX: 43.99 KG/M2 | WEIGHT: 233 LBS | HEIGHT: 61 IN

## 2022-01-11 DIAGNOSIS — E11.65 TYPE 2 DIABETES MELLITUS WITH HYPERGLYCEMIA, WITH LONG-TERM CURRENT USE OF INSULIN (HCC): ICD-10-CM

## 2022-01-11 DIAGNOSIS — J44.1 CHRONIC OBSTRUCTIVE PULMONARY DISEASE WITH ACUTE EXACERBATION (HCC): ICD-10-CM

## 2022-01-11 DIAGNOSIS — Z79.4 TYPE 2 DIABETES MELLITUS WITH HYPERGLYCEMIA, WITH LONG-TERM CURRENT USE OF INSULIN (HCC): ICD-10-CM

## 2022-01-11 DIAGNOSIS — J41.0 SIMPLE CHRONIC BRONCHITIS (HCC): Primary | ICD-10-CM

## 2022-01-11 PROCEDURE — 99214 OFFICE O/P EST MOD 30 MIN: CPT | Performed by: INTERNAL MEDICINE

## 2022-01-11 RX ORDER — PREDNISONE 20 MG/1
40 TABLET ORAL DAILY
Qty: 10 TABLET | Refills: 0 | Status: SHIPPED | OUTPATIENT
Start: 2022-01-11 | End: 2022-01-16
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
## 2022-01-11 NOTE — ASSESSMENT & PLAN NOTE
· Prescribed 40 milligrams prednisone daily for 5 days  · Prescribed chest x-ray  · Ordered COVID-19 PCR  · Patient encouraged to call back if no improvement within 1 week

## 2022-01-12 ENCOUNTER — APPOINTMENT (OUTPATIENT)
Dept: RADIOLOGY | Facility: CLINIC | Age: 62
End: 2022-01-12
Payer: COMMERCIAL

## 2022-01-12 DIAGNOSIS — J41.0 SIMPLE CHRONIC BRONCHITIS (HCC): ICD-10-CM

## 2022-01-12 PROCEDURE — 71046 X-RAY EXAM CHEST 2 VIEWS: CPT

## 2022-01-12 PROCEDURE — 87636 SARSCOV2 & INF A&B AMP PRB: CPT | Performed by: INTERNAL MEDICINE

## 2022-01-13 DIAGNOSIS — J31.0 RHINITIS, UNSPECIFIED TYPE: ICD-10-CM

## 2022-01-13 DIAGNOSIS — J44.9 CHRONIC OBSTRUCTIVE PULMONARY DISEASE, UNSPECIFIED COPD TYPE (HCC): ICD-10-CM

## 2022-01-13 RX ORDER — UMECLIDINIUM 62.5 UG/1
AEROSOL, POWDER ORAL
Qty: 30 BLISTER | Refills: 3 | Status: SHIPPED | OUTPATIENT
Start: 2022-01-13 | End: 2022-05-06

## 2022-01-13 RX ORDER — FLUTICASONE PROPIONATE 50 MCG
SPRAY, SUSPENSION (ML) NASAL
Qty: 16 ML | Refills: 3 | Status: SHIPPED | OUTPATIENT
Start: 2022-01-13 | End: 2022-05-06

## 2022-01-14 LAB
FLUAV RNA RESP QL NAA+PROBE: NEGATIVE
FLUBV RNA RESP QL NAA+PROBE: NEGATIVE
SARS-COV-2 RNA RESP QL NAA+PROBE: NEGATIVE

## 2022-01-18 ENCOUNTER — TELEPHONE (OUTPATIENT)
Dept: FAMILY MEDICINE CLINIC | Facility: CLINIC | Age: 62
End: 2022-01-18

## 2022-01-21 ENCOUNTER — OFFICE VISIT (OUTPATIENT)
Dept: ENDOCRINOLOGY | Facility: CLINIC | Age: 62
End: 2022-01-21
Payer: COMMERCIAL

## 2022-01-21 VITALS
SYSTOLIC BLOOD PRESSURE: 130 MMHG | DIASTOLIC BLOOD PRESSURE: 88 MMHG | HEIGHT: 61 IN | WEIGHT: 230.3 LBS | BODY MASS INDEX: 43.48 KG/M2 | HEART RATE: 83 BPM

## 2022-01-21 DIAGNOSIS — Z79.4 TYPE 2 DIABETES MELLITUS WITH HYPERGLYCEMIA, WITH LONG-TERM CURRENT USE OF INSULIN (HCC): Primary | ICD-10-CM

## 2022-01-21 DIAGNOSIS — N18.2 STAGE 2 CHRONIC KIDNEY DISEASE: ICD-10-CM

## 2022-01-21 DIAGNOSIS — E11.65 TYPE 2 DIABETES MELLITUS WITH HYPERGLYCEMIA, WITH LONG-TERM CURRENT USE OF INSULIN (HCC): Primary | ICD-10-CM

## 2022-01-21 DIAGNOSIS — E66.01 OBESITY, CLASS III, BMI 40-49.9 (MORBID OBESITY) (HCC): ICD-10-CM

## 2022-01-21 DIAGNOSIS — E78.2 MIXED HYPERLIPIDEMIA: ICD-10-CM

## 2022-01-21 DIAGNOSIS — R80.9 MICROALBUMINURIA: ICD-10-CM

## 2022-01-21 DIAGNOSIS — T38.0X5A STEROID-INDUCED HYPERGLYCEMIA: ICD-10-CM

## 2022-01-21 DIAGNOSIS — I10 PRIMARY HYPERTENSION: ICD-10-CM

## 2022-01-21 DIAGNOSIS — R73.9 STEROID-INDUCED HYPERGLYCEMIA: ICD-10-CM

## 2022-01-21 PROCEDURE — 99215 OFFICE O/P EST HI 40 MIN: CPT | Performed by: INTERNAL MEDICINE

## 2022-01-21 PROCEDURE — 3066F NEPHROPATHY DOC TX: CPT | Performed by: INTERNAL MEDICINE

## 2022-01-21 PROCEDURE — 3008F BODY MASS INDEX DOCD: CPT | Performed by: INTERNAL MEDICINE

## 2022-01-21 PROCEDURE — 4004F PT TOBACCO SCREEN RCVD TLK: CPT | Performed by: INTERNAL MEDICINE
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
## 2022-01-21 NOTE — PATIENT INSTRUCTIONS
Continue current regimen, please take Admelog regularly   Check blood sugars before meals and at bedtime, send log for review in 2-3 weeks  When you get a steroid injection, you may need to take more short-acting insulin to help with the high BG     In addition, please use admelog insulin per the following sliding scale to correct high blood sugars:  BG  151-200: 1 unit  201-250: 2 units  251-300: 3 units  301-350: 4 units  > 350: 5 units  Do not correct bed time highs unless > 200 mg/dl     Follow-up with Ophthalmology   Follow-up in 3 months with repeat labs       Please call your insurance and inquire which of the following would be covered  - GLP-1  Agonist-Victoza, Bydureon, Byetta, ozempic, trulicity  - SGLT 2 inhibitors -jardiance, farxiga, Invokana, steglarto

## 2022-01-25 ENCOUNTER — TELEPHONE (OUTPATIENT)
Dept: PULMONOLOGY | Facility: CLINIC | Age: 62
End: 2022-01-25

## 2022-01-25 ENCOUNTER — TELEPHONE (OUTPATIENT)
Dept: PULMONOLOGY | Facility: MEDICAL CENTER | Age: 62
End: 2022-01-25

## 2022-01-25 NOTE — TELEPHONE ENCOUNTER
Janina Jung states she is breathing better now  She did have prednisone recently  She does use Incruse 1 puff daily  Still feels tired does have some shortness of breath  I told her she could  sample of Trelegy 200 mcg 1 puff daily to use for 2 weeks in place of Incruse  She can  from our office  I also gave her report are chest x-ray which showed no significant abnormalities    This was done on 01/12 and she had nasal swab done same day for influenza and COVID which were negative

## 2022-01-26 DIAGNOSIS — E78.2 MIXED HYPERLIPIDEMIA: ICD-10-CM

## 2022-01-26 DIAGNOSIS — E66.01 OBESITY, CLASS III, BMI 40-49.9 (MORBID OBESITY) (HCC): ICD-10-CM

## 2022-01-26 DIAGNOSIS — R19.7 DIARRHEA, UNSPECIFIED TYPE: ICD-10-CM

## 2022-01-26 DIAGNOSIS — E11.65 TYPE 2 DIABETES MELLITUS WITH HYPERGLYCEMIA, WITH LONG-TERM CURRENT USE OF INSULIN (HCC): ICD-10-CM

## 2022-01-26 DIAGNOSIS — Z79.4 TYPE 2 DIABETES MELLITUS WITH HYPERGLYCEMIA, WITH LONG-TERM CURRENT USE OF INSULIN (HCC): ICD-10-CM

## 2022-01-26 DIAGNOSIS — I10 HYPERTENSION GOAL BP (BLOOD PRESSURE) < 140/90: ICD-10-CM

## 2022-01-26 RX ORDER — DICYCLOMINE HYDROCHLORIDE 10 MG/1
CAPSULE ORAL
Qty: 60 CAPSULE | Refills: 2 | Status: SHIPPED | OUTPATIENT
Start: 2022-01-26 | End: 2022-03-09

## 2022-01-27 ENCOUNTER — APPOINTMENT (OUTPATIENT)
Dept: RADIOLOGY | Facility: HOSPITAL | Age: 62
End: 2022-01-27
Payer: COMMERCIAL

## 2022-01-27 ENCOUNTER — HOSPITAL ENCOUNTER (OUTPATIENT)
Facility: AMBULARY SURGERY CENTER | Age: 62
Setting detail: OUTPATIENT SURGERY
Discharge: HOME | End: 2022-01-27
Attending: ANESTHESIOLOGY | Admitting: ANESTHESIOLOGY
Payer: COMMERCIAL

## 2022-01-27 VITALS
OXYGEN SATURATION: 95 % | RESPIRATION RATE: 16 BRPM | SYSTOLIC BLOOD PRESSURE: 145 MMHG | HEIGHT: 61 IN | WEIGHT: 229 LBS | DIASTOLIC BLOOD PRESSURE: 87 MMHG | BODY MASS INDEX: 43.23 KG/M2 | HEART RATE: 97 BPM

## 2022-01-27 PROCEDURE — 20610 DRAIN/INJ JOINT/BURSA W/O US: CPT

## 2022-01-27 PROCEDURE — 20610 DRAIN/INJ JOINT/BURSA W/O US: CPT | Performed by: ANESTHESIOLOGY

## 2022-01-27 PROCEDURE — 77002 NEEDLE LOCALIZATION BY XRAY: CPT | Performed by: ANESTHESIOLOGY

## 2022-01-27 PROCEDURE — 77002 NEEDLE LOCALIZATION BY XRAY: CPT

## 2022-01-27 RX ORDER — LIDOCAINE WITH 8.4% SOD BICARB 0.9%(10ML)
SYRINGE (ML) INJECTION AS NEEDED
Status: DISCONTINUED | OUTPATIENT
Start: 2022-01-27 | End: 2022-01-27 | Stop reason: HOSPADM

## 2022-01-27 RX ORDER — METHYLPREDNISOLONE ACETATE 80 MG/ML
INJECTION, SUSPENSION INTRA-ARTICULAR; INTRALESIONAL; INTRAMUSCULAR; SOFT TISSUE AS NEEDED
Status: DISCONTINUED | OUTPATIENT
Start: 2022-01-27 | End: 2022-01-27 | Stop reason: HOSPADM

## 2022-01-27 RX ORDER — BUPIVACAINE HYDROCHLORIDE 2.5 MG/ML
INJECTION, SOLUTION EPIDURAL; INFILTRATION; INTRACAUDAL AS NEEDED
Status: DISCONTINUED | OUTPATIENT
Start: 2022-01-27 | End: 2022-01-27 | Stop reason: HOSPADM

## 2022-01-27 NOTE — DISCHARGE INSTRUCTIONS

## 2022-01-27 NOTE — OP NOTE
ATTENDING PHYSICIAN:  Elizabeth Su MD      PREPROCEDURE DIAGNOSIS:  Left hip pain  POSTPROCEDURE DIAGNOSIS: Left hip pain  PROCEDURE: Left intraarticular hip injection under fluoroscopic guidance  ANESTHESIA:  Local     ESTIMATED BLOOD LOSS:  Minimal     COMPLICATIONS:  None  [facility]ATION:  [facility] [place]  CONSENT:  Today's procedure, its risks, benefits, and alternatives were explained in detail to the patient  Risks include, but are not limited bleeding, infection, hematoma formation, abscess formation, weakness, nerve irritation or damage, failure of the pain to improve, or potential worsening of the pain  The patient verbalized understanding and wished to proceed with the procedure  Written informed consent was thereby obtained  DESCRIPTION OF THE PROCEDURE:  After written informed consent was obtained, the patient was taken to the fluoroscopy suite and placed in the supine position  Anatomical landmarks were identified by way of fluoroscopy in the AP and oblique views  The patient's hip region was prepped using antiseptic solution and draped in the usual sterile fashion  Strict aseptic technique was utilized  The skin and subcutaneous tissues at the needle entry site was infiltrated with a small amount of 1% preservative free Lidocaine using a 25-gauge 1-1/2 inch needle  A 22 gauge 3-1/2 inch needle was then advanced incrementally under fluoroscopic guidance towards the identified point until os was contacted and the joint space was entered  After negative aspiration, 1 ml of contrast solution was injected showing an appropriate arthrogram   Subsequently, a solution consisting of 4 ml of 0 25% Bupivacaine mixed with 1 ml of Depo-Medrol 80 mg/ml was injected slowly  All needles were removed with the tips intact and hemostasis was maintained throughout    The patient tolerated the procedure well, and there were no apparent paresthesias or complications noted during or following this procedure  The antiseptic was wiped clean and Band-Aids were placed as appropriate  The patient was transferred to the recovery area and was observed for an appropriate period of time during which the patient remained hemodynamically stable and neurovascularly intact, as the patient was prior to the procedure  The patient was subsequently discharged to home in stable condition with supervision  The patient was instructed to call the office in a few days for an update  Discharge instructions were provided  I was present and participated in all key and critical portions of this procedure      Marilee York MD  1/27/2022  3:37 PM

## 2022-01-28 DIAGNOSIS — E11.8 TYPE 2 DIABETES MELLITUS WITH COMPLICATION, WITHOUT LONG-TERM CURRENT USE OF INSULIN (HCC): ICD-10-CM

## 2022-01-28 RX ORDER — SYRING-NEEDL,DISP,INSUL,0.3 ML 31GX15/64"
SYRINGE, EMPTY DISPOSABLE MISCELLANEOUS
Qty: 100 EACH | Refills: 1 | Status: SHIPPED | OUTPATIENT
Start: 2022-01-28

## 2022-01-28 RX ORDER — AMLODIPINE BESYLATE 5 MG/1
TABLET ORAL
Qty: 90 TABLET | Refills: 1 | Status: SHIPPED | OUTPATIENT
Start: 2022-01-28 | End: 2022-03-08

## 2022-02-03 NOTE — TELEPHONE ENCOUNTER
Pt reports no improvement post inj   She said she had a fall on Friday  Pain level 7/10  Pt aware I will call next week for an update

## 2022-02-21 ENCOUNTER — OFFICE VISIT (OUTPATIENT)
Dept: PAIN MEDICINE | Facility: CLINIC | Age: 62
End: 2022-02-21
Payer: OTHER MISCELLANEOUS

## 2022-02-21 ENCOUNTER — TELEPHONE (OUTPATIENT)
Dept: PAIN MEDICINE | Facility: CLINIC | Age: 62
End: 2022-02-21

## 2022-02-21 VITALS — WEIGHT: 237 LBS | HEIGHT: 61 IN | BODY MASS INDEX: 44.75 KG/M2

## 2022-02-21 DIAGNOSIS — G89.4 CHRONIC PAIN SYNDROME: Primary | ICD-10-CM

## 2022-02-21 DIAGNOSIS — M51.37 DISC DEGENERATION, LUMBOSACRAL: ICD-10-CM

## 2022-02-21 DIAGNOSIS — Z79.891 LONG-TERM CURRENT USE OF OPIATE ANALGESIC: ICD-10-CM

## 2022-02-21 DIAGNOSIS — M96.1 POSTLAMINECTOMY SYNDROME, LUMBAR REGION: ICD-10-CM

## 2022-02-21 DIAGNOSIS — M43.16 SPONDYLOLISTHESIS OF LUMBAR REGION: Chronic | ICD-10-CM

## 2022-02-21 DIAGNOSIS — F11.20 UNCOMPLICATED OPIOID DEPENDENCE (HCC): ICD-10-CM

## 2022-02-21 DIAGNOSIS — M54.16 LUMBAR RADICULOPATHY: ICD-10-CM

## 2022-02-21 PROCEDURE — 80305 DRUG TEST PRSMV DIR OPT OBS: CPT | Performed by: ANESTHESIOLOGY

## 2022-02-21 PROCEDURE — 99214 OFFICE O/P EST MOD 30 MIN: CPT | Performed by: ANESTHESIOLOGY

## 2022-02-21 NOTE — TELEPHONE ENCOUNTER
Scheduled patient for 3/10/22  Patient denies RX blood thinners/ NSAIDS  Nothing to eat or drink 1 hour prior to procedure  Needs to arrange transportation  Proper clothing for procedure  No vaccines 2 weeks prior or after procedure  If ill or place on antibiotics, please call to reschedule
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
## 2022-02-23 ENCOUNTER — OFFICE VISIT (OUTPATIENT)
Dept: PULMONOLOGY | Facility: MEDICAL CENTER | Age: 62
End: 2022-02-23
Payer: COMMERCIAL

## 2022-02-23 VITALS
HEIGHT: 61 IN | DIASTOLIC BLOOD PRESSURE: 74 MMHG | HEART RATE: 107 BPM | BODY MASS INDEX: 45.05 KG/M2 | RESPIRATION RATE: 12 BRPM | WEIGHT: 238.6 LBS | SYSTOLIC BLOOD PRESSURE: 122 MMHG | TEMPERATURE: 98 F | OXYGEN SATURATION: 96 %

## 2022-02-23 DIAGNOSIS — G47.33 OBSTRUCTIVE SLEEP APNEA: ICD-10-CM

## 2022-02-23 DIAGNOSIS — F17.210 CIGARETTE NICOTINE DEPENDENCE WITHOUT COMPLICATION: ICD-10-CM

## 2022-02-23 DIAGNOSIS — J41.0 SIMPLE CHRONIC BRONCHITIS (HCC): Primary | ICD-10-CM

## 2022-02-23 DIAGNOSIS — R05.9 COUGH: ICD-10-CM

## 2022-02-23 LAB
6MAM UR QL CFM: NEGATIVE NG/ML
7AMINOCLONAZEPAM UR QL CFM: NEGATIVE NG/ML
A-OH ALPRAZ UR QL CFM: NEGATIVE NG/ML
AMPHET UR QL CFM: NEGATIVE NG/ML
AMPHET UR QL CFM: NEGATIVE NG/ML
BUPRENORPHINE UR QL CFM: NEGATIVE NG/ML
BUTALBITAL UR QL CFM: NEGATIVE NG/ML
BZE UR QL CFM: NEGATIVE NG/ML
CODEINE UR QL CFM: NEGATIVE NG/ML
DESIPRAMINE UR QL CFM: NEGATIVE NG/ML
DESIPRAMINE UR QL CFM: NEGATIVE NG/ML
EDDP UR QL CFM: NEGATIVE NG/ML
ETHYL GLUCURONIDE UR QL CFM: NEGATIVE NG/ML
ETHYL SULFATE UR QL SCN: NEGATIVE NG/ML
EUTYLONE UR QL: NEGATIVE NG/ML
FENTANYL UR QL CFM: NEGATIVE NG/ML
GLIADIN IGG SER IA-ACNC: NEGATIVE NG/ML
GLUCOSE 30M P 50 G LAC PO SERPL-MCNC: NEGATIVE NG/ML
HYDROCODONE UR QL CFM: NEGATIVE NG/ML
HYDROCODONE UR QL CFM: NEGATIVE NG/ML
HYDROMORPHONE UR QL CFM: NEGATIVE NG/ML
IMIPRAMINE UR QL CFM: NEGATIVE NG/ML
LORAZEPAM UR QL CFM: NEGATIVE NG/ML
MDMA UR QL CFM: NEGATIVE NG/ML
ME-PHENIDATE UR QL CFM: NEGATIVE NG/ML
MEPERIDINE UR QL CFM: NEGATIVE NG/ML
METHADONE UR QL CFM: NEGATIVE NG/ML
METHAMPHET UR QL CFM: NEGATIVE NG/ML
MORPHINE UR QL CFM: NEGATIVE NG/ML
MORPHINE UR QL CFM: NEGATIVE NG/ML
NORBUPRENORPHINE UR QL CFM: NEGATIVE NG/ML
NORDIAZEPAM UR QL CFM: NEGATIVE NG/ML
NORFENTANYL UR QL CFM: NEGATIVE NG/ML
NORHYDROCODONE UR QL CFM: NEGATIVE NG/ML
NORHYDROCODONE UR QL CFM: NEGATIVE NG/ML
NORMEPERIDINE UR QL CFM: NEGATIVE NG/ML
NOROXYCODONE UR QL CFM: NEGATIVE NG/ML
OPC-3373 QUANTIFICATION: NEGATIVE
OXAZEPAM UR QL CFM: NEGATIVE NG/ML
OXYCODONE UR QL CFM: NEGATIVE NG/ML
OXYMORPHONE UR QL CFM: NEGATIVE NG/ML
OXYMORPHONE UR QL CFM: NEGATIVE NG/ML
PCP UR QL CFM: NEGATIVE NG/ML
PHENOBARB UR QL CFM: NEGATIVE NG/ML
RESULT ALL_PRESCRIBED MEDS AND SPECIAL INSTRUCTIONS: NORMAL
SECOBARBITAL UR QL CFM: NEGATIVE NG/ML
SL AMB 3-METHYL-FENTANYL QUANTIFICATION: NORMAL NG/ML
SL AMB 4-ANPP QUANTIFICATION: NORMAL NG/ML
SL AMB 4-FIBF QUANTIFICATION: NORMAL NG/ML
SL AMB 5F-ADB-M7 METABOLITE QUANTIFICATION: NEGATIVE NG/ML
SL AMB 7-OH-MITRAGYNINE (KRATOM ALKALOID) QUANTIFICATION: NEGATIVE NG/ML
SL AMB AB-FUBINACA-M3 METABOLITE QUANTIFICATION: NEGATIVE NG/ML
SL AMB ACETYL FENTANYL QUANTIFICATION: NORMAL NG/ML
SL AMB ACETYL NORFENTANYL QUANTIFICATION: NORMAL NG/ML
SL AMB ACRYL FENTANYL QUANTIFICATION: NORMAL NG/ML
SL AMB BUTRYL FENTANYL QUANTIFICATION: NORMAL NG/ML
SL AMB CARFENTANIL QUANTIFICATION: NORMAL NG/ML
SL AMB CLOZAPINE QUANTIFICATION: NEGATIVE NG/ML
SL AMB CTHC (MARIJUANA METABOLITE) QUANTIFICATION: NEGATIVE NG/ML
SL AMB CYCLOPROPYL FENTANYL QUANTIFICATION: NORMAL NG/ML
SL AMB DEXTROMETHORPHAN QUANTIFICATION: NEGATIVE NG/ML
SL AMB DEXTRORPHAN (DEXTROMETHORPHAN METABOLITE) QUANT: NEGATIVE NG/ML
SL AMB DEXTRORPHAN (DEXTROMETHORPHAN METABOLITE) QUANT: NEGATIVE NG/ML
SL AMB FURANYL FENTANYL QUANTIFICATION: NORMAL NG/ML
SL AMB HALOPERIDOL  QUANTIFICATION: NEGATIVE NG/ML
SL AMB HALOPERIDOL METABOLITE QUANTIFICATION: NEGATIVE NG/ML
SL AMB JWH018 METABOLITE QUANTIFICATION: NEGATIVE NG/ML
SL AMB JWH073 METABOLITE QUANTIFICATION: NEGATIVE NG/ML
SL AMB MDMB-FUBINACA-M1 METABOLITE QUANTIFICATION: NEGATIVE NG/ML
SL AMB METHOXYACETYL FENTANYL QUANTIFICATION: NORMAL NG/ML
SL AMB METHYLONE QUANTIFICATION: NEGATIVE NG/ML
SL AMB N-DESMETHYL U-47700 QUANTIFICATION: NORMAL NG/ML
SL AMB N-DESMETHYL-TRAMADOL QUANTIFICATION: NEGATIVE NG/ML
SL AMB N-DESMETHYLCLOZAPINE QUANTIFICATION: NEGATIVE NG/ML
SL AMB PHENTERMINE QUANTIFICATION: NEGATIVE NG/ML
SL AMB RCS4 METABOLITE QUANTIFICATION: NEGATIVE NG/ML
SL AMB RITALINIC ACID QUANTIFICATION: NEGATIVE NG/ML
SL AMB U-47700 QUANTIFICATION: NORMAL NG/ML
SPECIMEN DRAWN SERPL: NEGATIVE NG/ML
TAPENTADOL UR QL CFM: ABNORMAL NG/ML
TEMAZEPAM UR QL CFM: NEGATIVE NG/ML
TEMAZEPAM UR QL CFM: NEGATIVE NG/ML
TRAMADOL UR QL CFM: NEGATIVE NG/ML
URATE/CREAT 24H UR: NEGATIVE NG/ML

## 2022-02-23 PROCEDURE — 4004F PT TOBACCO SCREEN RCVD TLK: CPT | Performed by: INTERNAL MEDICINE

## 2022-02-23 PROCEDURE — 99214 OFFICE O/P EST MOD 30 MIN: CPT | Performed by: INTERNAL MEDICINE

## 2022-02-23 PROCEDURE — 3078F DIAST BP <80 MM HG: CPT | Performed by: INTERNAL MEDICINE

## 2022-02-23 PROCEDURE — 3008F BODY MASS INDEX DOCD: CPT | Performed by: INTERNAL MEDICINE

## 2022-02-23 PROCEDURE — 3074F SYST BP LT 130 MM HG: CPT | Performed by: INTERNAL MEDICINE

## 2022-02-23 RX ORDER — FLUTICASONE PROPIONATE AND SALMETEROL 113; 14 UG/1; UG/1
1 POWDER, METERED RESPIRATORY (INHALATION) 2 TIMES DAILY
Qty: 1 EACH | Refills: 11 | Status: SHIPPED | OUTPATIENT
Start: 2022-02-23

## 2022-02-23 RX ORDER — AZITHROMYCIN 250 MG/1
TABLET, FILM COATED ORAL
Qty: 6 TABLET | Refills: 0 | Status: SHIPPED | OUTPATIENT
Start: 2022-02-23 | End: 2022-02-27

## 2022-02-23 RX ORDER — ALBUTEROL SULFATE 2.5 MG/3ML
2.5 SOLUTION RESPIRATORY (INHALATION) 4 TIMES DAILY
Qty: 360 ML | Refills: 7 | Status: SHIPPED | OUTPATIENT
Start: 2022-02-23 | End: 2022-03-25

## 2022-02-23 RX ORDER — PREDNISONE 10 MG/1
TABLET ORAL
Qty: 40 TABLET | Refills: 0 | Status: SHIPPED | OUTPATIENT
Start: 2022-02-23 | End: 2022-06-16

## 2022-02-23 NOTE — PATIENT INSTRUCTIONS
Start AirDuo 113 mcg - 1 puff twice a day  Rinse mouth after using  Continue Incruse 1 puff daily    Use nebulizer with albuterol 1 vial 4 times a day as needed or use your rescue albuterol inhaler 2 puffs    Schedule pulmonary function test and have this done before next office visit in 3 months    I gave you emergency pack of prednisone and antibiotic azithromycin to use if needed    May need to get Nicotrol inhaler pre approved  Can use 1 cartridge per hour as needed do not exceed 10 per day    This to helpquit smoking

## 2022-02-24 DIAGNOSIS — J41.0 SIMPLE CHRONIC BRONCHITIS (HCC): Primary | ICD-10-CM

## 2022-02-24 RX ORDER — BUDESONIDE AND FORMOTEROL FUMARATE DIHYDRATE 160; 4.5 UG/1; UG/1
2 AEROSOL RESPIRATORY (INHALATION) 2 TIMES DAILY
Qty: 10.2 G | Refills: 7 | Status: SHIPPED | OUTPATIENT
Start: 2022-02-24

## 2022-02-24 NOTE — ASSESSMENT & PLAN NOTE
Moderate obstructive sleep apnea  She has not been using her CPAP machine  She has [employer] machine which is likely on recall but she is not register yet  I did encourage her to least register machine to get a new 1  When she last used the CPAP she did have reduction of her AHI 2 6  She set on a CPAP pressure of 10  Her [employer] company is [employer]   She states she felt no better with CPAP on and CPAP of her sleep  Not have any excessive daytime somnolence    I did advise her to try to lose weight as this would help her LESLIE

## 2022-02-24 NOTE — ASSESSMENT & PLAN NOTE
I did discussed diet and weight loss with her    I told this would help her LESLIE and overall breathing and health

## 2022-02-24 NOTE — ASSESSMENT & PLAN NOTE
She is back to smoking half pack cigarettes per day  I did talk about smoking cessation  She tried Chantix in the past but this is not available now    I did give her prescription for Nicotrol inhaler and discussed smoking cessation with her

## 2022-02-24 NOTE — PROGRESS NOTES
AirDuo not covered by her prescription plan so I placed order for Symbicort 160 mg 2 puffs twice a day
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
## 2022-02-24 NOTE — ASSESSMENT & PLAN NOTE
She presently is using Incruse 1 puff daily  I did give her prescription for AirDuo 113 mg that she will use 1 puff twice a day  She used to use this before  She had recent exacerbation COPD in January but this has resolved    I did give her prescription to keep a 5 day Z-Diego on hand and prednisone 10 mg tablets  If needed she will take prednisone 40 mg daily with taper of 10 mg every 5th day    She only uses medication if she has any exacerbation or COPD  I did order nebulizer for her as she does not have on an albuterol solution to using it  She will continue with Incruse 1 puff daily    We did dispense nebulizer from our office today from [address]  and reviewed with her how to use it    I did order complete PFT for her to get before her next office visit

## 2022-02-28 NOTE — TELEPHONE ENCOUNTER
If she wants to push her injection back to two weeks from the end of the Medrol dosepak, I can start a Medrol dosepak today

## 2022-02-28 NOTE — TELEPHONE ENCOUNTER
Procedure Sceduler-    S/w pt  Pt is asking if there are any sooner appts available for her Caudal JESSIE? Pt is currently scheduled for 3/10  Please contact pt if there if a sooner appt  becomes available      Dr Mikel Roberto-  Per pt she is having a lot of pain and can barely walk  Pt wanted AS to be aware  Pt is currently taking Duloxetine 90 mg daily, and Nucynta 50 mg q 12h without relief  Per pt she tried to take 2 Nycynta tablets yesterday and did not have any relief  Pt advised that it is important that she take her medication as prescribed  Pt advised she can try OTC ES Tylenol 1000 mg every 8 hours not to exceed 3000 mg in 24 hours and  Ibuprofen (follow instructions on the bottle) as long as she does not have any contraindications  Pt also advised to try ice or heat 20 mins on 20 mins off whichever feels better  Are there any other recommendations?

## 2022-02-28 NOTE — TELEPHONE ENCOUNTER
Pt called asking fir a sooner appmt - pt is in alot in her back 10/10 after her fall and medication isnt helping       [phone]

## 2022-03-06 DIAGNOSIS — E11.65 TYPE 2 DIABETES MELLITUS WITH HYPERGLYCEMIA, WITH LONG-TERM CURRENT USE OF INSULIN (HCC): ICD-10-CM

## 2022-03-06 DIAGNOSIS — I10 HYPERTENSION GOAL BP (BLOOD PRESSURE) < 140/90: ICD-10-CM

## 2022-03-06 DIAGNOSIS — E66.01 OBESITY, CLASS III, BMI 40-49.9 (MORBID OBESITY) (HCC): ICD-10-CM

## 2022-03-06 DIAGNOSIS — Z79.4 TYPE 2 DIABETES MELLITUS WITH HYPERGLYCEMIA, WITH LONG-TERM CURRENT USE OF INSULIN (HCC): ICD-10-CM

## 2022-03-06 DIAGNOSIS — E78.2 MIXED HYPERLIPIDEMIA: ICD-10-CM

## 2022-03-07 RX ORDER — HYDROCHLOROTHIAZIDE 25 MG/1
TABLET ORAL
Qty: 90 TABLET | Refills: 1 | Status: SHIPPED | OUTPATIENT
Start: 2022-03-07 | End: 2022-03-08 | Stop reason: SDUPTHER

## 2022-03-08 ENCOUNTER — HOSPITAL ENCOUNTER (OUTPATIENT)
Dept: PULMONOLOGY | Facility: HOSPITAL | Age: 62
Discharge: HOME | End: 2022-03-08
Attending: INTERNAL MEDICINE
Payer: COMMERCIAL

## 2022-03-08 ENCOUNTER — OFFICE VISIT (OUTPATIENT)
Dept: CARDIOLOGY CLINIC | Facility: CLINIC | Age: 62
End: 2022-03-08
Payer: COMMERCIAL

## 2022-03-08 VITALS
OXYGEN SATURATION: 97 % | TEMPERATURE: 98 F | HEIGHT: 61 IN | DIASTOLIC BLOOD PRESSURE: 70 MMHG | SYSTOLIC BLOOD PRESSURE: 100 MMHG | HEART RATE: 98 BPM | WEIGHT: 238 LBS | BODY MASS INDEX: 44.93 KG/M2

## 2022-03-08 DIAGNOSIS — E66.01 OBESITY, CLASS III, BMI 40-49.9 (MORBID OBESITY) (HCC): ICD-10-CM

## 2022-03-08 DIAGNOSIS — E11.65 TYPE 2 DIABETES MELLITUS WITH HYPERGLYCEMIA, WITH LONG-TERM CURRENT USE OF INSULIN (HCC): ICD-10-CM

## 2022-03-08 DIAGNOSIS — Z79.4 TYPE 2 DIABETES MELLITUS WITH HYPERGLYCEMIA, WITH LONG-TERM CURRENT USE OF INSULIN (HCC): ICD-10-CM

## 2022-03-08 DIAGNOSIS — I10 HYPERTENSION GOAL BP (BLOOD PRESSURE) < 140/90: ICD-10-CM

## 2022-03-08 DIAGNOSIS — R06.02 SHORTNESS OF BREATH: ICD-10-CM

## 2022-03-08 DIAGNOSIS — F17.200 NICOTINE DEPENDENCE, UNCOMPLICATED, UNSPECIFIED NICOTINE PRODUCT TYPE: ICD-10-CM

## 2022-03-08 DIAGNOSIS — G47.33 OBSTRUCTIVE SLEEP APNEA: ICD-10-CM

## 2022-03-08 DIAGNOSIS — E78.2 MIXED HYPERLIPIDEMIA: ICD-10-CM

## 2022-03-08 DIAGNOSIS — F17.210 CIGARETTE NICOTINE DEPENDENCE WITHOUT COMPLICATION: ICD-10-CM

## 2022-03-08 DIAGNOSIS — J41.0 SIMPLE CHRONIC BRONCHITIS (HCC): ICD-10-CM

## 2022-03-08 DIAGNOSIS — J43.2 CENTRILOBULAR EMPHYSEMA (HCC): ICD-10-CM

## 2022-03-08 DIAGNOSIS — R00.0 TACHYCARDIA: ICD-10-CM

## 2022-03-08 DIAGNOSIS — I25.119 CORONARY ARTERY DISEASE INVOLVING NATIVE CORONARY ARTERY OF NATIVE HEART WITH ANGINA PECTORIS (HCC): ICD-10-CM

## 2022-03-08 PROCEDURE — 94729 DIFFUSING CAPACITY: CPT

## 2022-03-08 PROCEDURE — 94760 N-INVAS EAR/PLS OXIMETRY 1: CPT

## 2022-03-08 PROCEDURE — 94726 PLETHYSMOGRAPHY LUNG VOLUMES: CPT | Performed by: INTERNAL MEDICINE

## 2022-03-08 PROCEDURE — 94060 EVALUATION OF WHEEZING: CPT

## 2022-03-08 PROCEDURE — 4004F PT TOBACCO SCREEN RCVD TLK: CPT | Performed by: INTERNAL MEDICINE

## 2022-03-08 PROCEDURE — 93000 ELECTROCARDIOGRAM COMPLETE: CPT | Performed by: INTERNAL MEDICINE

## 2022-03-08 PROCEDURE — 3008F BODY MASS INDEX DOCD: CPT | Performed by: INTERNAL MEDICINE

## 2022-03-08 PROCEDURE — 94726 PLETHYSMOGRAPHY LUNG VOLUMES: CPT

## 2022-03-08 PROCEDURE — 3078F DIAST BP <80 MM HG: CPT | Performed by: INTERNAL MEDICINE

## 2022-03-08 PROCEDURE — 3074F SYST BP LT 130 MM HG: CPT | Performed by: INTERNAL MEDICINE

## 2022-03-08 PROCEDURE — 99214 OFFICE O/P EST MOD 30 MIN: CPT | Performed by: INTERNAL MEDICINE

## 2022-03-08 PROCEDURE — 94060 EVALUATION OF WHEEZING: CPT | Performed by: INTERNAL MEDICINE

## 2022-03-08 PROCEDURE — 94729 DIFFUSING CAPACITY: CPT | Performed by: INTERNAL MEDICINE

## 2022-03-08 RX ORDER — INSULIN GLARGINE 100 [IU]/ML
INJECTION, SOLUTION SUBCUTANEOUS
Qty: 15 ML | Refills: 3 | Status: SHIPPED | OUTPATIENT
Start: 2022-03-08 | End: 2022-03-08

## 2022-03-08 RX ORDER — INSULIN GLARGINE 100 [IU]/ML
INJECTION, SOLUTION SUBCUTANEOUS
Qty: 15 ML | Refills: 3 | Status: SHIPPED | OUTPATIENT
Start: 2022-03-08 | End: 2022-03-31

## 2022-03-08 RX ORDER — HYDROCHLOROTHIAZIDE 25 MG/1
12.5 TABLET ORAL DAILY
Qty: 45 TABLET | Refills: 1 | Status: SHIPPED | OUTPATIENT
Start: 2022-03-08 | End: 2022-04-19 | Stop reason: SDUPTHER

## 2022-03-08 RX ORDER — LEVALBUTEROL 1.25 MG/.5ML
1.25 SOLUTION, CONCENTRATE RESPIRATORY (INHALATION) ONCE
Status: COMPLETED | OUTPATIENT
Start: 2022-03-08 | End: 2022-03-08

## 2022-03-08 RX ORDER — SODIUM CHLORIDE FOR INHALATION 0.9 %
3 VIAL, NEBULIZER (ML) INHALATION ONCE
Status: COMPLETED | OUTPATIENT
Start: 2022-03-08 | End: 2022-03-08

## 2022-03-08 RX ORDER — DILTIAZEM HYDROCHLORIDE 120 MG/1
120 CAPSULE, COATED, EXTENDED RELEASE ORAL DAILY
Qty: 60 CAPSULE | Refills: 1 | Status: SHIPPED | OUTPATIENT
Start: 2022-03-08 | End: 2022-06-06

## 2022-03-08 RX ORDER — ALBUTEROL SULFATE 2.5 MG/3ML
2.5 SOLUTION RESPIRATORY (INHALATION) ONCE
Status: DISCONTINUED | OUTPATIENT
Start: 2022-03-08 | End: 2022-03-08

## 2022-03-08 RX ADMIN — ISODIUM CHLORIDE 3 ML: 0.03 SOLUTION RESPIRATORY (INHALATION) at 15:25

## 2022-03-08 RX ADMIN — LEVALBUTEROL HYDROCHLORIDE 1.25 MG: 1.25 SOLUTION, CONCENTRATE RESPIRATORY (INHALATION) at 15:25
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
## 2022-03-08 NOTE — PATIENT INSTRUCTIONS
1  Discontinue amlodipine, you were taking 5 mg daily    2   Cut back hydrochlorothiazide to 12 5 mg daily    Start Cardizem CD 1 20 mg daily which will help slow your heart    Continue other medication as be

## 2022-03-09 DIAGNOSIS — R19.7 DIARRHEA, UNSPECIFIED TYPE: ICD-10-CM

## 2022-03-09 RX ORDER — DICYCLOMINE HYDROCHLORIDE 10 MG/1
CAPSULE ORAL
Qty: 60 CAPSULE | Refills: 2 | Status: SHIPPED | OUTPATIENT
Start: 2022-03-09 | End: 2022-07-05

## 2022-03-10 ENCOUNTER — APPOINTMENT (OUTPATIENT)
Dept: RADIOLOGY | Facility: HOSPITAL | Age: 62
End: 2022-03-10

## 2022-03-10 ENCOUNTER — HOSPITAL ENCOUNTER (OUTPATIENT)
Facility: AMBULARY SURGERY CENTER | Age: 62
Setting detail: OUTPATIENT SURGERY
Discharge: HOME | End: 2022-03-10
Attending: ANESTHESIOLOGY | Admitting: ANESTHESIOLOGY
Payer: OTHER MISCELLANEOUS

## 2022-03-10 VITALS
OXYGEN SATURATION: 98 % | RESPIRATION RATE: 18 BRPM | HEART RATE: 94 BPM | TEMPERATURE: 96.1 F | DIASTOLIC BLOOD PRESSURE: 97 MMHG | SYSTOLIC BLOOD PRESSURE: 174 MMHG

## 2022-03-10 LAB — GLUCOSE SERPL-MCNC: 180 MG/DL (ref 65–140)

## 2022-03-10 PROCEDURE — 82948 REAGENT STRIP/BLOOD GLUCOSE: CPT

## 2022-03-10 PROCEDURE — 62323 NJX INTERLAMINAR LMBR/SAC: CPT | Performed by: ANESTHESIOLOGY

## 2022-03-10 RX ORDER — LIDOCAINE WITH 8.4% SOD BICARB 0.9%(10ML)
SYRINGE (ML) INJECTION AS NEEDED
Status: DISCONTINUED | OUTPATIENT
Start: 2022-03-10 | End: 2022-03-10 | Stop reason: HOSPADM

## 2022-03-10 RX ORDER — METHYLPREDNISOLONE ACETATE 80 MG/ML
INJECTION, SUSPENSION INTRA-ARTICULAR; INTRALESIONAL; INTRAMUSCULAR; SOFT TISSUE AS NEEDED
Status: DISCONTINUED | OUTPATIENT
Start: 2022-03-10 | End: 2022-03-10 | Stop reason: HOSPADM

## 2022-03-10 NOTE — DISCHARGE INSTRUCTIONS
Epidural Steroid Injection   WHAT YOU NEED TO KNOW:   An epidural steroid injection (JESSIE) is a procedure to inject steroid medicine into the epidural space  The epidural space is between your spinal cord and vertebrae  Steroids reduce inflammation and fluid buildup in your spine that may be causing pain  You may be given pain medicine along with the steroids  ACTIVITY  · Do not drive or operate machinery today  · No strenuous activity today - bending, lifting, etc   · You may resume normal activites starting tomorrow - start slowly and as tolerated  · You may shower today, but no tub baths or hot tubs  · You may have numbness for several hours from the local anesthetic  Please use caution and common sense, especially with weight-bearing activities  CARE OF THE INJECTION SITE  · If you have soreness or pain, apply ice to the area today (20 minutes on/20 minutes off)  · Starting tomorrow, you may use warm, moist heat or ice if needed  · You may have an increase or change in your discomfort for 36-48 hours after your treatment  · Apply ice and continue with any pain medication you have been prescribed  · Notify [facility] if you have any of the following: redness, drainage, swelling, headache, stiff neck or fever above 100°F     SPECIAL INSTRUCTIONS  · Our office will contact you in approximately 7 days for a progress report  MEDICATIONS  · Continue to take all routine medications  · Our office may have instructed you to hold some medications  As no general anesthesia was used in today's procedure, you should not experience any side effects related to anesthesia  If you have a problem specifically related to your procedure, please call our office at [phone]  Problems not related to your procedure should be directed to your primary care physician

## 2022-03-10 NOTE — INTERVAL H&P NOTE
H&P reviewed  After examining the patient I find no changes in the patients condition since the H&P had been written      Vitals:    03/10/22 1433   BP: 158/66   Pulse: 100   Resp: 20   Temp: (!) 96 1 °F (35 6 °C)   SpO2: 98%

## 2022-03-10 NOTE — OP NOTE
ATTENDING PHYSICIAN:  Eileen Galloway MD     PROCEDURE: Caudal epidural steroid injection under fluoroscopic guidance  PREPROCEDURE DIAGNOSIS:  Lumbar post-laminectomy syndrome  POSTPROCEDURE DIAGNOSIS:  Lumbar post-laminectomy syndrome  ANESTHESIA:  Local     ESTIMATED BLOOD LOSS:  Minimal     COMPLICATIONS:  None  LOCATION:  [address]  CONSENT:  Today's procedure, its potential benefits as well as its risks and potential side effects were reviewed  Discussed risks of the procedure including bleeding, infection, nerve irritation or damage, reactions to the medications, headache, failure of the pain to improve, and potential worsening of the pain were explained in detail to the patient who verbalized understanding and who wished to proceed  Written informed consent was thereby obtained  DESCRIPTION OF THE PROCEDURE: After written informed consent was obtained, the patient was taken to the fluoroscopy suite and placed in the prone position  Anatomical landmarks were identified by palpation to identify the sacral hiatus and by way of fluoroscopy in the PA and lateral views  The skin overlying the sacral hiatus region was prepped using antiseptic applied x3 and draped in the usual sterile fashion  Strict aseptic technique was utilized  The skin and subcutaneous tissues at the needle entry site were infiltrated with 3 mL of 1% preservative-free lidocaine using a 25-gauge 1-1/2-inch needle  A 22-gauge spinal needle was then advanced under fluoroscopic guidance in the lateral view at the sacral hiatus and guided to enter the sacral canal and directed towards the epidural space  Proper placement into the epidural space of the sacral canal in the midline was confirmed with fluoroscopy in the PA view  After negative aspiration for CSF or heme, contrast was injected which delineated the epidural space under fluoroscopy in the PA and lateral views      After negative aspiration was reconfirmed, a 13 mL injectate consisting of 1 mL of 80 mg/mL of Depo-Medrol, 3 mL of 1% preservative-free lidocaine, and 9 mL of preservative-free normal saline was slowly injected incrementally with negative aspiration between aliquots  The patient tolerated the procedure well and all needles were removed intact  Hemostasis was obtained and there were no paresthesias or apparent complications  The skin was wiped free of the antiseptic and a Band-Aid was placed as appropriate  The patient was monitored for an appropriate period of time following the procedure and remained hemodynamically stable and neurovascularly intact following the procedure  The patient was ultimately discharged to home with supervision in good condition and instructed to call the office in approximately 7-10 days with an update or sooner as warranted  Discharge and follow up instructions were provided  I was present for and participated in all key and critical portions of this procedure      Connie Stuart MD  3/10/2022  3:08 PM

## 2022-03-17 ENCOUNTER — TELEPHONE (OUTPATIENT)
Dept: PAIN MEDICINE | Facility: MEDICAL CENTER | Age: 62
End: 2022-03-17

## 2022-03-17 NOTE — TELEPHONE ENCOUNTER
1st attempt call, unable to leave msg to call back with % of improvement and pain level  Phone rang then hung up

## 2022-03-31 DIAGNOSIS — E11.65 TYPE 2 DIABETES MELLITUS WITH HYPERGLYCEMIA, WITH LONG-TERM CURRENT USE OF INSULIN (HCC): ICD-10-CM

## 2022-03-31 DIAGNOSIS — E66.01 OBESITY, CLASS III, BMI 40-49.9 (MORBID OBESITY) (HCC): ICD-10-CM

## 2022-03-31 DIAGNOSIS — I10 HYPERTENSION GOAL BP (BLOOD PRESSURE) < 140/90: ICD-10-CM

## 2022-03-31 DIAGNOSIS — E78.2 MIXED HYPERLIPIDEMIA: ICD-10-CM

## 2022-03-31 DIAGNOSIS — Z79.4 TYPE 2 DIABETES MELLITUS WITH HYPERGLYCEMIA, WITH LONG-TERM CURRENT USE OF INSULIN (HCC): ICD-10-CM

## 2022-03-31 RX ORDER — INSULIN GLARGINE 100 [IU]/ML
INJECTION, SOLUTION SUBCUTANEOUS
Qty: 15 ML | Refills: 3 | Status: SHIPPED | OUTPATIENT
Start: 2022-03-31

## 2022-04-04 ENCOUNTER — TELEMEDICINE (OUTPATIENT)
Dept: FAMILY MEDICINE CLINIC | Facility: CLINIC | Age: 62
End: 2022-04-04
Payer: COMMERCIAL

## 2022-04-04 DIAGNOSIS — J40 BRONCHITIS: Primary | ICD-10-CM

## 2022-04-04 DIAGNOSIS — Z79.4 TYPE 2 DIABETES MELLITUS WITH HYPERGLYCEMIA, WITH LONG-TERM CURRENT USE OF INSULIN (HCC): Primary | ICD-10-CM

## 2022-04-04 DIAGNOSIS — E11.65 TYPE 2 DIABETES MELLITUS WITH HYPERGLYCEMIA, WITH LONG-TERM CURRENT USE OF INSULIN (HCC): Primary | ICD-10-CM

## 2022-04-04 PROCEDURE — 99213 OFFICE O/P EST LOW 20 MIN: CPT | Performed by: FAMILY MEDICINE

## 2022-04-04 RX ORDER — INSULIN GLARGINE-YFGN 100 [IU]/ML
INJECTION, SOLUTION SUBCUTANEOUS
Qty: 45 ML | Refills: 2 | Status: SHIPPED | OUTPATIENT
Start: 2022-04-04

## 2022-04-04 RX ORDER — AZITHROMYCIN 250 MG/1
TABLET, FILM COATED ORAL
Qty: 6 TABLET | Refills: 0 | Status: SHIPPED | OUTPATIENT
Start: 2022-04-04 | End: 2022-04-08

## 2022-04-04 RX ORDER — BENZONATATE 200 MG/1
200 CAPSULE ORAL 3 TIMES DAILY PRN
Qty: 20 CAPSULE | Refills: 0 | Status: SHIPPED | OUTPATIENT
Start: 2022-04-04

## 2022-04-04 RX ORDER — PROMETHAZINE HYDROCHLORIDE AND CODEINE PHOSPHATE 6.25; 1 MG/5ML; MG/5ML
5 SYRUP ORAL EVERY 4 HOURS PRN
Qty: 120 ML | Refills: 0 | Status: SHIPPED | OUTPATIENT
Start: 2022-04-04

## 2022-04-04 RX ORDER — PREDNISONE 20 MG/1
20 TABLET ORAL DAILY
Qty: 13 TABLET | Refills: 0 | Status: SHIPPED | OUTPATIENT
Start: 2022-04-04

## 2022-04-07 ENCOUNTER — TELEPHONE (OUTPATIENT)
Dept: PAIN MEDICINE | Facility: CLINIC | Age: 62
End: 2022-04-07

## 2022-04-07 NOTE — TELEPHONE ENCOUNTER
Spoke to pt regarding medication  Pt is unsure if she picked up medication, pt stated she will check and call us back

## 2022-04-07 NOTE — TELEPHONE ENCOUNTER
Pt contacted Call Center requested refill of their medication          Medication Name: Tapentadol HCl ER     Dosage of Med: 50 mg       Frequency of Med: 2xs a day       Remaining Medication: 0      Pharmacy and Location:  [facility]/PHARMACY [address] [place] [address], [phone] [facility]: [place]  ID: [id number]  Phone: [phone]

## 2022-04-08 DIAGNOSIS — E11.65 TYPE 2 DIABETES MELLITUS WITH HYPERGLYCEMIA, WITH LONG-TERM CURRENT USE OF INSULIN (HCC): ICD-10-CM

## 2022-04-08 DIAGNOSIS — I10 ESSENTIAL HYPERTENSION: ICD-10-CM

## 2022-04-08 DIAGNOSIS — Z79.4 TYPE 2 DIABETES MELLITUS WITH HYPERGLYCEMIA, WITH LONG-TERM CURRENT USE OF INSULIN (HCC): ICD-10-CM

## 2022-04-08 DIAGNOSIS — E78.01 FAMILIAL HYPERCHOLESTEROLEMIA: ICD-10-CM

## 2022-04-08 PROCEDURE — 4010F ACE/ARB THERAPY RXD/TAKEN: CPT | Performed by: INTERNAL MEDICINE

## 2022-04-08 RX ORDER — ATORVASTATIN CALCIUM 80 MG/1
TABLET, FILM COATED ORAL
Qty: 30 TABLET | Refills: 3 | Status: SHIPPED | OUTPATIENT
Start: 2022-04-08 | End: 2022-07-13

## 2022-04-08 RX ORDER — LISINOPRIL 2.5 MG/1
TABLET ORAL
Qty: 30 TABLET | Refills: 4 | Status: SHIPPED | OUTPATIENT
Start: 2022-04-08

## 2022-04-11 ENCOUNTER — OFFICE VISIT (OUTPATIENT)
Dept: PAIN MEDICINE | Facility: CLINIC | Age: 62
End: 2022-04-11
Payer: OTHER MISCELLANEOUS

## 2022-04-11 ENCOUNTER — OFFICE VISIT (OUTPATIENT)
Dept: PULMONOLOGY | Facility: MEDICAL CENTER | Age: 62
End: 2022-04-11
Payer: COMMERCIAL

## 2022-04-11 VITALS
WEIGHT: 247.8 LBS | HEIGHT: 61 IN | RESPIRATION RATE: 12 BRPM | DIASTOLIC BLOOD PRESSURE: 84 MMHG | BODY MASS INDEX: 46.78 KG/M2 | TEMPERATURE: 96.8 F | HEART RATE: 106 BPM | OXYGEN SATURATION: 97 % | SYSTOLIC BLOOD PRESSURE: 142 MMHG

## 2022-04-11 VITALS
SYSTOLIC BLOOD PRESSURE: 143 MMHG | DIASTOLIC BLOOD PRESSURE: 85 MMHG | WEIGHT: 248.6 LBS | HEIGHT: 61 IN | BODY MASS INDEX: 46.93 KG/M2 | HEART RATE: 106 BPM

## 2022-04-11 DIAGNOSIS — J44.1 CHRONIC OBSTRUCTIVE PULMONARY DISEASE WITH ACUTE EXACERBATION (HCC): Primary | ICD-10-CM

## 2022-04-11 DIAGNOSIS — R06.02 SHORTNESS OF BREATH: ICD-10-CM

## 2022-04-11 DIAGNOSIS — J41.0 SIMPLE CHRONIC BRONCHITIS (HCC): ICD-10-CM

## 2022-04-11 DIAGNOSIS — F17.210 CIGARETTE NICOTINE DEPENDENCE WITHOUT COMPLICATION: ICD-10-CM

## 2022-04-11 DIAGNOSIS — G89.4 CHRONIC PAIN SYNDROME: Primary | ICD-10-CM

## 2022-04-11 DIAGNOSIS — Z79.4 TYPE 2 DIABETES MELLITUS WITH HYPERGLYCEMIA, WITH LONG-TERM CURRENT USE OF INSULIN (HCC): ICD-10-CM

## 2022-04-11 DIAGNOSIS — E11.65 TYPE 2 DIABETES MELLITUS WITH HYPERGLYCEMIA, WITH LONG-TERM CURRENT USE OF INSULIN (HCC): ICD-10-CM

## 2022-04-11 DIAGNOSIS — G47.33 OBSTRUCTIVE SLEEP APNEA: ICD-10-CM

## 2022-04-11 DIAGNOSIS — M96.1 POSTLAMINECTOMY SYNDROME, LUMBAR REGION: ICD-10-CM

## 2022-04-11 PROCEDURE — 99214 OFFICE O/P EST MOD 30 MIN: CPT | Performed by: INTERNAL MEDICINE

## 2022-04-11 PROCEDURE — 99214 OFFICE O/P EST MOD 30 MIN: CPT | Performed by: NURSE PRACTITIONER

## 2022-04-11 RX ORDER — TRAMADOL HYDROCHLORIDE 50 MG/1
50 TABLET ORAL EVERY 6 HOURS PRN
Qty: 90 TABLET | Refills: 1 | Status: SHIPPED | OUTPATIENT
Start: 2022-04-11 | End: 2022-06-06

## 2022-04-11 RX ORDER — ALBUTEROL SULFATE 90 UG/1
2 AEROSOL, METERED RESPIRATORY (INHALATION) EVERY 4 HOURS PRN
Qty: 8.5 G | Refills: 7 | Status: SHIPPED | OUTPATIENT
Start: 2022-04-11

## 2022-04-11 NOTE — PATIENT INSTRUCTIONS
Opioid Safety   WHAT YOU NEED TO KNOW:   What do I need to know about opioid safety? Safety includes the correct use, storage, and disposal of opioids  Examples of opioid pain medicines are oxycodone, morphine, fentanyl, and codeine  How are opioids given? Opioids can be given as a pill, patch, or suppository  They can also be given as an injection into a vein, near a nerve, or into a joint  Your prescription may include one or both of the following:  · Short-acting opioids  work fast and relieve pain for about 3 to 6 hours  They are often used for acute or breakthrough pain  · Long-acting opioids  usually last at least 8 hours  You can take them less often and they may be used for chronic pain  How do I use opioids safely? · Take prescribed opioids exactly as directed  Opioids come with directions based on the kind and how it is given  Talk to your healthcare provider or a pharmacist if you have any questions  Do not take more than the recommended amount  Too much can cause a life-threatening overdose  Do not continue to take it after your pain stops  You may develop tolerance  This means you keep needing higher doses to get the same effect  You may also develop opioid use disorder  This means you are not able to control your opioid use  · Do not give opioids to others or take opioids that belong to someone else  The kind or amount one person takes may not be right for another  The person you share them with may also be taking medicines that do not mix with opioids  He or she may drink alcohol or use other drugs that can cause life-threatening problems when mixed with opioids  · Do not mix opioids with other medicines or alcohol  The combination can cause an overdose, or cause you to stop breathing  Alcohol, sleeping pills, and medicines such as antihistamines can make you sleepy  A combination with opioids can lead to a coma      · Do not drive or operate heavy machinery after you use an opioid  You may feel drowsy or have trouble concentrating  You can injure yourself or others if you drive or use heavy machinery when you are not alert  Your provider or pharmacist can tell you how long to wait after a dose before you do these activities  · Talk to your healthcare provider if you have any side effects  Side effects include nausea, sleepiness, itching, and trouble thinking clearly  Your provider may need to make changes to the kind or amount of opioid you are taking  He or she can also help you find ways to prevent or relieve side effects  What can I do to manage constipation? Constipation is the most common side effect of opioid medicine  Constipation is when you have hard, dry bowel movements, or you go longer than usual between bowel movements  Tell your healthcare provider about all changes in your bowel movements while you are taking opioids  He or she may recommend laxative medicine to help you have a bowel movement  He or she may also change the kind of opioid you are taking, or change when you take it  The following are more ways you can prevent or relieve constipation:  · Drink liquids as directed  You may need to drink extra liquids to help soften and move your bowels  Ask how much liquid to drink each day and which liquids are best for you  · Eat high-fiber foods  This may help decrease constipation by adding bulk to your bowel movements  High-fiber foods include fruits, vegetables, whole-grain breads and cereals, and beans  Your healthcare provider or dietitian can help you create a high-fiber meal plan  Your provider may also recommend a fiber supplement if you cannot get enough fiber from food  · Exercise regularly  Regular physical activity can help stimulate your intestines  Walking is a good exercise to prevent or relieve constipation  Ask which exercises are best for you  · Schedule a time each day to have a bowel movement    This may help train your body to have regular bowel movements  Bend forward while you are on the toilet to help move the bowel movement out  Sit on the toilet for at least 10 minutes, even if you do not have a bowel movement  How do I store opioids safely? · Store opioids where others cannot easily get them  Keep them in a locked cabinet or secure area  Do not  keep them in a purse or other bag you carry with you  A person may be looking for something else and find the opioids  · Make sure opioids are stored out of the reach of children  A child can easily overdose on opioids  Opioids may look like candy to a small child  What is the best way to dispose of opioids? The laws vary by country and area  In the [place], the best way is to return the opioids through a take-back program  This program is offered by the [employer]  The following are options for using the program:  · Take the opioids to a TETE collection site  The site is often a law enforcement center  Call your local law enforcement center for scheduled take-back days in your area  You will be given information on where to go if the collection site is in a different location  · Take the opioids to an approved pharmacy or hospital   A pharmacy or hospital may be set up as a collection site  You will need to ask if it is a TETE collection site if you were not directed there  A pharmacy or doctor's office may not be able to take back opioids unless it is a TETE site  · Use a mail-back system  This means you are given containers to put the opioids into  You will then mail them in the containers  · Use a take-back drop box  This is a place to leave the opioids at any time  People and animals will not be able to get into the box  Your local law enforcement agency can tell you where to find a drop box in your area  What are some other safe ways to dispose of opioids? The medicine may come with disposal instructions   The instructions may vary depending on the brand of medicine you are using  Instructions may come in a Medication Guide, but not every medicine has one  You may instead get instructions from your pharmacy or doctor  Follow instructions carefully  The following are general guidelines to follow:  · Find out if you can flush the opioid  Some opioids can be flushed down the toilet or poured into the sink  You will need to contact authorities in your area to see if this is an option for you  The FDA also offers a list of medicines that are safe to flush down the toilet  You can check the list if you cannot get the information for your local area  · Ask your waste management company about rules for putting opioids in the trash  The company will be able to give you specific directions  Scratch out personal information on the original medicine label so it cannot be read  Then put it in the trash  Do not label the trash or put any information on it about the opioids  It should look like regular household trash so no one is tempted to look for the opioids  Keep the trash out of the reach of children and animals  Always make sure trash is secure  · Talk to officials if you live in a facility  If you live in a nursing home or assisted living center, talk to an official  The person will know the rules for your area  What are some other ways to manage pain? · Ask your healthcare provider about non-opioid medicines to control pain  Some medicines may even work better than opioids, depending on the cause of your pain  Nonprescription medicines include NSAIDs (such as ibuprofen) and acetaminophen  Prescription medicines include muscle relaxers, antidepressants, and steroids  · Pain may be managed without any medicines  Some ways to relieve pain include massage, aromatherapy, or meditation  Physical or occupational therapy may also help  Where can I find more information?    · Drug Enforcement Administration  [address]  Phone: 8- [phone] - [phone]  Web Address: [facility] gov/drug_disposal/    · [address]  and Drug Administration  [place] , [address]  Phone: [phone]  Web Address: http://[employer]/    Call your local emergency number (911 in the [address]), or have someone else call if:   · You have a seizure  · You cannot be woken  · You have trouble staying awake and your breathing is slow or shallow  · Your speech is slurred, or you are confused  · You are dizzy or stumble when you walk  When should I or someone close to me call my doctor? · You are extremely drowsy, or you have trouble staying awake or speaking  · You have pale or clammy skin  · You have blue fingernails or lips  · Your heartbeat is slower than normal     · You cannot stop vomiting  · You have questions or concerns about your condition or care  CARE AGREEMENT:   You have the right to help plan your care  Learn about your health condition and how it may be treated  Discuss treatment options with your healthcare providers to decide what care you want to receive  You always have the right to refuse treatment  The above information is an  only  It is not intended as medical advice for individual conditions or treatments  Talk to your doctor, nurse or pharmacist before following any medical regimen to see if it is safe and effective for you  © Copyright [employer] 2022 Information is for End User's use only and may not be sold, redistributed or otherwise used for commercial purposes  All illustrations and images included in CareNotes® are the copyrighted property of [employer]  or [employer]  Tramadol (By mouth)   Tramadol (TRAM-a-dol)  Treats moderate to severe pain  This medicine is a narcotic pain reliever     Brand Name(s): FusePaq Synapryn, Qdolo, Ultram, Ultram ER   There may be other brand names for this medicine  When This Medicine Should Not Be Used: This medicine is not right for everyone  Do not use it if you had an allergic reaction to tramadol or other narcotic medicine, or if you have stomach or bowel blockage (including paralytic ileus) or serious lung or breathing problems (including asthma, respiratory depression)  How to Use This Medicine:   Long Acting Capsule, Liquid, Tablet, Dissolving Tablet, Long Acting Tablet  · Take your medicine as directed  Your dose may need to be changed several times to find what works best for you  · Make sure your hands are dry before you handle the disintegrating tablet  Peel back the foil from the blister pack, then remove the tablet  Do not push the tablet through the foil  Place the tablet in your mouth  After it has melted, swallow or take a drink of water  · Swallow the extended-release tablet whole  Do not crush, break, or chew it  · Drink plenty of liquids to help avoid constipation  · This medicine should come with a Medication Guide  Ask your pharmacist for a copy if you do not have one  · Missed dose: Take a dose as soon as you remember  If it is almost time for your next dose, wait until then and take a regular dose  Do not take extra medicine to make up for a missed dose  · Store the medicine in a closed container at room temperature, away from heat, moisture, and direct light  · Drop off any unused narcotic medicine at a drug take-back location right away  If you do not have a drug take-back location near you, flush any unused narcotic medicine down the toilet  Check your local drug store and clinics for take-back locations  You can also check the [employer] web site for locations   Here is the link to the FDA safe disposal of medicines website: www fda gov/drugs/resourcesforyou/consumers/buyingusingmedicinesafely/ensuringsafeuseofmedicine/safedisposalofmedicines/cgw377252 htm  Drugs and Foods to Avoid:   Ask your doctor or pharmacist before using any other medicine, including over-the-counter medicines, vitamins, and herbal products  · Do not use this medicine if you are using or have used an MAO inhibitor within the past 14 days  · Some medicines can affect how tramadol works  Tell your doctor if you are using any of the following:  ? Amiodarone, carbamazepine, cyclobenzaprine, digoxin, erythromycin, ketoconazole, lithium, metaxalone, mirtazapine, phenytoin, promethazine, rifampin, ritonavir, quinidine, trazodone  ? Blood thinner (including warfarin)  ? Diuretic (water pill)  ? Medicine to treat depression (including bupropion, fluoxetine, paroxetine, quinidine, SSRIs, TCAs)  ? Phenothiazine medicine  ? Triptan medicine for migraine headaches  · Tell your doctor if you use anything else that makes you sleepy  Some examples are allergy medicine, narcotic pain medicine, and alcohol  Tell your doctor if you are using buprenorphine, butorphanol, nalbuphine, pentazocine, or a muscle relaxer (including cyclobenzaprine, metaxalone)  · Do not drink alcohol while you are using this medicine  Warnings While Using This Medicine:   · Tell your doctor if you are pregnant or breastfeeding, or if you have kidney disease, liver disease (including cirrhosis), adrenal problems, gallstones, lung or breathing problems (including sleep apnea), diabetes, pancreas problems, or a history of head injury, seizures, drug addiction, or depression or similar emotional problems  Tell your doctor if you have phenylketonuria  · This medicine may cause the following problems:  ? High risk of overdose, which can lead to death  ? Respiratory depression (serious breathing problem that can be life-threatening)  ? Sleep-related breathing problems (including sleep apnea, sleep-related hypoxemia)  ? Serotonin syndrome (when used with certain medicines)  ? Increased risk of seizures  ? Adrenal gland problem  ? Low blood pressure  ? Unusual change in mood or behavior  ?  Hypoglycemia (low blood sugar level)  · This medicine may make you dizzy, drowsy, or lightheaded  Do not drive or do anything else that could be dangerous until you know how this medicine affects you  Sit or lie down if you feel dizzy  Stand up carefully  · This medicine can be habit-forming  Do not use more than your prescribed dose  Call your doctor if you think your medicine is not working  · Do not stop using this medicine suddenly  Your doctor will need to slowly decrease your dose before you stop it completely  · Tell any doctor or dentist who treats you that you are using this medicine  · This medicine may cause constipation, especially with long-term use  Ask your doctor if you should use a laxative to prevent and treat constipation  · This medicine could cause infertility  Talk with your doctor before using this medicine if you plan to have children  · Your doctor will do lab tests at regular visits to check on the effects of this medicine  Keep all appointments  · Keep all medicine out of the reach of children  Never share your medicine with anyone    Possible Side Effects While Using This Medicine:   Call your doctor right away if you notice any of these side effects:  · Allergic reaction: Itching or hives, swelling in your face or hands, swelling or tingling in your mouth or throat, chest tightness, trouble breathing  · Anxiety, restlessness, fast heartbeat, fever, sweating, muscle spasms, nausea, vomiting, diarrhea, seeing or hearing things that are not there  · Blistering, peeling, red skin rash  · Blue lips, fingernails, or skin  · Changes in skin color, dark freckles, cold feeling, tiredness, weight loss  · Extreme dizziness, drowsiness, or weakness, shallow breathing, slow heartbeat, seizures, and cold, clammy skin  · Lightheadedness, dizziness, fainting  · Seizures  · Shaking, trembling, sweating, hunger, confusion  · Unusual mood or behavior, thoughts of killing yourself or others  · Trouble breathing  · Weakness, muscle twitching  If you notice these less serious side effects, talk with your doctor:   · Constipation, loss of appetite, stomach upset  · Dry mouth  · Headache  If you notice other side effects that you think are caused by this medicine, tell your doctor  Call your doctor for medical advice about side effects  You may report side effects to FDA at 5-584-FDA-5215    © Copyright [employer] 2022 Information is for End User's use only and may not be sold, redistributed or otherwise used for commercial purposes  The above information is an  only  It is not intended as medical advice for individual conditions or treatments  Talk to your doctor, nurse or pharmacist before following any medical regimen to see if it is safe and effective for you

## 2022-04-11 NOTE — PATIENT INSTRUCTIONS
Use Breztri 2 puffs twice a day  Hold Incruse while using the Breztri for 2 weeks    One year done the Breztri samples start Wixela 250 mcg 1 puff twice a day along with Incruse 1 puff daily    Rinse mouth after using Wixela    Go for blood test   This includes CBC hemoglobin A1c CMP and D-dimer
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
## 2022-04-12 NOTE — ASSESSMENT & PLAN NOTE
Kiley Simon is on tapering dose of prednisone started on 04/04 with some improvement but still complaining of shortness of breath and wheezing  She completed 5 day Z-Diego I did tell her she could use her nebulizer with albuterol use 4 times a day  Presently using Incruse  I gave her 2 samples of Breztri she can use 2 puffs b i d  for next 2 weeks and not use Incruse while using this  When she has completed the [facility] sample she can use her Incruse 1 puff daily again and I gave prescription for Wixela 250 micrograms 1 puff b i d  she can use with it  This will likely be covered by her insurance

## 2022-04-12 NOTE — ASSESSMENT & PLAN NOTE
She does have moderate LESLIE  She has not been using her [employer] machine recently  She did not rest her machine as it is likely on recall list   Her CPAP machine is set at 10 centimeters water  On last compliance data for work 1 month she use it for 22 days and average 4 5 hours of usage per night  This resulted in AHI is 0 7 which is good  DME company is [employer]    I did encourage her to register her CPAP machine

## 2022-04-12 NOTE — ASSESSMENT & PLAN NOTE
I reviewed complete PFT was done March 8th with Viral Black  This shows mild decrease in FVC and FEV1 but normal obstructive ratio  She did have moderate air trapping with residual volume of 143% of predicted and total lung capacity was normal at 99% of predicted  FVC was 1 95 liters 70% of predicted, FEV1 1 57 liters or 71% predicted, obstructive index 80%  No significant change after bronchodilator    Diffusion capacity measurement was normal at 80% predicted

## 2022-04-12 NOTE — ASSESSMENT & PLAN NOTE
She has been having increased shortness of breath recently  This is due to COPD exacerbation  Will check CBC to make sure she has no anemia    Also I did order CMP and hemoglobin A1c    Room air O2 saturation at rest 96% and after ambulating 100 ft lowest O2 saturation was 95%    No oxygen desaturation with ambulation today

## 2022-04-12 NOTE — ASSESSMENT & PLAN NOTE
She still smoking half a pack of cigarettes per day  Did encourage her to quit smoking    I also renewed her Nicotrol inhaler to help her quit smoking

## 2022-04-14 ENCOUNTER — TELEPHONE (OUTPATIENT)
Dept: PULMONOLOGY | Facility: CLINIC | Age: 62
End: 2022-04-14

## 2022-04-14 NOTE — TELEPHONE ENCOUNTER
Pharmacy called, patients [place] is not covered nor is Advair  They did say Airduo would be covered   Please advise

## 2022-04-19 ENCOUNTER — OFFICE VISIT (OUTPATIENT)
Dept: CARDIOLOGY CLINIC | Facility: CLINIC | Age: 62
End: 2022-04-19
Payer: COMMERCIAL

## 2022-04-19 VITALS
TEMPERATURE: 97.3 F | DIASTOLIC BLOOD PRESSURE: 80 MMHG | HEIGHT: 61 IN | SYSTOLIC BLOOD PRESSURE: 128 MMHG | OXYGEN SATURATION: 93 % | BODY MASS INDEX: 46.63 KG/M2 | HEART RATE: 92 BPM | WEIGHT: 247 LBS

## 2022-04-19 DIAGNOSIS — R06.02 SHORTNESS OF BREATH: ICD-10-CM

## 2022-04-19 DIAGNOSIS — I25.119 CORONARY ARTERY DISEASE INVOLVING NATIVE CORONARY ARTERY OF NATIVE HEART WITH ANGINA PECTORIS (HCC): ICD-10-CM

## 2022-04-19 DIAGNOSIS — E78.2 MIXED HYPERLIPIDEMIA: ICD-10-CM

## 2022-04-19 DIAGNOSIS — J44.9 CHRONIC OBSTRUCTIVE PULMONARY DISEASE, UNSPECIFIED COPD TYPE (HCC): ICD-10-CM

## 2022-04-19 DIAGNOSIS — F17.200 NICOTINE DEPENDENCE, UNCOMPLICATED, UNSPECIFIED NICOTINE PRODUCT TYPE: ICD-10-CM

## 2022-04-19 DIAGNOSIS — I11.9 HYPERTENSIVE HEART DISEASE WITHOUT HEART FAILURE: ICD-10-CM

## 2022-04-19 PROCEDURE — 3074F SYST BP LT 130 MM HG: CPT | Performed by: INTERNAL MEDICINE

## 2022-04-19 PROCEDURE — 99214 OFFICE O/P EST MOD 30 MIN: CPT | Performed by: INTERNAL MEDICINE

## 2022-04-19 PROCEDURE — 3008F BODY MASS INDEX DOCD: CPT | Performed by: INTERNAL MEDICINE

## 2022-04-19 PROCEDURE — 93000 ELECTROCARDIOGRAM COMPLETE: CPT | Performed by: INTERNAL MEDICINE

## 2022-04-19 PROCEDURE — 4004F PT TOBACCO SCREEN RCVD TLK: CPT | Performed by: INTERNAL MEDICINE

## 2022-04-19 PROCEDURE — 3079F DIAST BP 80-89 MM HG: CPT | Performed by: INTERNAL MEDICINE

## 2022-04-19 RX ORDER — HYDROCHLOROTHIAZIDE 25 MG/1
25 TABLET ORAL DAILY
Qty: 90 TABLET | Refills: 1 | Status: SHIPPED | OUTPATIENT
Start: 2022-04-19
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
## 2022-05-06 DIAGNOSIS — J31.0 RHINITIS, UNSPECIFIED TYPE: ICD-10-CM

## 2022-05-06 DIAGNOSIS — J44.9 CHRONIC OBSTRUCTIVE PULMONARY DISEASE, UNSPECIFIED COPD TYPE (HCC): ICD-10-CM

## 2022-05-06 RX ORDER — FLUTICASONE PROPIONATE 50 MCG
2 SPRAY, SUSPENSION (ML) NASAL AS NEEDED
Qty: 16 G | Refills: 3 | Status: SHIPPED | OUTPATIENT
Start: 2022-05-06

## 2022-05-06 RX ORDER — UMECLIDINIUM 62.5 UG/1
AEROSOL, POWDER ORAL
Qty: 30 BLISTER | Refills: 3 | Status: SHIPPED | OUTPATIENT
Start: 2022-05-06

## 2022-05-12 RX ORDER — FLUTICASONE PROPIONATE AND SALMETEROL 250; 50 UG/1; UG/1
1 POWDER RESPIRATORY (INHALATION) 2 TIMES DAILY
Qty: 60 BLISTER | Refills: 11 | Status: SHIPPED | OUTPATIENT
Start: 2022-05-12 | End: 2022-06-11

## 2022-05-16 DIAGNOSIS — Z79.4 TYPE 2 DIABETES MELLITUS WITH HYPERGLYCEMIA, WITH LONG-TERM CURRENT USE OF INSULIN (HCC): ICD-10-CM

## 2022-05-16 DIAGNOSIS — E11.65 TYPE 2 DIABETES MELLITUS WITH HYPERGLYCEMIA, WITH LONG-TERM CURRENT USE OF INSULIN (HCC): ICD-10-CM

## 2022-05-16 RX ORDER — BLOOD SUGAR DIAGNOSTIC
STRIP MISCELLANEOUS
Qty: 100 STRIP | Refills: 3 | Status: SHIPPED | OUTPATIENT
Start: 2022-05-16

## 2022-05-19 ENCOUNTER — OFFICE VISIT (OUTPATIENT)
Dept: OBGYN CLINIC | Facility: CLINIC | Age: 62
End: 2022-05-19
Payer: COMMERCIAL

## 2022-05-19 VITALS
HEART RATE: 72 BPM | WEIGHT: 239 LBS | HEIGHT: 61 IN | BODY MASS INDEX: 45.12 KG/M2 | DIASTOLIC BLOOD PRESSURE: 72 MMHG | SYSTOLIC BLOOD PRESSURE: 126 MMHG

## 2022-05-19 DIAGNOSIS — G89.29 CHRONIC PAIN OF RIGHT KNEE: ICD-10-CM

## 2022-05-19 DIAGNOSIS — Z79.4 TYPE 2 DIABETES MELLITUS WITH HYPERGLYCEMIA, WITH LONG-TERM CURRENT USE OF INSULIN (HCC): ICD-10-CM

## 2022-05-19 DIAGNOSIS — M17.11 PRIMARY OSTEOARTHRITIS OF RIGHT KNEE: Primary | ICD-10-CM

## 2022-05-19 DIAGNOSIS — M25.561 CHRONIC PAIN OF RIGHT KNEE: ICD-10-CM

## 2022-05-19 DIAGNOSIS — M25.461 EFFUSION OF RIGHT KNEE: ICD-10-CM

## 2022-05-19 DIAGNOSIS — E11.65 TYPE 2 DIABETES MELLITUS WITH HYPERGLYCEMIA, WITH LONG-TERM CURRENT USE OF INSULIN (HCC): ICD-10-CM

## 2022-05-19 PROCEDURE — 20610 DRAIN/INJ JOINT/BURSA W/O US: CPT | Performed by: ORTHOPAEDIC SURGERY

## 2022-05-19 PROCEDURE — 99213 OFFICE O/P EST LOW 20 MIN: CPT | Performed by: ORTHOPAEDIC SURGERY

## 2022-05-19 PROCEDURE — 3074F SYST BP LT 130 MM HG: CPT | Performed by: ORTHOPAEDIC SURGERY

## 2022-05-19 PROCEDURE — 4004F PT TOBACCO SCREEN RCVD TLK: CPT | Performed by: ORTHOPAEDIC SURGERY

## 2022-05-19 PROCEDURE — 3008F BODY MASS INDEX DOCD: CPT | Performed by: ORTHOPAEDIC SURGERY

## 2022-05-19 PROCEDURE — 3078F DIAST BP <80 MM HG: CPT | Performed by: ORTHOPAEDIC SURGERY

## 2022-05-19 RX ORDER — TRIAMCINOLONE ACETONIDE 40 MG/ML
80 INJECTION, SUSPENSION INTRA-ARTICULAR; INTRAMUSCULAR
Status: COMPLETED | OUTPATIENT
Start: 2022-05-19 | End: 2022-05-19

## 2022-05-19 RX ORDER — BUPIVACAINE HYDROCHLORIDE 5 MG/ML
6 INJECTION, SOLUTION EPIDURAL; INTRACAUDAL
Status: COMPLETED | OUTPATIENT
Start: 2022-05-19 | End: 2022-05-19

## 2022-05-19 RX ADMIN — TRIAMCINOLONE ACETONIDE 80 MG: 40 INJECTION, SUSPENSION INTRA-ARTICULAR; INTRAMUSCULAR at 15:00

## 2022-05-19 RX ADMIN — BUPIVACAINE HYDROCHLORIDE 6 ML: 5 INJECTION, SOLUTION EPIDURAL; INTRACAUDAL at 15:00
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
## 2022-06-06 ENCOUNTER — OFFICE VISIT (OUTPATIENT)
Dept: PAIN MEDICINE | Facility: CLINIC | Age: 62
End: 2022-06-06
Payer: OTHER MISCELLANEOUS

## 2022-06-06 VITALS
RESPIRATION RATE: 19 BRPM | BODY MASS INDEX: 45.31 KG/M2 | WEIGHT: 240 LBS | HEART RATE: 88 BPM | DIASTOLIC BLOOD PRESSURE: 60 MMHG | HEIGHT: 61 IN | SYSTOLIC BLOOD PRESSURE: 120 MMHG | TEMPERATURE: 97.2 F

## 2022-06-06 DIAGNOSIS — M51.37 DISC DEGENERATION, LUMBOSACRAL: ICD-10-CM

## 2022-06-06 DIAGNOSIS — M43.16 SPONDYLOLISTHESIS OF LUMBAR REGION: Chronic | ICD-10-CM

## 2022-06-06 DIAGNOSIS — G89.4 CHRONIC PAIN SYNDROME: Primary | ICD-10-CM

## 2022-06-06 DIAGNOSIS — M54.50 CHRONIC BILATERAL LOW BACK PAIN WITHOUT SCIATICA: ICD-10-CM

## 2022-06-06 DIAGNOSIS — J41.0 SIMPLE CHRONIC BRONCHITIS (HCC): ICD-10-CM

## 2022-06-06 DIAGNOSIS — E11.8 TYPE 2 DIABETES MELLITUS WITH COMPLICATION, WITHOUT LONG-TERM CURRENT USE OF INSULIN (HCC): ICD-10-CM

## 2022-06-06 DIAGNOSIS — R00.0 TACHYCARDIA: ICD-10-CM

## 2022-06-06 DIAGNOSIS — G89.29 CHRONIC BILATERAL LOW BACK PAIN WITHOUT SCIATICA: ICD-10-CM

## 2022-06-06 PROCEDURE — 99214 OFFICE O/P EST MOD 30 MIN: CPT | Performed by: ANESTHESIOLOGY

## 2022-06-06 PROCEDURE — 3008F BODY MASS INDEX DOCD: CPT | Performed by: ORTHOPAEDIC SURGERY

## 2022-06-06 RX ORDER — DILTIAZEM HYDROCHLORIDE 120 MG/1
CAPSULE, COATED, EXTENDED RELEASE ORAL
Qty: 30 CAPSULE | Refills: 5 | Status: SHIPPED | OUTPATIENT
Start: 2022-06-06

## 2022-06-06 RX ORDER — TRAMADOL HYDROCHLORIDE 50 MG/1
50 TABLET ORAL EVERY 6 HOURS PRN
Qty: 90 TABLET | Refills: 1 | Status: CANCELLED | OUTPATIENT
Start: 2022-06-06

## 2022-06-06 RX ORDER — TRAMADOL HYDROCHLORIDE 200 MG/1
200 TABLET, EXTENDED RELEASE ORAL DAILY
Qty: 30 TABLET | Refills: 1 | Status: SHIPPED | OUTPATIENT
Start: 2022-06-19 | End: 2022-07-19
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
## 2022-06-15 DIAGNOSIS — F32.A DEPRESSION, UNSPECIFIED DEPRESSION TYPE: ICD-10-CM

## 2022-06-15 LAB
ALBUMIN SERPL-MCNC: 4.1 G/DL (ref 3.8–4.8)
ALBUMIN/GLOB SERPL: 1.9 {RATIO} (ref 1.2–2.2)
ALP SERPL-CCNC: 91 IU/L (ref 44–121)
ALT SERPL-CCNC: 16 IU/L (ref 0–32)
AST SERPL-CCNC: 15 IU/L (ref 0–40)
BASOPHILS # BLD AUTO: 0.1 X10E3/UL (ref 0–0.2)
BASOPHILS NFR BLD AUTO: 1 %
BILIRUB SERPL-MCNC: <0.2 MG/DL (ref 0–1.2)
BUN SERPL-MCNC: 14 MG/DL (ref 8–27)
BUN/CREAT SERPL: 14 (ref 12–28)
CALCIUM SERPL-MCNC: 9.7 MG/DL (ref 8.7–10.3)
CHLORIDE SERPL-SCNC: 101 MMOL/L (ref 96–106)
CO2 SERPL-SCNC: 27 MMOL/L (ref 20–29)
CREAT SERPL-MCNC: 1.02 MG/DL (ref 0.57–1)
D DIMER PPP FEU-MCNC: 0.23 MG/L FEU (ref 0–0.49)
EGFR: 62 ML/MIN/1.73
EOSINOPHIL # BLD AUTO: 0.2 X10E3/UL (ref 0–0.4)
EOSINOPHIL NFR BLD AUTO: 2 %
ERYTHROCYTE [DISTWIDTH] IN BLOOD BY AUTOMATED COUNT: 12.8 % (ref 11.7–15.4)
EST. AVERAGE GLUCOSE BLD GHB EST-MCNC: 229 MG/DL
GLOBULIN SER-MCNC: 2.2 G/DL (ref 1.5–4.5)
GLUCOSE SERPL-MCNC: 136 MG/DL (ref 65–99)
HBA1C MFR BLD: 9.6 % (ref 4.8–5.6)
HCT VFR BLD AUTO: 46.4 % (ref 34–46.6)
HGB BLD-MCNC: 15.3 G/DL (ref 11.1–15.9)
IMM GRANULOCYTES # BLD: 0 X10E3/UL (ref 0–0.1)
IMM GRANULOCYTES NFR BLD: 1 %
LYMPHOCYTES # BLD AUTO: 2.3 X10E3/UL (ref 0.7–3.1)
LYMPHOCYTES NFR BLD AUTO: 28 %
MCH RBC QN AUTO: 30.1 PG (ref 26.6–33)
MCHC RBC AUTO-ENTMCNC: 33 G/DL (ref 31.5–35.7)
MCV RBC AUTO: 91 FL (ref 79–97)
MONOCYTES # BLD AUTO: 0.6 X10E3/UL (ref 0.1–0.9)
MONOCYTES NFR BLD AUTO: 8 %
NEUTROPHILS # BLD AUTO: 5.1 X10E3/UL (ref 1.4–7)
NEUTROPHILS NFR BLD AUTO: 60 %
PLATELET # BLD AUTO: 309 X10E3/UL (ref 150–450)
POTASSIUM SERPL-SCNC: 4.6 MMOL/L (ref 3.5–5.2)
PROT SERPL-MCNC: 6.3 G/DL (ref 6–8.5)
RBC # BLD AUTO: 5.09 X10E6/UL (ref 3.77–5.28)
SODIUM SERPL-SCNC: 143 MMOL/L (ref 134–144)
WBC # BLD AUTO: 8.3 X10E3/UL (ref 3.4–10.8)

## 2022-06-15 PROCEDURE — 3046F HEMOGLOBIN A1C LEVEL >9.0%: CPT | Performed by: ORTHOPAEDIC SURGERY

## 2022-06-15 RX ORDER — QUETIAPINE FUMARATE 25 MG/1
TABLET, FILM COATED ORAL
Qty: 60 TABLET | Refills: 5 | Status: SHIPPED | OUTPATIENT
Start: 2022-06-15 | End: 2022-07-15

## 2022-06-16 ENCOUNTER — HOSPITAL ENCOUNTER (OUTPATIENT)
Dept: NON INVASIVE DIAGNOSTICS | Facility: HOSPITAL | Age: 62
Discharge: HOME | End: 2022-06-16
Attending: INTERNAL MEDICINE
Payer: COMMERCIAL

## 2022-06-16 VITALS
SYSTOLIC BLOOD PRESSURE: 117 MMHG | HEIGHT: 61 IN | BODY MASS INDEX: 45.31 KG/M2 | WEIGHT: 240 LBS | DIASTOLIC BLOOD PRESSURE: 77 MMHG | HEART RATE: 83 BPM

## 2022-06-16 DIAGNOSIS — E78.2 MIXED HYPERLIPIDEMIA: ICD-10-CM

## 2022-06-16 DIAGNOSIS — F17.200 NICOTINE DEPENDENCE, UNCOMPLICATED, UNSPECIFIED NICOTINE PRODUCT TYPE: ICD-10-CM

## 2022-06-16 DIAGNOSIS — I11.9 HYPERTENSIVE HEART DISEASE WITHOUT HEART FAILURE: ICD-10-CM

## 2022-06-16 DIAGNOSIS — I25.119 CORONARY ARTERY DISEASE INVOLVING NATIVE CORONARY ARTERY OF NATIVE HEART WITH ANGINA PECTORIS (HCC): ICD-10-CM

## 2022-06-16 DIAGNOSIS — J44.9 CHRONIC OBSTRUCTIVE PULMONARY DISEASE, UNSPECIFIED COPD TYPE (HCC): ICD-10-CM

## 2022-06-16 DIAGNOSIS — R06.02 SHORTNESS OF BREATH: ICD-10-CM

## 2022-06-16 PROCEDURE — 93306 TTE W/DOPPLER COMPLETE: CPT | Performed by: INTERNAL MEDICINE

## 2022-06-16 PROCEDURE — 93306 TTE W/DOPPLER COMPLETE: CPT

## 2022-06-16 RX ORDER — PREDNISONE 10 MG/1
TABLET ORAL
Qty: 40 TABLET | Refills: 0 | Status: SHIPPED | OUTPATIENT
Start: 2022-06-16

## 2022-06-17 LAB
AORTIC ROOT: 3.3 CM
APICAL FOUR CHAMBER EJECTION FRACTION: 57 %
AV LVOT PEAK GRADIENT: 5 MMHG
AV PEAK GRADIENT: 8 MMHG
DOP CALC LVOT AREA: 3.46 CM2
DOP CALC LVOT DIAMETER: 2.1 CM
E WAVE DECELERATION TIME: 131 MS
FRACTIONAL SHORTENING: 25 (ref 28–44)
INTERVENTRICULAR SEPTUM IN DIASTOLE (PARASTERNAL SHORT AXIS VIEW): 1.3 CM
INTERVENTRICULAR SEPTUM: 1.3 CM (ref 0.6–1.1)
LAAS-AP2: 12.8 CM2
LAAS-AP4: 24 CM2
LEFT ATRIUM AREA SYSTOLE SINGLE PLANE A4C: 23.9 CM2
LEFT ATRIUM SIZE: 3.8 CM
LEFT INTERNAL DIMENSION IN SYSTOLE: 2.7 CM (ref 2.1–4)
LEFT VENTRICLE DIASTOLIC VOLUME (MOD BIPLANE): 122 ML
LEFT VENTRICLE SYSTOLIC VOLUME (MOD BIPLANE): 59 ML
LEFT VENTRICULAR INTERNAL DIMENSION IN DIASTOLE: 3.6 CM (ref 3.5–6)
LEFT VENTRICULAR POSTERIOR WALL IN END DIASTOLE: 1.2 CM
LEFT VENTRICULAR STROKE VOLUME: 29 ML
LV EF: 52 %
LVSV (TEICH): 29 ML
MV E'TISSUE VEL-LAT: 10 CM/S
MV E'TISSUE VEL-SEP: 9 CM/S
MV PEAK A VEL: 1.02 M/S
MV PEAK E VEL: 85 CM/S
MV STENOSIS PRESSURE HALF TIME: 38 MS
MV VALVE AREA P 1/2 METHOD: 5.79
PV PEAK GRADIENT: 2 MMHG
RIGHT ATRIUM AREA SYSTOLE A4C: 15.5 CM2
RIGHT VENTRICLE ID DIMENSION: 3.9 CM
SL CV LEFT ATRIUM LENGTH A2C: 4.4 CM
SL CV LV EF: 52
SL CV PED ECHO LEFT VENTRICLE DIASTOLIC VOLUME (MOD BIPLANE) 2D: 55 ML
SL CV PED ECHO LEFT VENTRICLE SYSTOLIC VOLUME (MOD BIPLANE) 2D: 26 ML
TRICUSPID ANNULAR PLANE SYSTOLIC EXCURSION: 2.4 CM

## 2022-06-20 ENCOUNTER — TELEPHONE (OUTPATIENT)
Dept: PAIN MEDICINE | Facility: CLINIC | Age: 62
End: 2022-06-20

## 2022-06-20 ENCOUNTER — TELEPHONE (OUTPATIENT)
Dept: CARDIOLOGY CLINIC | Facility: CLINIC | Age: 62
End: 2022-06-20

## 2022-06-20 NOTE — TELEPHONE ENCOUNTER
----- Message from Lesa Fox MD sent at 6/20/2022  9:04 AM EDT -----  Patient's echo shows normal LV systolic function  No significant, to mild valvular disease  Patient can keep an appointment  Please call patient about echo report

## 2022-07-03 DIAGNOSIS — R19.7 DIARRHEA, UNSPECIFIED TYPE: ICD-10-CM

## 2022-07-05 RX ORDER — DICYCLOMINE HYDROCHLORIDE 10 MG/1
CAPSULE ORAL
Qty: 60 CAPSULE | Refills: 2 | Status: SHIPPED | OUTPATIENT
Start: 2022-07-05 | End: 2022-09-27 | Stop reason: ALTCHOICE

## 2022-07-13 DIAGNOSIS — E78.01 FAMILIAL HYPERCHOLESTEROLEMIA: ICD-10-CM

## 2022-07-13 RX ORDER — ATORVASTATIN CALCIUM 80 MG/1
TABLET, FILM COATED ORAL
Qty: 30 TABLET | Refills: 3 | Status: ON HOLD | OUTPATIENT
Start: 2022-07-13

## 2022-08-18 ENCOUNTER — OFFICE VISIT (OUTPATIENT)
Dept: PAIN MEDICINE | Facility: CLINIC | Age: 62
End: 2022-08-18

## 2022-08-18 VITALS
TEMPERATURE: 98.2 F | HEIGHT: 61 IN | SYSTOLIC BLOOD PRESSURE: 125 MMHG | DIASTOLIC BLOOD PRESSURE: 75 MMHG | RESPIRATION RATE: 19 BRPM | BODY MASS INDEX: 45.35 KG/M2 | HEART RATE: 78 BPM

## 2022-08-18 DIAGNOSIS — F11.20 UNCOMPLICATED OPIOID DEPENDENCE (HCC): ICD-10-CM

## 2022-08-18 DIAGNOSIS — M96.1 POSTLAMINECTOMY SYNDROME, LUMBAR REGION: ICD-10-CM

## 2022-08-18 DIAGNOSIS — M54.50 CHRONIC BILATERAL LOW BACK PAIN WITHOUT SCIATICA: ICD-10-CM

## 2022-08-18 DIAGNOSIS — G89.29 CHRONIC BILATERAL LOW BACK PAIN WITHOUT SCIATICA: ICD-10-CM

## 2022-08-18 DIAGNOSIS — G89.4 CHRONIC PAIN SYNDROME: Primary | ICD-10-CM

## 2022-08-18 DIAGNOSIS — Z79.891 LONG-TERM CURRENT USE OF OPIATE ANALGESIC: ICD-10-CM

## 2022-08-18 PROCEDURE — 80305 DRUG TEST PRSMV DIR OPT OBS: CPT | Performed by: ANESTHESIOLOGY

## 2022-08-18 PROCEDURE — 99214 OFFICE O/P EST MOD 30 MIN: CPT | Performed by: ANESTHESIOLOGY

## 2022-08-18 RX ORDER — TRAMADOL HYDROCHLORIDE 200 MG/1
200 TABLET, EXTENDED RELEASE ORAL EVERY 24 HOURS
Qty: 30 TABLET | Refills: 1 | Status: ON HOLD | OUTPATIENT
Start: 2022-08-18
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
## 2022-08-19 ENCOUNTER — NURSE TRIAGE (OUTPATIENT)
Dept: OTHER | Facility: OTHER | Age: 62
End: 2022-08-19

## 2022-08-19 ENCOUNTER — TELEPHONE (OUTPATIENT)
Dept: PULMONOLOGY | Facility: CLINIC | Age: 62
End: 2022-08-19

## 2022-08-19 ENCOUNTER — HOSPITAL ENCOUNTER (EMERGENCY)
Facility: HOSPITAL | Age: 62
Discharge: HOME | End: 2022-08-19
Attending: EMERGENCY MEDICINE
Payer: COMMERCIAL

## 2022-08-19 ENCOUNTER — APPOINTMENT (OUTPATIENT)
Dept: RADIOLOGY | Facility: HOSPITAL | Age: 62
End: 2022-08-19
Payer: COMMERCIAL

## 2022-08-19 VITALS
OXYGEN SATURATION: 97 % | HEIGHT: 63 IN | TEMPERATURE: 97.3 F | WEIGHT: 240 LBS | DIASTOLIC BLOOD PRESSURE: 91 MMHG | BODY MASS INDEX: 42.52 KG/M2 | SYSTOLIC BLOOD PRESSURE: 166 MMHG | HEART RATE: 113 BPM | RESPIRATION RATE: 22 BRPM

## 2022-08-19 DIAGNOSIS — J18.9 PNEUMONIA: ICD-10-CM

## 2022-08-19 DIAGNOSIS — J41.0 SIMPLE CHRONIC BRONCHITIS (HCC): ICD-10-CM

## 2022-08-19 DIAGNOSIS — J44.1 COPD EXACERBATION (HCC): Primary | ICD-10-CM

## 2022-08-19 LAB
ALBUMIN SERPL BCP-MCNC: 3.5 G/DL (ref 3.5–5)
ALP SERPL-CCNC: 89 U/L (ref 46–116)
ALT SERPL W P-5'-P-CCNC: 30 U/L (ref 12–78)
ANION GAP SERPL CALCULATED.3IONS-SCNC: 8 MMOL/L (ref 4–13)
AST SERPL W P-5'-P-CCNC: 15 U/L (ref 5–45)
BASOPHILS # BLD AUTO: 0.04 THOUSANDS/ΜL (ref 0–0.1)
BASOPHILS NFR BLD AUTO: 1 % (ref 0–1)
BILIRUB SERPL-MCNC: 0.34 MG/DL (ref 0.2–1)
BUN SERPL-MCNC: 13 MG/DL (ref 5–25)
CALCIUM SERPL-MCNC: 9 MG/DL (ref 8.3–10.1)
CARDIAC TROPONIN I PNL SERPL HS: 59 NG/L
CHLORIDE SERPL-SCNC: 99 MMOL/L (ref 96–108)
CO2 SERPL-SCNC: 34 MMOL/L (ref 21–32)
CREAT SERPL-MCNC: 1.51 MG/DL (ref 0.6–1.3)
EOSINOPHIL # BLD AUTO: 0.09 THOUSAND/ΜL (ref 0–0.61)
EOSINOPHIL NFR BLD AUTO: 1 % (ref 0–6)
ERYTHROCYTE [DISTWIDTH] IN BLOOD BY AUTOMATED COUNT: 14 % (ref 11.6–15.1)
GFR SERPL CREATININE-BSD FRML MDRD: 36 ML/MIN/1.73SQ M
GLUCOSE SERPL-MCNC: 345 MG/DL (ref 65–140)
HCT VFR BLD AUTO: 48 % (ref 34.8–46.1)
HGB BLD-MCNC: 15.1 G/DL (ref 11.5–15.4)
IMM GRANULOCYTES # BLD AUTO: 0.03 THOUSAND/UL (ref 0–0.2)
IMM GRANULOCYTES NFR BLD AUTO: 0 % (ref 0–2)
LYMPHOCYTES # BLD AUTO: 1.77 THOUSANDS/ΜL (ref 0.6–4.47)
LYMPHOCYTES NFR BLD AUTO: 22 % (ref 14–44)
MAGNESIUM SERPL-MCNC: 1.8 MG/DL (ref 1.6–2.6)
MCH RBC QN AUTO: 30 PG (ref 26.8–34.3)
MCHC RBC AUTO-ENTMCNC: 31.5 G/DL (ref 31.4–37.4)
MCV RBC AUTO: 95 FL (ref 82–98)
MONOCYTES # BLD AUTO: 0.69 THOUSAND/ΜL (ref 0.17–1.22)
MONOCYTES NFR BLD AUTO: 9 % (ref 4–12)
NEUTROPHILS # BLD AUTO: 5.33 THOUSANDS/ΜL (ref 1.85–7.62)
NEUTS SEG NFR BLD AUTO: 67 % (ref 43–75)
NRBC BLD AUTO-RTO: 0 /100 WBCS
NT-PROBNP SERPL-MCNC: 285 PG/ML
PLATELET # BLD AUTO: 259 THOUSANDS/UL (ref 149–390)
PMV BLD AUTO: 11.3 FL (ref 8.9–12.7)
POTASSIUM SERPL-SCNC: 4.5 MMOL/L (ref 3.5–5.3)
PROT SERPL-MCNC: 7.2 G/DL (ref 6.4–8.4)
RBC # BLD AUTO: 5.04 MILLION/UL (ref 3.81–5.12)
SARS-COV-2 RNA RESP QL NAA+PROBE: POSITIVE
SODIUM SERPL-SCNC: 141 MMOL/L (ref 135–147)
WBC # BLD AUTO: 7.95 THOUSAND/UL (ref 4.31–10.16)

## 2022-08-19 PROCEDURE — U0005 INFEC AGEN DETEC AMPLI PROBE: HCPCS | Performed by: EMERGENCY MEDICINE

## 2022-08-19 PROCEDURE — 80053 COMPREHEN METABOLIC PANEL: CPT | Performed by: EMERGENCY MEDICINE

## 2022-08-19 PROCEDURE — 83735 ASSAY OF MAGNESIUM: CPT | Performed by: EMERGENCY MEDICINE

## 2022-08-19 PROCEDURE — 36415 COLL VENOUS BLD VENIPUNCTURE: CPT | Performed by: EMERGENCY MEDICINE

## 2022-08-19 PROCEDURE — 96374 THER/PROPH/DIAG INJ IV PUSH: CPT

## 2022-08-19 PROCEDURE — 83880 ASSAY OF NATRIURETIC PEPTIDE: CPT | Performed by: EMERGENCY MEDICINE

## 2022-08-19 PROCEDURE — 84484 ASSAY OF TROPONIN QUANT: CPT | Performed by: EMERGENCY MEDICINE

## 2022-08-19 PROCEDURE — 99285 EMERGENCY DEPT VISIT HI MDM: CPT

## 2022-08-19 PROCEDURE — 99284 EMERGENCY DEPT VISIT MOD MDM: CPT | Performed by: EMERGENCY MEDICINE

## 2022-08-19 PROCEDURE — 71045 X-RAY EXAM CHEST 1 VIEW: CPT

## 2022-08-19 PROCEDURE — 94640 AIRWAY INHALATION TREATMENT: CPT

## 2022-08-19 PROCEDURE — 85025 COMPLETE CBC W/AUTO DIFF WBC: CPT | Performed by: EMERGENCY MEDICINE

## 2022-08-19 PROCEDURE — U0003 INFECTIOUS AGENT DETECTION BY NUCLEIC ACID (DNA OR RNA); SEVERE ACUTE RESPIRATORY SYNDROME CORONAVIRUS 2 (SARS-COV-2) (CORONAVIRUS DISEASE [COVID-19]), AMPLIFIED PROBE TECHNIQUE, MAKING USE OF HIGH THROUGHPUT TECHNOLOGIES AS DESCRIBED BY CMS-2020-01-R: HCPCS | Performed by: EMERGENCY MEDICINE

## 2022-08-19 RX ORDER — ALBUTEROL SULFATE 90 UG/1
2 AEROSOL, METERED RESPIRATORY (INHALATION) EVERY 4 HOURS PRN
Qty: 8.5 G | Refills: 7 | Status: ON HOLD | OUTPATIENT
Start: 2022-08-19

## 2022-08-19 RX ORDER — AZITHROMYCIN 250 MG/1
TABLET, FILM COATED ORAL
Qty: 6 TABLET | Refills: 0 | Status: SHIPPED | OUTPATIENT
Start: 2022-08-19 | End: 2022-08-26

## 2022-08-19 RX ORDER — CEFDINIR 300 MG/1
300 CAPSULE ORAL EVERY 12 HOURS SCHEDULED
Qty: 14 CAPSULE | Refills: 0 | Status: SHIPPED | OUTPATIENT
Start: 2022-08-19 | End: 2022-08-26

## 2022-08-19 RX ORDER — METHYLPREDNISOLONE SODIUM SUCCINATE 125 MG/2ML
125 INJECTION, POWDER, LYOPHILIZED, FOR SOLUTION INTRAMUSCULAR; INTRAVENOUS ONCE
Status: COMPLETED | OUTPATIENT
Start: 2022-08-19 | End: 2022-08-19

## 2022-08-19 RX ORDER — NIRMATRELVIR AND RITONAVIR 300-100 MG
3 KIT ORAL 2 TIMES DAILY
Qty: 30 TABLET | Refills: 0 | Status: SHIPPED | OUTPATIENT
Start: 2022-08-19 | End: 2022-08-26

## 2022-08-19 RX ORDER — IPRATROPIUM BROMIDE AND ALBUTEROL SULFATE 2.5; .5 MG/3ML; MG/3ML
3 SOLUTION RESPIRATORY (INHALATION) ONCE
Status: COMPLETED | OUTPATIENT
Start: 2022-08-19 | End: 2022-08-19

## 2022-08-19 RX ADMIN — METHYLPREDNISOLONE SODIUM SUCCINATE 125 MG: 125 INJECTION, POWDER, FOR SOLUTION INTRAMUSCULAR; INTRAVENOUS at 20:53

## 2022-08-19 RX ADMIN — IPRATROPIUM BROMIDE AND ALBUTEROL SULFATE 3 ML: 2.5; .5 SOLUTION RESPIRATORY (INHALATION) at 20:56

## 2022-08-19 NOTE — TELEPHONE ENCOUNTER
Vasile Horne would like a return call regarding     What is the reason for the call/chief complaint? Usual flare up she gets yearly with cough and sob     What additional symptoms are present or absent? SOB, yes   Are they on O2? No Pulse O2 restingna When walkingna   chest pain/tightness, no  wheezing, no  Cough, yes  mucous production,yes  What color is it light green   fevers/chills, no  weight gain, no  Swelling no  Pain no, does it hurt when breathing or all the time? na    When did they start/how long have they been going on? Few days ago    Constant or intermittent; if intermittent describe yes? What makes it better or worse?in the past anitibiotics, prednisone and cough syrup    Have you been exposed to anyone that is sick? No    Have you been tested for COVID recently? no    Check current pulmonary medications incruse, wixela, rescue inhaler and nebulizer   frequency of use daily     Have they had any other treatment or testing for this problem elsewhere? no    Recent steroids? No    Recent Antibiotics? No     Last office visit? 4/11/22    Patient pharmacy?  [facility]    30 or 90 day supply 30

## 2022-08-19 NOTE — TELEPHONE ENCOUNTER
Regarding: [facility]/Covid positive test  ----- Message from Nahid Tay sent at 8/19/2022  7:52 PM EDT -----  Pt called,  I just tested positive for covid from a home covid test  What do I do now?"

## 2022-08-19 NOTE — TELEPHONE ENCOUNTER
Reason for Disposition   MODERATE difficulty breathing (e g , speaks in phrases, SOB even at rest, pulse 100-120)    Answer Assessment - Initial Assessment Questions  Were you within 6 feet or less, for up to 15 minutes or more with a person that has a confirmed COVID-19 test?   Unknown     What was the date of your exposure? N/A    Are you experiencing any symptoms attributed to the virus?  (Assess for SOB, cough, fever, difficulty breathing)   Yes, fatigue, hard time breathing    HIGH RISK: Do you have any history heart or lung conditions, weakened immune system, diabetes, Asthma, CHF, HIV, COPD, Chemo, renal failure, sickle cell, etc?   Yes, bronchitis, DM, COPD, Chronic Bronchitis     PREGNANCY: Are you pregnant or did you recently give birth?    N/A    VACCINE: "Have you gotten the COVID-19 vaccine?" If Yes ask: "Which one, how many shots, when did you get it?"  Yes    Using Albuterol Inhaler with some relief    Protocols used: CORONAVIRUS (COVID-19) DIAGNOSED OR SUSPECTED-[facility]

## 2022-08-20 ENCOUNTER — TELEMEDICINE (OUTPATIENT)
Dept: FAMILY MEDICINE CLINIC | Facility: CLINIC | Age: 62
End: 2022-08-20
Payer: COMMERCIAL

## 2022-08-20 DIAGNOSIS — G89.4 CHRONIC PAIN SYNDROME: ICD-10-CM

## 2022-08-20 DIAGNOSIS — U07.1 COVID-19: Primary | ICD-10-CM

## 2022-08-20 DIAGNOSIS — J41.0 SIMPLE CHRONIC BRONCHITIS (HCC): ICD-10-CM

## 2022-08-20 LAB
6MAM UR QL CFM: NEGATIVE NG/ML
7AMINOCLONAZEPAM UR QL CFM: NEGATIVE NG/ML
A-OH ALPRAZ UR QL CFM: NEGATIVE NG/ML
ACCEPTABLE CREAT UR QL: NORMAL MG/DL
ACCEPTIBLE SP GR UR QL: NORMAL
AMPHET UR QL CFM: NEGATIVE NG/ML
AMPHET UR QL CFM: NEGATIVE NG/ML
BUPRENORPHINE UR QL CFM: NEGATIVE NG/ML
BZE UR QL CFM: NEGATIVE NG/ML
CODEINE UR QL CFM: NORMAL NG/ML
EDDP UR QL CFM: NEGATIVE NG/ML
ETHYL GLUCURONIDE UR QL CFM: NEGATIVE NG/ML
ETHYL SULFATE UR QL SCN: NEGATIVE NG/ML
FENTANYL UR QL CFM: NEGATIVE NG/ML
HYDROCODONE UR QL CFM: NEGATIVE NG/ML
HYDROCODONE UR QL CFM: NEGATIVE NG/ML
HYDROMORPHONE UR QL CFM: NEGATIVE NG/ML
LORAZEPAM UR QL CFM: NEGATIVE NG/ML
MDMA UR QL CFM: NEGATIVE NG/ML
ME-PHENIDATE UR QL CFM: NEGATIVE NG/ML
METHADONE UR QL CFM: NEGATIVE NG/ML
METHAMPHET UR QL CFM: NEGATIVE NG/ML
MORPHINE UR QL CFM: NEGATIVE NG/ML
MORPHINE UR QL CFM: NEGATIVE NG/ML
NITRITE UR QL: NORMAL UG/ML
NORBUPRENORPHINE UR QL CFM: NEGATIVE NG/ML
NORDIAZEPAM UR QL CFM: NEGATIVE NG/ML
NORFENTANYL UR QL CFM: NEGATIVE NG/ML
NORHYDROCODONE UR QL CFM: NEGATIVE NG/ML
NORHYDROCODONE UR QL CFM: NEGATIVE NG/ML
NOROXYCODONE UR QL CFM: NEGATIVE NG/ML
OXAZEPAM UR QL CFM: NEGATIVE NG/ML
OXYCODONE UR QL CFM: NEGATIVE NG/ML
OXYMORPHONE UR QL CFM: NEGATIVE NG/ML
OXYMORPHONE UR QL CFM: NEGATIVE NG/ML
PARA-FLUOROFENTANYL QUANTIFICATION: NORMAL NG/ML
RESULT ALL_PRESCRIBED MEDS AND SPECIAL INSTRUCTIONS: NORMAL
SL AMB 4-ANPP QUANTIFICATION: NORMAL NG/ML
SL AMB ACETYL FENTANYL QUANTIFICATION: NORMAL NG/ML
SL AMB ACETYL NORFENTANYL QUANTIFICATION: NORMAL NG/ML
SL AMB ACRYL FENTANYL QUANTIFICATION: NORMAL NG/ML
SL AMB CARFENTANIL QUANTIFICATION: NORMAL NG/ML
SL AMB CITALOPRAM/ESCITALOPRAM QUANTIFICATION: NEGATIVE NG/ML
SL AMB CITALOPRAM/ESCITALOPRAM QUANTIFICATION: NEGATIVE NG/ML
SL AMB CTHC (MARIJUANA METABOLITE) QUANTIFICATION: NEGATIVE NG/ML
SL AMB N-DESMETHYL-TRAMADOL QUANTIFICATION: ABNORMAL NG/ML
SL AMB PHENTERMINE QUANTIFICATION: NEGATIVE NG/ML
SL AMB PREGABALIN QUANTIFICATION: NEGATIVE
SL AMB RITALINIC ACID QUANTIFICATION: NEGATIVE NG/ML
SPECIMEN PH ACCEPTABLE UR: NORMAL
TAPENTADOL UR QL CFM: NEGATIVE NG/ML
TEMAZEPAM UR QL CFM: NEGATIVE NG/ML
TEMAZEPAM UR QL CFM: NEGATIVE NG/ML
TRAMADOL UR QL CFM: ABNORMAL NG/ML
URATE/CREAT 24H UR: ABNORMAL NG/ML

## 2022-08-20 PROCEDURE — 99212 OFFICE O/P EST SF 10 MIN: CPT | Performed by: NURSE PRACTITIONER

## 2022-08-20 RX ORDER — DULOXETIN HYDROCHLORIDE 60 MG/1
CAPSULE, DELAYED RELEASE ORAL
Qty: 30 CAPSULE | Refills: 3 | Status: ON HOLD | OUTPATIENT
Start: 2022-08-20
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
## 2022-08-20 NOTE — DISCHARGE INSTRUCTIONS
Please understand the risk of signing out against medical advice with worsening shortness of breath infection the blood stream and death  Return immediately for any worsening symptoms  Take the antibiotics as prescribed follow up with her doctor in a couple days

## 2022-08-21 ENCOUNTER — APPOINTMENT (OUTPATIENT)
Dept: RADIOLOGY | Facility: HOSPITAL | Age: 62
DRG: 871 | End: 2022-08-21
Payer: COMMERCIAL

## 2022-08-21 ENCOUNTER — HOSPITAL ENCOUNTER (INPATIENT)
Facility: HOSPITAL | Age: 62
LOS: 5 days | Discharge: HOME | DRG: 871 | End: 2022-08-26
Attending: EMERGENCY MEDICINE | Admitting: INTERNAL MEDICINE
Payer: COMMERCIAL

## 2022-08-21 DIAGNOSIS — J18.9 PNEUMONIA: ICD-10-CM

## 2022-08-21 DIAGNOSIS — J43.2 CENTRILOBULAR EMPHYSEMA (HCC): ICD-10-CM

## 2022-08-21 DIAGNOSIS — J44.1 COPD EXACERBATION (HCC): Primary | ICD-10-CM

## 2022-08-21 DIAGNOSIS — Z79.4 TYPE 2 DIABETES MELLITUS WITH HYPERGLYCEMIA, WITH LONG-TERM CURRENT USE OF INSULIN (HCC): ICD-10-CM

## 2022-08-21 DIAGNOSIS — U07.1 COVID-19: ICD-10-CM

## 2022-08-21 DIAGNOSIS — J96.01 ACUTE RESPIRATORY FAILURE WITH HYPOXIA (HCC): ICD-10-CM

## 2022-08-21 DIAGNOSIS — E11.65 TYPE 2 DIABETES MELLITUS WITH HYPERGLYCEMIA, WITH LONG-TERM CURRENT USE OF INSULIN (HCC): ICD-10-CM

## 2022-08-21 PROBLEM — R79.89 ELEVATED LACTIC ACID LEVEL: Status: ACTIVE | Noted: 2022-08-21

## 2022-08-21 PROBLEM — E66.9 OBESITY: Status: ACTIVE | Noted: 2022-08-21

## 2022-08-21 LAB
ALBUMIN SERPL BCP-MCNC: 3.5 G/DL (ref 3.5–5)
ALP SERPL-CCNC: 84 U/L (ref 46–116)
ALT SERPL W P-5'-P-CCNC: 34 U/L (ref 12–78)
ANION GAP SERPL CALCULATED.3IONS-SCNC: 8 MMOL/L (ref 4–13)
AST SERPL W P-5'-P-CCNC: 15 U/L (ref 5–45)
BASOPHILS # BLD AUTO: 0.05 THOUSANDS/ΜL (ref 0–0.1)
BASOPHILS NFR BLD AUTO: 0 % (ref 0–1)
BILIRUB SERPL-MCNC: 0.28 MG/DL (ref 0.2–1)
BUN SERPL-MCNC: 13 MG/DL (ref 5–25)
CALCIUM SERPL-MCNC: 8.4 MG/DL (ref 8.3–10.1)
CARDIAC TROPONIN I PNL SERPL HS: 20 NG/L (ref 8–18)
CHLORIDE SERPL-SCNC: 100 MMOL/L (ref 96–108)
CK MB SERPL-MCNC: 3 % (ref 0–2.5)
CK MB SERPL-MCNC: 5.2 NG/ML (ref 0–5)
CK SERPL-CCNC: 176 U/L (ref 26–192)
CO2 SERPL-SCNC: 33 MMOL/L (ref 21–32)
CREAT SERPL-MCNC: 1.08 MG/DL (ref 0.6–1.3)
CRP SERPL QL: 8.5 MG/L
D DIMER PPP FEU-MCNC: 0.39 UG/ML FEU
EOSINOPHIL # BLD AUTO: 0.06 THOUSAND/ΜL (ref 0–0.61)
EOSINOPHIL NFR BLD AUTO: 1 % (ref 0–6)
ERYTHROCYTE [DISTWIDTH] IN BLOOD BY AUTOMATED COUNT: 13.9 % (ref 11.6–15.1)
GFR SERPL CREATININE-BSD FRML MDRD: 55 ML/MIN/1.73SQ M
GLUCOSE SERPL-MCNC: 283 MG/DL (ref 65–140)
GLUCOSE SERPL-MCNC: 392 MG/DL (ref 65–140)
HCT VFR BLD AUTO: 47.3 % (ref 34.8–46.1)
HGB BLD-MCNC: 14.5 G/DL (ref 11.5–15.4)
IMM GRANULOCYTES # BLD AUTO: 0.05 THOUSAND/UL (ref 0–0.2)
IMM GRANULOCYTES NFR BLD AUTO: 0 % (ref 0–2)
LACTATE SERPL-SCNC: 1.7 MMOL/L (ref 0.5–2)
LACTATE SERPL-SCNC: 2.4 MMOL/L (ref 0.5–2)
LYMPHOCYTES # BLD AUTO: 2.3 THOUSANDS/ΜL (ref 0.6–4.47)
LYMPHOCYTES NFR BLD AUTO: 20 % (ref 14–44)
MAGNESIUM SERPL-MCNC: 1.8 MG/DL (ref 1.6–2.6)
MCH RBC QN AUTO: 30 PG (ref 26.8–34.3)
MCHC RBC AUTO-ENTMCNC: 30.7 G/DL (ref 31.4–37.4)
MCV RBC AUTO: 98 FL (ref 82–98)
MONOCYTES # BLD AUTO: 0.72 THOUSAND/ΜL (ref 0.17–1.22)
MONOCYTES NFR BLD AUTO: 6 % (ref 4–12)
NEUTROPHILS # BLD AUTO: 8.26 THOUSANDS/ΜL (ref 1.85–7.62)
NEUTS SEG NFR BLD AUTO: 73 % (ref 43–75)
NRBC BLD AUTO-RTO: 0 /100 WBCS
NT-PROBNP SERPL-MCNC: 88 PG/ML
PLATELET # BLD AUTO: 280 THOUSANDS/UL (ref 149–390)
PMV BLD AUTO: 11.1 FL (ref 8.9–12.7)
POTASSIUM SERPL-SCNC: 4.1 MMOL/L (ref 3.5–5.3)
PROCALCITONIN SERPL-MCNC: 0.05 NG/ML
PROT SERPL-MCNC: 6.9 G/DL (ref 6.4–8.4)
RBC # BLD AUTO: 4.84 MILLION/UL (ref 3.81–5.12)
SODIUM SERPL-SCNC: 141 MMOL/L (ref 135–147)
WBC # BLD AUTO: 11.44 THOUSAND/UL (ref 4.31–10.16)

## 2022-08-21 PROCEDURE — 85379 FIBRIN DEGRADATION QUANT: CPT | Performed by: NURSE PRACTITIONER

## 2022-08-21 PROCEDURE — 93005 ELECTROCARDIOGRAM TRACING: CPT

## 2022-08-21 PROCEDURE — 84145 PROCALCITONIN (PCT): CPT | Performed by: NURSE PRACTITIONER

## 2022-08-21 PROCEDURE — 84484 ASSAY OF TROPONIN QUANT: CPT | Performed by: NURSE PRACTITIONER

## 2022-08-21 PROCEDURE — 87040 BLOOD CULTURE FOR BACTERIA: CPT | Performed by: EMERGENCY MEDICINE

## 2022-08-21 PROCEDURE — 83735 ASSAY OF MAGNESIUM: CPT | Performed by: NURSE PRACTITIONER

## 2022-08-21 PROCEDURE — 94640 AIRWAY INHALATION TREATMENT: CPT

## 2022-08-21 PROCEDURE — 86140 C-REACTIVE PROTEIN: CPT | Performed by: NURSE PRACTITIONER

## 2022-08-21 PROCEDURE — 94644 CONT INHLJ TX 1ST HOUR: CPT

## 2022-08-21 PROCEDURE — 80053 COMPREHEN METABOLIC PANEL: CPT | Performed by: EMERGENCY MEDICINE

## 2022-08-21 PROCEDURE — 82553 CREATINE MB FRACTION: CPT | Performed by: NURSE PRACTITIONER

## 2022-08-21 PROCEDURE — 83880 ASSAY OF NATRIURETIC PEPTIDE: CPT | Performed by: NURSE PRACTITIONER

## 2022-08-21 PROCEDURE — 82550 ASSAY OF CK (CPK): CPT | Performed by: NURSE PRACTITIONER

## 2022-08-21 PROCEDURE — 36415 COLL VENOUS BLD VENIPUNCTURE: CPT | Performed by: EMERGENCY MEDICINE

## 2022-08-21 PROCEDURE — 71045 X-RAY EXAM CHEST 1 VIEW: CPT

## 2022-08-21 PROCEDURE — 99222 1ST HOSP IP/OBS MODERATE 55: CPT | Performed by: NURSE PRACTITIONER

## 2022-08-21 PROCEDURE — 82948 REAGENT STRIP/BLOOD GLUCOSE: CPT

## 2022-08-21 PROCEDURE — 85025 COMPLETE CBC W/AUTO DIFF WBC: CPT | Performed by: EMERGENCY MEDICINE

## 2022-08-21 PROCEDURE — 83605 ASSAY OF LACTIC ACID: CPT | Performed by: EMERGENCY MEDICINE

## 2022-08-21 PROCEDURE — XW033E5 INTRODUCTION OF REMDESIVIR ANTI-INFECTIVE INTO PERIPHERAL VEIN, PERCUTANEOUS APPROACH, NEW TECHNOLOGY GROUP 5: ICD-10-PCS | Performed by: NURSE PRACTITIONER

## 2022-08-21 PROCEDURE — 96374 THER/PROPH/DIAG INJ IV PUSH: CPT

## 2022-08-21 PROCEDURE — 99285 EMERGENCY DEPT VISIT HI MDM: CPT

## 2022-08-21 PROCEDURE — 99291 CRITICAL CARE FIRST HOUR: CPT | Performed by: EMERGENCY MEDICINE

## 2022-08-21 RX ORDER — IPRATROPIUM BROMIDE AND ALBUTEROL SULFATE 2.5; .5 MG/3ML; MG/3ML
3 SOLUTION RESPIRATORY (INHALATION)
Status: DISCONTINUED | OUTPATIENT
Start: 2022-08-22 | End: 2022-08-21

## 2022-08-21 RX ORDER — INSULIN LISPRO 100 [IU]/ML
1-5 INJECTION, SOLUTION INTRAVENOUS; SUBCUTANEOUS
Status: DISCONTINUED | OUTPATIENT
Start: 2022-08-22 | End: 2022-08-22

## 2022-08-21 RX ORDER — DULOXETIN HYDROCHLORIDE 60 MG/1
60 CAPSULE, DELAYED RELEASE ORAL DAILY
Status: DISCONTINUED | OUTPATIENT
Start: 2022-08-22 | End: 2022-08-21

## 2022-08-21 RX ORDER — METHYLPREDNISOLONE SODIUM SUCCINATE 40 MG/ML
40 INJECTION, POWDER, LYOPHILIZED, FOR SOLUTION INTRAMUSCULAR; INTRAVENOUS DAILY
Status: DISCONTINUED | OUTPATIENT
Start: 2022-08-22 | End: 2022-08-26 | Stop reason: HOSPADM

## 2022-08-21 RX ORDER — INSULIN LISPRO 100 [IU]/ML
1-5 INJECTION, SOLUTION INTRAVENOUS; SUBCUTANEOUS
Status: DISCONTINUED | OUTPATIENT
Start: 2022-08-21 | End: 2022-08-26 | Stop reason: HOSPADM

## 2022-08-21 RX ORDER — BUDESONIDE AND FORMOTEROL FUMARATE DIHYDRATE 160; 4.5 UG/1; UG/1
2 AEROSOL RESPIRATORY (INHALATION) 2 TIMES DAILY
Status: DISCONTINUED | OUTPATIENT
Start: 2022-08-22 | End: 2022-08-26 | Stop reason: HOSPADM

## 2022-08-21 RX ORDER — BUPROPION HYDROCHLORIDE 150 MG/1
300 TABLET ORAL EVERY MORNING
Status: DISCONTINUED | OUTPATIENT
Start: 2022-08-22 | End: 2022-08-26 | Stop reason: HOSPADM

## 2022-08-21 RX ORDER — INSULIN LISPRO 100 [IU]/ML
18 INJECTION, SOLUTION INTRAVENOUS; SUBCUTANEOUS
Status: DISCONTINUED | OUTPATIENT
Start: 2022-08-22 | End: 2022-08-26

## 2022-08-21 RX ORDER — ENOXAPARIN SODIUM 100 MG/ML
40 INJECTION SUBCUTANEOUS EVERY 12 HOURS SCHEDULED
Status: DISCONTINUED | OUTPATIENT
Start: 2022-08-21 | End: 2022-08-26 | Stop reason: HOSPADM

## 2022-08-21 RX ORDER — QUETIAPINE FUMARATE 25 MG/1
25 TABLET, FILM COATED ORAL 2 TIMES DAILY
Status: DISCONTINUED | OUTPATIENT
Start: 2022-08-21 | End: 2022-08-26 | Stop reason: HOSPADM

## 2022-08-21 RX ORDER — TRAMADOL HYDROCHLORIDE 50 MG/1
50 TABLET ORAL EVERY 6 HOURS PRN
Status: DISCONTINUED | OUTPATIENT
Start: 2022-08-21 | End: 2022-08-25

## 2022-08-21 RX ORDER — ONDANSETRON 2 MG/ML
4 INJECTION INTRAMUSCULAR; INTRAVENOUS EVERY 6 HOURS PRN
Status: DISCONTINUED | OUTPATIENT
Start: 2022-08-21 | End: 2022-08-26 | Stop reason: HOSPADM

## 2022-08-21 RX ORDER — DICYCLOMINE HYDROCHLORIDE 10 MG/1
10 CAPSULE ORAL 2 TIMES DAILY
Status: DISCONTINUED | OUTPATIENT
Start: 2022-08-21 | End: 2022-08-26 | Stop reason: HOSPADM

## 2022-08-21 RX ORDER — INSULIN GLARGINE 100 [IU]/ML
82 INJECTION, SOLUTION SUBCUTANEOUS
Status: DISCONTINUED | OUTPATIENT
Start: 2022-08-21 | End: 2022-08-25

## 2022-08-21 RX ORDER — HYDROCHLOROTHIAZIDE 25 MG/1
25 TABLET ORAL DAILY
Status: DISCONTINUED | OUTPATIENT
Start: 2022-08-22 | End: 2022-08-22

## 2022-08-21 RX ORDER — AZITHROMYCIN 250 MG/1
500 TABLET, FILM COATED ORAL EVERY 24 HOURS
Status: DISCONTINUED | OUTPATIENT
Start: 2022-08-22 | End: 2022-08-23

## 2022-08-21 RX ORDER — FLUTICASONE PROPIONATE 50 MCG
1 SPRAY, SUSPENSION (ML) NASAL 2 TIMES DAILY
Status: DISCONTINUED | OUTPATIENT
Start: 2022-08-21 | End: 2022-08-26 | Stop reason: HOSPADM

## 2022-08-21 RX ORDER — IPRATROPIUM BROMIDE AND ALBUTEROL SULFATE 2.5; .5 MG/3ML; MG/3ML
3 SOLUTION RESPIRATORY (INHALATION) ONCE
Status: COMPLETED | OUTPATIENT
Start: 2022-08-21 | End: 2022-08-21

## 2022-08-21 RX ORDER — CEFTRIAXONE 1 G/50ML
1000 INJECTION, SOLUTION INTRAVENOUS ONCE
Status: COMPLETED | OUTPATIENT
Start: 2022-08-21 | End: 2022-08-21

## 2022-08-21 RX ORDER — NICOTINE 21 MG/24HR
14 PATCH, TRANSDERMAL 24 HOURS TRANSDERMAL DAILY
Status: DISCONTINUED | OUTPATIENT
Start: 2022-08-22 | End: 2022-08-26 | Stop reason: HOSPADM

## 2022-08-21 RX ORDER — BENZONATATE 100 MG/1
200 CAPSULE ORAL 3 TIMES DAILY PRN
Status: DISCONTINUED | OUTPATIENT
Start: 2022-08-21 | End: 2022-08-26 | Stop reason: HOSPADM

## 2022-08-21 RX ORDER — ACETAMINOPHEN 325 MG/1
650 TABLET ORAL EVERY 6 HOURS PRN
Status: DISCONTINUED | OUTPATIENT
Start: 2022-08-21 | End: 2022-08-26 | Stop reason: HOSPADM

## 2022-08-21 RX ORDER — GUAIFENESIN 600 MG/1
1200 TABLET, EXTENDED RELEASE ORAL EVERY 12 HOURS SCHEDULED
Status: DISCONTINUED | OUTPATIENT
Start: 2022-08-21 | End: 2022-08-26 | Stop reason: HOSPADM

## 2022-08-21 RX ORDER — METHYLPREDNISOLONE SODIUM SUCCINATE 40 MG/ML
40 INJECTION, POWDER, LYOPHILIZED, FOR SOLUTION INTRAMUSCULAR; INTRAVENOUS EVERY 24 HOURS
Status: DISCONTINUED | OUTPATIENT
Start: 2022-08-22 | End: 2022-08-21

## 2022-08-21 RX ORDER — METHYLPREDNISOLONE SODIUM SUCCINATE 125 MG/2ML
125 INJECTION, POWDER, LYOPHILIZED, FOR SOLUTION INTRAMUSCULAR; INTRAVENOUS ONCE
Status: COMPLETED | OUTPATIENT
Start: 2022-08-21 | End: 2022-08-21

## 2022-08-21 RX ORDER — ALBUTEROL SULFATE 1.25 MG/3ML
1.25 SOLUTION RESPIRATORY (INHALATION) EVERY 6 HOURS PRN
Status: ON HOLD | COMMUNITY

## 2022-08-21 RX ORDER — LEVALBUTEROL 1.25 MG/.5ML
1.25 SOLUTION, CONCENTRATE RESPIRATORY (INHALATION)
Status: DISCONTINUED | OUTPATIENT
Start: 2022-08-22 | End: 2022-08-26 | Stop reason: HOSPADM

## 2022-08-21 RX ORDER — DILTIAZEM HYDROCHLORIDE 120 MG/1
120 CAPSULE, COATED, EXTENDED RELEASE ORAL DAILY
Status: DISCONTINUED | OUTPATIENT
Start: 2022-08-22 | End: 2022-08-26 | Stop reason: HOSPADM

## 2022-08-21 RX ORDER — SODIUM CHLORIDE FOR INHALATION 0.9 %
3 VIAL, NEBULIZER (ML) INHALATION
Status: DISCONTINUED | OUTPATIENT
Start: 2022-08-22 | End: 2022-08-26 | Stop reason: HOSPADM

## 2022-08-21 RX ORDER — LEVALBUTEROL INHALATION SOLUTION 0.63 MG/3ML
0.63 SOLUTION RESPIRATORY (INHALATION) EVERY 4 HOURS PRN
Status: DISCONTINUED | OUTPATIENT
Start: 2022-08-21 | End: 2022-08-26 | Stop reason: HOSPADM

## 2022-08-21 RX ORDER — ATORVASTATIN CALCIUM 80 MG/1
80 TABLET, FILM COATED ORAL
Status: DISCONTINUED | OUTPATIENT
Start: 2022-08-21 | End: 2022-08-26 | Stop reason: HOSPADM

## 2022-08-21 RX ORDER — INSULIN LISPRO 100 [IU]/ML
1-5 INJECTION, SOLUTION INTRAVENOUS; SUBCUTANEOUS
Status: DISCONTINUED | OUTPATIENT
Start: 2022-08-22 | End: 2022-08-26 | Stop reason: HOSPADM

## 2022-08-21 RX ORDER — LISINOPRIL 2.5 MG/1
2.5 TABLET ORAL DAILY
Status: DISCONTINUED | OUTPATIENT
Start: 2022-08-22 | End: 2022-08-22

## 2022-08-21 RX ADMIN — QUETIAPINE FUMARATE 25 MG: 25 TABLET ORAL at 23:28

## 2022-08-21 RX ADMIN — AZITHROMYCIN MONOHYDRATE 500 MG: 500 INJECTION, POWDER, LYOPHILIZED, FOR SOLUTION INTRAVENOUS at 20:36

## 2022-08-21 RX ADMIN — CEFTRIAXONE 1000 MG: 1 INJECTION, SOLUTION INTRAVENOUS at 20:21

## 2022-08-21 RX ADMIN — ALBUTEROL SULFATE 10 MG: 2.5 SOLUTION RESPIRATORY (INHALATION) at 20:35

## 2022-08-21 RX ADMIN — INSULIN LISPRO 4 UNITS: 100 INJECTION, SOLUTION INTRAVENOUS; SUBCUTANEOUS at 23:28

## 2022-08-21 RX ADMIN — DICYCLOMINE HYDROCHLORIDE 10 MG: 10 CAPSULE ORAL at 23:28

## 2022-08-21 RX ADMIN — IPRATROPIUM BROMIDE 1 MG: 0.5 SOLUTION RESPIRATORY (INHALATION) at 20:35

## 2022-08-21 RX ADMIN — IPRATROPIUM BROMIDE AND ALBUTEROL SULFATE 3 ML: 2.5; .5 SOLUTION RESPIRATORY (INHALATION) at 19:05

## 2022-08-21 RX ADMIN — SODIUM CHLORIDE 1000 ML: 0.9 INJECTION, SOLUTION INTRAVENOUS at 20:25

## 2022-08-21 RX ADMIN — ATORVASTATIN CALCIUM 80 MG: 80 TABLET, FILM COATED ORAL at 23:28

## 2022-08-21 RX ADMIN — METHYLPREDNISOLONE SODIUM SUCCINATE 125 MG: 125 INJECTION, POWDER, FOR SOLUTION INTRAMUSCULAR; INTRAVENOUS at 18:54

## 2022-08-21 RX ADMIN — INSULIN GLARGINE 82 UNITS: 100 INJECTION, SOLUTION SUBCUTANEOUS at 23:27

## 2022-08-21 RX ADMIN — ENOXAPARIN SODIUM 40 MG: 40 INJECTION SUBCUTANEOUS at 23:28

## 2022-08-21 RX ADMIN — REMDESIVIR 200 MG: 100 INJECTION, POWDER, LYOPHILIZED, FOR SOLUTION INTRAVENOUS at 23:42
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
## 2022-08-22 PROBLEM — E87.5 HYPERKALEMIA: Status: ACTIVE | Noted: 2022-08-22

## 2022-08-22 LAB
ALBUMIN SERPL BCP-MCNC: 3.2 G/DL (ref 3.5–5)
ALP SERPL-CCNC: 87 U/L (ref 46–116)
ALT SERPL W P-5'-P-CCNC: 32 U/L (ref 12–78)
ANION GAP SERPL CALCULATED.3IONS-SCNC: 10 MMOL/L (ref 4–13)
ANION GAP SERPL CALCULATED.3IONS-SCNC: 9 MMOL/L (ref 4–13)
ANION GAP SERPL CALCULATED.3IONS-SCNC: 9 MMOL/L (ref 4–13)
AST SERPL W P-5'-P-CCNC: 14 U/L (ref 5–45)
ATRIAL RATE: 106 BPM
ATRIAL RATE: 98 BPM
BASOPHILS # BLD AUTO: 0.02 THOUSANDS/ΜL (ref 0–0.1)
BASOPHILS NFR BLD AUTO: 0 % (ref 0–1)
BILIRUB SERPL-MCNC: 0.21 MG/DL (ref 0.2–1)
BUN SERPL-MCNC: 16 MG/DL (ref 5–25)
BUN SERPL-MCNC: 17 MG/DL (ref 5–25)
BUN SERPL-MCNC: 19 MG/DL (ref 5–25)
CALCIUM ALBUM COR SERPL-MCNC: 8.2 MG/DL (ref 8.3–10.1)
CALCIUM SERPL-MCNC: 7.6 MG/DL (ref 8.3–10.1)
CALCIUM SERPL-MCNC: 7.7 MG/DL (ref 8.3–10.1)
CALCIUM SERPL-MCNC: 8 MG/DL (ref 8.3–10.1)
CARDIAC TROPONIN I PNL SERPL HS: 10 NG/L (ref 8–18)
CHLORIDE SERPL-SCNC: 97 MMOL/L (ref 96–108)
CHLORIDE SERPL-SCNC: 98 MMOL/L (ref 96–108)
CHLORIDE SERPL-SCNC: 98 MMOL/L (ref 96–108)
CO2 SERPL-SCNC: 29 MMOL/L (ref 21–32)
CO2 SERPL-SCNC: 29 MMOL/L (ref 21–32)
CO2 SERPL-SCNC: 30 MMOL/L (ref 21–32)
CREAT SERPL-MCNC: 1.29 MG/DL (ref 0.6–1.3)
CREAT SERPL-MCNC: 1.41 MG/DL (ref 0.6–1.3)
CREAT SERPL-MCNC: 1.42 MG/DL (ref 0.6–1.3)
CRP SERPL QL: 7.3 MG/L
D DIMER PPP FEU-MCNC: 0.39 UG/ML FEU
EOSINOPHIL # BLD AUTO: 0 THOUSAND/ΜL (ref 0–0.61)
EOSINOPHIL NFR BLD AUTO: 0 % (ref 0–6)
ERYTHROCYTE [DISTWIDTH] IN BLOOD BY AUTOMATED COUNT: 14 % (ref 11.6–15.1)
GFR SERPL CREATININE-BSD FRML MDRD: 39 ML/MIN/1.73SQ M
GFR SERPL CREATININE-BSD FRML MDRD: 39 ML/MIN/1.73SQ M
GFR SERPL CREATININE-BSD FRML MDRD: 44 ML/MIN/1.73SQ M
GLUCOSE SERPL-MCNC: 352 MG/DL (ref 65–140)
GLUCOSE SERPL-MCNC: 370 MG/DL (ref 65–140)
GLUCOSE SERPL-MCNC: 384 MG/DL (ref 65–140)
GLUCOSE SERPL-MCNC: 404 MG/DL (ref 65–140)
GLUCOSE SERPL-MCNC: 408 MG/DL (ref 65–140)
GLUCOSE SERPL-MCNC: 428 MG/DL (ref 65–140)
GLUCOSE SERPL-MCNC: 437 MG/DL (ref 65–140)
GLUCOSE SERPL-MCNC: 445 MG/DL (ref 65–140)
GLUCOSE SERPL-MCNC: 464 MG/DL (ref 65–140)
GLUCOSE SERPL-MCNC: 489 MG/DL (ref 65–140)
HCT VFR BLD AUTO: 45.9 % (ref 34.8–46.1)
HGB BLD-MCNC: 13.7 G/DL (ref 11.5–15.4)
IMM GRANULOCYTES # BLD AUTO: 0.1 THOUSAND/UL (ref 0–0.2)
IMM GRANULOCYTES NFR BLD AUTO: 1 % (ref 0–2)
LYMPHOCYTES # BLD AUTO: 0.76 THOUSANDS/ΜL (ref 0.6–4.47)
LYMPHOCYTES NFR BLD AUTO: 7 % (ref 14–44)
MAGNESIUM SERPL-MCNC: 2 MG/DL (ref 1.6–2.6)
MCH RBC QN AUTO: 29.9 PG (ref 26.8–34.3)
MCHC RBC AUTO-ENTMCNC: 29.8 G/DL (ref 31.4–37.4)
MCV RBC AUTO: 100 FL (ref 82–98)
MONOCYTES # BLD AUTO: 0.13 THOUSAND/ΜL (ref 0.17–1.22)
MONOCYTES NFR BLD AUTO: 1 % (ref 4–12)
NEUTROPHILS # BLD AUTO: 9.78 THOUSANDS/ΜL (ref 1.85–7.62)
NEUTS SEG NFR BLD AUTO: 91 % (ref 43–75)
NRBC BLD AUTO-RTO: 0 /100 WBCS
P AXIS: 55 DEGREES
P AXIS: 61 DEGREES
PLATELET # BLD AUTO: 238 THOUSANDS/UL (ref 149–390)
PMV BLD AUTO: 11 FL (ref 8.9–12.7)
POTASSIUM SERPL-SCNC: 4.9 MMOL/L (ref 3.5–5.3)
POTASSIUM SERPL-SCNC: 6.2 MMOL/L (ref 3.5–5.3)
POTASSIUM SERPL-SCNC: 6.5 MMOL/L (ref 3.5–5.3)
PR INTERVAL: 142 MS
PR INTERVAL: 154 MS
PROCALCITONIN SERPL-MCNC: <0.05 NG/ML
PROT SERPL-MCNC: 6.8 G/DL (ref 6.4–8.4)
QRS AXIS: 27 DEGREES
QRS AXIS: 43 DEGREES
QRSD INTERVAL: 82 MS
QRSD INTERVAL: 84 MS
QT INTERVAL: 368 MS
QT INTERVAL: 382 MS
QTC INTERVAL: 487 MS
QTC INTERVAL: 488 MS
RBC # BLD AUTO: 4.58 MILLION/UL (ref 3.81–5.12)
SODIUM SERPL-SCNC: 136 MMOL/L (ref 135–147)
SODIUM SERPL-SCNC: 136 MMOL/L (ref 135–147)
SODIUM SERPL-SCNC: 137 MMOL/L (ref 135–147)
T WAVE AXIS: 62 DEGREES
T WAVE AXIS: 65 DEGREES
VENTRICULAR RATE: 106 BPM
VENTRICULAR RATE: 98 BPM
WBC # BLD AUTO: 10.79 THOUSAND/UL (ref 4.31–10.16)

## 2022-08-22 PROCEDURE — 82948 REAGENT STRIP/BLOOD GLUCOSE: CPT

## 2022-08-22 PROCEDURE — 84484 ASSAY OF TROPONIN QUANT: CPT | Performed by: NURSE PRACTITIONER

## 2022-08-22 PROCEDURE — 94640 AIRWAY INHALATION TREATMENT: CPT

## 2022-08-22 PROCEDURE — 93010 ELECTROCARDIOGRAM REPORT: CPT | Performed by: INTERNAL MEDICINE

## 2022-08-22 PROCEDURE — 99232 SBSQ HOSP IP/OBS MODERATE 35: CPT | Performed by: FAMILY MEDICINE

## 2022-08-22 PROCEDURE — 93005 ELECTROCARDIOGRAM TRACING: CPT

## 2022-08-22 PROCEDURE — 94760 N-INVAS EAR/PLS OXIMETRY 1: CPT

## 2022-08-22 PROCEDURE — 94644 CONT INHLJ TX 1ST HOUR: CPT

## 2022-08-22 PROCEDURE — 84145 PROCALCITONIN (PCT): CPT | Performed by: NURSE PRACTITIONER

## 2022-08-22 PROCEDURE — 85025 COMPLETE CBC W/AUTO DIFF WBC: CPT | Performed by: NURSE PRACTITIONER

## 2022-08-22 PROCEDURE — 80053 COMPREHEN METABOLIC PANEL: CPT | Performed by: NURSE PRACTITIONER

## 2022-08-22 PROCEDURE — 85379 FIBRIN DEGRADATION QUANT: CPT | Performed by: NURSE PRACTITIONER

## 2022-08-22 PROCEDURE — 80048 BASIC METABOLIC PNL TOTAL CA: CPT | Performed by: NURSE PRACTITIONER

## 2022-08-22 PROCEDURE — 83735 ASSAY OF MAGNESIUM: CPT | Performed by: NURSE PRACTITIONER

## 2022-08-22 PROCEDURE — 86140 C-REACTIVE PROTEIN: CPT | Performed by: NURSE PRACTITIONER

## 2022-08-22 PROCEDURE — 99223 1ST HOSP IP/OBS HIGH 75: CPT | Performed by: INTERNAL MEDICINE

## 2022-08-22 RX ORDER — INSULIN LISPRO 100 [IU]/ML
10 INJECTION, SOLUTION INTRAVENOUS; SUBCUTANEOUS ONCE
Status: COMPLETED | OUTPATIENT
Start: 2022-08-22 | End: 2022-08-22

## 2022-08-22 RX ORDER — MAGNESIUM SULFATE 1 G/100ML
1 INJECTION INTRAVENOUS ONCE
Status: COMPLETED | OUTPATIENT
Start: 2022-08-22 | End: 2022-08-22

## 2022-08-22 RX ORDER — SODIUM POLYSTYRENE SULFONATE 4.1 MEQ/G
30 POWDER, FOR SUSPENSION ORAL; RECTAL ONCE
Status: COMPLETED | OUTPATIENT
Start: 2022-08-22 | End: 2022-08-22

## 2022-08-22 RX ORDER — PREDNISONE 10 MG/1
TABLET ORAL
Qty: 40 TABLET | Refills: 0 | Status: SHIPPED | OUTPATIENT
Start: 2022-08-22 | End: 2022-08-26

## 2022-08-22 RX ORDER — LISINOPRIL 2.5 MG/1
2.5 TABLET ORAL DAILY
Status: DISCONTINUED | OUTPATIENT
Start: 2022-08-23 | End: 2022-08-22

## 2022-08-22 RX ORDER — INSULIN LISPRO 100 [IU]/ML
1-6 INJECTION, SOLUTION INTRAVENOUS; SUBCUTANEOUS
Status: DISCONTINUED | OUTPATIENT
Start: 2022-08-22 | End: 2022-08-26 | Stop reason: HOSPADM

## 2022-08-22 RX ORDER — ALBUTEROL SULFATE 2.5 MG/3ML
10 SOLUTION RESPIRATORY (INHALATION) ONCE
Status: COMPLETED | OUTPATIENT
Start: 2022-08-22 | End: 2022-08-22

## 2022-08-22 RX ORDER — CALCIUM GLUCONATE 20 MG/ML
1 INJECTION, SOLUTION INTRAVENOUS ONCE
Status: COMPLETED | OUTPATIENT
Start: 2022-08-22 | End: 2022-08-22

## 2022-08-22 RX ADMIN — ALBUTEROL SULFATE 10 MG: 2.5 SOLUTION RESPIRATORY (INHALATION) at 08:02

## 2022-08-22 RX ADMIN — IPRATROPIUM BROMIDE 0.5 MG: 0.5 SOLUTION RESPIRATORY (INHALATION) at 19:35

## 2022-08-22 RX ADMIN — GUAIFENESIN 1200 MG: 600 TABLET, EXTENDED RELEASE ORAL at 09:27

## 2022-08-22 RX ADMIN — INSULIN LISPRO 6 UNITS: 100 INJECTION, SOLUTION INTRAVENOUS; SUBCUTANEOUS at 09:23

## 2022-08-22 RX ADMIN — ATORVASTATIN CALCIUM 80 MG: 80 TABLET, FILM COATED ORAL at 16:56

## 2022-08-22 RX ADMIN — CALCIUM GLUCONATE 1 G: 20 INJECTION, SOLUTION INTRAVENOUS at 07:51

## 2022-08-22 RX ADMIN — INSULIN LISPRO 18 UNITS: 100 INJECTION, SOLUTION INTRAVENOUS; SUBCUTANEOUS at 09:24

## 2022-08-22 RX ADMIN — DULOXETINE HYDROCHLORIDE 90 MG: 60 CAPSULE, DELAYED RELEASE ORAL at 09:27

## 2022-08-22 RX ADMIN — INSULIN LISPRO 18 UNITS: 100 INJECTION, SOLUTION INTRAVENOUS; SUBCUTANEOUS at 11:54

## 2022-08-22 RX ADMIN — DICYCLOMINE HYDROCHLORIDE 10 MG: 10 CAPSULE ORAL at 09:27

## 2022-08-22 RX ADMIN — BUDESONIDE AND FORMOTEROL FUMARATE DIHYDRATE 2 PUFF: 160; 4.5 AEROSOL RESPIRATORY (INHALATION) at 17:01

## 2022-08-22 RX ADMIN — REMDESIVIR 100 MG: 100 INJECTION, POWDER, LYOPHILIZED, FOR SOLUTION INTRAVENOUS at 22:09

## 2022-08-22 RX ADMIN — INSULIN LISPRO 18 UNITS: 100 INJECTION, SOLUTION INTRAVENOUS; SUBCUTANEOUS at 16:56

## 2022-08-22 RX ADMIN — INSULIN LISPRO 4 UNITS: 100 INJECTION, SOLUTION INTRAVENOUS; SUBCUTANEOUS at 22:08

## 2022-08-22 RX ADMIN — INSULIN GLARGINE 82 UNITS: 100 INJECTION, SOLUTION SUBCUTANEOUS at 22:10

## 2022-08-22 RX ADMIN — QUETIAPINE FUMARATE 25 MG: 25 TABLET ORAL at 17:00

## 2022-08-22 RX ADMIN — LEVALBUTEROL HYDROCHLORIDE 1.25 MG: 1.25 SOLUTION, CONCENTRATE RESPIRATORY (INHALATION) at 19:35

## 2022-08-22 RX ADMIN — ENOXAPARIN SODIUM 40 MG: 40 INJECTION SUBCUTANEOUS at 09:26

## 2022-08-22 RX ADMIN — INSULIN LISPRO 10 UNITS: 100 INJECTION, SOLUTION INTRAVENOUS; SUBCUTANEOUS at 01:59

## 2022-08-22 RX ADMIN — SODIUM POLYSTYRENE SULFONATE 30 G: 1 POWDER ORAL; RECTAL at 08:23

## 2022-08-22 RX ADMIN — ENOXAPARIN SODIUM 40 MG: 40 INJECTION SUBCUTANEOUS at 20:02

## 2022-08-22 RX ADMIN — FLUTICASONE PROPIONATE 1 SPRAY: 50 SPRAY, METERED NASAL at 17:01

## 2022-08-22 RX ADMIN — Medication 14 MG: at 09:28

## 2022-08-22 RX ADMIN — MAGNESIUM SULFATE HEPTAHYDRATE 1 G: 1 INJECTION, SOLUTION INTRAVENOUS at 01:32

## 2022-08-22 RX ADMIN — METHYLPREDNISOLONE SODIUM SUCCINATE 40 MG: 40 INJECTION, POWDER, FOR SOLUTION INTRAMUSCULAR; INTRAVENOUS at 09:28

## 2022-08-22 RX ADMIN — BUPROPION HYDROCHLORIDE 300 MG: 150 TABLET, EXTENDED RELEASE ORAL at 09:27

## 2022-08-22 RX ADMIN — BUDESONIDE AND FORMOTEROL FUMARATE DIHYDRATE 2 PUFF: 160; 4.5 AEROSOL RESPIRATORY (INHALATION) at 09:26

## 2022-08-22 RX ADMIN — DILTIAZEM HYDROCHLORIDE 120 MG: 120 CAPSULE, COATED, EXTENDED RELEASE ORAL at 09:28

## 2022-08-22 RX ADMIN — FLUTICASONE PROPIONATE 1 SPRAY: 50 SPRAY, METERED NASAL at 09:26

## 2022-08-22 RX ADMIN — INSULIN LISPRO 6 UNITS: 100 INJECTION, SOLUTION INTRAVENOUS; SUBCUTANEOUS at 11:54

## 2022-08-22 RX ADMIN — LEVALBUTEROL HYDROCHLORIDE 1.25 MG: 1.25 SOLUTION, CONCENTRATE RESPIRATORY (INHALATION) at 13:27

## 2022-08-22 RX ADMIN — AZITHROMYCIN MONOHYDRATE 500 MG: 250 TABLET ORAL at 20:02

## 2022-08-22 RX ADMIN — GUAIFENESIN 1200 MG: 600 TABLET, EXTENDED RELEASE ORAL at 20:02

## 2022-08-22 RX ADMIN — ISODIUM CHLORIDE 3 ML: 0.03 SOLUTION RESPIRATORY (INHALATION) at 19:35

## 2022-08-22 RX ADMIN — IPRATROPIUM BROMIDE 0.5 MG: 0.5 SOLUTION RESPIRATORY (INHALATION) at 13:27

## 2022-08-22 RX ADMIN — INSULIN LISPRO 6 UNITS: 100 INJECTION, SOLUTION INTRAVENOUS; SUBCUTANEOUS at 16:55

## 2022-08-22 RX ADMIN — DICYCLOMINE HYDROCHLORIDE 10 MG: 10 CAPSULE ORAL at 17:00

## 2022-08-22 RX ADMIN — ISODIUM CHLORIDE 3 ML: 0.03 SOLUTION RESPIRATORY (INHALATION) at 13:27

## 2022-08-22 RX ADMIN — INSULIN HUMAN 12 UNITS: 100 INJECTION, SOLUTION PARENTERAL at 08:25

## 2022-08-22 RX ADMIN — QUETIAPINE FUMARATE 25 MG: 25 TABLET ORAL at 09:28

## 2022-08-22 RX ADMIN — HYDROCHLOROTHIAZIDE 25 MG: 25 TABLET ORAL at 09:27

## 2022-08-22 NOTE — ASSESSMENT & PLAN NOTE
Lab Results   Component Value Date    HGBA1C 9 6 (H) 06/14/2022       Recent Labs     08/22/22  0803 08/22/22  0902 08/22/22  1124 08/22/22  1554   POCGLU 404* 352* 408* 370*       Blood Sugar Average: Last 72 hrs:  (P) 933 [id number] poorly controlled type 2 diabetes as evidenced by A1c 9 6 in June this year  Patient is on metformin 1 g b i d , insulin glargine 82 units subQ HS, insulin lispro 18 units t i d  With meals, Victoza injection daily at home  Reports noncompliant at home at times  · Will order Lantus 82 units subQ HS, Humalog 18 units t i d   With meals  · Hold metformin, Victoza  · SSI  · Diabetic diet

## 2022-08-22 NOTE — ASSESSMENT & PLAN NOTE
Continue lisinopril, HCTZ, Cardizem  · BP labile  · Reports history of tachycardia,on Cardizem for it  Denies history of AFib, a flutter

## 2022-08-22 NOTE — ASSESSMENT & PLAN NOTE
Mild COPD exacerbation likely triggered by COVID-19 infection  · On Airduo, Incruse Ellipta, proair and albuterol neb prn at home  · Patient started on IV Zithromax in ED  · Patient received IV Solu-Medrol 125mg in ED  Will continue IV Solu-Medrol 40mg daily  · Will substitute Airduo with Symbicort  · Neb treatment as above  · Currently on 5 L via nasal cannula  Continue supplemental oxygen therapy as needed and wean as tolerated

## 2022-08-22 NOTE — ASSESSMENT & PLAN NOTE
Tested positive on 8/19th  Patient received COVID vaccine  On oxygen 2 L currently, satting mid 80s  · Chest x-ray today shows atelectasis to me, pending final read  · Follow mild pathway  · Cardiac markers: Total CK, proBNP normal   Troponin 20(EKG no ischemic changes, patient denies chest pain except when coughing)  · CRP 8 5, D-dimer 0 39  · Patient received IV Solu-Medrol 125mg in ED, will continue 40 mg IV daily  · Starting remdesivir  · AC with Lovenox 40 subQ q 12 hours per protocol  · Start Mucinex, Xopenex/Atrovent b i d , Xopenex p r n , Tessalon Perles p r n  · Supportive care with IS, ambulation  · Continue supplemental oxygen to maintain sats above 90%  · Check procalcitonin  · Check CBC with diff, CMP, CRP, D-dimer in the morning      Results from last 7 days   Lab Units 08/19/22 2056   SARS-COV-2  Positive*     Results from last 7 days   Lab Units 08/22/22 0457 08/21/22 2057 08/21/22  1903   CRP mg/L 7 3*  --  8 5*   D-DIMER QUANTITATIVE ug/ml FEU 0 39 0 39  --       Results from last 7 days   Lab Units 08/22/22 0457 08/21/22  1903   PROCALCITONIN ng/ml <0 05 0 05

## 2022-08-22 NOTE — ASSESSMENT & PLAN NOTE
Lab Results   Component Value Date    EGFR 44 08/22/2022    EGFR 39 08/22/2022    EGFR 39 08/22/2022    CREATININE 1 29 08/22/2022    CREATININE 1 41 (H) 08/22/2022    CREATININE 1 42 (H) 08/22/2022   Baseline creatinine appears to be around 1 0-1 5  · Continue stable  · Monitor

## 2022-08-22 NOTE — ASSESSMENT & PLAN NOTE
Lactic acid 2 4  Suspect due to respiratory distress  No evidence of sepsis  Tachycardia and tachypnea most likely due to COVID-19 and COPD exacerbation  · Patient received normal saline 1 L in ED, repeat normalized

## 2022-08-22 NOTE — ASSESSMENT & PLAN NOTE
Status post lumbar laminectomy with fusion  Sees Pain Medicine as outpatient  On tramadol ER daily at home  Verified at [facility] website    · Will order tramadol p r n   · Monitor for pain

## 2022-08-22 NOTE — ASSESSMENT & PLAN NOTE
Mild COPD exacerbation likely triggered by COVID-19 infection  · On Airduo, Incruse Ellipta, proair and albuterol neb prn at home  · Patient received IV Zithromax in ED, continue for 2 more days  · Patient received IV Solu-Medrol 125mg in ED  Also received prednisone at home this morning  Will continue IV Solu-Medrol 40mg daily for 9 more days starting tomorrow  · Will substitute Airduo with Symbicort  · Neb treatment as above  · Continue supplemental oxygen to maintain sats above 90%

## 2022-08-22 NOTE — ASSESSMENT & PLAN NOTE
Potassium 6 5 this morning which has since improved to 4 9  Patient received albuterol, insulin, calcium gluconate and Kayexalate  Hold hydrochlorothiazide and lisinopril  Will continue to monitor

## 2022-08-22 NOTE — PROGRESS NOTES
Cat U  65   Progress Note - Yaneth Blevins 1960, 57 y o  female MRN: [id number]  Unit/Bed#: [address] [facility] Encounter: [id number]  Primary Care Provider: Zbigniew Valdes MD   Date and time admitted to hospital: 8/21/2022  6:34 PM    Hyperkalemia  Assessment & Plan  Potassium 6 5 this morning which has since improved to 4 9  Patient received albuterol, insulin, calcium gluconate and Kayexalate  Hold hydrochlorothiazide and lisinopril  Will continue to monitor  Elevated lactic acid level  Assessment & Plan  Lactic acid 2 4 on admission  Suspect due to respiratory distress  No evidence of sepsis  Tachycardia and tachypnea most likely due to COVID-19 and COPD exacerbation  · Patient received normal saline 1 L in ED, repeat normalized  Obesity  Assessment & Plan  Body mass index is 42 51 kg/m²  · Diet and lifestyle modification    COVID-19 virus infection  Assessment & Plan  Tested positive on 8/19th  Patient received COVID vaccine  On oxygen 2 L currently, satting mid 80s  · Chest x-ray today shows atelectasis to me, pending final read  · Follow mild pathway  · Cardiac markers: Total CK, proBNP normal   Troponin 20(EKG no ischemic changes, patient denies chest pain except when coughing)  · CRP 8 5, D-dimer 0 39  · Patient received IV Solu-Medrol 125mg in ED, will continue 40 mg IV daily  · Starting remdesivir  · AC with Lovenox 40 subQ q 12 hours per protocol  · Start Mucinex, Xopenex/Atrovent b i d , Xopenex p r n , Tessalon Perles p r n  · Supportive care with IS, ambulation  · Continue supplemental oxygen to maintain sats above 90%  · Check procalcitonin  · Check CBC with diff, CMP, CRP, D-dimer in the morning      Results from last 7 days   Lab Units 08/19/22 2056   SARS-COV-2  Positive*     Results from last 7 days   Lab Units 08/22/22  0457 08/21/22 2057 08/21/22  1903   CRP mg/L 7 3*  --  8 5*   D-DIMER QUANTITATIVE ug/ml FEU 0 39 0 39  --       Results from last 7 days   Lab Units 08/22/22  0457 08/21/22  1903   PROCALCITONIN ng/ml <0 05 0 05              Stage 2 chronic kidney disease  Assessment & Plan  Lab Results   Component Value Date    EGFR 44 08/22/2022    EGFR 39 08/22/2022    EGFR 39 08/22/2022    CREATININE 1 29 08/22/2022    CREATININE 1 41 (H) 08/22/2022    CREATININE 1 42 (H) 08/22/2022   Baseline creatinine appears to be around 1 0-1 5  · Continue stable  · Monitor    Chronic obstructive pulmonary disease with acute exacerbation (HCC)  Assessment & Plan  Mild COPD exacerbation likely triggered by COVID-19 infection  · On Airduo, Incruse Ellipta, proair and albuterol neb prn at home  · Patient started on IV Zithromax in ED  · Patient received IV Solu-Medrol 125mg in ED  Will continue IV Solu-Medrol 40mg daily  · Will substitute Airduo with Symbicort  · Neb treatment as above  · Currently on 5 L via nasal cannula  Continue supplemental oxygen therapy as needed and wean as tolerated  Coronary artery disease involving native coronary artery of native heart with angina pectoris [facility])  Assessment & Plan  Nonobstructive CAD on cardiac catheterization in 2019  · Continue statin    Obstructive sleep apnea  Assessment & Plan  Intolerant to CPAP machine  Cigarette nicotine dependence without complication  Assessment & Plan  Nicotine patch, smoking cessation  Hyperlipidemia  Assessment & Plan  Continue statin    Hypertension  Assessment & Plan  BP currently 114/60  Continue with oral Cardizem  Hold lisinopril hydrochlorothiazide secondary to KRYSTLE and associated hyperkalemia      Type 2 diabetes mellitus with hyperglycemia, with long-term current use of insulin [facility])  Assessment & Plan  Lab Results   Component Value Date    HGBA1C 9 6 (H) 06/14/2022       Recent Labs     08/22/22  0803 08/22/22  0902 08/22/22  1124 08/22/22  1554   POCGLU 404* 352* 408* 370*       Blood Sugar Average: Last 72 hrs:  (P) 411 [id number] poorly controlled type 2 diabetes as evidenced by A1c 9 6 in June this year  Patient is on metformin 1 g b i d , insulin glargine 82 units subQ HS, insulin lispro 18 units t i d  With meals, Victoza injection daily at home  Reports noncompliant at home at times  · Will order Lantus 82 units subQ HS, Humalog 18 units t i d  With meals  · Hold metformin, Victoza  · SSI  · Diabetic diet      Depression with anxiety  Assessment & Plan  Continue Seroquel, Cymbalta, Wellbutrin    Chronic low back pain  Assessment & Plan  Status post lumbar laminectomy with fusion  Sees Pain Medicine as outpatient  On tramadol ER daily at home  Verified at [facility] website  · Will order tramadol p r n   · Monitor for pain    * Acute respiratory failure with hypoxia [facility])  Assessment & Plan  Patient presenting with worsening SOB, fatigue, productive cough since likely Wednesday 8/17th  Patient was seen in ED on 8/19th, COVID test positive  Patient left AMA, was given Z-Diego/Omnicef/Paxlovid for COVID-19 and possible bacterial pneumonia  Chest x-ray at that time showed atelectasis  Patient was prescribed prednisone taper and Z-Diego on 8/19th by her pulmonologist prior to ED visit  Patient took prednisone, Omnicef, Paxlovid this morning at home  · Patient was satting 88% on room air on ED arrival     · Currently on 5L NC  · Likely multifactorial secondary to COVID-19 infection and mild COPD exacerbation  · Chest x-ray shows stable interstitial prominence bilaterally with right middle lobe linear atelectasis  · Treat underlying causes as below  · Continue supplemental oxygen to maintain sats above 90%  · Pulmonology consult pending      VTE Pharmacologic Prophylaxis: VTE Score: 8 Lovenox 40mg q 12h  Patient Centered Rounds: I performed bedside rounds with nursing staff today  Discussions with Specialists or Other Care Team Provider: Yes  Education and Discussions with Family / Patient: Yes  Time Spent for Care: 30 minutes   More than 50% of total time spent on counseling and coordination of care as described above  Current Length of Stay: 1 day(s)  Current Patient Status: Inpatient   Certification Statement: The patient will continue to require additional inpatient hospital stay due to hypoxic respiratory failure, covid  Discharge Plan: Home  Code Status: No Order    Subjective:   Patient seen and examined at bedside  No acute events overnight  Patient reports feeling better today  Currently on 5 L via nasal cannula  Denies any chest pain  Objective:     Vitals:   Temp (24hrs), Av 9 °F (36 6 °C), Min:97 7 °F (36 5 °C), Max:98 2 °F (36 8 °C)    Temp:  [97 7 °F (36 5 °C)-98 2 °F (36 8 °C)] 97 9 °F (36 6 °C)  HR:  [] 90  Resp:  [19-36] 19  BP: (110-147)/(52-81) 114/60  SpO2:  [88 %-98 %] 90 %  Body mass index is 42 51 kg/m²  Input and Output Summary (last 24 hours): Intake/Output Summary (Last 24 hours) at 2022 1731  Last data filed at 2022 0650  Gross per 24 hour   Intake 2050 ml   Output --   Net 2050 ml       Physical Exam:   Physical Exam  HENT:      Head: Normocephalic  Mouth/Throat:      Mouth: Mucous membranes are moist    Eyes:      Extraocular Movements: Extraocular movements intact  Cardiovascular:      Rate and Rhythm: Normal rate  Pulmonary:      Effort: Pulmonary effort is normal  No respiratory distress  Comments: Diminished bilaterally  Abdominal:      Palpations: Abdomen is soft  Tenderness: There is no abdominal tenderness  Skin:     General: Skin is warm  Neurological:      Mental Status: She is alert and oriented to person, place, and time     Psychiatric:         Mood and Affect: Mood normal          Behavior: Behavior normal        Additional Data:     Labs:  Results from last 7 days   Lab Units 22  0457   WBC Thousand/uL 10 79*   HEMOGLOBIN g/dL 13 7   HEMATOCRIT % 45 9   PLATELETS Thousands/uL 238   NEUTROS PCT % 91*   LYMPHS PCT % 7*   MONOS PCT % 1*   EOS PCT % 0 Results from last 7 days   Lab Units 08/22/22  1135 08/22/22  0640 08/22/22  0457   SODIUM mmol/L 136   < > 137   POTASSIUM mmol/L 4 9   < > 6 2*   CHLORIDE mmol/L 97   < > 98   CO2 mmol/L 30   < > 29   BUN mg/dL 19   < > 16   CREATININE mg/dL 1 29   < > 1 42*   ANION GAP mmol/L 9   < > 10   CALCIUM mg/dL 8 0*   < > 7 6*   ALBUMIN g/dL  --   --  3 2*   TOTAL BILIRUBIN mg/dL  --   --  0 21   ALK PHOS U/L  --   --  87   ALT U/L  --   --  32   AST U/L  --   --  14   GLUCOSE RANDOM mg/dL 428*   < > 445*    < > = values in this interval not displayed  Results from last 7 days   Lab Units 08/22/22  1554 08/22/22  1124 08/22/22  0902 08/22/22  0803 08/22/22  0148 08/22/22  0138 08/21/22  2314   POC GLUCOSE mg/dl 370* 408* 352* 404* 489* 464* 392*         Results from last 7 days   Lab Units 08/22/22  0457 08/21/22  2103 08/21/22  1903   LACTIC ACID mmol/L  --  1 7 2 4*   PROCALCITONIN ng/ml <0 05  --  0 05     Lines/Drains:  Invasive Devices  Report    Peripheral Intravenous Line  Duration           Peripheral IV 08/21/22 Left;Proximal;Ventral (anterior) Forearm <1 day              Telemetry:  Telemetry Orders (From admission, onward)             24 Hour Telemetry Monitoring  Continuous x 24 Hours (Telem)        References:    Telemetry Guidelines   Question:  Reason for 24 Hour Telemetry  Answer:  Metabolic/Electrolyte Disturbance with High Probability of Dysrhythmia (K level <3 or >6, or KCL infusion >=10mEq/hr)                      Imaging: Reviewed  Recent Cultures (last 7 days):   Results from last 7 days   Lab Units 08/21/22  1903   BLOOD CULTURE  Received in Microbiology Lab  Culture in Progress  Received in Microbiology Lab  Culture in Progress         Last 24 Hours Medication List:   Current Facility-Administered Medications   Medication Dose Route Frequency Provider Last Rate    acetaminophen  650 mg Oral Q6H PRN LURDES Núñez      atorvastatin  80 mg Oral Daily With Lincoln Newton CRNP      azithromycin  500 mg Oral Q24H Cuiyisantosh Yurik, CRNP      benzonatate  200 mg Oral TID PRN Cuiger Yurik, CRNP      budesonide-formoterol  2 puff Inhalation BID Cuiyisantosh Yurik, CRNP      buPROPion  300 mg Oral QAM Cuiyisantosh Yurik, CRNP      dicyclomine  10 mg Oral BID Cuiyisantosh Yurik, CRNP      diltiazem  120 mg Oral Daily Cuiyisantosh Yurik, CRNP      DULoxetine  90 mg Oral Daily Cuiyisantosh Yurik, CRNP      enoxaparin  40 mg Subcutaneous Q12H [facility] Cuiyisantosh Chauk, CRNP      fluticasone  1 spray Each Nare BID Cuiyisantosh Yurik, CRNP      guaiFENesin  1,200 mg Oral Q12H [facility] Cuger Mcleod, CRNP      insulin glargine  82 Units Subcutaneous HS Cuger Yurik, CRNP      insulin lispro  1-5 Units Subcutaneous HS Cuger Yurik, CRNP      insulin lispro  1-5 Units Subcutaneous 0200 Cuger Mcleod, CRNP      insulin lispro  1-6 Units Subcutaneous TID AC Cuger Yurik, CRNP      insulin lispro  18 Units Subcutaneous TID With Meals Cualberto Mcleod, CRNP      ipratropium  0 5 mg Nebulization TID Cuiyisantosh Yulucero, CRNP      levalbuterol  0 63 mg Nebulization Q4H PRN Emmaiger Mcleod, CRNP      levalbuterol  1 25 mg Nebulization TID [facility] CR[facility]      And    sodium chloride  3 mL Nebulization TID Cuiyisantosh Yulucero, CRNP      methylPREDNISolone sodium succinate  40 mg Intravenous Daily Cuiyisantosh Yurik, CRNP      nicotine  14 mg Transdermal Daily Cuiyisantosh Yulucero, CRNP      ondansetron  4 mg Intravenous Q6H PRN iyisantosh Yurik, CRNP      QUEtiapine  25 mg Oral BID Cuiyisantosh Yulucero, CRNP      remdesivir  100 mg Intravenous Q24H Cuiyisantosh Yurik, CRNP      traMADol  50 mg Oral Q6H PRN LURDES Fernández          Today, Patient Was Seen By: Cam Rodriguez MD    **Please Note: This note may have been constructed using a voice recognition system  **

## 2022-08-22 NOTE — ASSESSMENT & PLAN NOTE
Lab Results   Component Value Date    EGFR 55 08/21/2022    EGFR 36 08/19/2022    EGFR 62 06/14/2022    CREATININE 1 08 08/21/2022    CREATININE 1 51 (H) 08/19/2022    CREATININE 1 02 (H) 06/14/2022   Baseline creatinine appears to be around 1 0-1 5  · Continue stable  · Monitor

## 2022-08-22 NOTE — ASSESSMENT & PLAN NOTE
Patient presenting with worsening SOB, fatigue, productive cough since likely Wednesday 8/17th  Patient was seen in ED on 8/19th, COVID test positive  Patient left AMA, was given Z-Diego/Omnicef/Paxlovid for COVID-19 and possible bacterial pneumonia  Chest x-ray at that time showed atelectasis  Patient was prescribed prednisone taper and Z-Diego on 8/19th by her pulmonologist prior to ED visit  Patient took prednisone, Omnicef, Paxlovid this morning at home  · Patient was satting 88% on room air on ED arrival     · Currently on 5L NC  · Likely multifactorial secondary to COVID-19 infection and mild COPD exacerbation  · Chest x-ray shows stable interstitial prominence bilaterally with right middle lobe linear atelectasis  · Treat underlying causes as below  · Continue supplemental oxygen to maintain sats above 90%    · Pulmonology consult pending
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
## 2022-08-22 NOTE — ASSESSMENT & PLAN NOTE
Tested positive on 8/19th  Patient received COVID vaccine  On oxygen 2 L currently, satting mid 80s  · Chest x-ray today shows atelectasis to me, pending final read  · Follow mild pathway  · Cardiac markers: Total CK, proBNP normal   Troponin 20(EKG no ischemic changes, patient denies chest pain except when coughing)  · CRP 8 5, D-dimer 0 39  · Patient received IV Solu-Medrol 125mg in ED, will continue 40 mg IV daily for 9 more days starting tomorrow  · Starting remdesivir  · AC with Lovenox 40 subQ q 12 hours per protocol  · Start Mucinex, Xopenex/Atrovent b i d , Xopenex p r n , Tessalon Perles p r n  · Supportive care with IS, ambulation  · Continue supplemental oxygen to maintain sats above 90%  · Check procalcitonin  · Check CBC with diff, CMP, CRP, D-dimer in the morning      Results from last 7 days   Lab Units 08/19/22 2056   SARS-COV-2  Positive*     Results from last 7 days   Lab Units 08/21/22 2057 08/21/22  1903   CRP mg/L  --  8 5*   D-DIMER QUANTITATIVE ug/ml FEU 0 39  --

## 2022-08-22 NOTE — PLAN OF CARE
Problem: RESPIRATORY - ADULT  Goal: Achieves optimal ventilation and oxygenation  Description: INTERVENTIONS:  - Assess for changes in respiratory status  - Assess for changes in mentation and behavior  - Position to facilitate oxygenation and minimize respiratory effort  - Oxygen administered by appropriate delivery if ordered  - Initiate smoking cessation education as indicated  - Encourage broncho-pulmonary hygiene including cough, deep breathe, Incentive Spirometry  - Assess the need for suctioning and aspirate as needed  - Assess and instruct to report SOB or any respiratory difficulty  - Respiratory Therapy support as indicated  8/22/2022 0053 by Beckie Du Fahr  Outcome: Progressing       Problem: Knowledge Deficit  Goal: Patient/family/caregiver demonstrates understanding of disease process, treatment plan, medications, and discharge instructions  Description: Complete learning assessment and assess knowledge base    Interventions:  - Provide teaching at level of understanding  - Provide teaching via preferred learning methods  Outcome: Progressing     Problem: INFECTION - ADULT  Goal: Absence or prevention of progression during hospitalization  Description: INTERVENTIONS:  - Assess and monitor for signs and symptoms of infection  - Monitor lab/diagnostic results  Outcome: Progressing

## 2022-08-22 NOTE — QUICK NOTE
Potassium 6 2 this morning, repeat 6 5  Glucose 445->437  Will check EKG stat, give IV calcium gluconate 1 g x1, regular insulin 12 units IV, Kayexalate 30gm p o  Place patient on telemetry  Hold Lisinopril  Low k diet    Repeat BMP around 11AM

## 2022-08-22 NOTE — ASSESSMENT & PLAN NOTE
Lactic acid 2 4 on admission  Suspect due to respiratory distress  No evidence of sepsis  Tachycardia and tachypnea most likely due to COVID-19 and COPD exacerbation  · Patient received normal saline 1 L in ED, repeat normalized
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
## 2022-08-22 NOTE — ASSESSMENT & PLAN NOTE
Lab Results   Component Value Date    HGBA1C 9 6 (H) 06/14/2022       No results for input(s): POCGLU in the last 72 hours  Blood Sugar Average: Last 72 hrs:   poorly controlled type 2 diabetes as evidenced by A1c 9 6 in June this year  Patient is on metformin 1 g b i d , insulin glargine 82 units subQ HS, insulin lispro 18 units t i d  With meals, Victoza injection daily at home  Reports noncompliant at home at times  · Will order Lantus 82 units subQ HS, Humalog 18 units t i d   With meals  · Hold metformin, Victoza  · SSI  · Diabetic diet

## 2022-08-22 NOTE — ASSESSMENT & PLAN NOTE
Status post lumbar laminectomy with fusion  Sees Pain Medicine as outpatient  On tramadol ER daily at home  Verified at [facility] website    · Will order tramadol p r n   · Monitor for pain

## 2022-08-22 NOTE — ASSESSMENT & PLAN NOTE
Patient presents with worsening SOB, fatigue, productive cough since likely Wednesday 8/17th  Patient was seen in ED on 8/19th, COVID test positive  Patient left AMA, was given Z-Diego/Omnicef/Paxlovid for COVID-19 and possible bacterial pneumonia  Chest x-ray at that time showed atelectasis  Patient was prescribed prednisone taper and Z-Diego on 8/19th by her pulmonologist prior to ED visit  Patient took prednisone, Omnicef, Paxlovid this morning at home  · Patient was satting 88% on room air on ED arrival   SpO2 improved to 95% with oxygen 2 L  · Likely multifactorial secondary to COVID-19 infection and mild COPD exacerbation  · Chest x-ray today shows atelectasis to me, otherwise unremarkable, pending final read  · Treat underlying causes as below  · Continue supplemental oxygen to maintain sats above 90%    · Consult Pulmonary

## 2022-08-22 NOTE — ASSESSMENT & PLAN NOTE
BP currently 114/60  Continue with oral Cardizem  Hold lisinopril hydrochlorothiazide secondary to KRYSTLE and associated hyperkalemia

## 2022-08-23 LAB
ALBUMIN SERPL BCP-MCNC: 3.1 G/DL (ref 3.5–5)
ALP SERPL-CCNC: 76 U/L (ref 46–116)
ALT SERPL W P-5'-P-CCNC: 35 U/L (ref 12–78)
ANION GAP SERPL CALCULATED.3IONS-SCNC: 7 MMOL/L (ref 4–13)
AST SERPL W P-5'-P-CCNC: 20 U/L (ref 5–45)
BASOPHILS # BLD AUTO: 0.02 THOUSANDS/ΜL (ref 0–0.1)
BASOPHILS NFR BLD AUTO: 0 % (ref 0–1)
BILIRUB SERPL-MCNC: 0.27 MG/DL (ref 0.2–1)
BUN SERPL-MCNC: 23 MG/DL (ref 5–25)
CALCIUM ALBUM COR SERPL-MCNC: 8.8 MG/DL (ref 8.3–10.1)
CALCIUM SERPL-MCNC: 8.1 MG/DL (ref 8.3–10.1)
CHLORIDE SERPL-SCNC: 97 MMOL/L (ref 96–108)
CO2 SERPL-SCNC: 32 MMOL/L (ref 21–32)
CREAT SERPL-MCNC: 1.04 MG/DL (ref 0.6–1.3)
CRP SERPL QL: 4.5 MG/L
EOSINOPHIL # BLD AUTO: 0 THOUSAND/ΜL (ref 0–0.61)
EOSINOPHIL NFR BLD AUTO: 0 % (ref 0–6)
ERYTHROCYTE [DISTWIDTH] IN BLOOD BY AUTOMATED COUNT: 13.6 % (ref 11.6–15.1)
GFR SERPL CREATININE-BSD FRML MDRD: 57 ML/MIN/1.73SQ M
GLUCOSE SERPL-MCNC: 277 MG/DL (ref 65–140)
GLUCOSE SERPL-MCNC: 287 MG/DL (ref 65–140)
GLUCOSE SERPL-MCNC: 294 MG/DL (ref 65–140)
GLUCOSE SERPL-MCNC: 306 MG/DL (ref 65–140)
GLUCOSE SERPL-MCNC: 309 MG/DL (ref 65–140)
GLUCOSE SERPL-MCNC: 326 MG/DL (ref 65–140)
HCT VFR BLD AUTO: 41.8 % (ref 34.8–46.1)
HGB BLD-MCNC: 13 G/DL (ref 11.5–15.4)
IMM GRANULOCYTES # BLD AUTO: 0.13 THOUSAND/UL (ref 0–0.2)
IMM GRANULOCYTES NFR BLD AUTO: 1 % (ref 0–2)
LYMPHOCYTES # BLD AUTO: 1.02 THOUSANDS/ΜL (ref 0.6–4.47)
LYMPHOCYTES NFR BLD AUTO: 6 % (ref 14–44)
MAGNESIUM SERPL-MCNC: 2 MG/DL (ref 1.6–2.6)
MCH RBC QN AUTO: 30 PG (ref 26.8–34.3)
MCHC RBC AUTO-ENTMCNC: 31.1 G/DL (ref 31.4–37.4)
MCV RBC AUTO: 97 FL (ref 82–98)
MONOCYTES # BLD AUTO: 0.74 THOUSAND/ΜL (ref 0.17–1.22)
MONOCYTES NFR BLD AUTO: 5 % (ref 4–12)
NEUTROPHILS # BLD AUTO: 14.03 THOUSANDS/ΜL (ref 1.85–7.62)
NEUTS SEG NFR BLD AUTO: 88 % (ref 43–75)
NRBC BLD AUTO-RTO: 0 /100 WBCS
PLATELET # BLD AUTO: 239 THOUSANDS/UL (ref 149–390)
PMV BLD AUTO: 10.9 FL (ref 8.9–12.7)
POTASSIUM SERPL-SCNC: 4.6 MMOL/L (ref 3.5–5.3)
PROCALCITONIN SERPL-MCNC: <0.05 NG/ML
PROT SERPL-MCNC: 6.3 G/DL (ref 6.4–8.4)
RBC # BLD AUTO: 4.33 MILLION/UL (ref 3.81–5.12)
SODIUM SERPL-SCNC: 136 MMOL/L (ref 135–147)
WBC # BLD AUTO: 15.94 THOUSAND/UL (ref 4.31–10.16)

## 2022-08-23 PROCEDURE — 99232 SBSQ HOSP IP/OBS MODERATE 35: CPT | Performed by: HOSPITALIST

## 2022-08-23 PROCEDURE — 84145 PROCALCITONIN (PCT): CPT | Performed by: INTERNAL MEDICINE

## 2022-08-23 PROCEDURE — 85025 COMPLETE CBC W/AUTO DIFF WBC: CPT | Performed by: FAMILY MEDICINE

## 2022-08-23 PROCEDURE — 94640 AIRWAY INHALATION TREATMENT: CPT

## 2022-08-23 PROCEDURE — 86140 C-REACTIVE PROTEIN: CPT | Performed by: INTERNAL MEDICINE

## 2022-08-23 PROCEDURE — 80053 COMPREHEN METABOLIC PANEL: CPT | Performed by: FAMILY MEDICINE

## 2022-08-23 PROCEDURE — 82948 REAGENT STRIP/BLOOD GLUCOSE: CPT

## 2022-08-23 PROCEDURE — 99232 SBSQ HOSP IP/OBS MODERATE 35: CPT | Performed by: INTERNAL MEDICINE

## 2022-08-23 PROCEDURE — 94760 N-INVAS EAR/PLS OXIMETRY 1: CPT

## 2022-08-23 PROCEDURE — 83735 ASSAY OF MAGNESIUM: CPT | Performed by: FAMILY MEDICINE

## 2022-08-23 RX ORDER — FUROSEMIDE 10 MG/ML
20 INJECTION INTRAMUSCULAR; INTRAVENOUS ONCE
Status: COMPLETED | OUTPATIENT
Start: 2022-08-23 | End: 2022-08-23

## 2022-08-23 RX ADMIN — ISODIUM CHLORIDE 3 ML: 0.03 SOLUTION RESPIRATORY (INHALATION) at 13:07

## 2022-08-23 RX ADMIN — ISODIUM CHLORIDE 3 ML: 0.03 SOLUTION RESPIRATORY (INHALATION) at 07:16

## 2022-08-23 RX ADMIN — DICYCLOMINE HYDROCHLORIDE 10 MG: 10 CAPSULE ORAL at 17:17

## 2022-08-23 RX ADMIN — BUPROPION HYDROCHLORIDE 300 MG: 150 TABLET, EXTENDED RELEASE ORAL at 08:34

## 2022-08-23 RX ADMIN — IPRATROPIUM BROMIDE 0.5 MG: 0.5 SOLUTION RESPIRATORY (INHALATION) at 13:07

## 2022-08-23 RX ADMIN — INSULIN LISPRO 5 UNITS: 100 INJECTION, SOLUTION INTRAVENOUS; SUBCUTANEOUS at 11:59

## 2022-08-23 RX ADMIN — INSULIN LISPRO 18 UNITS: 100 INJECTION, SOLUTION INTRAVENOUS; SUBCUTANEOUS at 12:00

## 2022-08-23 RX ADMIN — METHYLPREDNISOLONE SODIUM SUCCINATE 40 MG: 40 INJECTION, POWDER, FOR SOLUTION INTRAMUSCULAR; INTRAVENOUS at 08:35

## 2022-08-23 RX ADMIN — INSULIN LISPRO 4 UNITS: 100 INJECTION, SOLUTION INTRAVENOUS; SUBCUTANEOUS at 17:16

## 2022-08-23 RX ADMIN — IPRATROPIUM BROMIDE 0.5 MG: 0.5 SOLUTION RESPIRATORY (INHALATION) at 20:19

## 2022-08-23 RX ADMIN — FLUTICASONE PROPIONATE 1 SPRAY: 50 SPRAY, METERED NASAL at 17:19

## 2022-08-23 RX ADMIN — INSULIN GLARGINE 82 UNITS: 100 INJECTION, SOLUTION SUBCUTANEOUS at 21:34

## 2022-08-23 RX ADMIN — ATORVASTATIN CALCIUM 80 MG: 80 TABLET, FILM COATED ORAL at 17:17

## 2022-08-23 RX ADMIN — INSULIN LISPRO 4 UNITS: 100 INJECTION, SOLUTION INTRAVENOUS; SUBCUTANEOUS at 08:33

## 2022-08-23 RX ADMIN — ISODIUM CHLORIDE 3 ML: 0.03 SOLUTION RESPIRATORY (INHALATION) at 20:19

## 2022-08-23 RX ADMIN — Medication 14 MG: at 08:36

## 2022-08-23 RX ADMIN — DILTIAZEM HYDROCHLORIDE 120 MG: 120 CAPSULE, COATED, EXTENDED RELEASE ORAL at 08:34

## 2022-08-23 RX ADMIN — QUETIAPINE FUMARATE 25 MG: 25 TABLET ORAL at 08:34

## 2022-08-23 RX ADMIN — GUAIFENESIN 1200 MG: 600 TABLET, EXTENDED RELEASE ORAL at 21:34

## 2022-08-23 RX ADMIN — ENOXAPARIN SODIUM 40 MG: 40 INJECTION SUBCUTANEOUS at 21:34

## 2022-08-23 RX ADMIN — ENOXAPARIN SODIUM 40 MG: 40 INJECTION SUBCUTANEOUS at 08:34

## 2022-08-23 RX ADMIN — REMDESIVIR 100 MG: 100 INJECTION, POWDER, LYOPHILIZED, FOR SOLUTION INTRAVENOUS at 23:09

## 2022-08-23 RX ADMIN — LEVALBUTEROL HYDROCHLORIDE 1.25 MG: 1.25 SOLUTION, CONCENTRATE RESPIRATORY (INHALATION) at 07:15

## 2022-08-23 RX ADMIN — INSULIN LISPRO 18 UNITS: 100 INJECTION, SOLUTION INTRAVENOUS; SUBCUTANEOUS at 17:16

## 2022-08-23 RX ADMIN — IPRATROPIUM BROMIDE 0.5 MG: 0.5 SOLUTION RESPIRATORY (INHALATION) at 07:16

## 2022-08-23 RX ADMIN — INSULIN LISPRO 3 UNITS: 100 INJECTION, SOLUTION INTRAVENOUS; SUBCUTANEOUS at 03:27

## 2022-08-23 RX ADMIN — FUROSEMIDE 20 MG: 10 INJECTION, SOLUTION INTRAMUSCULAR; INTRAVENOUS at 17:14

## 2022-08-23 RX ADMIN — GUAIFENESIN 1200 MG: 600 TABLET, EXTENDED RELEASE ORAL at 08:34

## 2022-08-23 RX ADMIN — INSULIN LISPRO 3 UNITS: 100 INJECTION, SOLUTION INTRAVENOUS; SUBCUTANEOUS at 21:34

## 2022-08-23 RX ADMIN — DICYCLOMINE HYDROCHLORIDE 10 MG: 10 CAPSULE ORAL at 08:34

## 2022-08-23 RX ADMIN — QUETIAPINE FUMARATE 25 MG: 25 TABLET ORAL at 17:17

## 2022-08-23 RX ADMIN — LEVALBUTEROL HYDROCHLORIDE 1.25 MG: 1.25 SOLUTION, CONCENTRATE RESPIRATORY (INHALATION) at 13:07

## 2022-08-23 RX ADMIN — BUDESONIDE AND FORMOTEROL FUMARATE DIHYDRATE 2 PUFF: 160; 4.5 AEROSOL RESPIRATORY (INHALATION) at 17:19

## 2022-08-23 RX ADMIN — FLUTICASONE PROPIONATE 1 SPRAY: 50 SPRAY, METERED NASAL at 08:40

## 2022-08-23 RX ADMIN — INSULIN LISPRO 18 UNITS: 100 INJECTION, SOLUTION INTRAVENOUS; SUBCUTANEOUS at 08:33

## 2022-08-23 RX ADMIN — BUDESONIDE AND FORMOTEROL FUMARATE DIHYDRATE 2 PUFF: 160; 4.5 AEROSOL RESPIRATORY (INHALATION) at 08:40

## 2022-08-23 RX ADMIN — DULOXETINE HYDROCHLORIDE 90 MG: 60 CAPSULE, DELAYED RELEASE ORAL at 08:34

## 2022-08-23 RX ADMIN — LEVALBUTEROL HYDROCHLORIDE 1.25 MG: 1.25 SOLUTION, CONCENTRATE RESPIRATORY (INHALATION) at 20:19

## 2022-08-23 NOTE — ASSESSMENT & PLAN NOTE
Patient presenting with worsening SOB, fatigue, productive cough since likely Wednesday 8/17th  Patient was seen in ED on 8/19th, COVID test positive  Patient left AMA, was given Z-Diego/Omnicef/Paxlovid for COVID-19 and possible bacterial pneumonia  Chest x-ray at that time showed atelectasis  Patient was prescribed prednisone taper and Z-Diego on 8/19th by her pulmonologist prior to ED visit  Patient took prednisone, Omnicef, Paxlovid on morning of admission at home  Patient was satting 88% on room air on ED arrival     Chest x-ray shows stable interstitial prominence bilaterally with right middle lobe linear atelectasis  Likely multifactorial secondary to COVID-19 infection and mild COPD exacerbation, atelectasis, mild volume overload  Oxygen requirement escalated to 6 L at rest, now improving  ABG revealed normal pH with mildly elevated pCO2 and hypoxia  This was reviewed with Pulmonary  Respiratory symptoms improved  Home oxygen evaluation was done patient does not require supplemental oxygen    · Recommended to monitor symptoms and oxygenation  · Incentive spirometry  · Increase activity as tolerated  · Follow-up with Pulmonary after discharge

## 2022-08-23 NOTE — ASSESSMENT & PLAN NOTE
Tested positive on 8/19th  Patient received COVID vaccine  On oxygen 2 L currently, satting mid 90s on presentation, subsequently oxygen requirement increased to 6 L  Currently improved to 4 L at rest  Chest x-ray - Stable interstitial prominence bilaterally and right middle lobe linear atelectasis   No new focal airspace consolidation identified  Continue treatment as per COVID protocol  , clinically improved  Patient denies any significant cardiac respiratory symptoms at present  Afebrile hemodynamically stable saturating adequately on room air  Does not require supplemental oxygen on home O2 eval   Cardiac markers: Total CK, proBNP normal   Troponin 20(EKG no ischemic changes, patient denies chest pain )  CRP 8 5, D-dimer 0 39 initially-normalized  · Patient received IV Solu-Medrol 125mg in ED, subsequently continue 40 mg IV daily x5 days-no steroid needed on discharge  · Complete remdesivir  · Continued on Lovenox 40 subQ q 12 hours per protocol for DVT prophylaxis  · Treated with symptomatic treatment Mucinex, Xopenex/Atrovent b i d , Xopenex p r n , Tessalon Perles p r n  · Receive diuresis IV Lasix x2 to maintain negative balance  · Supportive care with IS, ambulation  · Continue supplemental oxygen to maintain sats above 90%  ·  procalcitonin-negative   D-dimer negative  CRP improved/normal  Patient was educated about the follow-up plan, isolation precautions and monitoring of the symptoms  Symptomatic treatment on discharge    Pulmonary follow-up discharge        Results from last 7 days   Lab Units 08/24/22  0521 08/23/22  0453 08/22/22  0457 08/21/22  2057 08/21/22  1903   CRP mg/L 1 9 4 5* 7 3*  --  8 5*   D-DIMER QUANTITATIVE ug/ml FEU 0 39  --  0 39 0 39  --       Results from last 7 days   Lab Units 08/24/22  0521 08/23/22  0453 08/22/22  0457 08/21/22  1903   PROCALCITONIN ng/ml <0 05 <0 05 <0 05 0 05

## 2022-08-23 NOTE — ASSESSMENT & PLAN NOTE
Mild COPD exacerbation likely triggered by COVID-19 infection  On Airduo, Incruse Ellipta, proair and albuterol neb prn at home     · Patient received IV Zithromax and treated with IV Solu-Medrol during hospitalization  · Improved clinically  · No further wheezing  · Oxygen requirement improved  · Recommend to continue home COPD treatment on discharge  · Follow-up with Pulmonary on discharge

## 2022-08-23 NOTE — ASSESSMENT & PLAN NOTE
Lab Results   Component Value Date    HGBA1C 9 6 (H) 06/14/2022       Recent Labs     08/25/22  2040 08/26/22  0207 08/26/22  0756 08/26/22  1126   POCGLU 297* 169* 64* 109       Blood Sugar Average: Last 72 hrs:  (P) 861 [id number]      poorly controlled type 2 diabetes as evidenced by A1c 9 6 in June this year  Patient is on metformin 1 g b i d , insulin glargine 82 units subQ HS, insulin lispro 18 units t i d  With meals, Victoza injection daily at home  Reports noncompliant at home at times  Morning hypoglycemia noted despite being on IV Solu-Medrol and good p o  Intake  Likely secondary to optimal diabetic diet during hospitalization which patient is planning to continue  · Decrease Lantus 76 units subQ HS, and Humalog 14 units t i d   With meals  · Resume metformin, Victoza on discharge  · Recommend close monitoring of blood glucose after discharge  · Diabetic diet  · Follow-up with endocrinology  · Recommended to follow-up with endocrinology for persistent hyperglycemia more than 200 mg/dL or hypoglycemia

## 2022-08-23 NOTE — ASSESSMENT & PLAN NOTE
Status post lumbar laminectomy with fusion  Sees Pain Medicine as outpatient  On tramadol ER daily at home  Verified at [facility] website    · on tramadol p r n   · Monitor for pain

## 2022-08-23 NOTE — ASSESSMENT & PLAN NOTE
Potassium 6 5 this morning which has since improved to 4 9  Patient received albuterol, insulin, calcium gluconate and Kayexalate  Hold lisinopril  Will continue to monitor

## 2022-08-23 NOTE — PROGRESS NOTES
Progress Note - Pulmonary   Charley Vinton 57 y o  female MRN: [id number]  Unit/Bed#: [address] [address] Encounter: [id number]    Assessment:  56 yo F with COPD, CAD, HTN, LESLIE with acute hypoxemic resp failure likely in the setting of pulm edema/decompensated HF and COPD exacerbation  She was also found to be covid positive  Overall improving but continues to require supplemental O2  Recommendations:  - continue to wean O2  Initial lactate likely 2/2 hypoxemia, no documented hypotension to suggest hypoperfusion     - diuresis with goal net neg -500cc to -1L fluid balance  - c/w remdesevir to completion    - methylprednisonlone 40mg IV x 5 days   - out of bed to chair, use incentive spirometer, ambulate as tolerated  - dvt ppx as per primary team       Chief Complaint:   Shortness of breath / hypoxemic resp failure     Subjective:   Overall feels better today, breathing is less heavy  Able to ambulate around the room with good tolerance  Objective:     Vitals: Blood pressure 142/79, pulse 82, temperature 98 °F (36 7 °C), temperature source Oral, resp  rate 20, height 5' 3" (1 6 m), weight 109 kg (240 lb), SpO2 92 %  ,Body mass index is 42 51 kg/m²  No intake or output data in the 24 hours ending 08/23/22 1620    Invasive Devices  Report    Peripheral Intravenous Line  Duration           Peripheral IV 08/21/22 Left;Proximal;Ventral (anterior) Forearm 1 day                Physical Exam:  General - lying in bed, not in distress     Oral - no thrush   Cardiac - s1s2 rrr, no m/r/g   Lungs - CTA b/l, no appreciable wheezing or rales   Abdomen - soft ndnt, normoactive   Extremities - no pitting edema  Mental status - lucid     Pertinent labs and imaging reviewed   - elevated blood glucose   - leukocytosis - likely 2/2 methylpred   - procal <0 05   - initial NT-proBNP 285  - CXR with bilateral prominent interstial markings     Medication Administration - last 24 hours from 08/22/2022 1650 to 08/23/2022 1650 Date/Time Order Dose Route Action Action by     08/22/2022 1656 atorvastatin (LIPITOR) tablet 80 mg 80 mg Oral Given Leonidas Gagnoning, RN     08/22/2022 2002 azithromycin (ZITHROMAX) tablet 500 mg 500 mg Oral Given Huy Masterson, RN     08/23/2022 0981 buPROPion (WELLBUTRIN XL) 24 hr tablet 300 mg 300 mg Oral Given Leonidas Earing, RN     08/23/2022 1451 dicyclomine (BENTYL) capsule 10 mg 10 mg Oral Given Leonidas Earing, RN     08/22/2022 1700 dicyclomine (BENTYL) capsule 10 mg 10 mg Oral Given Leonidas Earing, RN     08/23/2022 0834 diltiazem (CARDIZEM CD) 24 hr capsule 120 mg 120 mg Oral Given Leonidas Earing, RN     08/23/2022 0840 fluticasone (FLONASE) 50 mcg/act nasal spray 1 spray 1 spray Each Nare Given Leonidas Earing, RN     08/22/2022 1701 fluticasone (FLONASE) 50 mcg/act nasal spray 1 spray 1 spray Each Nare Given Leonidas Earing, RN     08/23/2022 0840 budesonide-formoterol (SYMBICORT) 160-4 5 mcg/act inhaler 2 puff 2 puff Inhalation Given Leonidas Earing, RN     08/22/2022 1701 budesonide-formoterol (SYMBICORT) 160-4 5 mcg/act inhaler 2 puff 2 puff Inhalation Given Leonidas Earing, RN     08/22/2022 2210 insulin glargine (LANTUS) subcutaneous injection 82 Units 0 82 mL 82 Units Subcutaneous Given Huy Masterson, RN     08/23/2022 9660 QUEtiapine (SEROquel) tablet 25 mg 25 mg Oral Given Leonidas Earing, RN     08/22/2022 1700 QUEtiapine (SEROquel) tablet 25 mg 25 mg Oral Given Leonidas Earing, RN     08/22/2022 2209 remdesivir (Veklury) 100 mg in sodium chloride 0 9 % 270 mL IVPB 100 mg Intravenous [address], RN     08/23/2022 0834 enoxaparin (LOVENOX) subcutaneous injection 40 mg 40 mg Subcutaneous Given Leonidas Seay, RN     08/22/2022 2002 enoxaparin (LOVENOX) subcutaneous injection 40 mg 40 mg Subcutaneous Given Huy Masterson, JASWINDER     08/23/2022 0834 guaiFENesin (MUCINEX) 12 hr tablet 1,200 mg 1,200 mg Oral Given Leonidas Seay RN     08/22/2022 2002 guaiFENesin (MUCINEX) 12 hr tablet 1,200 mg 1,200 mg Oral Given Priscilla Catalino, RN     08/23/2022 1200 insulin lispro (HumaLOG) 100 units/mL subcutaneous injection 18 Units 18 Units Subcutaneous Given Christie Dennison, RN     08/23/2022 2133 insulin lispro (HumaLOG) 100 units/mL subcutaneous injection 18 Units 18 Units Subcutaneous Given Christie Dennison, RN     08/22/2022 1656 insulin lispro (HumaLOG) 100 units/mL subcutaneous injection 18 Units 18 Units Subcutaneous Given Christie Dennison, RN     08/23/2022 5320 DULoxetine (CYMBALTA) delayed release capsule 90 mg 90 mg Oral Given Christie Dennison, RN     08/23/2022 6165 methylPREDNISolone sodium succinate (Solu-MEDROL) injection 40 mg 40 mg Intravenous Given Christie Dennison, RN     08/23/2022 1307 levalbuterol (Pecolia Kenton) inhalation solution 1 25 mg 1 25 mg Nebulization [facility] RT     08/23/2022 0715 levalbuterol (Pecolia Kenton) inhalation solution 1 25 mg 1 25 mg Nebulization Given Jannetta [facility], RT     08/22/2022 1935 levalbuterol (XOPENEX) inhalation solution 1 25 mg 1 25 mg Nebulization Given Lider Scott, RT     08/23/2022 1307 sodium chloride 0 9 % inhalation solution 3 mL 3 mL Nebulization Given [facility], RT     08/23/2022 0716 sodium chloride 0 9 % inhalation solution 3 mL 3 mL Nebulization Given Jannetta [facility], RT     08/22/2022 1935 sodium chloride 0 9 % inhalation solution 3 mL 3 mL Nebulization Given Lider Scott, RT     08/23/2022 1307 ipratropium (ATROVENT) 0 02 % inhalation solution 0 5 mg 0 5 mg Nebulization Given Jannetta [facility], RT     08/23/2022 0716 ipratropium (ATROVENT) 0 02 % inhalation solution 0 5 mg 0 5 mg Nebulization Given Jannetta [facility], RT     08/22/2022 1935 ipratropium (ATROVENT) 0 02 % inhalation solution 0 5 mg 0 5 mg Nebulization Given Lider Scott, RT     08/23/2022 0836 nicotine (NICODERM CQ) 14 mg/24hr TD 24 hr patch 14 mg 14 mg Transdermal Medication Applied Christie Dennison RN     08/23/2022 0835 nicotine (NICODERM CQ) 14 mg/24hr TD 24 hr patch 14 mg 14 mg Transdermal Patch Removed Anthony Parr RN     08/22/2022 2208 insulin lispro (HumaLOG) 100 units/mL subcutaneous injection 1-5 Units 4 Units Subcutaneous Given Chacorta Kelly RN     08/23/2022 0327 insulin lispro (HumaLOG) 100 units/mL subcutaneous injection 1-5 Units 3 Units Subcutaneous Given Chacorta Kelly RN     08/23/2022 1159 insulin lispro (HumaLOG) 100 units/mL subcutaneous injection 1-6 Units 5 Units Subcutaneous Given Anthony Parr RN     08/23/2022 0394 insulin lispro (HumaLOG) 100 units/mL subcutaneous injection 1-6 Units 4 Units Subcutaneous Given Anthony Parr RN     08/22/2022 1655 insulin lispro (HumaLOG) 100 units/mL subcutaneous injection 1-6 Units 6 Units Subcutaneous Given Anthony Parr RN

## 2022-08-23 NOTE — PROGRESS NOTES
[address] Internal Medicine Progress Note  Patient: Da Duong 57 y o  female   MRN: [id number]  PCP: Ananda Hummel MD  Unit/Bed#: [address] [place] [address] Encounter: [id number]  Date Of Visit: 08/23/22    Problem List:    Principal Problem:    Acute respiratory failure with hypoxia ([facility] [address] )  Active Problems:    COVID-19    Type 2 diabetes mellitus with hyperglycemia, with long-term current use of insulin ([facility] [address] )    Hypertension    Cigarette nicotine dependence without complication    Obstructive sleep apnea    Coronary artery disease involving native coronary artery of native heart with angina pectoris (HCC)    COPD exacerbation (HCC)    Chronic low back pain    Depression with anxiety    Hyperlipidemia    Stage 2 chronic kidney disease    Obesity      Assessment & Plan:    COVID-19  Assessment & Plan  Tested positive on 8/19th  Patient received COVID vaccine  On oxygen 2 L currently, satting mid 90s on presentation, subsequently oxygen requirement increased to 6 L  Currently improved to 4 L at rest  Chest x-ray - Stable interstitial prominence bilaterally and right middle lobe linear atelectasis   No new focal airspace consolidation identified  Continue treatment as per COVID protocol  · Cardiac markers: Total CK, proBNP normal   Troponin 20(EKG no ischemic changes, patient denies chest pain except when coughing)  · CRP 8 5, D-dimer 0 39 initially  · Patient received IV Solu-Medrol 125mg in ED, will continue 40 mg IV daily  · Continue remdesivir  · Continue with Lovenox 40 subQ q 12 hours per protocol  · Continue Mucinex, Xopenex/Atrovent b i d , Xopenex p r n , Tessalon Perles p r n   · Trial of IV Lasix x1 to maintain negative balance  · Supportive care with IS, ambulation  · Continue supplemental oxygen to maintain sats above 90%  · Check procalcitonin-negative so far  · Check CBC with diff, CMP, CRP, D-dimer in the morning      Results from last 7 days   Lab Units 08/19/22 2056   SARS-COV-2  Positive* Results from last 7 days   Lab Units 08/23/22  0453 08/22/22  0457 08/21/22 2057 08/21/22  1903   CRP mg/L 4 5* 7 3*  --  8 5*   D-DIMER QUANTITATIVE ug/ml FEU  --  0 39 0 39  --       Results from last 7 days   Lab Units 08/22/22  0457 08/21/22  1903   PROCALCITONIN ng/ml <0 05 0 05              * Acute respiratory failure with hypoxia [facility])  Assessment & Plan  Patient presenting with worsening SOB, fatigue, productive cough since likely Wednesday 8/17th  Patient was seen in ED on 8/19th, COVID test positive  Patient left AMA, was given Z-Diego/Omnicef/Paxlovid for COVID-19 and possible bacterial pneumonia  Chest x-ray at that time showed atelectasis  Patient was prescribed prednisone taper and Z-Diego on 8/19th by her pulmonologist prior to ED visit  Patient took prednisone, Omnicef, Paxlovid on morning of admission at home  Patient was satting 88% on room air on ED arrival     Oxygen requirement escalated to 6 L at rest, now to 4 L saturating in low 90s   Likely multifactorial secondary to COVID-19 infection and mild COPD exacerbation  Chest x-ray shows stable interstitial prominence bilaterally with right middle lobe linear atelectasis  · Treat underlying causes as below  · Continue supplemental oxygen to maintain sats above 90%  · Pulmonology consult appreciated will follow-up recommendations    COPD exacerbation ([facility] [address] )  Assessment & Plan  Mild COPD exacerbation likely triggered by COVID-19 infection  · On Airduo, Incruse Ellipta, proair and albuterol neb prn at home  · Patient started on IV Zithromax in ED, will discontinue  ·  continue IV Solu-Medrol 40mg daily  · Will substitute Airduo with Symbicort  · Neb treatment as above  ·  Continue supplemental oxygen therapy as needed and wean as tolerated  Coronary artery disease involving native coronary artery of native heart with angina pectoris [facility])  Assessment & Plan  Nonobstructive CAD on cardiac catheterization in 2019    · Continue statin    Obstructive sleep apnea  Assessment & Plan  Intolerant to CPAP machine  Cigarette nicotine dependence without complication  Assessment & Plan  Nicotine patch, smoking cessation  Hypertension  Assessment & Plan  BP stable   Continue with oral Cardizem  Hold lisinopril secondary to KRYSTLE   Resume hydrochlorothiazide in a m  Type 2 diabetes mellitus with hyperglycemia, with long-term current use of insulin [facility])  Assessment & Plan  Lab Results   Component Value Date    HGBA1C 9 6 (H) 06/14/2022       Recent Labs     08/23/22  1103 08/23/22  1546 08/23/22  2041 08/24/22  0151   POCGLU 326* 287* 309* 222*       Blood Sugar Average: Last 72 hrs:  (P) 242 [id number]      poorly controlled type 2 diabetes as evidenced by A1c 9 6 in June this year  Patient is on metformin 1 g b i d , insulin glargine 82 units subQ HS, insulin lispro 18 units t i d  With meals, Victoza injection daily at home  Reports noncompliant at home at times  · Continue Lantus 82 units subQ HS, Humalog 18 units t i d  With meals  · Hold metformin, Victoza  · SSI  · Diabetic diet      Obesity  Assessment & Plan  Body mass index is 42 51 kg/m²  · Diet and lifestyle modification    Stage 2 chronic kidney disease  Assessment & Plan  Baseline creatinine appears to be around 1 0-1 5  · Continue stable  · Monitor    Hyperlipidemia  Assessment & Plan  Continue statin    Depression with anxiety  Assessment & Plan  Continue Seroquel, Cymbalta, Wellbutrin    Chronic low back pain  Assessment & Plan  Status post lumbar laminectomy with fusion  Sees Pain Medicine as outpatient  On tramadol ER daily at home  Verified at [facility] website  · Will order tramadol p r n   · Monitor for pain    Hyperkalemia-resolved as of 8/24/2022  Assessment & Plan  Potassium 6 5 this morning which has since improved to 4 9  Patient received albuterol, insulin, calcium gluconate and Kayexalate  Hold lisinopril  Will continue to monitor      Elevated lactic acid level-resolved as of 2022  Assessment & Plan  Lactic acid 2 4 on admission  Suspect due to respiratory distress  No evidence of sepsis  Tachycardia and tachypnea most likely due to COVID-19 and COPD exacerbation  · Patient received normal saline 1 L in ED, repeat normalized  VTE Pharmacologic Prophylaxis: VTE Score: 8 High Risk (Score >/= 5) - Pharmacological DVT Prophylaxis Ordered: enoxaparin (Lovenox)  Sequential Compression Devices Ordered  Patient Centered Rounds: I performed bedside rounds with nursing staff today  Discussions with Specialists or Other Care Team Provider:  Pulmonary    Education and Discussions with Family / Patient: Updated  (mother) via phone  Time Spent for Care: 45 minutes  More than 50% of total time spent on counseling and coordination of care as described above  Current Length of Stay: 2 day(s)  Current Patient Status: Inpatient   Certification Statement: The patient will continue to require additional inpatient hospital stay due to Respiratory failure  Discharge Plan: Anticipate discharge in 48-72 hrs to home with home services  Subjective:   Reports modest improvement in breathing   Cough is improving   Denies any chest pain or dizziness   Still with desaturation and shortness of breath with activity    Objective:     Vitals:   Temp (24hrs), Av 1 °F (36 7 °C), Min:97 6 °F (36 4 °C), Max:98 4 °F (36 9 °C)    Temp:  [97 6 °F (36 4 °C)-98 4 °F (36 9 °C)] 98 °F (36 7 °C)  HR:  [80-90] 80  Resp:  [20-22] 20  BP: (105-141)/(59-74) 141/72  SpO2:  [90 %-94 %] 92 %  Body mass index is 42 51 kg/m²  Input and Output Summary (last 24 hours):   No intake or output data in the 24 hours ending 22 1533    Physical Exam:   Physical Exam  Constitutional:       General: She is not in acute distress  Appearance: She is obese  HENT:      Head: Normocephalic and atraumatic  Cardiovascular:      Rate and Rhythm: Normal rate  Pulmonary:      Effort: Pulmonary effort is normal  No respiratory distress  Breath sounds: Normal breath sounds  No wheezing or rales  Comments: Diminished bilaterally  Abdominal:      General: Bowel sounds are normal  There is no distension  Palpations: Abdomen is soft  Tenderness: There is no abdominal tenderness  There is no guarding or rebound  Musculoskeletal:      Comments: Trace lower extremity edema   Skin:     General: Skin is warm and dry  Findings: No rash  Neurological:      General: No focal deficit present  Mental Status: She is alert and oriented to person, place, and time  Mental status is at baseline           Additional Data:     Labs:  Results from last 7 days   Lab Units 08/23/22  0453   WBC Thousand/uL 15 94*   HEMOGLOBIN g/dL 13 0   HEMATOCRIT % 41 8   PLATELETS Thousands/uL 239   NEUTROS PCT % 88*   LYMPHS PCT % 6*   MONOS PCT % 5   EOS PCT % 0     Results from last 7 days   Lab Units 08/23/22  0453   SODIUM mmol/L 136   POTASSIUM mmol/L 4 6   CHLORIDE mmol/L 97   CO2 mmol/L 32   BUN mg/dL 23   CREATININE mg/dL 1 04   ANION GAP mmol/L 7   CALCIUM mg/dL 8 1*   ALBUMIN g/dL 3 1*   TOTAL BILIRUBIN mg/dL 0 27   ALK PHOS U/L 76   ALT U/L 35   AST U/L 20   GLUCOSE RANDOM mg/dL 294*         Results from last 7 days   Lab Units 08/23/22  1103 08/23/22  0801 08/23/22  0323 08/22/22  2104 08/22/22  1554 08/22/22  1124 08/22/22  0902 08/22/22  0803 08/22/22  0148 08/22/22  0138 08/21/22  2314   POC GLUCOSE mg/dl 326* 277* 306* 384* 370* 408* 352* 404* 489* 464* 392*         Results from last 7 days   Lab Units 08/23/22  0453 08/22/22  0457 08/21/22  2103 08/21/22  1903   LACTIC ACID mmol/L  --   --  1 7 2 4*   PROCALCITONIN ng/ml <0 05 <0 05  --  0 05       Lines/Drains:  Invasive Devices  Report    Peripheral Intravenous Line  Duration           Peripheral IV 08/21/22 Left;Proximal;Ventral (anterior) Forearm 1 day                  Telemetry:  Telemetry Orders (From admission, onward)             24 Hour Telemetry Monitoring  Continuous x 24 Hours (Telem)        References:    Telemetry Guidelines   Question:  Reason for 24 Hour Telemetry  Answer:  Metabolic/Electrolyte Disturbance with High Probability of Dysrhythmia (K level <3 or >6, or KCL infusion >=10mEq/hr)                 Telemetry Reviewed: Normal Sinus Rhythm  Indication for Continued Telemetry Use: No indication for continued use  Will discontinue  Imaging: Reviewed radiology reports from this admission including: chest xray    Recent Cultures (last 7 days):   Results from last 7 days   Lab Units 08/21/22  1903   BLOOD CULTURE  No Growth at 24 hrs  No Growth at 24 hrs         Last 24 Hours Medication List:   Current Facility-Administered Medications   Medication Dose Route Frequency Provider Last Rate    acetaminophen  650 mg Oral Q6H PRN LURDES Núñez      atorvastatin  80 mg Oral Daily With Stark & Lamar, LURDES      benzonatate  200 mg Oral TID PRN LURDES Metz      budesonide-formoterol  2 puff Inhalation BID LURDES Núñez      buPROPion  300 mg Oral QAM LURDES Núñez      dicyclomine  10 mg Oral BID Yuliana Mcleod, LURDES      diltiazem  120 mg Oral Daily Yuliana Mcleod, LURDES      DULoxetine  90 mg Oral Daily LURDES Núñez      enoxaparin  40 mg Subcutaneous Q12H [address] Yuliana Mcleod, LURDES      fluticasone  1 spray Each Nare BID Yuliana Mcleod, LURDES      guaiFENesin  1,200 mg Oral Q12H [address] LURDES Núñez      insulin glargine  82 Units Subcutaneous HS Yuliana Mcleod, LURDES      insulin lispro  1-5 Units Subcutaneous HS Yuliana Mcleod, LURDES      insulin lispro  1-5 Units Subcutaneous 0200 LURDES Núñez      insulin lispro  1-6 Units Subcutaneous TID AC Yuliana Mcleod, LURDES      insulin lispro  18 Units Subcutaneous TID With Meals LURDES Núñez      ipratropium  0 5 mg Nebulization TID LURDES Núñez      levalbuterol  0 63 mg Nebulization Q4H PRN Yuliana LURDES Ford      levalbuterol  1 25 mg Nebulization TID Cuiger Chauk, CRSHIN      And    sodium chloride  3 mL Nebulization TID Cuiger Wagonerrik, CRSHIN      methylPREDNISolone sodium succinate  40 mg Intravenous Daily Cuiyin Yurik, CRNP      nicotine  14 mg Transdermal Daily Cuiyisantosh Wagonerrik, CRNP      ondansetron  4 mg Intravenous Q6H PRN Emmaiger Wagonerrik, CRSHIN      QUEtiapine  25 mg Oral BID Cuiger Mcleod, LURDES      remdesivir  100 mg Intravenous Q24H Emmaiger Wagonerrik, CRNP      traMADol  50 mg Oral Q6H PRN LURDES Guardado          Today, Patient Was Seen By: Hollis Ivy MD    ** Please Note: "This note has been constructed using a voice recognition system  Therefore there may be syntax, spelling, and/or grammatical errors   Please call if you have any questions  "**

## 2022-08-23 NOTE — PLAN OF CARE
Problem: RESPIRATORY - ADULT  Goal: Achieves optimal ventilation and oxygenation  Description: INTERVENTIONS:  - Assess for changes in respiratory status  - Assess for changes in mentation and behavior  - Position to facilitate oxygenation and minimize respiratory effort  - Oxygen administered by appropriate delivery if ordered  - Initiate smoking cessation education as indicated  - Encourage broncho-pulmonary hygiene including cough, deep breathe, Incentive Spirometry  - Assess the need for suctioning and aspirate as needed  - Assess and instruct to report SOB or any respiratory difficulty  - Respiratory Therapy support as indicated  Outcome: Progressing     Problem: INFECTION - ADULT  Goal: Absence or prevention of progression during hospitalization  Description: INTERVENTIONS:  - Assess and monitor for signs and symptoms of infection  - Monitor lab/diagnostic results  - Monitor all insertion sites, i e  indwelling lines, tubes, and drains  - Monitor endotracheal if appropriate and nasal secretions for changes in amount and color  - [place] appropriate cooling/warming therapies per order  - Administer medications as ordered  - Instruct and encourage patient and family to use good hand hygiene technique  - Identify and instruct in appropriate isolation precautions for identified infection/condition  Outcome: Progressing     Problem: Knowledge Deficit  Goal: Patient/family/caregiver demonstrates understanding of disease process, treatment plan, medications, and discharge instructions  Description: Complete learning assessment and assess knowledge base    Interventions:  - Provide teaching at level of understanding  - Provide teaching via preferred learning methods  Outcome: Progressing

## 2022-08-23 NOTE — CASE MANAGEMENT
Case Management Assessment & Discharge Planning Note    Patient name Bianka Purdy  Location [address] MRN [id number]  : 1960 Date 2022       Current Admission Date: 2022  Current Admission Diagnosis:Acute respiratory failure with hypoxia [facility])   Patient Active Problem List    Diagnosis Date Noted    Hyperkalemia 2022    Acute respiratory failure with hypoxia ([facility] ) 2022    COVID-19 2022    Obesity 2022    Elevated lactic acid level 2022    Microalbuminuria 2022    Stage 2 chronic kidney disease 2022    Steroid-induced hyperglycemia 2022    Abrasion of left ear canal 2021    Preop examination 2021    COPD exacerbation ([facility] ) 2021    Postmenopausal bleeding 2021    Endometrial hyperplasia 2021    Left shoulder pain 2020    Need for influenza vaccination 2020    Bronchitis 2020    Urine frequency 2020    Atypical nevi 2020    BMI 45 0-49 9, adult ([facility] ) 2019    Tobacco abuse 2019    Physical exam 2019    Coronary artery disease involving native coronary artery of native heart with angina pectoris ([facility] ) 2019    Abnormal stress test 2019    Alveolar hypoventilation 2019    Obstructive sleep apnea     Centrilobular emphysema ([facility] ) 2019    Daytime hypersomnolence 2019    Shortness of breath 2019    Postlaminectomy syndrome, lumbar region 2018    Low back pain 2018    Obesity, Class III, BMI 40-49 9 (morbid obesity) ([facility] ) 2018    Primary osteoarthritis of one hip, left 2018    Pain in left hip 2018    Neck pain 2018    Myofascial pain syndrome 2018    Adjacent segment disease with spinal stenosis 2018    Spondylolisthesis of lumbar region 2018    Groin pain, left 2018    Lumbar radiculopathy 2017    Chronic pain syndrome 11/02/2017    Chronic low back pain 11/02/2017    Type 2 diabetes mellitus with hyperglycemia, with long-term current use of insulin ([facility] ) 10/27/2017    Varicose veins of both lower extremities with pain 09/20/2017    Varicose veins with inflammation 09/20/2017    Insomnia 08/14/2017    Cigarette nicotine dependence without complication 29/00/9801    Depression with anxiety 81/91/8763    Complication of surgical procedure 09/15/2014    Hypertension 05/07/2014    Simple chronic bronchitis ([facility] ) 10/22/2013    Hyperlipidemia 09/10/2013    Disc degeneration, lumbosacral 09/04/2012      LOS (days): 2  Geometric Mean LOS (GMLOS) (days):   Days to GMLOS:     OBJECTIVE:    Risk of Unplanned Readmission Score: 23 92       Current admission status: Inpatient  Referral Reason: Other (Discharge planning)    Preferred Pharmacy:   [facility] [address] [place] [address]  [address] [address]  Phone: [phone] Fax: [phone]    Primary Care Provider: Becca Sanchez MD    Primary Insurance: [address] [place] [address]  Secondary Insurance: WORKERS COMPENSATION    ASSESSMENT:  Joey 26 Proxies    There are no active Health Care Proxies on file  Readmission Root Cause  30 Day Readmission: No    Patient Information  Admitted from[de-identified] Home  Mental Status: Alert  During Assessment patient was accompanied by: Not accompanied during assessment  Assessment information provided by[de-identified] Patient  Primary Caregiver: Self  Support Systems: Parent, Family members  [place] of Residence: [address] [place] [address] do you live in?: [address] entry access options   Select all that apply : Stairs  Number of steps to enter home : 1  Type of Current Residence: [address] home  Upon entering residence, is there a bedroom on the main floor (no further steps)?: No  A bedroom is located on the following floor levels of residence (select all that apply):: 2nd Floor  Upon entering residence, is there a bathroom on the main floor (no further steps)?: Yes  Number of steps to 2nd floor from main floor: One Flight  In the last 12 months, was there a time when you were not able to pay the mortgage or rent on time?: No  In the last 12 months, how many places have you lived?: 1  In the last 12 months, was there a time when you did not have a steady place to sleep or slept in a shelter (including now)?: No  Homeless/housing insecurity resource given?: N/A  Living Arrangements: Lives w/ Parent(s)  Is patient a ?: No    Activities of Daily Living Prior to Admission  Functional Status: Independent  Completes ADLs independently?: Yes  Ambulates independently?: Yes  Does patient use assisted devices?: No  Does patient currently own DME?: No  Does patient have a history of Outpatient Therapy (PT/OT)?: Yes  Does the patient have a history of Short-Term Rehab?: No  Does patient have a history of HHC?: No  Does patient currently have TripsByTips?: No     Patient Information Continued  Income Source: Employed (works part-time in a [employer])  Does patient have prescription coverage?: Yes  Within the past 12 months, you worried that your food would run out before you got the money to buy more : Never true  Within the past 12 months, the food you bought just didn't last and you didn't have money to get more : Never true  Food insecurity resource given?: N/A  Does patient receive dialysis treatments?: No  Does patient have a history of substance abuse?: No  Does patient have a history of Mental Health Diagnosis?: Yes (Patient denied, depression/anxiety listed in chart)     Means of Transportation  Means of Transport to Appts[de-identified] Drives Self  In the past 12 months, has lack of transportation kept you from medical appointments or from getting medications?: No  In the past 12 months, has lack of transportation kept you from meetings, work, or from getting things needed for daily living?: No  Was application for public transport provided?: N/A    DISCHARGE DETAILS:    Discharge planning discussed with[de-identified] Patient  Freedom of Choice: Yes  Comments - Freedom of Choice: FOC reviewed with patient  Patient aware that preferences will be taken into consideration if referrals are indicated  CM contacted family/caregiver?: No- see comments (Patient declined call to family, prefers to update them herself)     Contacts  Patient Contacts: Irvin Susana (mother)  Relationship to Patient[de-identified] Family  Contact Method: Phone  Phone Number: [phone]  Reason/Outcome: Emergency Contact    Other Referral/Resources/Interventions Provided:  Interventions: None Indicated    Would you like to participate in our [address] service program?  : No - Declined    SW spoke by phone with patient due to her COVID status  Patient lives with her mother in a 2-story home in [place] with a BR on the 2nd floor  She is independent at baseline, drives and works part-time  She uses no DME  She stated that she drove herself to the hospital and her car is still in the lot but she has family who could pick her up at discharge if needed  At this time patient is not medically cleared for discharge  DEISI will continue to follow

## 2022-08-23 NOTE — UTILIZATION REVIEW
Continued Stay Review    Date: 8/23/22                          Current Patient Class: inpatient  Current Level of Care: med surg    HPI:61 y o  female initially admitted on 8/21/22     Assessment/Plan:   Acute respiratory failure 2nd COVID 19  Lungs with diminished breath sounds, persistent dyspnea on exertion, O2 requirements weaned to 4ltr nc  IV Remdesivir & IV steroids in progress      Vital Signs:   Date/Time Temp Pulse Resp BP MAP (mmHg) SpO2 Calculated FIO2 (%) - Nasal Cannula Nasal Cannula O2 Flow Rate (L/min) O2 Device Patient Position - Orthostatic VS   08/23/22 14:39:14 98 °F (36 7 °C) 80 20 141/72 -- 92 % 36 4 L/min Nasal cannula --   08/23/22 1310 -- -- -- -- -- 94 % -- -- -- --   08/23/22 08:03:14 97 6 °F (36 4 °C) 80 -- 116/74 88 92 % -- -- -- --   08/23/22 0719 -- -- -- -- -- 92 % -- -- -- --   08/23/22 0100 -- 83 -- -- -- 93 % -- -- -- --     Pertinent Labs/Diagnostic Results:   Results from last 7 days   Lab Units 08/19/22 2056   SARS-COV-2  Positive*     Results from last 7 days   Lab Units 08/23/22  0453 08/22/22  0457 08/21/22  1903 08/19/22  2053   WBC Thousand/uL 15 94* 10 79* 11 44* 7 95   HEMOGLOBIN g/dL 13 0 13 7 14 5 15 1   HEMATOCRIT % 41 8 45 9 47 3* 48 0*   PLATELETS Thousands/uL 239 238 280 259   NEUTROS ABS Thousands/µL 14 03* 9 78* 8 26* 5 33     Results from last 7 days   Lab Units 08/23/22  0453 08/22/22  1135 08/22/22  0640 08/22/22  0457 08/21/22  1903 08/19/22  2053   SODIUM mmol/L 136 136 136 137 141 141   POTASSIUM mmol/L 4 6 4 9 6 5* 6 2* 4 1 4 5   CHLORIDE mmol/L 97 97 98 98 100 99   CO2 mmol/L 32 30 29 29 33* 34*   ANION GAP mmol/L 7 9 9 10 8 8   BUN mg/dL 23 19 17 16 13 13   CREATININE mg/dL 1 04 1 29 1 41* 1 42* 1 08 1 51*   EGFR ml/min/1 73sq m 57 44 39 39 55 36   CALCIUM mg/dL 8 1* 8 0* 7 7* 7 6* 8 4 9 0   MAGNESIUM mg/dL 2 0  --   --  2 0 1 8 1 8     Results from last 7 days   Lab Units 08/23/22  0453 08/22/22  0457 08/21/22  1903 08/19/22  2053   AST U/L 20 14 15 15 ALT U/L 35 32 34 30   ALK PHOS U/L 76 87 84 89   TOTAL PROTEIN g/dL 6 3* 6 8 6 9 7 2   ALBUMIN g/dL 3 1* 3 2* 3 5 3 5   TOTAL BILIRUBIN mg/dL 0 27 0 21 0 28 0 34     Results from last 7 days   Lab Units 08/23/22  0801 08/23/22  0323 08/22/22  2104 08/22/22  1554 08/22/22  1124 08/22/22  0902 08/22/22  0803 08/22/22  0148 08/22/22  0138 08/21/22  2314   POC GLUCOSE mg/dl 277* 306* 384* 370* 408* 352* 404* 489* 464* 392*     Results from last 7 days   Lab Units 08/23/22  0453 08/22/22  1135 08/22/22  0640 08/22/22  0457 08/21/22  1903 08/19/22  2053   GLUCOSE RANDOM mg/dL 294* 428* 437* 445* 283* 345*     Results from last 7 days   Lab Units 08/21/22  1903   CK TOTAL U/L 176   CK MB INDEX % 3 0*   CK MB ng/mL 5 2*     Results from last 7 days   Lab Units 08/19/22  2053   HS TNI 0HR ng/L 59*     Results from last 7 days   Lab Units 08/22/22  0457 08/21/22  2057   D-DIMER QUANTITATIVE ug/ml FEU 0 39 0 39     Results from last 7 days   Lab Units 08/23/22  0453 08/22/22  0457 08/21/22  1903   PROCALCITONIN ng/ml <0 05 <0 05 0 05     Results from last 7 days   Lab Units 08/21/22  2103 08/21/22  1903   LACTIC ACID mmol/L 1 7 2 4*     Results from last 7 days   Lab Units 08/21/22  1903 08/19/22  2053   NT-PRO BNP pg/mL 88 285*     Results from last 7 days   Lab Units 08/23/22  0453 08/22/22  0457 08/21/22  1903   CRP mg/L 4 5* 7 3* 8 5*     Results from last 7 days   Lab Units 08/18/22  0909   SPEC GRAV UA  normal-1 034     Results from last 7 days   Lab Units 08/21/22  1903   BLOOD CULTURE  No Growth at 24 hrs  No Growth at 24 hrs       Medications:   atorvastatin, 80 mg, Oral, Daily With Dinner  azithromycin, 500 mg, Oral, Q24H  budesonide-formoterol, 2 puff, Inhalation, BID  buPROPion, 300 mg, Oral, QAM  dicyclomine, 10 mg, Oral, BID  diltiazem, 120 mg, Oral, Daily  DULoxetine, 90 mg, Oral, Daily  enoxaparin, 40 mg, Subcutaneous, Q12H MATTHEW  fluticasone, 1 spray, Each Nare, BID  guaiFENesin, 1,200 mg, Oral, Q12H [address]  insulin glargine, 82 Units, Subcutaneous, HS  insulin lispro, 1-5 Units, Subcutaneous, HS  insulin lispro, 1-5 Units, Subcutaneous, 0200  insulin lispro, 1-6 Units, Subcutaneous, TID AC  insulin lispro, 18 Units, Subcutaneous, TID With Meals  ipratropium, 0 5 mg, Nebulization, TID  levalbuterol, 1 25 mg, Nebulization, TID   And  sodium chloride, 3 mL, Nebulization, TID  methylPREDNISolone sodium succinate, 40 mg, Intravenous, Daily  nicotine, 14 mg, Transdermal, Daily  QUEtiapine, 25 mg, Oral, BID  remdesivir, 100 mg, Intravenous, Q24H    PRN Meds:  acetaminophen, 650 mg, Oral, Q6H PRN  benzonatate, 200 mg, Oral, TID PRN  levalbuterol, 0 63 mg, Nebulization, Q4H PRN  ondansetron, 4 mg, Intravenous, Q6H PRN  traMADol, 50 mg, Oral, Q6H PRN    [facility]    Network Utilization Review Department  ATTENTION: Please call with any questions or concerns to [phone] and carefully listen to the prompts so that you are directed to the right person  All voicemails are confidential   Joanne Bee all requests for admission clinical reviews, approved or denied determinations and any other requests to dedicated fax number below belonging to the campus where the patient is receiving treatment   List of dedicated fax numbers for the Facilities:  [address] DENIALS (Administrative/Medical Necessity) [phone]   [address] (Maternity/NICU/Pediatrics) [phone]   [address] [place] [address] [address] [facility] [place] [address] [id number] [address]  Shad Monteiro [address]  [place] [address] [address] [place] [address] [phone]

## 2022-08-23 NOTE — PLAN OF CARE
Problem: RESPIRATORY - ADULT  Goal: Achieves optimal ventilation and oxygenation  Description: INTERVENTIONS:  - Assess for changes in respiratory status  - Assess for changes in mentation and behavior  - Position to facilitate oxygenation and minimize respiratory effort  - Oxygen administered by appropriate delivery if ordered  - Initiate smoking cessation education as indicated  - Encourage broncho-pulmonary hygiene including cough, deep breathe, Incentive Spirometry  - Assess the need for suctioning and aspirate as needed  - Assess and instruct to report SOB or any respiratory difficulty  - Respiratory Therapy support as indicated  Outcome: Progressing     Problem: INFECTION - ADULT  Goal: Absence or prevention of progression during hospitalization  Description: INTERVENTIONS:  - Assess and monitor for signs and symptoms of infection  - Monitor lab/diagnostic results  - Monitor all insertion sites, i e  indwelling lines, tubes, and drains  - Monitor endotracheal if appropriate and nasal secretions for changes in amount and color  - [place] appropriate cooling/warming therapies per order  - Administer medications as ordered  - Instruct and encourage patient and family to use good hand hygiene technique  - Identify and instruct in appropriate isolation precautions for identified infection/condition  Outcome: Progressing

## 2022-08-23 NOTE — RESPIRATORY THERAPY NOTE
COPD education completed and booklet given  Discussed COPD severity zones, nutrition, energy conservation, infection prevention, pursed lip breathing and proper use of spacer with mdi

## 2022-08-24 PROBLEM — E87.5 HYPERKALEMIA: Status: RESOLVED | Noted: 2022-08-22 | Resolved: 2022-08-24

## 2022-08-24 PROBLEM — R79.89 ELEVATED LACTIC ACID LEVEL: Status: RESOLVED | Noted: 2022-08-21 | Resolved: 2022-08-24

## 2022-08-24 LAB
ALBUMIN SERPL BCP-MCNC: 3.2 G/DL (ref 3.5–5)
ALP SERPL-CCNC: 69 U/L (ref 46–116)
ALT SERPL W P-5'-P-CCNC: 32 U/L (ref 12–78)
ANION GAP SERPL CALCULATED.3IONS-SCNC: 5 MMOL/L (ref 4–13)
AST SERPL W P-5'-P-CCNC: 12 U/L (ref 5–45)
BILIRUB SERPL-MCNC: 0.26 MG/DL (ref 0.2–1)
BUN SERPL-MCNC: 26 MG/DL (ref 5–25)
CALCIUM ALBUM COR SERPL-MCNC: 8.8 MG/DL (ref 8.3–10.1)
CALCIUM SERPL-MCNC: 8.2 MG/DL (ref 8.3–10.1)
CHLORIDE SERPL-SCNC: 99 MMOL/L (ref 96–108)
CO2 SERPL-SCNC: 36 MMOL/L (ref 21–32)
CREAT SERPL-MCNC: 1.01 MG/DL (ref 0.6–1.3)
CRP SERPL QL: 1.9 MG/L
D DIMER PPP FEU-MCNC: 0.39 UG/ML FEU
ERYTHROCYTE [DISTWIDTH] IN BLOOD BY AUTOMATED COUNT: 13.9 % (ref 11.6–15.1)
GFR SERPL CREATININE-BSD FRML MDRD: 59 ML/MIN/1.73SQ M
GLUCOSE SERPL-MCNC: 109 MG/DL (ref 65–140)
GLUCOSE SERPL-MCNC: 134 MG/DL (ref 65–140)
GLUCOSE SERPL-MCNC: 180 MG/DL (ref 65–140)
GLUCOSE SERPL-MCNC: 222 MG/DL (ref 65–140)
GLUCOSE SERPL-MCNC: 259 MG/DL (ref 65–140)
GLUCOSE SERPL-MCNC: 348 MG/DL (ref 65–140)
HCT VFR BLD AUTO: 42.7 % (ref 34.8–46.1)
HGB BLD-MCNC: 13.3 G/DL (ref 11.5–15.4)
MCH RBC QN AUTO: 30 PG (ref 26.8–34.3)
MCHC RBC AUTO-ENTMCNC: 31.1 G/DL (ref 31.4–37.4)
MCV RBC AUTO: 96 FL (ref 82–98)
PLATELET # BLD AUTO: 250 THOUSANDS/UL (ref 149–390)
PMV BLD AUTO: 11.1 FL (ref 8.9–12.7)
POTASSIUM SERPL-SCNC: 4.1 MMOL/L (ref 3.5–5.3)
PROCALCITONIN SERPL-MCNC: <0.05 NG/ML
PROT SERPL-MCNC: 6.2 G/DL (ref 6.4–8.4)
RBC # BLD AUTO: 4.44 MILLION/UL (ref 3.81–5.12)
SODIUM SERPL-SCNC: 140 MMOL/L (ref 135–147)
WBC # BLD AUTO: 15.47 THOUSAND/UL (ref 4.31–10.16)

## 2022-08-24 PROCEDURE — 85027 COMPLETE CBC AUTOMATED: CPT | Performed by: INTERNAL MEDICINE

## 2022-08-24 PROCEDURE — 94760 N-INVAS EAR/PLS OXIMETRY 1: CPT

## 2022-08-24 PROCEDURE — 85379 FIBRIN DEGRADATION QUANT: CPT | Performed by: INTERNAL MEDICINE

## 2022-08-24 PROCEDURE — 82948 REAGENT STRIP/BLOOD GLUCOSE: CPT

## 2022-08-24 PROCEDURE — 84145 PROCALCITONIN (PCT): CPT | Performed by: INTERNAL MEDICINE

## 2022-08-24 PROCEDURE — 94640 AIRWAY INHALATION TREATMENT: CPT

## 2022-08-24 PROCEDURE — 86140 C-REACTIVE PROTEIN: CPT | Performed by: INTERNAL MEDICINE

## 2022-08-24 PROCEDURE — 80053 COMPREHEN METABOLIC PANEL: CPT | Performed by: INTERNAL MEDICINE

## 2022-08-24 PROCEDURE — 99232 SBSQ HOSP IP/OBS MODERATE 35: CPT | Performed by: INTERNAL MEDICINE

## 2022-08-24 RX ORDER — FUROSEMIDE 10 MG/ML
20 INJECTION INTRAMUSCULAR; INTRAVENOUS ONCE
Status: COMPLETED | OUTPATIENT
Start: 2022-08-24 | End: 2022-08-24

## 2022-08-24 RX ORDER — HYDROCHLOROTHIAZIDE 25 MG/1
25 TABLET ORAL DAILY
Status: DISCONTINUED | OUTPATIENT
Start: 2022-08-24 | End: 2022-08-26 | Stop reason: HOSPADM

## 2022-08-24 RX ADMIN — DICYCLOMINE HYDROCHLORIDE 10 MG: 10 CAPSULE ORAL at 08:40

## 2022-08-24 RX ADMIN — GUAIFENESIN 1200 MG: 600 TABLET, EXTENDED RELEASE ORAL at 21:28

## 2022-08-24 RX ADMIN — LEVALBUTEROL HYDROCHLORIDE 1.25 MG: 1.25 SOLUTION, CONCENTRATE RESPIRATORY (INHALATION) at 20:14

## 2022-08-24 RX ADMIN — DULOXETINE HYDROCHLORIDE 90 MG: 60 CAPSULE, DELAYED RELEASE ORAL at 08:40

## 2022-08-24 RX ADMIN — INSULIN LISPRO 3 UNITS: 100 INJECTION, SOLUTION INTRAVENOUS; SUBCUTANEOUS at 21:28

## 2022-08-24 RX ADMIN — ISODIUM CHLORIDE 3 ML: 0.03 SOLUTION RESPIRATORY (INHALATION) at 07:35

## 2022-08-24 RX ADMIN — BUDESONIDE AND FORMOTEROL FUMARATE DIHYDRATE 2 PUFF: 160; 4.5 AEROSOL RESPIRATORY (INHALATION) at 17:17

## 2022-08-24 RX ADMIN — IPRATROPIUM BROMIDE 0.5 MG: 0.5 SOLUTION RESPIRATORY (INHALATION) at 13:30

## 2022-08-24 RX ADMIN — LEVALBUTEROL HYDROCHLORIDE 1.25 MG: 1.25 SOLUTION, CONCENTRATE RESPIRATORY (INHALATION) at 13:30

## 2022-08-24 RX ADMIN — DICYCLOMINE HYDROCHLORIDE 10 MG: 10 CAPSULE ORAL at 17:14

## 2022-08-24 RX ADMIN — ENOXAPARIN SODIUM 40 MG: 40 INJECTION SUBCUTANEOUS at 21:28

## 2022-08-24 RX ADMIN — ISODIUM CHLORIDE 3 ML: 0.03 SOLUTION RESPIRATORY (INHALATION) at 20:14

## 2022-08-24 RX ADMIN — BUPROPION HYDROCHLORIDE 300 MG: 150 TABLET, EXTENDED RELEASE ORAL at 08:40

## 2022-08-24 RX ADMIN — LEVALBUTEROL HYDROCHLORIDE 1.25 MG: 1.25 SOLUTION, CONCENTRATE RESPIRATORY (INHALATION) at 07:35

## 2022-08-24 RX ADMIN — INSULIN LISPRO 3 UNITS: 100 INJECTION, SOLUTION INTRAVENOUS; SUBCUTANEOUS at 17:13

## 2022-08-24 RX ADMIN — METHYLPREDNISOLONE SODIUM SUCCINATE 40 MG: 40 INJECTION, POWDER, FOR SOLUTION INTRAMUSCULAR; INTRAVENOUS at 08:41

## 2022-08-24 RX ADMIN — ENOXAPARIN SODIUM 40 MG: 40 INJECTION SUBCUTANEOUS at 08:40

## 2022-08-24 RX ADMIN — FLUTICASONE PROPIONATE 1 SPRAY: 50 SPRAY, METERED NASAL at 08:48

## 2022-08-24 RX ADMIN — INSULIN GLARGINE 82 UNITS: 100 INJECTION, SOLUTION SUBCUTANEOUS at 21:28

## 2022-08-24 RX ADMIN — QUETIAPINE FUMARATE 25 MG: 25 TABLET ORAL at 17:14

## 2022-08-24 RX ADMIN — INSULIN LISPRO 18 UNITS: 100 INJECTION, SOLUTION INTRAVENOUS; SUBCUTANEOUS at 17:13

## 2022-08-24 RX ADMIN — QUETIAPINE FUMARATE 25 MG: 25 TABLET ORAL at 08:40

## 2022-08-24 RX ADMIN — ISODIUM CHLORIDE 3 ML: 0.03 SOLUTION RESPIRATORY (INHALATION) at 13:30

## 2022-08-24 RX ADMIN — FLUTICASONE PROPIONATE 1 SPRAY: 50 SPRAY, METERED NASAL at 17:17

## 2022-08-24 RX ADMIN — TRAMADOL HYDROCHLORIDE 50 MG: 50 TABLET, COATED ORAL at 23:42

## 2022-08-24 RX ADMIN — GUAIFENESIN 1200 MG: 600 TABLET, EXTENDED RELEASE ORAL at 08:40

## 2022-08-24 RX ADMIN — INSULIN LISPRO 18 UNITS: 100 INJECTION, SOLUTION INTRAVENOUS; SUBCUTANEOUS at 08:39

## 2022-08-24 RX ADMIN — BUDESONIDE AND FORMOTEROL FUMARATE DIHYDRATE 2 PUFF: 160; 4.5 AEROSOL RESPIRATORY (INHALATION) at 08:48

## 2022-08-24 RX ADMIN — IPRATROPIUM BROMIDE 0.5 MG: 0.5 SOLUTION RESPIRATORY (INHALATION) at 07:35

## 2022-08-24 RX ADMIN — ATORVASTATIN CALCIUM 80 MG: 80 TABLET, FILM COATED ORAL at 17:14

## 2022-08-24 RX ADMIN — REMDESIVIR 100 MG: 100 INJECTION, POWDER, LYOPHILIZED, FOR SOLUTION INTRAVENOUS at 22:43

## 2022-08-24 RX ADMIN — TRAMADOL HYDROCHLORIDE 50 MG: 50 TABLET, COATED ORAL at 17:22

## 2022-08-24 RX ADMIN — INSULIN LISPRO 1 UNITS: 100 INJECTION, SOLUTION INTRAVENOUS; SUBCUTANEOUS at 02:09

## 2022-08-24 RX ADMIN — IPRATROPIUM BROMIDE 0.5 MG: 0.5 SOLUTION RESPIRATORY (INHALATION) at 20:14

## 2022-08-24 RX ADMIN — DILTIAZEM HYDROCHLORIDE 120 MG: 120 CAPSULE, COATED, EXTENDED RELEASE ORAL at 08:40

## 2022-08-24 RX ADMIN — FUROSEMIDE 20 MG: 10 INJECTION, SOLUTION INTRAMUSCULAR; INTRAVENOUS at 17:09

## 2022-08-24 RX ADMIN — Medication 14 MG: at 08:41

## 2022-08-24 RX ADMIN — HYDROCHLOROTHIAZIDE 25 MG: 25 TABLET ORAL at 08:41

## 2022-08-24 NOTE — PROGRESS NOTES
[address] Internal Medicine Progress Note  Patient: Vasile Horne 57 y o  female   MRN: [id number]  PCP: Lily Kolb MD  Unit/Bed#: [address] [place] [address] Encounter: [id number]  Date Of Visit: 08/24/22    Problem List:    Principal Problem:    Acute respiratory failure with hypoxia ([facility] )  Active Problems:    COVID-19    Type 2 diabetes mellitus with hyperglycemia, with long-term current use of insulin ([facility] )    Hypertension    Cigarette nicotine dependence without complication    Obstructive sleep apnea    Coronary artery disease involving native coronary artery of native heart with angina pectoris (HCC)    COPD exacerbation (HCC)    Sepsis ([facility] )    Chronic low back pain    Depression with anxiety    Hyperlipidemia    Stage 2 chronic kidney disease    Obesity      Assessment & Plan:    COVID-19  Assessment & Plan  Tested positive on 8/19th  Patient received COVID vaccine  On oxygen 2 L currently, satting mid 90s on presentation, subsequently oxygen requirement increased to 6 L  Currently improved to 4 L at rest  Chest x-ray - Stable interstitial prominence bilaterally and right middle lobe linear atelectasis   No new focal airspace consolidation identified  Continue treatment as per COVID protocol  , clinically improving  · Cardiac markers: Total CK, proBNP normal   Troponin 20(EKG no ischemic changes, patient denies chest pain except when coughing)  · CRP 8 5, D-dimer 0 39 initially  · Patient received IV Solu-Medrol 125mg in ED, will continue 40 mg IV daily x5 days  · Continue remdesivir  · Continue with Lovenox 40 subQ q 12 hours per protocol  · Continue Mucinex, Xopenex/Atrovent b i d , Xopenex p r n , Tessalon Perles p r n  · Re-dose IV Lasix x1 to maintain negative balance  · Supportive care with IS, ambulation  · Continue supplemental oxygen to maintain sats above 90%  · Check procalcitonin-negative so far  D-dimer negative    CRP improved  · Monitor labs    Results from last 7 days   Lab Units 08/19/22 2056   SARS-COV-2  Positive*     Results from last 7 days   Lab Units 08/24/22  0521 08/23/22  0453 08/22/22 0457 08/21/22 2057 08/21/22 1903   CRP mg/L 1 9 4 5* 7 3*  --  8 5*   D-DIMER QUANTITATIVE ug/ml FEU 0 39  --  0 39 0 39  --       Results from last 7 days   Lab Units 08/24/22  0521 08/23/22  0453 08/22/22 0457 08/21/22 1903   PROCALCITONIN ng/ml <0 05 <0 05 <0 05 0 05              * Acute respiratory failure with hypoxia [facility])  Assessment & Plan  Patient presenting with worsening SOB, fatigue, productive cough since likely Wednesday 8/17th  Patient was seen in ED on 8/19th, COVID test positive  Patient left AMA, was given Z-Diego/Omnicef/Paxlovid for COVID-19 and possible bacterial pneumonia  Chest x-ray at that time showed atelectasis  Patient was prescribed prednisone taper and Z-Diego on 8/19th by her pulmonologist prior to ED visit  Patient took prednisone, Omnicef, Paxlovid on morning of admission at home  Patient was satting 88% on room air on ED arrival     Oxygen requirement escalated to 6 L at rest, now improving to 3 L saturating in low 90s   Likely multifactorial secondary to COVID-19 infection and mild COPD exacerbation  Chest x-ray shows stable interstitial prominence bilaterally with right middle lobe linear atelectasis  · Treat underlying causes as below  · Continue supplemental oxygen to maintain sats above 90%  · Pulmonology consult appreciated will follow-up recommendations  · Home oxygen evaluation prior to discharge    Sepsis [facility])  Assessment & Plan  Evolving POA as evidenced by leukocytosis, lactic acidosis, tachycardia and tachypnea  Secondary to COVID pneumonia  Resolving    COPD exacerbation ([facility] )  Assessment & Plan  Mild COPD exacerbation likely triggered by COVID-19 infection  · On Airduo, Incruse Ellipta, proair and albuterol neb prn at home     · Patient started on IV Zithromax in ED, will discontinue  ·  continue IV Solu-Medrol 40mg daily x5 days  · Will substitute Airduo with Symbicort  · Neb treatment as above  ·  Continue supplemental oxygen therapy as needed and wean as tolerated  Coronary artery disease involving native coronary artery of native heart with angina pectoris [facility])  Assessment & Plan  Nonobstructive CAD on cardiac catheterization in 2019  · Continue statin    Obstructive sleep apnea  Assessment & Plan  Intolerant to CPAP machine  Cigarette nicotine dependence without complication  Assessment & Plan  Nicotine patch, smoking cessation  Hypertension  Assessment & Plan  BP stable   Continue with oral Cardizem  Hold lisinopril secondary to KRYSTLE   Resume hydrochlorothiazide in a m  Type 2 diabetes mellitus with hyperglycemia, with long-term current use of insulin [facility])  Assessment & Plan  Lab Results   Component Value Date    HGBA1C 9 6 (H) 06/14/2022       Recent Labs     08/24/22  0801 08/24/22  1100 08/24/22  1636 08/24/22 2053   POCGLU 134 109 259* 348*       Blood Sugar Average: Last 72 hrs:  (P) 655 [id number]      poorly controlled type 2 diabetes as evidenced by A1c 9 6 in June this year  Patient is on metformin 1 g b i d , insulin glargine 82 units subQ HS, insulin lispro 18 units t i d  With meals, Victoza injection daily at home  Reports noncompliant at home at times  · Continue Lantus 82 units subQ HS, Humalog 18 units t i d  With meals  · Hold metformin, Victoza  · SSI  · Diabetic diet      Obesity  Assessment & Plan  Body mass index is 42 51 kg/m²  · Diet and lifestyle modification    Stage 2 chronic kidney disease  Assessment & Plan  Baseline creatinine appears to be around 1 0-1 5  · Continue stable  · Monitor    Hyperlipidemia  Assessment & Plan  Continue statin    Depression with anxiety  Assessment & Plan  Continue Seroquel, Cymbalta, Wellbutrin    Chronic low back pain  Assessment & Plan  Status post lumbar laminectomy with fusion  Sees Pain Medicine as outpatient  On tramadol ER daily at home    Verified at PDMP website  · Will order tramadol p r n   · Monitor for pain    Hyperkalemia-resolved as of 2022  Assessment & Plan  Potassium 6 5 this morning which has since improved to 4 9  Patient received albuterol, insulin, calcium gluconate and Kayexalate  Hold lisinopril  Will continue to monitor  Elevated lactic acid level-resolved as of 2022  Assessment & Plan  Lactic acid 2 4 on admission  Suspect due to respiratory distress  No evidence of sepsis  Tachycardia and tachypnea most likely due to COVID-19 and COPD exacerbation  · Patient received normal saline 1 L in ED, repeat normalized  VTE Pharmacologic Prophylaxis: VTE Score: 8 High Risk (Score >/= 5) - Pharmacological DVT Prophylaxis Ordered: enoxaparin (Lovenox)  Sequential Compression Devices Ordered  Patient Centered Rounds: I performed bedside rounds with nursing staff today  Discussions with Specialists or Other Care Team Provider:  Pulmonary    Education and Discussions with Family / Patient: Updated  (mother) via phone  Time Spent for Care: 45 minutes  More than 50% of total time spent on counseling and coordination of care as described above  Current Length of Stay: 3 day(s)  Current Patient Status: Inpatient   Certification Statement: The patient will continue to require additional inpatient hospital stay due to Respiratory failure  Discharge Plan: Anticipate discharge in 24-48 hrs to home with home services      Subjective:   Breathing continues to improve  Less short of breath at rest and with activity  Cough is better  Denies any chest pain or abdominal pain  Good urine output with diuretics yesterday    Oxygen requirement is slowly improving    Objective:     Vitals:   Temp (24hrs), Av 2 °F (36 8 °C), Min:98 1 °F (36 7 °C), Max:98 4 °F (36 9 °C)    Temp:  [98 1 °F (36 7 °C)-98 4 °F (36 9 °C)] 98 4 °F (36 9 °C)  HR:  [86-98] 91  Resp:  [18-20] 18  BP: (126-133)/(64-73) 126/64  SpO2:  [88 %-94 %] 90 %  Body mass index is 42 34 kg/m²  Input and Output Summary (last 24 hours): Intake/Output Summary (Last 24 hours) at 8/24/2022 1533  Last data filed at 8/24/2022 0900  Gross per 24 hour   Intake 360 ml   Output --   Net 360 ml       Physical Exam:   Physical Exam  Constitutional:       General: She is not in acute distress  Appearance: She is obese  HENT:      Head: Normocephalic and atraumatic  Cardiovascular:      Rate and Rhythm: Normal rate  Pulmonary:      Effort: Pulmonary effort is normal  No respiratory distress  Breath sounds: Normal breath sounds  No wheezing or rales  Comments: Diminished, clear  Abdominal:      General: Bowel sounds are normal  There is no distension  Palpations: Abdomen is soft  Tenderness: There is no abdominal tenderness  There is no guarding or rebound  Musculoskeletal:      Comments: Trace lower extremity edema   Skin:     General: Skin is warm and dry  Findings: No rash  Neurological:      Mental Status: She is alert           Additional Data:     Labs:  Results from last 7 days   Lab Units 08/24/22  0521 08/23/22  0453   WBC Thousand/uL 15 47* 15 94*   HEMOGLOBIN g/dL 13 3 13 0   HEMATOCRIT % 42 7 41 8   PLATELETS Thousands/uL 250 239   NEUTROS PCT %  --  88*   LYMPHS PCT %  --  6*   MONOS PCT %  --  5   EOS PCT %  --  0     Results from last 7 days   Lab Units 08/24/22  0521   SODIUM mmol/L 140   POTASSIUM mmol/L 4 1   CHLORIDE mmol/L 99   CO2 mmol/L 36*   BUN mg/dL 26*   CREATININE mg/dL 1 01   ANION GAP mmol/L 5   CALCIUM mg/dL 8 2*   ALBUMIN g/dL 3 2*   TOTAL BILIRUBIN mg/dL 0 26   ALK PHOS U/L 69   ALT U/L 32   AST U/L 12   GLUCOSE RANDOM mg/dL 180*         Results from last 7 days   Lab Units 08/24/22  1100 08/24/22  0801 08/24/22  0151 08/23/22  2041 08/23/22  1546 08/23/22  1103 08/23/22  0801 08/23/22  0323 08/22/22  2104 08/22/22  1554 08/22/22  1124 08/22/22  0902   POC GLUCOSE mg/dl 109 134 222* 309* 287* 326* 277* 306* 384* 370* 408* 352*         Results from last 7 days   Lab Units 08/24/22  0521 08/23/22  0453 08/22/22  0457 08/21/22  2103 08/21/22  1903   LACTIC ACID mmol/L  --   --   --  1 7 2 4*   PROCALCITONIN ng/ml <0 05 <0 05 <0 05  --  0 05       Lines/Drains:  Invasive Devices  Report    Peripheral Intravenous Line  Duration           Peripheral IV 08/24/22 Left;Ventral (anterior) Forearm <1 day                  Telemetry:  Telemetry Orders (From admission, onward)             24 Hour Telemetry Monitoring  Continuous x 24 Hours ([facility])        References:    Telemetry Guidelines   Question:  Reason for 24 Hour Telemetry  Answer:  Metabolic/Electrolyte Disturbance with High Probability of Dysrhythmia (K level <3 or >6, or KCL infusion >=10mEq/hr)                 Telemetry Reviewed: Normal Sinus Rhythm  Indication for Continued Telemetry Use: No indication for continued use  Will discontinue  Imaging: Reviewed radiology reports from this admission including: chest xray    Recent Cultures (last 7 days):   Results from last 7 days   Lab Units 08/21/22  1903   BLOOD CULTURE  No Growth at 48 hrs  No Growth at 48 hrs         Last 24 Hours Medication List:   Current Facility-Administered Medications   Medication Dose Route Frequency Provider Last Rate    acetaminophen  650 mg Oral Q6H PRN Yuliana Mcleod, LURDES      atorvastatin  80 mg Oral Daily With Lincoln Newton, CRNP      benzonatate  200 mg Oral TID PRN LURDES Carey      budesonide-formoterol  2 puff Inhalation BID Yuliana Mcleod, LURDES      buPROPion  300 mg Oral QAM Yuliana Mcleod, CRSHIN      dicyclomine  10 mg Oral BID Yuliana Mcleod, CRNP      diltiazem  120 mg Oral Daily Yuliana Mcleod, CRNP      DULoxetine  90 mg Oral Daily LURDES Núñez      enoxaparin  40 mg Subcutaneous Q12H [address] Cuiyin Shani, CRNP      fluticasone  1 spray Each Nare BID Yuliana Mcleod, SHERLYNNP      furosemide  20 mg Intravenous Once MD Campbell RedmanaiCHITOesin 1,200 mg Oral Q12H [address] Cuiyin Rziwanarik, CRNP      hydrochlorothiazide  25 mg Oral Daily Carmen Alston MD      insulin glargine  82 Units Subcutaneous HS Cubryonsantosh Isack, CRNP      insulin lispro  1-5 Units Subcutaneous HS Cuibryonsantosh Rizwanarik, CRNP      insulin lispro  1-5 Units Subcutaneous 0200 Cuiger Rizwanarik, CRNP      insulin lispro  1-6 Units Subcutaneous TID AC Cuibryonsantosh Rizwanarik, CRNP      insulin lispro  18 Units Subcutaneous TID With Meals Cuiger Wagonerrik, CRNP      ipratropium  0 5 mg Nebulization TID [facility], CRNP      levalbuterol  0 63 mg Nebulization Q4H PRN [facility] Rizwanaluc[facility], CRNP      levalbuterol  1 25 mg Nebulization TID [facility] Riz[facility], CRNP      And    sodium chloride  3 mL Nebulization TID [facility], CRNP      methylPREDNISolone sodium succinate  40 mg Intravenous Daily [facility] Yurik, CRNP      nicotine  14 mg Transdermal Daily [facility] Rizwanarik, CRNP      ondansetron  4 mg Intravenous Q6H PRN [facility] Rizwan[facility], CRNP      QUEtiapine  25 mg Oral BID Cuisantosh Rizwanarijohn, CRNP      remdesivir  100 mg Intravenous Q24H Cuisantosh Yurik, CRNP      traMADol  50 mg Oral Q6H PRN LURDES Loja          Today, Patient Was Seen By: Carmen Alston MD    ** Please Note: "This note has been constructed using a voice recognition system  Therefore there may be syntax, spelling, and/or grammatical errors   Please call if you have any questions  "**

## 2022-08-24 NOTE — UTILIZATION REVIEW
Continued Stay Review    Date: 8/24/22                          Current Patient Class: inpatient  Current Level of Care: med surg      HPI:61 y o  female initially admitted on 8/21/22     Assessment/Plan:   IV Remdesivir & IV steroids in progress for acute respiratory failure 2nd pulm edema/decompensated HF and COPD exacerbation, found to be COVID+  Exertional dyspnea noted, O2 requirements, lungs diminished  O2 weaned to 3ltr nc, continue to wean as tolerates  Self proning & incentive spirometry encouraged       Vital Signs:   Date/Time Temp Pulse Resp BP MAP (mmHg) SpO2 Calculated FIO2 (%) - Nasal Cannula Nasal Cannula O2 Flow Rate (L/min) O2 Device Patient Position - Orthostatic VS   08/24/22 1215 -- 98 -- -- -- 94 % -- -- -- --   08/24/22 11:10:30 98 1 °F (36 7 °C) 91 -- 133/70 -- 93 % -- -- -- --   08/24/22 0900 -- -- -- -- -- -- 32 3 L/min Nasal cannula --   08/24/22 08:05:03 98 3 °F (36 8 °C) 89 -- 128/73 91 92 % -- -- -- --   08/24/22 0735 -- -- -- -- -- 91 % 32 3 L/min Nasal cannula --   08/24/22 0528 -- 87 -- -- -- 94 % 32 3 L/min Nasal cannula --     Pertinent Labs/Diagnostic Results:   Results from last 7 days   Lab Units 08/19/22 2056   SARS-COV-2  Positive*     Results from last 7 days   Lab Units 08/24/22  0521 08/23/22  0453 08/22/22  0457 08/21/22  1903 08/19/22  2053   WBC Thousand/uL 15 47* 15 94* 10 79* 11 44* 7 95   HEMOGLOBIN g/dL 13 3 13 0 13 7 14 5 15 1   HEMATOCRIT % 42 7 41 8 45 9 47 3* 48 0*   PLATELETS Thousands/uL 250 239 238 280 259   NEUTROS ABS Thousands/µL  --  14 03* 9 78* 8 26* 5 33     Results from last 7 days   Lab Units 08/24/22  0521 08/23/22  0453 08/22/22  1135 08/22/22  0640 08/22/22  0457 08/21/22  1903 08/19/22 2053   SODIUM mmol/L 140 136 136 136 137 141 141   POTASSIUM mmol/L 4 1 4 6 4 9 6 5* 6 2* 4 1 4 5   CHLORIDE mmol/L 99 97 97 98 98 100 99   CO2 mmol/L 36* 32 30 29 29 33* 34*   ANION GAP mmol/L 5 7 9 9 10 8 8   BUN mg/dL 26* 23 19 17 16 13 13   CREATININE mg/dL 1  01 1 04 1 29 1 41* 1 42* 1 08 1 51*   EGFR ml/min/1 73sq m 59 57 44 39 39 55 36   CALCIUM mg/dL 8 2* 8 1* 8 0* 7 7* 7 6* 8 4 9 0   MAGNESIUM mg/dL  --  2 0  --   --  2 0 1 8 1 8     Results from last 7 days   Lab Units 08/24/22  0521 08/23/22  0453 08/22/22  0457 08/21/22  1903 08/19/22 2053   AST U/L 12 20 14 15 15   ALT U/L 32 35 32 34 30   ALK PHOS U/L 69 76 87 84 89   TOTAL PROTEIN g/dL 6 2* 6 3* 6 8 6 9 7 2   ALBUMIN g/dL 3 2* 3 1* 3 2* 3 5 3 5   TOTAL BILIRUBIN mg/dL 0 26 0 27 0 21 0 28 0 34     Results from last 7 days   Lab Units 08/24/22  0801 08/24/22  0151 08/23/22  2041 08/23/22  1546 08/23/22  1103 08/23/22  0801 08/23/22  0323 08/22/22  2104 08/22/22  1554 08/22/22  1124 08/22/22  0902 08/22/22  0803   POC GLUCOSE mg/dl 134 222* 309* 287* 326* 277* 306* 384* 370* 408* 352* 404*     Results from last 7 days   Lab Units 08/24/22  0521 08/23/22  0453 08/22/22  1135 08/22/22  0640 08/22/22  0457 08/21/22  1903 08/19/22 2053   GLUCOSE RANDOM mg/dL 180* 294* 428* 437* 445* 283* 345*     Results from last 7 days   Lab Units 08/21/22  1903   CK TOTAL U/L 176   CK MB INDEX % 3 0*   CK MB ng/mL 5 2*     Results from last 7 days   Lab Units 08/19/22 2053   HS TNI 0HR ng/L 59*     Results from last 7 days   Lab Units 08/24/22  0521 08/22/22  0457 08/21/22 2057   D-DIMER QUANTITATIVE ug/ml FEU 0 39 0 39 0 39     Results from last 7 days   Lab Units 08/24/22  0521 08/23/22  0453 08/22/22  0457 08/21/22  1903   PROCALCITONIN ng/ml <0 05 <0 05 <0 05 0 05     Results from last 7 days   Lab Units 08/21/22  2103 08/21/22  1903   LACTIC ACID mmol/L 1 7 2 4*     Results from last 7 days   Lab Units 08/21/22  1903 08/19/22  2053   NT-PRO BNP pg/mL 88 285*     Results from last 7 days   Lab Units 08/24/22  0521 08/23/22  0453 08/22/22  0457 08/21/22  1903   CRP mg/L 1 9 4 5* 7 3* 8 5*     Results from last 7 days   Lab Units 08/18/22  0909   SPEC GRAV UA  normal-1 034     Results from last 7 days   Lab Units 08/21/22  1903   BLOOD CULTURE  No Growth at 48 hrs  No Growth at 48 hrs  Medications:   atorvastatin, 80 mg, Oral, Daily With Dinner  budesonide-formoterol, 2 puff, Inhalation, BID  buPROPion, 300 mg, Oral, QAM  dicyclomine, 10 mg, Oral, BID  diltiazem, 120 mg, Oral, Daily  DULoxetine, 90 mg, Oral, Daily  enoxaparin, 40 mg, Subcutaneous, Q12H MATTHEW  fluticasone, 1 spray, Each Nare, BID  guaiFENesin, 1,200 mg, Oral, Q12H MATTHEW  hydrochlorothiazide, 25 mg, Oral, Daily  insulin glargine, 82 Units, Subcutaneous, HS  insulin lispro, 1-5 Units, Subcutaneous, HS  insulin lispro, 1-5 Units, Subcutaneous, 0200  insulin lispro, 1-6 Units, Subcutaneous, TID AC  insulin lispro, 18 Units, Subcutaneous, TID With Meals  ipratropium, 0 5 mg, Nebulization, TID  levalbuterol, 1 25 mg, Nebulization, TID   And  sodium chloride, 3 mL, Nebulization, TID  methylPREDNISolone sodium succinate, 40 mg, Intravenous, Daily  nicotine, 14 mg, Transdermal, Daily  QUEtiapine, 25 mg, Oral, BID  remdesivir, 100 mg, Intravenous, Q24H    PRN Meds:  acetaminophen, 650 mg, Oral, Q6H PRN  benzonatate, 200 mg, Oral, TID PRN  levalbuterol, 0 63 mg, Nebulization, Q4H PRN  ondansetron, 4 mg, Intravenous, Q6H PRN  traMADol, 50 mg, Oral, Q6H PRN    Discharge Plan: [facility]    Network Utilization Review Department  ATTENTION: Please call with any questions or concerns to [phone] and carefully listen to the prompts so that you are directed to the right person  All voicemails are confidential   Lee Ann Salomon all requests for admission clinical reviews, approved or denied determinations and any other requests to dedicated fax number below belonging to the campus where the patient is receiving treatment  List of dedicated fax numbers for the Facilities:  [address] DENIALS (Administrative/Medical Necessity) [phone]   [address] (Maternity/NICU/Pediatrics) [phone]   [address] [place] [address] [phone]     [address]  [phone]   [address] [place] [address] [id number] [place] [address] [address] [place] [address] [phone]

## 2022-08-24 NOTE — PLAN OF CARE
Problem: RESPIRATORY - ADULT  Goal: Achieves optimal ventilation and oxygenation  Description: INTERVENTIONS:  - Assess for changes in respiratory status  - Assess for changes in mentation and behavior  - Position to facilitate oxygenation and minimize respiratory effort  - Oxygen administered by appropriate delivery if ordered  - Initiate smoking cessation education as indicated  - Encourage broncho-pulmonary hygiene including cough, deep breathe, Incentive Spirometry  - Assess the need for suctioning and aspirate as needed  - Assess and instruct to report SOB or any respiratory difficulty  - Respiratory Therapy support as indicated  8/23/2022 2157 by Rosalva Petersen RN  Outcome: Progressing     Problem: MOBILITY - ADULT  Goal: Maintain or return to baseline ADL function  Description: INTERVENTIONS:  -  Assess patient's ability to carry out ADLs; assess patient's baseline for ADL function and identify physical deficits which impact ability to perform ADLs (bathing, care of mouth/teeth, toileting, grooming, dressing, etc )  - Assess/evaluate cause of self-care deficits   - Assess range of motion  - Assess patient's mobility; develop plan if impaired  - Assess patient's need for assistive devices and provide as appropriate  - Encourage maximum independence but intervene and supervise when necessary  - Involve family in performance of ADLs  - Assess for home care needs following discharge   - Consider OT consult to assist with ADL evaluation and planning for discharge  - Provide patient education as appropriate  8/23/2022 2157 by Rosalva Petersen RN  Outcome: Progressing  Goal: Maintains/Returns to pre admission functional level  Description: INTERVENTIONS:  - Perform BMAT or MOVE assessment daily    - Set and communicate daily mobility goal to care team and patient/family/caregiver  - Collaborate with rehabilitation services on mobility goals if consulted  - Perform Range of Motion 4 times a day    - Reposition patient every 2 hours  - Dangle patient 3 times a day  - Stand patient 3 times a day  - Ambulate patient 3 times a day  - Out of bed to chair 3 times a day   - Out of bed for meals 3 times a day  - Out of bed for toileting  - Record patient progress and toleration of activity level   8/23/2022 2157 by Edinson Alvarado RN  Outcome: Progressing     Problem: Potential for Falls  Goal: Patient will remain free of falls  Description: INTERVENTIONS:  - Educate patient/family on patient safety including physical limitations  - Instruct patient to call for assistance with activity   - Consult OT/PT to assist with strengthening/mobility   - Keep Call bell within reach  - Keep bed low and locked with side rails adjusted as appropriate  - Keep care items and personal belongings within reach  - Initiate and maintain comfort rounds  - Make Fall Risk Sign visible to staff  - Offer Toileting every 2 Hours, in advance of need  - Initiate/Maintain bed alarm  - Obtain necessary fall risk management equipment: yellow socks  - Apply yellow socks and bracelet for high fall risk patients  - Consider moving patient to room near nurses station  8/23/2022 2157 by Edinson Alvarado RN  Outcome: Progressing     Problem: Knowledge Deficit  Goal: Patient/family/caregiver demonstrates understanding of disease process, treatment plan, medications, and discharge instructions  Description: Complete learning assessment and assess knowledge base    Interventions:  - Provide teaching at level of understanding  - Provide teaching via preferred learning methods  8/23/2022 2157 by Edinson Alvarado RN  Outcome: Progressing     Problem: INFECTION - ADULT  Goal: Absence or prevention of progression during hospitalization  Description: INTERVENTIONS:  - Assess and monitor for signs and symptoms of infection  - Monitor lab/diagnostic results  - Monitor all insertion sites, i e  indwelling lines, tubes, and drains  - Monitor endotracheal if appropriate and nasal secretions for changes in amount and color  - [place] appropriate cooling/warming therapies per order  - Administer medications as ordered  - Instruct and encourage patient and family to use good hand hygiene technique  - Identify and instruct in appropriate isolation precautions for identified infection/condition  8/23/2022 2157 by Gabriela Frances RN  Outcome: Progressing     Problem: Nutrition/Hydration-ADULT  Goal: Nutrient/Hydration intake appropriate for improving, restoring or maintaining nutritional needs  Description: Monitor and assess patient's nutrition/hydration status for malnutrition  Collaborate with interdisciplinary team and initiate plan and interventions as ordered  Monitor patient's weight and dietary intake as ordered or per policy  Utilize nutrition screening tool and intervene as necessary  Determine patient's food preferences and provide high-protein, high-caloric foods as appropriate       INTERVENTIONS:  - Monitor oral intake, urinary output, labs, and treatment plans  - Assess nutrition and hydration status and recommend course of action  - Evaluate amount of meals eaten  - Assist patient with eating if necessary   - Allow adequate time for meals  - Recommend/ encourage appropriate diets, oral nutritional supplements, and vitamin/mineral supplements  - Order, calculate, and assess calorie counts as needed  - Recommend, monitor, and adjust tube feedings and TPN/PPN based on assessed needs  - Assess need for intravenous fluids  - Provide specific nutrition/hydration education as appropriate  - Include patient/family/caregiver in decisions related to nutrition  8/23/2022 2157 by Gabriela Frances RN  Outcome: Progressing

## 2022-08-25 PROBLEM — A41.9 SEPSIS (HCC): Status: ACTIVE | Noted: 2022-08-25

## 2022-08-25 LAB
ALBUMIN SERPL BCP-MCNC: 3.2 G/DL (ref 3.5–5)
ALP SERPL-CCNC: 74 U/L (ref 46–116)
ALT SERPL W P-5'-P-CCNC: 32 U/L (ref 12–78)
ANION GAP SERPL CALCULATED.3IONS-SCNC: 5 MMOL/L (ref 4–13)
AST SERPL W P-5'-P-CCNC: 14 U/L (ref 5–45)
BILIRUB SERPL-MCNC: 0.36 MG/DL (ref 0.2–1)
BUN SERPL-MCNC: 24 MG/DL (ref 5–25)
CALCIUM ALBUM COR SERPL-MCNC: 8.9 MG/DL (ref 8.3–10.1)
CALCIUM SERPL-MCNC: 8.3 MG/DL (ref 8.3–10.1)
CHLORIDE SERPL-SCNC: 98 MMOL/L (ref 96–108)
CO2 SERPL-SCNC: 39 MMOL/L (ref 21–32)
CREAT SERPL-MCNC: 0.9 MG/DL (ref 0.6–1.3)
ERYTHROCYTE [DISTWIDTH] IN BLOOD BY AUTOMATED COUNT: 13.7 % (ref 11.6–15.1)
GFR SERPL CREATININE-BSD FRML MDRD: 68 ML/MIN/1.73SQ M
GLUCOSE SERPL-MCNC: 152 MG/DL (ref 65–140)
GLUCOSE SERPL-MCNC: 185 MG/DL (ref 65–140)
GLUCOSE SERPL-MCNC: 288 MG/DL (ref 65–140)
GLUCOSE SERPL-MCNC: 297 MG/DL (ref 65–140)
GLUCOSE SERPL-MCNC: 55 MG/DL (ref 65–140)
GLUCOSE SERPL-MCNC: 80 MG/DL (ref 65–140)
HCT VFR BLD AUTO: 45.7 % (ref 34.8–46.1)
HGB BLD-MCNC: 14.3 G/DL (ref 11.5–15.4)
MCH RBC QN AUTO: 29.7 PG (ref 26.8–34.3)
MCHC RBC AUTO-ENTMCNC: 31.3 G/DL (ref 31.4–37.4)
MCV RBC AUTO: 95 FL (ref 82–98)
PLATELET # BLD AUTO: 272 THOUSANDS/UL (ref 149–390)
PMV BLD AUTO: 10.7 FL (ref 8.9–12.7)
POTASSIUM SERPL-SCNC: 3.5 MMOL/L (ref 3.5–5.3)
PROT SERPL-MCNC: 6.5 G/DL (ref 6.4–8.4)
RBC # BLD AUTO: 4.81 MILLION/UL (ref 3.81–5.12)
SODIUM SERPL-SCNC: 142 MMOL/L (ref 135–147)
WBC # BLD AUTO: 12.92 THOUSAND/UL (ref 4.31–10.16)

## 2022-08-25 PROCEDURE — 80053 COMPREHEN METABOLIC PANEL: CPT | Performed by: INTERNAL MEDICINE

## 2022-08-25 PROCEDURE — 99232 SBSQ HOSP IP/OBS MODERATE 35: CPT | Performed by: INTERNAL MEDICINE

## 2022-08-25 PROCEDURE — 85027 COMPLETE CBC AUTOMATED: CPT | Performed by: INTERNAL MEDICINE

## 2022-08-25 PROCEDURE — 94760 N-INVAS EAR/PLS OXIMETRY 1: CPT

## 2022-08-25 PROCEDURE — 94640 AIRWAY INHALATION TREATMENT: CPT

## 2022-08-25 PROCEDURE — 82948 REAGENT STRIP/BLOOD GLUCOSE: CPT

## 2022-08-25 PROCEDURE — 99232 SBSQ HOSP IP/OBS MODERATE 35: CPT | Performed by: HOSPITALIST

## 2022-08-25 RX ORDER — TRAMADOL HYDROCHLORIDE 50 MG/1
50 TABLET ORAL EVERY 6 HOURS PRN
Status: DISCONTINUED | OUTPATIENT
Start: 2022-08-25 | End: 2022-08-26 | Stop reason: HOSPADM

## 2022-08-25 RX ORDER — INSULIN GLARGINE 100 [IU]/ML
80 INJECTION, SOLUTION SUBCUTANEOUS
Status: DISCONTINUED | OUTPATIENT
Start: 2022-08-25 | End: 2022-08-26

## 2022-08-25 RX ADMIN — DICYCLOMINE HYDROCHLORIDE 10 MG: 10 CAPSULE ORAL at 17:08

## 2022-08-25 RX ADMIN — FLUTICASONE PROPIONATE 1 SPRAY: 50 SPRAY, METERED NASAL at 10:18

## 2022-08-25 RX ADMIN — DILTIAZEM HYDROCHLORIDE 120 MG: 120 CAPSULE, COATED, EXTENDED RELEASE ORAL at 08:13

## 2022-08-25 RX ADMIN — ATORVASTATIN CALCIUM 80 MG: 80 TABLET, FILM COATED ORAL at 17:08

## 2022-08-25 RX ADMIN — IPRATROPIUM BROMIDE 0.5 MG: 0.5 SOLUTION RESPIRATORY (INHALATION) at 20:00

## 2022-08-25 RX ADMIN — GUAIFENESIN 1200 MG: 600 TABLET, EXTENDED RELEASE ORAL at 20:37

## 2022-08-25 RX ADMIN — ENOXAPARIN SODIUM 40 MG: 40 INJECTION SUBCUTANEOUS at 08:12

## 2022-08-25 RX ADMIN — INSULIN LISPRO 1 UNITS: 100 INJECTION, SOLUTION INTRAVENOUS; SUBCUTANEOUS at 01:59

## 2022-08-25 RX ADMIN — BUDESONIDE AND FORMOTEROL FUMARATE DIHYDRATE 2 PUFF: 160; 4.5 AEROSOL RESPIRATORY (INHALATION) at 08:14

## 2022-08-25 RX ADMIN — INSULIN LISPRO 18 UNITS: 100 INJECTION, SOLUTION INTRAVENOUS; SUBCUTANEOUS at 17:10

## 2022-08-25 RX ADMIN — REMDESIVIR 100 MG: 100 INJECTION, POWDER, LYOPHILIZED, FOR SOLUTION INTRAVENOUS at 22:49

## 2022-08-25 RX ADMIN — INSULIN LISPRO 1 UNITS: 100 INJECTION, SOLUTION INTRAVENOUS; SUBCUTANEOUS at 12:00

## 2022-08-25 RX ADMIN — LEVALBUTEROL HYDROCHLORIDE 1.25 MG: 1.25 SOLUTION, CONCENTRATE RESPIRATORY (INHALATION) at 07:24

## 2022-08-25 RX ADMIN — TRAMADOL HYDROCHLORIDE 50 MG: 50 TABLET, COATED ORAL at 21:26

## 2022-08-25 RX ADMIN — ENOXAPARIN SODIUM 40 MG: 40 INJECTION SUBCUTANEOUS at 20:38

## 2022-08-25 RX ADMIN — INSULIN LISPRO 4 UNITS: 100 INJECTION, SOLUTION INTRAVENOUS; SUBCUTANEOUS at 17:09

## 2022-08-25 RX ADMIN — FLUTICASONE PROPIONATE 1 SPRAY: 50 SPRAY, METERED NASAL at 17:09

## 2022-08-25 RX ADMIN — Medication 14 MG: at 08:13

## 2022-08-25 RX ADMIN — INSULIN GLARGINE 80 UNITS: 100 INJECTION, SOLUTION SUBCUTANEOUS at 21:24

## 2022-08-25 RX ADMIN — BUDESONIDE AND FORMOTEROL FUMARATE DIHYDRATE 2 PUFF: 160; 4.5 AEROSOL RESPIRATORY (INHALATION) at 17:08

## 2022-08-25 RX ADMIN — ISODIUM CHLORIDE 3 ML: 0.03 SOLUTION RESPIRATORY (INHALATION) at 20:00

## 2022-08-25 RX ADMIN — HYDROCHLOROTHIAZIDE 25 MG: 25 TABLET ORAL at 08:14

## 2022-08-25 RX ADMIN — ISODIUM CHLORIDE 3 ML: 0.03 SOLUTION RESPIRATORY (INHALATION) at 07:24

## 2022-08-25 RX ADMIN — INSULIN LISPRO 18 UNITS: 100 INJECTION, SOLUTION INTRAVENOUS; SUBCUTANEOUS at 12:00

## 2022-08-25 RX ADMIN — QUETIAPINE FUMARATE 25 MG: 25 TABLET ORAL at 08:13

## 2022-08-25 RX ADMIN — DICYCLOMINE HYDROCHLORIDE 10 MG: 10 CAPSULE ORAL at 08:13

## 2022-08-25 RX ADMIN — LEVALBUTEROL HYDROCHLORIDE 1.25 MG: 1.25 SOLUTION, CONCENTRATE RESPIRATORY (INHALATION) at 13:20

## 2022-08-25 RX ADMIN — ISODIUM CHLORIDE 3 ML: 0.03 SOLUTION RESPIRATORY (INHALATION) at 13:20

## 2022-08-25 RX ADMIN — QUETIAPINE FUMARATE 25 MG: 25 TABLET ORAL at 17:08

## 2022-08-25 RX ADMIN — METHYLPREDNISOLONE SODIUM SUCCINATE 40 MG: 40 INJECTION, POWDER, FOR SOLUTION INTRAMUSCULAR; INTRAVENOUS at 08:14

## 2022-08-25 RX ADMIN — GUAIFENESIN 1200 MG: 600 TABLET, EXTENDED RELEASE ORAL at 08:13

## 2022-08-25 RX ADMIN — IPRATROPIUM BROMIDE 0.5 MG: 0.5 SOLUTION RESPIRATORY (INHALATION) at 13:20

## 2022-08-25 RX ADMIN — DULOXETINE HYDROCHLORIDE 90 MG: 60 CAPSULE, DELAYED RELEASE ORAL at 08:13

## 2022-08-25 RX ADMIN — IPRATROPIUM BROMIDE 0.5 MG: 0.5 SOLUTION RESPIRATORY (INHALATION) at 07:24

## 2022-08-25 RX ADMIN — LEVALBUTEROL HYDROCHLORIDE 1.25 MG: 1.25 SOLUTION, CONCENTRATE RESPIRATORY (INHALATION) at 20:00

## 2022-08-25 RX ADMIN — BUPROPION HYDROCHLORIDE 300 MG: 150 TABLET, EXTENDED RELEASE ORAL at 08:17

## 2022-08-25 RX ADMIN — INSULIN LISPRO 2 UNITS: 100 INJECTION, SOLUTION INTRAVENOUS; SUBCUTANEOUS at 21:35

## 2022-08-25 NOTE — PLAN OF CARE
Problem: RESPIRATORY - ADULT  Goal: Achieves optimal ventilation and oxygenation  Description: INTERVENTIONS:  - Assess for changes in respiratory status  - Assess for changes in mentation and behavior  - Position to facilitate oxygenation and minimize respiratory effort  - Oxygen administered by appropriate delivery if ordered  - Initiate smoking cessation education as indicated  - Encourage broncho-pulmonary hygiene including cough, deep breathe, Incentive Spirometry  - Assess the need for suctioning and aspirate as needed  - Assess and instruct to report SOB or any respiratory difficulty  - Respiratory Therapy support as indicated  Outcome: Progressing     Problem: MOBILITY - ADULT  Goal: Maintain or return to baseline ADL function  Description: INTERVENTIONS:  -  Assess patient's ability to carry out ADLs; assess patient's baseline for ADL function and identify physical deficits which impact ability to perform ADLs (bathing, care of mouth/teeth, toileting, grooming, dressing, etc )  - Assess/evaluate cause of self-care deficits   - Assess range of motion  - Assess patient's mobility; develop plan if impaired  - Assess patient's need for assistive devices and provide as appropriate  - Encourage maximum independence but intervene and supervise when necessary  - Involve family in performance of ADLs  - Assess for home care needs following discharge   - Consider OT consult to assist with ADL evaluation and planning for discharge  - Provide patient education as appropriate  Outcome: Progressing  Goal: Maintains/Returns to pre admission functional level  Description: INTERVENTIONS:  - Perform BMAT or MOVE assessment daily    - Set and communicate daily mobility goal to care team and patient/family/caregiver  - Collaborate with rehabilitation services on mobility goals if consulted  - Perform Range of Motion 3 times a day  - Reposition patient every 2 hours    - Dangle patient 3 times a day  - Stand patient 3 times a day  - Ambulate patient 3 times a day  - Out of bed to chair 3 times a day   - Out of bed for meals 3 times a day  - Out of bed for toileting  - Record patient progress and toleration of activity level   Outcome: Progressing     Problem: Potential for Falls  Goal: Patient will remain free of falls  Description: INTERVENTIONS:  - Educate patient/family on patient safety including physical limitations  - Instruct patient to call for assistance with activity   - Consult OT/PT to assist with strengthening/mobility   - Keep Call bell within reach  - Keep bed low and locked with side rails adjusted as appropriate  - Keep care items and personal belongings within reach  - Initiate and maintain comfort rounds  - Make Fall Risk Sign visible to staff  - Offer Toileting every 2 Hours, in advance of need  - Initiate/Maintain bed/chair alarm  - Obtain necessary fall risk management equipment: bed/chair alarm  - Apply yellow socks and bracelet for high fall risk patients  - Consider moving patient to room near nurses station  Outcome: Progressing     Problem: Knowledge Deficit  Goal: Patient/family/caregiver demonstrates understanding of disease process, treatment plan, medications, and discharge instructions  Description: Complete learning assessment and assess knowledge base    Interventions:  - Provide teaching at level of understanding  - Provide teaching via preferred learning methods  Outcome: Progressing     Problem: INFECTION - ADULT  Goal: Absence or prevention of progression during hospitalization  Description: INTERVENTIONS:  - Assess and monitor for signs and symptoms of infection  - Monitor lab/diagnostic results  - Monitor all insertion sites, i e  indwelling lines, tubes, and drains  - Monitor endotracheal if appropriate and nasal secretions for changes in amount and color  - [place] appropriate cooling/warming therapies per order  - Administer medications as ordered  - Instruct and encourage patient and family to use good hand hygiene technique  - Identify and instruct in appropriate isolation precautions for identified infection/condition  Outcome: Progressing     Problem: Nutrition/Hydration-ADULT  Goal: Nutrient/Hydration intake appropriate for improving, restoring or maintaining nutritional needs  Description: Monitor and assess patient's nutrition/hydration status for malnutrition  Collaborate with interdisciplinary team and initiate plan and interventions as ordered  Monitor patient's weight and dietary intake as ordered or per policy  Utilize nutrition screening tool and intervene as necessary  Determine patient's food preferences and provide high-protein, high-caloric foods as appropriate       INTERVENTIONS:  - Monitor oral intake, urinary output, labs, and treatment plans  - Assess nutrition and hydration status and recommend course of action  - Evaluate amount of meals eaten  - Assist patient with eating if necessary   - Allow adequate time for meals  - Recommend/ encourage appropriate diets, oral nutritional supplements, and vitamin/mineral supplements  - Order, calculate, and assess calorie counts as needed  - Recommend, monitor, and adjust tube feedings and TPN/PPN based on assessed needs  - Assess need for intravenous fluids  - Provide specific nutrition/hydration education as appropriate  - Include patient/family/caregiver in decisions related to nutrition  Outcome: Progressing

## 2022-08-25 NOTE — ASSESSMENT & PLAN NOTE
Evolving POA as evidenced by leukocytosis, lactic acidosis, tachycardia and tachypnea  Secondary to COVID pneumonia  Resolving

## 2022-08-25 NOTE — PROGRESS NOTES
Progress Note - Pulmonary   Trula Bigness 57 y o  female MRN: [id number]  Unit/Bed#: [address] [place] [address] Encounter: [id number]    Assessment:  56 yo F with COPD, CAD, HTN, LESLIE with acute hypoxemic resp failure likely in the setting of pulm edema/decompensated HF and COPD exacerbation  She was also found to be covid positive  Continues to improve, ambulating aroudn today with minimal distress  O2 weaned down to 2L today, improves when patient is upright, suspect component of atelactasis in addition to her pulm edema  Problem List  [id number]   Diastolic heart failure with exacerbation   Pulmonary edema   COPD exacerbation    Recommendations:  - no additional taper after completion of methylpred x 5 days   - continue w incentive spirometery, ambulate as tolerated and out of bed    - hold diuretics tomorrow, bicarb uptrending - possible contraction alkalosis  - obtain VBG in AM             Chief Complaint:   Hypoxemia     Subjective:   Feels overall better today, ambulating around the room, sitting upright  Objective:     Vitals: Blood pressure 140/86, pulse 84, temperature 98 3 °F (36 8 °C), temperature source Oral, resp  rate 19, height 5' 3" (1 6 m), weight 108 kg (237 lb 3 4 oz), SpO2 91 %  ,Body mass index is 42 02 kg/m²        Intake/Output Summary (Last 24 hours) at 8/25/2022 1842  Last data filed at 8/25/2022 0900  Gross per 24 hour   Intake 240 ml   Output --   Net 240 ml       Invasive Devices  Report    Peripheral Intravenous Line  Duration           Peripheral IV 08/24/22 Left;Ventral (anterior) Forearm 1 day                Physical Exam:  General - NAD, comfortable   Cardiac - s1s2 rrr, no m/r/g   Lungs - cta b/l   Abdomen - soft, ndnt   Extremities - trace b/l LE edema       Lab Results   Component Value Date    WBC 12 92 (H) 08/25/2022    HGB 14 3 08/25/2022    HCT 45 7 08/25/2022    MCV 95 08/25/2022     08/25/2022     Lab Results   Component Value Date    SODIUM 142 08/25/2022    K 3 5 08/25/2022    CL 98 08/25/2022    CO2 39 (H) 08/25/2022    BUN 24 08/25/2022    CREATININE 0 90 08/25/2022    GLUC 80 08/25/2022    CALCIUM 8 3 08/25/2022

## 2022-08-25 NOTE — UTILIZATION REVIEW
Continued Stay Review    Date: 8/25/22                          Current Patient Class: inpatient  Current Level of Care: med surg  HPI:61 y o  female initially admitted on 8/21/22     Assessment/Plan:   Pulm edema, CHF, COPD exacerbation  S/p IV Lasix yesterday evening to maintain negative balance  SOB improving, O2 currently weaned to 3ltr nc, continue to wean as tolerates  Lungs with diminished breath sounds  IV steroids & IV Remdesivir continued for COVID+        Vital Signs:   Date/Time Temp Pulse Resp BP MAP (mmHg) SpO2 Calculated FIO2 (%) - Nasal Cannula Nasal Cannula O2 Flow Rate (L/min) O2 Device   08/25/22 1320 -- -- -- -- -- 91 % 32 3 L/min Nasal cannula   08/25/22 1100 98 3 °F (36 8 °C) 84 19 140/86 104 -- 32 3 L/min Nasal cannula   08/25/22 0808 98 °F (36 7 °C) 83 19 143/65 -- -- 34 3 5 L/min Nasal cannula   08/25/22 0738 -- -- -- -- -- 98 % -- -- --   08/25/22 0724 -- 93 18 -- -- 94 % 34 3 5 L/min Nasal cannula   08/25/22 0700 -- 79 -- -- -- 94 % -- -- --     Pertinent Labs/Diagnostic Results:   Results from last 7 days   Lab Units 08/19/22 2056   SARS-COV-2  Positive*     Results from last 7 days   Lab Units 08/25/22  0526 08/24/22  0521 08/23/22  0453 08/22/22  0457 08/21/22  1903   WBC Thousand/uL 12 92* 15 47* 15 94* 10 79* 11 44*   HEMOGLOBIN g/dL 14 3 13 3 13 0 13 7 14 5   HEMATOCRIT % 45 7 42 7 41 8 45 9 47 3*   PLATELETS Thousands/uL 272 250 239 238 280   NEUTROS ABS Thousands/µL  --   --  14 03* 9 78* 8 26*     Results from last 7 days   Lab Units 08/25/22  0526 08/24/22  0521 08/23/22  0453 08/22/22  1135 08/22/22  0640 08/22/22  0457 08/21/22  1903 08/19/22  2053   SODIUM mmol/L 142 140 136 136 136 137 141 141   POTASSIUM mmol/L 3 5 4 1 4 6 4 9 6 5* 6 2* 4 1 4 5   CHLORIDE mmol/L 98 99 97 97 98 98 100 99   CO2 mmol/L 39* 36* 32 30 29 29 33* 34*   ANION GAP mmol/L 5 5 7 9 9 10 8 8   BUN mg/dL 24 26* 23 19 17 16 13 13   CREATININE mg/dL 0 90 1 01 1 04 1 29 1 41* 1 42* 1 08 1 51*   EGFR ml/min/1 73sq m 68 59 57 44 39 39 55 36   CALCIUM mg/dL 8 3 8 2* 8 1* 8 0* 7 7* 7 6* 8 4 9 0   MAGNESIUM mg/dL  --   --  2 0  --   --  2 0 1 8 1 8     Results from last 7 days   Lab Units 08/25/22  0526 08/24/22  0521 08/23/22  0453 08/22/22  0457 08/21/22  1903   AST U/L 14 12 20 14 15   ALT U/L 32 32 35 32 34   ALK PHOS U/L 74 69 76 87 84   TOTAL PROTEIN g/dL 6 5 6 2* 6 3* 6 8 6 9   ALBUMIN g/dL 3 2* 3 2* 3 1* 3 2* 3 5   TOTAL BILIRUBIN mg/dL 0 36 0 26 0 27 0 21 0 28     Results from last 7 days   Lab Units 08/25/22  1115 08/25/22  0753 08/25/22  0151 08/24/22  2053 08/24/22  1636 08/24/22  1100 08/24/22  0801 08/24/22  0151 08/23/22  2041 08/23/22  1546 08/23/22  1103 08/23/22  0801   POC GLUCOSE mg/dl 152* 55* 185* 348* 259* 109 134 222* 309* 287* 326* 277*     Results from last 7 days   Lab Units 08/25/22  0526 08/24/22  0521 08/23/22  0453 08/22/22  1135 08/22/22  0640 08/22/22  0457 08/21/22  1903 08/19/22 2053   GLUCOSE RANDOM mg/dL 80 180* 294* 428* 437* 445* 283* 345*     Results from last 7 days   Lab Units 08/21/22  1903   CK TOTAL U/L 176   CK MB INDEX % 3 0*   CK MB ng/mL 5 2*     Results from last 7 days   Lab Units 08/19/22 2053   HS TNI 0HR ng/L 59*     Results from last 7 days   Lab Units 08/24/22  0521 08/22/22  0457 08/21/22 2057   D-DIMER QUANTITATIVE ug/ml FEU 0 39 0 39 0 39     Results from last 7 days   Lab Units 08/24/22  0521 08/23/22  0453 08/22/22  0457 08/21/22  1903   PROCALCITONIN ng/ml <0 05 <0 05 <0 05 0 05     Results from last 7 days   Lab Units 08/21/22  2103 08/21/22  1903   LACTIC ACID mmol/L 1 7 2 4*     Results from last 7 days   Lab Units 08/21/22  1903 08/19/22  2053   NT-PRO BNP pg/mL 88 285*     Results from last 7 days   Lab Units 08/24/22  0521 08/23/22  0453 08/22/22  0457 08/21/22  1903   CRP mg/L 1 9 4 5* 7 3* 8 5*     Results from last 7 days   Lab Units 08/21/22  1903   BLOOD CULTURE  No Growth at 72 hrs  No Growth at 72 hrs       Medications:   atorvastatin, 80 mg, Oral, Daily With Dinner  budesonide-formoterol, 2 puff, Inhalation, BID  buPROPion, 300 mg, Oral, QAM  dicyclomine, 10 mg, Oral, BID  diltiazem, 120 mg, Oral, Daily  DULoxetine, 90 mg, Oral, Daily  enoxaparin, 40 mg, Subcutaneous, Q12H MATTHEW  fluticasone, 1 spray, Each Nare, BID  guaiFENesin, 1,200 mg, Oral, Q12H MATTHEW  hydrochlorothiazide, 25 mg, Oral, Daily  insulin glargine, 82 Units, Subcutaneous, HS  insulin lispro, 1-5 Units, Subcutaneous, HS  insulin lispro, 1-5 Units, Subcutaneous, 0200  insulin lispro, 1-6 Units, Subcutaneous, TID AC  insulin lispro, 18 Units, Subcutaneous, TID With Meals  ipratropium, 0 5 mg, Nebulization, TID  levalbuterol, 1 25 mg, Nebulization, TID   And  sodium chloride, 3 mL, Nebulization, TID  methylPREDNISolone sodium succinate, 40 mg, Intravenous, Daily  nicotine, 14 mg, Transdermal, Daily  QUEtiapine, 25 mg, Oral, BID  remdesivir, 100 mg, Intravenous, Q24H    PRN Meds:  acetaminophen, 650 mg, Oral, Q6H PRN  benzonatate, 200 mg, Oral, TID PRN  levalbuterol, 0 63 mg, Nebulization, Q4H PRN  ondansetron, 4 mg, Intravenous, Q6H PRN  traMADol, 50 mg, Oral, Q6H PRN    Discharge Plan: [facility]    Network Utilization Review Department  ATTENTION: Please call with any questions or concerns to [phone] and carefully listen to the prompts so that you are directed to the right person  All voicemails are confidential   [facility] all requests for admission clinical reviews, approved or denied determinations and any other requests to dedicated fax number below belonging to the campus where the patient is receiving treatment   List of dedicated fax numbers for the Facilities:  [address] DENIALS (Administrative/Medical Necessity) [phone]   [address] (Maternity/NICU/Pediatrics) 261 [facility] [place] [address] Ila Mcleod [address] [place] [address] [address] [id number] [place] [address] Keith Daniel 14[phone] [facility] [phone]

## 2022-08-25 NOTE — PROGRESS NOTES
[address] Internal Medicine Progress Note  Patient: Doris Jimenes 57 y o  female   MRN: [id number]  PCP: Saintclair Halter, MD  Unit/Bed#: [address] [place] [address] Encounter: [id number]  Date Of Visit: 08/25/22    Problem List:    Principal Problem:    Acute respiratory failure with hypoxia ([facility] )  Active Problems:    COVID-19    Type 2 diabetes mellitus with hyperglycemia, with long-term current use of insulin ([facility] )    Hypertension    Cigarette nicotine dependence without complication    Obstructive sleep apnea    Coronary artery disease involving native coronary artery of native heart with angina pectoris (HCC)    COPD exacerbation (HCC)    Sepsis ([facility] )    Chronic low back pain    Depression with anxiety    Hyperlipidemia    Stage 2 chronic kidney disease    Obesity      Assessment & Plan:    COVID-19  Assessment & Plan  Tested positive on 8/19th  Patient received COVID vaccine  On oxygen 2 L currently, satting mid 90s on presentation, subsequently oxygen requirement increased to 6 L  Currently improved to 4 L at rest  Chest x-ray - Stable interstitial prominence bilaterally and right middle lobe linear atelectasis   No new focal airspace consolidation identified  Continue treatment as per COVID protocol  , clinically improving  · Cardiac markers: Total CK, proBNP normal   Troponin 20(EKG no ischemic changes, patient denies chest pain )  · CRP 8 5, D-dimer 0 39 initially-normalized  · Patient received IV Solu-Medrol 125mg in ED, will continue 40 mg IV daily x5 days  · Continue remdesivir  · Continue with Lovenox 40 subQ q 12 hours per protocol  · Continue Mucinex, Xopenex/Atrovent b i d , Xopenex p r n , Tessalon Perles p r n  · Will hold further diuresis with evidence of contraction alkalosis  · Supportive care with IS, ambulation  · Continue supplemental oxygen to maintain sats above 90%  ·  procalcitonin-negative so far  D-dimer negative    CRP improved  · Monitor labs    Results from last 7 days   Lab Units 08/19/22 2056   SARS-COV-2  Positive*     Results from last 7 days   Lab Units 08/24/22  0521 08/23/22  0453 08/22/22 0457 08/21/22 2057 08/21/22 1903   CRP mg/L 1 9 4 5* 7 3*  --  8 5*   D-DIMER QUANTITATIVE ug/ml FEU 0 39  --  0 39 0 39  --       Results from last 7 days   Lab Units 08/24/22  0521 08/23/22  0453 08/22/22 0457 08/21/22 1903   PROCALCITONIN ng/ml <0 05 <0 05 <0 05 0 05              * Acute respiratory failure with hypoxia [facility])  Assessment & Plan  Patient presenting with worsening SOB, fatigue, productive cough since likely Wednesday 8/17th  Patient was seen in ED on 8/19th, COVID test positive  Patient left AMA, was given Z-Diego/Omnicef/Paxlovid for COVID-19 and possible bacterial pneumonia  Chest x-ray at that time showed atelectasis  Patient was prescribed prednisone taper and Z-Diego on 8/19th by her pulmonologist prior to ED visit  Patient took prednisone, Omnicef, Paxlovid on morning of admission at home  Patient was satting 88% on room air on ED arrival     Chest x-ray shows stable interstitial prominence bilaterally with right middle lobe linear atelectasis  Likely multifactorial secondary to COVID-19 infection and mild COPD exacerbation, atelectasis, mild volume overload  Oxygen requirement escalated to 6 L at rest, now improving  Currently saturating mid to high 90s on 3 5 L of supplemental oxygen  · Treat underlying causes as below  · Continue supplemental oxygen to maintain sats above 90%  · Pulmonology consult appreciated will follow-up recommendations  · Will check ABG in a m  with elevated CO2  · Home oxygen evaluation prior to discharge    Sepsis [facility])  Assessment & Plan  Evolving POA as evidenced by leukocytosis, lactic acidosis, tachycardia and tachypnea  Secondary to COVID pneumonia  Resolving    COPD exacerbation ([address] )  Assessment & Plan  Mild COPD exacerbation likely triggered by COVID-19 infection     · On Airduo, Incruse Ellipta, proair and albuterol neb prn at home    · Patient started on IV Zithromax in ED, will discontinue  ·  continue IV Solu-Medrol 40mg daily x5 days  · Will substitute Airduo with Symbicort  · Neb treatment as above  ·  Continue supplemental oxygen therapy as needed and wean as tolerated  Coronary artery disease involving native coronary artery of native heart with angina pectoris [facility])  Assessment & Plan  Nonobstructive CAD on cardiac catheterization in 2019  · Continue statin    Obstructive sleep apnea  Assessment & Plan  Intolerant to CPAP machine  Cigarette nicotine dependence without complication  Assessment & Plan  Nicotine patch, smoking cessation  Hypertension  Assessment & Plan  BP stable   Continue with oral Cardizem  Hold lisinopril secondary to KRYSTLE   Resume hydrochlorothiazide in a m  Type 2 diabetes mellitus with hyperglycemia, with long-term current use of insulin [facility])  Assessment & Plan  Lab Results   Component Value Date    HGBA1C 9 6 (H) 06/14/2022       Recent Labs     08/25/22  0753 08/25/22  1115 08/25/22  1538 08/25/22  2040   POCGLU 55* 152* 288* 297*       Blood Sugar Average: Last 72 hrs:  (P) 650 [id number]      poorly controlled type 2 diabetes as evidenced by A1c 9 6 in June this year  Patient is on metformin 1 g b i d , insulin glargine 82 units subQ HS, insulin lispro 18 units t i d  With meals, Victoza injection daily at home  Reports noncompliant at home at times  Episode of hypoglycemia early a m  denies history of hypoglycemic episode at home  · Decrease Lantus 80 units subQ HS, continue Humalog 18 units t i d  With meals  · Hold metformin, Victoza  · SSI  · Diabetic diet      Obesity  Assessment & Plan  Body mass index is 42 51 kg/m²    · Diet and lifestyle modification    Stage 2 chronic kidney disease  Assessment & Plan  Baseline creatinine appears to be around 1 0-1 5  · Continue stable  · Monitor    Hyperlipidemia  Assessment & Plan  Continue statin    Depression with anxiety  Assessment & Plan  Continue Seroquel, Cymbalta, Wellbutrin    Chronic low back pain  Assessment & Plan  Status post lumbar laminectomy with fusion  Sees Pain Medicine as outpatient  On tramadol ER daily at home  Verified at [place] [address] website  · Will order tramadol p r n   · Monitor for pain    Hyperkalemia-resolved as of 8/24/2022  Assessment & Plan  Potassium 6 5 this morning which has since improved to 4 9  Patient received albuterol, insulin, calcium gluconate and Kayexalate  Hold lisinopril  Will continue to monitor  Elevated lactic acid level-resolved as of 8/24/2022  Assessment & Plan  Lactic acid 2 4 on admission  Suspect due to respiratory distress  No evidence of sepsis  Tachycardia and tachypnea most likely due to COVID-19 and COPD exacerbation  · Patient received normal saline 1 L in ED, repeat normalized  VTE Pharmacologic Prophylaxis: VTE Score: 8 High Risk (Score >/= 5) - Pharmacological DVT Prophylaxis Ordered: enoxaparin (Lovenox)  Sequential Compression Devices Ordered  Patient Centered Rounds: I performed bedside rounds with nursing staff today  Discussions with Specialists or Other Care Team Provider:  Pulmonary    Education and Discussions with Family / Patient: Updated  (mother) via phone  Time Spent for Care: 45 minutes  More than 50% of total time spent on counseling and coordination of care as described above  Current Length of Stay: 4 day(s)  Current Patient Status: Inpatient   Certification Statement: The patient will continue to require additional inpatient hospital stay due to Respiratory failure  Discharge Plan: Anticipate discharge in 24-48 hrs to home with home services      Subjective:   Noted to be ambulating in the room   Denies any significant dyspnea with activity     Denies any dizziness chest pain or palpitation   Episode of hypoglycemia earlier today, relatively asymptomatic   Denies hypoglycemic episodes at home , though her diet is more restricted in hospital    Objective:     Vitals:   Temp (24hrs), Av 3 °F (36 8 °C), Min:98 °F (36 7 °C), Max:98 5 °F (36 9 °C)    Temp:  [98 °F (36 7 °C)-98 5 °F (36 9 °C)] 98 °F (36 7 °C)  HR:  [79-98] 83  Resp:  [18-19] 19  BP: (126-143)/(64-78) 143/65  SpO2:  [88 %-98 %] 98 % on 3  5 L  Body mass index is 42 02 kg/m²  Input and Output Summary (last 24 hours):   No intake or output data in the 24 hours ending 22 1020    Physical Exam:   Physical Exam  Constitutional:       General: She is not in acute distress  Appearance: She is obese  HENT:      Head: Normocephalic and atraumatic  Cardiovascular:      Rate and Rhythm: Normal rate  Pulmonary:      Effort: Pulmonary effort is normal  No respiratory distress  Breath sounds: Normal breath sounds  No wheezing or rales  Comments: Diminished, clear  Abdominal:      General: Bowel sounds are normal  There is no distension  Palpations: Abdomen is soft  Tenderness: There is no abdominal tenderness  There is no guarding or rebound  Musculoskeletal:      Right lower leg: No edema  Left lower leg: No edema  Skin:     General: Skin is warm and dry  Findings: No rash  Neurological:      Mental Status: She is alert  Additional Data:     Labs:  Results from last 7 days   Lab Units 22  0526 22  0521 22  0453   WBC Thousand/uL 12 92*   < > 15 94*   HEMOGLOBIN g/dL 14 3   < > 13 0   HEMATOCRIT % 45 7   < > 41 8   PLATELETS Thousands/uL 272   < > 239   NEUTROS PCT %  --   --  88*   LYMPHS PCT %  --   --  6*   MONOS PCT %  --   --  5   EOS PCT %  --   --  0    < > = values in this interval not displayed       Results from last 7 days   Lab Units 22  0526   SODIUM mmol/L 142   POTASSIUM mmol/L 3 5   CHLORIDE mmol/L 98   CO2 mmol/L 39*   BUN mg/dL 24   CREATININE mg/dL 0 90   ANION GAP mmol/L 5   CALCIUM mg/dL 8 3   ALBUMIN g/dL 3 2*   TOTAL BILIRUBIN mg/dL 0 36   ALK PHOS U/L 74   ALT U/L 32 AST U/L 14   GLUCOSE RANDOM mg/dL 80         Results from last 7 days   Lab Units 08/25/22  0753 08/25/22  0151 08/24/22  2053 08/24/22  1636 08/24/22  1100 08/24/22  0801 08/24/22  0151 08/23/22  2041 08/23/22  1546 08/23/22  1103 08/23/22  0801 08/23/22  0323   POC GLUCOSE mg/dl 55* 185* 348* 259* 109 134 222* 309* 287* 326* 277* 306*         Results from last 7 days   Lab Units 08/24/22  0521 08/23/22  0453 08/22/22  0457 08/21/22  2103 08/21/22  1903   LACTIC ACID mmol/L  --   --   --  1 7 2 4*   PROCALCITONIN ng/ml <0 05 <0 05 <0 05  --  0 05       Lines/Drains:  Invasive Devices  Report    Peripheral Intravenous Line  Duration           Peripheral IV 08/24/22 Left;Ventral (anterior) Forearm <1 day                  Telemetry:  Telemetry Orders (From admission, onward)             24 Hour Telemetry Monitoring  Continuous x 24 Hours ([facility])        References:    Telemetry Guidelines   Question:  Reason for 24 Hour Telemetry  Answer:  Metabolic/Electrolyte Disturbance with High Probability of Dysrhythmia (K level <3 or >6, or KCL infusion >=10mEq/hr)                 Telemetry Reviewed: Normal Sinus Rhythm  Indication for Continued Telemetry Use: No indication for continued use  Will discontinue  Imaging: Reviewed radiology reports from this admission including: chest xray    Recent Cultures (last 7 days):   Results from last 7 days   Lab Units 08/21/22  1903   BLOOD CULTURE  No Growth at 72 hrs  No Growth at 72 hrs         Last 24 Hours Medication List:   Current Facility-Administered Medications   Medication Dose Route Frequency Provider Last Rate    acetaminophen  650 mg Oral Q6H PRN LURDES Núñez      atorvastatin  80 mg Oral Daily With LURDES Kay      benzonatate  200 mg Oral TID PRN LURDES Núñez      budesonide-formoterol  2 puff Inhalation BID LURDES Núñez      buPROPion  300 mg Oral QAM LURDES Núñez      dicyclomine  10 mg Oral BID Cindy Elliott CRNP      diltiazem  120 mg Oral Daily Cuiyin Yurik, CRNP      DULoxetine  90 mg Oral Daily Cuiyin Yurik, CRNP      enoxaparin  40 mg Subcutaneous Q12H [facility] Cuiyin Yurik, CRNP      fluticasone  1 spray Each Nare BID Cuiyin Rizwanarik, CRNP      guaiFENesin  1,200 mg Oral Q12H [facility] Cuiyin Rizwanarik, CRNP      hydrochlorothiazide  25 mg Oral Daily Frida Mack MD      insulin glargine  82 Units Subcutaneous HS Cuiyin Rizwanarik, CRNP      insulin lispro  1-5 Units Subcutaneous HS Cuiger Wagonerrik, CRNP      insulin lispro  1-5 Units Subcutaneous 0200 Cuiyisantosh Rizwanarik, CRNP      insulin lispro  1-6 Units Subcutaneous TID AC Cuiyin Rizwanarik, CRNP      insulin lispro  18 Units Subcutaneous TID With Meals Cuiger Rizwanarik, CRNP      ipratropium  0 5 mg Nebulization TID Cuiyisantosh Rizwanarik, CRNP      levalbuterol  0 63 mg Nebulization Q4H PRN Cuiyisantosh Wagonerrik, CRNP      levalbuterol  1 25 mg Nebulization TID Cuisantosh Rizwanarik, CRNP      And    sodium chloride  3 mL Nebulization TID Cuiyisantosh Rizwanarik, CRNP      methylPREDNISolone sodium succinate  40 mg Intravenous Daily Cuiyin Yurik, CRNP      nicotine  14 mg Transdermal Daily Cuiyisantosh Yurik, CRNP      ondansetron  4 mg Intravenous Q6H PRN Cuiyisantosh Yurik, CRNP      QUEtiapine  25 mg Oral BID Cuiyin Yurik, CRNP      remdesivir  100 mg Intravenous Q24H Cuiyin Yurik, CRNP      traMADol  50 mg Oral Q6H PRN LURDES Mercer          Today, Patient Was Seen By: Frida Mack MD    ** Please Note: "This note has been constructed using a voice recognition system  Therefore there may be syntax, spelling, and/or grammatical errors   Please call if you have any questions  "**

## 2022-08-25 NOTE — PLAN OF CARE
Problem: RESPIRATORY - ADULT  Goal: Achieves optimal ventilation and oxygenation  Description: INTERVENTIONS:  - Assess for changes in respiratory status  - Assess for changes in mentation and behavior  - Position to facilitate oxygenation and minimize respiratory effort  - Oxygen administered by appropriate delivery if ordered  - Initiate smoking cessation education as indicated  - Encourage broncho-pulmonary hygiene including cough, deep breathe, Incentive Spirometry  - Assess the need for suctioning and aspirate as needed  - Assess and instruct to report SOB or any respiratory difficulty  - Respiratory Therapy support as indicated  Outcome: Progressing     Problem: MOBILITY - ADULT  Goal: Maintain or return to baseline ADL function  Description: INTERVENTIONS:  -  Assess patient's ability to carry out ADLs; assess patient's baseline for ADL function and identify physical deficits which impact ability to perform ADLs (bathing, care of mouth/teeth, toileting, grooming, dressing, etc )  - Assess/evaluate cause of self-care deficits   - Assess range of motion  - Assess patient's mobility; develop plan if impaired  - Assess patient's need for assistive devices and provide as appropriate  - Encourage maximum independence but intervene and supervise when necessary  - Involve family in performance of ADLs  - Assess for home care needs following discharge   - Consider OT consult to assist with ADL evaluation and planning for discharge  - Provide patient education as appropriate  Outcome: Progressing  Goal: Maintains/Returns to pre admission functional level  Description: INTERVENTIONS:  - Perform BMAT or MOVE assessment daily    - Set and communicate daily mobility goal to care team and patient/family/caregiver  - Collaborate with rehabilitation services on mobility goals if consulted  - Perform Range of Motion 4 times a day  - Reposition patient every 2 hours    - Dangle patient 3 times a day  - Stand patient 3 times a day  - Ambulate patient 3 times a day  - Out of bed to chair 3 times a day   - Out of bed for meals 3 times a day  - Out of bed for toileting  - Record patient progress and toleration of activity level   Outcome: Progressing     Problem: Potential for Falls  Goal: Patient will remain free of falls  Description: INTERVENTIONS:  - Educate patient/family on patient safety including physical limitations  - Instruct patient to call for assistance with activity   - Consult OT/PT to assist with strengthening/mobility   - Keep Call bell within reach  - Keep bed low and locked with side rails adjusted as appropriate  - Keep care items and personal belongings within reach  - Initiate and maintain comfort rounds  - Make Fall Risk Sign visible to staff  - Offer Toileting every 2 Hours, in advance of need  - Initiate/Maintain bed alarm  - Obtain necessary fall risk management equipment: yellow socks  - Apply yellow socks and bracelet for high fall risk patients  - Consider moving patient to room near nurses station  Outcome: Progressing     Problem: Knowledge Deficit  Goal: Patient/family/caregiver demonstrates understanding of disease process, treatment plan, medications, and discharge instructions  Description: Complete learning assessment and assess knowledge base    Interventions:  - Provide teaching at level of understanding  - Provide teaching via preferred learning methods  Outcome: Progressing     Problem: INFECTION - ADULT  Goal: Absence or prevention of progression during hospitalization  Description: INTERVENTIONS:  - Assess and monitor for signs and symptoms of infection  - Monitor lab/diagnostic results  - Monitor all insertion sites, i e  indwelling lines, tubes, and drains  - Monitor endotracheal if appropriate and nasal secretions for changes in amount and color  - [place] appropriate cooling/warming therapies per order  - Administer medications as ordered  - Instruct and encourage patient and family to use good hand hygiene technique  - Identify and instruct in appropriate isolation precautions for identified infection/condition  Outcome: Progressing     Problem: Nutrition/Hydration-ADULT  Goal: Nutrient/Hydration intake appropriate for improving, restoring or maintaining nutritional needs  Description: Monitor and assess patient's nutrition/hydration status for malnutrition  Collaborate with interdisciplinary team and initiate plan and interventions as ordered  Monitor patient's weight and dietary intake as ordered or per policy  Utilize nutrition screening tool and intervene as necessary  Determine patient's food preferences and provide high-protein, high-caloric foods as appropriate       INTERVENTIONS:  - Monitor oral intake, urinary output, labs, and treatment plans  - Assess nutrition and hydration status and recommend course of action  - Evaluate amount of meals eaten  - Assist patient with eating if necessary   - Allow adequate time for meals  - Recommend/ encourage appropriate diets, oral nutritional supplements, and vitamin/mineral supplements  - Order, calculate, and assess calorie counts as needed  - Recommend, monitor, and adjust tube feedings and TPN/PPN based on assessed needs  - Assess need for intravenous fluids  - Provide specific nutrition/hydration education as appropriate  - Include patient/family/caregiver in decisions related to nutrition  Outcome: Progressing

## 2022-08-26 ENCOUNTER — TELEPHONE (OUTPATIENT)
Dept: FAMILY MEDICINE CLINIC | Facility: CLINIC | Age: 62
End: 2022-08-26

## 2022-08-26 VITALS
HEART RATE: 82 BPM | SYSTOLIC BLOOD PRESSURE: 136 MMHG | DIASTOLIC BLOOD PRESSURE: 83 MMHG | TEMPERATURE: 98 F | BODY MASS INDEX: 40.75 KG/M2 | RESPIRATION RATE: 18 BRPM | WEIGHT: 230 LBS | OXYGEN SATURATION: 91 % | HEIGHT: 63 IN

## 2022-08-26 LAB
ANION GAP SERPL CALCULATED.3IONS-SCNC: 6 MMOL/L (ref 4–13)
ARTERIAL PATENCY WRIST A: YES
BASE EXCESS BLDA CALC-SCNC: 6.8 MMOL/L
BODY TEMPERATURE: 98 DEGREES FEHRENHEIT
BUN SERPL-MCNC: 23 MG/DL (ref 5–25)
CALCIUM SERPL-MCNC: 8.5 MG/DL (ref 8.3–10.1)
CHLORIDE SERPL-SCNC: 97 MMOL/L (ref 96–108)
CO2 SERPL-SCNC: 39 MMOL/L (ref 21–32)
CREAT SERPL-MCNC: 0.88 MG/DL (ref 0.6–1.3)
GFR SERPL CREATININE-BSD FRML MDRD: 70 ML/MIN/1.73SQ M
GLUCOSE SERPL-MCNC: 109 MG/DL (ref 65–140)
GLUCOSE SERPL-MCNC: 169 MG/DL (ref 65–140)
GLUCOSE SERPL-MCNC: 64 MG/DL (ref 65–140)
GLUCOSE SERPL-MCNC: 80 MG/DL (ref 65–140)
HCO3 BLDA-SCNC: 32.5 MMOL/L (ref 22–28)
NON VENT ROOM AIR: 21 %
O2 CT BLDA-SCNC: 20.2 ML/DL (ref 16–23)
OXYHGB MFR BLDA: 89.7 % (ref 94–97)
PCO2 BLDA: 49.7 MM HG (ref 36–44)
PCO2 TEMP ADJ BLDA: 49.1 MM HG (ref 36–44)
PH BLD: 7.44 [PH] (ref 7.35–7.45)
PH BLDA: 7.43 [PH] (ref 7.35–7.45)
PO2 BLD: 54.5 MM HG (ref 75–129)
PO2 BLDA: 55.7 MM HG (ref 75–129)
POTASSIUM SERPL-SCNC: 3.6 MMOL/L (ref 3.5–5.3)
SODIUM SERPL-SCNC: 142 MMOL/L (ref 135–147)
SPECIMEN SOURCE: ABNORMAL

## 2022-08-26 PROCEDURE — 82805 BLOOD GASES W/O2 SATURATION: CPT | Performed by: INTERNAL MEDICINE

## 2022-08-26 PROCEDURE — 94640 AIRWAY INHALATION TREATMENT: CPT

## 2022-08-26 PROCEDURE — 94760 N-INVAS EAR/PLS OXIMETRY 1: CPT

## 2022-08-26 PROCEDURE — 80048 BASIC METABOLIC PNL TOTAL CA: CPT | Performed by: INTERNAL MEDICINE

## 2022-08-26 PROCEDURE — 82948 REAGENT STRIP/BLOOD GLUCOSE: CPT

## 2022-08-26 PROCEDURE — 36600 WITHDRAWAL OF ARTERIAL BLOOD: CPT

## 2022-08-26 PROCEDURE — 94761 N-INVAS EAR/PLS OXIMETRY MLT: CPT

## 2022-08-26 PROCEDURE — 99239 HOSP IP/OBS DSCHRG MGMT >30: CPT | Performed by: INTERNAL MEDICINE

## 2022-08-26 RX ORDER — INSULIN GLARGINE 100 [IU]/ML
76 INJECTION, SOLUTION SUBCUTANEOUS
Status: DISCONTINUED | OUTPATIENT
Start: 2022-08-26 | End: 2022-08-26 | Stop reason: HOSPADM

## 2022-08-26 RX ORDER — INSULIN LISPRO 100 [IU]/ML
16 INJECTION, SOLUTION INTRAVENOUS; SUBCUTANEOUS
Status: DISCONTINUED | OUTPATIENT
Start: 2022-08-26 | End: 2022-08-26 | Stop reason: HOSPADM

## 2022-08-26 RX ORDER — GUAIFENESIN 600 MG/1
600 TABLET, EXTENDED RELEASE ORAL EVERY 12 HOURS SCHEDULED
Qty: 10 TABLET | Refills: 0 | Status: SHIPPED | OUTPATIENT
Start: 2022-08-26 | End: 2022-09-02 | Stop reason: ALTCHOICE

## 2022-08-26 RX ORDER — GUAIFENESIN 1200 MG/1
600 TABLET, EXTENDED RELEASE ORAL EVERY 12 HOURS SCHEDULED
Qty: 10 TABLET | Refills: 0 | Status: SHIPPED | OUTPATIENT
Start: 2022-08-26 | End: 2022-08-26

## 2022-08-26 RX ORDER — INSULIN GLARGINE-YFGN 100 [IU]/ML
INJECTION, SOLUTION SUBCUTANEOUS
Qty: 45 ML | Refills: 0 | Status: ON HOLD | OUTPATIENT
Start: 2022-08-26

## 2022-08-26 RX ADMIN — BUPROPION HYDROCHLORIDE 300 MG: 150 TABLET, EXTENDED RELEASE ORAL at 08:42

## 2022-08-26 RX ADMIN — ISODIUM CHLORIDE 3 ML: 0.03 SOLUTION RESPIRATORY (INHALATION) at 07:46

## 2022-08-26 RX ADMIN — ENOXAPARIN SODIUM 40 MG: 40 INJECTION SUBCUTANEOUS at 08:39

## 2022-08-26 RX ADMIN — INSULIN LISPRO 16 UNITS: 100 INJECTION, SOLUTION INTRAVENOUS; SUBCUTANEOUS at 11:48

## 2022-08-26 RX ADMIN — DILTIAZEM HYDROCHLORIDE 120 MG: 120 CAPSULE, COATED, EXTENDED RELEASE ORAL at 08:42

## 2022-08-26 RX ADMIN — DICYCLOMINE HYDROCHLORIDE 10 MG: 10 CAPSULE ORAL at 08:44

## 2022-08-26 RX ADMIN — HYDROCHLOROTHIAZIDE 25 MG: 25 TABLET ORAL at 08:47

## 2022-08-26 RX ADMIN — BUDESONIDE AND FORMOTEROL FUMARATE DIHYDRATE 2 PUFF: 160; 4.5 AEROSOL RESPIRATORY (INHALATION) at 08:38

## 2022-08-26 RX ADMIN — QUETIAPINE FUMARATE 25 MG: 25 TABLET ORAL at 08:42

## 2022-08-26 RX ADMIN — IPRATROPIUM BROMIDE 0.5 MG: 0.5 SOLUTION RESPIRATORY (INHALATION) at 07:46

## 2022-08-26 RX ADMIN — LEVALBUTEROL HYDROCHLORIDE 1.25 MG: 1.25 SOLUTION, CONCENTRATE RESPIRATORY (INHALATION) at 07:46

## 2022-08-26 RX ADMIN — INSULIN LISPRO 18 UNITS: 100 INJECTION, SOLUTION INTRAVENOUS; SUBCUTANEOUS at 08:30

## 2022-08-26 RX ADMIN — Medication 14 MG: at 08:43

## 2022-08-26 RX ADMIN — METHYLPREDNISOLONE SODIUM SUCCINATE 40 MG: 40 INJECTION, POWDER, FOR SOLUTION INTRAMUSCULAR; INTRAVENOUS at 08:42

## 2022-08-26 RX ADMIN — FLUTICASONE PROPIONATE 1 SPRAY: 50 SPRAY, METERED NASAL at 08:39

## 2022-08-26 RX ADMIN — DULOXETINE HYDROCHLORIDE 90 MG: 60 CAPSULE, DELAYED RELEASE ORAL at 08:42

## 2022-08-26 RX ADMIN — GUAIFENESIN 1200 MG: 600 TABLET, EXTENDED RELEASE ORAL at 08:42

## 2022-08-26 RX ADMIN — INSULIN LISPRO 1 UNITS: 100 INJECTION, SOLUTION INTRAVENOUS; SUBCUTANEOUS at 02:09

## 2022-08-26 RX ADMIN — ISODIUM CHLORIDE 3 ML: 0.03 SOLUTION RESPIRATORY (INHALATION) at 14:07

## 2022-08-26 NOTE — PLAN OF CARE
Problem: RESPIRATORY - ADULT  Goal: Achieves optimal ventilation and oxygenation  Description: INTERVENTIONS:  - Assess for changes in respiratory status  - Assess for changes in mentation and behavior  - Position to facilitate oxygenation and minimize respiratory effort  - Oxygen administered by appropriate delivery if ordered  - Initiate smoking cessation education as indicated  - Encourage broncho-pulmonary hygiene including cough, deep breathe, Incentive Spirometry  - Assess the need for suctioning and aspirate as needed  - Assess and instruct to report SOB or any respiratory difficulty  - Respiratory Therapy support as indicated  Outcome: Progressing     Problem: MOBILITY - ADULT  Goal: Maintain or return to baseline ADL function  Description: INTERVENTIONS:  -  Assess patient's ability to carry out ADLs; assess patient's baseline for ADL function and identify physical deficits which impact ability to perform ADLs (bathing, care of mouth/teeth, toileting, grooming, dressing, etc )  - Assess/evaluate cause of self-care deficits   - Assess range of motion  - Assess patient's mobility; develop plan if impaired  - Assess patient's need for assistive devices and provide as appropriate  - Encourage maximum independence but intervene and supervise when necessary  - Involve family in performance of ADLs  - Assess for home care needs following discharge   - Consider OT consult to assist with ADL evaluation and planning for discharge  - Provide patient education as appropriate  Outcome: Progressing     Problem: RESPIRATORY - ADULT  Goal: Achieves optimal ventilation and oxygenation  Description: INTERVENTIONS:  - Assess for changes in respiratory status  - Assess for changes in mentation and behavior  - Position to facilitate oxygenation and minimize respiratory effort  - Oxygen administered by appropriate delivery if ordered  - Initiate smoking cessation education as indicated  - Encourage broncho-pulmonary hygiene including cough, deep breathe, Incentive Spirometry  - Assess the need for suctioning and aspirate as needed  - Assess and instruct to report SOB or any respiratory difficulty  - Respiratory Therapy support as indicated  Outcome: Progressing

## 2022-08-26 NOTE — TELEPHONE ENCOUNTER
Patient was just discharged from hospital after having Covid and pneumonia and they put her on a nicotine patch #14 and she would like to continue on the patch  Can it be sent thru her insurance so she does not have to pay out of pocket

## 2022-08-26 NOTE — RESPIRATORY THERAPY NOTE
Home Oxygen Qualifying Test     Patient name: Rosa Sender        : 1960   Date of Test:  2022  Diagnosis:    Home Oxygen Test:    Medicare Guidelines require item(s) 1-5 on all ambulatory patients or 1 and 2 on non-ambulatory patients  1  Baseline SPO2 on Room Air at rest 91 %   a  If <= 88% on Room Air add O2 via NC to obtain SpO2 >=88%  If LPM needed, document LPM  needed to reach =>88%    2  SPO2 during exertion on Room Air 96  %  a  During exertion monitor SPO2  If SPO2 increases >=89%, do not add supplemental oxygen    3  SPO2 on Oxygen at Rest na % at na LPM    4  SPO2 during exertion on Oxygen na % at na LPM    5  Test performed during exertion activity  []  Supplemental Home Oxygen is indicated  [x]  Client does not qualify for home oxygen      Respiratory Additional Notes-   Derrick W[place]ack, RT

## 2022-08-26 NOTE — DISCHARGE SUMMARY
[facility]    Patient Information: Reina Frost 57 y o  female MRN: [id number]  Unit/Bed#: [address] [address] Encounter: [id number]    Discharging Physician / Practitioner: Chester Goodpasture, MD  PCP: Jose Maria Burt MD  Admission Date: 8/21/2022  Discharge Date: 08/26/22    Reason for Admission: Shortness of Breath (Pt experiencing SOB  O2 sat upon arrival at 88  With 2 L/min NC at 95  COVID positive on Friday, 8/19/22 )      Discharge Diagnoses:     Principal Problem:    Acute respiratory failure with hypoxia ([facility] )  Active Problems:    COVID-19    Type 2 diabetes mellitus with hyperglycemia, with long-term current use of insulin (HCC)    Hypertension    Cigarette nicotine dependence without complication    Obstructive sleep apnea    Coronary artery disease involving native coronary artery of native heart with angina pectoris (HCC)    COPD exacerbation (HCC)    Chronic low back pain    Depression with anxiety    Hyperlipidemia    Stage 2 chronic kidney disease    Obesity  Resolved Problems:    Sepsis ([facility] )    Elevated lactic acid level    Hyperkalemia        COVID-19  Assessment & Plan  Tested positive on 8/19th  Patient received COVID vaccine  On oxygen 2 L currently, satting mid 90s on presentation, subsequently oxygen requirement increased to 6 L  Currently improved to 4 L at rest  Chest x-ray - Stable interstitial prominence bilaterally and right middle lobe linear atelectasis   No new focal airspace consolidation identified  Continue treatment as per COVID protocol  , clinically improved  Patient denies any significant cardiac respiratory symptoms at present  Afebrile hemodynamically stable saturating adequately on room air  Does not require supplemental oxygen on home O2 eval   Cardiac markers: Total CK, proBNP normal   Troponin 20(EKG no ischemic changes, patient denies chest pain )    CRP 8 5, D-dimer 0 39 initially-normalized  · Patient received IV Solu-Medrol 125mg in ED, subsequently continue 40 mg IV daily x5 days-no steroid needed on discharge  · Complete remdesivir  · Continued on Lovenox 40 subQ q 12 hours per protocol for DVT prophylaxis  · Treated with symptomatic treatment Mucinex, Xopenex/Atrovent b i d , Xopenex p r n , Tessalon Perles p r n  · Receive diuresis IV Lasix x2 to maintain negative balance  · Supportive care with IS, ambulation  · Continue supplemental oxygen to maintain sats above 90%  ·  procalcitonin-negative   D-dimer negative  CRP improved/normal  Patient was educated about the follow-up plan, isolation precautions and monitoring of the symptoms  Symptomatic treatment on discharge  Pulmonary follow-up discharge        Results from last 7 days   Lab Units 08/24/22  0521 08/23/22 0453 08/22/22 0457 08/21/22 2057 08/21/22  1903   CRP mg/L 1 9 4 5* 7 3*  --  8 5*   D-DIMER QUANTITATIVE ug/ml FEU 0 39  --  0 39 0 39  --       Results from last 7 days   Lab Units 08/24/22  0521 08/23/22 0453 08/22/22 0457 08/21/22  1903   PROCALCITONIN ng/ml <0 05 <0 05 <0 05 0 05              * Acute respiratory failure with hypoxia [facility])  Assessment & Plan  Patient presenting with worsening SOB, fatigue, productive cough since likely Wednesday 8/17th  Patient was seen in ED on 8/19th, COVID test positive  Patient left AMA, was given Z-Diego/Omnicef/Paxlovid for COVID-19 and possible bacterial pneumonia  Chest x-ray at that time showed atelectasis  Patient was prescribed prednisone taper and Z-Diego on 8/19th by her pulmonologist prior to ED visit  Patient took prednisone, Omnicef, Paxlovid on morning of admission at home  Patient was satting 88% on room air on ED arrival     Chest x-ray shows stable interstitial prominence bilaterally with right middle lobe linear atelectasis  Likely multifactorial secondary to COVID-19 infection and mild COPD exacerbation, atelectasis, mild volume overload     Oxygen requirement escalated to 6 L at rest, now improving  ABG revealed normal pH with mildly elevated pCO2 and hypoxia  This was reviewed with Pulmonary  Respiratory symptoms improved  Home oxygen evaluation was done patient does not require supplemental oxygen  · Recommended to monitor symptoms and oxygenation  · Incentive spirometry  · Increase activity as tolerated  · Follow-up with Pulmonary after discharge    COPD exacerbation (HCC)  Assessment & Plan  Mild COPD exacerbation likely triggered by COVID-19 infection  On Airduo, Incruse Ellipta, proair and albuterol neb prn at home  · Patient received IV Zithromax and treated with IV Solu-Medrol during hospitalization  · Improved clinically  · No further wheezing  · Oxygen requirement improved  · Recommend to continue home COPD treatment on discharge  · Follow-up with Pulmonary on discharge      Coronary artery disease involving native coronary artery of native heart with angina pectoris [facility])  Assessment & Plan  Nonobstructive CAD on cardiac catheterization in 2019  · Continue statin    Obstructive sleep apnea  Assessment & Plan  Intolerant to CPAP machine  Cigarette nicotine dependence without complication  Assessment & Plan  Nicotine patch, smoking cessation  Hypertension  Assessment & Plan  BP stable   Continue with oral Cardizem  Resume hydrochlorothiazide and lisinopril on discharge    Type 2 diabetes mellitus with hyperglycemia, with long-term current use of insulin [facility])  Assessment & Plan  Lab Results   Component Value Date    HGBA1C 9 6 (H) 06/14/2022       Recent Labs     08/25/22  2040 08/26/22  0207 08/26/22  0756 08/26/22  1126   POCGLU 297* 169* 64* 109       Blood Sugar Average: Last 72 hrs:  (P) 803 [id number]      poorly controlled type 2 diabetes as evidenced by A1c 9 6 in June this year  Patient is on metformin 1 g b i d , insulin glargine 82 units subQ HS, insulin lispro 18 units t i d  With meals, Victoza injection daily at home    Reports noncompliant at home at times   Morning hypoglycemia noted despite being on IV Solu-Medrol and good p o  Intake  Likely secondary to optimal diabetic diet during hospitalization which patient is planning to continue  · Decrease Lantus 76 units subQ HS, and Humalog 14 units t i d  With meals  · Resume metformin, Victoza on discharge  · Recommend close monitoring of blood glucose after discharge  · Diabetic diet  · Follow-up with endocrinology  · Recommended to follow-up with endocrinology for persistent hyperglycemia more than 200 mg/dL or hypoglycemia      Sepsis (HCC)-resolved as of 8/27/2022  Assessment & Plan  Evolving POA as evidenced by leukocytosis, lactic acidosis, tachycardia and tachypnea  Secondary to COVID pneumonia  Resolving    Obesity  Assessment & Plan  Body mass index is 42 51 kg/m²  · Diet and lifestyle modification    Stage 2 chronic kidney disease  Assessment & Plan  Baseline creatinine appears to be around 1 0-1 5  · Continue stable  · Monitor    Hyperlipidemia  Assessment & Plan  Continue statin    Depression with anxiety  Assessment & Plan  Continue Seroquel, Cymbalta, Wellbutrin    Chronic low back pain  Assessment & Plan  Status post lumbar laminectomy with fusion  Sees Pain Medicine as outpatient  On tramadol ER daily at home  Verified at [facility] website  · on tramadol p r n   · Monitor for pain    Hyperkalemia-resolved as of 8/24/2022  Assessment & Plan  Potassium 6 5 this morning which has since improved to 4 9  Patient received albuterol, insulin, calcium gluconate and Kayexalate  Hold lisinopril  Will continue to monitor  Elevated lactic acid level-resolved as of 8/24/2022  Assessment & Plan  Lactic acid 2 4 on admission  Suspect due to respiratory distress  No evidence of sepsis  Tachycardia and tachypnea most likely due to COVID-19 and COPD exacerbation  · Patient received normal saline 1 L in ED, repeat normalized        Consultations During Hospital Stay:  [facility]ULMON[facility]    Procedures Performed:     · Home O2 evaluation    Significant Findings:     · Refer to hospital course and above listed diagnosis related plan for details    Imaging while in hospital:    XR chest 1 view portable    Result Date: 8/22/2022  Narrative: CHEST INDICATION:   pneumonia  Patient has confirmed COVID-19  COMPARISON:  8/19/2022 EXAM PERFORMED/VIEWS:  XR CHEST PORTABLE FINDINGS: Cardiomediastinal silhouette appears unremarkable  There is right middle lobe atelectasis  There is interstitial prominence bilaterally  Osseous structures appear within normal limits for patient age  Impression: Stable interstitial prominence bilaterally and right middle lobe linear atelectasis  No new focal airspace consolidation identified  Workstation performed: [id number]     XR chest 1 view portable    Result Date: 8/19/2022  Narrative: CHEST INDICATION:   copd  COMPARISON:  Chest x-ray dated 1/12/2022 EXAM PERFORMED/VIEWS:  XR CHEST PORTABLE FINDINGS: No pneumothorax is seen  Linear atelectasis in the right midlung field  Mild atelectasis suspected in the left lower lung field  Visualized lungs otherwise appear grossly clear The cardiomediastinal silhouette appears unremarkable  The visualized bones appear intact  Impression: Atelectasis suspected bilaterally but the lungs otherwise appear grossly clear  Workstation performed: [id number]       Incidental Findings:   · None    Test Results Pending at Discharge (will require follow up):   · As per After Visit Summary     Outpatient Tests Requested:  · Pulse ox monitoring  · Blood glucose monitoring    Complications:  Refer to hospital course and above listed diagnosis related plan, if any    Hospital Course:    As per HPI  Yandy Alexander is a 57 y o  female patient with history of COPD, CAD, nicotine dependence, diabetes mellitus type 2, hypertension, dyslipidemia, obesity, depression/anxiety, sleep apnea, CKD who originally presented to the hospital on 8/21/2022 due to worsening SOB, fatigue, productive cough since likely Wednesday 8/17th  Patient was seen in ED on 8/19th, COVID tested positive  Patient reported chest pain when coughing  Denied sore throat, reports runny nose  Denied headache, dizziness, nausea vomiting diarrhea, constipation, fever, chills  Reported poor appetite at home since symptom onset  Patient received COVID vaccine      Of note, Patient left AMA from ED on 8/19th, was given Z-Diego/Omnicef/Paxlovid for COVID-19 and possible bacterial pneumonia  Please see above list of diagnoses and related plan for additional information  Condition at Discharge: stable     Discharge Day Visit / Exam:     Subjective:  Feels much better  Ambulating in room without any limiting symptoms  Denies any chest pain or dizziness  Reports good appetite    Vitals: Blood Pressure: 136/83 (08/26/22 0900)  Pulse: 82 (08/26/22 0900)  Temperature: 98 °F (36 7 °C) (08/26/22 0900)  Temp Source: Oral (08/26/22 0900)  Respirations: 18 (08/26/22 0900)  Height: 5' 3" (160 cm) (08/21/22 1837)  Weight - Scale: 104 kg (230 lb) (08/26/22 0600)  SpO2: 91 % (08/26/22 0933)  Exam:   Physical Exam  Constitutional:       General: She is not in acute distress  Appearance: She is obese  HENT:      Head: Normocephalic and atraumatic  Cardiovascular:      Rate and Rhythm: Normal rate  Pulmonary:      Effort: Pulmonary effort is normal  No respiratory distress  Breath sounds: Normal breath sounds  No wheezing or rales  Comments: Diminished, clear  Abdominal:      General: Bowel sounds are normal  There is no distension  Palpations: Abdomen is soft  Tenderness: There is no abdominal tenderness  There is no guarding or rebound  Musculoskeletal:      Right lower leg: No edema  Left lower leg: No edema  Skin:     General: Skin is warm and dry  Findings: No rash  Neurological:      General: No focal deficit present        Mental Status: She is alert and oriented to person, place, and time  Mental status is at baseline  Psychiatric:         Mood and Affect: Mood normal          Discharge instructions/Information to patient and family:(Discharge Medications and Follow up):   See after visit summary for information provided to patient and family  Provisions for Follow-Up Care:  See after visit summary for information related to follow-up care and any pertinent home health orders  Disposition: Home    Planned Readmission:  No     Discharge Statement:  I spent 45 minutes discharging the patient  This time was spent on the day of discharge  I had direct contact with the patient on the day of discharge  Greater than 50% of the total time was spent examining patient, answering all patient questions, arranging and discussing plan of care with patient as well as directly providing post-discharge instructions  Additional time then spent on discharge activities  Coordinated with Pulmonary regarding discharge and follow-up plan  Patient was advised to have ride home, also offered assistance to get 1 but patient declined and preferred to drive home back  Discharge Medications:  See after visit summary for reconciled discharge medications provided to patient and family  ** Please Note: "This note has been constructed using a voice recognition system  Therefore there may be syntax, spelling, and/or grammatical errors   Please call if you have any questions  "**

## 2022-08-26 NOTE — PLAN OF CARE
Problem: RESPIRATORY - ADULT  Goal: Achieves optimal ventilation and oxygenation  Description: INTERVENTIONS:  - Assess for changes in respiratory status  - Assess for changes in mentation and behavior  - Position to facilitate oxygenation and minimize respiratory effort  - Oxygen administered by appropriate delivery if ordered  - Initiate smoking cessation education as indicated  - Encourage broncho-pulmonary hygiene including cough, deep breathe, Incentive Spirometry  - Assess the need for suctioning and aspirate as needed  - Assess and instruct to report SOB or any respiratory difficulty  - Respiratory Therapy support as indicated  Outcome: Progressing     Problem: MOBILITY - ADULT  Goal: Maintain or return to baseline ADL function  Description: INTERVENTIONS:  -  Assess patient's ability to carry out ADLs; assess patient's baseline for ADL function and identify physical deficits which impact ability to perform ADLs (bathing, care of mouth/teeth, toileting, grooming, dressing, etc )  - Assess/evaluate cause of self-care deficits   - Assess range of motion  - Assess patient's mobility; develop plan if impaired  - Assess patient's need for assistive devices and provide as appropriate  - Encourage maximum independence but intervene and supervise when necessary  - Involve family in performance of ADLs  - Assess for home care needs following discharge   - Consider OT consult to assist with ADL evaluation and planning for discharge  - Provide patient education as appropriate  Outcome: Progressing  Goal: Maintains/Returns to pre admission functional level  Description: INTERVENTIONS:  - Perform BMAT or MOVE assessment daily    - Set and communicate daily mobility goal to care team and patient/family/caregiver     - Collaborate with rehabilitation services on mobility goals if consulted  - Out of bed for toileting  - Record patient progress and toleration of activity level   Outcome: Progressing     Problem: Potential for Falls  Goal: Patient will remain free of falls  Description: INTERVENTIONS:  - Educate patient/family on patient safety including physical limitations  - Instruct patient to call for assistance with activity   - Consult OT/PT to assist with strengthening/mobility   - Keep Call bell within reach  - Keep bed low and locked with side rails adjusted as appropriate  - Keep care items and personal belongings within reach  - Initiate and maintain comfort rounds  - Make Fall Risk Sign visible to staff  - Apply yellow socks and bracelet for high fall risk patients  - Consider moving patient to room near nurses station  Outcome: Progressing     Problem: Knowledge Deficit  Goal: Patient/family/caregiver demonstrates understanding of disease process, treatment plan, medications, and discharge instructions  Description: Complete learning assessment and assess knowledge base  Interventions:  - Provide teaching at level of understanding  - Provide teaching via preferred learning methods  Outcome: Progressing     Problem: INFECTION - ADULT  Goal: Absence or prevention of progression during hospitalization  Description: INTERVENTIONS:  - Assess and monitor for signs and symptoms of infection  - Monitor lab/diagnostic results  - Monitor all insertion sites, i e  indwelling lines, tubes, and drains  - Monitor endotracheal if appropriate and nasal secretions for changes in amount and color  - [place] appropriate cooling/warming therapies per order  - Administer medications as ordered  - Instruct and encourage patient and family to use good hand hygiene technique  - Identify and instruct in appropriate isolation precautions for identified infection/condition  Outcome: Progressing     Problem: Nutrition/Hydration-ADULT  Goal: Nutrient/Hydration intake appropriate for improving, restoring or maintaining nutritional needs  Description: Monitor and assess patient's nutrition/hydration status for malnutrition   Collaborate with interdisciplinary team and initiate plan and interventions as ordered  Monitor patient's weight and dietary intake as ordered or per policy  Utilize nutrition screening tool and intervene as necessary  Determine patient's food preferences and provide high-protein, high-caloric foods as appropriate       INTERVENTIONS:  - Monitor oral intake, urinary output, labs, and treatment plans  - Assess nutrition and hydration status and recommend course of action  - Evaluate amount of meals eaten  - Assist patient with eating if necessary   - Allow adequate time for meals  - Recommend/ encourage appropriate diets, oral nutritional supplements, and vitamin/mineral supplements  - Order, calculate, and assess calorie counts as needed  - Recommend, monitor, and adjust tube feedings and TPN/PPN based on assessed needs  - Assess need for intravenous fluids  - Provide specific nutrition/hydration education as appropriate  - Include patient/family/caregiver in decisions related to nutrition  Outcome: Progressing

## 2022-08-26 NOTE — DISCHARGE INSTRUCTIONS
Your goal pulse oximetry is 88 - 92%  If your pulse ox is <88 - rest for 5 minutes and take deep breaths through your nose with the oxygen in place  Make sure your finger is warm (cold fingers will give falsely low readings)  After 5 minutes, recheck your pulse ox  If your pulse ox continues to be below < 88% and you feel short of breath, rest, breathe through your nose and call your primary care physician or pulmonologist 24/7 (if after office hours the answering service will contact the on call physician who will call you back)  If you continue to feel excessively short of breath (much more than your normal breathing pattern), consider further evaluation in the emergency department - remember that a mask must be worn to enter any [facility]  Monitor daily weight  Continue incentive spirometry  Activity as tolerated     Monitor blood glucose, call endocrinology persistent low or high blood sugar        COVID-19 Home Care Guidelines    Your healthcare provider and/or public health staff have evaluated you and have determined that you do not need to remain in the hospital at this time  At this time you can be isolated at home where you will be monitored by staff from your local or state health department  You should carefully follow the prevention and isolation steps below until a healthcare provider or local or state health department says that you can return to your normal activities  Stay home except to get medical care    People who are mildly ill with COVID-19 are able to isolate at home during their illness  You should restrict activities outside your home, except for getting medical care  Do not go to work, school, or public areas  Avoid using public transportation, ride-sharing, or taxis  Separate yourself from other people and animals in your home    People: As much as possible, you should stay in a specific room and away from other people in your home   Also, you should use a separate bathroom, if available  Animals: You should restrict contact with pets and other animals while you are sick with COVID-19, just like you would around other people  Although there have not been reports of pets or other animals becoming sick with COVID-19, it is still recommended that people sick with COVID-19 limit contact with animals until more information is known about the virus  When possible, have another member of your household care for your animals while you are sick  If you are sick with COVID-19, avoid contact with your pet, including petting, snuggling, being kissed or licked, and sharing food  If you must care for your pet or be around animals while you are sick, wash your hands before and after you interact with pets and wear a facemask  See COVID-19 and Animals for more information  Call ahead before visiting your doctor    If you have a medical appointment, call the healthcare provider and tell them that you have or may have COVID-19  This will help the healthcare providers office take steps to keep other people from getting infected or exposed  Wear a facemask    You should wear a facemask when you are around other people (e g , sharing a room or vehicle) or pets and before you enter a healthcare providers office  If you are not able to wear a facemask (for example, because it causes trouble breathing), then people who live with you should not stay in the same room with you, or they should wear a facemask if they enter your room  Cover your coughs and sneezes    Cover your mouth and nose with a tissue when you cough or sneeze  Throw used tissues in a lined trash can  Immediately wash your hands with soap and water for at least 20 seconds or, if soap and water are not available, clean your hands with an alcohol-based hand  that contains at least 60% alcohol      Clean your hands often    Wash your hands often with soap and water for at least 20 seconds, especially after blowing your nose, coughing, or sneezing; going to the bathroom; and before eating or preparing food  If soap and water are not readily available, use an alcohol-based hand  with at least 60% alcohol, covering all surfaces of your hands and rubbing them together until they feel dry  Soap and water are the best option if hands are visibly dirty  Avoid touching your eyes, nose, and mouth with unwashed hands  Avoid sharing personal household items    You should not share dishes, drinking glasses, cups, eating utensils, towels, or bedding with other people or pets in your home  After using these items, they should be washed thoroughly with soap and water  Clean all high-touch surfaces everyday    High touch surfaces include counters, tabletops, doorknobs, bathroom fixtures, toilets, phones, keyboards, tablets, and bedside tables  Also, clean any surfaces that may have blood, stool, or body fluids on them  Use a household cleaning spray or wipe, according to the label instructions  Labels contain instructions for safe and effective use of the cleaning product including precautions you should take when applying the product, such as wearing gloves and making sure you have good ventilation during use of the product  Monitor your symptoms    Seek prompt medical attention if your illness is worsening (e g , difficulty breathing)  Before seeking care, call your healthcare provider and tell them that you have, or are being evaluated for, COVID-19  Put on a facemask before you enter the facility  These steps will help the healthcare providers office to keep other people in the office or waiting room from getting infected or exposed  Ask your healthcare provider to call the local or [facility] health department   Persons who are placed under active monitoring or facilitated self-monitoring should follow instructions provided by their local health department or occupational health professionals, as appropriate  If you have a medical emergency and need to call 911, notify the dispatch personnel that you have, or are being evaluated for COVID-19  If possible, put on a facemask before emergency medical services arrive  Discontinuing home isolation    Patients with confirmed COVID-19 should remain under home isolation precautions until the following conditions are met: They have had no fever for at least 24 hours (that is one full day of no fever without the use medicine that reduces fevers)  AND  other symptoms have improved (for example, when their cough or shortness of breath have improved)  AND  If had mild or moderate illness, at least 10 days have passed since their symptoms first appeared or if severe illness (needed oxygen) or immunosuppressed, at least 20 days have passed since symptoms first appeared  Patients with confirmed COVID-19 should also notify close contacts (including their workplace) and ask that they self-quarantine  Currently, close contact is defined as being within 6 feet for 15 minutes or more from the period 24 hours starting 48 hours before symptom onset to the time at which the patient went into isolation  Close contacts of patients diagnosed with COVID-19 should be instructed by the patient to self-quarantine for 14 days from the last time of their last contact with the patient       Source: [employer]

## 2022-08-26 NOTE — TELEPHONE ENCOUNTER
Please let pt know that I  am happy to hear she is out of hospital and doing better and that I sent in rx for the nicotine patches

## 2022-08-26 NOTE — NURSING NOTE
Patient given her discharge instructions  Yanes [place] removed  IV removed  All belongings with patient  Reviewed all discharge instructions with patient  No questions  Instructed when patient needs to follow up

## 2022-08-27 PROBLEM — A41.9 SEPSIS (HCC): Status: RESOLVED | Noted: 2022-08-25 | Resolved: 2022-08-27

## 2022-08-27 LAB
BACTERIA BLD CULT: NORMAL
BACTERIA BLD CULT: NORMAL

## 2022-08-29 ENCOUNTER — TRANSITIONAL CARE MANAGEMENT (OUTPATIENT)
Dept: FAMILY MEDICINE CLINIC | Facility: CLINIC | Age: 62
End: 2022-08-29

## 2022-08-29 RX ORDER — ALBUTEROL SULFATE 2.5 MG/3ML
2.5 SOLUTION RESPIRATORY (INHALATION) 4 TIMES DAILY
COMMUNITY
Start: 2022-07-27 | End: 2022-10-13

## 2022-08-29 NOTE — UTILIZATION REVIEW
Notification of Discharge   This is a Notification of Discharge from our facility [address]  Please be advised that this patient has been discharge from our facility  Below you will find the admission and discharge date and time including the patients disposition  UTILIZATION REVIEW CONTACT:  Maxwell Rojas  Utilization   Network Utilization Review Department  Phone: [phone] x carefully listen to the prompts  All voicemails are confidential   Email: [email or web address]     PHYSICIAN ADVISORY SERVICES:  FOR [id number] REVIEW - MEDICAL NECESSITY DENIAL  Phone: [phone]  Fax: [phone]  Email: [email or web address]     PRESENTATION DATE: 8/21/2022  6:34 PM  OBERVATION ADMISSION DATE:   INPATIENT ADMISSION DATE: 8/21/22  8:26 PM   DISCHARGE DATE: 8/26/2022  3:54 PM  DISPOSITION: Home/Self Care Home/Self Care      IMPORTANT INFORMATION:  Send all requests for admission clinical reviews, approved or denied determinations and any other requests to dedicated fax number below belonging to the campus where the patient is receiving treatment   List of dedicated fax numbers:  [address] DENIALS (Administrative/Medical Necessity) [phone]   [address] (Maternity/NICU/Pediatrics) [phone]   Weston Moran [phone]   130 [facility] [phone]   [address] [place] [address] [phone]   2000 [facility] [address] [place] [address],[address] [phone]   [facility]  [phone]   2205 [facility]  2401 [facility] 1000 [facility] [phone]

## 2022-08-30 ENCOUNTER — PATIENT OUTREACH (OUTPATIENT)
Dept: CASE MANAGEMENT | Facility: OTHER | Age: 62
End: 2022-08-30

## 2022-08-30 NOTE — PROGRESS NOTES
An attempt was made to contact Joann Suerojaydon after discharge from [facility] with a COPD exacerbation  Notes indicate cigarette dependence, usage of inhaled Incruse Ellipta and Albuterol  She did not require O2 upon discharge  Joann Nunez will need a follow up appt with Dr Lester Stark  A detailed message was left on her VM with contact information for a return call

## 2022-09-02 ENCOUNTER — OFFICE VISIT (OUTPATIENT)
Dept: FAMILY MEDICINE CLINIC | Facility: CLINIC | Age: 62
End: 2022-09-02
Payer: COMMERCIAL

## 2022-09-02 VITALS
BODY MASS INDEX: 44.29 KG/M2 | SYSTOLIC BLOOD PRESSURE: 120 MMHG | HEART RATE: 76 BPM | WEIGHT: 234.6 LBS | HEIGHT: 61 IN | TEMPERATURE: 98.1 F | RESPIRATION RATE: 18 BRPM | OXYGEN SATURATION: 96 % | DIASTOLIC BLOOD PRESSURE: 80 MMHG

## 2022-09-02 DIAGNOSIS — Z86.16 HISTORY OF COVID-19: ICD-10-CM

## 2022-09-02 DIAGNOSIS — Z79.4 TYPE 2 DIABETES MELLITUS WITH HYPERGLYCEMIA, WITH LONG-TERM CURRENT USE OF INSULIN (HCC): ICD-10-CM

## 2022-09-02 DIAGNOSIS — Z12.31 ENCOUNTER FOR SCREENING MAMMOGRAM FOR MALIGNANT NEOPLASM OF BREAST: ICD-10-CM

## 2022-09-02 DIAGNOSIS — E11.65 TYPE 2 DIABETES MELLITUS WITH HYPERGLYCEMIA, WITH LONG-TERM CURRENT USE OF INSULIN (HCC): ICD-10-CM

## 2022-09-02 DIAGNOSIS — Z78.0 POSTMENOPAUSAL: ICD-10-CM

## 2022-09-02 DIAGNOSIS — M54.6 ACUTE LEFT-SIDED THORACIC BACK PAIN: ICD-10-CM

## 2022-09-02 DIAGNOSIS — Z76.89 ENCOUNTER FOR SUPPORT AND COORDINATION OF TRANSITION OF CARE: Primary | ICD-10-CM

## 2022-09-02 LAB
SL AMB  POCT GLUCOSE, UA: ABNORMAL
SL AMB LEUKOCYTE ESTERASE,UA: ABNORMAL
SL AMB POCT BILIRUBIN,UA: ABNORMAL
SL AMB POCT BLOOD,UA: ABNORMAL
SL AMB POCT CLARITY,UA: CLEAR
SL AMB POCT COLOR,UA: YELLOW
SL AMB POCT KETONES,UA: 15
SL AMB POCT NITRITE,UA: ABNORMAL
SL AMB POCT PH,UA: 5
SL AMB POCT SPECIFIC GRAVITY,UA: 1.02
SL AMB POCT URINE PROTEIN: ABNORMAL
SL AMB POCT UROBILINOGEN: 1

## 2022-09-02 PROCEDURE — 99496 TRANSJ CARE MGMT HIGH F2F 7D: CPT | Performed by: FAMILY MEDICINE

## 2022-09-02 PROCEDURE — 81003 URINALYSIS AUTO W/O SCOPE: CPT | Performed by: FAMILY MEDICINE

## 2022-09-02 PROCEDURE — 1111F DSCHRG MED/CURRENT MED MERGE: CPT | Performed by: FAMILY MEDICINE

## 2022-09-02 NOTE — PROGRESS NOTES
Assessment/Plan:    1  Encounter for support and coordination of transition of care    2  History of COVID-19    3  Encounter for screening mammogram for malignant neoplasm of breast  -     Mammo screening bilateral w 3d & cad; Future; Expected date: 09/02/2022    4  Postmenopausal  -     DXA bone density spine hip and pelvis; Future; Expected date: 09/02/2022    5  Acute left-sided thoracic back pain  -     POCT urine dip auto non-scope    6  Type 2 diabetes mellitus with hyperglycemia, with long-term current use of insulin ([facility])  -     Microalbumin / creatinine urine ratio    7  BMI 40 0-44 9, adult ([facility])      BMI Counseling: Body mass index is 44 33 kg/m²  The BMI is above normal  Nutrition recommendations include encouraging healthy choices of fruits and vegetables, consuming healthier snacks, moderation in carbohydrate intake, increasing intake of lean protein, reducing intake of saturated and trans fat and reducing intake of cholesterol  Exercise recommendations include strength training exercises  No pharmacotherapy was ordered  Rationale for BMI follow-up plan is due to patient being overweight or obese  Subjective:      Patient ID: Lulú Dempsey is a 57 y o  female  Chief Complaint   Patient presents with    Transition of Care Management     Covid , pt feeling much better but is still fatigued , pt having issue at work upper left side of back hurts and burns after she vacuums or does anything physical        HPI  TCM Call     Date and time call was made  8/29/2022  1940 [facility] reviewed  Records reviewed    Patient was hospitialized at  [address] [place] [address]    Date of Admission  08/21/22    Date of discharge  08/26/22    Diagnosis  pneumonia    Disposition  Home    Were the patients medications reviewed and updated  Yes    Current Symptoms  None      TCM Call     Should patient be enrolled in anticoag monitoring?   No    Patients specialists  Cardiologist; Pulmonlolgist Cardiologist name  Dr Sandip Contreras name  DR Juan Alberto Juarez    Did you obtain your prescribed medications  Yes    Do you need help managing your prescriptions or medications  No    Is transportation to your appointment needed  No    I have advised the patient to call PCP with any new or worsening symptoms  Libertad jennings/audelia 8/29/2022    Living Arrangements  Family members    Are you recieving any outpatient services  No    Are you recieving home care services  No    Are you using any community resources  No    Current waiver services  No    Have you fallen in the last 12 months  No    Interperter language line needed  No      recovering from Adri  Doing better but still w fatigue   Also intermittent upper left side of back--worse w exertion--pulse ox=96%, HR=76 in office  DM not well controlled--sgt2i=8 6% 2 mths ago--pt following w endocrine-Dr Sky Moreno  Also following w cardio-Dr Ruben Ames--has appt sched 9/23  The following portions of the patient's history were reviewed and updated as appropriate: allergies, current medications, past family history, past medical history, past social history, past surgical history and problem list     Review of Systems   Constitutional: Positive for fatigue  Negative for fever  Respiratory:        GOLDSTEIN   Cardiovascular: Positive for palpitations  Gastrointestinal:        GERD   Musculoskeletal: Positive for arthralgias and myalgias  Neurological: Negative  Psychiatric/Behavioral: Positive for sleep disturbance  The patient is nervous/anxious            Current Outpatient Medications   Medication Sig Dispense Refill    albuterol (2 5 mg/3 mL) 0 083 % nebulizer solution Take 2 5 mg by nebulization 4 (four) times a day      albuterol (ACCUNEB) 1 25 MG/3ML nebulizer solution Take 1 25 mg by nebulization every 6 (six) hours as needed for wheezing      albuterol (ProAir HFA) 90 mcg/act inhaler Inhale 2 puffs every 4 (four) hours as needed for wheezing 8 5 g 7  atorvastatin (LIPITOR) 80 mg tablet TAKE 1 TABLET BY MOUTH EVERY DAY 30 tablet 3    BD Pen Needle Jenn 2nd Gen 32G X 4 MM MISC USE 2 PEN NEEDLES A DAY TO ADMINISTER INSULIN AND VICTOZA UNDER THE SKIN 100 each 3    BD Veo Insulin Syringe U/F 31G X 15/64" 0 5 ML MISC USE TO INJECT INSULIN DAILY      BD Veo Insulin Syringe U/F 31G X 15/64" 0 5 ML MISC USE TO INJECT INSULIN DAILY 100 each 1    buPROPion (WELLBUTRIN XL) 300 mg 24 hr tablet TAKE 1 TABLET BY MOUTH EVERY DAY IN THE MORNING 30 tablet 11    dicyclomine (BENTYL) 10 mg capsule TAKE 1 CAPSULE BY MOUTH TWICE A DAY 60 capsule 2    DULoxetine (CYMBALTA) 30 mg delayed release capsule TAKE 1 CAPSULE BY MOUTH ONCE A DAY 30 capsule 11    DULoxetine (CYMBALTA) 60 mg delayed release capsule TAKE 1 CAPSULE BY MOUTH EVERY DAY 30 capsule 3    fluticasone (FLONASE) 50 mcg/act nasal spray 2 sprays into each nostril as needed for rhinitis or allergies 16 g 3    hydrochlorothiazide (HYDRODIURIL) 25 mg tablet Take 1 tablet (25 mg total) by mouth daily 90 tablet 1    Incruse Ellipta 62 5 MCG/INH AEPB inhaler INHALE ONE PUFF BY MOUTH DAILY 30 blister 3    Insulin Glargine-yfgn (Semglee, yfgn,) 100 UNIT/ML SOPN Inject 74 units under the skin every bedtime 45 mL 0    insulin lispro (Admelog) 100 units/mL injection Inject 14 Units under the skin 3 (three) times a day with meals 30 mL 0    Insulin Pen Needle (BD Pen Needle Jenn 2nd Gen) 32G X 4 MM MISC USE 1 PEN NEEDLE A DAY TO ADMINISTER INSULIN UNDER THE SKIN      lisinopril (ZESTRIL) 2 5 mg tablet TAKE 1 TABLET BY MOUTH EVERY DAY 30 tablet 4    metFORMIN (GLUCOPHAGE) 1000 MG tablet TAKE 1 TABLET BY MOUTH TWICE A DAY WITH MEALS 180 tablet 1    OneTouch Delica Lancets 32W MISC Use to test blood sugar 2 times a day 100 each 3    OneTouch Ultra test strip USE TWO STRIPS A DAY TO CHECK BLOOD SUGAR 100 strip 3    traMADol (ULTRAM-ER) 200 MG 24 hr tablet Take 1 tablet (200 mg total) by mouth every 24 hours Fill dates:  8/31/22 & 9/30/22 30 tablet 1    Victoza injection INJECT 1 8 MG UNDER THE SKIN ONCE DAILY 9 mL 6    diltiazem (CARDIZEM CD) 120 mg 24 hr capsule TAKE 1 CAPSULE BY MOUTH EVERY DAY (Patient not taking: Reported on 9/2/2022) 30 capsule 5    nicotine (NICODERM CQ) 14 mg/24hr TD 24 hr patch Place 1 patch on the skin every 24 hours (Patient not taking: Reported on 9/2/2022) 28 patch 0    QUEtiapine (SEROquel) 25 mg tablet TAKE 1 TABLET BY MOUTH TWICE A DAY 60 tablet 5     No current facility-administered medications for this visit  Objective:    /80 (BP Location: Left arm, Patient Position: Sitting, Cuff Size: Large)   Pulse 76   Temp 98 1 °F (36 7 °C)   Resp 18   Ht 5' 1" (1 549 m)   Wt 106 kg (234 lb 9 6 oz)   SpO2 96%   BMI 44 33 kg/m²        Physical Exam  Vitals and nursing note reviewed  Constitutional:       General: She is not in acute distress  Appearance: She is obese  Eyes:      General: No scleral icterus  Conjunctiva/sclera: Conjunctivae normal    Cardiovascular:      Rate and Rhythm: Normal rate and regular rhythm  Pulses: no weak pulses          Dorsalis pedis pulses are 2+ on the right side and 2+ on the left side  Posterior tibial pulses are 2+ on the right side and 2+ on the left side  Pulmonary:      Effort: Pulmonary effort is normal  No respiratory distress  Comments: No localized w/r/r  Abdominal:      General: Bowel sounds are normal       Palpations: Abdomen is soft  Tenderness: There is no abdominal tenderness  Musculoskeletal:         General: Tenderness present  Cervical back: Neck supple  Feet:      Right foot:      Skin integrity: No ulcer, skin breakdown, erythema, warmth, callus or dry skin  Left foot:      Skin integrity: No ulcer, skin breakdown, erythema, warmth, callus or dry skin  Skin:     General: Skin is warm and dry  Coloration: Skin is not jaundiced     Neurological:      General: No focal deficit present  Mental Status: She is alert and oriented to person, place, and time  Cranial Nerves: No cranial nerve deficit  Psychiatric:         Mood and Affect: Mood normal          Diabetic Foot Exam    Patient's shoes and socks removed  Right Foot/Ankle   Right Foot Inspection  Skin Exam: skin normal and skin intact  No dry skin, no warmth, no callus, no erythema, no maceration, no abnormal color, no pre-ulcer, no ulcer and no callus  Toe Exam: ROM and strength within normal limits  Sensory   Vibration: intact  Monofilament testing: intact    Vascular  Capillary refills: < 3 seconds  The right DP pulse is 2+  The right PT pulse is 2+  Left Foot/Ankle  Left Foot Inspection  Skin Exam: skin normal and skin intact  No dry skin, no warmth, no erythema, no maceration, normal color, no pre-ulcer, no ulcer and no callus  Toe Exam: ROM and strength within normal limits  Sensory   Vibration: intact  Monofilament testing: intact    Vascular  Capillary refills: < 3 seconds  The left DP pulse is 2+  The left PT pulse is 2+       Assign Risk Category  No deformity present  No loss of protective sensation  No weak pulses  Risk: 0         Alphonso Johnson MD

## 2022-09-08 LAB
ALBUMIN/CREAT UR: 59 MG/G CREAT (ref 0–29)
CREAT UR-MCNC: 149.2 MG/DL
MICROALBUMIN UR-MCNC: 87.8 UG/ML

## 2022-09-08 PROCEDURE — 3060F POS MICROALBUMINURIA REV: CPT | Performed by: STUDENT IN AN ORGANIZED HEALTH CARE EDUCATION/TRAINING PROGRAM

## 2022-09-09 ENCOUNTER — TELEPHONE (OUTPATIENT)
Dept: PULMONOLOGY | Facility: CLINIC | Age: 62
End: 2022-09-09

## 2022-09-15 DIAGNOSIS — E11.65 TYPE 2 DIABETES MELLITUS WITH HYPERGLYCEMIA, WITH LONG-TERM CURRENT USE OF INSULIN (HCC): ICD-10-CM

## 2022-09-15 DIAGNOSIS — Z79.4 TYPE 2 DIABETES MELLITUS WITH HYPERGLYCEMIA, WITH LONG-TERM CURRENT USE OF INSULIN (HCC): ICD-10-CM

## 2022-09-15 DIAGNOSIS — I10 ESSENTIAL HYPERTENSION: ICD-10-CM

## 2022-09-15 PROCEDURE — 4010F ACE/ARB THERAPY RXD/TAKEN: CPT | Performed by: STUDENT IN AN ORGANIZED HEALTH CARE EDUCATION/TRAINING PROGRAM

## 2022-09-15 RX ORDER — LISINOPRIL 2.5 MG/1
TABLET ORAL
Qty: 30 TABLET | Refills: 4 | Status: ON HOLD | OUTPATIENT
Start: 2022-09-15

## 2022-09-20 ENCOUNTER — OFFICE VISIT (OUTPATIENT)
Dept: PULMONOLOGY | Facility: MEDICAL CENTER | Age: 62
End: 2022-09-20
Payer: COMMERCIAL

## 2022-09-20 VITALS
RESPIRATION RATE: 12 BRPM | SYSTOLIC BLOOD PRESSURE: 122 MMHG | HEART RATE: 95 BPM | BODY MASS INDEX: 43.31 KG/M2 | TEMPERATURE: 97.2 F | DIASTOLIC BLOOD PRESSURE: 84 MMHG | OXYGEN SATURATION: 94 % | HEIGHT: 61 IN | WEIGHT: 229.4 LBS

## 2022-09-20 DIAGNOSIS — J40 BRONCHITIS: Primary | Chronic | ICD-10-CM

## 2022-09-20 DIAGNOSIS — E66.2 HYPOVENTILATION ASSOCIATED WITH OBESITY SYNDROME (HCC): ICD-10-CM

## 2022-09-20 DIAGNOSIS — Z87.891 FORMER SMOKER: Chronic | ICD-10-CM

## 2022-09-20 PROBLEM — U07.1 COVID-19: Status: RESOLVED | Noted: 2022-08-21 | Resolved: 2022-09-20

## 2022-09-20 PROBLEM — J96.01 ACUTE RESPIRATORY FAILURE WITH HYPOXIA (HCC): Status: RESOLVED | Noted: 2022-08-21 | Resolved: 2022-09-20

## 2022-09-20 PROBLEM — J43.2 CENTRILOBULAR EMPHYSEMA (HCC): Chronic | Status: RESOLVED | Noted: 2019-05-13 | Resolved: 2022-09-20

## 2022-09-20 PROBLEM — J43.2 CENTRILOBULAR EMPHYSEMA (HCC): Chronic | Status: ACTIVE | Noted: 2019-05-13

## 2022-09-20 PROCEDURE — 99214 OFFICE O/P EST MOD 30 MIN: CPT | Performed by: NURSE PRACTITIONER

## 2022-09-20 NOTE — ASSESSMENT & PLAN NOTE
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

## 2022-09-20 NOTE — PROGRESS NOTES
Assessment/Plan:     Problem List Items Addressed This Visit        Respiratory    Bronchitis - Primary (Chronic)     Patient has simple chronic bronchitis  She was last seen in the office in April 2022  Recently she was hospitalized for COVID-19 and acute respiratory failure with hypoxia  She had a complete pulmonary function test done in March 2022  This showed mild decrease in forced vital capacity and   Overall patient is stable but normal obstruction ratio there was moderate air trapping with residual volume 143% of predicted and total lung capacity was normal at 99% of predicted  FEV1 was 1 57 L or 71% % of predicted forced vital capacity was 1 95 L or 70% of predicted and obstruction ratio was 80%  According to patient she is now using Incruse 1 puff daily  She has not been on any inhaled corticosteroid  It appears that her last visit Advair fluticasone and salmeterol was written but patient reports that she has not used this  She is not need nebulized station  She rarely needs rescue inhalation    Patient is stable at this point            Other    Former smoker (Chronic)     Patient is a former smoker  She has stop smoking since her discharge from the hospital on we applauded her for this she is no longer on nicotine patches and is smoke-free  She has agreed to screening CT of chest   I would like her to have this perhaps in November 2022 as she is recently recovering from COVID-19  She does meet the the criteria for this and is willing to have this done         Relevant Orders    CT lung screening program      Other Visit Diagnoses     Hypoventilation associated with obesity syndrome ([facility] [address] )        Relevant Orders    Pulse oximetry overnight            Return in about 1 year (around 9/20/2023)  All questions are answered to the patient's satisfaction and understanding  She verbalizes understanding  She is encouraged to call with any further questions or concerns      Portions of the record may have been created with voice recognition software  Occasional wrong word or "sound a like" substitutions may have occurred due to the inherent limitations of voice recognition software  Read the chart carefully and recognize, using context, where substitutions have occurred  Electronically Signed by LURDES Treviño    ______________________________________________________________________    Chief Complaint: No chief complaint on file  Patient ID: Maritza Bañuelos is a 57 y o  y o  female has a past medical history of Anxiety, Arthritis, Asthma, Breast lump, Chronic pain disorder, COPD (chronic obstructive pulmonary disease) ([facility] [address] ), Coronary artery disease involving native coronary artery of native heart with angina pectoris ([facility] [address] ) (09/21/2019), CPAP (continuous positive airway pressure) dependence, Depression, Diarrhea, GERD (gastroesophageal reflux disease), Hypercholesterolemia, Hyperlipidemia, Hypertension, Intolerance to heat, Irregular heart beat, Joint pain, Low back pain, Neck pain, Nodule of tendon sheath, Obesity, Osteoarthritis (2020), Peripheral neuropathy, Shortness of breath, Sleep apnea, Spinal stenosis, Type 2 diabetes mellitus with hyperglycemia ([address] ), and Varicose vein of leg     9/20/2022  Patient presents today for follow-up visit  ANA Jayden Barry is a 19-year-old female who was here today status post hospitalization  This patient has been seen in the office before with diagnosis of COPD  She was admitted to the hospital with acute respiratory failure with hypoxia  She also was positive for COVID-19  She has been a cigarette smoker in the past   She also has history of coronary artery disease and stage 2 chronic kidney disease  Patient did test positive she did receive COVID vaccine  She initially required 2 L of supplemental oxygen at 1 point her oxygen requirement increased to 6 L   She also had a chest x-ray showing stable interstitial prominence bilaterally and right middle lobe linear atelectasis no consolidation was identified  She continued on COVID protocol on discharge she did not need supplemental oxygen  Patient was started on IV Solu-Medrol for which she continued during her hospitalization  Patient currently is on AirDuo on Incruse  She also receives nebulized albuterol at home  She was also on IV Zithromax  Patient did have a pulmonology consultation during her hospitalization  When she was seen she was on 5 L of supplemental oxygen it was also felt that she has shortness of breath secondary to some heart failure fluid overload and COPD  Patient had complete PFT done in March 2022  Her forced vital capacity was 70% of predicted, FEV1 was 71% of predicted obstruction ratio was normal   She has significant response to inhaled bronchodilator in small airways  Diffusion capacity was normal flow volume you showed a curvature suggesting expiratory airflow obstruction   Patient did have a 2D echo that was done June 16, 2022  Left ventricle size was normal there was mild concentric hypertrophy ejection fraction was 38% diastolic function was mildly abnormal consistent with grade 1 dysfunction  Right ventricle systolic function was normal atrium of left was normal aortic valve leaflets were mildly thickened without calcification no evidence of regurgitation or stenosis mitral valve had mild thickening and mild chordal involvement no evidence of regurgitation no evidence of regurgitation and tricuspid valve  Her chest x-ray on admission showed linear atelectasis and mild atelectasis in the left lower field  He had x-ray done on 08/21/2022  There was right middle lobe atelectasis and interstitial prominence bilaterally    Review of Systems    Smoking history: She reports that she quit smoking about 4 weeks ago  Her smoking use included cigarettes  She has a 15 00 pack-year smoking history   She has never used smokeless tobacco     The following portions of the patient's history were reviewed and updated as appropriate: current medications, past family history, past medical history, past social history, past surgical history and problem list     Immunization History   Administered Date(s) Administered    COVID-19 MODERNA VACC 0 5 ML IM 03/04/2021, 04/02/2021    Influenza Quadrivalent Preservative Free 3 years and older IM 11/01/2016, 10/02/2017    Influenza, injectable, quadrivalent, preservative free 0 5 mL 10/10/2018    Influenza, recombinant, quadrivalent,injectable, preservative free 09/10/2019, 10/06/2020, 10/08/2021    Influenza, seasonal, injectable 01/06/2005, 10/07/2010, 10/07/2013, 10/14/2014, 09/16/2015    Pneumococcal Conjugate 13-Valent 05/18/2016    Pneumococcal Polysaccharide PPV23 10/07/2013, 10/10/2018    Tdap 09/21/2016     Current Outpatient Medications   Medication Sig Dispense Refill    albuterol (2 5 mg/3 mL) 0 083 % nebulizer solution Take 2 5 mg by nebulization 4 (four) times a day      albuterol (ACCUNEB) 1 25 MG/3ML nebulizer solution Take 1 25 mg by nebulization every 6 (six) hours as needed for wheezing      albuterol (ProAir HFA) 90 mcg/act inhaler Inhale 2 puffs every 4 (four) hours as needed for wheezing 8 5 g 7    atorvastatin (LIPITOR) 80 mg tablet TAKE 1 TABLET BY MOUTH EVERY DAY 30 tablet 3    BD Pen Needle Jenn 2nd Gen 32G X 4 MM MISC USE 2 PEN NEEDLES A DAY TO ADMINISTER INSULIN AND VICTOZA UNDER THE SKIN 100 each 3    BD Veo Insulin Syringe U/F 31G X 15/64" 0 5 ML MISC USE TO INJECT INSULIN DAILY      BD Veo Insulin Syringe U/F 31G X 15/64" 0 5 ML MISC USE TO INJECT INSULIN DAILY 100 each 1    buPROPion (WELLBUTRIN XL) 300 mg 24 hr tablet TAKE 1 TABLET BY MOUTH EVERY DAY IN THE MORNING 30 tablet 11    dicyclomine (BENTYL) 10 mg capsule TAKE 1 CAPSULE BY MOUTH TWICE A DAY 60 capsule 2    DULoxetine (CYMBALTA) 30 mg delayed release capsule TAKE 1 CAPSULE BY MOUTH ONCE A DAY 30 capsule 11    DULoxetine (CYMBALTA) 60 mg delayed release capsule TAKE 1 CAPSULE BY MOUTH EVERY DAY 30 capsule 3    fluticasone (FLONASE) 50 mcg/act nasal spray 2 SPRAYS INTO EACH NOSTRIL AS NEEDED FOR RHINITIS OR ALLERGIES 16 mL 3    hydrochlorothiazide (HYDRODIURIL) 25 mg tablet Take 1 tablet (25 mg total) by mouth daily 90 tablet 1    Incruse Ellipta 62 5 MCG/INH AEPB inhaler INHALE ONE PUFF BY MOUTH DAILY 1 blister 3    Insulin Glargine-yfgn (Semglee, yfgn,) 100 UNIT/ML SOPN Inject 74 units under the skin every bedtime 45 mL 0    insulin lispro (Admelog) 100 units/mL injection Inject 14 Units under the skin 3 (three) times a day with meals 30 mL 0    Insulin Pen Needle (BD Pen Needle Jenn 2nd Gen) 32G X 4 MM MISC USE 1 PEN NEEDLE A DAY TO ADMINISTER INSULIN UNDER THE SKIN      lisinopril (ZESTRIL) 2 5 mg tablet TAKE 1 TABLET BY MOUTH EVERY DAY 30 tablet 4    metFORMIN (GLUCOPHAGE) 1000 MG tablet TAKE 1 TABLET BY MOUTH TWICE A DAY WITH MEALS 180 tablet 1    OneTouch Delica Lancets 91M MISC Use to test blood sugar 2 times a day 100 each 3    OneTouch Ultra test strip USE TWO STRIPS A DAY TO CHECK BLOOD SUGAR 100 strip 3    traMADol (ULTRAM-ER) 200 MG 24 hr tablet Take 1 tablet (200 mg total) by mouth every 24 hours Fill dates:  8/31/22 & 9/30/22 30 tablet 1    Victoza injection INJECT 1 8 MG UNDER THE SKIN ONCE DAILY 9 mL 6    diltiazem (CARDIZEM CD) 120 mg 24 hr capsule TAKE 1 CAPSULE BY MOUTH EVERY DAY (Patient not taking: No sig reported) 30 capsule 5    nicotine (NICODERM CQ) 14 mg/24hr TD 24 hr patch Place 1 patch on the skin every 24 hours (Patient not taking: No sig reported) 28 patch 0    QUEtiapine (SEROquel) 25 mg tablet TAKE 1 TABLET BY MOUTH TWICE A DAY 60 tablet 5     No current facility-administered medications for this visit  Allergies: Patient has no known allergies      Objective:  Vitals:    09/20/22 1434   BP: 122/84   Pulse: 95   Resp: 12   Temp: (!) 97 2 °F (36 2 °C)   TempSrc: Temporal   SpO2: 94%   Weight: 104 kg (229 lb 6 4 oz)   Height: 5' 1" (1 549 m)   Oxygen Therapy  SpO2: 94 %    Wt Readings from Last 3 Encounters:   09/20/22 104 kg (229 lb 6 4 oz)   09/02/22 106 kg (234 lb 9 6 oz)   08/26/22 104 kg (230 lb)     Body mass index is 43 34 kg/m²  Physical Exam    Lab Review:   Orders Only on 09/02/2022   Component Date Value    Creatinine, Urine 09/02/2022 149 2     Microalbum  ,U,Random 09/02/2022 87 8     Microalb/Creat Ratio 09/02/2022 59 (A)   Office Visit on 09/02/2022   Component Date Value     COLOR,UA 09/02/2022 yellow     CLARITY,UA 09/02/2022 clear     SPECIFIC GRAVITY,UA 09/02/2022 1 020      PH,UA 09/02/2022 5     LEUKOCYTE ESTERASE,UA 09/02/2022 neg     NITRITE,UA 09/02/2022 neg     GLUCOSE, UA 09/02/2022 norm     KETONES,UA 09/02/2022 15     BILIRUBIN,UA 09/02/2022 neg     BLOOD,UA 09/02/2022 neg     POCT URINE PROTEIN 09/02/2022 tr     SL AMB POCT UROBILINOGEN 09/02/2022 1    No results displayed because visit has over 200 results        Admission on 08/19/2022, Discharged on 08/19/2022   Component Date Value    WBC 08/19/2022 7 95     RBC 08/19/2022 5 04     Hemoglobin 08/19/2022 15 1     Hematocrit 08/19/2022 48 0 (A)    MCV 08/19/2022 95     MCH 08/19/2022 30 0     MCHC 08/19/2022 31 5     RDW 08/19/2022 14 0     MPV 08/19/2022 11 3     Platelets 51/07/1075 259     nRBC 08/19/2022 0     Neutrophils Relative 08/19/2022 67     Immat GRANS % 08/19/2022 0     Lymphocytes Relative 08/19/2022 22     Monocytes Relative 08/19/2022 9     Eosinophils Relative 08/19/2022 1     Basophils Relative 08/19/2022 1     Neutrophils Absolute 08/19/2022 5 33     Immature Grans Absolute 08/19/2022 0 03     Lymphocytes Absolute 08/19/2022 1 77     Monocytes Absolute 08/19/2022 0 69     Eosinophils Absolute 08/19/2022 0 09     Basophils Absolute 08/19/2022 0 04     Sodium 08/19/2022 141     Potassium 08/19/2022 4 5     Chloride 08/19/2022 99     CO2 08/19/2022 34 (A)    ANION GAP 08/19/2022 8     BUN 08/19/2022 13     Creatinine 08/19/2022 1 51 (A)    Glucose 08/19/2022 345 (A)    Calcium 08/19/2022 9 0     AST 08/19/2022 15     ALT 08/19/2022 30     Alkaline Phosphatase 08/19/2022 89     Total Protein 08/19/2022 7 2     Albumin 08/19/2022 3 5     Total Bilirubin 08/19/2022 0 34     eGFR 08/19/2022 36     Magnesium 08/19/2022 1 8     hs TnI 0hr 08/19/2022 59 (A)    NT-proBNP 08/19/2022 285 (A)    SARS-CoV-2 08/19/2022 Positive (A)   Office Visit on 08/18/2022   Component Date Value    RESULT ALL_PRESC MEDS SP* 65/47/0920 Not Applicable     Alpha-Hydroxyalprazolam * 08/18/2022 negative     Amphetamine Quantificati* 08/18/2022 negative     Buprenorphine Quantifica* 08/18/2022 negative     Norbuprenorphine Quantif* 08/18/2022 negative     Citalopram/Escitalopram * 08/18/2022 negative     CITALOPRAM/ESCITALOPRAM * 08/18/2022 negative     7-Amino-Clonazepam Quant* 08/18/2022 negative     Cocaine metabolite Quant* 08/18/2022 negative     Codeine Quantification 08/18/2022 positive-61  185     Morphine Quantification 08/18/2022 negative     Hydrocodone Quantificati* 08/18/2022 negative     Norhydrocodone Quantific* 08/18/2022 negative     Hydromorphone Quantifica* 08/18/2022 negative     Nordiazepam Quantificati* 08/18/2022 negative     Temazepam Quantification 08/18/2022 negative     Oxazepam Quantification 08/18/2022 negative     Ethyl Glucuronide Quanti* 08/18/2022 negative     Ethyl Sulfate Quantifica* 08/18/2022 negative     Fentanyl Quantification 08/18/2022 negative     Norfentanyl Quantificati* 08/18/2022 negative     4-ANPP Quantification 08/18/2022 Fen Neg     Acetyl fentanyl Quantifi* 08/18/2022 Fen Neg     Acetyl norfentanyl Quant* 08/18/2022 Fen Neg     Acryl fentanyl Quantific* 08/18/2022 Fen Neg     Carfentanil Quantificati* 08/18/2022 Fen Neg     Para-fluorofentanyl Herbert* 08/18/2022 Fen Neg     6-MONY (Heroin metabolite* 08/18/2022 negative     Hydrocodone Quantificati* 08/18/2022 negative     Norhydrocodone Quantific* 08/18/2022 negative     Hydromorphone Quantifica* 08/18/2022 negative     Hydromorphone Quantifica* 08/18/2022 negative     Lorazepam Quantification 08/18/2022 negative     MDMA Quantification 08/18/2022 negative     Methadone Quantification 08/18/2022 negative     EDDP (Methadone metaboli* 08/18/2022 negative     Amphetamine Quantificati* 08/18/2022 negative     Methamphetamine Quantifi* 08/18/2022 negative     Methylphenidate Quantifi* 08/18/2022 negative     RITALINIC ACID QUANTIFIC* 08/18/2022 negative     Morphine Quantification 08/18/2022 negative     Hydromorphone Quantifica* 08/18/2022 negative     Oxazepam Quantification 08/18/2022 negative     Oxycodone Quantification 08/18/2022 negative     Noroxycodone Quantificat* 08/18/2022 negative     Oxymorphone Quantificati* 08/18/2022 negative     Oxymorphone Quantificati* 08/18/2022 negative     PHENTERMINE QUANTIFICATI* 08/18/2022 negative     PREGABALIN QUANTIFICATION 08/18/2022 negative     Tapentadol Quantification 08/18/2022 negative     Temazepam Quantification 08/18/2022 negative     Oxazepam Quantification 08/18/2022 negative     cTHC (Marijuana metaboli* 08/18/2022 negative     Tramadol Quantification 08/18/2022 positive-63577 382-I (A)    O-desmethyl-tramadol Vishal* 08/18/2022 positive-> 35686-Q (A)    N-DESMETHYL-TRAMADOL VISHAL* 08/18/2022 positive-1261 174-I (A)    CREATININE 08/18/2022 normal-175 7     OXIDANT 08/18/2022 normal-0     pH 08/18/2022 normal-5 4     SPECIFIC GRAVITY URINE 08/18/2022 normal-1 034    Hospital Outpatient Visit on 06/16/2022   Component Date Value    LA size 06/16/2022 3 8     LVPWd 06/16/2022 1 20     Left Atrium Area-systoli* 06/16/2022 12 8     Left Atrium Area-systoli* 06/16/2022 24     MV E' Tissue Velocity Se* 06/16/2022 9     MV E' Tissue Velocity La* 06/16/2022 10     IVSd 06/16/2022 0 98     LV DIASTOLIC VOLUME (MOD* 06/16/2022 55     LEFT VENTRICLE SYSTOLIC * 67/17/2979 26     Left ventricular stroke * 06/16/2022 29 00     EF 06/16/2022 52     A4C EF 06/16/2022 57     LA length (A2C) 06/16/2022 4 40     LVIDd 06/16/2022 3 60     IVS 06/16/2022 1 3     LVIDS 06/16/2022 2 70     FS 06/16/2022 25     Ao root 06/16/2022 3 30     RVID d 06/16/2022 3 9     AV LVOT peak gradient 06/16/2022 5     MV valve area p 1/2 meth* 06/16/2022 5 79     PV peak gradient antegra* 06/16/2022 2     E wave deceleration time 06/16/2022 131     LVOT diameter 06/16/2022 2 1     AV peak gradient 06/16/2022 8     MV Peak E Db 06/16/2022 85     MV Peak A Db 06/16/2022 4 11     LV Systolic Volume (BP) 73/42/0049 59     LV Diastolic Volume (BP) 76/04/6298 122     PAVEL A4C 06/16/2022 23 9     RAA A4C 06/16/2022 15 5     MV stenosis pressure 1/2* 06/16/2022 38     LVSV, 2D 06/16/2022 29     LVOT area 06/16/2022 3 46     LV EF 06/16/2022 52     Tricuspid annular plane * 06/16/2022 2 40    Office Visit on 04/11/2022   Component Date Value    White Blood Cell Count 06/14/2022 8 3     Red Blood Cell Count 06/14/2022 5 09     Hemoglobin 06/14/2022 15 3     HCT 06/14/2022 46 4     MCV 06/14/2022 91     MCH 06/14/2022 30 1     MCHC 06/14/2022 33 0     RDW 06/14/2022 12 8     Platelet Count 25/52/1006 309     Neutrophils 06/14/2022 60     Lymphocytes 06/14/2022 28     Monocytes 06/14/2022 8     Eosinophils 06/14/2022 2     Basophils PCT 06/14/2022 1     Neutrophils (Absolute) 06/14/2022 5 1     Lymphocytes (Absolute) 06/14/2022 2 3     Monocytes (Absolute) 06/14/2022 0 6     Eosinophils (Absolute) 06/14/2022 0 2     Basophils ABS 06/14/2022 0 1     Immature Granulocytes 06/14/2022 1     Immature Granulocytes (A* 06/14/2022 0 0     Glucose, Random 06/14/2022 136 (A)    BUN 06/14/2022 14     Creatinine 06/14/2022 1 02 (A)    eGFR 06/14/2022 62     SL AMB BUN/CREATININE RA* 06/14/2022 14     Sodium 06/14/2022 143  Potassium 2022 4 6     Chloride 2022 101     CO2 2022 27     CALCIUM 2022 9 7     Protein, Total 2022 6 3     Albumin 2022 4 1     Globulin, Total 2022 2 2     Albumin/Globulin Ratio 2022 1 9     TOTAL BILIRUBIN 2022 <0 2     Alk Phos Isoenzymes 2022 91     AST 2022 15     ALT 2022 16     D-Dimer 2022 0 23     Hemoglobin A1C 2022 9 6 (A)    Estimated Average Glucose 2022 229        Past Surgical History:   Procedure Laterality Date    BACK SURGERY  2005    lower fusion    BREAST BIOPSY Right 2021    benign    CARDIAC SURGERY  2019    had a cardiac cath    CARPAL TUNNEL RELEASE Right     CAUDAL BLOCK N/A 2017    Procedure: BLOCK / INJECTION CAUDAL;  Surgeon: Jovita Hinson MD;  Location: [place] [address] MAIN OR;  Service: Pain Management     CAUDAL BLOCK N/A 2018    Procedure: Caudal Epidural Steroid Injection (07647); Surgeon: Jovita Hinson MD;  Location: [facility] MAIN OR;  Service: Pain Management     CAUDAL BLOCK N/A 2020    Procedure: Caudal Epidural Steroid Injection (15102); Surgeon: Jovita Hinson MD;  Location: [facility] MAIN OR;  Service: Pain Management     CAUDAL BLOCK N/A 03/10/2022    Procedure: CAUDAL epidural steroid injection ( 92181 ); Surgeon: Jovita Hinson MD;  Location: [facility] MAIN OR;  Service: Pain Management      SECTION  1984    EGD  10/2019    for bariatric surgery    FL GUIDED NEEDLE PLAC BX/ASP/INJ  03/10/2022    FL INJECTION LEFT HIP (NON ARTHROGRAM)  2021    FL INJECTION LEFT HIP (NON ARTHROGRAM)  2022    LAMINECTOMY      Lumbar 2005    OK ARTHROCENTESIS ASPIR&/INJ MAJOR JT/BURSA W/O US Left 10/15/2020    Procedure: Intra Articular hip joint injection (62658);   Surgeon: Jovita Hinson MD;  Location: [facility] MAIN OR;  Service: Pain Management     OK ARTHROCENTESIS ASPIR&/INJ MAJOR JT/BURSA W/O US Left 2021    Procedure: hip intra articular joint injection (63884 32346-22); Surgeon: Criss Malik MD;  Location: [facility] MAIN OR;  Service: Pain Management     [place] ARTHROCENTESIS ASPIR&/INJ MAJOR JT/BURSA W/O US Left 01/27/2022    Procedure: hip intra articular joint injection ([id number] [id number]); Surgeon: Criss Malik MD;  Location: [facility] MAIN OR;  Service: Pain Management     US GUIDED BREAST BIOPSY RIGHT COMPLETE Right 03/29/2021        Family History   Problem Relation Age of Onset    Diabetes Mother     Heart disease Mother         CAD-3 stents   [place] Polycythemia Mother     Hemochromatosis Mother     Dementia Father     Alzheimer's disease Father     No Known Problems Brother     No Known Problems Son     No Known Problems Maternal Grandmother     No Known Problems Maternal Grandfather     No Known Problems Paternal Grandmother     No Known Problems Paternal Grandfather     No Known Problems Daughter     No Known Problems Maternal Aunt     No Known Problems Maternal Uncle     No Known Problems Paternal Aunt     No Known Problems Paternal Uncle         Diagnostics:  I have personally reviewed pertinent reports  Office Spirometry Results:     ESS:    XR chest 1 view portable    Result Date: 8/22/2022  Narrative: CHEST INDICATION:   pneumonia  Patient has confirmed COVID-19  COMPARISON:  8/19/2022 EXAM PERFORMED/VIEWS:  XR CHEST PORTABLE FINDINGS: Cardiomediastinal silhouette appears unremarkable  There is right middle lobe atelectasis  There is interstitial prominence bilaterally  Osseous structures appear within normal limits for patient age  Impression: Stable interstitial prominence bilaterally and right middle lobe linear atelectasis  No new focal airspace consolidation identified   Workstation performed: [id number]

## 2022-09-20 NOTE — ASSESSMENT & PLAN NOTE
Patient is a former smoker  She has stop smoking since her discharge from the hospital on we applauded her for this she is no longer on nicotine patches and is smoke-free  She has agreed to screening CT of chest   I would like her to have this perhaps in November 2022 as she is recently recovering from COVID-19    She does meet the the criteria for this and is willing to have this done

## 2022-09-21 LAB
DME PARACHUTE DELIVERY DATE REQUESTED: NORMAL
DME PARACHUTE ITEM DESCRIPTION: NORMAL
DME PARACHUTE ORDER STATUS: NORMAL
DME PARACHUTE SUPPLIER NAME: NORMAL
DME PARACHUTE SUPPLIER PHONE: NORMAL

## 2022-09-23 ENCOUNTER — OFFICE VISIT (OUTPATIENT)
Dept: CARDIOLOGY CLINIC | Facility: CLINIC | Age: 62
End: 2022-09-23
Payer: COMMERCIAL

## 2022-09-23 VITALS
HEART RATE: 99 BPM | HEIGHT: 61 IN | SYSTOLIC BLOOD PRESSURE: 120 MMHG | DIASTOLIC BLOOD PRESSURE: 80 MMHG | BODY MASS INDEX: 43.61 KG/M2 | WEIGHT: 231 LBS | TEMPERATURE: 97 F | OXYGEN SATURATION: 95 %

## 2022-09-23 DIAGNOSIS — E78.2 MIXED HYPERLIPIDEMIA: ICD-10-CM

## 2022-09-23 DIAGNOSIS — F17.200 NICOTINE DEPENDENCE, UNCOMPLICATED, UNSPECIFIED NICOTINE PRODUCT TYPE: ICD-10-CM

## 2022-09-23 DIAGNOSIS — Z86.16 HISTORY OF COVID-19: ICD-10-CM

## 2022-09-23 DIAGNOSIS — I25.119 CORONARY ARTERY DISEASE INVOLVING NATIVE CORONARY ARTERY OF NATIVE HEART WITH ANGINA PECTORIS (HCC): ICD-10-CM

## 2022-09-23 DIAGNOSIS — R06.02 SHORTNESS OF BREATH: ICD-10-CM

## 2022-09-23 DIAGNOSIS — G47.33 OBSTRUCTIVE SLEEP APNEA: ICD-10-CM

## 2022-09-23 DIAGNOSIS — J44.9 CHRONIC OBSTRUCTIVE PULMONARY DISEASE, UNSPECIFIED COPD TYPE (HCC): ICD-10-CM

## 2022-09-23 DIAGNOSIS — I11.9 HYPERTENSIVE HEART DISEASE WITHOUT HEART FAILURE: ICD-10-CM

## 2022-09-23 DIAGNOSIS — E11.65 TYPE 2 DIABETES MELLITUS WITH HYPERGLYCEMIA, WITH LONG-TERM CURRENT USE OF INSULIN (HCC): ICD-10-CM

## 2022-09-23 DIAGNOSIS — Z79.4 TYPE 2 DIABETES MELLITUS WITH HYPERGLYCEMIA, WITH LONG-TERM CURRENT USE OF INSULIN (HCC): ICD-10-CM

## 2022-09-23 PROCEDURE — 99214 OFFICE O/P EST MOD 30 MIN: CPT | Performed by: INTERNAL MEDICINE

## 2022-09-23 PROCEDURE — 1111F DSCHRG MED/CURRENT MED MERGE: CPT | Performed by: INTERNAL MEDICINE
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
## 2022-09-27 ENCOUNTER — OFFICE VISIT (OUTPATIENT)
Dept: FAMILY MEDICINE CLINIC | Facility: CLINIC | Age: 62
End: 2022-09-27
Payer: COMMERCIAL

## 2022-09-27 VITALS
SYSTOLIC BLOOD PRESSURE: 122 MMHG | WEIGHT: 237 LBS | DIASTOLIC BLOOD PRESSURE: 80 MMHG | RESPIRATION RATE: 16 BRPM | HEIGHT: 61 IN | TEMPERATURE: 98 F | HEART RATE: 78 BPM | BODY MASS INDEX: 44.75 KG/M2

## 2022-09-27 DIAGNOSIS — M54.50 CHRONIC BILATERAL LOW BACK PAIN WITHOUT SCIATICA: ICD-10-CM

## 2022-09-27 DIAGNOSIS — M72.2 PLANTAR FASCIITIS OF RIGHT FOOT: Primary | ICD-10-CM

## 2022-09-27 DIAGNOSIS — G89.4 CHRONIC PAIN SYNDROME: ICD-10-CM

## 2022-09-27 DIAGNOSIS — G89.29 CHRONIC BILATERAL LOW BACK PAIN WITHOUT SCIATICA: ICD-10-CM

## 2022-09-27 PROCEDURE — 3074F SYST BP LT 130 MM HG: CPT | Performed by: STUDENT IN AN ORGANIZED HEALTH CARE EDUCATION/TRAINING PROGRAM

## 2022-09-27 PROCEDURE — 3079F DIAST BP 80-89 MM HG: CPT | Performed by: STUDENT IN AN ORGANIZED HEALTH CARE EDUCATION/TRAINING PROGRAM

## 2022-09-27 PROCEDURE — 99213 OFFICE O/P EST LOW 20 MIN: CPT | Performed by: STUDENT IN AN ORGANIZED HEALTH CARE EDUCATION/TRAINING PROGRAM
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
## 2022-10-06 ENCOUNTER — TELEPHONE (OUTPATIENT)
Dept: PULMONOLOGY | Facility: CLINIC | Age: 62
End: 2022-10-06

## 2022-10-06 DIAGNOSIS — J44.1 COPD EXACERBATION (HCC): ICD-10-CM

## 2022-10-06 DIAGNOSIS — J41.0 SIMPLE CHRONIC BRONCHITIS (HCC): Primary | ICD-10-CM

## 2022-10-06 DIAGNOSIS — J20.9 ACUTE BRONCHITIS, UNSPECIFIED ORGANISM: ICD-10-CM

## 2022-10-06 DIAGNOSIS — R05.1 ACUTE COUGH: ICD-10-CM

## 2022-10-06 RX ORDER — PREDNISONE 10 MG/1
TABLET ORAL
Qty: 30 TABLET | Refills: 0 | Status: ON HOLD | OUTPATIENT
Start: 2022-10-06 | End: 2022-10-18

## 2022-10-06 RX ORDER — FLUTICASONE PROPIONATE AND SALMETEROL 250; 50 UG/1; UG/1
1 POWDER RESPIRATORY (INHALATION) 2 TIMES DAILY
Qty: 60 BLISTER | Refills: 6 | Status: ON HOLD | OUTPATIENT
Start: 2022-10-06

## 2022-10-06 RX ORDER — AZITHROMYCIN 250 MG/1
TABLET, FILM COATED ORAL
Qty: 6 TABLET | Refills: 0 | Status: SHIPPED | OUTPATIENT
Start: 2022-10-06 | End: 2022-10-10

## 2022-10-06 RX ORDER — GUAIFENESIN AND CODEINE PHOSPHATE 100; 10 MG/5ML; MG/5ML
5 SOLUTION ORAL 3 TIMES DAILY PRN
Qty: 120 ML | Refills: 0 | Status: ON HOLD | OUTPATIENT
Start: 2022-10-06

## 2022-10-06 NOTE — PROGRESS NOTES
Over past few days ago has had cough  She states she initially developed a cold symptoms that she got from her granddaughter  She has been now having cough productive for yellow mucus nasal congestion and some shortness of breath and wheezing  She did COVID test which was negative  No fever  She did have COVID infection and had positive test on 08/19 and was hospitalized in mid to late August because of COPD exacerbation related to COVID infection  Will prescribe a Z-Diego, prednisone tapering dose  She is only using Incruse as her maintenance inhaler will add Advair 250 mcg disc 1 puff b i d    She is using nebulizer with albuterol or albuterol inhaler as needed every 4 hours

## 2022-10-06 NOTE — TELEPHONE ENCOUNTER
Patient calling stating she has a cough and is requesting cough syrup with codeine   Please advise [phone]

## 2022-10-17 PROBLEM — J96.02 ACUTE RESPIRATORY FAILURE WITH HYPOXIA AND HYPERCAPNIA (HCC): Status: ACTIVE | Noted: 2022-08-21

## 2022-10-17 PROBLEM — J96.00 ACUTE RESPIRATORY FAILURE (HCC): Status: ACTIVE | Noted: 2022-08-21

## 2022-10-17 PROBLEM — G92.8 TOXIC METABOLIC ENCEPHALOPATHY: Status: ACTIVE | Noted: 2022-10-17

## 2022-10-17 PROBLEM — J18.9 PNEUMONIA: Status: ACTIVE | Noted: 2022-10-17

## 2022-10-17 NOTE — ASSESSMENT & PLAN NOTE
· CT PE study demonstrated dense consolidation in right lower lobe and left sided consolidation consistent with multilobar pneumonia  · Sputum cx pending  · Continue cefepime/vanco  · May benefit from bronchoscopy   · Trend fever curve, WBC and procal

## 2022-10-17 NOTE — PROGRESS NOTES
Vancomycin Assessment    Sarina Goodman is a 57 y o  female who is currently receiving vancomycin 1000mg IV q12h for Pneumonia     Relevant clinical data and objective history reviewed:  Creatinine   Date Value Ref Range Status   10/17/2022 1 17 0 60 - 1 30 mg/dL Final     Comment:     Standardized to IDMS reference method   08/26/2022 0 88 0 60 - 1 30 mg/dL Final     Comment:     Standardized to IDMS reference method   08/25/2022 0 90 0 60 - 1 30 mg/dL Final     Comment:     Standardized to IDMS reference method   10/09/2017 1 01 (H) 0 57 - 1 00 mg/dL Final   06/14/2016 0 89 0 57 - 1 00 mg/dL Final     BP (!) 87/50   Pulse (!) 112   Temp (!) 100 58 °F (38 1 °C)   Resp (!) 24   Ht 5' 3" (1 6 m)   Wt 107 kg (234 lb 12 6 oz)   SpO2 96%   BMI 41 59 kg/m²   I/O last 3 completed shifts: In: 83 7 [I V :33 7; IV Piggyback:50]  Out: 900 [Urine:900]  Lab Results   Component Value Date/Time    BUN 15 10/17/2022 11:00 AM    BUN 14 06/14/2022 08:49 AM    WBC 9 37 10/17/2022 11:00 AM    WBC 9 0 10/09/2017 12:55 PM    HGB 15 3 10/17/2022 03:19 PM    HGB 13 4 10/17/2022 11:00 AM    HGB 13 4 10/09/2017 12:55 PM    HCT 45 10/17/2022 03:19 PM    HCT 43 4 10/17/2022 11:00 AM    HCT 38 7 10/09/2017 12:55 PM    MCV 98 10/17/2022 11:00 AM    MCV 92 10/09/2017 12:55 PM     10/17/2022 11:00 AM     10/09/2017 12:55 PM     Temp Readings from Last 3 Encounters:   10/17/22 (!) 100 58 °F (38 1 °C)   09/27/22 98 °F (36 7 °C) (Temporal)   09/23/22 (!) 97 °F (36 1 °C)     Vancomycin Days of Therapy: 1    Assessment/Plan  The patient is currently on vancomycin utilizing scheduled dosing based on adjusted body weight (due to obesity)  Baseline risks associated with therapy include: concomitant nephrotoxic medications and advanced age  The patient is currently receiving 1000mg IV q12h and is clinically appropriate and dose will be continued    Pharmacy will also follow closely for s/sx of nephrotoxicity, infusion reactions, and appropriateness of therapy  BMP and CBC will be ordered per protocol  Plan for trough as patient approaches steady state, prior to the 4th  dose at approximately 0530 (with am labs) on 10/19/22  Due to infection severity, will target a trough of 15-20 (appropriate for most indications)   Pharmacy will continue to follow the patient’s culture results and clinical progress daily      Gerhard Holland, Pharmacist
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
## 2022-10-17 NOTE — ASSESSMENT & PLAN NOTE
Toxic metabolic encephalopathy in the setting of hypercarbia/sepsis  · Intubated and now sedated with propofol  · Daily SAT  · CAM ICU  · Sleep hygiene  · Neuro checks per routine

## 2022-10-17 NOTE — ASSESSMENT & PLAN NOTE
· Hx of COPD, presents with respiratory distress likely in the setting of pneumonia, however possible component of COPD exacerbation  · Plan to start solumedrol 40mg Q8  · Continue duonebs  · Encourage smoking cessation when appropriate

## 2022-10-17 NOTE — ASSESSMENT & PLAN NOTE
· Cardiac cath in 2019 demonstrated non-obstructive CAD with moderate stenosis of the RCA  · EKG on admission showing sinus tachycardia, no acute ST changes  · HS troponin minimally elevated  · Continue statin

## 2022-10-17 NOTE — ASSESSMENT & PLAN NOTE
Patient presents with progressive shortness of breath over the past several weeks  Reports not feeling well since diagnosed with COVID  Initially hypoxic requiring NRB  Patient deteriorated in the ER and was placed on BiPAP and was ultimately intubated due to respiratory distress, tachypnea and hypercarbia  · Recently had COVID, tested positive 8/19 and was hospitalized for 5 days requiring nasal cannula  She noted increased SOB over the past week several weeks  Prescribed Zpak and prednisone taper on 10/6 by Dr Bob Nose  · CXR with "Mild pulmonary venous congestion with trace right effusion "  · CT PE study: No pulmonary embolism  Dense consolidation in the right lower lobe and medial aspect of the left lower lobe suspicious for multilobar pneumonia "  · COVID/flu/RSV negative  · Acute respiratory failure with hypoxia and hypercapnia secondary to pneumonia, possible exacerbation of COPD    Plan:   · AC/VC 24x315, 100, 6PEEP immediately after intubation  · Goal to wean FiO2 as tolerated  · Check ABG at 2000  · VAP ppx  · Cefepime/vanco, day 1  · Will start solumedrol 40mg Q8   · Duonebs  · Check sputum cx  · Pulmonary hygiene  · Daily SAT/SBTs

## 2022-10-17 NOTE — ED PROVIDER NOTES
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 death    Alternatives discussed:  No treatment  Universal protocol:     Patient identity confirmed:  Arm band  Pre-procedure details:     Patient status:  Altered mental status    Mallampati score:  2    Pretreatment medications:  Etomidate    Paralytics:  Succinylcholine  Indications:     Indications for intubation: respiratory failure    Procedure details:     Preoxygenation:  Bag valve mask    CPR in progress: no      Intubation method:  Oral    Oral intubation technique:  Glidescope    Laryngoscope blade: Mac 3    Tube size (mm):  7 5    Tube type:  Cuffed    Number of attempts:  1    Cricoid pressure: yes      Tube visualized through cords: yes    Placement assessment:     ETT to lip:  24    Tube secured with:  ETT dill    Breath sounds:  Equal    Placement verification: chest rise, condensation, direct visualization, equal breath sounds, ETCO2 detector and tube exhalation    Post-procedure details:     Patient tolerance of procedure:   Tolerated well, no immediate complications             ED Course                               SBIRT 20yo+    Flowsheet Row Most Recent Value   SBIRT (22 yo +)    In order to provide better care to our patients, we are screening all of our patients for alcohol and drug use  Would it be okay to ask you these screening questions? Yes Filed at: 10/17/2022 1136   Initial Alcohol Screen: US AUDIT-C     1  How often do you have a drink containing alcohol? 0 Filed at: 10/17/2022 1136   2  How many drinks containing alcohol do you have on a typical day you are drinking? 0 Filed at: 10/17/2022 1136   3a  Male UNDER 65: How often do you have five or more drinks on one occasion? 0 Filed at: 10/17/2022 1136   3b  FEMALE Any Age, or MALE 65+: How often do you have 4 or more drinks on one occassion? 0 Filed at: 10/17/2022 1136   Audit-C Score 0 Filed at: 10/17/2022 1136   RATNA: How many times in the past year have you    Used an illegal drug or used a prescription medication for non-medical reasons? Never Filed at: 10/17/2022 1136                    MDM  Number of Diagnoses or Management Options  CHF (congestive heart failure) ([facility] )  COPD with acute exacerbation ([facility] )  Respiratory failure ([facility] [facility] )  Diagnosis management comments: Patient spiked a fever during her stay at the ED  She has been an undiagnosed CO2 retainer in addition to her CHF  Will maintain on BiPAP to try to ventilate  May require intubation  Patient did not improve on BiPAP  After consultation with family, but decided to proceed with intubation  See intubation note    Patient is admitted to critical care      Disposition  Final diagnoses:   COPD with acute exacerbation ([address] )   Respiratory failure ([address] )   CHF (congestive heart failure) ([facility] )     Time reflects when diagnosis was documented in both MDM as applicable and the Disposition within this note     Time User Action Codes Description Comment    10/17/2022  4:04 PM Amrita ROMERO Add [J44 1] COPD with acute exacerbation ([facility] )     10/17/2022  4:05 PM Amrita ROMERO Add [J96 90] Respiratory failure ([facility] )     10/17/2022  4:05 PM Max Mas Add [I50 9] [facility] (congestive [facility] failure[facility]     10/17/2022  5:02 PM Krystina Rowan Add [X12 8] Toxic metabolic encephalopathy     10/17/2022  6:42 PM Krystina [place] Add [J18 9] Pneumonia       ED Disposition     ED Disposition   Admit    Condition   Stable    Date/Time   Mon Oct 17, 2022  4:04 PM    Comment   Case was discussed with critical care and the patient's admission status was agreed to be Admission Status: inpatient status to the service of Dr Junior Treviño   Follow-up Information    None         Current Discharge Medication List      CONTINUE these medications which have NOT CHANGED    Details   albuterol (2 5 mg/3 mL) 0 083 % nebulizer solution USE 1 VIAL VIA NEBULIZER 4 TIMES A DAY  Qty: 360 mL, Refills: 7    Associated Diagnoses: Simple chronic bronchitis (HCC)      albuterol (ACCUNEB) 1 25 MG/3ML nebulizer solution Take 1 25 mg by nebulization every 6 (six) hours as needed for wheezing      albuterol (ProAir HFA) 90 mcg/act inhaler Inhale 2 puffs every 4 (four) hours as needed for wheezing  Qty: 8 5 g, Refills: 7    Comments: Substitution to a formulary equivalent within the same pharmaceutical class is authorized  Associated Diagnoses: Simple chronic bronchitis (HCC)      atorvastatin (LIPITOR) 80 mg tablet TAKE 1 TABLET BY MOUTH EVERY DAY  Qty: 30 tablet, Refills: 3    Associated Diagnoses: Familial hypercholesterolemia      !! BD Pen Needle Jenn 2nd Gen 32G X 4 MM MISC USE 2 PEN NEEDLES A DAY TO ADMINISTER INSULIN AND VICTOZA UNDER THE SKIN  Qty: 100 each, Refills: 3    Associated Diagnoses: Type 2 diabetes mellitus with hyperglycemia, without long-term current use of insulin ([facility] ); Hypertension goal BP (blood pressure) < 140/90; Mixed hyperlipidemia; Obesity, Class III, BMI 40-49 9 (morbid obesity) ([facility])      BD Veo Insulin Syringe U/F 31G X 15/64" 0 5 ML MISC USE TO INJECT INSULIN DAILY  Qty: 100 each, Refills: 1    Comments: DX Code Needed      Associated Diagnoses: Type 2 diabetes mellitus with hyperglycemia, with long-term current use of insulin (HCC)      buPROPion (WELLBUTRIN XL) 300 mg 24 hr tablet TAKE 1 TABLET BY MOUTH EVERY DAY IN THE MORNING  Qty: 30 tablet, Refills: 11    Associated Diagnoses: Dysthymia; Nicotine dependence with nicotine-induced disorder, unspecified nicotine product type      diltiazem (CARDIZEM CD) 120 mg 24 hr capsule TAKE 1 CAPSULE BY MOUTH EVERY DAY  Qty: 30 capsule, Refills: 5    Associated Diagnoses: Tachycardia      !! DULoxetine (CYMBALTA) 30 mg delayed release capsule TAKE 1 CAPSULE BY MOUTH ONCE A DAY  Qty: 30 capsule, Refills: 11    Associated Diagnoses: Depression, unspecified depression type      !! DULoxetine (CYMBALTA) 60 mg delayed release capsule TAKE 1 CAPSULE BY MOUTH EVERY DAY  Qty: 30 capsule, Refills: 3    Associated Diagnoses: Chronic pain syndrome      fluticasone (FLONASE) 50 mcg/act nasal spray 2 SPRAYS INTO EACH NOSTRIL AS NEEDED FOR RHINITIS OR ALLERGIES  Qty: 16 mL, Refills: 3    Associated Diagnoses: Rhinitis, unspecified type      Fluticasone-Salmeterol (Advair) 250-50 mcg/dose inhaler Inhale 1 puff 2 (two) times a day Rinse mouth after use  Qty: 60 blister, Refills: 6    Comments: Substitution to a formulary equivalent within the same pharmaceutical class is authorized    Associated Diagnoses: Simple chronic bronchitis (HCC)      guaifenesin-codeine (GUAIFENESIN AC) 100-10 MG/5ML liquid Take 5 mL by mouth 3 (three) times a day as needed for cough  Qty: 120 mL, Refills: 0    Associated Diagnoses: Acute cough      hydrochlorothiazide (HYDRODIURIL) 25 mg tablet Take 1 tablet (25 mg total) by mouth daily  Qty: 90 tablet, Refills: 1    Associated Diagnoses: Hypertensive heart disease without heart failure      Incruse Ellipta 62 5 MCG/INH AEPB inhaler INHALE ONE PUFF BY MOUTH DAILY  Qty: 1 blister, Refills: 3    Associated Diagnoses: Chronic obstructive pulmonary disease, unspecified COPD type ([facility] [address] )      Insulin Glargine-yfgn (Semglee, yfgn,) 100 UNIT/ML SOPN Inject 74 units under the skin every bedtime  Qty: 45 mL, Refills: 0    Associated Diagnoses: Type 2 diabetes mellitus with hyperglycemia, with long-term current use of insulin ([facility])      insulin lispro (Admelog) 100 units/mL injection Inject 14 Units under the skin 3 (three) times a day with meals  Qty: 30 mL, Refills: 0    Associated Diagnoses: Type 2 diabetes mellitus with hyperglycemia, with long-term current use of insulin [facility]      ! ! Insulin Pen Needle (BD Pen Needle Jenn 2nd Gen) 32G X 4 MM MISC USE 1 PEN NEEDLE A DAY TO ADMINISTER INSULIN UNDER THE SKIN      lisinopril (ZESTRIL) 2 5 mg tablet TAKE 1 TABLET BY MOUTH EVERY DAY  Qty: 30 tablet, Refills: 4    Associated Diagnoses: Essential hypertension; Type 2 diabetes mellitus with hyperglycemia, with long-term current use of insulin ([facility])      metFORMIN (GLUCOPHAGE) 1000 MG tablet TAKE 1 TABLET BY MOUTH TWICE A DAY WITH MEALS  Qty: 180 tablet, Refills: 1    Associated Diagnoses: Type 2 diabetes mellitus with complication, without long-term current use of insulin ([facility])      OneTouch Delica Lancets 12R MISC Use to test blood sugar 2 times a day  Qty: 100 each, Refills: 3    Associated Diagnoses: Type 2 diabetes mellitus with hyperglycemia, without long-term current use of insulin ([facility])      OneTouch Ultra test strip USE TWO STRIPS A DAY TO CHECK BLOOD SUGAR  Qty: 100 strip, Refills: 3    Associated Diagnoses: Type 2 diabetes mellitus with hyperglycemia, with long-term current use of insulin ([facility])      predniSONE 10 mg tablet Take 4 tablets (40 mg total) by mouth daily for 3 days, THEN 3 tablets (30 mg total) daily for 3 days, THEN 2 tablets (20 mg total) daily for 3 days, THEN 1 tablet (10 mg total) daily for 3 days    Qty: 30 tablet, Refills: 0    Associated Diagnoses: COPD exacerbation ([facility])      QUEtiapine (SEROquel) 25 mg tablet TAKE 1 TABLET BY MOUTH TWICE A DAY  Qty: 60 tablet, Refills: 5    Associated Diagnoses: Depression, unspecified depression type      traMADol (ULTRAM-ER) 200 MG 24 hr tablet Take 1 tablet (200 mg total) by mouth every 24 hours Fill dates:  8/31/22 & 9/30/22  Qty: 30 tablet, Refills: 1    Associated Diagnoses: Chronic pain syndrome; Postlaminectomy syndrome, lumbar region; Chronic bilateral low back pain without sciatica      Victoza injection INJECT 1 8 MG UNDER THE SKIN ONCE DAILY  Qty: 9 mL, Refills: 6    Associated Diagnoses: Type 2 diabetes mellitus with complication, without long-term current use of insulin ([facility] [address] )       ! ! - Potential duplicate medications found  Please discuss with provider  No discharge procedures on file      PDMP Review       Value Time User    PDMP Reviewed  Yes 8/18/2022  9:16 AM Kim Lawson MD          ED Provider  Electronically Signed by           Lenka Day MD  10/17/22 [address] [place] [address] [address], [place]  10/18/22 [id number]

## 2022-10-17 NOTE — ASSESSMENT & PLAN NOTE
Sepsis criteria met on admission due to tachycardia, tachypnea, fever 103  Multilobar pneumonia present on CT scan   · LA 1 0  · COVID/flu/RSV negative  · Given zosyn in the ER  · CXR: "Mild pulmonary venous congestion with trace right effusion"  · CT PE study: "No pulmonary embolism  Dense consolidation in the right lower lobe and medial aspect of the left lower lobe suspicious for multilobar pneumonia"  · Blood cultures pending    Plan:   · Continue cefepime/vanco given recent hospitalization  · Send sputum culture  · Check strep pneumo/legionella urine antigen   · Goal MAP >65  · Trend fever curve, WBC and procal

## 2022-10-17 NOTE — ASSESSMENT & PLAN NOTE
Lab Results   Component Value Date    HGBA1C 9 6 (H) 06/14/2022       No results for input(s): POCGLU in the last 72 hours      Blood Sugar Average: Last 72 hrs:     · Hyperglycemia with BG 400s on presentation  · PTA meds include lantus, meal coverage, victoza and metformin  · Will start insulin gtt for tight glycemic control

## 2022-10-17 NOTE — RESPIRATORY THERAPY NOTE
Patient intubated size 7 5  ETT secured 24 at the lip  BS equal and b/l, colorimeter positive for color change  Placed on full support vent settings

## 2022-10-18 PROBLEM — R73.9 STEROID-INDUCED HYPERGLYCEMIA: Status: RESOLVED | Noted: 2022-01-21 | Resolved: 2022-10-18

## 2022-10-18 PROBLEM — G89.29 CHRONIC LOW BACK PAIN: Chronic | Status: ACTIVE | Noted: 2017-11-02

## 2022-10-18 PROBLEM — G89.4 CHRONIC PAIN SYNDROME: Chronic | Status: ACTIVE | Noted: 2017-11-02

## 2022-10-18 PROBLEM — Z76.89 ENCOUNTER FOR SUPPORT AND COORDINATION OF TRANSITION OF CARE: Status: RESOLVED | Noted: 2019-09-26 | Resolved: 2022-10-18

## 2022-10-18 PROBLEM — R80.9 MICROALBUMINURIA: Status: RESOLVED | Noted: 2022-01-21 | Resolved: 2022-10-18

## 2022-10-18 PROBLEM — M54.50 LOW BACK PAIN: Status: RESOLVED | Noted: 2018-12-17 | Resolved: 2022-10-18

## 2022-10-18 PROBLEM — R35.0 URINE FREQUENCY: Status: RESOLVED | Noted: 2020-05-04 | Resolved: 2022-10-18

## 2022-10-18 PROBLEM — R06.89 ALVEOLAR HYPOVENTILATION: Status: RESOLVED | Noted: 2019-08-07 | Resolved: 2022-10-18

## 2022-10-18 PROBLEM — T38.0X5A STEROID-INDUCED HYPERGLYCEMIA: Status: RESOLVED | Noted: 2022-01-21 | Resolved: 2022-10-18

## 2022-10-18 PROBLEM — R06.02 SHORTNESS OF BREATH: Status: RESOLVED | Noted: 2019-04-03 | Resolved: 2022-10-18

## 2022-10-18 PROBLEM — E87.29 RESPIRATORY ACIDOSIS: Status: ACTIVE | Noted: 2022-10-18

## 2022-10-18 PROBLEM — E11.65 TYPE 2 DIABETES MELLITUS WITH HYPERGLYCEMIA, WITH LONG-TERM CURRENT USE OF INSULIN (HCC): Chronic | Status: ACTIVE | Noted: 2017-10-27

## 2022-10-18 PROBLEM — M54.50 CHRONIC LOW BACK PAIN: Chronic | Status: ACTIVE | Noted: 2017-11-02

## 2022-10-18 PROBLEM — M25.552 PAIN IN LEFT HIP: Status: RESOLVED | Noted: 2018-08-31 | Resolved: 2022-10-18

## 2022-10-18 PROBLEM — Z01.818 PREOP EXAMINATION: Status: RESOLVED | Noted: 2021-01-14 | Resolved: 2022-10-18

## 2022-10-18 PROBLEM — Z79.4 TYPE 2 DIABETES MELLITUS WITH HYPERGLYCEMIA, WITH LONG-TERM CURRENT USE OF INSULIN (HCC): Chronic | Status: ACTIVE | Noted: 2017-10-27

## 2022-10-18 PROBLEM — R65.20 SEVERE SEPSIS (HCC): Status: ACTIVE | Noted: 2022-08-25

## 2022-10-18 PROBLEM — I25.119 CORONARY ARTERY DISEASE INVOLVING NATIVE CORONARY ARTERY OF NATIVE HEART WITH ANGINA PECTORIS (HCC): Chronic | Status: ACTIVE | Noted: 2019-09-21

## 2022-10-18 PROBLEM — M79.18 MYOFASCIAL PAIN SYNDROME: Status: RESOLVED | Noted: 2018-07-17 | Resolved: 2022-10-18

## 2022-10-18 PROBLEM — M54.6 ACUTE LEFT-SIDED THORACIC BACK PAIN: Status: RESOLVED | Noted: 2022-09-02 | Resolved: 2022-10-18

## 2022-10-18 PROBLEM — M54.2 NECK PAIN: Status: RESOLVED | Noted: 2018-07-17 | Resolved: 2022-10-18

## 2022-10-18 PROBLEM — Z72.0 TOBACCO ABUSE: Chronic | Status: ACTIVE | Noted: 2019-09-26

## 2022-10-18 PROBLEM — M25.512 LEFT SHOULDER PAIN: Status: RESOLVED | Noted: 2020-11-07 | Resolved: 2022-10-18
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
## 2022-10-18 NOTE — ASSESSMENT & PLAN NOTE
· CTA chest 10/17 demonstrated dense consolidation in right lower lobe and left sided consolidation consistent with multilobar pneumonia  · Sputum cx pending  · Strep pneumo and legionella pending  · Continue cefepime/vanco D2  · Chest PT TID, ETT suctioning PRN   · Trend fever curve, WBC and procal

## 2022-10-18 NOTE — ASSESSMENT & PLAN NOTE
Lab Results   Component Value Date    HGBA1C 9 6 (H) 06/14/2022       Recent Labs     10/17/22  1748 10/17/22  2013 10/17/22  2158 10/17/22  2356   POCGLU 371* 388* 297* 285*       Blood Sugar Average: Last 72 hrs:  (P) 335 25   · Hyperglycemia with BG 400s on presentation, no elevation in anion gap   · PTA meds include lantus, meal coverage, victoza and metformin  · Continue insulin gtt

## 2022-10-18 NOTE — PROGRESS NOTES
Charlie 44  Progress Note - Elvira Cheatham 1960, 57 y o  female MRN: [id number]  Unit/Bed#: ICU 05 Encounter: [id number]  Primary Care Provider: Krisitn See MD   Date and time admitted to hospital: 10/17/2022 10:24 AM    * Acute respiratory failure with hypoxia and hypercapnia [facility])  Assessment & Plan  Patient presented 10/17 with progressively worsening shortness of breath over the past several weeks  Reported not feeling well since diagnosed with COVID in August  Initially hypoxic requiring NRB  Patient deteriorated in the ER and was placed on BiPAP and was ultimately intubated due to respiratory distress, tachypnea and hypercarbia  · Recently had COVID, tested positive 8/19 and was hospitalized for 5 days requiring nasal cannula  She noted increased SOB over the past week several weeks  Prescribed Zpak and prednisone taper on 10/6 by Dr Shekhar Michael  · CXR with "Mild pulmonary venous congestion with trace right effusion "  · CT PE study: No pulmonary embolism  Dense consolidation in the right lower lobe and medial aspect of the left lower lobe suspicious for multilobar pneumonia "  · COVID/flu/RSV negative  · Acute respiratory failure with hypoxia and hypercapnia secondary to pneumonia, possible exacerbation of COPD    Plan:   · AC/VC 28/360/8/70%  · Goal to wean FiO2 as tolerated for O2  Sat 92% or above   · RR and vT increased overnight secondary to respiratory acidosis on ABG, repeat ABG pending this morning   · VAP ppx; chlorhexidine, PPI, HOB greater than 30 degrees  · Solumedrol 40mg Q8   · Duonebs q6h   · Pulmonary hygiene  · Daily SAT/SBTs      Pneumonia  Assessment & Plan  · CTA chest 10/17 demonstrated dense consolidation in right lower lobe and left sided consolidation consistent with multilobar pneumonia  · Sputum cx pending  · Strep pneumo and legionella pending  · Continue cefepime/vanco D2  · Chest PT TID, ETT suctioning PRN   · Trend fever curve, WBC and procal Severe sepsis [facility])  Assessment & Plan  Sepsis criteria met on admission due to tachycardia, tachypnea, fever 103  Multilobar pneumonia present on CT scan   · LA 1 0  · COVID/flu/RSV negative  · Given zosyn in the ER  · CXR: "Mild pulmonary venous congestion with trace right effusion"  · CT PE study: "No pulmonary embolism  Dense consolidation in the right lower lobe and medial aspect of the left lower lobe suspicious for multilobar pneumonia"  · Blood cultures pending    Plan:   · Continue cefepime/vanco given recent hospitalization, D2  · Sputum culture pending   · Strep pneumo/legionella urine antigen pending   · Goal MAP >65  · Required 1L [place] overnight secondary to hypotension, BP fluid responsive   · Trend fever curve, WBC and procal   · PRN Tylenol for fever     Toxic metabolic encephalopathy  Assessment & Plan  · in the setting of hypercarbia/sepsis  · Intubated and now sedated with propofol  · Daily SAT  · CAM ICU  · Sleep hygiene  · Neuro checks per routine    [facility]  Assessment & Plan  · Hx of COPD, presents with respiratory distress likely in the setting of pneumonia, however possible component of COPD exacerbation  · Continue solumedrol 40mg Q8  · Continue duonebs  · Encourage smoking cessation when appropriate    Type 2 diabetes mellitus with hyperglycemia, with long-term current use of insulin [facility])  Assessment & Plan  Lab Results   Component Value Date    HGBA1C 9 6 (H) 06/14/2022       Recent Labs     10/17/22  1748 10/17/22  2013 10/17/22  2158 10/17/22  2356   POCGLU 371* 388* 297* 285*       Blood Sugar Average: Last 72 hrs:  (P) 335 25   · Hyperglycemia with BG 400s on presentation, no elevation in anion gap   · PTA meds include lantus, meal coverage, victoza and metformin  · Continue insulin gtt     Coronary artery disease involving native coronary artery of native heart with angina pectoris [facility])  Assessment & Plan  · Cardiac cath in 2019 demonstrated non-obstructive CAD with moderate stenosis of the RCA  · EKG on admission showing sinus tachycardia, no acute ST changes  · HS troponin minimally elevated  · Continue statin    Obstructive sleep apnea  Assessment & Plan  · CPAP HS once extubated  · Likely component of OHS     Hypertension  Assessment & Plan  · Hold home lisinopril/hydrochlorothiazide and cardizem  · Episode of hypotension overnight     Chronic pain syndrome  Assessment & Plan  · Prescribed tramadol 200mg Q24 hrs for chronic back pain  · Hold for now s/p intubation    Depression with anxiety  Assessment & Plan  · Continue home Wellbutrin and Cymbalta as they are delayed release meds and unable to be crushed    ----------------------------------------------------------------------------------------  HPI/24hr events: ABG 7  5/33 4/3 5 last night, RR increased to 28 and vT increased to 360  Repeat ABG pending this morning  Hypotensive last evening, received 1L [place]olyte with improvement  Patient appropriate for transfer out of the ICU today?: No  Disposition: Continue Critical Care   Code Status: Level 1 - Full Code  ---------------------------------------------------------------------------------------  SUBJECTIVE    Review of Systems  Review of systems was unable to be performed secondary to intubation/sedation  ---------------------------------------------------------------------------------------  OBJECTIVE    Vitals   Vitals:    10/17/22 2300 10/18/22 0000 10/18/22 0100 10/18/22 0200   BP: 104/57 100/63 106/60 101/60   BP Location:       Pulse: 93 90 91 90   Resp: (!) 28 (!) 28 (!) 28 (!) 28   Temp: 98 78 °F (37 1 °C) 98 24 °F (36 8 °C) 98 78 °F (37 1 °C) 98 42 °F (36 9 °C)   TempSrc:  Esophageal     SpO2: 95% 97% 97% 96%   Weight:       Height:         Temp (24hrs), Av 6 °F (37 6 °C), Min:97 5 °F (36 4 °C), Max:103 4 °F (39 7 °C)  Current: Temperature: 98 42 °F (36 9 °C)          Respiratory:  SpO2: SpO2: 96 %, SpO2 Activity: SpO2 Activity:  At Rest, SpO2 Device: O2 Device: Ventilator       Invasive/non-invasive ventilation settings   Respiratory  Report   Lab Data (Last 4 hours)    None         O2/Vent Data (Last 4 hours)    None                Physical Exam  Constitutional:       Appearance: She is morbidly obese  She is ill-appearing  Interventions: She is sedated, intubated and restrained  HENT:      Head: Normocephalic and atraumatic  Eyes:      General: Lids are normal       Extraocular Movements: Extraocular movements intact  Conjunctiva/sclera: Conjunctivae normal    Neck:      Trachea: Trachea normal    Cardiovascular:      Rate and Rhythm: Normal rate and regular rhythm  Pulses: Normal pulses  Radial pulses are 2+ on the right side and 2+ on the left side  Dorsalis pedis pulses are 2+ on the right side and 2+ on the left side  Heart sounds: Normal heart sounds, S1 normal and S2 normal    Pulmonary:      Effort: Pulmonary effort is normal  She is intubated  Breath sounds: Decreased breath sounds and rhonchi present  Abdominal:      General: Bowel sounds are normal       Palpations: Abdomen is soft  Musculoskeletal:      Cervical back: Full passive range of motion without pain, normal range of motion and neck supple  Comments: Normal ROM   Skin:     General: Skin is warm and dry  Capillary Refill: Capillary refill takes less than 2 seconds  Neurological:      GCS: GCS eye subscore is 1  GCS verbal subscore is 1  GCS motor subscore is 5               Laboratory and Diagnostics:  Results from last 7 days   Lab Units 10/17/22  1519 10/17/22  1515 10/17/22  1100   WBC Thousand/uL  --   --  9 37   HEMOGLOBIN g/dL  --   --  13 4   I STAT HEMOGLOBIN g/dl 15 3 15 6*  --    HEMATOCRIT %  --   --  43 4   HEMATOCRIT, ISTAT % 45 46  --    PLATELETS Thousands/uL  --   --  349   NEUTROS PCT %  --   --  75   MONOS PCT %  --   --  8     Results from last 7 days   Lab Units 10/17/22  1519 10/17/22  1100 SODIUM mmol/L  --  133*   POTASSIUM mmol/L  --  5 0   CHLORIDE mmol/L  --  98   CO2 mmol/L  --  33*   CO2, I-STAT mmol/L 41*  --    ANION GAP mmol/L  --  2*   BUN mg/dL  --  15   CREATININE mg/dL  --  1 17   CALCIUM mg/dL  --  8 9   GLUCOSE RANDOM mg/dL  --  410*   ALT U/L  --  22   AST U/L  --  10   ALK PHOS U/L  --  90   ALBUMIN g/dL  --  2 9*   TOTAL BILIRUBIN mg/dL  --  0 19*          Results from last 7 days   Lab Units 10/17/22  1100   INR  0 98   PTT seconds 27          Results from last 7 days   Lab Units 10/17/22  1519   LACTIC ACID mmol/L 1 0     ABG:  Results from last 7 days   Lab Units 10/17/22  2152   PH ART  7 250*   PCO2 ART mm Hg 78 0*   PO2 ART mm Hg 85 5   HCO3 ART mmol/L 33 4*   BASE EXC ART mmol/L 3 5   ABG SOURCE  Radial, Left     VBG:  Results from last 7 days   Lab Units 10/17/22  2152   ABG SOURCE  Radial, Left           Micro  Results from last 7 days   Lab Units 10/17/22  1523 10/17/22  1519 10/17/22  1215   BLOOD CULTURE  Received in Microbiology Lab  Culture in Progress  Received in Microbiology Lab  Culture in Progress  --    LEGIONELLA URINARY ANTIGEN   --   --  Negative   STREP PNEUMONIAE ANTIGEN, URINE   --   --  Negative       No new imaging     Intake and Output  I/O       10/16 0701  10/17 0700 10/17 0701  10/18 0700    I V  (mL/kg)  33 7 (0 3)    IV Piggyback  50    Total Intake(mL/kg)  83 7 (0 8)    Urine (mL/kg/hr)  975    Total Output  975    Net  -891 3                Height and Weights   Height: 5' 3" (160 cm)     Body mass index is 41 59 kg/m²  Weight (last 2 days)     Date/Time Weight    10/17/22 1730 107 (234 79)    10/17/22 1018 102 (225)            Nutrition       Diet Orders   (From admission, onward)             Start     Ordered    10/17/22 1720  Diet NPO  Diet effective now        References:    Nutrtion Support Algorithm Enteral vs  Parenteral   Question Answer Comment   Diet Type NPO    RD to adjust diet per protocol?  Yes        10/17/22 2294 Active Medications  Scheduled Meds:  Current Facility-Administered Medications   Medication Dose Route Frequency Provider Last Rate   • atorvastatin  80 mg Oral Daily With Dinner [address] [place], CRNP     • cefepime  1,000 mg Intravenous Q12H [address] [place], [address] Stopped (10/17/22 1830)   • chlorhexidine  15 mL Mouth/Throat Q12H [facility] [place], [address]     • enoxaparin  40 mg Subcutaneous Daily [address] [place], [address]     • insulin regular (HumuLIN R,NovoLIN R) infusion  0 3-21 Units/hr Intravenous Titrated [address] [place], [address] 10 Units/hr (10/18/22 0207)   • ipratropium-albuterol  3 mL Nebulization Q6H [place], SHERLYNNP     • methylPREDNISolone sodium succinate  40 mg Intravenous [facility] [place], [address]     • propofol  5-50 mcg/kg/min Intravenous Titrated [address] [place], [address] 40 mcg/kg/min (10/17/22 2302)   • vancomycin  15 mg/kg (Adjusted) Intravenous Q12H [address] [place], CRNP 1,000 mg (10/17/22 1831)     Continuous Infusions:  insulin regular (HumuLIN R,NovoLIN R) infusion, 0 3-21 Units/hr, Last Rate: 10 Units/hr (10/18/22 0207)  propofol, 5-50 mcg/kg/min, Last Rate: 40 mcg/kg/min (10/17/22 2302)      PRN Meds:        Invasive Devices Review  Invasive Devices  Report    Peripheral Intravenous Line  Duration           Peripheral IV 10/17/22 Left Antecubital <1 day    Peripheral IV 10/17/22 Right Antecubital <1 day    Peripheral IV 10/17/22 Right;Ventral (anterior) Wrist <1 day          Drain  Duration           NG/OG/Enteral Tube Orogastric <1 day    Urethral Catheter Temperature probe 16 Fr  <1 day          Airway  Duration           ETT  7 5 mm <1 day                Rationale for remaining devices: continue torres for strict I&O   ---------------------------------------------------------------------------------------  Advance Directive and Living Will:      Power of :    POLST: ---------------------------------------------------------------------------------------  Care Time Delivered:   No Critical Care time spent       LURDES Bolaños      Portions of the record may have been created with voice recognition software  Occasional wrong word or "sound a like" substitutions may have occurred due to the inherent limitations of voice recognition software    Read the chart carefully and recognize, using context, where substitutions have occurred

## 2022-10-18 NOTE — PROGRESS NOTES
Recommend Glucerna 1 2 at goal rate of 40 mL/hr for a total volume of 960 mL  This will provide 1152 kcals (1799 kcals with current propofol), 58 grams protein and 773 mL free water  Recommend flushes of 150 mL q 4 hours for a total fluid intake of 1673 mL

## 2022-10-18 NOTE — QUICK NOTE
Patient Name: Cris Casillas  Patient MRN: [id number]   Date: 10/18/22   Time: 2:16 PM   Unit/Bed: ICU 05    Family/Relationship: Trini Arriaga (Son)  Location: Telephone  Content of meeting: Update on current treatment plan    Family understanding of patient's condition: Good  Comments: None  Time spent: 5 minutes    Gino Trevizo

## 2022-10-18 NOTE — ASSESSMENT & PLAN NOTE
Patient presented 10/17 with progressively worsening shortness of breath over the past several weeks  Reported not feeling well since diagnosed with COVID in August  Initially hypoxic requiring NRB  Patient deteriorated in the ER and was placed on BiPAP and was ultimately intubated due to respiratory distress, tachypnea and hypercarbia  · Recently had COVID, tested positive 8/19 and was hospitalized for 5 days requiring nasal cannula  She noted increased SOB over the past week several weeks  Prescribed Zpak and prednisone taper on 10/6 by Dr Coleen Mahmood  · CXR with "Mild pulmonary venous congestion with trace right effusion "  · CT PE study: No pulmonary embolism  Dense consolidation in the right lower lobe and medial aspect of the left lower lobe suspicious for multilobar pneumonia "  · COVID/flu/RSV negative  · Acute respiratory failure with hypoxia and hypercapnia secondary to pneumonia, possible exacerbation of COPD    Plan:   · AC/VC 28/360/8/70%  · Goal to wean FiO2 as tolerated for O2  Sat 92% or above   · RR and vT increased overnight secondary to respiratory acidosis on ABG, repeat ABG pending this morning   · VAP ppx; chlorhexidine, PPI, HOB greater than 30 degrees  · Solumedrol 40mg Q8   · Duonebs q6h   · Pulmonary hygiene  · Daily SAT/SBTs

## 2022-10-18 NOTE — ASSESSMENT & PLAN NOTE
· Hx of COPD, presents with respiratory distress likely in the setting of pneumonia, however possible component of COPD exacerbation  · Continue solumedrol 40mg Q8  · Continue duonebs  · Encourage smoking cessation when appropriate

## 2022-10-18 NOTE — ASSESSMENT & PLAN NOTE
· Hold home lisinopril/hydrochlorothiazide and cardizem  · Episode of hypotension overnight Hatchet Flap Text: The defect edges were debeveled with a #15 scalpel blade.  Given the location of the defect, shape of the defect and the proximity to free margins a hatchet flap was deemed most appropriate.  Using a sterile surgical marker, an appropriate hatchet flap was drawn incorporating the defect and placing the expected incisions within the relaxed skin tension lines where possible.    The area thus outlined was incised deep to adipose tissue with a #15 scalpel blade.  The skin margins were undermined to an appropriate distance in all directions utilizing iris scissors.

## 2022-10-18 NOTE — PROGRESS NOTES
Vancomycin IV Pharmacy-to-Dose Consultation    Lul Ornelas is a 57 y o  female who is currently receiving Vancomycin IV with management by the Pharmacy Consult service  Assessment/Plan:  The patient was reviewed  Renal function is stable and no signs or symptoms of nephrotoxicity and/or infusion reactions were documented in the chart  Based on today’s assessment, continue current vancomycin (day # 2) dosing of 1000 mg every 12 hours, with a plan for trough to be drawn at 0530 on 10/19  We will continue to follow the patient’s culture results and clinical progress daily      Kole Hendrickson

## 2022-10-18 NOTE — ASSESSMENT & PLAN NOTE
Sepsis criteria met on admission due to tachycardia, tachypnea, fever 103  Multilobar pneumonia present on CT scan   · LA 1 0  · COVID/flu/RSV negative  · Given zosyn in the ER  · CXR: "Mild pulmonary venous congestion with trace right effusion"  · CT PE study: "No pulmonary embolism  Dense consolidation in the right lower lobe and medial aspect of the left lower lobe suspicious for multilobar pneumonia"  · Blood cultures pending    Plan:   · Continue cefepime/vanco given recent hospitalization, D2  · Sputum culture pending   · Strep pneumo/legionella urine antigen pending   · Goal MAP >65  · Required 1L [place]te overnight secondary to hypotension, BP fluid responsive   · Trend fever curve, WBC and procal   · PRN Tylenol for fever

## 2022-10-18 NOTE — ASSESSMENT & PLAN NOTE
· in the setting of hypercarbia/sepsis  · Intubated and now sedated with propofol  · Daily SAT  · CAM ICU  · Sleep hygiene  · Neuro checks per routine

## 2022-10-19 NOTE — ASSESSMENT & PLAN NOTE
Sepsis criteria met on admission due to tachycardia, tachypnea, fever 103  Multilobar pneumonia present on CT scan   · LA 1 0  · COVID/flu/RSV negative  · Given zosyn in the ER  · CXR: "Mild pulmonary venous congestion with trace right effusion"  · CT PE study: "No pulmonary embolism  Dense consolidation in the right lower lobe and medial aspect of the left lower lobe suspicious for multilobar pneumonia"  · Blood cultures pending    Plan:   · Continue cefepime and vanco D3  · MRS nasal surveillance pending  · Sputum culture pending   · Strep pneumo/legionella urine antigen negative    · BC 1/2 with staph coag negative, likely contaminant   · Goal MAP >65  · Trend fever curve, WBC and procal   · PRN Tylenol for fever

## 2022-10-19 NOTE — ASSESSMENT & PLAN NOTE
· CTA chest 10/17 demonstrated dense consolidation in right lower lobe and left sided consolidation consistent with multilobar pneumonia  · Sputum cx pending  · Strep pneumo and legionella negative  · Continue cefepime, vanc D3  · Chest PT TID, ETT suctioning PRN, 3% NSS nebs   · Trend fever curve, WBC and procal

## 2022-10-19 NOTE — PHYSICAL THERAPY NOTE
10/19/22 0933   Note Type   Note type Cancelled Session; Evaluation   Cancel Reasons Medical status; Intubated/sedated   Additional Comments PT orders received  Chart reviewed    Pt canceled by RN and LURDES - pt not appropriate for PT services today   Licensure   [place] License Number  [id number] [id number]

## 2022-10-19 NOTE — UTILIZATION REVIEW
Continued Stay Review    Date: 10/19/2022                        Current Patient Class: Inpatient  Current Level of Care: Critical Care    HPI:61 y o  female initially admitted on 10/17/2022    Assessment/Plan: Pneumonia  Intubated / vented  (28/360/8/60%)  Blair Swift Continue IV Abx  Sedated with propofol 45mcg  Continue IV insulin Gtt  Continue IV flds  PT/OT        Vital Signs: BP 95/56   Pulse 76   Temp (!) 97 16 °F (36 2 °C)   Resp (!) 33   Ht 5' 3" (1 6 m)   Wt 107 kg (236 lb 8 9 oz)   SpO2 93%   BMI 41 90 kg/m²     Pertinent Labs/Diagnostic Results:   Results from last 7 days   Lab Units 10/17/22  1100   SARS-COV-2  Negative     Results from last 7 days   Lab Units 10/19/22  0545 10/18/22  0506 10/17/22  1519 10/17/22  1515 10/17/22  1100   WBC Thousand/uL 19 65* 20 67*  --   --  9 37   HEMOGLOBIN g/dL 11 8 12 8  --   --  13 4   I STAT HEMOGLOBIN g/dl  --   --  15 3 15 6*  --    HEMATOCRIT % 36 8 41 0  --   --  43 4   HEMATOCRIT, ISTAT %  --   --  45 46  --    PLATELETS Thousands/uL 307 317  --   --  349   NEUTROS ABS Thousands/µL  --   --   --   --  7 16   BANDS PCT %  --  21*  --   --   --      Results from last 7 days   Lab Units 10/19/22  0545 10/18/22  0506 10/17/22  1519 10/17/22  1515 10/17/22  1100   SODIUM mmol/L 133* 135  --   --  133*   POTASSIUM mmol/L 4 3 3 6  --   --  5 0   CHLORIDE mmol/L 97 97  --   --  98   CO2 mmol/L 29 32  --   --  33*   CO2, I-STAT mmol/L  --   --  41*  --   --    ANION GAP mmol/L 7 6  --   --  2*   BUN mg/dL 29* 21  --   --  15   CREATININE mg/dL 1 28 1 47*  --   --  1 17   EGFR ml/min/1 73sq m 44 37  --   --  50   CALCIUM mg/dL 8 4 8 9  --   --  8 9   CALCIUM, IONIZED, ISTAT mmol/L  --   --  1 26 1 26  --    MAGNESIUM mg/dL 2 5 1 7  --   --   --    PHOSPHORUS mg/dL  --  3 3  --   --   --      Results from last 7 days   Lab Units 10/17/22  1100   AST U/L 10   ALT U/L 22   ALK PHOS U/L 90   TOTAL PROTEIN g/dL 7 0   ALBUMIN g/dL 2 9*   TOTAL BILIRUBIN mg/dL 0 19* Results from last 7 days   Lab Units 10/19/22  1611 10/19/22  1413 10/19/22  1214 10/19/22  1014 10/19/22  0802 10/19/22  0620 10/19/22  0419 10/19/22  0152 10/19/22  0000 10/18/22  2153 10/18/22  1959/22  1813   POC GLUCOSE mg/dl 233* 231* 185* 217* 204* 230* 254* 239* 178* 196* 211* 196*     Results from last 7 days   Lab Units 10/19/22  0545 10/18/22  0506 10/17/22  1100   GLUCOSE RANDOM mg/dL 251* 346* 410*      Results from last 7 days   Lab Units 10/19/22  0805 10/18/22  0517 10/17/22  2152   PH ART  7 420 7 371 7 250*   PCO2 ART mm Hg 45 5* 53 2* 78 0*   PO2 ART mm Hg 67 4* 77 1 85 5   HCO3 ART mmol/L 28 9* 30 1* 33 4*   BASE EXC ART mmol/L 3 7 3 7 3 5   O2 CONTENT ART mL/dL 16 9 18 3 19 5   O2 HGB, ARTERIAL % 92 8* 94 8 95 2   ABG SOURCE  Radial, Left Radial, Right Radial, Left     Results from last 7 days   Lab Units 10/17/22  1519 10/17/22  1515   I STAT BASE EXC mmol/L 8*  --    I STAT O2 SAT % 84  --    ISTAT PH ART  7 256*  --    I STAT ART PCO2 mm HG 87 5*  --    I STAT ART PO2 mm HG 59 0* 59 0*   I STAT ART HCO3 mmol/L 38 9*  --      Results from last 7 days   Lab Units 10/17/22  1459 10/17/22  1310 10/17/22  1100   HS TNI 0HR ng/L  --   --  8   HS TNI 2HR ng/L  --  9  --    HSTNI D2 ng/L  --  1  --    HS TNI 4HR ng/L 9  --   --    HSTNI D4 ng/L 1  --   --      Results from last 7 days   Lab Units 10/17/22  1100   PROTIME seconds 13 1   INR  0 98   PTT seconds 27     Results from last 7 days   Lab Units 10/19/22  0545 10/18/22  0506   PROCALCITONIN ng/ml 5 78* 8 28*     Results from last 7 days   Lab Units 10/17/22  1519   LACTIC ACID mmol/L 1 0     Results from last 7 days   Lab Units 10/17/22  1100   NT-PRO BNP pg/mL 313*     Results from last 7 days   Lab Units 10/17/22  1215   CLARITY UA  Clear   COLOR UA  Light Yellow   SPEC GRAV UA  1 015   PH UA  5 5   GLUCOSE UA mg/dl >=1000 (1%)*   KETONES UA mg/dl Negative   BLOOD UA  Negative   PROTEIN UA mg/dl Trace*   NITRITE UA  Negative BILIRUBIN UA  Negative   UROBILINOGEN UA E U /dl 0 2   LEUKOCYTES UA  Elevated glucose may cause decreased leukocyte values  See urine microscopic for [facility] result/*   WBC UA /hpf 0-1   RBC UA /hpf 0-1   BACTERIA UA /hpf Occasional   EPITHELIAL CELLS WET PREP /hpf None Seen     Results from last 7 days   Lab Units 10/17/22  1215 10/17/22  1100   STREP PNEUMONIAE ANTIGEN, URINE  Negative  --    LEGIONELLA URINARY ANTIGEN  Negative  --    INFLUENZA A PCR   --  Negative   INFLUENZA B PCR   --  Negative   RSV PCR   --  Negative     Results from last 7 days   Lab Units 10/19/22  1103 10/19/22  1100 10/17/22  1742 10/17/22  1523 10/17/22  1519   BLOOD CULTURE  Received in Microbiology Lab  Culture in Progress  Received in Microbiology Lab  Culture in Progress  --  No Growth at 24 hrs  Staphylococcus coagulase negative*   SPUTUM CULTURE   --   --  Culture results to follow    --   --    GRAM STAIN RESULT   --   --  No Epithelial cells seen  4+ Polys  No organisms seen  --  Gram positive cocci in clusters*     Medications:   Scheduled Medications:  atorvastatin, 80 mg, Oral, Daily With Dinner  buPROPion, 100 mg, Per NG Tube, TID  cefepime, 1,000 mg, Intravenous, Q12H  chlorhexidine, 15 mL, Mouth/Throat, Q12H MATTHEW  DULoxetine, 30 mg, Oral, Daily  heparin (porcine), 5,000 Units, Subcutaneous, Q8H [address]  ipratropium-albuterol, 3 mL, Nebulization, Q6H  methylPREDNISolone sodium succinate, 40 mg, Intravenous, Q12H MATTHEW  omeprazole (PRILOSEC) suspension 2 mg/mL, 20 mg, Oral, Daily  QUEtiapine, 25 mg, Per NG Tube, BID  sodium chloride, 4 mL, Nebulization, Q6H  vancomycin, 1,250 mg, Intravenous, Q12H      Continuous IV Infusions:  fentaNYL, 50 mcg/hr, Intravenous, Continuous  insulin regular (HumuLIN R,NovoLIN R) infusion, 0 3-21 Units/hr, Intravenous, Titrated  propofol, 5-50 mcg/kg/min, Intravenous, Titrated      PRN Meds:  acetaminophen, 650 mg, Oral, Q6H PRN  fentanyl citrate (PF), 50 mcg, Intravenous, Q1H PRN        Discharge Plan: D    Network Utilization Review Department  ATTENTION: Please call with any questions or concerns to [phone] and carefully listen to the prompts so that you are directed to the right person  All voicemails are confidential   [place] [place] all requests for admission clinical reviews, approved or denied determinations and any other requests to dedicated fax number below belonging to the campus where the patient is receiving treatment   List of dedicated fax numbers for the Facilities:  [address] DENIALS (Administrative/Medical Necessity) [phone]   [address] (Maternity/NICU/Pediatrics) [phone]   8 Maia Bellamy [phone]   [facility]  [phone]   [address] [address] Sourav Benz 28 [phone]   155 [facility] 134 5 [facility] [phone]

## 2022-10-19 NOTE — ASSESSMENT & PLAN NOTE
Lab Results   Component Value Date    HGBA1C 9 6 (H) 06/14/2022       Recent Labs     10/18/22  1401 10/18/22  1615 10/18/22  1813 10/18/22  1959   POCGLU 203* 194* 196* 211*       Blood Sugar Average: Last 72 hrs:  (P) 282 [id number]   · Hyperglycemia with BG 400s on presentation, no elevation in anion gap   · PTA meds include lantus, meal coverage, victoza and metformin  · Continue insulin gtt

## 2022-10-19 NOTE — ASSESSMENT & PLAN NOTE
· Hold home lisinopril/hydrochlorothiazide and cardizem  · BPs soft in setting of sepsis and sedation

## 2022-10-19 NOTE — RESPIRATORY THERAPY NOTE
Received ventilated patient in icu, pt tolerating well  Verified vent orders  ambu at bedside  Alarms set and functioning  pt properly restrained  Pt in no distress at this time

## 2022-10-19 NOTE — PROGRESS NOTES
Charlie 44  Progress Note - Greyson Para 1960, 57 y o  female MRN: [id number]  Unit/Bed#: ICU 05 Encounter: [id number]  Primary Care Provider: Janet Trevino MD   Date and time admitted to hospital: 10/17/2022 10:24 AM    * Pneumonia  Assessment & Plan  · CTA chest 10/17 demonstrated dense consolidation in right lower lobe and left sided consolidation consistent with multilobar pneumonia  · Sputum cx pending  · Strep pneumo and legionella negative  · Continue cefepime D3  · Chest PT TID, ETT suctioning PRN, 3% NSS nebs   · Trend fever curve, WBC and procal     Acute respiratory failure with hypoxia and hypercapnia ([facility] [address] )  Assessment & Plan  Patient presented 10/17 with progressively worsening shortness of breath over the past several weeks  Reported not feeling well since diagnosed with COVID in August  Initially hypoxic requiring NRB  Patient deteriorated in the ER and was placed on BiPAP and was ultimately intubated due to respiratory distress, tachypnea and hypercarbia  · Recently had COVID, tested positive 8/19 and was hospitalized for 5 days requiring nasal cannula  She noted increased SOB over the past week several weeks  Prescribed Zpak and prednisone taper on 10/6 by Dr Tati Cueto  · CXR with "Mild pulmonary venous congestion with trace right effusion "  · CT PE study: No pulmonary embolism  Dense consolidation in the right lower lobe and medial aspect of the left lower lobe suspicious for multilobar pneumonia "  · COVID/flu/RSV negative  · Acute respiratory failure with hypoxia and hypercapnia secondary to pneumonia, possible exacerbation of COPD    Plan:   · AC/VC 28/360/8/60%  · Goal to wean FiO2 and PEEP as tolerated for O2  Sat 92% or above   · VAP ppx; chlorhexidine, PPI, HOB greater than 30 degrees  · Solumedrol 40mg Q8   · Duonebs q6h   · Pulmonary hygiene  · Daily SAT/SBTs      Severe sepsis [facility])  Assessment & Plan  Sepsis criteria met on admission due to tachycardia, tachypnea, fever 103  Multilobar pneumonia present on CT scan   · LA 1 0  · COVID/flu/RSV negative  · Given zosyn in the ER  · CXR: "Mild pulmonary venous congestion with trace right effusion"  · CT PE study: "No pulmonary embolism  Dense consolidation in the right lower lobe and medial aspect of the left lower lobe suspicious for multilobar pneumonia"  · Blood cultures pending    Plan:   · Continue cefepime and vanco D3  · MRS nasal surveillance pending  · Sputum culture pending   · Strep pneumo/legionella urine antigen negative    · BC 1/2 with staph coag negative, likely contaminant   · Goal MAP >65  · Trend fever curve, WBC and procal   · PRN Tylenol for fever     Toxic metabolic encephalopathy  Assessment & Plan  · in the setting of hypercarbia/sepsis  · Intubated and now sedated with propofol  · Daily SAT  · CAM ICU  · Sleep hygiene  · Neuro checks per routine    COPD exacerbation (HCC)  Assessment & Plan  · Hx of COPD, presents with respiratory distress likely in the setting of pneumonia, however possible component of COPD exacerbation  · Continue solumedrol 40mg Q8  · Continue duonebs  · Encourage smoking cessation when appropriate    Obstructive sleep apnea  Assessment & Plan  · CPAP HS once extubated  · Likely component of OHS     Type 2 diabetes mellitus with hyperglycemia, with long-term current use of insulin [facility])  Assessment & Plan  Lab Results   Component Value Date    HGBA1C 9 6 (H) 06/14/2022       Recent Labs     10/18/22  1401 10/18/22  1615 10/18/22  1813 10/18/22  1959   POCGLU 203* 194* 196* 211*       Blood Sugar Average: Last 72 hrs:  (P) 282 [id number]   · Hyperglycemia with BG 400s on presentation, no elevation in anion gap   · PTA meds include lantus, meal coverage, victoza and metformin  · Continue insulin gtt     Chronic pain syndrome  Assessment & Plan  · Prescribed tramadol 200mg Q24 hrs for chronic back pain  · Hold for now s/p intubation    Coronary artery disease involving native coronary artery of native heart with angina pectoris [facility])  Assessment & Plan  · Cardiac cath in 2019 demonstrated non-obstructive CAD with moderate stenosis of the RCA  · EKG on admission showing sinus tachycardia, no acute ST changes  · HS troponin minimally elevated  · Continue statin    Hypertension  Assessment & Plan  · Hold home lisinopril/hydrochlorothiazide and cardizem  · BPs soft in setting of sepsis and sedation     Depression with anxiety  Assessment & Plan  · Continue home Wellbutrin, Cymbalta, and Seroquel     ----------------------------------------------------------------------------------------  HPI/24hr events: Remains intubated, vent settings 28/360/6/60%  Sedated with propofol 45mcg  No acute overnight events       Patient appropriate for transfer out of the ICU today?: No  Disposition: Continue Critical Care   Code Status: Level 1 - Full Code  ---------------------------------------------------------------------------------------  SUBJECTIVE    Review of Systems  Review of systems was unable to be performed secondary to intubation/sedation  ---------------------------------------------------------------------------------------  OBJECTIVE    Vitals   Vitals:    10/18/22 1900 10/18/22 2000 10/18/22 2100 10/18/22 2116   BP: 99/56 101/56 94/53    BP Location:       Pulse: 94 92 94    Resp: (!) 35 (!) 28 (!) 29    Temp: 98 6 °F (37 °C) 98 1 °F (36 7 °C) 97 7 °F (36 5 °C)    TempSrc:  Esophageal     SpO2: 91% 91% 92% 92%   Weight:       Height:         Temp (24hrs), Av 7 °F (37 1 °C), Min:97 7 °F (36 5 °C), Max:99 5 °F (37 5 °C)  Current: Temperature: 97 7 °F (36 5 °C)          Respiratory:  SpO2: SpO2: 92 %, SpO2 Activity: SpO2 Activity: At Rest, SpO2 Device: O2 Device: Ventilator       Invasive/non-invasive ventilation settings   Respiratory  Report   Lab Data (Last 4 hours)    None         O2/Vent Data (Last 4 hours)    None                Physical Exam  Constitutional: Appearance: She is morbidly obese  She is ill-appearing  Interventions: She is sedated, intubated and restrained  HENT:      Head: Normocephalic and atraumatic  Eyes:      General: Lids are normal       Extraocular Movements: Extraocular movements intact  Conjunctiva/sclera: Conjunctivae normal    Neck:      Trachea: Trachea normal    Cardiovascular:      Rate and Rhythm: Normal rate and regular rhythm  Pulses: Normal pulses  Radial pulses are 2+ on the right side and 2+ on the left side  Dorsalis pedis pulses are 2+ on the right side and 2+ on the left side  Heart sounds: Normal heart sounds, S1 normal and S2 normal    Pulmonary:      Effort: Pulmonary effort is normal  She is intubated  Breath sounds: Decreased breath sounds and rhonchi present  Abdominal:      General: Bowel sounds are normal       Palpations: Abdomen is soft  Musculoskeletal:      Cervical back: Full passive range of motion without pain, normal range of motion and neck supple  Comments: Normal ROM   Skin:     General: Skin is warm and dry  Capillary Refill: Capillary refill takes less than 2 seconds  Neurological:      General: No focal deficit present  Mental Status: She is easily aroused  She is lethargic  GCS: GCS eye subscore is 3  GCS verbal subscore is 1  GCS motor subscore is 6     Psychiatric:      Comments: Periods of anxiety/agitation              Laboratory and Diagnostics:  Results from last 7 days   Lab Units 10/18/22  0506 10/17/22  1519 10/17/22  1515 10/17/22  1100   WBC Thousand/uL 20 67*  --   --  9 37   HEMOGLOBIN g/dL 12 8  --   --  13 4   I STAT HEMOGLOBIN g/dl  --  15 3 15 6*  --    HEMATOCRIT % 41 0  --   --  43 4   HEMATOCRIT, ISTAT %  --  45 46  --    PLATELETS Thousands/uL 317  --   --  349   NEUTROS PCT %  --   --   --  75   BANDS PCT % 21*  --   --   --    MONOS PCT %  --   --   --  8   MONO PCT % 6  --   --   --      Results from last 7 days   Lab Units 10/18/22  0506 10/17/22  1519 10/17/22  1100   SODIUM mmol/L 135  --  133*   POTASSIUM mmol/L 3 6  --  5 0   CHLORIDE mmol/L 97  --  98   CO2 mmol/L 32  --  33*   CO2, I-STAT mmol/L  --  41*  --    ANION GAP mmol/L 6  --  2*   BUN mg/dL 21  --  15   CREATININE mg/dL 1 47*  --  1 17   CALCIUM mg/dL 8 9  --  8 9   GLUCOSE RANDOM mg/dL 346*  --  410*   ALT U/L  --   --  22   AST U/L  --   --  10   ALK PHOS U/L  --   --  90   ALBUMIN g/dL  --   --  2 9*   TOTAL BILIRUBIN mg/dL  --   --  0 19*     Results from last 7 days   Lab Units 10/18/22  0506   MAGNESIUM mg/dL 1 7   PHOSPHORUS mg/dL 3 3      Results from last 7 days   Lab Units 10/17/22  1100   INR  0 98   PTT seconds 27          Results from last 7 days   Lab Units 10/17/22  1519   LACTIC ACID mmol/L 1 0     ABG:  Results from last 7 days   Lab Units 10/18/22  0517   PH ART  7 371   PCO2 ART mm Hg 53 2*   PO2 ART mm Hg 77 1   HCO3 ART mmol/L 30 1*   BASE EXC ART mmol/L 3 7   ABG SOURCE  Radial, Right     VBG:  Results from last 7 days   Lab Units 10/18/22  0517   ABG SOURCE  Radial, Right     Results from last 7 days   Lab Units 10/18/22  0506   PROCALCITONIN ng/ml 8 28*       Micro  Results from last 7 days   Lab Units 10/17/22  1742 10/17/22  1523 10/17/22  1519 10/17/22  1215   BLOOD CULTURE   --  No Growth at 24 hrs   --   --    SPUTUM CULTURE  Culture too young- will reincubate  --   --   --    GRAM STAIN RESULT  No Epithelial cells seen  4+ Polys  No organisms seen  --  Gram positive cocci in clusters*  --    LEGIONELLA URINARY ANTIGEN   --   --   --  Negative   STREP PNEUMONIAE ANTIGEN, URINE   --   --   --  Negative       No new imaging    Intake and Output  I/O       10/17 0701  10/18 0700 10/18 0701  10/19 0700    I V  (mL/kg) 539 5 (5 1) 800 5 (7 6)    NG/GT  142    IV Piggyback 300 50    Feedings  80    Total Intake(mL/kg) 839 5 (7 9) 1072 5 (10 1)    Urine (mL/kg/hr) 1290 560 (0 4)    Emesis/NG output 250     Total Output 1540 560    Net -700 5 +512 5                Height and Weights   Height: 5' 3" (160 cm)     Body mass index is 41 27 kg/m²  Weight (last 2 days)     Date/Time Weight    10/18/22 0730 106 (233)    10/18/22 0300 106 (233 91)    10/17/22 1730 107 (234 79)    10/17/22 1018 102 (225)            Nutrition       Diet Orders   (From admission, onward)             Start     Ordered    10/18/22 1458  Diet Enteral/Parenteral; Tube Feeding No Oral Diet; Glucerna 1 2; Continuous; 40; 150; Water; Every 4 hours  Diet effective now        Comments: Start @ 20 cc/hr and increase by 10 cc/hr per protocol until reach goal   References:    Nutrtion Support Algorithm Enteral vs  Parenteral   Question Answer Comment   Diet Type Enteral/Parenteral    Enteral/Parenteral Tube Feeding No Oral Diet    Tube Feeding Formula: Glucerna 1 2    Bolus/Cyclic/Continuous Continuous    Tube Feeding Goal Rate (mL/hr): 40    Tube Feeding flush (mL): 150    Water Flush type: Water    Water flush frequency: Every 4 hours    RD to adjust diet per protocol?  Yes        10/18/22 1458    10/18/22 0844  Room Service  Once        Question:  Type of Service  Answer:  Room Service- Not Appropriate    10/18/22 0843                  Active Medications  Scheduled Meds:  Current Facility-Administered Medications   Medication Dose Route Frequency Provider Last Rate   • acetaminophen  650 mg Oral Q6H PRN LURDES Gupta     • atorvastatin  80 mg Oral Daily With LURDES JAMES     • buPROPion  100 mg Per NG Tube TID LURDES Smith     • cefepime  1,000 mg Intravenous Q12H Zee Ramírez [place], [place] Stopped (10/18/22 2814)   • chlorhexidine  15 mL Mouth/Throat Q12H [address] [place], [place]     • DULoxetine  30 mg Oral Daily LURDES Smith     • fentanyl citrate (PF)  50 mcg Intravenous Q1H PRN LURDES Gupta     • [START ON 10/19/2022] heparin (porcine)  5,000 Units Subcutaneous Q8H [address] [facility] Spironello V, CRNP     • insulin regular (HumuLIN R,NovoLIN R) infusion  0 3-21 Units/hr Intravenous Titrated Cherlynn Formosa [place], [address] 14 Units/hr (10/18/22 2015)   • ipratropium-albuterol  3 mL Nebulization Q6H Cherlynn Formosa [place], CRNP     • methylPREDNISolone sodium succinate  40 mg Intravenous [facility] Debbi Landers [place], [address]     • multi-electrolyte  50 mL/hr Intravenous Continuous Johnella Necessary, CRNP 50 mL/hr (10/18/22 1120)   • omeprazole (PRILOSEC) suspension 2 mg/mL  20 mg Oral Daily Johnella Necessary, CRNP     • potassium chloride  40 mEq Oral BID Johnella Necessary, CRNP     • propofol  5-50 mcg/kg/min Intravenous Titrated Johnella Necessary, CRNP 45 mcg/kg/min (10/18/22 2017)   • QUEtiapine  25 mg Per NG Tube BID Johnella Necessary, CRNP     • vancomycin  15 mg/kg (Adjusted) Intravenous Q12H Cherlynn Formosa Pompey, CRNP 1,000 mg (10/18/22 1805)     Continuous Infusions:  insulin regular (HumuLIN R,NovoLIN R) infusion, 0 3-21 Units/hr, Last Rate: 14 Units/hr (10/18/22 2015)  multi-electrolyte, 50 mL/hr, Last Rate: 50 mL/hr (10/18/22 1120)  propofol, 5-50 mcg/kg/min, Last Rate: 45 mcg/kg/min (10/18/22 2017)      PRN Meds:   acetaminophen, 650 mg, Q6H PRN  fentanyl citrate (PF), 50 mcg, Q1H PRN        Invasive Devices Review  Invasive Devices  Report    Peripheral Intravenous Line  Duration           Peripheral IV 10/17/22 Left Antecubital 1 day    Peripheral IV 10/17/22 Right Antecubital 1 day    Peripheral IV 10/17/22 Right;Ventral (anterior) Wrist 1 day          Drain  Duration           NG/OG/Enteral Tube Orogastric 1 day    Urethral Catheter Temperature probe 16 Fr  1 day          Airway  Duration           ETT  7 5 mm 1 day                Rationale for remaining devices: continue torres for strict I&O   ---------------------------------------------------------------------------------------  Advance Directive and Living Will:      Power of :    POLST: ---------------------------------------------------------------------------------------  Care Time Delivered:   No Critical Care time spent       LURDES Alexander      Portions of the record may have been created with voice recognition software  Occasional wrong word or "sound a like" substitutions may have occurred due to the inherent limitations of voice recognition software    Read the chart carefully and recognize, using context, where substitutions have occurred

## 2022-10-19 NOTE — PROGRESS NOTES
Vancomycin Assessment    Bigg Mcgregor is a 57 y o  female who is currently receiving vancomycin 1000mg iv q 12 hours for Pneumonia     Relevant clinical data and objective history reviewed:  Creatinine   Date Value Ref Range Status   10/19/2022 1 28 0 60 - 1 30 mg/dL Final     Comment:     Standardized to IDMS reference method   10/18/2022 1 47 (H) 0 60 - 1 30 mg/dL Final     Comment:     Standardized to IDMS reference method   10/17/2022 1 17 0 60 - 1 30 mg/dL Final     Comment:     Standardized to IDMS reference method   10/09/2017 1 01 (H) 0 57 - 1 00 mg/dL Final   06/14/2016 0 89 0 57 - 1 00 mg/dL Final     /62   Pulse 80   Temp (!) 97 16 °F (36 2 °C)   Resp (!) 37   Ht 5' 3" (1 6 m)   Wt 107 kg (236 lb 8 9 oz)   SpO2 92%   BMI 41 90 kg/m²   I/O last 3 completed shifts: In: 4036 7 [I V :2494 7; NG/GT:592; IV Piggyback:550; Feedings:400]  Out: 0488 [ZCXNK:5037; Emesis/NG output:250]  Lab Results   Component Value Date/Time    BUN 29 (H) 10/19/2022 05:45 AM    BUN 14 06/14/2022 08:49 AM    WBC 19 65 (H) 10/19/2022 05:45 AM    WBC 9 0 10/09/2017 12:55 PM    HGB 11 8 10/19/2022 05:45 AM    HGB 13 4 10/09/2017 12:55 PM    HCT 36 8 10/19/2022 05:45 AM    HCT 38 7 10/09/2017 12:55 PM    MCV 96 10/19/2022 05:45 AM    MCV 92 10/09/2017 12:55 PM     10/19/2022 05:45 AM     10/09/2017 12:55 PM     Temp Readings from Last 3 Encounters:   10/19/22 (!) 97 16 °F (36 2 °C)   09/27/22 98 °F (36 7 °C) (Temporal)   09/23/22 (!) 97 °F (36 1 °C)     Vancomycin Days of Therapy: 3    Assessment/Plan  The patient is currently on vancomycin utilizing scheduled dosing  Baseline risks associated with therapy include: concomitant nephrotoxic medications, advanced age, and dehydration    The patient is receiving 1000mg iv q 12 hours with the most recent vancomycin level being at steady-state and sub-therapeutic based on a goal of 15-20 (appropriate for most indications) ; therefore, after clinical evaluation will be changed to 1250mg iv q 12 hours   Pharmacy will continue to follow closely for s/sx of nephrotoxicity, infusion reactions, and appropriateness of therapy  BMP and CBC will be ordered per protocol  Plan for trough as patient approaches steady state, prior to the 4th  dose at approximately 0530 at 10/21/22  Pharmacy will continue to follow the patient’s culture results and clinical progress daily      Veva Libman, Pharmacist

## 2022-10-19 NOTE — ASSESSMENT & PLAN NOTE
Patient presented 10/17 with progressively worsening shortness of breath over the past several weeks  Reported not feeling well since diagnosed with COVID in August  Initially hypoxic requiring NRB  Patient deteriorated in the ER and was placed on BiPAP and was ultimately intubated due to respiratory distress, tachypnea and hypercarbia  · Recently had COVID, tested positive 8/19 and was hospitalized for 5 days requiring nasal cannula  She noted increased SOB over the past week several weeks  Prescribed Zpak and prednisone taper on 10/6 by Dr Dimas Patel  · CXR with "Mild pulmonary venous congestion with trace right effusion "  · CT PE study: No pulmonary embolism  Dense consolidation in the right lower lobe and medial aspect of the left lower lobe suspicious for multilobar pneumonia "  · COVID/flu/RSV negative  · Acute respiratory failure with hypoxia and hypercapnia secondary to pneumonia, possible exacerbation of COPD    Plan:   · AC/VC 28/360/8/60%  · Goal to wean FiO2 and PEEP as tolerated for O2  Sat 92% or above   · VAP ppx; chlorhexidine, PPI, HOB greater than 30 degrees  · Solumedrol 40mg Q8   · Duonebs q6h   · Pulmonary hygiene  · Daily SAT/SBTs

## 2022-10-20 NOTE — PROGRESS NOTES
Vancomycin IV Pharmacy-to-Dose Consultation    Lul Ornelas is a 57 y o  female who is currently receiving Vancomycin IV with management by the Pharmacy Consult service  Assessment/Plan:  The patient was reviewed  Renal function is stable and no signs or symptoms of nephrotoxicity and/or infusion reactions were documented in the chart  Based on today’s assessment, continue current vancomycin (day # 4) dosing of 1250 mg IV q 12 h , with a plan for trough to be drawn at 0530 on 10/21/22  We will continue to follow the patient’s culture results and clinical progress daily      Kole Hendrickson

## 2022-10-20 NOTE — CONSULTS
The patient's vancomycin therapy has been discontinued  Pharmacy will sign off now  Thank you for this consult       Ho Jung, Pharmacist

## 2022-10-20 NOTE — PHYSICAL THERAPY NOTE
10/20/22 0835   Note Type   Note type Cancelled Session; Evaluation   Cancel Reasons Intubated/sedated   Additional Comments PT orders received  Chart reviewed  Pt not appropriate for skilled physical therapy services today  Will follow     Licensure   [place] License Number  Luc[place]  [id number]

## 2022-10-20 NOTE — ASSESSMENT & PLAN NOTE
Patient presented 10/17 with progressively worsening SOB over the past several weeks  COVID + in August  Hospitalized for 5 days then on NC  Not feeling well since then  Saw Dr Mirna Bullock on 10/6 and was prescribed a Zpak and prednisone taper  Required NRB initially for hypoxia  Placed on BIPAP at ER due to deterioration  Then intubated due to respiratory distress, tachypnea and hypercarbia  CXR with "Mild pulmonary venous congestion with trace right effusion  CT PE study: No PE  Dense consolidation in the RLL and medial aspect of the LLL, suspicious for multilobar pneumonia  COVID/flu/RSV negative  Pt most likely presenting with acute RF with hypoxia and hypercapnia 2ry to pneumonia, possible exacerbation of COPD       · /10/60%  ?  Goal to wean FiO2 and PEEP as tolerated for O2 - Sat 92% or above   · VAP ppx; chlorhexidine, PPI, HOB greater than 30 degrees  · Continue Solumedrol 40 mg, IV, Q12H   · Continue DuoNebs, Q6H   · Continue treating Sepsis/Multilobar pneumonia with cefepime and vancomycin (D4)  · Pulmonary hygiene  · Daily SAT/SBTs  · On non violent restraints

## 2022-10-20 NOTE — PROGRESS NOTES
Charlie 44  Progress Note - Mansi Lawton 1960, 57 y o  female MRN: [id number]  Unit/Bed#: ICU 05 Encounter: [id number]  Primary Care Provider: Mando De Leon MD   Date and time admitted to hospital: 10/17/2022 10:24 AM    * Acute respiratory failure with hypoxia and hypercapnia [facility])  Assessment & Plan    Patient presented 10/17 with progressively worsening SOB over the past several weeks  COVID + in August  Hospitalized for 5 days then on NC  Not feeling well since then  Saw Dr Adán Bermudez on 10/6 and was prescribed a Zpak and prednisone taper  Required NRB initially for hypoxia  Placed on BIPAP at ER due to deterioration  Then intubated due to respiratory distress, tachypnea and hypercarbia  CXR with "Mild pulmonary venous congestion with trace right effusion  CT PE study: No PE  Dense consolidation in the RLL and medial aspect of the LLL, suspicious for multilobar pneumonia  COVID/flu/RSV negative  Pt most likely presenting with acute RF with hypoxia and hypercapnia 2ry to pneumonia, possible exacerbation of COPD       · /10/60%  ? Goal to wean FiO2 and PEEP as tolerated for O2 - Sat 92% or above   · VAP ppx; chlorhexidine, PPI, HOB greater than 30 degrees  · Continue Solumedrol 40 mg, IV, Q12H   · Continue DuoNebs, Q6H   · Continue treating Sepsis/Multilobar pneumonia with cefepime and vancomycin (D4)  · Pulmonary hygiene  · Daily SAT/SBTs  · On non violent restraints    Severe sepsis [facility])  Assessment & Plan    Sepsis criteria met on admission due to tachycardia, tachypnea, fever 103  Multilobar pneumonia present on CT scan  Improving in last CXR  LA 1 0  COVID/flu/RSV negative  Given Zosyn in the ER  CXR: "Mild pulmonary venous congestion with trace right effusion"  CT PE study: "No pulmonary embolism  Dense consolidation in the right lower lobe and medial aspect of the left lower lobe suspicious for multilobar pneumonia"   Sputum cultures - No Epithelial cells seen, 4+ Polys  No organisms seen  Legionella negative  Strep Negative  WBC 17 30  Afebrile  HR WNL  Normotensive  · Blood cultures pending x2  · Continue Cefepime 2000 mg, IV, Q8H (Day 4)   · Discontinue Vancomycin 1250 mg, IV, Q12H (Day4)   · Monitor VS, temperature and fever curve    Toxic metabolic encephalopathy  Assessment & Plan    In the setting of hypercarbia/sepsis  Intubated and now sedated with propofol/fentanyl  · Daily SAT  · CAM ICU  · Sleep hygiene  · Neuro checks per routine    COPD exacerbation ([facility] [address] )  Assessment & Plan    Hx of COPD, presents with respiratory distress likely in the setting of pneumonia, however possible component of COPD exacerbation  · Continue Solumedrol 40 mg, IV, Q12H  · Continue Duonebs, 3 mL, Q6H  · Continue treating Pneumonia  · Encourage smoking cessation when appropriate  · Monitor respiratory status - SpO2 88 -92%    Pneumonia  Assessment & Plan    CTA chest 10/17 demonstrated dense consolidation in right lower lobe and left sided consolidation consistent with multilobar pneumonia  Sputum cx pending  Strep pneumo and legionella negative  · Continue cefepime  Hold vanc  D4  · Chest PT TID, ETT suctioning PRN, 3% NSS nebs   · Trend fever curve, WBC and procal       Coronary artery disease involving native coronary artery of native heart with angina pectoris [facility])  Assessment & Plan    Cardiac cath in 2019 demonstrated non-obstructive CAD with moderate stenosis of the RCA  EKG on admission showing sinus tachycardia, no acute ST changes  HS troponin minimally elevated  · Continue statin    Obstructive sleep apnea  Assessment & Plan    CPAP HS once extubated  · Likely component of OHS     Hypertension  Assessment & Plan    BP soft in setting of sepsis and sedation      · Hold home lisinopril/hydrochlorothiazide/cardizem    Type 2 diabetes mellitus with hyperglycemia, with long-term current use of insulin [facility])  Assessment & Plan  Lab Results   Component Value Date    HGBA1C 9 6 (H) 2022     Hyperglycemia with BG 400s on presentation  No elevation in anion gap  PTA meds include lantus, meal coverage, victoza and metformin    · Continue insulin gtt     Depression with anxiety  Assessment & Plan    On home psych meds  · Continue home Wellbutrin, Cymbalta, and Seroquel     Chronic pain syndrome  Assessment & Plan    Followed by pain management  On Nucynta ER and cymbalta  Asked patient to discuss with prescribing provider as ER medications may not be fully absorbed post surgery  ER meds in capsule may be absorbed better as most capsules can be opened up  · Reassess when pt alert and oriented  ---------------------------------------------------------------------------------------  HPI/24hr events: Remained intubated throughout the night  No major events  Patient appropriate for transfer out of the ICU today?: No  Disposition: Continue Critical Care   Code Status: Level 1 - Full Code  ---------------------------------------------------------------------------------------  SUBJECTIVE    Pt was examined at bedside  Was awake but not alert or following commands  Intubated and sedated  No acute distress       Review of Systems   Unable to perform ROS: Intubated     Review of systems was reviewed and negative unless stated above in HPI/24-hour events   ---------------------------------------------------------------------------------------  OBJECTIVE    Vitals     Vitals:    10/20/22 0900 10/20/22 1000 10/20/22 1100 10/20/22 1200   BP: 103/65 103/69 100/60 128/73   Pulse: 64 63 75 67   Resp: (!) 28 (!) 29 (!) 35 (!) 30   Temp: 97 88 °F (36 6 °C) 97 88 °F (36 6 °C) 97 88 °F (36 6 °C) 98 24 °F (36 8 °C)   TempSrc:   Bladder    SpO2: 97% 94% 95% 95%   Weight:       Height:         Temp (24hrs), Av 6 °F (36 4 °C), Min:96 98 °F (36 1 °C), Max:98 24 °F (36 8 °C)  Current: Temperature: 98 24 °F (36 8 °C)    Respiratory:    SpO2: 91 %  SpO2 Activity: At Rest   O2 Device: Ventilator   Capnography: No     Invasive/non-invasive ventilation settings   Respiratory  Report   Lab Data (Last 4 hours)      10/20 1143            pH, Arterial       7 401             pCO2, Arterial       47 7             pO2, Arterial       82 4             HCO3, Arterial       29 0             Base Excess, Arterial       3 4                  O2/Vent Data     None              Physical Exam  Vitals and nursing note reviewed  Constitutional:       Appearance: She is well-developed  She is morbidly obese  She is ill-appearing  Interventions: She is sedated and intubated  HENT:      Head: Normocephalic and atraumatic  Right Ear: External ear normal       Left Ear: External ear normal       Nose: Nose normal    Eyes:      Conjunctiva/sclera: Conjunctivae normal    Cardiovascular:      Rate and Rhythm: Normal rate and regular rhythm  Heart sounds: No murmur heard  Pulmonary:      Effort: Pulmonary effort is normal  No respiratory distress  She is intubated  Breath sounds: Normal breath sounds  Abdominal:      Palpations: Abdomen is soft  Tenderness: There is no abdominal tenderness  Musculoskeletal:      Cervical back: Neck supple  Skin:     General: Skin is warm and dry       Laboratory and Diagnostics:    Results from last 7 days   Lab Units 10/20/22  0542 10/19/22  0545 10/18/22  0506 10/17/22  1519 10/17/22  1515 10/17/22  1100   WBC Thousand/uL 17 30* 19 65* 20 67*  --   --  9 37   HEMOGLOBIN g/dL 11 6 11 8 12 8  --   --  13 4   I STAT HEMOGLOBIN g/dl  --   --   --  15 3 15 6*  --    HEMATOCRIT % 36 7 36 8 41 0  --   --  43 4   HEMATOCRIT, ISTAT %  --   --   --  45 46  --    PLATELETS Thousands/uL 324 307 317  --   --  349   NEUTROS PCT % 89*  --   --   --   --  75   BANDS PCT %  --   --  21*  --   --   --    MONOS PCT % 4  --   --   --   --  8   MONO PCT %  --   --  6  --   --   --      Results from last 7 days   Lab Units 10/20/22  0542 10/19/22  0545 10/18/22  0506 10/17/22  1519 10/17/22  1100   SODIUM mmol/L 135 133* 135  --  133*   POTASSIUM mmol/L 5 0 4 3 3 6  --  5 0   CHLORIDE mmol/L 100 97 97  --  98   CO2 mmol/L 31 29 32  --  33*   CO2, I-STAT mmol/L  --   --   --  41*  --    ANION GAP mmol/L 4 7 6  --  2*   BUN mg/dL 35* 29* 21  --  15   CREATININE mg/dL 1 06 1 28 1 47*  --  1 17   CALCIUM mg/dL 8 3 8 4 8 9  --  8 9   GLUCOSE RANDOM mg/dL 221* 251* 346*  --  410*   ALT U/L  --   --   --   --  22   AST U/L  --   --   --   --  10   ALK PHOS U/L  --   --   --   --  90   ALBUMIN g/dL  --   --   --   --  2 9*   TOTAL BILIRUBIN mg/dL  --   --   --   --  0 19*     Results from last 7 days   Lab Units 10/20/22  0542 10/19/22  0545 10/18/22  0506   MAGNESIUM mg/dL 2 5 2 5 1 7   PHOSPHORUS mg/dL  --   --  3 3      Results from last 7 days   Lab Units 10/17/22  1100   INR  0 98   PTT seconds 27          Results from last 7 days   Lab Units 10/17/22  1519   LACTIC ACID mmol/L 1 0     ABG:    Results from last 7 days   Lab Units 10/20/22  1143   PH ART  7 401   PCO2 ART mm Hg 47 7*   PO2 ART mm Hg 82 4   HCO3 ART mmol/L 29 0*   BASE EXC ART mmol/L 3 4   ABG SOURCE  Radial, Left     VBG:    Results from last 7 days   Lab Units 10/20/22  1143   ABG SOURCE  Radial, Left     Results from last 7 days   Lab Units 10/19/22  0545 10/18/22  0506   PROCALCITONIN ng/ml 5 78* 8 28*     Micro    Results from last 7 days   Lab Units 10/19/22  1103 10/19/22  1100 10/17/22  1742 10/17/22  1739 10/17/22  1523 10/17/22  1519 10/17/22  1215   BLOOD CULTURE  Received in Microbiology Lab  Culture in Progress  Received in Microbiology Lab  Culture in Progress  --   --  No Growth at 48 hrs   Staphylococcus coagulase negative*  --    SPUTUM CULTURE   --   --  1+ Growth of   --   --   --   --    GRAM STAIN RESULT   --   --  No Epithelial cells seen  4+ Polys  No organisms seen  --   --  Gram positive cocci in clusters*  --    MRSA CULTURE ONLY   --   --   --  No Methicillin Resistant Staphlyococcus aureus (MRSA) isolated  --   --   --    LEGIONELLA URINARY ANTIGEN   --   --   --   --   --   --  Negative   STREP PNEUMONIAE ANTIGEN, URINE   --   --   --   --   --   --  Negative     Imaging: I have personally reviewed pertinent reports  Intake and Output    I/O       10/18 0701  10/19 0700 10/19 0701  10/20 0700 10/20 0701  10/21 0700    I V  (mL/kg) 1989 (18 6) 1237 (11 3)     NG/ 900     IV Piggyback 300 600 250    Feedings 400 960     Total Intake(mL/kg) 3281 (30 7) 3697 (33 9) 250 (2 3)    Urine (mL/kg/hr) 955 (0 4) 1950 (0 7)     Emesis/NG output       Total Output 955 1950     Net +2326 +1747 +250             Height and Weights     Height: 5' 3" (160 cm)  Body mass index is 42 65 kg/m²    Weight (last 2 days)     Date/Time Weight    10/20/22 0559 109 (240 74)    10/19/22 0400 107 (236 55)    10/18/22 0730 106 (233)    10/18/22 0300 106 (233 91)        Nutrition         Diet Orders   (From admission, onward)             Start     Ordered    10/20/22 1039  Diet Enteral/Parenteral; Tube Feeding No Oral Diet; Glucerna 1 2; Continuous; 40; 75; Water; Every 4 hours  Diet effective now        Comments: Start @ 20 cc/hr and increase by 10 cc/hr per protocol until reach goal   References:    Nutrtion Support Algorithm Enteral vs  Parenteral   Question Answer Comment   Diet Type Enteral/Parenteral    Enteral/Parenteral Tube Feeding No Oral Diet    Tube Feeding Formula: Glucerna 1 2    Bolus/Cyclic/Continuous Continuous    Tube Feeding Goal Rate (mL/hr): 40    Tube Feeding flush (mL): 75    Water Flush type: Water    Water flush frequency: Every 4 hours    RD to adjust diet per protocol?  Yes        10/20/22 1044    10/18/22 0844  Room Service  Once        Question:  Type of Service  Answer:  Room Service- Not Appropriate    10/18/22 0843            Active Medications    Scheduled Meds:  Current Facility-Administered Medications   Medication Dose Route Frequency Provider Last Rate   • acetaminophen  650 mg Oral Q6H PRN Mima Sandoval V, CRNP     • atorvastatin  80 mg Oral Daily With [employer], [address]     • buPROPion  100 mg Per NG Tube TID Tiara Cordial, CRNP     • cefepime  2 g Intravenous Q8H Marcella Pérez PA-C 2,000 mg (10/20/22 1225)   • chlorhexidine  15 mL Mouth/Throat Q12H [address] [facility], [address]     • DULoxetine  30 mg Oral Daily Tiara Cordial, CRNP     • fentaNYL  50 mcg/hr Intravenous Continuous Tiara Cordial, CRNP 50 mcg/hr (10/20/22 1527)   • fentanyl citrate (PF)  50 mcg Intravenous Q1H PRN Mariel Melchor V, CRNP     • heparin (porcine)  5,000 Units Subcutaneous Q8H [address] Mariel Spironello V, CRNP     • insulin regular (HumuLIN R,NovoLIN R) infusion  0 3-21 Units/hr Intravenous Titrated LURDES Chaves 12 Units/hr (10/20/22 1200)   • ipratropium-albuterol  3 mL Nebulization Q6H Debbi [facility] [place], CRNP     • methylPREDNISolone sodium succinate  40 mg Intravenous Q12H [address] Tiara Cordial, CRNP     • omeprazole (PRILOSEC) suspension 2 mg/mL  20 mg Oral Daily Tiara Cordial, CRNP     • polyethylene glycol  17 g Oral Daily Marcella Pérez PA-C     • propofol  5-50 mcg/kg/min Intravenous Titrated Tiara Cordial, CRNP 35 mcg/kg/min (10/20/22 1338)   • QUEtiapine  25 mg Per NG Tube BID Tiraa Cordial, CRNP     • sodium chloride  4 mL Nebulization Q6H Tiara Cordial, CRNP       Continuous Infusions:    fentaNYL, 50 mcg/hr, Last Rate: 50 mcg/hr (10/20/22 0709)  insulin regular (HumuLIN R,NovoLIN R) infusion, 0 3-21 Units/hr, Last Rate: 12 Units/hr (10/20/22 1200)  propofol, 5-50 mcg/kg/min, Last Rate: 35 mcg/kg/min (10/20/22 1338)    PRN Meds:    acetaminophen, 650 mg, Q6H PRN  fentanyl citrate (PF), 50 mcg, Q1H PRN    Invasive Devices Review  Invasive Devices  Report    Peripheral Intravenous Line  Duration           Peripheral IV 10/17/22 Right;Ventral (anterior) Wrist 2 days    Peripheral IV 10/20/22 Left Forearm <1 day    Peripheral IV 10/20/22 Left;Dorsal (posterior) Wrist <1 day          Drain  Duration           NG/OG/Enteral Tube Orogastric 2 days    Urethral Catheter Temperature probe 16 Fr  2 days          Airway  Duration           ETT  7 5 mm 2 days            Rationale for remaining devices: IV fluids and medication administration  Tube feedings  Urine output    ---------------------------------------------------------------------------------------  Advance Directive and Living Will:      Power of :    POLST:    ---------------------------------------------------------------------------------------  Care Time Delivered:     No Critical Care time spent - Deferred to Dr Gordon Szymanski  It was a pleasure to be of service to Tristen Paredes Cap, MD , MSMS  [address]     Portions of the record may have been created with voice recognition software  Occasional wrong word or "sound a like" substitutions may have occurred due to the inherent limitations of voice recognition software    Read the chart carefully and recognize, using context, where substitutions have occurred

## 2022-10-20 NOTE — ASSESSMENT & PLAN NOTE
Hx of COPD, presents with respiratory distress likely in the setting of pneumonia, however possible component of COPD exacerbation       · Continue Solumedrol 40 mg, IV, Q12H  · Continue Duonebs, 3 mL, Q6H  · Continue treating Pneumonia  · Encourage smoking cessation when appropriate  · Monitor respiratory status - SpO2 88 -92%

## 2022-10-20 NOTE — ASSESSMENT & PLAN NOTE
Cardiac cath in 2019 demonstrated non-obstructive CAD with moderate stenosis of the RCA  EKG on admission showing sinus tachycardia, no acute ST changes  HS troponin minimally elevated      · Continue statin

## 2022-10-20 NOTE — ASSESSMENT & PLAN NOTE
Lab Results   Component Value Date    HGBA1C 9 6 (H) 06/14/2022     Hyperglycemia with BG 400s on presentation  No elevation in anion gap   PTA meds include lantus, meal coverage, victoza and metformin    · Continue insulin gtt

## 2022-10-20 NOTE — ASSESSMENT & PLAN NOTE
In the setting of hypercarbia/sepsis  Intubated and now sedated with propofol/fentanyl      · Daily SAT  · CAM ICU  · Sleep hygiene  · Neuro checks per routine

## 2022-10-20 NOTE — PLAN OF CARE
Problem: RESPIRATORY - ADULT  Goal: Achieves optimal ventilation and oxygenation  Description: INTERVENTIONS:  - Assess for changes in respiratory status  - Assess for changes in mentation and behavior  - Position to facilitate oxygenation and minimize respiratory effort  - Oxygen administered by appropriate delivery if ordered  - Initiate smoking cessation education as indicated  - Encourage broncho-pulmonary hygiene including cough, deep breathe, Incentive Spirometry  - Assess the need for suctioning and aspirate as needed  - Assess and instruct to report SOB or any respiratory difficulty  - Respiratory Therapy support as indicated  - Ventilator  Outcome: Progressing

## 2022-10-20 NOTE — ASSESSMENT & PLAN NOTE
CTA chest 10/17 demonstrated dense consolidation in right lower lobe and left sided consolidation consistent with multilobar pneumonia  Sputum cx pending  Strep pneumo and legionella negative  · Continue cefepime  Hold vanc   D4  · Chest PT TID, ETT suctioning PRN, 3% NSS nebs   · Trend fever curve, WBC and procal

## 2022-10-20 NOTE — ASSESSMENT & PLAN NOTE
Sepsis criteria met on admission due to tachycardia, tachypnea, fever 103  Multilobar pneumonia present on CT scan  Improving in last CXR  LA 1 0  COVID/flu/RSV negative  Given Zosyn in the ER  CXR: "Mild pulmonary venous congestion with trace right effusion"  CT PE study: "No pulmonary embolism  Dense consolidation in the right lower lobe and medial aspect of the left lower lobe suspicious for multilobar pneumonia"  Sputum cultures - No Epithelial cells seen, 4+ Polys  No organisms seen  Legionella negative  Strep Negative  WBC 17 30  Afebrile  HR WNL  Normotensive       · Blood cultures pending x2  · Continue Cefepime 2000 mg, IV, Q8H (Day 4)   · Discontinue Vancomycin 1250 mg, IV, Q12H (Day4)   · Monitor VS, temperature and fever curve

## 2022-10-20 NOTE — ASSESSMENT & PLAN NOTE
Followed by pain management  On Nucynta ER and cymbalta  Asked patient to discuss with prescribing provider as ER medications may not be fully absorbed post surgery  ER meds in capsule may be absorbed better as most capsules can be opened up  · Reassess when pt alert and oriented

## 2022-10-21 NOTE — PHYSICAL THERAPY NOTE
PT cancellation   10/21/22 0955   PT Last Visit   PT Visit Date 10/21/22   Note Type   Note type Cancelled Session   Cancel Reasons Intubated/sedated   Additional Comments will follow as medically appropriate and able to participate with PT     Licensure   [place] License Number  Erki nevarez [place]  [id number]

## 2022-10-21 NOTE — ASSESSMENT & PLAN NOTE
Sepsis criteria met on admission due to tachycardia, tachypnea, fever 103  Multilobar pneumonia present on CT scan  Improving in last CXR  LA 1 0  COVID/flu/RSV negative  Given Zosyn in the ER  CXR: "Mild pulmonary venous congestion with trace right effusion"  CT PE study: "No pulmonary embolism  Dense consolidation in the right lower lobe and medial aspect of the left lower lobe suspicious for multilobar pneumonia"  Sputum cultures - No Epithelial cells seen, 4+ Polys  No organisms seen  Legionella negative  Strep Negative  WBC 14 14; trending down  Afebrile  HR WNL  Normotensive       · Blood cultures pending x2  · Continue Cefepime 2000 mg, IV, Q8H (Day 5)   · Discontinue Vancomycin   · Monitor VS, temperature and fever curve

## 2022-10-21 NOTE — ASSESSMENT & PLAN NOTE
Lab Results   Component Value Date    HGBA1C 9 4 (H) 10/20/2022     Hyperglycemia with BG 400s on presentation  No elevation in anion gap   PTA meds include lantus, meal coverage, victoza and metformin    · Continue insulin gtt

## 2022-10-21 NOTE — PROGRESS NOTES
[address]  Progress Note - Pascual Casillas 1960, 57 y o  female MRN: [id number]  Unit/Bed#: ICU 05 Encounter: [id number]  Primary Care Provider: Helen Esqueda MD   Date and time admitted to hospital: 10/17/2022 10:24 AM    * Acute respiratory failure with hypoxia and hypercapnia [facility])  Assessment & Plan    Patient presented 10/17 with progressively worsening SOB over the past several weeks  COVID + in August  Hospitalized for 5 days then on NC  Not feeling well since then  Saw Dr Ghassan Zelaya on 10/6 and was prescribed a Zpak and prednisone taper  Required NRB initially for hypoxia  Placed on BIPAP at ER due to deterioration  Then intubated due to respiratory distress, tachypnea and hypercarbia  CXR with "Mild pulmonary venous congestion with trace right effusion  CT PE study: No PE  Dense consolidation in the RLL and medial aspect of the LLL, suspicious for multilobar pneumonia  COVID/flu/RSV negative  Pt most likely presenting with acute RF with hypoxia and hypercapnia 2ry to pneumonia, possible exacerbation of COPD       · On ventilator   ? Goal to wean FiO2 and PEEP as tolerated for O2 - Sat 92% or above   · VAP ppx; chlorhexidine, PPI, HOB greater than 30 degrees  · Continue Solumedrol 40 mg, IV, Q12H   · Continue DuoNebs, Q6H   · Continue treating Sepsis/Multilobar pneumonia with cefepime (D5)  · Pulmonary hygiene  · Daily SAT/SBTs  · On non violent restraints    Severe sepsis [facility])  Assessment & Plan    Sepsis criteria met on admission due to tachycardia, tachypnea, fever 103  Multilobar pneumonia present on CT scan  Improving in last CXR  LA 1 0  COVID/flu/RSV negative  Given Zosyn in the ER  CXR: "Mild pulmonary venous congestion with trace right effusion"  CT PE study: "No pulmonary embolism  Dense consolidation in the right lower lobe and medial aspect of the left lower lobe suspicious for multilobar pneumonia"  Sputum cultures - No Epithelial cells seen, 4+ Polys   No organisms seen  Legionella negative  Strep Negative  WBC 14 14; trending down  Afebrile  HR WNL  Normotensive  · Blood cultures pending x2  · Continue Cefepime 2000 mg, IV, Q8H (Day 5)   · Discontinue Vancomycin   · Monitor VS, temperature and fever curve        Toxic metabolic encephalopathy  Assessment & Plan    In the setting of hypercarbia/sepsis  Intubated and now sedated with propofol/fentanyl  · Daily SAT  · CAM ICU  · Sleep hygiene  · Neuro checks per routine    COPD exacerbation ([facility] [address] )  Assessment & Plan    Hx of COPD, presents with respiratory distress likely in the setting of pneumonia, however possible component of COPD exacerbation  · Continue Solumedrol 40 mg, IV, Q12H  · Continue Duonebs, 3 mL, Q6H  · Continue treating Pneumonia  · Encourage smoking cessation when appropriate  · Monitor respiratory status - SpO2 88 -92%    Pneumonia  Assessment & Plan    CTA chest 10/17 demonstrated dense consolidation in right lower lobe and left sided consolidation consistent with multilobar pneumonia  Sputum cx pending  Strep pneumo and legionella negative  · Continue cefepime (day 5)  · Hold vanc  D4  · Chest PT TID, ETT suctioning PRN, 3% NSS nebs   · Trend fever curve, WBC and procal     Coronary artery disease involving native coronary artery of native heart with angina pectoris [facility])  Assessment & Plan    Cardiac cath in 2019 demonstrated non-obstructive CAD with moderate stenosis of the RCA  EKG on admission showing sinus tachycardia, no acute ST changes  HS troponin minimally elevated  · Continue statin      Obstructive sleep apnea  Assessment & Plan    CPAP HS once extubated  · Likely component of OHS     Hypertension  Assessment & Plan    BP soft in setting of sepsis and sedation      · Hold home lisinopril/hydrochlorothiazide/cardizem    Type 2 diabetes mellitus with hyperglycemia, with long-term current use of insulin [facility])  Assessment & Plan  Lab Results   Component Value Date HGBA1C 9 4 (H) 10/20/2022     Hyperglycemia with BG 400s on presentation  No elevation in anion gap  PTA meds include lantus, meal coverage, victoza and metformin    · Continue insulin gtt     Depression with anxiety  Assessment & Plan    On home psych meds  · Continue home Wellbutrin, Cymbalta, and Seroquel     Chronic pain syndrome  Assessment & Plan    Followed by pain management  On Nucynta ER and cymbalta  Asked patient to discuss with prescribing provider as ER medications may not be fully absorbed post surgery  ER meds in capsule may be absorbed better as most capsules can be opened up  · Reassess when pt alert and oriented  ----------------------------------------------------------------------------------------  HPI/24hr events: No acute events  Pt continues to be intubated  Patient appropriate for transfer out of the ICU today?: No  Disposition: Continue Critical Care   Code Status: Level 1 - Full Code  ---------------------------------------------------------------------------------------  SUBJECTIVE    Patient examined at bedside  Patient non verbal and in no acute distress  Pt able to slightly wave back with her hand when her name was called       Review of Systems   Unable to perform ROS: Intubated     Review of systems was reviewed and negative unless stated above in HPI/24-hour events   ---------------------------------------------------------------------------------------  OBJECTIVE    Vitals   Vitals:    10/21/22 0500 10/21/22 0549 10/21/22 0600 10/21/22 0806   BP: 108/63  108/61    Pulse: 75  77    Resp: (!) 25  (!) 30    Temp: 97 7 °F (36 5 °C)  97 88 °F (36 6 °C)    TempSrc:       SpO2: 95%  92% 96%   Weight: 110 kg (243 lb 9 7 oz) 110 kg (243 lb 9 7 oz)     Height:         Temp (24hrs), Av 2 °F (36 8 °C), Min:97 7 °F (36 5 °C), Max:98 6 °F (37 °C)  Current: Temperature: 97 88 °F (36 6 °C)    Respiratory:    SpO2: SpO2: 96 %, SpO2 Activity: SpO2 Activity: At Rest, SpO2 Device: O2 Device: Ventilator, Capnography: Capnography: No     Invasive/non-invasive ventilation settings    Respiratory  Report   Lab Data (Last 4 hours)    None         O2/Vent Data (Last 4 hours)    None              Physical Exam  Vitals and nursing note reviewed  Constitutional:       General: She is not in acute distress  Appearance: She is well-developed  She is obese  She is ill-appearing  Interventions: She is intubated  HENT:      Head: Normocephalic and atraumatic  Eyes:      Conjunctiva/sclera: Conjunctivae normal    Cardiovascular:      Rate and Rhythm: Normal rate and regular rhythm  Heart sounds: No murmur heard  Pulmonary:      Effort: Pulmonary effort is normal  No respiratory distress  She is intubated  Breath sounds: Normal breath sounds  Comments: On ventilator  Abdominal:      Palpations: Abdomen is soft  Tenderness: There is no abdominal tenderness  Musculoskeletal:      Cervical back: Neck supple  Right lower leg: No edema  Left lower leg: No edema  Skin:     General: Skin is warm and dry  Neurological:      Mental Status: Mental status is at baseline  Psychiatric:         Behavior: Behavior is cooperative       Laboratory and Diagnostics:    Results from last 7 days   Lab Units 10/21/22  0525 10/20/22  0542 10/19/22  0545 10/18/22  0506 10/17/22  1519 10/17/22  1515 10/17/22  1100   WBC Thousand/uL 14 14* 17 30* 19 65* 20 67*  --   --  9 37   HEMOGLOBIN g/dL 11 8 11 6 11 8 12 8  --   --  13 4   I STAT HEMOGLOBIN g/dl  --   --   --   --  15 3 15 6*  --    HEMATOCRIT % 38 4 36 7 36 8 41 0  --   --  43 4   HEMATOCRIT, ISTAT %  --   --   --   --  45 46  --    PLATELETS Thousands/uL 325 324 307 317  --   --  349   NEUTROS PCT %  --  89*  --   --   --   --  75   BANDS PCT %  --   --   --  21*  --   --   --    MONOS PCT %  --  4  --   --   --   --  8   MONO PCT %  --   --   --  6  --   --   --      Results from last 7 days   Lab Units 10/21/22  0525 10/20/22  0542 10/19/22  0545 10/18/22  0506 10/17/22  1519 10/17/22  1100   SODIUM mmol/L 136 135 133* 135  --  133*   POTASSIUM mmol/L 5 1 5 0 4 3 3 6  --  5 0   CHLORIDE mmol/L 101 100 97 97  --  98   CO2 mmol/L 31 31 29 32  --  33*   CO2, I-STAT mmol/L  --   --   --   --  41*  --    ANION GAP mmol/L 4 4 7 6  --  2*   BUN mg/dL 34* 35* 29* 21  --  15   CREATININE mg/dL 1 07 1 06 1 28 1 47*  --  1 17   CALCIUM mg/dL 8 4 8 3 8 4 8 9  --  8 9   GLUCOSE RANDOM mg/dL 180* 221* 251* 346*  --  410*   ALT U/L  --   --   --   --   --  22   AST U/L  --   --   --   --   --  10   ALK PHOS U/L  --   --   --   --   --  90   ALBUMIN g/dL  --   --   --   --   --  2 9*   TOTAL BILIRUBIN mg/dL  --   --   --   --   --  0 19*     Results from last 7 days   Lab Units 10/20/22  0542 10/19/22  0545 10/18/22  0506   MAGNESIUM mg/dL 2 5 2 5 1 7   PHOSPHORUS mg/dL  --   --  3 3      Results from last 7 days   Lab Units 10/17/22  1100   INR  0 98   PTT seconds 27          Results from last 7 days   Lab Units 10/17/22  1519   LACTIC ACID mmol/L 1 0     ABG:  Results from last 7 days   Lab Units 10/20/22  1143   PH ART  7 401   PCO2 ART mm Hg 47 7*   PO2 ART mm Hg 82 4   HCO3 ART mmol/L 29 0*   BASE EXC ART mmol/L 3 4   ABG SOURCE  Radial, Left     VBG:  Results from last 7 days   Lab Units 10/20/22  1143   ABG SOURCE  Radial, Left     Results from last 7 days   Lab Units 10/19/22  0545 10/18/22  0506   PROCALCITONIN ng/ml 5 78* 8 28*       Micro  Results from last 7 days   Lab Units 10/19/22  1103 10/19/22  1100 10/17/22  1742 10/17/22  1739 10/17/22  1523 10/17/22  1519 10/17/22  1215   BLOOD CULTURE  No Growth at 24 hrs  No Growth at 24 hrs   --   --  No Growth at 72 hrs   Staphylococcus coagulase negative*  --    SPUTUM CULTURE   --   --  1+ Growth of   --   --   --   --    GRAM STAIN RESULT   --   --  No Epithelial cells seen  4+ Polys  No organisms seen  --   --  Gram positive cocci in clusters*  --    MRSA CULTURE ONLY   --   --   -- No Methicillin Resistant Staphlyococcus aureus (MRSA) isolated  --   --   --    LEGIONELLA URINARY ANTIGEN   --   --   --   --   --   --  Negative   STREP PNEUMONIAE ANTIGEN, URINE   --   --   --   --   --   --  Negative     Imaging: I have personally reviewed pertinent reports  Intake and Output  I/O       10/19 0701  10/20 0700 10/20 0701  10/21 0700    I V  (mL/kg) 1237 (11 3) 1230 3 (11 2)    NG/ 565    IV Piggyback 600 300    Feedings 960 880    Total Intake(mL/kg) 3697 (33 9) 2975 3 (27)    Urine (mL/kg/hr) 1950 (0 7) 1985 (0 8)    Total Output 1950 1985    Net +1747 +990 3                Height and Weights   Height: 5' 3" (160 cm)     Body mass index is 43 15 kg/m²  Weight (last 2 days)     Date/Time Weight    10/21/22 0549 110 (243 61)    10/21/22 0500 110 (243 61)    10/20/22 0559 109 (240 74)    10/19/22 0400 107 (236 55)            Nutrition       Diet Orders   (From admission, onward)             Start     Ordered    10/20/22 1039  Diet Enteral/Parenteral; Tube Feeding No Oral Diet; Glucerna 1 2; Continuous; 40; 75; Water; Every 4 hours  Diet effective now        Comments: Start @ 20 cc/hr and increase by 10 cc/hr per protocol until reach goal   References:    Nutrtion Support Algorithm Enteral vs  Parenteral   Question Answer Comment   Diet Type Enteral/Parenteral    Enteral/Parenteral Tube Feeding No Oral Diet    Tube Feeding Formula: Glucerna 1 2    Bolus/Cyclic/Continuous Continuous    Tube Feeding Goal Rate (mL/hr): 40    Tube Feeding flush (mL): 75    Water Flush type: Water    Water flush frequency: Every 4 hours    RD to adjust diet per protocol?  Yes        10/20/22 1044    10/18/22 0844  Room Service  Once        Question:  Type of Service  Answer:  Room Service- Not Appropriate    10/18/22 0843                  Active Medications  Scheduled Meds:  Current Facility-Administered Medications   Medication Dose Route Frequency Provider Last Rate   • acetaminophen  650 mg Oral Q6H PRN LURDES Gupta     • atorvastatin  80 mg Oral Daily With [employer], [address]     • buPROPion  100 mg Per NG Tube TID LURDES Zuniga     • cefepime  2 g Intravenous Q8H Justino Pérez PA-C 2,000 mg (10/21/22 0518)   • chlorhexidine  15 mL Mouth/Throat Q12H [address] [facility], [facility]      • DULoxetine  30 mg Oral Daily SHERLYN ZunigaNP     • fentaNYL  50 mcg/hr Intravenous Continuous Ye Coopere CRNP 50 mcg/hr (10/21/22 0704)   • fentanyl citrate (PF)  50 mcg Intravenous Q1H PRN LURDES Gupta     • heparin (porcine)  5,000 Units Subcutaneous Q8H [address] [place] SHERLYN NguyenNP     • insulin regular (HumuLIN R,NovoLIN R) infusion  0 3-21 Units/hr Intravenous Titrated [facility], CRNP 8 Units/hr (10/21/22 9992)   • ipratropium-albuterol  3 mL Nebulization Q6H [place] SHERLYN JohnsonNP     • methylPREDNISolone sodium succinate  40 mg Intravenous Q12H [address] LURDES Zuniga     • omeprazole (PRILOSEC) suspension 2 mg/mL  20 mg Oral Daily SHERLYN ZunigaNP     • polyethylene glycol  17 g Oral Daily Justino Pérez PA-C     • propofol  5-50 mcg/kg/min Intravenous Titrated Ye Hernández CRNP 50 mcg/kg/min (10/21/22 0704)   • QUEtiapine  25 mg Per NG Tube BID SHERLYN ZunigaNP     • sodium chloride  4 mL Nebulization Q6H SHERLYN ZunigaNP       Continuous Infusions:  fentaNYL, 50 mcg/hr, Last Rate: 50 mcg/hr (10/21/22 0704)  insulin regular (HumuLIN R,NovoLIN R) infusion, 0 3-21 Units/hr, Last Rate: 8 Units/hr (10/21/22 0704)  propofol, 5-50 mcg/kg/min, Last Rate: 50 mcg/kg/min (10/21/22 0704)      PRN Meds:   acetaminophen, 650 mg, Q6H PRN  fentanyl citrate (PF), 50 mcg, Q1H PRN        Invasive Devices Review  Invasive Devices  Report    Peripheral Intravenous Line  Duration           Peripheral IV 10/20/22 Left Forearm 1 day    Peripheral IV 10/20/22 Left;Dorsal (posterior) Wrist <1 day          Drain  Duration           NG/OG/Enteral Tube Orogastric 3 days    Urethral Catheter Temperature probe 16 Fr  3 days          Airway  Duration           ETT  7 5 mm 3 days              Rationale for remaining devices: IVF administration, Tube feeds and Urinary output,   ---------------------------------------------------------------------------------------  Advance Directive and Living Will:      Power of :    POLST:    ---------------------------------------------------------------------------------------  Care Time Delivered:     No Critical Care time spent - Deferred to Dr Gordon Szymanski  It was a pleasure to be of service to Tristen Paredes Cap, MD , MSMS  [address]     Portions of the record may have been created with voice recognition software  Occasional wrong word or "sound a like" substitutions may have occurred due to the inherent limitations of voice recognition software  Read the chart carefully and recognize, using context, where substitutions have occurred

## 2022-10-21 NOTE — ASSESSMENT & PLAN NOTE
CTA chest 10/17 demonstrated dense consolidation in right lower lobe and left sided consolidation consistent with multilobar pneumonia  Sputum cx pending  Strep pneumo and legionella negative  · Continue cefepime (day 5)  · Hold vanc   D4  · Chest PT TID, ETT suctioning PRN, 3% NSS nebs   · Trend fever curve, WBC and procal

## 2022-10-21 NOTE — ASSESSMENT & PLAN NOTE
Patient presented 10/17 with progressively worsening SOB over the past several weeks  COVID + in August  Hospitalized for 5 days then on NC  Not feeling well since then  Saw Dr Kye Clements on 10/6 and was prescribed a Zpak and prednisone taper  Required NRB initially for hypoxia  Placed on BIPAP at ER due to deterioration  Then intubated due to respiratory distress, tachypnea and hypercarbia  CXR with "Mild pulmonary venous congestion with trace right effusion  CT PE study: No PE  Dense consolidation in the RLL and medial aspect of the LLL, suspicious for multilobar pneumonia  COVID/flu/RSV negative  Pt most likely presenting with acute RF with hypoxia and hypercapnia 2ry to pneumonia, possible exacerbation of COPD       · On ventilator   ?  Goal to wean FiO2 and PEEP as tolerated for O2 - Sat 92% or above   · VAP ppx; chlorhexidine, PPI, HOB greater than 30 degrees  · Continue Solumedrol 40 mg, IV, Q12H   · Continue DuoNebs, Q6H   · Continue treating Sepsis/Multilobar pneumonia with cefepime (D5)  · Pulmonary hygiene  · Daily SAT/SBTs  · On non violent restraints

## 2022-10-22 NOTE — ASSESSMENT & PLAN NOTE
Lab Results   Component Value Date    HGBA1C 9 4 (H) 10/20/2022       Recent Labs     10/21/22  1755 10/21/22  2012 10/21/22  2157 10/22/22  0201   POCGLU 115 125 112 210*       Blood Sugar Average: Last 72 hrs:  (P) 822 [id number]   · Hyperglycemia with BG 400s on presentation, no elevation in anion gap   · PTA meds include lantus, meal coverage, victoza and metformin  · Transitioned from insulin infusion to Lantus 50 units qHS   Monitor and increase to home if tolerated

## 2022-10-22 NOTE — PHYSICAL THERAPY NOTE
Physical Therapy Cancellation Note       10/22/22 1100   PT Last Visit   PT Visit Date 10/22/22   Note Type   Note type Cancelled Session   Cancel Reasons Intubated/sedated   Additional Comments will follow as medically appropriate     Licensure   [place] License Number  Red Bullock PT, DPT [id number]

## 2022-10-22 NOTE — ASSESSMENT & PLAN NOTE
Patient presented 10/17 with progressively worsening shortness of breath over the past several weeks  Reported not feeling well since diagnosed with COVID in August  Initially hypoxic requiring NRB  Patient deteriorated in the ER and was placed on BiPAP and was ultimately intubated due to respiratory distress, tachypnea and hypercarbia  · Recently had COVID, tested positive 8/19 and was hospitalized for 5 days requiring nasal cannula  She noted increased SOB over the past week several weeks  Prescribed Zpak and prednisone taper on 10/6 by Dr Coleen Mahmood  · CXR with "Mild pulmonary venous congestion with trace right effusion "  · CT PE study: No pulmonary embolism  Dense consolidation in the right lower lobe and medial aspect of the left lower lobe suspicious for multilobar pneumonia "  · COVID/flu/RSV negative  · Acute respiratory failure with hypoxia and hypercapnia secondary to pneumonia, possible exacerbation of COPD    Plan:   · AC/VC 28/360/8/60%  · Goal to wean FiO2 and PEEP as tolerated for O2  Sat 92% or above   · VAP ppx; chlorhexidine, PPI, HOB greater than 30 degrees  · Solumedrol 40mg Q8   · Duonebs q6h   · Pulmonary hygiene  · Daily SAT/SBTs  · Consideration for bronchoscopy today for secretion management

## 2022-10-22 NOTE — ASSESSMENT & PLAN NOTE
· CTA chest 10/17 demonstrated dense consolidation in right lower lobe and left sided consolidation consistent with multilobar pneumonia  · Sputum cx pending  · Strep pneumo and legionella negative  · Continue cefepime, vanc D4  · Chest PT TID, ETT suctioning PRN, 3% NSS nebs   · Trend fever curve, WBC and procal

## 2022-10-22 NOTE — PROGRESS NOTES
Charlie 44  Progress Note - Desiree Olszewski 1960, 57 y o  female MRN: [id number]  Unit/Bed#: ICU 05 Encounter: [id number]  Primary Care Provider: Mireya Brooks MD   Date and time admitted to hospital: 10/17/2022 10:24 AM    Pneumonia  Assessment & Plan  · CTA chest 10/17 demonstrated dense consolidation in right lower lobe and left sided consolidation consistent with multilobar pneumonia  · Sputum cx pending  · Strep pneumo and legionella negative  · Continue cefepime, vanc D4  · Chest PT TID, ETT suctioning PRN, 3% NSS nebs   · Trend fever curve, WBC and procal     * Acute respiratory failure with hypoxia and hypercapnia ([facility] [address] )  Assessment & Plan  Patient presented 10/17 with progressively worsening shortness of breath over the past several weeks  Reported not feeling well since diagnosed with COVID in August  Initially hypoxic requiring NRB  Patient deteriorated in the ER and was placed on BiPAP and was ultimately intubated due to respiratory distress, tachypnea and hypercarbia  · Recently had COVID, tested positive 8/19 and was hospitalized for 5 days requiring nasal cannula  She noted increased SOB over the past week several weeks  Prescribed Zpak and prednisone taper on 10/6 by Dr Violeta Narvaez  · CXR with "Mild pulmonary venous congestion with trace right effusion "  · CT PE study: No pulmonary embolism  Dense consolidation in the right lower lobe and medial aspect of the left lower lobe suspicious for multilobar pneumonia "  · COVID/flu/RSV negative  · Acute respiratory failure with hypoxia and hypercapnia secondary to pneumonia, possible exacerbation of COPD    Plan:   · AC/VC 28/360/8/60%  · Goal to wean FiO2 and PEEP as tolerated for O2  Sat 92% or above   · VAP ppx; chlorhexidine, PPI, HOB greater than 30 degrees  · Solumedrol 40mg Q8   · Duonebs q6h   · Pulmonary hygiene  · Daily SAT/SBTs  · Consideration for bronchoscopy today for secretion management      Severe sepsis [facility])  Assessment & Plan  Sepsis criteria met on admission due to tachycardia, tachypnea, fever 103  Multilobar pneumonia present on CT scan   · LA 1 0  · COVID/flu/RSV negative  · Given zosyn in the ER  · CXR: "Mild pulmonary venous congestion with trace right effusion"  · CT PE study: "No pulmonary embolism  Dense consolidation in the right lower lobe and medial aspect of the left lower lobe suspicious for multilobar pneumonia"  · Blood cultures pending    Plan:   · Continue cefepime and vanco D4  · MRS nasal surveillance pending  · Sputum culture pending   · Strep pneumo/legionella urine antigen negative    · BC 1/2 with staph coag negative, likely contaminant   · Goal MAP >65  · Trend fever curve, WBC and procal   · PRN Tylenol for fever     Toxic metabolic encephalopathy  Assessment & Plan  · in the setting of hypercarbia/sepsis  · Intubated and now sedated with propofol  · Daily SAT  · CAM ICU  · Sleep hygiene  · Neuro checks per routine    COPD exacerbation (HCC)  Assessment & Plan  · Hx of COPD, presents with respiratory distress likely in the setting of pneumonia, however possible component of COPD exacerbation  · Continue solumedrol 40mg Q12  · Continue duonebs  · Encourage smoking cessation when appropriate    Obstructive sleep apnea  Assessment & Plan  · CPAP HS once extubated  · Likely component of OHS     Type 2 diabetes mellitus with hyperglycemia, with long-term current use of insulin [facility])  Assessment & Plan  Lab Results   Component Value Date    HGBA1C 9 4 (H) 10/20/2022       Recent Labs     10/21/22  1755 10/21/22  2012 10/21/22  2157 10/22/22  0201   POCGLU 115 125 112 210*       Blood Sugar Average: Last 72 hrs:  (P) 996 [id number]   · Hyperglycemia with BG 400s on presentation, no elevation in anion gap   · PTA meds include lantus, meal coverage, victoza and metformin  · Transitioned from insulin infusion to Lantus 50 units qHS   Monitor and increase to home if tolerated    Chronic pain syndrome  Assessment & Plan  · Prescribed tramadol 200mg Q24 hrs for chronic back pain  · Hold for now s/p intubation    Coronary artery disease involving native coronary artery of native heart with angina pectoris [facility])  Assessment & Plan  · Cardiac cath in 2019 demonstrated non-obstructive CAD with moderate stenosis of the RCA  · EKG on admission showing sinus tachycardia, no acute ST changes  · HS troponin minimally elevated  · Continue statin    Hypertension  Assessment & Plan  · Hold home lisinopril/hydrochlorothiazide and cardizem      Depression with anxiety  Assessment & Plan  · Continue home Wellbutrin, Cymbalta, and Seroquel       ----------------------------------------------------------------------------------------  HPI/24hr events: no acute events overnight    Patient appropriate for transfer out of the ICU today?: No  Disposition: Continue Critical Care   Code Status: Level 1 - Full Code  ---------------------------------------------------------------------------------------  SUBJECTIVE  Sedated, intubated    Review of Systems  Review of systems was unable to be performed secondary to intubated  ---------------------------------------------------------------------------------------  OBJECTIVE    Vitals   Vitals:    10/21/22 2100 10/21/22 2200 10/21/22 2300 10/22/22 0018   BP: 111/63 112/62 97/56 98/57   BP Location:       Pulse: 79 75 71 74   Resp: (!) 34 (!) 27 22 19   Temp: 98 78 °F (37 1 °C) 99 14 °F (37 3 °C) 99 14 °F (37 3 °C) 98 96 °F (37 2 °C)   TempSrc:       SpO2: 93% 92% 91% 93%   Weight:       Height:         Temp (24hrs), Av 9 °F (37 2 °C), Min:97 7 °F (36 5 °C), Max:99 68 °F (37 6 °C)  Current: Temperature: 98 96 °F (37 2 °C)          Respiratory:  SpO2: SpO2: 93 %, SpO2 Activity: SpO2 Activity: At Rest, SpO2 Device: O2 Device: Ventilator, Capnography: Capnography: No       Invasive/non-invasive ventilation settings   Respiratory  Report   Lab Data (Last 4 hours)    None O2/Vent Data (Last 4 hours)    None                Physical Exam  Vitals and nursing note reviewed  Constitutional:       Appearance: Normal appearance  She is not ill-appearing  HENT:      Head: Normocephalic and atraumatic  Mouth/Throat:      Mouth: Mucous membranes are moist    Eyes:      Pupils: Pupils are equal, round, and reactive to light  Cardiovascular:      Rate and Rhythm: Normal rate and regular rhythm  Pulses: Normal pulses  Heart sounds: No murmur heard  Pulmonary:      Effort: Pulmonary effort is normal  No respiratory distress  Abdominal:      General: Abdomen is flat  Skin:     General: Skin is warm     Neurological:      Comments: sedated             Laboratory and Diagnostics:  Results from last 7 days   Lab Units 10/21/22  0525 10/20/22  0542 10/19/22  0545 10/18/22  0506 10/17/22  1519 10/17/22  1515 10/17/22  1100   WBC Thousand/uL 14 14* 17 30* 19 65* 20 67*  --   --  9 37   HEMOGLOBIN g/dL 11 8 11 6 11 8 12 8  --   --  13 4   I STAT HEMOGLOBIN g/dl  --   --   --   --  15 3 15 6*  --    HEMATOCRIT % 38 4 36 7 36 8 41 0  --   --  43 4   HEMATOCRIT, ISTAT %  --   --   --   --  45 46  --    PLATELETS Thousands/uL 325 324 307 317  --   --  349   NEUTROS PCT %  --  89*  --   --   --   --  75   BANDS PCT %  --   --   --  21*  --   --   --    MONOS PCT %  --  4  --   --   --   --  8   MONO PCT %  --   --   --  6  --   --   --      Results from last 7 days   Lab Units 10/21/22  0525 10/20/22  0542 10/19/22  0545 10/18/22  0506 10/17/22  1519 10/17/22  1100   SODIUM mmol/L 136 135 133* 135  --  133*   POTASSIUM mmol/L 5 1 5 0 4 3 3 6  --  5 0   CHLORIDE mmol/L 101 100 97 97  --  98   CO2 mmol/L 31 31 29 32  --  33*   CO2, I-STAT mmol/L  --   --   --   --  41*  --    ANION GAP mmol/L 4 4 7 6  --  2*   BUN mg/dL 34* 35* 29* 21  --  15   CREATININE mg/dL 1 07 1 06 1 28 1 47*  --  1 17   CALCIUM mg/dL 8 4 8 3 8 4 8 9  --  8 9   GLUCOSE RANDOM mg/dL 180* 221* 251* 346*  --  410* ALT U/L  --   --   --   --   --  22   AST U/L  --   --   --   --   --  10   ALK PHOS U/L  --   --   --   --   --  90   ALBUMIN g/dL  --   --   --   --   --  2 9*   TOTAL BILIRUBIN mg/dL  --   --   --   --   --  0 19*     Results from last 7 days   Lab Units 10/20/22  0542 10/19/22  0545 10/18/22  0506   MAGNESIUM mg/dL 2 5 2 5 1 7   PHOSPHORUS mg/dL  --   --  3 3      Results from last 7 days   Lab Units 10/17/22  1100   INR  0 98   PTT seconds 27          Results from last 7 days   Lab Units 10/17/22  1519   LACTIC ACID mmol/L 1 0     ABG:  Results from last 7 days   Lab Units 10/20/22  1143   PH ART  7 401   PCO2 ART mm Hg 47 7*   PO2 ART mm Hg 82 4   HCO3 ART mmol/L 29 0*   BASE EXC ART mmol/L 3 4   ABG SOURCE  Radial, Left     VBG:  Results from last 7 days   Lab Units 10/20/22  1143   ABG SOURCE  Radial, Left     Results from last 7 days   Lab Units 10/19/22  0545 10/18/22  0506   PROCALCITONIN ng/ml 5 78* 8 28*       Micro  Results from last 7 days   Lab Units 10/19/22  1103 10/19/22  1100 10/17/22  1742 10/17/22  1739 10/17/22  1523 10/17/22  1519 10/17/22  1215   BLOOD CULTURE  No Growth at 48 hrs  No Growth at 48 hrs   --   --  No Growth After 4 Days  Staphylococcus coagulase negative*  --    SPUTUM CULTURE   --   --  1+ Growth of   --   --   --   --    GRAM STAIN RESULT   --   --  No Epithelial cells seen  4+ Polys  No organisms seen  --   --  Gram positive cocci in clusters*  --    MRSA CULTURE ONLY   --   --   --  No Methicillin Resistant Staphlyococcus aureus (MRSA) isolated  --   --   --    LEGIONELLA URINARY ANTIGEN   --   --   --   --   --   --  Negative   STREP PNEUMONIAE ANTIGEN, URINE   --   --   --   --   --   --  Negative       EKG: NSR on tele  Imaging: I have personally reviewed pertinent reports        Intake and Output  I/O       10/20 0701  10/21 0700 10/21 0701  10/22 0700    I V  (mL/kg) 1230 3 (11 2) 547 9 (5)    NG/ 440    IV Piggyback 300 100    Feedings 880 480    Total Intake(mL/kg) 2975 3 (27) 1567 9 (14 3)    Urine (mL/kg/hr) 1985 (0 8) 4230 (1 6)    Total Output 1985 4230    Net +990 3 -6612 1                Height and Weights   Height: 5' 3" (160 cm)     Body mass index is 43 15 kg/m²  Weight (last 2 days)     Date/Time Weight    10/21/22 0549 110 (243 61)    10/21/22 0500 110 (243 61)    10/20/22 0559 109 (240 74)            Nutrition       Diet Orders   (From admission, onward)             Start     Ordered    10/20/22 1039  Diet Enteral/Parenteral; Tube Feeding No Oral Diet; Glucerna 1 2; Continuous; 40; 75; Water; Every 4 hours  Diet effective now        Comments: Start @ 20 cc/hr and increase by 10 cc/hr per protocol until reach goal   References:    Nutrtion Support Algorithm Enteral vs  Parenteral   Question Answer Comment   Diet Type Enteral/Parenteral    Enteral/Parenteral Tube Feeding No Oral Diet    Tube Feeding Formula: Glucerna 1 2    Bolus/Cyclic/Continuous Continuous    Tube Feeding Goal Rate (mL/hr): 40    Tube Feeding flush (mL): 75    Water Flush type: Water    Water flush frequency: Every 4 hours    RD to adjust diet per protocol?  Yes        10/20/22 1044    10/18/22 0844  Room Service  Once        Question:  Type of Service  Answer:  Room Service- Not Appropriate    10/18/22 0843                  Active Medications  Scheduled Meds:  Current Facility-Administered Medications   Medication Dose Route Frequency Provider Last Rate   • acetaminophen  650 mg Oral Q6H PRN LURDES Haley     • atorvastatin  80 mg Oral Daily With LURDES JAMES     • buPROPion  100 mg Per NG Tube TID LURDES Hernandez     • cefepime  2 g Intravenous Q8H [place], [place] Stopped (10/21/22 2210)   • chlorhexidine  15 mL Mouth/Throat Q12H [facility] Debbi Waters [place], [address] [place] [address]     • DULoxetine  30 mg Oral Daily LURDES Hernandez     • enoxaparin  40 mg Subcutaneous Q24H [address]     • fentaNYL  50 mcg/hr Intravenous Continuous Stefanie Fiddler, CRNP 50 mcg/hr (10/21/22 2157)   • fentanyl citrate (PF)  50 mcg Intravenous Q1H PRN LURDES Gupta     • insulin glargine  50 Units Subcutaneous HS Lester Navarro PA-C     • insulin lispro  2-12 Units Subcutaneous A2C [facility] Lester Navarro PA-C     • ipratropium-albuterol  3 mL Nebulization Q6H LURDES Vyas     • methylPREDNISolone sodium succinate  40 mg Intravenous Q12H [facility] Stefanie FiddlerLURDES     • omeprazole (PRILOSEC) suspension 2 mg/mL  20 mg Oral Daily Stefanie FidhuberrLURDES     • polyethylene glycol  17 g Oral Daily Avelina Pérez PA-C     • propofol  5-50 mcg/kg/min Intravenous Titrated Stefanie Fiddler, CRNP 45 mcg/kg/min (10/22/22 0115)   • QUEtiapine  25 mg Per NG Tube BID Stefanie FidhuberrLURDES     • sodium chloride  4 mL Nebulization Q6H Stefanie Fiddler, CRNP       Continuous Infusions:  fentaNYL, 50 mcg/hr, Last Rate: 50 mcg/hr (10/21/22 2157)  propofol, 5-50 mcg/kg/min, Last Rate: 45 mcg/kg/min (10/22/22 0115)      PRN Meds:   acetaminophen, 650 mg, Q6H PRN  fentanyl citrate (PF), 50 mcg, Q1H PRN        Invasive Devices Review  Invasive Devices  Report    Peripheral Intravenous Line  Duration           Peripheral IV 10/20/22 Left Forearm 2 days    Long-Dwell Peripheral IV (Midline) 05/68/66 Left Basilic <1 day    Peripheral IV 10/21/22 Right Wrist <1 day    Peripheral IV 10/21/22 Right; Lower Arm <1 day          Drain  Duration           NG/OG/Enteral Tube Orogastric 4 days    Urethral Catheter Temperature probe 16 Fr  4 days          Airway  Duration           ETT  7 5 mm 4 days                Rationale for remaining devices:   ---------------------------------------------------------------------------------------  Advance Directive and Living Will:      Power of :    POLST:    ---------------------------------------------------------------------------------------  Care Time Delivered: Upon my evaluation, this patient had a high probability of imminent or life-threatening deterioration due to hypoxic respiratory failure, pneumonia, which required my direct attention, intervention, and personal management  I have personally provided 30 minutes (0200 to 0230) of critical care time, exclusive of procedures, teaching, family meetings, and any prior time recorded by providers other than myself  Td Justice PA-C      Portions of the record may have been created with voice recognition software  Occasional wrong word or "sound a like" substitutions may have occurred due to the inherent limitations of voice recognition software    Read the chart carefully and recognize, using context, where substitutions have occurred

## 2022-10-22 NOTE — RESPIRATORY THERAPY NOTE
Pt bronch on vent no problems    Saline inserted 110 cc  Bronch done by Dr Gamez Rough settings 100%  CMV

## 2022-10-22 NOTE — OCCUPATIONAL THERAPY NOTE
OT EVALUATION       10/22/22 0956   Note Type   Note type Cancelled Session   Cancel Reasons Intubated/sedated   Licensure   [place] License Number  Corine Escamilla [address] 86 OTR/L [id number]

## 2022-10-22 NOTE — PROCEDURES
Bronchoscopy    Date/Time: 10/22/2022 3:28 PM  Performed by: Edgar Dobbins MD  Authorized by: Edgar Dobbins MD     Patient location:  Bedside  Other Assisting Provider: No    Consent:     Consent obtained:  Written    Consent given by: Adult son  Risks discussed:  Pneumothorax, infection, bleeding and adverse reaction to sedation    Alternatives discussed:  No treatment  Universal protocol:     Procedure explained and questions answered to patient or proxy's satisfaction: yes      Relevant documents present and verified: yes      Required blood products, implants, devices and special equipment available: yes      Immediately prior to procedure a time out was called: yes      Patient identity confirmed:  Arm band and anonymous protocol, patient vented/unresponsive  Indications:     Procedure Purpose: therapeutic      Indications: pneumonia/infiltrate    Sedation:     Sedation type:  Continuous (ICU/vent)  Anesthesia (see MAR for exact dosages): Anesthesia method:  None  Upper Airway:     Trachea: normal      Ca:  Normal  Airway:     Airway:  Ambu regular-sized bronchoscope inserted through port and down ETT  ETT in appropriate position 3-5 cm above ca  Airways readily collapsed  RUL and RML were clear of secretions  Thick strands of mucus seen emanating from right lower lobe, including superior segment  Segmental bronchi patent without plugging  Approximately 30 mL of sterile saline instilled in right lower lobe and subsequently suctioned to obtain specimen for culture  Right lower lobe was then cleared of secretions to subsegmental level  Left lung was inspected next  A small quantity of secretions was suctioned from the lower lobe  Bronchoscope was withdrawn  No immediate complications

## 2022-10-22 NOTE — ASSESSMENT & PLAN NOTE
· Hx of COPD, presents with respiratory distress likely in the setting of pneumonia, however possible component of COPD exacerbation  · Continue solumedrol 40mg Q12  · Continue duonebs  · Encourage smoking cessation when appropriate

## 2022-10-22 NOTE — PLAN OF CARE
Problem: RESPIRATORY - ADULT  Goal: Achieves optimal ventilation and oxygenation  Description: INTERVENTIONS:  - Assess for changes in respiratory status  - Assess for changes in mentation and behavior  - Position to facilitate oxygenation and minimize respiratory effort  - Oxygen administered by appropriate delivery if ordered  - Encourage broncho-pulmonary hygiene including cough, deep breathe, Incentive Spirometry  - Assess the need for suctioning and aspirate as needed  - Assess and instruct to report SOB or any respiratory difficulty  - Respiratory Therapy support as indicated  - Ventilator  10/22/2022 1123 by Roseann Youssef RN  Outcome: Progressing  10/22/2022 1121 by Roseann Youssef RN  Outcome: Progressing     Problem: MOBILITY - ADULT  Goal: Maintain or return to baseline ADL function  Description: INTERVENTIONS:  -  Assess patient's ability to carry out ADLs; assess patient's baseline for ADL function and identify physical deficits which impact ability to perform ADLs (bathing, care of mouth/teeth, toileting, grooming, dressing, etc )  - Assess/evaluate cause of self-care deficits   - Assess range of motion  - Assess patient's mobility; develop plan if impaired  - Assess patient's need for assistive devices and provide as appropriate  - Encourage maximum independence but intervene and supervise when necessary  - Involve family in performance of ADLs  - Assess for home care needs following discharge   - Consider OT consult to assist with ADL evaluation and planning for discharge  - Provide patient education as appropriate  10/22/2022 1123 by Roseann Youssef RN  Outcome: Progressing  10/22/2022 1121 by Roseann Youssef RN  Outcome: Progressing  Goal: Maintains/Returns to pre admission functional level  Description: INTERVENTIONS:  - Perform BMAT or MOVE assessment daily    - Set and communicate daily mobility goal to care team and patient/family/caregiver     - Collaborate with rehabilitation services on mobility goals if consulted  - Perform Range of Motion 4 times a day  - Reposition patient every 2 hours    - Record patient progress and toleration of activity level   10/22/2022 1123 by Ronnie Cintron RN  Outcome: Progressing  10/22/2022 1121 by Ronnie Cintron RN  Outcome: Progressing     Problem: Potential for Falls  Goal: Patient will remain free of falls  Description: INTERVENTIONS:  - Educate patient/family on patient safety including physical limitations  - Instruct patient to call for assistance with activity   - Consult OT/PT to assist with strengthening/mobility   - Keep Call bell within reach  - Keep bed low and locked with side rails adjusted as appropriate  - Keep care items and personal belongings within reach  - Initiate and maintain comfort rounds  - Make Fall Risk Sign visible to staff  - Obtain necessary fall risk management equipment  - Apply yellow socks and bracelet for high fall risk patients  - Consider moving patient to room near nurses station  10/22/2022 1123 by Ronnie Cintron RN  Outcome: Progressing  10/22/2022 1121 by Ronnie Cintron RN  Outcome: Progressing     Problem: Prexisting or High Potential for Compromised Skin Integrity  Goal: Skin integrity is maintained or improved  Description: INTERVENTIONS:  - Identify patients at risk for skin breakdown  - Assess and monitor skin integrity  - Assess and monitor nutrition and hydration status  - Monitor labs   - Assess for incontinence   - Turn and reposition patient  - Assist with mobility/ambulation  - Relieve pressure over bony prominences  - Avoid friction and shearing  - Provide appropriate hygiene as needed including keeping skin clean and dry  - Evaluate need for skin moisturizer/barrier cream  - Collaborate with interdisciplinary team   - Patient/family teaching  - Consider wound care consult   10/22/2022 1123 by Ronnie Cintron RN  Outcome: Progressing  10/22/2022 1121 by Ronnie Cintron RN  Outcome: Progressing     Problem: PAIN - ADULT  Goal: Verbalizes/displays adequate comfort level or baseline comfort level  Description: Interventions:  - Encourage patient to monitor pain and request assistance  - Assess pain using appropriate pain scale  - Administer analgesics based on type and severity of pain and evaluate response  - Implement non-pharmacological measures as appropriate and evaluate response  - Consider cultural and social influences on pain and pain management  - Notify physician/advanced practitioner if interventions unsuccessful or patient reports new pain  10/22/2022 1123 by Roseann Youssef RN  Outcome: Progressing  10/22/2022 1121 by Roseann Youssef RN  Outcome: Progressing     Problem: INFECTION - ADULT  Goal: Absence or prevention of progression during hospitalization  Description: INTERVENTIONS:  - Assess and monitor for signs and symptoms of infection  - Monitor lab/diagnostic results  - Monitor all insertion sites, i e  indwelling lines, tubes, and drains  - Monitor endotracheal if appropriate and nasal secretions for changes in amount and color  - [place] appropriate cooling/warming therapies per order  - Administer medications as ordered  - Instruct and encourage patient and family to use good hand hygiene technique  - Identify and instruct in appropriate isolation precautions for identified infection/condition  10/22/2022 1123 by Roseann Youssef RN  Outcome: Progressing  10/22/2022 1121 by Roseann Youssef RN  Outcome: Progressing  Goal: Absence of fever/infection during neutropenic period  Description: INTERVENTIONS:  - Monitor WBC    10/22/2022 1123 by Roseann Youssef RN  Outcome: Progressing  10/22/2022 1121 by Roseann Youssef RN  Outcome: Progressing     Problem: SAFETY ADULT  Goal: Maintain or return to baseline ADL function  Description: INTERVENTIONS:  -  Assess patient's ability to carry out ADLs; assess patient's baseline for ADL function and identify physical deficits which impact ability to perform ADLs (bathing, care of mouth/teeth, toileting, grooming, dressing, etc )  - Assess/evaluate cause of self-care deficits   - Assess range of motion  - Assess patient's mobility; develop plan if impaired  - Assess patient's need for assistive devices and provide as appropriate  - Encourage maximum independence but intervene and supervise when necessary  - Involve family in performance of ADLs  - Assess for home care needs following discharge   - Consider OT consult to assist with ADL evaluation and planning for discharge  - Provide patient education as appropriate  10/22/2022 1123 by Kate Hinson RN  Outcome: Progressing  10/22/2022 1121 by Kate Hinson RN  Outcome: Progressing  Goal: Maintains/Returns to pre admission functional level  Description: INTERVENTIONS:  - Perform BMAT or MOVE assessment daily    - Set and communicate daily mobility goal to care team and patient/family/caregiver  - Collaborate with rehabilitation services on mobility goals if consulted  - Perform Range of Motion 4 times a day  - Reposition patient every 2 hours    - Record patient progress and toleration of activity level   10/22/2022 1123 by Kate Hinson RN  Outcome: Progressing  10/22/2022 1121 by Kate Hinson RN  Outcome: Progressing  Goal: Patient will remain free of falls  Description: INTERVENTIONS:  - Educate patient/family on patient safety including physical limitations  - Instruct patient to call for assistance with activity   - Consult OT/PT to assist with strengthening/mobility   - Keep Call bell within reach  - Keep bed low and locked with side rails adjusted as appropriate  - Keep care items and personal belongings within reach  - Initiate and maintain comfort rounds  - Make Fall Risk Sign visible to staff  - Obtain necessary fall risk management equipment  - Apply yellow socks and bracelet for high fall risk patients  - Consider moving patient to room near nurses station  10/22/2022 1123 by Kate Hinson RN  Outcome: Progressing  10/22/2022 1121 by Cleve Reyes Marva Doherty RN  Outcome: Progressing     Problem: DISCHARGE PLANNING  Goal: Discharge to home or other facility with appropriate resources  Description: INTERVENTIONS:  - Identify barriers to discharge w/patient and caregiver  - Arrange for needed discharge resources and transportation as appropriate  - Identify discharge learning needs (meds, wound care, etc )  - Arrange for interpretive services to assist at discharge as needed  - Refer to Case Management Department for coordinating discharge planning if the patient needs post-hospital services based on physician/advanced practitioner order or complex needs related to functional status, cognitive ability, or social support system  10/22/2022 1123 by Cesar Herron RN  Outcome: Progressing  10/22/2022 1121 by Cesar Herron RN  Outcome: Progressing     Problem: Knowledge Deficit  Goal: Patient/family/caregiver demonstrates understanding of disease process, treatment plan, medications, and discharge instructions  Description: Complete learning assessment and assess knowledge base  Interventions:  - Provide teaching at level of understanding  - Provide teaching via preferred learning methods  10/22/2022 1123 by Cesar Herron RN  Outcome: Progressing  10/22/2022 1121 by Cesar Herron RN  Outcome: Progressing     Problem: Nutrition/Hydration-ADULT  Goal: Nutrient/Hydration intake appropriate for improving, restoring or maintaining nutritional needs  Description: Monitor and assess patient's nutrition/hydration status for malnutrition  Collaborate with interdisciplinary team and initiate plan and interventions as ordered  Monitor patient's weight and dietary intake as ordered or per policy  Utilize nutrition screening tool and intervene as necessary  Determine patient's food preferences and provide high-protein, high-caloric foods as appropriate       INTERVENTIONS:  - Monitor oral intake, urinary output, labs, and treatment plans  - Assess nutrition and hydration status and recommend course of action  - Evaluate amount of meals eaten  - Assist patient with eating if necessary   - Allow adequate time for meals  - Recommend/ encourage appropriate diets, oral nutritional supplements, and vitamin/mineral supplements  - Order, calculate, and assess calorie counts as needed  - Recommend, monitor, and adjust tube feedings and TPN/PPN based on assessed needs  - Assess need for intravenous fluids  - Provide specific nutrition/hydration education as appropriate  - Include patient/family/caregiver in decisions related to nutrition  10/22/2022 1123 by Tanya Vogt RN  Outcome: Progressing  10/22/2022 1121 by Tanya Vogt RN  Outcome: Progressing     Problem: SAFETY,RESTRAINT: NV/NON-SELF DESTRUCTIVE BEHAVIOR  Goal: Remains free of harm/injury (restraint for non violent/non self-detsructive behavior)  Description: INTERVENTIONS:  - Instruct patient/family regarding restraint use   - Assess and monitor physiologic and psychological status   - Provide interventions and comfort measures to meet assessed patient needs   - Identify and implement measures to help patient regain control  - Assess readiness for release of restraint   10/22/2022 1123 by Tanya Vogt RN  Outcome: Progressing  10/22/2022 1121 by Tanya Vogt RN  Outcome: Progressing  Goal: Returns to optimal restraint-free functioning  Description: INTERVENTIONS:  - Assess the patient's behavior and symptoms that indicate continued need for restraint  - Identify and implement measures to help patient regain control  - Assess readiness for release of restraint   10/22/2022 1123 by Tanya Vogt RN  Outcome: Progressing  10/22/2022 1121 by Tanya Vogt RN  Outcome: Progressing

## 2022-10-23 PROBLEM — G92.8 TOXIC METABOLIC ENCEPHALOPATHY: Status: RESOLVED | Noted: 2022-10-17 | Resolved: 2022-10-23

## 2022-10-23 NOTE — ASSESSMENT & PLAN NOTE
Patient presented 10/17 with progressively worsening shortness of breath over the past several weeks  Reported not feeling well since diagnosed with COVID in August  Initially hypoxic requiring NRB  Patient deteriorated in the ER and was placed on BiPAP and was ultimately intubated due to respiratory distress, tachypnea and hypercarbia  · Recently had COVID, tested positive 8/19 and was hospitalized for 5 days requiring nasal cannula  She noted increased SOB over the past week several weeks  Prescribed Zpak and prednisone taper on 10/6 by Dr Cielo Marie  · CXR with "Mild pulmonary venous congestion with trace right effusion "  · CT PE study: No pulmonary embolism  Dense consolidation in the right lower lobe and medial aspect of the left lower lobe suspicious for multilobar pneumonia "  · COVID/flu/RSV negative  · Acute respiratory failure with hypoxia and hypercapnia secondary to pneumonia, possible exacerbation of COPD    Plan:   · AC/VC 18/360/8/45%  · Goal to wean FiO2 and PEEP as tolerated for O2  Sat 92% or above   · VAP ppx; chlorhexidine, PPI, HOB greater than 30 degrees  · Solumedrol 40mg QD  · Duonebs q6h   · Pulmonary hygiene  · Daily SAT/SBTs  · S/p bronch 10/22 for minimal thick secretions

## 2022-10-23 NOTE — ASSESSMENT & PLAN NOTE
· Hx of COPD, presents with respiratory distress likely in the setting of pneumonia, however possible component of COPD exacerbation  · Continue solumedrol 40mg QD  · Continue duonebs  · Encourage smoking cessation when appropriate

## 2022-10-23 NOTE — ASSESSMENT & PLAN NOTE
Lab Results   Component Value Date    HGBA1C 9 4 (H) 10/20/2022       Recent Labs     10/22/22  1755 10/22/22  2112 10/22/22  2352 10/23/22  0525   POCGLU 240* 210* 187* 127       Blood Sugar Average: Last 72 hrs:  (P) 525 58901   · Hyperglycemia with BG 400s on presentation, no elevation in anion gap   · PTA meds include lantus, meal coverage, victoza and metformin  · Transitioned from insulin infusion to Lantus 50 units qHS   Monitor and increase to home if tolerated

## 2022-10-23 NOTE — ASSESSMENT & PLAN NOTE
Sepsis criteria met on admission due to tachycardia, tachypnea, fever 103  Multilobar pneumonia present on CT scan   · LA 1 0  · COVID/flu/RSV negative  · Given zosyn in the ER  · CXR: "Mild pulmonary venous congestion with trace right effusion"  · CT PE study: "No pulmonary embolism  Dense consolidation in the right lower lobe and medial aspect of the left lower lobe suspicious for multilobar pneumonia"  · Blood cultures pending    Plan:   · Continue cefepime and vanco D6  · MRS nasal surveillance pending  · Sputum culture resp tova  · Strep pneumo/legionella urine antigen negative    · BC 1/2 with staph coag negative, likely contaminant   · Goal MAP >65  · Trend fever curve, WBC and procal   · PRN Tylenol for fever

## 2022-10-23 NOTE — PROGRESS NOTES
[address]  Progress Note - Glynn Monge 1960, 57 y o  female MRN: [id number]  Unit/Bed#: ICU 05 Encounter: [id number]  Primary Care Provider: Germán Summers MD   Date and time admitted to hospital: 10/17/2022 10:24 AM    Pneumonia  Assessment & Plan  · CTA chest 10/17 demonstrated dense consolidation in right lower lobe and left sided consolidation consistent with multilobar pneumonia  · Sputum cx resp tova  · Strep pneumo and legionella negative  · Continue cefepime, vanc D6  · Chest PT TID, ETT suctioning PRN, 3% NSS nebs   · Trend fever curve, WBC and procal     * Acute respiratory failure with hypoxia and hypercapnia ([facility] [address] )  Assessment & Plan  Patient presented 10/17 with progressively worsening shortness of breath over the past several weeks  Reported not feeling well since diagnosed with COVID in August  Initially hypoxic requiring NRB  Patient deteriorated in the ER and was placed on BiPAP and was ultimately intubated due to respiratory distress, tachypnea and hypercarbia  · Recently had COVID, tested positive 8/19 and was hospitalized for 5 days requiring nasal cannula  She noted increased SOB over the past week several weeks  Prescribed Zpak and prednisone taper on 10/6 by Dr Ana Lilia Leiva  · CXR with "Mild pulmonary venous congestion with trace right effusion "  · CT PE study: No pulmonary embolism  Dense consolidation in the right lower lobe and medial aspect of the left lower lobe suspicious for multilobar pneumonia "  · COVID/flu/RSV negative  · Acute respiratory failure with hypoxia and hypercapnia secondary to pneumonia, possible exacerbation of COPD    Plan:   · AC/VC 18/360/8/45%  · Goal to wean FiO2 and PEEP as tolerated for O2  Sat 92% or above   · VAP ppx; chlorhexidine, PPI, HOB greater than 30 degrees  · Solumedrol 40mg QD  · Duonebs q6h   · Pulmonary hygiene  · Daily SAT/SBTs  · S/p bronch 10/22 for minimal thick secretions      Severe sepsis [facility])  Assessment & Plan  Sepsis criteria met on admission due to tachycardia, tachypnea, fever 103  Multilobar pneumonia present on CT scan   · LA 1 0  · COVID/flu/RSV negative  · Given zosyn in the ER  · CXR: "Mild pulmonary venous congestion with trace right effusion"  · CT PE study: "No pulmonary embolism  Dense consolidation in the right lower lobe and medial aspect of the left lower lobe suspicious for multilobar pneumonia"  · Blood cultures pending    Plan:   · Continue cefepime and vanco D6  · MRS nasal surveillance pending  · Sputum culture resp tova  · Strep pneumo/legionella urine antigen negative    · BC 1/2 with staph coag negative, likely contaminant   · Goal MAP >65  · Trend fever curve, WBC and procal   · PRN Tylenol for fever     COPD exacerbation (HCC)  Assessment & Plan  · Hx of COPD, presents with respiratory distress likely in the setting of pneumonia, however possible component of COPD exacerbation  · Continue solumedrol 40mg QD  · Continue duonebs  · Encourage smoking cessation when appropriate    Obstructive sleep apnea  Assessment & Plan  · CPAP HS once extubated  · Likely component of OHS     Type 2 diabetes mellitus with hyperglycemia, with long-term current use of insulin [facility])  Assessment & Plan  Lab Results   Component Value Date    HGBA1C 9 4 (H) 10/20/2022       Recent Labs     10/22/22  1755 10/22/22  2112 10/22/22  2352 10/23/22  0525   POCGLU 240* 210* 187* 127       Blood Sugar Average: Last 72 hrs:  (P) 113 61455   · Hyperglycemia with BG 400s on presentation, no elevation in anion gap   · PTA meds include lantus, meal coverage, victoza and metformin  · Transitioned from insulin infusion to Lantus 50 units qHS   Monitor and increase to home if tolerated    Chronic pain syndrome  Assessment & Plan  · Prescribed tramadol 200mg Q24 hrs for chronic back pain  · Hold for now s/p intubation    Coronary artery disease involving native coronary artery of native heart with angina pectoris [facility])  Assessment & Plan  · Cardiac cath in 2019 demonstrated non-obstructive CAD with moderate stenosis of the RCA  · EKG on admission showing sinus tachycardia, no acute ST changes  · HS troponin minimally elevated  · Continue statin    Hypertension  Assessment & Plan  · Hold home lisinopril/hydrochlorothiazide and cardizem      Depression with anxiety  Assessment & Plan  · Continue home Wellbutrin, Cymbalta, and Seroquel       ----------------------------------------------------------------------------------------  HPI/24hr events: no acute events overnight    Patient appropriate for transfer out of the ICU today?: No  Disposition: Continue Critical Care   Code Status: Level 1 - Full Code  ---------------------------------------------------------------------------------------  SUBJECTIVE  intubated    Review of Systems  Review of systems was unable to be performed secondary to intubated  ---------------------------------------------------------------------------------------  OBJECTIVE    Vitals   Vitals:    10/23/22 0400 10/23/22 0500 10/23/22 0514 10/23/22 0548   BP: 105/70 (!) 82/52 (!) 77/49 (!) 87/55   Pulse: 62 (!) 53 (!) 52 (!) 52   Resp: (!) 28 19 (!) 35 (!) 36   Temp: 98 96 °F (37 2 °C) 98 6 °F (37 °C) 98 42 °F (36 9 °C) 98 42 °F (36 9 °C)   TempSrc:       SpO2: 90% 93% 93% 94%   Weight:       Height:         Temp (24hrs), Av 8 °F (37 1 °C), Min:97 88 °F (36 6 °C), Max:99 5 °F (37 5 °C)  Current: Temperature: 98 42 °F (36 9 °C)          Respiratory:  SpO2: SpO2: 96 %, SpO2 Activity: SpO2 Activity: At Rest, SpO2 Device: O2 Device: Ventilator, Capnography: Capnography: No       Invasive/non-invasive ventilation settings   Respiratory  Report   Lab Data (Last 4 hours)    None         O2/Vent Data (Last 4 hours)    None                Physical Exam  Vitals and nursing note reviewed  Constitutional:       General: She is not in acute distress  HENT:      Head: Normocephalic and atraumatic  Mouth/Throat:      Mouth: Mucous membranes are moist    Eyes:      Pupils: Pupils are equal, round, and reactive to light  Cardiovascular:      Rate and Rhythm: Normal rate and regular rhythm  Pulses: Normal pulses  Heart sounds: No murmur heard  Pulmonary:      Effort: Pulmonary effort is normal  No respiratory distress  Abdominal:      General: Abdomen is flat  There is no distension  Skin:     General: Skin is warm  Neurological:      General: No focal deficit present  Mental Status: She is alert               Laboratory and Diagnostics:  Results from last 7 days   Lab Units 10/23/22  0525 10/21/22  0525 10/20/22  0542 10/19/22  0545 10/18/22  0506 10/17/22  1519 10/17/22  1515 10/17/22  1100   WBC Thousand/uL 14 87* 14 14* 17 30* 19 65* 20 67*  --   --  9 37   HEMOGLOBIN g/dL 12 0 11 8 11 6 11 8 12 8  --   --  13 4   I STAT HEMOGLOBIN g/dl  --   --   --   --   --  15 3 15 6*  --    HEMATOCRIT % 39 4 38 4 36 7 36 8 41 0  --   --  43 4   HEMATOCRIT, ISTAT %  --   --   --   --   --  45 46  --    PLATELETS Thousands/uL 308 325 324 307 317  --   --  349   NEUTROS PCT %  --   --  89*  --   --   --   --  75   BANDS PCT %  --   --   --   --  21*  --   --   --    MONOS PCT %  --   --  4  --   --   --   --  8   MONO PCT %  --   --   --   --  6  --   --   --      Results from last 7 days   Lab Units 10/23/22  0525 10/22/22  0554 10/21/22  0525 10/20/22  0542 10/19/22  0545 10/18/22  0506 10/17/22  1519 10/17/22  1100   SODIUM mmol/L 140 135 136 135 133* 135  --  133*   POTASSIUM mmol/L 3 9 5 3 5 1 5 0 4 3 3 6  --  5 0   CHLORIDE mmol/L 102 99 101 100 97 97  --  98   CO2 mmol/L 37* 34* 31 31 29 32  --  33*   CO2, I-STAT mmol/L  --   --   --   --   --   --  41*  --    ANION GAP mmol/L 1* 2* 4 4 7 6  --  2*   BUN mg/dL 41* 39* 34* 35* 29* 21  --  15   CREATININE mg/dL 1 21 1 08 1 07 1 06 1 28 1 47*  --  1 17   CALCIUM mg/dL 8 7 8 2* 8 4 8 3 8 4 8 9  --  8 9   GLUCOSE RANDOM mg/dL 113 275* 180* 221* 251* 346*  --  410*   ALT U/L  --   --   --   --   --   --   --  22   AST U/L  --   --   --   --   --   --   --  10   ALK PHOS U/L  --   --   --   --   --   --   --  90   ALBUMIN g/dL  --   --   --   --   --   --   --  2 9*   TOTAL BILIRUBIN mg/dL  --   --   --   --   --   --   --  0 19*     Results from last 7 days   Lab Units 10/23/22  0525 10/20/22  0542 10/19/22  0545 10/18/22  0506   MAGNESIUM mg/dL 2 5 2 5 2 5 1 7   PHOSPHORUS mg/dL 5 0*  --   --  3 3      Results from last 7 days   Lab Units 10/17/22  1100   INR  0 98   PTT seconds 27          Results from last 7 days   Lab Units 10/17/22  1519   LACTIC ACID mmol/L 1 0     ABG:  Results from last 7 days   Lab Units 10/20/22  1143   PH ART  7 401   PCO2 ART mm Hg 47 7*   PO2 ART mm Hg 82 4   HCO3 ART mmol/L 29 0*   BASE EXC ART mmol/L 3 4   ABG SOURCE  Radial, Left     VBG:  Results from last 7 days   Lab Units 10/20/22  1143   ABG SOURCE  Radial, Left     Results from last 7 days   Lab Units 10/19/22  0545 10/18/22  0506   PROCALCITONIN ng/ml 5 78* 8 28*       Micro  Results from last 7 days   Lab Units 10/19/22  1103 10/19/22  1100 10/17/22  1742 10/17/22  1739 10/17/22  1523 10/17/22  1519 10/17/22  1215   BLOOD CULTURE  No Growth at 72 hrs  No Growth at 72 hrs   --   --  No Growth After 5 Days  Staphylococcus coagulase negative*  --    SPUTUM CULTURE   --   --  1+ Growth of   --   --   --   --    GRAM STAIN RESULT   --   --  No Epithelial cells seen  4+ Polys  No organisms seen  --   --  Gram positive cocci in clusters*  --    MRSA CULTURE ONLY   --   --   --  No Methicillin Resistant Staphlyococcus aureus (MRSA) isolated  --   --   --    LEGIONELLA URINARY ANTIGEN   --   --   --   --   --   --  Negative   STREP PNEUMONIAE ANTIGEN, URINE   --   --   --   --   --   --  Negative       EKG:   Imaging: I have personally reviewed pertinent reports        Intake and Output  I/O       10/21 0701  10/22 0700 10/22 0701  10/23 0700    I V  (mL/kg) 1076 3 (2 9) 239 9 (3 7)    NG/ 360    IV Piggyback 150 100    Feedings 960 110    Total Intake(mL/kg) 2776 3 (25 5) 970 9 (8 9)    Urine (mL/kg/hr) 4455 (1 7) 1995 (0 8)    Total Output 4455 1995    Net -1673 7 -1024 1                Height and Weights   Height: 5' 3" (160 cm)     Body mass index is 42 49 kg/m²  Weight (last 2 days)     Date/Time Weight    10/22/22 0600 109 (239 86)    10/21/22 0549 110 (243 61)    10/21/22 0500 110 (243 61)            Nutrition       Diet Orders   (From admission, onward)             Start     Ordered    10/22/22 0936  Diet Enteral/Parenteral; Tube Feeding No Oral Diet; Glucerna 1 2; Continuous; 40  Diet effective now        Comments: Start @ 20 cc/hr and increase by 10 cc/hr per protocol until reach goal   References:    Nutrtion Support Algorithm Enteral vs  Parenteral   Question Answer Comment   Diet Type Enteral/Parenteral    Enteral/Parenteral Tube Feeding No Oral Diet    Tube Feeding Formula: Glucerna 1 2    Bolus/Cyclic/Continuous Continuous    Tube Feeding Goal Rate (mL/hr): 40    RD to adjust diet per protocol?  Yes        10/22/22 0935    10/18/22 0844  Room Service  Once        Question:  Type of Service  Answer:  Room Service- Not Appropriate    10/18/22 0843                  Active Medications  Scheduled Meds:  Current Facility-Administered Medications   Medication Dose Route Frequency Provider Last Rate   • acetaminophen  650 mg Oral Q6H PRN LURDES Bartlett     • atorvastatin  80 mg Oral Daily With ULRDES JAMES     • buPROPion  100 mg Per NG Tube TID LURDES Lee     • cefepime  2 g Intravenous Q8H Parveen Pitts PA-C 2,000 mg (10/23/22 0528)   • chlorhexidine  15 mL Mouth/Throat Q12H [address] Debbi Landers [place], [address]     • dexmedetomidine  0 1-0 7 mcg/kg/hr Intravenous Titrated LURDES Hilliard 0 4 mcg/kg/hr (10/23/22 0529)   • DULoxetine  30 mg Oral Daily LURDES Lee     • enoxaparin  40 mg Subcutaneous Q24H [address] VEL Pérez     • fentaNYL  50 mcg/hr Intravenous Continuous LURDES Reddy 50 mcg/hr (10/22/22 1830)   • fentanyl citrate (PF)  50 mcg Intravenous Q1H PRN LURDES Gupta     • insulin glargine  50 Units Subcutaneous HS Victor Manueliridion Part VEL Navarro     • insulin lispro  2-12 Units Subcutaneous V6Z [address] Espiridion Part VEL Navarro     • ipratropium-albuterol  3 mL Nebulization Q6H LURDES Coyle     • methylPREDNISolone sodium succinate  40 mg Intravenous Daily LURDES Aaron     • omeprazole (PRILOSEC) suspension 2 mg/mL  20 mg Oral Daily LURDES Reddy     • polyethylene glycol  17 g Oral Daily Juan Luis Pérez PA-C     • propofol  5-50 mcg/kg/min Intravenous Titrated LURDES Reddy Stopped (10/23/22 0010)   • QUEtiapine  25 mg Per NG Tube BID LURDES Reddy     • sodium chloride  4 mL Nebulization Q6H LURDES Reddy       Continuous Infusions:  dexmedetomidine, 0 1-0 7 mcg/kg/hr, Last Rate: 0 4 mcg/kg/hr (10/23/22 0529)  fentaNYL, 50 mcg/hr, Last Rate: 50 mcg/hr (10/22/22 1830)  propofol, 5-50 mcg/kg/min, Last Rate: Stopped (10/23/22 0010)      PRN Meds:   acetaminophen, 650 mg, Q6H PRN  fentanyl citrate (PF), 50 mcg, Q1H PRN        Invasive Devices Review  Invasive Devices  Report    Peripheral Intravenous Line  Duration           Peripheral IV 10/20/22 Left Forearm 3 days    Long-Dwell Peripheral IV (Midline) 79/12/89 Left Basilic 1 day    Peripheral IV 10/21/22 Right Wrist 1 day    Peripheral IV 10/21/22 Right; Lower Arm 1 day          Drain  Duration           NG/OG/Enteral Tube Orogastric 5 days    Urethral Catheter Temperature probe 16 Fr  5 days          Airway  Duration           ETT  7 5 mm 5 days                Rationale for remaining devices:   ---------------------------------------------------------------------------------------  Advance Directive and Living Will:      Power of Attorney:    POLST:    ---------------------------------------------------------------------------------------  Care Time Delivered:   No Critical Care time spent       Sam Cohn PA-C      Portions of the record may have been created with voice recognition software  Occasional wrong word or "sound a like" substitutions may have occurred due to the inherent limitations of voice recognition software    Read the chart carefully and recognize, using context, where substitutions have occurred

## 2022-10-23 NOTE — ASSESSMENT & PLAN NOTE
· CTA chest 10/17 demonstrated dense consolidation in right lower lobe and left sided consolidation consistent with multilobar pneumonia  · Sputum cx resp tova  · Strep pneumo and legionella negative  · Continue cefepime, vanc D6  · Chest PT TID, ETT suctioning PRN, 3% NSS nebs   · Trend fever curve, WBC and procal

## 2022-10-23 NOTE — UTILIZATION REVIEW
Continued Stay Review    Date: 10/22/2022    10/23/2022                         Current Patient Class: Inpatient  Current Level of Care: 10/22/2022  Critical Care                 10/23/2022  Critical Care    HPI:61 y o  female initially admitted on 10/17/2022    Assessment/Plan:   Acute Respiratory Failure with hypoxia & hypercapnia  10/22/2022  Remains intubated / vented  Wean pressure support with goal to extubate  Sedated - IV Propofol  Tube feeding continuous with 75 ml of  H2O [id number]  Continue PCA - fentanyl gtt  Continue hypertonic saline nebs and chest physiotherapy  Continue scheduled duonebs  Wean solumedrol to 40 mg IV daily  Continue IV cefepime & IV vanco   PPX - Enoxaparin, SCDs  Omeprazole for SUP  Chlorhexidine for VAP prophylaxis  10/23/2022   Remains intubated / vented  Continue IV propofol gtt  Continue PCA Fentanyl gtt  Continue tube feeding  Continue IV Abxs      Vital Signs:   Date/Time Temp Pulse Resp BP MAP (mmHg) SpO2 FiO2 (%) O2 Device Patient Position - Orthostatic VS   10/23/22 1600 99 86 °F (37 7 °C) 52 Abnormal  23 Abnormal  105/55 75 92 % -- -- --   10/23/22 1530 99 68 °F (37 6 °C) -- -- -- -- -- -- -- --   10/23/22 1500 99 68 °F (37 6 °C) 55 24 Abnormal  90/59 70 90 % -- -- --   10/23/22 1400 99 68 °F (37 6 °C) 60 28 Abnormal  105/55 74 94 % -- -- --   10/23/22 1300 99 68 °F (37 6 °C) 60 30 Abnormal  107/55 76 93 % -- -- --   10/23/22 1200 99 68 °F (37 6 °C) 60 35 Abnormal  117/58 82 91 % -- -- --   10/23/22 1130 99 5 °F (37 5 °C) -- -- -- -- -- -- -- --   10/23/22 1100 99 32 °F (37 4 °C) 57 34 Abnormal  115/65 85 92 % -- -- --   10/23/22 1000 99 14 °F (37 3 °C) 53 Abnormal  51 Abnormal  106/60 77 92 % -- -- --   10/23/22 0900 98 96 °F (37 2 °C) 54 Abnormal  36 Abnormal  101/65 78 95 % -- -- --   10/23/22 0800 98 6 °F (37 °C) 52 Abnormal  35 Abnormal  97/57 73 94 % -- -- --   10/23/22 0754 -- -- -- -- -- 96 % -- -- --   10/23/22 0730 98 42 °F (36 9 °C) 51 Abnormal  35 Abnormal  92/53 -- 93 % 55 Ventilator Lying   10/23/22 0700 98 42 °F (36 9 °C) 51 Abnormal  39 Abnormal  -- -- 96 % -- -- --   10/23/22 0600 98 42 °F (36 9 °C) 52 Abnormal  19 90/55 68 96 % -- -- --   10/23/22 0548 98 42 °F (36 9 °C) 52 Abnormal  36 Abnormal  87/55 Abnormal  67 94 % -- -- --   10/23/22 0514 98 42 °F (36 9 °C) 52 Abnormal  35 Abnormal  77/49 Abnormal  59 Abnormal  93 % -- -- --   10/23/22 0500 98 6 °F (37 °C) 53 Abnormal  19 82/52 Abnormal  62 Abnormal  93 % -- -- --   10/23/22 0400 98 96 °F (37 2 °C) 62 28 Abnormal  105/70 83 90 % -- -- --   10/23/22 0200 99 32 °F (37 4 °C) 50 Abnormal  26 Abnormal  90/53 67 94 % -- -- --   10/23/22 0100 99 5 °F (37 5 °C) 55 28 Abnormal  97/62 75 96 % -- -- --   10/23/22 0020 99 32 °F (37 4 °C) 50 Abnormal  34 Abnormal  88/53 Abnormal  66 95 % -- -- --   10/23/22 0000 99 5 °F (37 5 °C) 50 Abnormal  34 Abnormal  88/54 Abnormal  66 94 % -- -- --   10/22/22 2200 99 5 °F (37 5 °C) 51 Abnormal  30 Abnormal  94/56 69 96 % -- -- --   10/22/22 2100 99 32 °F (37 4 °C) 50 Abnormal  34 Abnormal  92/57 69 95 % -- -- --   10/22/22 2000 99 14 °F (37 3 °C) 51 Abnormal  33 Abnormal  100/59 74 95 % 55 Ventilator --   10/22/22 1900 98 78 °F (37 1 °C) 48 Abnormal  28 Abnormal  103/65 80 97 % -- -- --   10/22/22 1845 98 78 °F (37 1 °C) 48 Abnormal  34 Abnormal  -- -- 96 % -- -- --   10/22/22 1800 98 78 °F (37 1 °C) 57 39 Abnormal  95/52 68 94 % -- Ventilator Lying   10/22/22 1751 -- -- -- -- -- 94 % -- -- --   10/22/22 1700 98 78 °F (37 1 °C) 68 54 Abnormal  110/59 79 91 % -- -- --   10/22/22 1600 98 6 °F (37 °C) 79 32 Abnormal  113/63 82 92 % -- -- --   10/22/22 1500 98 24 °F (36 8 °C) 62 19 109/60 79 95 % -- -- --   10/22/22 1400 98 24 °F (36 8 °C) 60 20 96/51 68 93 % -- -- --   10/22/22 1319 -- -- -- -- -- 96 % -- -- --   10/22/22 1300 97 88 °F (36 6 °C) 58 17 99/58 76 96 % -- -- --   10/22/22 1200 98 24 °F (36 8 °C) 57 15 96/52 69 96 % -- -- --   10/22/22 1129 -- -- -- -- -- 95 % -- -- --   10/22/22 1100 98 24 °F (36 8 °C) 59 18 97/52 69 95 % -- -- --   10/22/22 1000 98 24 °F (36 8 °C) 63 46 Abnormal  100/59 74 96 % -- -- --   10/22/22 0900 98 42 °F (36 9 °C) 60 22 99/54 71 96 % -- -- --   10/22/22 0800 98 96 °F (37 2 °C) 63 22 98/55 71 96 % -- Ventilator Lying   10/22/22 0752 -- -- -- -- -- 96 % -- -- --   10/22/22 0700 99 32 °F (37 4 °C) 69 55 Abnormal  104/58 76 95 % -- -- --   10/22/22 0600 98 78 °F (37 1 °C) 71 26 Abnormal  115/64 85 93 % -- -- --   10/22/22 0500 98 6 °F (37 °C) 74 26 Abnormal  110/58 79 91 % -- -- --   10/22/22 0400 98 78 °F (37 1 °C) 73 22 97/53 70 92 % -- -- --   10/22/22 0300 98 78 °F (37 1 °C) 66 18 96/55 73 92 % -- -- --   10/22/22 0200 98 78 °F (37 1 °C) 71 20 94/53 71 92 % -- -- --   10/22/22 0100 98 6 °F (37 °C) 65 18 94/53 71 92 % -- -- --   10/22/22 0018 98 96 °F (37 2 °C) 74 19 98/57 72 93 % -- -- --           Pertinent Labs/Diagnostic Results:   Results from last 7 days   Lab Units 10/17/22  1100   SARS-COV-2  Negative     Results from last 7 days   Lab Units 10/23/22  0525 10/21/22  0525 10/20/22  0542 10/19/22  0545 10/18/22  0506 10/17/22  1515 10/17/22  1100   WBC Thousand/uL 14 87* 14 14* 17 30* 19 65* 20 67*  --  9 37   HEMOGLOBIN g/dL 12 0 11 8 11 6 11 8 12 8  --  13 4   I STAT HEMOGLOBIN   --   --   --   --   --    < >  --    HEMATOCRIT % 39 4 38 4 36 7 36 8 41 0  --  43 4   HEMATOCRIT, ISTAT   --   --   --   --   --    < >  --    PLATELETS Thousands/uL 308 325 324 307 317  --  349   NEUTROS ABS Thousands/µL  --   --  15 35*  --   --   --  7 16   BANDS PCT %  --   --   --   --  21*  --   --     < > = values in this interval not displayed       Results from last 7 days   Lab Units 10/23/22  0525 10/22/22  0554 10/21/22  0525 10/20/22  0542 10/19/22  0545 10/18/22  0506 10/17/22  1519 10/17/22  1515   SODIUM mmol/L 140 135 136 135 133* 135  --   --    POTASSIUM mmol/L 3 9 5 3 5 1 5 0 4 3 3 6  --   --    CHLORIDE mmol/L 102 99 101 100 97 97  --   -- CO2 mmol/L 37* 34* 31 31 29 32  --   --    CO2, I-STAT mmol/L  --   --   --   --   --   --  41*  --    ANION GAP mmol/L 1* 2* 4 4 7 6  --   --    BUN mg/dL 41* 39* 34* 35* 29* 21  --   --    CREATININE mg/dL 1 21 1 08 1 07 1 06 1 28 1 47*  --   --    EGFR ml/min/1 73sq m 48 55 55 56 44 37  --   --    CALCIUM mg/dL 8 7 8 2* 8 4 8 3 8 4 8 9  --   --    CALCIUM, IONIZED, ISTAT mmol/L  --   --   --   --   --   --  1 26 1 26   MAGNESIUM mg/dL 2 5  --   --  2 5 2 5 1 7  --   --    PHOSPHORUS mg/dL 5 0*  --   --   --   --  3 3  --   --      Results from last 7 days   Lab Units 10/17/22  1100   AST U/L 10   ALT U/L 22   ALK PHOS U/L 90   TOTAL PROTEIN g/dL 7 0   ALBUMIN g/dL 2 9*   TOTAL BILIRUBIN mg/dL 0 19*     Results from last 7 days   Lab Units 10/23/22  1125 10/23/22  0525 10/22/22  2352 10/22/22  2112 10/22/22  1755 10/22/22  1146 10/22/22  0603 10/22/22  0201 10/21/22  2157 10/21/22  2012 10/21/22  1755 10/21/22  1555   POC GLUCOSE mg/dl 200* 127 187* 210* 240* 193* 247* 210* 112 125 115 108     Results from last 7 days   Lab Units 10/23/22  0525 10/22/22  0554 10/21/22  0525 10/20/22  0542 10/19/22  0545 10/18/22  0506 10/17/22  1100   GLUCOSE RANDOM mg/dL 113 275* 180* 221* 251* 346* 410*     Results from last 7 days   Lab Units 10/20/22  0542   HEMOGLOBIN A1C % 9 4*   EAG mg/dl 223      Results from last 7 days   Lab Units 10/20/22  1143 10/19/22  0805 10/18/22  0517   PH ART  7 401 7 420 7  371   PCO2 ART mm Hg 47 7* 45 5* 53 2*   PO2 ART mm Hg 82 4 67 4* 77 1   HCO3 ART mmol/L 29 0* 28 9* 30 1*   BASE EXC ART mmol/L 3 4 3 7 3 7   O2 CONTENT ART mL/dL 17 2 16 9 18 3   O2 HGB, ARTERIAL % 95 4 92 8* 94 8   ABG SOURCE  Radial, Left Radial, Left Radial, Right     Results from last 7 days   Lab Units 10/17/22  1519 10/17/22  1515   I STAT BASE EXC mmol/L 8*  --    I STAT O2 SAT % 84  --    ISTAT PH ART  7 256*  --    I STAT ART PCO2 mm HG 87 5*  --    I STAT ART PO2 mm HG 59 0* 59 0*   I STAT ART HCO3 mmol/L 38 9* --      Results from last 7 days   Lab Units 10/17/22  1459 10/17/22  1310 10/17/22  1100   HS TNI 0HR ng/L  --   --  8   HS TNI 2HR ng/L  --  9  --    HSTNI D2 ng/L  --  1  --    HS TNI 4HR ng/L 9  --   --    HSTNI D4 ng/L 1  --   --      Results from last 7 days   Lab Units 10/17/22  1100   PROTIME seconds 13 1   INR  0 98   PTT seconds 27     Results from last 7 days   Lab Units 10/19/22  0545 10/18/22  0506   PROCALCITONIN ng/ml 5 78* 8 28*     Results from last 7 days   Lab Units 10/17/22  1519   LACTIC ACID mmol/L 1 0     Results from last 7 days   Lab Units 10/17/22  1100   NT-PRO BNP pg/mL 313*     Results from last 7 days   Lab Units 10/17/22  1215   CLARITY UA  Clear   COLOR UA  Light Yellow   SPEC GRAV UA  1 015   PH UA  5 5   GLUCOSE UA mg/dl >=1000 (1%)*   KETONES UA mg/dl Negative   BLOOD UA  Negative   PROTEIN UA mg/dl Trace*   NITRITE UA  Negative   BILIRUBIN UA  Negative   UROBILINOGEN UA E U /dl 0 2   LEUKOCYTES UA  Elevated glucose may cause decreased leukocyte values  See urine microscopic for [facility] result/*   WBC UA /hpf 0-1   RBC UA /hpf 0-1   BACTERIA UA /hpf Occasional   EPITHELIAL CELLS WET PREP /hpf None Seen     Results from last 7 days   Lab Units 10/17/22  1215 10/17/22  1100   STREP PNEUMONIAE ANTIGEN, URINE  Negative  --    LEGIONELLA URINARY ANTIGEN  Negative  --    INFLUENZA A PCR   --  Negative   INFLUENZA B PCR   --  Negative   RSV PCR   --  Negative     Results from last 7 days   Lab Units 10/19/22  1103 10/19/22  1100 10/17/22  1742 10/17/22  1523 10/17/22  1519   BLOOD CULTURE  No Growth After 4 Days  No Growth After 4 Days  --  No Growth After 5 Days   Staphylococcus coagulase negative*   SPUTUM CULTURE   --   --  1+ Growth of   --   --    GRAM STAIN RESULT   --   --  No Epithelial cells seen  4+ Polys  No organisms seen  --  Gram positive cocci in clusters*     Results from last 7 days   Lab Units 10/22/22  1548   TOTAL COUNTED  100     Medications:   Scheduled Medications:  atorvastatin, 80 mg, Oral, Daily With Dinner  buPROPion, 100 mg, Per NG Tube, TID  cefepime, 2 g, Intravenous, Q8H  chlorhexidine, 15 mL, Mouth/Throat, Q12H MATTHEW  DULoxetine, 30 mg, Oral, Daily  enoxaparin, 40 mg, Subcutaneous, Q24H MATTHEW  insulin glargine, 50 Units, Subcutaneous, HS  insulin lispro, 2-12 Units, Subcutaneous, Q6H [facility]  ipratropium-albuterol, 3 mL, Nebulization, Q6H  methylPREDNISolone sodium succinate, 40 mg, Intravenous, Daily  omeprazole (PRILOSEC) suspension 2 mg/mL, 20 mg, Oral, Daily  polyethylene glycol, 17 g, Oral, Daily  QUEtiapine, 25 mg, Per NG Tube, BID  sodium chloride, 4 mL, Nebulization, Q6H      Continuous IV Infusions:  dexmedetomidine, 0 1-0 7 mcg/kg/hr, Intravenous, Titrated      PRN Meds:  acetaminophen, 650 mg, Oral, Q6H PRN  fentanyl citrate (PF), 50 mcg, Intravenous, Q1H PRN        Discharge Plan: D    Network Utilization Review Department  ATTENTION: Please call with any questions or concerns to [phone] and carefully listen to the prompts so that you are directed to the right person  All voicemails are confidential   Eve Steen all requests for admission clinical reviews, approved or denied determinations and any other requests to dedicated fax number below belonging to the campus where the patient is receiving treatment   List of dedicated fax numbers for the Facilities:  [address] DENIALS (Administrative/Medical Necessity) [phone]   [address] (Maternity/NICU/Pediatrics) [phone]   2 Maia Bellamy [phone]   [facility] [phone]   [facility] [phone]   [facility] [address] [place] [address] [place] [address] [phone]     Angie Davey [address] [place] [facility] [place] [address] [phone]

## 2022-10-24 NOTE — PROGRESS NOTES
Charlie 44  Progress Note - Reynold Hemalcolm 1960, 57 y o  female MRN: [id number]  Unit/Bed#: ICU 05 Encounter: [id number]  Primary Care Provider: Nat Mcleod MD   Date and time admitted to hospital: 10/17/2022 10:24 AM    Pneumonia  Assessment & Plan  · CTA chest 10/17 demonstrated dense consolidation in right lower lobe and left sided consolidation consistent with multilobar pneumonia  · Sputum cx resp tova  · Strep pneumo and legionella negative  · Completed cefepime, vanc 7/7  · Chest PT TID, ETT suctioning PRN, 3% NSS nebs   · Trend fever curve, WBC and procal     * Acute respiratory failure with hypoxia and hypercapnia ([facility] [address] )  Assessment & Plan  Patient presented 10/17 with progressively worsening shortness of breath over the past several weeks  Reported not feeling well since diagnosed with COVID in August  Initially hypoxic requiring NRB  Patient deteriorated in the ER and was placed on BiPAP and was ultimately intubated due to respiratory distress, tachypnea and hypercarbia  · Recently had COVID, tested positive 8/19 and was hospitalized for 5 days requiring nasal cannula  She noted increased SOB over the past week several weeks  Prescribed Zpak and prednisone taper on 10/6 by Dr Moiz Ku  · CXR with "Mild pulmonary venous congestion with trace right effusion "  · CT PE study: No pulmonary embolism  Dense consolidation in the right lower lobe and medial aspect of the left lower lobe suspicious for multilobar pneumonia "  · COVID/flu/RSV negative  · Acute respiratory failure with hypoxia and hypercapnia secondary to pneumonia, possible exacerbation of COPD    Plan:   · AC/VC 18/360/8/45%  · Goal to wean FiO2 and PEEP as tolerated for O2  Sat 92% or above   · VAP ppx; chlorhexidine, PPI, HOB greater than 30 degrees  · Solumedrol 40mg QD  · Duonebs q6h   · Pulmonary hygiene  · Daily SAT/SBTs  · S/p bronch 10/22 for minimal thick secretions      Severe sepsis [facility])  Assessment & Plan  Sepsis criteria met on admission due to tachycardia, tachypnea, fever 103  Multilobar pneumonia present on CT scan   · LA 1 0  · COVID/flu/RSV negative  · Given zosyn in the ER  · CXR: "Mild pulmonary venous congestion with trace right effusion"  · CT PE study: "No pulmonary embolism  Dense consolidation in the right lower lobe and medial aspect of the left lower lobe suspicious for multilobar pneumonia"  · Blood cultures pending    Plan:   · Completed day 7/7 cefepime/vanco  · Sputum culture resp tova  · Strep pneumo/legionella urine antigen negative    · BC 1/2 with staph coag negative, likely contaminant   · Goal MAP >65  · Trend fever curve, WBC and procal   · PRN Tylenol for fever     COPD exacerbation (HCC)  Assessment & Plan  · Hx of COPD, presents with respiratory distress likely in the setting of pneumonia, however possible component of COPD exacerbation  · Continue solumedrol 40mg QD  · Continue duonebs  · Encourage smoking cessation when appropriate    Obstructive sleep apnea  Assessment & Plan  · CPAP HS once extubated  · Likely component of OHS     Type 2 diabetes mellitus with hyperglycemia, with long-term current use of insulin [facility])  Assessment & Plan  Lab Results   Component Value Date    HGBA1C 9 4 (H) 10/20/2022       Recent Labs     10/23/22  1125 10/23/22  1728 10/24/22  0025 10/24/22  0552   POCGLU 200* 186* 128 150*       Blood Sugar Average: Last 72 hrs:  (P) 169 [id number]   · Hyperglycemia with BG 400s on presentation, no elevation in anion gap   · PTA meds include lantus, meal coverage, victoza and metformin  · Transitioned from insulin infusion to Lantus 50 units qHS   Monitor and increase to home if tolerated    Chronic pain syndrome  Assessment & Plan  · Prescribed tramadol 200mg Q24 hrs for chronic back pain  · Hold for now s/p intubation    Coronary artery disease involving native coronary artery of native heart with angina pectoris [facility])  Assessment & Plan  · Cardiac cath in 2019 demonstrated non-obstructive CAD with moderate stenosis of the RCA  · EKG on admission showing sinus tachycardia, no acute ST changes  · HS troponin minimally elevated  · Continue statin    Hypertension  Assessment & Plan  · Hold home lisinopril/hydrochlorothiazide and cardizem      Depression with anxiety  Assessment & Plan  · Continue home Wellbutrin, Cymbalta, and Seroquel     ----------------------------------------------------------------------------------------  HPI/24hr events: no acute events    Patient appropriate for transfer out of the ICU today?: No  Disposition: Continue Critical Care   Code Status: Level 1 - Full Code  ---------------------------------------------------------------------------------------  SUBJECTIVE  No acute events    Review of Systems  Review of systems was unable to be performed secondary to intubated  ---------------------------------------------------------------------------------------  OBJECTIVE    Vitals   Vitals:    10/24/22 0100 10/24/22 0200 10/24/22 0300 10/24/22 0600   BP: 95/53 (!) 89/52 98/56    BP Location:       Pulse: 57 60 55    Resp: (!) 25 (!) 25 (!) 25    Temp: 100 4 °F (38 °C) (!) 100 58 °F (38 1 °C) 100 4 °F (38 °C)    TempSrc:       SpO2: 94% 97% 97%    Weight:    108 kg (237 lb 7 oz)   Height:         Temp (24hrs), Av 6 °F (37 6 °C), Min:98 42 °F (36 9 °C), Max:100 58 °F (38 1 °C)  Current: Temperature: 100 4 °F (38 °C)          Respiratory:  SpO2: SpO2: 97 %, SpO2 Activity: SpO2 Activity: At Rest, SpO2 Device: O2 Device: Ventilator, Capnography: Capnography: No       Invasive/non-invasive ventilation settings   Respiratory  Report   Lab Data (Last 4 hours)    None         O2/Vent Data (Last 4 hours)    None                Physical Exam  Vitals and nursing note reviewed  Constitutional:       Appearance: She is not ill-appearing  HENT:      Head: Normocephalic        Mouth/Throat:      Mouth: Mucous membranes are moist  Eyes:      Pupils: Pupils are equal, round, and reactive to light  Cardiovascular:      Rate and Rhythm: Normal rate and regular rhythm  Pulses: Normal pulses  Heart sounds: No murmur heard  Pulmonary:      Effort: Pulmonary effort is normal  No respiratory distress  Abdominal:      General: Abdomen is flat  There is no distension  Musculoskeletal:         General: No swelling  Skin:     General: Skin is warm  Neurological:      General: No focal deficit present  Mental Status: She is alert               Laboratory and Diagnostics:  Results from last 7 days   Lab Units 10/23/22  0525 10/21/22  0525 10/20/22  0542 10/19/22  0545 10/18/22  0506 10/17/22  1519 10/17/22  1515 10/17/22  1100   WBC Thousand/uL 14 87* 14 14* 17 30* 19 65* 20 67*  --   --  9 37   HEMOGLOBIN g/dL 12 0 11 8 11 6 11 8 12 8  --   --  13 4   I STAT HEMOGLOBIN g/dl  --   --   --   --   --  15 3 15 6*  --    HEMATOCRIT % 39 4 38 4 36 7 36 8 41 0  --   --  43 4   HEMATOCRIT, ISTAT %  --   --   --   --   --  45 46  --    PLATELETS Thousands/uL 308 325 324 307 317  --   --  349   NEUTROS PCT %  --   --  89*  --   --   --   --  75   BANDS PCT %  --   --   --   --  21*  --   --   --    MONOS PCT %  --   --  4  --   --   --   --  8   MONO PCT %  --   --   --   --  6  --   --   --      Results from last 7 days   Lab Units 10/23/22  0525 10/22/22  0554 10/21/22  0525 10/20/22  0542 10/19/22  0545 10/18/22  0506 10/17/22  1519 10/17/22  1100   SODIUM mmol/L 140 135 136 135 133* 135  --  133*   POTASSIUM mmol/L 3 9 5 3 5 1 5 0 4 3 3 6  --  5 0   CHLORIDE mmol/L 102 99 101 100 97 97  --  98   CO2 mmol/L 37* 34* 31 31 29 32  --  33*   CO2, I-STAT mmol/L  --   --   --   --   --   --  41*  --    ANION GAP mmol/L 1* 2* 4 4 7 6  --  2*   BUN mg/dL 41* 39* 34* 35* 29* 21  --  15   CREATININE mg/dL 1 21 1 08 1 07 1 06 1 28 1 47*  --  1 17   CALCIUM mg/dL 8 7 8 2* 8 4 8 3 8 4 8 9  --  8 9   GLUCOSE RANDOM mg/dL 113 275* 180* 221* 251* 346* --  410*   ALT U/L  --   --   --   --   --   --   --  22   AST U/L  --   --   --   --   --   --   --  10   ALK PHOS U/L  --   --   --   --   --   --   --  90   ALBUMIN g/dL  --   --   --   --   --   --   --  2 9*   TOTAL BILIRUBIN mg/dL  --   --   --   --   --   --   --  0 19*     Results from last 7 days   Lab Units 10/23/22  0525 10/20/22  0542 10/19/22  0545 10/18/22  0506   MAGNESIUM mg/dL 2 5 2 5 2 5 1 7   PHOSPHORUS mg/dL 5 0*  --   --  3 3      Results from last 7 days   Lab Units 10/17/22  1100   INR  0 98   PTT seconds 27          Results from last 7 days   Lab Units 10/17/22  1519   LACTIC ACID mmol/L 1 0     ABG:  Results from last 7 days   Lab Units 10/20/22  1143   PH ART  7 401   PCO2 ART mm Hg 47 7*   PO2 ART mm Hg 82 4   HCO3 ART mmol/L 29 0*   BASE EXC ART mmol/L 3 4   ABG SOURCE  Radial, Left     VBG:  Results from last 7 days   Lab Units 10/20/22  1143   ABG SOURCE  Radial, Left     Results from last 7 days   Lab Units 10/19/22  0545 10/18/22  0506   PROCALCITONIN ng/ml 5 78* 8 28*       Micro  Results from last 7 days   Lab Units 10/19/22  1103 10/19/22  1100 10/17/22  1742 10/17/22  1739 10/17/22  1523 10/17/22  1519 10/17/22  1215   BLOOD CULTURE  No Growth After 4 Days  No Growth After 4 Days  --   --  No Growth After 5 Days  Staphylococcus coagulase negative*  --    SPUTUM CULTURE   --   --  1+ Growth of   --   --   --   --    GRAM STAIN RESULT   --   --  No Epithelial cells seen  4+ Polys  No organisms seen  --   --  Gram positive cocci in clusters*  --    MRSA CULTURE ONLY   --   --   --  No Methicillin Resistant Staphlyococcus aureus (MRSA) isolated  --   --   --    LEGIONELLA URINARY ANTIGEN   --   --   --   --   --   --  Negative   STREP PNEUMONIAE ANTIGEN, URINE   --   --   --   --   --   --  Negative       EKG: nsr  Imaging: I have personally reviewed pertinent reports        Intake and Output  I/O       10/22 0701  10/23 0700 10/23 0701  10/24 0700    I V  (mL/kg) 645 2 (6) NG/ 60    IV Piggyback 600 50    Feedings 590 560    Total Intake(mL/kg) 2195 2 (20 3) 670 (6 2)    Urine (mL/kg/hr) 2045 (0 8) 1130 (0 4)    Total Output 2045 1130    Net +150 2 -460                Height and Weights   Height: 5' 3" (160 cm)     Body mass index is 42 06 kg/m²  Weight (last 2 days)     Date/Time Weight    10/24/22 0600 108 (237 44)    10/23/22 0600 108 (237 66)    10/22/22 0600 109 (239 86)            Nutrition       Diet Orders   (From admission, onward)             Start     Ordered    10/22/22 0936  Diet Enteral/Parenteral; Tube Feeding No Oral Diet; Glucerna 1 2; Continuous; 40  Diet effective now        Comments: Start @ 20 cc/hr and increase by 10 cc/hr per protocol until reach goal   References:    Nutrtion Support Algorithm Enteral vs  Parenteral   Question Answer Comment   Diet Type Enteral/Parenteral    Enteral/Parenteral Tube Feeding No Oral Diet    Tube Feeding Formula: Glucerna 1 2    Bolus/Cyclic/Continuous Continuous    Tube Feeding Goal Rate (mL/hr): 40    RD to adjust diet per protocol?  Yes        10/22/22 0935    10/18/22 0844  Room Service  Once        Question:  Type of Service  Answer:  Room Service- Not Appropriate    10/18/22 0843                  Active Medications  Scheduled Meds:  Current Facility-Administered Medications   Medication Dose Route Frequency Provider Last Rate   • acetaminophen  650 mg Oral Q6H PRN LURDES Gupta     • atorvastatin  80 mg Oral Daily With LURDES JAMES     • buPROPion  100 mg Per NG Tube TID LURDES Reddy     • chlorhexidine  15 mL Mouth/Throat Q12H [address] Debbi [facility] [place] [address]     • dexmedetomidine  0 1-0 7 mcg/kg/hr Intravenous Titrated LURDES Turcios 0 4 mcg/kg/hr (10/24/22 0026)   • DULoxetine  30 mg Oral Daily LURDES Reddy     • enoxaparin  40 mg Subcutaneous Q24H [address] [place]     • fentanyl citrate (PF)  50 mcg Intravenous Q1H PRN LURDES Gupta     • insulin glargine  50 Units Subcutaneous HS Caseyl Theodore Navarro PA-C     • insulin lispro  2-12 Units Subcutaneous Z1Z [facility] [place], [place]     • ipratropium-albuterol  3 mL Nebulization Q6H [place]anna [place], [address]     • methylPREDNISolone sodium succinate  40 mg Intravenous Daily LURDES Perea     • omeprazole (PRILOSEC) suspension 2 mg/mL  20 mg Oral Daily Héctor Actis, LURDES     • polyethylene glycol  17 g Oral Daily Nicholas Pérez PA-C     • QUEtiapine  25 mg Per NG Tube BID Héctor Actis, LURDES     • sodium chloride  4 mL Nebulization Q6H Héctor Davidis, LURDES       Continuous Infusions:  dexmedetomidine, 0 1-0 7 mcg/kg/hr, Last Rate: 0 4 mcg/kg/hr (10/24/22 0026)      PRN Meds:   acetaminophen, 650 mg, Q6H PRN  fentanyl citrate (PF), 50 mcg, Q1H PRN        Invasive Devices Review  Invasive Devices  Report    Peripheral Intravenous Line  Duration           Peripheral IV 10/20/22 Left Forearm 4 days    Long-Dwell Peripheral IV (Midline) 77/28/23 Left Basilic 2 days    Peripheral IV 10/21/22 Right Wrist 2 days    Peripheral IV 10/21/22 Right; Lower Arm 2 days          Drain  Duration           NG/OG/Enteral Tube Orogastric 6 days    Urethral Catheter Temperature probe 16 Fr  6 days          Airway  Duration           ETT  7 5 mm 6 days                Rationale for remaining devices:   ---------------------------------------------------------------------------------------  Advance Directive and Living Will:      Power of :    POLST:    ---------------------------------------------------------------------------------------  Care Time Delivered:   No Critical Care time spent       Marilu Duckworth PA-C      Portions of the record may have been created with voice recognition software  Occasional wrong word or "sound a like" substitutions may have occurred due to the inherent limitations of voice recognition software    Read the chart carefully and recognize, using context, where substitutions have occurred

## 2022-10-24 NOTE — ASSESSMENT & PLAN NOTE
Sepsis criteria met on admission due to tachycardia, tachypnea, fever 103  Multilobar pneumonia present on CT scan   · LA 1 0  · COVID/flu/RSV negative  · Given zosyn in the ER  · CXR: "Mild pulmonary venous congestion with trace right effusion"  · CT PE study: "No pulmonary embolism  Dense consolidation in the right lower lobe and medial aspect of the left lower lobe suspicious for multilobar pneumonia"  · Blood cultures pending    Plan:   · Completed day 7/7 cefepime/vanco  · Sputum culture resp tova  · Strep pneumo/legionella urine antigen negative    · BC 1/2 with staph coag negative, likely contaminant   · Goal MAP >65  · Trend fever curve, WBC and procal   · PRN Tylenol for fever

## 2022-10-24 NOTE — RESPIRATORY THERAPY NOTE
Patient found on full support vent settings tolerating well  Transitioned to PS settings per protocol  VSS, Spo2 95%  See flowsheet for full settings  Will monitor

## 2022-10-24 NOTE — ASSESSMENT & PLAN NOTE
Lab Results   Component Value Date    HGBA1C 9 4 (H) 10/20/2022       Recent Labs     10/23/22  1125 10/23/22  1728 10/24/22  0025 10/24/22  0552   POCGLU 200* 186* 128 150*       Blood Sugar Average: Last 72 hrs:  (P) 169 [id number]   · Hyperglycemia with BG 400s on presentation, no elevation in anion gap   · PTA meds include lantus, meal coverage, victoza and metformin  · Transitioned from insulin infusion to Lantus 50 units qHS   Monitor and increase to home if tolerated

## 2022-10-24 NOTE — ASSESSMENT & PLAN NOTE
Patient presented 10/17 with progressively worsening shortness of breath over the past several weeks  Reported not feeling well since diagnosed with COVID in August  Initially hypoxic requiring NRB  Patient deteriorated in the ER and was placed on BiPAP and was ultimately intubated due to respiratory distress, tachypnea and hypercarbia  · Recently had COVID, tested positive 8/19 and was hospitalized for 5 days requiring nasal cannula  She noted increased SOB over the past week several weeks  Prescribed Zpak and prednisone taper on 10/6 by Dr Zoie Buchanan  · CXR with "Mild pulmonary venous congestion with trace right effusion "  · CT PE study: No pulmonary embolism  Dense consolidation in the right lower lobe and medial aspect of the left lower lobe suspicious for multilobar pneumonia "  · COVID/flu/RSV negative  · Acute respiratory failure with hypoxia and hypercapnia secondary to pneumonia, possible exacerbation of COPD    Plan:   · AC/VC 18/360/8/45%  · Goal to wean FiO2 and PEEP as tolerated for O2  Sat 92% or above   · VAP ppx; chlorhexidine, PPI, HOB greater than 30 degrees  · Solumedrol 40mg QD  · Duonebs q6h   · Pulmonary hygiene  · Daily SAT/SBTs  · S/p bronch 10/22 for minimal thick secretions

## 2022-10-24 NOTE — ASSESSMENT & PLAN NOTE
· CTA chest 10/17 demonstrated dense consolidation in right lower lobe and left sided consolidation consistent with multilobar pneumonia  · Sputum cx resp tova  · Strep pneumo and legionella negative  · Completed cefepime, vanc 7/7  · Chest PT TID, ETT suctioning PRN, 3% NSS nebs   · Trend fever curve, WBC and procal

## 2022-10-24 NOTE — UTILIZATION REVIEW
Continued Stay Review    Date:    10/24/22                         Current Patient Class:    Inpatient  Current Level of Care:    ICU    HPI:61 y o  female initially admitted on    10/17/22 with Acute respiratory failure with hypoxia and hypercapnia     Assessment/Plan:   10/24/22   Remains intubated and sedated  JENNIFER  D/c  10/23  Continue  IV  S/medrol  Continue duonebs  Q 6 hrs  Monitor labs  Continue current  Meds  Remains on tube feeds  40/hr        Vital Signs:   99 86 °F (37 7 °C) 64 33 Abnormal  101/59 75 91 % -- -- --    10/24/22 0900 99 5 °F (37 5 °C) 54 Abnormal  34 Abnormal  93/55 69 93 % -- -- --   10/24/22 0800 99 32 °F (37 4 °C) 61 31 Abnormal  109/61 80 95 % 40 -- --   10/24/22 0755 -- -- -- -- -- 100 % -- -- --   10/24/22 0730 99 14 °F (37 3 °C) -- -- -- -- -- -- -- --   10/24/22 0700 99 14 °F (37 3 °C) 58 37 Abnormal  97/55 71 95 % 55 Ventilator Lying   10/24/22 0300 100 4 °F (38 °C) 55 25 Abnormal  98/56 73 97 % -- -- --   10/24/22 0200 100 58 °F (38 1 °C) Abnormal  60 25 Abnormal  89/52 Abnormal  67 97 % -- -- --   10/24/22 0100 100 4 °F (38 °C) 57 25 Abnormal  95/53 69 94 % -- --          Pertinent Labs/Diagnostic Results:   Results from last 7 days   Lab Units 10/17/22  1100   SARS-COV-2  Negative     Lab Units 10/24/22  0552   WBC Thousand/uL 16 89*   HEMOGLOBIN g/dL 12 1   I STAT HEMOGLOBIN   --    HEMATOCRIT % 40 0   HEMATOCRIT, ISTAT   --    PLATELETS Thousands/uL 289   NEUTROS ABS Thousands/µL  --    BANDS PCT %  --          Lab Units 10/24/22  0552   SODIUM mmol/L 139   POTASSIUM mmol/L 4 0   CHLORIDE mmol/L 100   CO2 mmol/L 34*   CO2, I-STAT mmol/L  --    ANION GAP mmol/L 5   BUN mg/dL 40*   CREATININE mg/dL 1 01   EGFR ml/min/1 73sq m 59   CALCIUM mg/dL 8 6   CALCIUM, IONIZED, ISTAT mmol/L  --    MAGNESIUM mg/dL  --    PHOSPHORUS mg/dL  --        Lab Units 10/24/22  0552 10/24/22  0025   POC GLUCOSE mg/dl 150* 128     Lab Units 10/24/22  0552   GLUCOSE RANDOM mg/dL 148* Medications:   Scheduled Medications:  atorvastatin, 80 mg, Oral, Daily With Dinner  buPROPion, 100 mg, Per NG Tube, TID  chlorhexidine, 15 mL, Mouth/Throat, Q12H MATTHEW  DULoxetine, 30 mg, Oral, Daily  enoxaparin, 40 mg, Subcutaneous, Q24H MATTHEW  insulin glargine, 50 Units, Subcutaneous, HS  insulin lispro, 2-12 Units, Subcutaneous, Q6H [facility]  ipratropium-albuterol, 3 mL, Nebulization, Q6H  methylPREDNISolone sodium succinate, 40 mg, Intravenous, Daily  omeprazole (PRILOSEC) suspension 2 mg/mL, 20 mg, Oral, Daily  polyethylene glycol, 17 g, Oral, Daily  QUEtiapine, 25 mg, Per NG Tube, BID  sodium chloride, 4 mL, Nebulization, Q6H  IV  Cefepime - d/c   10/23      Continuous IV Infusions:  dexmedetomidine, 0 1-0 7 mcg/kg/hr, Intravenous, Titrated      PRN Meds:  acetaminophen, 650 mg, Oral, Q6H PRN  fentanyl citrate (PF), 50 mcg, Intravenous, Q1H PRN        Discharge Plan:    D    Network Utilization Review Department  ATTENTION: Please call with any questions or concerns to [phone] and carefully listen to the prompts so that you are directed to the right person  All voicemails are confidential   [place] all requests for admission clinical reviews, approved or denied determinations and any other requests to dedicated fax number below belonging to the campus where the patient is receiving treatment   List of dedicated fax numbers for the Facilities:  [address] DENIALS (Administrative/Medical Necessity) [phone]   [address] [place] [address] (Maternity/NICU/Pediatrics) [phone]   9[address] [facility] [address] [facility] 150 [facility] [place] [address] [place] [address] Miley Jair [place] [address] [address] [facility] 134 048 [facility] [phone]

## 2022-10-24 NOTE — ASSESSMENT & PLAN NOTE
· CTA chest 10/17 demonstrated dense consolidation in right lower lobe and left sided consolidation consistent with multilobar pneumonia  · Sputum cx resp tova  · Strep pneumo and legionella negative  · Completed cefepime D7, vanc D4  · S/p bronchoscopy 10/22 with repeat sputum culture showing no polys or bacteria  · Chest PT TID, ETT suctioning PRN, 3% NSS nebs   · Trend fever curve, WBC and procal

## 2022-10-24 NOTE — ASSESSMENT & PLAN NOTE
Patient presented 10/17 with progressively worsening shortness of breath over the past several weeks  Reported not feeling well since diagnosed with COVID in August  Initially hypoxic requiring NRB  Patient deteriorated in the ER and was placed on BiPAP and was ultimately intubated due to respiratory distress, tachypnea and hypercarbia  · Recently had COVID, tested positive 8/19 and was hospitalized for 5 days requiring nasal cannula  She noted increased SOB over the past week several weeks  Prescribed Zpak and prednisone taper on 10/6 by Dr Coleen Mahmood  · CXR with "Mild pulmonary venous congestion with trace right effusion "  · CT PE study: No pulmonary embolism  Dense consolidation in the right lower lobe and medial aspect of the left lower lobe suspicious for multilobar pneumonia "  · COVID/flu/RSV negative  · Acute respiratory failure with hypoxia and hypercapnia secondary to pneumonia, possible exacerbation of COPD    Plan:   · AC/VC 18/360/8/50%  Tolerated wean to 10/8 yesterday with -350 and RR 30's  Attempted wean to 6 peep overnight but SpO2 dipped to 85%    · Goal to wean FiO2 and PEEP as tolerated for O2  Sat 92% or above   · VAP ppx; chlorhexidine, PPI, HOB greater than 30 degrees  · Solumedrol 40mg QD  · Duonebs q6h   · Pulmonary hygiene  · Daily SAT/SBTs  · S/p bronch 10/22 for minimal thick secretions

## 2022-10-25 PROBLEM — I46.9 CARDIAC ARREST (HCC): Status: ACTIVE | Noted: 2022-10-25

## 2022-10-25 NOTE — PROCEDURES
Intubation    Date/Time: 10/25/2022 10:51 AM  Performed by: LURDES Mg  Authorized by: Sona Mg [facility]     Patient location:  Bedside  Consent:     Consent obtained:  Emergent situation  Universal protocol:     Patient identity confirmed:  Arm band and hospital-assigned identification number  Pre-procedure details:     Patient status:  Unresponsive    Mallampati score:  3    Pretreatment medications:  Etomidate  Indications:     Indications for intubation: respiratory failure    Procedure details:     Preoxygenation:  Bag valve mask    CPR in progress: yes      Intubation method:  Oral    Oral intubation technique:  Direct Marjorie Gomez    Laryngoscope blade: Mac 4    Tube size (mm):  7 0    Tube type:  Cuffed    Number of attempts:  3 or more    Ventilation between attempts: yes      Cricoid pressure: yes      Tube visualized through cords: yes    Placement assessment:     ETT to lip:  24    Tube secured with:  ETT dill    Breath sounds:  Equal and absent over the epigastrium    CXR findings:  ETT in proper place    Ventilator settings:  20x3  Comments:      First attempt without sedation with poor visualization  Etomidate given  Attempt with 7  5ETT unsuccessfully advanced through the cords  CPR then started as patient was PEA  Patient bagged between attempts with good chest rise  Pulse ox was not reading appropriately  2nd attempt with 7  0ETT again unable to be advanced through cords  Copious green secretions noted in oropharynx which were suctioned  CPR and bagging resumed  3rd attempt by myself successful with 7  0ETT confirmed by continuous capnography

## 2022-10-25 NOTE — ASSESSMENT & PLAN NOTE
Lab Results   Component Value Date    HGBA1C 9 4 (H) 10/20/2022       Recent Labs     10/24/22  0025 10/24/22  0552 10/24/22  1141 10/24/22  1711   POCGLU 128 150* 167* 169*       Blood Sugar Average: Last 72 hrs:  (P) 659 [id number]   · Hyperglycemia with BG 400s on presentation, no elevation in anion gap   · PTA meds include lantus, meal coverage, victoza and metformin  · Transitioned from insulin infusion to Lantus 50 units qHS plus SSI with adequate BG control

## 2022-10-25 NOTE — PROGRESS NOTES
Cat [facility]  [id number]   Progress Note - Tomi Almendarez 1960, 57 y o  female MRN: [id number]  Unit/Bed#: ICU 05 Encounter: [id number]  Primary Care Provider: Jose David Conde MD   Date and time admitted to hospital: 10/17/2022 10:24 AM    Pneumonia  Assessment & Plan  · CTA chest 10/17 demonstrated dense consolidation in right lower lobe and left sided consolidation consistent with multilobar pneumonia  · Sputum cx resp tova  · Strep pneumo and legionella negative  · Completed cefepime D7, vanc D4  · S/p bronchoscopy 10/22 with repeat sputum culture showing no polys or bacteria  · Chest PT TID, ETT suctioning PRN, 3% NSS nebs   · Trend fever curve, WBC and procal     * Acute respiratory failure with hypoxia and hypercapnia ([facility] [address] )  Assessment & Plan  Patient presented 10/17 with progressively worsening shortness of breath over the past several weeks  Reported not feeling well since diagnosed with COVID in August  Initially hypoxic requiring NRB  Patient deteriorated in the ER and was placed on BiPAP and was ultimately intubated due to respiratory distress, tachypnea and hypercarbia  · Recently had COVID, tested positive 8/19 and was hospitalized for 5 days requiring nasal cannula  She noted increased SOB over the past week several weeks  Prescribed Zpak and prednisone taper on 10/6 by Dr Madelyn Covarrubias  · CXR with "Mild pulmonary venous congestion with trace right effusion "  · CT PE study: No pulmonary embolism  Dense consolidation in the right lower lobe and medial aspect of the left lower lobe suspicious for multilobar pneumonia "  · COVID/flu/RSV negative  · Acute respiratory failure with hypoxia and hypercapnia secondary to pneumonia, possible exacerbation of COPD    Plan:   · AC/VC 18/360/8/50%  Tolerated wean to 10/8 yesterday with -350 and RR 30's  Attempted wean to 6 peep overnight but SpO2 dipped to 85%    · Goal to wean FiO2 and PEEP as tolerated for O2  Sat 92% or above   · VAP ppx; chlorhexidine, PPI, HOB greater than 30 degrees  · Solumedrol 40mg QD  · Duonebs q6h   · Pulmonary hygiene  · Daily SAT/SBTs  · S/p bronch 10/22 for minimal thick secretions      Severe sepsis [facility])  Assessment & Plan  Sepsis criteria met on admission due to tachycardia, tachypnea, fever 103  Multilobar pneumonia present on CT scan   · LA 1 0  · COVID/flu/RSV negative  · Given zosyn in the ER  · CXR: "Mild pulmonary venous congestion with trace right effusion"  · CT PE study: "No pulmonary embolism  Dense consolidation in the right lower lobe and medial aspect of the left lower lobe suspicious for multilobar pneumonia"  · Blood cultures Neg x5D    Plan:   · Completed day 7/7 cefepime/vanco  · Sputum culture resp tova  · Strep pneumo/legionella urine antigen negative    · BC 1/2 with staph coag negative, likely contaminant   · Goal MAP >65  · Trend fever curve, WBC and procal   · PRN Tylenol for fever     COPD exacerbation (HCC)  Assessment & Plan  · Hx of COPD, presents with respiratory distress likely in the setting of pneumonia, however possible component of COPD exacerbation  · Continue solumedrol 40mg QD  · Continue duonebs  · Encourage smoking cessation when appropriate    Obstructive sleep apnea  Assessment & Plan  · CPAP HS once extubated  · Likely component of OHS     Type 2 diabetes mellitus with hyperglycemia, with long-term current use of insulin [facility])  Assessment & Plan  Lab Results   Component Value Date    HGBA1C 9 4 (H) 10/20/2022       Recent Labs     10/24/22  0025 10/24/22  0552 10/24/22  1141 10/24/22  1711   POCGLU 128 150* 167* 169*       Blood Sugar Average: Last 72 hrs:  (P) 398 [id number]   · Hyperglycemia with BG 400s on presentation, no elevation in anion gap   · PTA meds include lantus, meal coverage, victoza and metformin  · Transitioned from insulin infusion to Lantus 50 units qHS plus SSI with adequate BG control    Chronic pain syndrome  Assessment & Plan  · Prescribed tramadol 200mg Q24 hrs for chronic back pain  · Hold for now s/p intubation    Coronary artery disease involving native coronary artery of native heart with angina pectoris [facility])  Assessment & Plan  · Cardiac cath in 2019 demonstrated non-obstructive CAD with moderate stenosis of the RCA  · EKG on admission showing sinus tachycardia, no acute ST changes  · HS troponin minimally elevated  · Continue statin    Hypertension  Assessment & Plan  · Hold home lisinopril/hydrochlorothiazide and cardizem      Depression with anxiety  Assessment & Plan  · Continue home Wellbutrin, Cymbalta, and Seroquel     ----------------------------------------------------------------------------------------  HPI/24hr events: No acute events  Tolerated PS wean to 10/8 yesterday for most of day  Placed on AC overnight  Peep briefly weaned to 6 last evening, but did not tolerate adjustment and SpO2 dipped to 85%  Now maintained on 18/360/50/8  No other changes       Patient appropriate for transfer out of the ICU today?: No  Disposition: Continue Critical Care   Code Status: Level 1 - Full Code  ---------------------------------------------------------------------------------------  SUBJECTIVE  ALEX/ETT    Review of Systems   Unable to perform ROS: Intubated     Review of systems was unable to be performed secondary to ETT  ---------------------------------------------------------------------------------------  OBJECTIVE    Vitals   Vitals:    10/25/22 0422 10/25/22 0500 10/25/22 0542 10/25/22 0600   BP:  105/61  129/77   Pulse:  72  73   Resp:  (!) 43  (!) 35   Temp:  100 4 °F (38 °C)  (!) 100 76 °F (38 2 °C)   TempSrc:       SpO2: 93% 93%  92%   Weight:   109 kg (239 lb 10 2 oz)    Height:         Temp (24hrs), Av 9 °F (37 7 °C), Min:99 14 °F (37 3 °C), Max:100 76 °F (38 2 °C)  Current: Temperature: (!) 100 76 °F (38 2 °C)          Respiratory:  SpO2: SpO2: 92 %, SpO2 Activity: SpO2 Activity: At Rest, SpO2 Device: O2 Device: Ventilator Invasive/non-invasive ventilation settings   Respiratory  Report   Lab Data (Last 4 hours)    None         O2/Vent Data (Last 4 hours)    None                Physical Exam  Vitals and nursing note reviewed  Constitutional:       General: She is not in acute distress  Appearance: She is obese  She is ill-appearing  She is not toxic-appearing  Interventions: She is sedated, intubated and restrained  HENT:      Head: Normocephalic and atraumatic  Nose: Nose normal       Mouth/Throat:      Mouth: Mucous membranes are moist    Eyes:      General: Lids are normal       Pupils: Pupils are equal, round, and reactive to light  Cardiovascular:      Rate and Rhythm: Normal rate and regular rhythm  Pulses:           Radial pulses are 2+ on the right side  Dorsalis pedis pulses are 2+ on the right side and 2+ on the left side  Heart sounds: Normal heart sounds  No murmur heard  Pulmonary:      Effort: No respiratory distress  She is intubated  Breath sounds: Rhonchi present  No wheezing  Abdominal:      Palpations: Abdomen is soft  Comments: OGT in place with TF running  Decreased bowel sounds noted   Genitourinary:     Comments: Urinary catheter patent for yellow urine with sediment noted in tubing  Musculoskeletal:      Cervical back: Neck supple  Right lower leg: No edema  Left lower leg: No edema  Skin:     General: Skin is dry  Neurological:      Mental Status: She is lethargic     Psychiatric:      Comments: Intubated, precedex infusing             Laboratory and Diagnostics:  Results from last 7 days   Lab Units 10/25/22  0451 10/24/22  0552 10/23/22  0525 10/21/22  0525 10/20/22  0542 10/19/22  0545   WBC Thousand/uL 15 32* 16 89* 14 87* 14 14* 17 30* 19 65*   HEMOGLOBIN g/dL 11 7 12 1 12 0 11 8 11 6 11 8   HEMATOCRIT % 37 5 40 0 39 4 38 4 36 7 36 8   PLATELETS Thousands/uL 286 289 308 325 324 307   NEUTROS PCT %  --   --   --   --  89*  --    BANDS PCT % 4  --   --   --   --   --    MONOS PCT %  --   --   --   --  4  --    MONO PCT % 5  --   --   --   --   --      Results from last 7 days   Lab Units 10/25/22  0451 10/24/22  0552 10/23/22  0525 10/22/22  0554 10/21/22  0525 10/20/22  0542 10/19/22  0545   SODIUM mmol/L 139 139 140 135 136 135 133*   POTASSIUM mmol/L 4 2 4 0 3 9 5 3 5 1 5 0 4 3   CHLORIDE mmol/L 101 100 102 99 101 100 97   CO2 mmol/L 33* 34* 37* 34* 31 31 29   ANION GAP mmol/L 5 5 1* 2* 4 4 7   BUN mg/dL 32* 40* 41* 39* 34* 35* 29*   CREATININE mg/dL 0 95 1 01 1 21 1 08 1 07 1 06 1 28   CALCIUM mg/dL 8 4 8 6 8 7 8 2* 8 4 8 3 8 4   GLUCOSE RANDOM mg/dL 178* 148* 113 275* 180* 221* 251*     Results from last 7 days   Lab Units 10/25/22  0451 10/23/22  0525 10/20/22  0542 10/19/22  0545   MAGNESIUM mg/dL 2 1 2 5 2 5 2 5   PHOSPHORUS mg/dL 3 5 5 0*  --   --                    ABG:  Results from last 7 days   Lab Units 10/20/22  1143   PH ART  7 401   PCO2 ART mm Hg 47 7*   PO2 ART mm Hg 82 4   HCO3 ART mmol/L 29 0*   BASE EXC ART mmol/L 3 4   ABG SOURCE  Radial, Left     VBG:  Results from last 7 days   Lab Units 10/20/22  1143   ABG SOURCE  Radial, Left     Results from last 7 days   Lab Units 10/19/22  0545   PROCALCITONIN ng/ml 5 78*       Micro  Results from last 7 days   Lab Units 10/22/22  1554 10/19/22  1103 10/19/22  1100   BLOOD CULTURE   --  No Growth After 5 Days  No Growth After 5 Days     SPUTUM CULTURE  No growth  --   --    GRAM STAIN RESULT  Rare Epithelial cells per low power field  No Polys or Bacteria seen  --   --        EKG: NSR to sinus bradycardia on monitor, rate 50-60s   Imaging: No new imaging    Intake and Output  I/O       10/23 0701  10/24 0700 10/24 0701  10/25 0700    I V  (mL/kg) 361 6 (3 3)     NG/GT 60 120    IV Piggyback 50     Feedings 1040 440    Total Intake(mL/kg) 1511 6 (14) 560 (5 2)    Urine (mL/kg/hr) 1370 (0 5) 700 (0 5)    Total Output 1370 700    Net +141 6 -140                Height and Weights   Height: 5' 3" (160 cm)     Body mass index is 42 45 kg/m²  Weight (last 2 days)     Date/Time Weight    10/25/22 0542 109 (239 64)    10/24/22 0600 108 (237 44)    10/23/22 0600 108 (237 66)            Nutrition       Diet Orders   (From admission, onward)             Start     Ordered    10/22/22 0936  Diet Enteral/Parenteral; Tube Feeding No Oral Diet; Glucerna 1 2; Continuous; 40  Diet effective now        Comments: Start @ 20 cc/hr and increase by 10 cc/hr per protocol until reach goal   References:    Nutrtion Support Algorithm Enteral vs  Parenteral   Question Answer Comment   Diet Type Enteral/Parenteral    Enteral/Parenteral Tube Feeding No Oral Diet    Tube Feeding Formula: Glucerna 1 2    Bolus/Cyclic/Continuous Continuous    Tube Feeding Goal Rate (mL/hr): 40    RD to adjust diet per protocol?  Yes        10/22/22 0935    10/18/22 0844  Room Service  Once        Question:  Type of Service  Answer:  Room Service- Not Appropriate    10/18/22 0843                  Active Medications  Scheduled Meds:  Current Facility-Administered Medications   Medication Dose Route Frequency Provider Last Rate   • acetaminophen  650 mg Oral Q6H PRN LURDES Mosqueda     • atorvastatin  80 mg Oral Daily With LURDES JAMES     • buPROPion  100 mg Per NG Tube TID LURDES Soliz     • chlorhexidine  15 mL Mouth/Throat Q12H [address] [facility] [place], [address]     • dexmedetomidine  0 1-0 7 mcg/kg/hr Intravenous Titrated Sharlon Burn [place]LY[place] JaimeNP 0 4 mcg/kg/hr (10/25/22 0118)   • DULoxetine  30 mg Oral Daily LURDES Soliz     • enoxaparin  40 mg Subcutaneous Q24H [address] VEL Pérez     • fentanyl citrate (PF)  50 mcg Intravenous Q1H PRN LURDES Gupta     • insulin glargine  50 Units Subcutaneous HS Denny Navarro PA-C     • insulin lispro  2-12 Units Subcutaneous H5B [address] Denny Navarro PA-C     • ipratropium-albuterol  3 mL Nebulization Q6H Josh Tam LURDES Johnson     • methylPREDNISolone sodium succinate  40 mg Intravenous Daily LURDES Lujan     • omeprazole (PRILOSEC) suspension 2 mg/mL  20 mg Oral Daily LURDES Miles     • polyethylene glycol  17 g Oral Daily Alissa Pérez PA-C     • QUEtiapine  25 mg Per NG Tube BID LURDES Miles     • sodium chloride  4 mL Nebulization Q6H LURDES Miles       Continuous Infusions:  dexmedetomidine, 0 1-0 7 mcg/kg/hr, Last Rate: 0 4 mcg/kg/hr (10/25/22 0118)      PRN Meds:   acetaminophen, 650 mg, Q6H PRN  fentanyl citrate (PF), 50 mcg, Q1H PRN        Invasive Devices Review  Invasive Devices  Report    Peripheral Intravenous Line  Duration           Long-Dwell Peripheral IV (Midline) 36/98/19 Left Basilic 3 days    Peripheral IV 10/21/22 Right Wrist 3 days    Peripheral IV 10/21/22 Right; Lower Arm 3 days          Drain  Duration           NG/OG/Enteral Tube Orogastric 7 days    Urethral Catheter Temperature probe 16 Fr  7 days          Airway  Duration           ETT  7 5 mm 7 days                Rationale for remaining devices: Critical illness  ---------------------------------------------------------------------------------------  Advance Directive and Living Will:      Power of :    POLST:    ---------------------------------------------------------------------------------------  Care Time Delivered:   No Critical Care time spent       [facility]      Portions of the record may have been created with voice recognition software  Occasional wrong word or "sound a like" substitutions may have occurred due to the inherent limitations of voice recognition software    Read the chart carefully and recognize, using context, where substitutions have occurred Suture Removal: 14 days

## 2022-10-25 NOTE — ASSESSMENT & PLAN NOTE
Sepsis criteria met on admission due to tachycardia, tachypnea, fever 103  Multilobar pneumonia present on CT scan   · LA 1 0  · COVID/flu/RSV negative  · Given zosyn in the ER  · CXR: "Mild pulmonary venous congestion with trace right effusion"  · CT PE study: "No pulmonary embolism  Dense consolidation in the right lower lobe and medial aspect of the left lower lobe suspicious for multilobar pneumonia"  · Blood cultures Neg x5D    Plan:   · Completed day 7/7 cefepime/vanco  · Sputum culture resp tova  · Strep pneumo/legionella urine antigen negative    · BC 1/2 with staph coag negative, likely contaminant   · Goal MAP >65  · Trend fever curve, WBC and procal   · PRN Tylenol for fever

## 2022-10-25 NOTE — PROCEDURES
Arterial Line Insertion    Date/Time: 10/25/2022 12:11 PM  Performed by: LURDES Roy  Authorized by: Sona Roy     Patient location:  ICU  Consent:     Consent obtained:  Emergent situation  Universal protocol:     Patient identity confirmed:  Arm band and hospital-assigned identification number  Indications:     Indications: hemodynamic monitoring, multiple ABGs and continuous blood pressure monitoring    Pre-procedure details:     Skin preparation:  Chlorhexidine  Procedure details:     Laterality:  Left    Needle gauge:  20 G    Placement technique:  Percutaneous    Number of attempts:  2    Successful placement: yes      Transducer: waveform confirmed    Post-procedure details:     Post-procedure:  Biopatch applied, secured with tape and sterile dressing applied    CMS:  Unable to assess

## 2022-10-25 NOTE — PLAN OF CARE
Problem: RESPIRATORY - ADULT  Goal: Achieves optimal ventilation and oxygenation  Description: INTERVENTIONS:  - Assess for changes in respiratory status  - Assess for changes in mentation and behavior  - Position to facilitate oxygenation and minimize respiratory effort  - Oxygen administered by appropriate delivery if ordered  - Encourage broncho-pulmonary hygiene including cough, deep breathe, Incentive Spirometry  - Assess the need for suctioning and aspirate as needed  - Assess and instruct to report SOB or any respiratory difficulty  - Respiratory Therapy support as indicated  - Ventilator  Outcome: Progressing

## 2022-10-25 NOTE — UTILIZATION REVIEW
Continued Stay Review    Date: 10/25/2022                          Current Patient Class: inpatient    Current Level of Care: critical care    HPI:61 y o  female initially admitted on 10/17/2022     Assessment/Plan: Extubated to BiPAP in early AM after turning by RN patient attempting a BM then became less responsive w desaturation; attempt to re intubate difficult, during hand bagging lost pulses w Code Blue initiated; #7 0 ETT placed on 3rd attempt w pulses re gained; copious green secretions in oropharynx  no PNEUMO  Arterial line placed Comatose & TTM will be initiated    Vital Signs: & GCS=  Date and Time Eye Opening Best Verbal Response Best Motor Response Swink Coma Scale Score   10/25/22 1400 1 1 1 3   10/25/22 1250 1 1 1 3   10/25/22 1035 1 1 1 3   10/25/22 0800 4 5 6 15   10/25/22 0400 4 5 6 15   10/25/22 0000 4 5 6 15   10/24/22 2000 4 5 6 15     Date/Time Temp Pulse Resp BP MAP (mmHg) Arterial Line BP MAP SpO2 FiO2 (%) O2 Device O2 Interface Device Patient Position - Orthostatic VS   10/25/22 1500 96 4 °F (35 8 °C) Abnormal  80 24 Abnormal  131/69 95 144/69 95 mmHg 98 % -- -- -- --   10/25/22 1445 96 98 °F (36 1 °C) Abnormal  78 24 Abnormal  -- -- 146/71 97 mmHg 97 % -- -- -- --   10/25/22 1430 96 8 °F (36 °C) Abnormal  78 24 Abnormal  -- -- 140/69 93 mmHg 98 % -- -- -- --   10/25/22 1415 97 °F (36 1 °C) Abnormal  79 24 Abnormal  -- -- 132/66 88 mmHg 97 % -- -- -- --   10/25/22 1400 97 3 °F (36 3 °C) Abnormal  79 24 Abnormal  117/67 87 134/64 87 mmHg 98 % 60 -- -- --   10/25/22 1345 97 5 °F (36 4 °C) 80 24 Abnormal  -- -- 130/62 84 mmHg 97 % -- -- -- --   10/25/22 1330 97 9 °F (36 6 °C) 79 20 -- -- 125/60 81 mmHg 97 % -- -- -- --   10/25/22 1315 98 1 °F (36 7 °C) 79 23 Abnormal  -- -- 132/66 87 mmHg 96 % -- -- -- --   10/25/22 1300 98 2 °F (36 8 °C)  81 20 115/70 89 134/66 88 mmHg 95 % -- -- -- --   Temp: Simultaneous filing  User may not have seen previous data   at 10/25/22 1300   10/25/22 1250 98 6 °F (37 °C) 82 20 -- -- 127/65 85 mmHg 99 % 70 Ventilator -- --   10/25/22 1245 98 78 °F (37 1 °C) 82 20 -- -- 127/65 85 mmHg 95 % -- -- -- --   10/25/22 1230 98 96 °F (37 2 °C) 82 20 -- -- 162/89 178 mmHg 97 % -- -- -- --   10/25/22 1215 99 14 °F (37 3 °C) 84 20 -- -- 114/54 74 mmHg 97 % -- -- -- --   10/25/22 1200 99 32 °F (37 4 °C) 86 20 108/60 77 118/53 73 mmHg 98 % 70 Ventilator -- Lying   10/25/22 1145 99 32 °F (37 4 °C) 90 20 111/60 80 127/59 79 mmHg 99 % -- -- -- --   10/25/22 1140 99 32 °F (37 4 °C) 90 20 -- -- 127/59 79 mmHg 100 % 80 Ventilator -- --   10/25/22 1130 99 14 °F (37 3 °C) 92 21 110/59 78 127/58 78 mmHg 100 % -- -- -- --   10/25/22 1115 99 32 °F (37 4 °C) 100 24 Abnormal  130/69 90 135/60 81 mmHg 99 % -- -- -- --   10/25/22 1100 99 32 °F (37 4 °C) 110 Abnormal  29 Abnormal  145/86 110 154/73 96 mmHg 99 % -- -- -- --   10/25/22 1046 -- -- -- -- -- -- -- 98 % -- -- -- --   10/25/22 1045 99 68 °F (37 6 °C) 114 Abnormal  26 Abnormal  126/87 103 132/69 91 mmHg -- -- -- -- --   10/25/22 1042 99 68 °F (37 6 °C) 116 Abnormal  20 124/85 100 130/74 94 mmHg 96 % -- -- -- --   10/25/22 1041 99 68 °F (37 6 °C) 124 Abnormal  36 Abnormal  -- -- 130/72 94 mmHg 96 % -- -- -- --   10/25/22 1040 99 68 °F (37 6 °C) 121 Abnormal  22 -- -- 127/74 93 mmHg 95 % -- -- -- --   10/25/22 1039 -- 118 Abnormal  20 -- -- -- -- 95 % -- -- -- --   10/25/22 1037 99 68 °F (37 6 °C) 121 Abnormal  22 128/88 103 0/0 0 mmHg Abnormal  95 % -- -- -- --   10/25/22 1034 -- 129 Abnormal  -- 154/90 -- -- -- 95 % -- -- -- --   10/25/22 1030 99 86 °F (37 7 °C) 141 Abnormal  17 182/90 Abnormal  129 -- -- 93 % 100 Ventilator -- --   10/25/22 1029 -- 144 Abnormal  -- -- -- -- -- 90 % -- -- -- --   10/25/22 1027 -- 150 Abnormal   20  193/116 Abnormal  -- -- -- 78 % Abnormal  -- -- -- --   Pulse: SVT on monitor at 10/25/22 1027   Resp: patient switched from manual bag vetilations to the ventilator at 10/25/22 1027   10/25/22 1024 -- 99 20  -- -- -- -- -- -- -- -- --   Resp: manual ambu bag at 10/25/22 1024   10/25/22 1022 -- 67 20  168/67 -- -- -- -- -- -- -- --   Resp: manual bag ventilations at 10/25/22 1022   10/25/22 1000 99 68 °F (37 6 °C) 65 23 Abnormal  91/55 71 -- -- -- -- -- -- --   10/25/22 0959 99 68 °F (37 6 °C) 64 28 Abnormal  -- -- -- -- -- -- -- -- --   10/25/22 0957 99 68 °F (37 6 °C) 103 39 Abnormal  -- -- -- -- 75 % Abnormal  85 -- -- --   10/25/22 0929 -- -- -- -- -- -- -- 98 % 45 BiPAP Face mask --   10/25/22 0900 99 86 °F (37 7 °C) 68 34 Abnormal  121/56 80 -- -- 94 % -- -- -- --   10/25/22 0800 100 04 °F (37 8 °C) 69 33 Abnormal  116/65 86 -- -- 93 % 50 Ventilator -- Lying   10/25/22 0737 -- -- -- -- -- -- -- 95 % -- -- -- --   10/25/22 0700 100 4 °F (38 °C) 66 28 Abnormal  122/71 91 -- -- 94 % -- -- -- --   10/25/22 0600 100 76 °F (38 2 °C) Abnormal   73 35 Abnormal  129/77 97 -- -- 92 % -- -- -- --   Temp: tylenol given at 10/25/22 0600   10/25/22 0500 100 4 °F (38 °C) 72 43 Abnormal  105/61 78 -- -- 93 % -- -- -- --   10/25/22 0422 -- -- -- -- -- -- -- 93 % 50 Ventilator -- --   10/25/22 0400 100 22 °F (37 9 °C) 68 34 Abnormal  109/59 79 -- -- 92 % 50 Ventilator -- --   10/25/22 0300 100 22 °F (37 9 °C) 71 34 Abnormal  107/56 76 -- -- 91 % -- -- -- --   10/25/22 0200 100 4 °F (38 °C) 70 32 Abnormal  108/55 76 -- -- 91 % -- -- -- --   10/25/22 0100 100 22 °F (37 9 °C) 67 36 Abnormal  115/65 85 -- -- 93 % -- -- -- --   10/25/22 0047 -- -- -- -- -- -- -- 92 % 50 Ventilator -- --   10/25/22 0000 100 04 °F (37 8 °C) 64 22 108/61 79 -- -- 92 % -- -- --          Pertinent Labs/Diagnostic Results:       Results from last 7 days   Lab Units 10/25/22  1038 10/25/22  0451 10/24/22  0552 10/23/22  0525 10/21/22  0525 10/20/22  0542   WBC Thousand/uL  --  15 32* 16 89* 14 87* 14 14* 17 30*   HEMOGLOBIN g/dL  --  11 7 12 1 12 0 11 8 11 6   I STAT HEMOGLOBIN g/dl 17 0*  --   --   --   --   --    HEMATOCRIT %  --  37 5 40 0 39 4 38 4 36 7 HEMATOCRIT, ISTAT % 50*  --   --   --   --   --    PLATELETS Thousands/uL  --  286 289 308 325 324   NEUTROS ABS Thousands/µL  --   --   --   --   --  15 35*   BANDS PCT %  --  4  --   --   --   --          Results from last 7 days   Lab Units 10/25/22  1232 10/25/22  1038 10/25/22  0451 10/24/22  8552 10/23/22  0525 10/22/22  0554 10/21/22  0525 10/20/22  0542 10/19/22  0545   SODIUM mmol/L 138  --  139 139 140 135   < > 135 133*   POTASSIUM mmol/L 5 0  --  4 2 4 0 3 9 5 3   < > 5 0 4 3   CHLORIDE mmol/L 101  --  101 100 102 99   < > 100 97   CO2 mmol/L 28  --  33* 34* 37* 34*   < > 31 29   ANION GAP mmol/L 9  --  5 5 1* 2*   < > 4 7   BUN mg/dL 37*  --  32* 40* 41* 39*   < > 35* 29*   CREATININE mg/dL 1 37*  --  0 95 1 01 1 21 1 08   < > 1 06 1 28   EGFR ml/min/1 73sq m 41  --  64 59 48 55   < > 56 44   CALCIUM mg/dL 8 4  --  8 4 8 6 8 7 8 2*   < > 8 3 8 4   CALCIUM, IONIZED, ISTAT mmol/L  --  1 24  --   --   --   --   --   --   --    MAGNESIUM mg/dL  --   --  2 1  --  2 5  --   --  2 5 2 5   PHOSPHORUS mg/dL  --   --  3 5  --  5 0*  --   --   --   --     < > = values in this interval not displayed       Results from last 7 days   Lab Units 10/25/22  1232   AST U/L 48*   ALT U/L 50   ALK PHOS U/L 97   TOTAL PROTEIN g/dL 6 5   ALBUMIN g/dL 2 6*   TOTAL BILIRUBIN mg/dL 0 56     Results from last 7 days   Lab Units 10/25/22  1138 10/25/22  0449 10/24/22  2341 10/24/22  1711 10/24/22  1141 10/24/22  0552 10/24/22  0025 10/23/22  1728 10/23/22  1125 10/23/22  0525 10/22/22  2352 10/22/22  2112   POC GLUCOSE mg/dl 220* 177* 134 169* 167* 150* 128 186* 200* 127 187* 210*     Results from last 7 days   Lab Units 10/25/22  1232 10/25/22  0451 10/24/22  0552 10/23/22  0525 10/22/22  0554 10/21/22  0525 10/20/22  0542 10/19/22  0545   GLUCOSE RANDOM mg/dL 252* 178* 148* 113 275* 180* 221* 251*         Results from last 7 days   Lab Units 10/20/22  0542   HEMOGLOBIN A1C % 9 4*   EAG mg/dl 223     No results found for: BETA-HYDROXYBUTYRATE   Results from last 7 days   Lab Units 10/25/22  1232 10/20/22  1143 10/19/22  0805   PH ART  7 279* 7 401 7 420   PCO2 ART mm Hg 58 4* 47 7* 45 5*   PO2 ART mm Hg 87 1 82 4 67 4*   HCO3 ART mmol/L 26 8 29 0* 28 9*   BASE EXC ART mmol/L -1 0 3 4 3 7   O2 CONTENT ART mL/dL 17 4 17 2 16 9   O2 HGB, ARTERIAL % 94 6 95 4 92 8*   ABG SOURCE  Line, Arterial Radial, Left Radial, Left         Results from last 7 days   Lab Units 10/25/22  1038   PH, LANCE I-STAT  7 053*   PCO2, LANCE ISTAT mm HG >103 0*   PO2, LANCE ISTAT mm HG <13 0*                         Results from last 7 days   Lab Units 10/25/22  0451 10/19/22  0545   PROCALCITONIN ng/ml 0 28* 5 78*         Results from last 7 days   Lab Units 10/22/22  1554 10/19/22  1103 10/19/22  1100   BLOOD CULTURE   --  No Growth After 5 Days  No Growth After 5 Days     SPUTUM CULTURE  No growth  --   --    GRAM STAIN RESULT  Rare Epithelial cells per low power field  No Polys or Bacteria seen  --   --      Results from last 7 days   Lab Units 10/22/22  1548   TOTAL COUNTED  100         10/25 CXR Improved aeration of the right upper lung field with persistent subsegmental atelectasis   Small right pleural effusion    Medications:   Scheduled Medications:  acetaminophen, 650 mg, Oral, Q6H  atorvastatin, 80 mg, Oral, Daily With Dinner  buPROPion, 100 mg, Per NG Tube, TID  chlorhexidine, 15 mL, Mouth/Throat, Q12H MATTHEW  DULoxetine, 30 mg, Oral, Daily  enoxaparin, 40 mg, Subcutaneous, Q24H MATTHEW  insulin glargine, 50 Units, Subcutaneous, HS  insulin lispro, 2-12 Units, Subcutaneous, Q6H [facility]  ipratropium-albuterol, 3 mL, Nebulization, Q6H  methylPREDNISolone sodium succinate, 40 mg, Intravenous, Daily  omeprazole (PRILOSEC) suspension 2 mg/mL, 20 mg, Oral, Daily  polyethylene glycol, 17 g, Oral, Daily  QUEtiapine, 25 mg, Per NG Tube, BID  sodium chloride, 4 mL, Nebulization, Q6H      Continuous IV Infusions:    dexmedeTOMIDine (Precedex) 400 mcg in sodium chloride 0 9% 100 ml IVPB 10/25 0118 & DC 1216  Rate: 2 7-19 1 mL/hr Dose: 0 1-0 7 mcg/kg/hr  Weight Dosing Info: 109 kg  Freq: Titrated Route: IV  PRN Meds:  fentanyl citrate (PF), 50 mcg, Intravenous, Q2H PRN  EPINEPHrine (ADRENALIN) injection  Freq: Code/trauma/sedation medication Route: IV  Start: 10/25/22 1012 End: 10/25/22 1022             1012 10/25     1016     1019     1022              Discharge Plan: [facility]    Network Utilization Review Department  ATTENTION: Please call with any questions or concerns to [phone] and carefully listen to the prompts so that you are directed to the right person  All voicemails are confidential   Monette Gosselin all requests for admission clinical reviews, approved or denied determinations and any other requests to dedicated fax number below belonging to the campus where the patient is receiving treatment   List of dedicated fax numbers for the Facilities:  [address] DENIALS (Administrative/Medical Necessity) [phone]   [address] (Maternity/NICU/Pediatrics) [phone]   912 Maia Bellamy [phone]   [facility] [phone]   [address] [place] [address] Sourav Benz 28 [phone]   [address] [facility] 134 815 [facility] [phone]

## 2022-10-25 NOTE — CODE DOCUMENTATION
Patient extubated early this AM after rounds to BIPAP  She initially tolerated settings of 16/8 with tidal volumes in the 350 range  After a turn by nursing and the patient attempting to have a bowel movement, she became less responsive and began to desaturate  We attempted to re-intubate but found a challenging airway  A 7 5 tube could not be advanced  During bagging she was felt to have lost pulses and a code blue was initiated  Please see separate documentation on that  7 0 tube was able to be placed after 3 attempts  Pulse re-gained  Cardiac effort confirmed by ultrasound  No pneumothorax identified  Patient placed on vent and saturations have recovered to the 90's and pulse/pressure are also acceptable  Patient is comatose and TTM will be initiated  Attempted to call son but no answer  VM left  Additional critical care time:  65 minutes

## 2022-10-25 NOTE — PHYSICAL THERAPY NOTE
10/25/22 0915   Note Type   Note type Cancelled Session; Evaluation   Cancel Reasons Other   Additional Comments PT orders received  Chart reviewed  Pt being extubated at time that PT attempted to see pt  Will attempt to see pt in p m  as schedule allows     Licensure   [place] License Number  Luc[place]  [id number]

## 2022-10-25 NOTE — PHYSICAL THERAPY NOTE
10/25/22 1157   Note Type   Note type Cancelled Session; Evaluation   Cancel Reasons Medical status   Additional Comments PT was going to attempt pt in pm, however, pt extubated earlier in the AM, then desaturated while attempting to move her bowels, re-intubated and code blue called due to difficulty with re-intubation and loss of pulse  Will follow as medically appropriate     Licensure   [place] License Number  Ji Bello, [facility] [id number]

## 2022-10-25 NOTE — CODE DOCUMENTATION
Femoral pulse check completed utilizing doppler, no pulse present  Chest compressions restarted  ISTAT blood work completed

## 2022-10-25 NOTE — CODE DOCUMENTATION
Manual ventilations continued  Femoral pulse check completed utilizing doppler, no pulse present  Chest compressions resumed

## 2022-10-25 NOTE — RESPIRATORY THERAPY NOTE
Patient found on full support vent settings  Weaned per protocol to PS  Tolerating well with VSS  See flowsheet for full settings  Will monitor  [id number]  Patient extubated to [facility] per Dr Lian Whitmore  Tolerated transition, VSS, negative for stridor  Will continue to monitor  [address] to room, patient un responsive  Patient BVM, CPR initiated, patient reintubated  Patient re intubated size 7 0 ETT, secured 24 at the lip  S/p CXR, ETT withdrawn to 22 cm at the lip  ETCO2 present, BS equal and b/l  Placed on full support vent settings with PEEP of 14 per Dr Lian Whitmore  Will maintain support and monitor

## 2022-10-25 NOTE — CODE DOCUMENTATION
Femoral pulse check completed utilizing doppler and pulse is present, normal sinus rhythm noted on monitor  Chest compressions held

## 2022-10-25 NOTE — RESPIRATORY THERAPY NOTE
Received intubated patient on ps/cpap, rsbi 96, pt rate in 30s so switched back to full control, made team aware  Confirmed what the correct vent orders should be, 18/360/ 50/+6, as a trial  Pt is tolerating well  Pt in properly restrained, in no distress at this time

## 2022-10-26 NOTE — ASSESSMENT & PLAN NOTE
· Prescribed tramadol 200mg Q24 hrs for chronic back pain  · Hold for now s/p intubation  · Continues on fentanyl drip in setting of TTM

## 2022-10-26 NOTE — ASSESSMENT & PLAN NOTE
· Hx of COPD, presented with respiratory distress likely in the setting of pneumonia, however possible component of COPD exacerbation  · Continue solumedrol 40mg QD  · Continue duonebs  · Encourage smoking cessation when appropriate

## 2022-10-26 NOTE — ASSESSMENT & PLAN NOTE
· Tolerated extubation to Bipap early AM 10/25, then became acutely SOB while turning for toileting and desaturated  Became unresponsive shortly after and lost pulses while bagging and preparing for intubation  Code blue was called  See separate documentation for details  · Was reintubated with #7 0 ETT  Remained unresponsive thereafter, prompting TTM initiation  · Noted with myoclonic jerking movements shortly after arrest  EEG showing:  "no epileptiform discharges  In the setting of anoxic brain injury and absence of CNS sedating medication, a burst suppression background and myoclonus associated with burst activity but not epileptiform discharges are indicative of severe diffuse cerebral dysfunction and poor prognosis "   · Echo completed post ROSC: EF 67%, Compared to previous exam, there is no significant change  · Neuro exam: Unresponsive, no purposeful spontaneous movements, absent corneal reflexes, + cough  Overbreathing vent  Extends to painful stimuli, also with frequent myoclonic movements and facial twitching despite propofol   · Obtain CT head today  · Continue TTM per protocol  Goal temp 36 degrees reached 10/25 @ 1430  · Noted with worsening tachycardia, tachypnea and ? Shivering vs  myoclonus overnight  Water temp decreased to 8 degrees celsius  Concern for underlying fever despite scheduled tylenol  Initiated NMB to help with fever/metabolic demand  · Continue propofol, fentanyl, nimbex drips   Scheduled to rewarm today at 1430

## 2022-10-26 NOTE — ASSESSMENT & PLAN NOTE
Lab Results   Component Value Date    HGBA1C 9 4 (H) 10/20/2022       Recent Labs     10/25/22  1732 10/26/22  0005 10/26/22  0208 10/26/22  0526   POCGLU 188* 101 116 167*       Blood Sugar Average: Last 72 hrs:  (P) 159 [id number]   · Hyperglycemia with BG 400s on presentation, no elevation in anion gap   · PTA meds include lantus, meal coverage, victoza and metformin  · Transitioned from insulin infusion to Lantus 50 units qHS plus SSI with adequate BG control

## 2022-10-26 NOTE — QUICK NOTE
2000 Notified by RN of tachypnea to high 30's and tachycardia despite up-titration of propofol  Concern for discomfort  Provided with 25mcg IV fentanyl with improvement noted  Less myoclonic jerking noted and RR down to 28-30  Fentanyl drip ordered  ABG also checked and no significant change from prior: 7 39/51/110/30  No vent changes at this time

## 2022-10-26 NOTE — ASSESSMENT & PLAN NOTE
Sepsis criteria met on admission due to tachycardia, tachypnea, fever 103  Multilobar pneumonia present on CT scan   · LA 1 0  · COVID/flu/RSV negative  · Given zosyn in the ER  · CXR: "Mild pulmonary venous congestion with trace right effusion"  · CT PE study: "No pulmonary embolism  Dense consolidation in the right lower lobe and medial aspect of the left lower lobe suspicious for multilobar pneumonia"  · Blood cultures Neg x5D    Plan:   · Completed day 7/7 cefepime/vanco  · Sputum culture resp tova  · Strep pneumo/legionella urine antigen negative    · BC 1/2 with staph coag negative, likely contaminant   · Goal MAP >65  · Trend fever curve, WBC and procal   · Scheduled Tylenol in setting of TTM

## 2022-10-26 NOTE — ASSESSMENT & PLAN NOTE
· CTA chest 10/17 demonstrated dense consolidation in right lower lobe and left sided consolidation consistent with multilobar pneumonia  · Sputum cx resp tova  · Strep pneumo and legionella negative  · Completed cefepime D7, vanc D4  · S/p bronchoscopy 10/22 with repeat sputum culture showing no polys or bacteria  · Briefly tolerated extubation to BiPap 10/25, but desaturated with turning, developed PEA arrest shortly thereafter  Was reintubated and obtained ROSC  See plan as outlined above    · CXR pending this AM  · Chest PT TID, ETT suctioning PRN, 3% NSS nebs   · Trend fever curve, WBC and procal

## 2022-10-26 NOTE — ASSESSMENT & PLAN NOTE
Patient presented 10/17 with progressively worsening shortness of breath over the past several weeks  Reported not feeling well since diagnosed with COVID in August  Initially hypoxic requiring NRB  Patient deteriorated in the ER and was placed on BiPAP and was ultimately intubated due to respiratory distress, tachypnea and hypercarbia  · Recently had COVID, tested positive 8/19 and was hospitalized for 5 days requiring nasal cannula  Increased SOB over the past week several weeks  Prescribed Zpak and prednisone taper on 10/6 by Dr Richard Winter  · CXR with "Mild pulmonary venous congestion with trace right effusion "  · CT PE study: No pulmonary embolism  Dense consolidation in the right lower lobe and medial aspect of the left lower lobe suspicious for multilobar pneumonia "  · COVID/flu/RSV negative  · Acute respiratory failure with hypoxia and hypercapnia secondary to pneumonia, possible exacerbation of COPD    Plan:   · Tolerated weaning to 10/8, 45% yesterday and was extubated to Bipap early morning  Tolerated BiPap initially, but became hypoxic with turning/toileting shortly thereafter  Became unresponsive and developed PEA arrest while bagging and establishing airway  Was ultimately re-intubated and remains on AC/VC 24/400/16/50     · Goal to wean FiO2 and PEEP as tolerated for O2 Sat 92% or above   · VAP ppx; chlorhexidine, PPI, HOB greater than 30 degrees  · Solumedrol 40mg QD  · Duonebs q6h   · Pulmonary hygiene  · Hold daily SAT/SBT until TTM course completed  · S/p bronch 10/22 for minimal thick secretions

## 2022-10-26 NOTE — RESPIRATORY THERAPY NOTE
Patient found on full support vent settings, tolerating well with VSS  See flowsheet for full settings  Will maintain support and monitor

## 2022-10-26 NOTE — OCCUPATIONAL THERAPY NOTE
OCCUPATIONAL THERAPY       10/26/22 1227   Note Type   Note type Cancelled Session   Cancel Reasons Medical status  (intubated/sedated)   Additional Comments cancelled by Laurie Sanders RN   Licensure   [place] License Number  Oniel Kam MS, OTR/L, #[id number]

## 2022-10-26 NOTE — CASE MANAGEMENT
Case Management Progress Note    Patient name Robb Cole  Location ICU 05/ICU 05 MRN [id number]  : 1960 Date 10/26/2022       LOS (days): 9  Geometric Mean LOS (GMLOS) (days): 5 00  Days to GMLOS:-4 1        OBJECTIVE:        Current admission status: Inpatient  Preferred Pharmacy:   [facility] [address] [place] [address]  [address] [facility]  Phone: [phone] Fax: [phone]    Primary Care Provider: Erwin Weldon MD    Primary Insurance: [address] [place] [address]  Secondary Insurance:     PROGRESS NOTE:     Patient not currently stable  Per rounds with providers and chart review, Bradycardic cardiac arrest d/t hypoxia yesterday follow extubation to bipap and patient was re-intubated at that time  CM continues to follow for care coordination and dcp needs

## 2022-10-26 NOTE — UTILIZATION REVIEW
Continued Stay Review    Date: 10/26/22                          Current Patient Class: inpatient  Current Level of Care: Critical care  HPI:61 y o  female initially admitted on 10/17/22 inpatient due to  Acute on chronic combined hypoxemic and hypercapnic respiratory failure/COPD exacerbation/Multifocal pneumonia with recent COVID 61/ Diastolic heart failure/DM II poorly controlled/LESLIE  Assessment/Plan:   10/26/22 - today intubated and sedated  Yesterday initially tolerated extubation to Bipap in am,  Later developed shortness of breath while rolling to use bedpan,  Became hypoxic and unresponsive, developed PEA  CPR and ALS given and emergently re intubated  ROSC but remained unresponsive with myoclonic movements    eeg concern for diffuse cerebral dysfunction  Placed on Propofol gtt and TTM started  Myoclonic movements improved but returned last evening  Given Vec IV and Nimbex gtt started  On exam:  Obese and toxic appearing  Murmur  Tachypnea  Rhonchi  Urinary catheter draining yellow urine  Tremor, myoclonic jerking with stimulation and concern for shivering  Unresponsive   + cough  - corneal,  - gag  Extended upper extremities with stimulation  Plan  Mechanical ventilations, AC/VC 24/400/26/50  Goal is wean FIO2 and PEEP for sat > 92%  Continue Solumedrol, Duonebs, daily SAT, SBT  Continue TTM  Water temp decreased to 8 degrees celsius  Continue propofol, fentanyl and nimbex gtts        Vital Signs:   10/26/22 1100 98 24 °F (36 8 °C) 80 24 Abnormal  167/67 96 155/77 108 mmHg 99 % -- -- -- -- Lying   10/26/22 1000 97 7 °F (36 5 °C) 88 24 Abnormal  146/95 111 97/81 91 mmHg 99 % -- -- -- -- Lying   10/26/22 0900 97 7 °F (36 5 °C) 87 24 Abnormal  145/74 103 88/74 82 mmHg 99 % -- -- -- -- --   10/26/22 0800 97 52 °F (36 4 °C) 90 24 Abnormal  137/68 97 115/65 84 mmHg 99 % 60 -- Ventilator -- Lying   10/26/22 0700 97 16 °F (36 2 °C) Abnormal  92 24 Abnormal  126/69 93 81/64  75 mmHg 100 % -- -- -- -- --   Arterial Line BP: positional at 10/26/22 0700   10/26/22 0600 96 98 °F (36 1 °C) Abnormal  94 24 Abnormal  123/69 89 125/55 75 mmHg 99 % -- -- -- -- --   10/26/22 0500 96 26 °F (35 7 °C) Abnormal  86 24 Abnormal  147/92 115 145/70 97 mmHg 98 % -- -- -- -- --   10/26/22 0400 95 72 °F (35 4 °C) Abnormal  80 24 Abnormal  166/78 112 168/83 119 mmHg 99 % -- -- -- -- --   10/26/22 0300 96 08 °F (35 6 °C) Abnormal  70 24 Abnormal  196/89 Abnormal  128 194/94 134 mmHg 99 % -- -- -- -- --   10/26/22 0258 -- 69 24 Abnormal  -- -- -- -- 99 % 60 -- Ventilator -- --   10/26/22 0200 96 44 °F (35 8 °C) Abnormal  72 24 Abnormal  174/100 Abnormal  127 187/94 131 mmHg 99 % -- -- -- -- --   10/26/22 0115 96 8 °F (36 °C) Abnormal  70 0 Abnormal  -- -- 176/87 122 mmHg 98 % -- -- -- -- --   10/26/22 0100 96 98 °F (36 1 °C) Abnormal  72 0 Abnormal  168/81 116 173/87 120 mmHg 98 % -- -- -- -- --   10/26/22 0000 97 16 °F (36 2 °C) Abnormal  90 40 Abnormal  -- -- 161/86 117 mmHg 93 % --           Pertinent Labs/Diagnostic Results:   XR chest portable   Final Result by Alka Daly MD (10/25 3450)      Repositioning of endotracheal tube  Improving right lung aeration  Workstation performed: [id number]         XR chest portable   Final Result by Alka Daly MD (10/25 5336)         1  Worsening right-sided atelectasis  2   Endotracheal tube position somewhat low and should be retracted by approximately 2-3 cm  Workstation performed: [id number]         XR chest portable ICU   Final Result by Lawrence Dawn MD (10/25 7427)      Improved aeration of the right upper lung field with persistent subsegmental atelectasis  Small right pleural effusion  Workstation performed: [id number]         XR chest portable   Final Result by Mariangel Jeff MD (17/28 1905)         1  Status post bronchoscopy with no pneumothorax     2   Recurrent right upper lobe consolidation likely at least in part due to atelectasis  3   Left predominantly perihilar pulmonary opacity which may represent atelectasis, asymmetric pulmonary edema, or pneumonia  Findings marked for immediate notification in Epic  Workstation performed: [id number]         XR chest portable   Final Result by Melissa Shirley MD (10/21 6985)      Persistent bibasilar atelectasis  Pneumonia not excluded in the appropriate clinical setting  Workstation performed: [id number]         XR chest portable ICU   Final Result by Cherry Hancock MD (10/19 1101)      Persistent though improved bilateral lower lobe airspace consolidations consistent with pneumonia  Workstation performed: [id number]         CTA chest pe study   Final Result by Marga Amaral MD (10/17 4487)      No pulmonary embolism  Dense consolidation in the right lower lobe and medial aspect of the left lower lobe suspicious for multilobar pneumonia  Endotracheal tube tip at the ca could be retracted 2 cm  Enlarged fatty liver  Workstation performed: [id number]         XR chest 1 view portable   Final Result by Melissa Shirley MD (10/17 9836)      Mild pulmonary venous congestion with trace right effusion  Workstation performed: [id number]         XR chest portable ICU 10/26/22 - there are far more prominent interstitial markings on the L as opposed to the right  There is a left basilar pneumonia  10/25/22 echo Left Ventricle: Left ventricular cavity size is normal  Wall thickness is normal  The left ventricular ejection fraction is 67% by biplane measurement  Systolic function is hyperdynamic  Wall motion is normal  Diastolic function is normal for age  Left atrial filling pressure is normal   •  IVS: There is abnormal septal motion of unclear etiology  •  Tricuspid Valve: There is mild regurgitation   The right ventricular systolic pressure is mildly elevated  The estimated right ventricular systolic pressure is 93 33 mmHg  •  Compared to previous exam, there is no significant change    10/18/22 echo Left Ventricle: Left ventricular cavity size is normal  Wall thickness is mildly increased  The left ventricular ejection fraction is 55%  Systolic function is normal  Wall motion is normal   •  Study Details: This transthoracic echocardiogram was performed at bedside  This was a routine, inpatient study  Study quality was poor  This was a technically difficult study due to lung interference, restricted mobility, poor acoustic windows and a mechanical ventilator  A limited 2D, color flow Doppler, spectral Doppler, 2D, color flow Doppler and spectral Doppler transthoracic echocardiogram was performed  The apical, parasternal, subcostal and suprasternal views were obtained  1 4 m/L of Definity ultrasound enhancing agent was used due to to opacify left ventricle    Results from last 7 days   Lab Units 10/26/22  0525 10/25/22  1038 10/25/22  0451 10/24/22  0552 10/23/22  0525 10/21/22  0525 10/20/22  0542   WBC Thousand/uL 18 95*  --  15 32* 16 89* 14 87*   < > 17 30*   HEMOGLOBIN g/dL 13 2  --  11 7 12 1 12 0   < > 11 6   I STAT HEMOGLOBIN g/dl  --  17 0*  --   --   --   --   --    HEMATOCRIT % 41 4  --  37 5 40 0 39 4   < > 36 7   HEMATOCRIT, ISTAT %  --  50*  --   --   --   --   --    PLATELETS Thousands/uL 344  --  286 289 308   < > 324   NEUTROS ABS Thousands/µL  --   --   --   --   --   --  15 35*   BANDS PCT %  --   --  4  --   --   --   --     < > = values in this interval not displayed       Results from last 7 days   Lab Units 10/26/22  0525 10/25/22  2014 10/25/22  1232 10/25/22  1038 10/25/22  0451 10/24/22  1964 10/23/22  0525 10/21/22  0525 10/20/22  0542   SODIUM mmol/L 141 139 138  --  139 139 140   < > 135   POTASSIUM mmol/L 3 2* 3 9 5 0  --  4 2 4 0 3 9   < > 5 0   CHLORIDE mmol/L 103 102 101  --  101 100 102   < > 100   CO2 mmol/L 28 31 28  --  33* 34* 37*   < > 31   ANION GAP mmol/L 10 6 9  --  5 5 1*   < > 4   BUN mg/dL 33* 39* 37*  --  32* 40* 41*   < > 35*   CREATININE mg/dL 0 83 1 11 1 37*  --  0 95 1 01 1 21   < > 1 06   EGFR ml/min/1 73sq m 75 53 41  --  64 59 48   < > 56   CALCIUM mg/dL 9 0 9 0 8 4  --  8 4 8 6 8 7   < > 8 3   CALCIUM, IONIZED mmol/L 1 17  --   --   --   --   --   --   --   --    CALCIUM, IONIZED, ISTAT mmol/L  --   --   --  1 24  --   --   --   --   --    MAGNESIUM mg/dL 1 9  --   --   --  2 1  --  2 5  --  2 5   PHOSPHORUS mg/dL 3 4  --   --   --  3 5  --  5 0*  --   --     < > = values in this interval not displayed       Results from last 7 days   Lab Units 10/25/22  1232   AST U/L 48*   ALT U/L 50   ALK PHOS U/L 97   TOTAL PROTEIN g/dL 6 5   ALBUMIN g/dL 2 6*   TOTAL BILIRUBIN mg/dL 0 56     Results from last 7 days   Lab Units 10/26/22  1120 10/26/22  0526 10/26/22  0208 10/26/22  0005 10/25/22  1732 10/25/22  1138 10/25/22  0449 10/24/22  2341 10/24/22  1711 10/24/22  1141 10/24/22  0552 10/24/22  0025   POC GLUCOSE mg/dl 167* 167* 116 101 188* 220* 177* 134 169* 167* 150* 128     Results from last 7 days   Lab Units 10/26/22  0525 10/25/22  2014 10/25/22  1232 10/25/22  0451 10/24/22  0552 10/23/22  0525 10/22/22  0554 10/21/22  0525 10/20/22  0542   GLUCOSE RANDOM mg/dL 173* 118 252* 178* 148* 113 275* 180* 221*     Results from last 7 days   Lab Units 10/20/22  0542   HEMOGLOBIN A1C % 9 4*   EAG mg/dl 223     Results from last 7 days   Lab Units 10/26/22  0525 10/26/22  0039 10/25/22  2014   PH ART  7 394 7 390 7 384   PCO2 ART mm Hg 43 8 49 7* 52 5*   PO2 ART mm Hg 109 1 75 3 110 5   HCO3 ART mmol/L 26 2 29 4* 30 6*   BASE EXC ART mmol/L 1 0 3 3 4 3   O2 CONTENT ART mL/dL 17 6 21 0 19 2   O2 HGB, ARTERIAL % 97 7* 94 0 97 7*   ABG SOURCE  Line, Arterial Line, Arterial Line, Arterial     Results from last 7 days   Lab Units 10/25/22  1038   PH, LANCE I-STAT  7 053*   PCO2, LANCE ISTAT mm HG >103 0*   PO2, LANCE ISTAT mm HG <13 0*     Results from last 7 days   Lab Units 10/26/22  0525   PROTIME seconds 13 7   INR  1 04   PTT seconds 28     Results from last 7 days   Lab Units 10/26/22  0525 10/25/22  0451   PROCALCITONIN ng/ml 12 09* 0 28*     Results from last 7 days   Lab Units 10/26/22  0525   LACTIC ACID mmol/L 0 7     Results from last 7 days   Lab Units 10/22/22  1554   SPUTUM CULTURE  No growth   GRAM STAIN RESULT  Rare Epithelial cells per low power field  No Polys or Bacteria seen     Results from last 7 days   Lab Units 10/22/22  1548   TOTAL COUNTED  100       Medications:   Scheduled Medications:  acetaminophen, 650 mg, Oral, Q6H  atorvastatin, 80 mg, Oral, Daily With Dinner  chlorhexidine, 15 mL, Mouth/Throat, Q12H MATTHEW  enoxaparin, 40 mg, Subcutaneous, Q24H [address]  insulin glargine, 25 Units, Subcutaneous, HS  insulin lispro, 2-12 Units, Subcutaneous, Q6H [address]  ipratropium-albuterol, 3 mL, Nebulization, Q6H  methylPREDNISolone sodium succinate, 40 mg, Intravenous, Daily  omeprazole (PRILOSEC) suspension 2 mg/mL, 20 mg, Oral, Daily  piperacillin-tazobactam, 4 5 g, Intravenous, Q6H  polyethylene glycol, 17 g, Oral, Daily  sodium chloride, 4 mL, Nebulization, Q6H    cisatracurium (NIMBEX) 200 mg in sodium chloride 0 9 % 500 mL infusion  Rate: 1 6-81 8 mL/hr Dose: 0 1-5 mcg/kg/min  Weight Dosing Info: 109 kg  Freq: Titrated Route: IV  Last Dose: Stopped (10/26/22 1107)  Start: 10/26/22 0145 End: 10/26/22 1056    buPROPion (WELLBUTRIN) tablet 100 mg  Dose: 100 mg  Freq: 3 times daily Route: PER NG TUBE  Start: 10/18/22 1615 End: 10/26/22 1205    hydrALAZINE (APRESOLINE) injection 5 mg  Dose: 5 mg  Freq: Once Route: IV  Indications of Use: SEVERE HYPERTENSION  Start: 10/26/22 0315 End: 10/26/22 0328    midazolam (VERSED) injection 2 mg  Dose: 2 mg  Freq:  Once Route: IV  Indications of Use: MAINTENANCE OF GENERAL ANESTHESIA  Start: 10/26/22 0015 End: 10/26/22 0017    vecuronium (NORCURON) injection 10 mg  Dose: 10 mg  Freq: Once Route: IV  Start: 10/26/22 0015 End: 10/26/22 0017    Continuous IV Infusions:  fentaNYL, 50 mcg/hr, Intravenous, Continuous  propofol, 5-50 mcg/kg/min, Intravenous, Titrated    cisatracurium (NIMBEX) 200 mg in sodium chloride 0 9 % 500 mL infusion  Rate: 1 6-81 8 mL/hr Dose: 0 1-5 mcg/kg/min  Weight Dosing Info: 109 kg  Freq: Titrated Route: IV  Last Dose: Stopped (10/26/22 1107)  Start: 10/26/22 0145 End: 10/26/22 1056  And  artificial tear (LUBRIFRESH P M ) ophthalmic ointment  Freq: Every 2 hours scheduled Route: Both Eyes  Start: 10/26/22 0030 End: 10/26/22 1056    PRN Meds: not used   fentanyl citrate (PF), 50 mcg, Intravenous, Q2H PRN  hydrALAZINE, 5 mg, Intravenous, Q6H PRN      Discharge Plan: to be determined  Network Utilization Review Department  ATTENTION: Please call with any questions or concerns to [phone] and carefully listen to the prompts so that you are directed to the right person  All voicemails are confidential   Wilfrid Abdalla all requests for admission clinical reviews, approved or denied determinations and any other requests to dedicated fax number below belonging to the campus where the patient is receiving treatment   List of dedicated fax numbers for the Facilities:  [address] DENIALS (Administrative/Medical Necessity) [phone]   [address] (Maternity/NICU/Pediatrics) [phone]   [address] Maia Bellamy [phone]   [facility] [phone]   [facility] [phone]   [address] [address] [facility] [address] [place] [address] [id number] [place] [address] [address] [place]   [id number] [place] [address] [phone]   [facility] [address] [place] [address] [phone]

## 2022-10-26 NOTE — ASSESSMENT & PLAN NOTE
· Initially in the setting of hypercarbia/sepsis   Now s/p PEA arrest, concern for hypoxic injury  · Intubated and now sedated with propofol and fentanyl in setting of TTM  · EEG completed 10/25 and showing evidence of severe diffuse cerebral dysfunction   · Hold daily SAT given TTM  · [facility] ICU  · Sleep hygiene  · Neuro checks per routine

## 2022-10-26 NOTE — PROGRESS NOTES
Charlie 44  Progress Note - Yina Rowan 1960, 57 y o  female MRN: [id number]  Unit/Bed#: ICU 05 Encounter: [id number]  Primary Care Provider: Jung Reilly MD   Date and time admitted to hospital: 10/17/2022 10:24 AM    * Acute respiratory failure with hypoxia and hypercapnia [facility])  Assessment & Plan  Patient presented 10/17 with progressively worsening shortness of breath over the past several weeks  Reported not feeling well since diagnosed with COVID in August  Initially hypoxic requiring NRB  Patient deteriorated in the ER and was placed on BiPAP and was ultimately intubated due to respiratory distress, tachypnea and hypercarbia  · Recently had COVID, tested positive 8/19 and was hospitalized for 5 days requiring nasal cannula  Increased SOB over the past week several weeks  Prescribed Zpak and prednisone taper on 10/6 by Dr Hiram Delgado  · CXR with "Mild pulmonary venous congestion with trace right effusion "  · CT PE study: No pulmonary embolism  Dense consolidation in the right lower lobe and medial aspect of the left lower lobe suspicious for multilobar pneumonia "  · COVID/flu/RSV negative  · Acute respiratory failure with hypoxia and hypercapnia secondary to pneumonia, possible exacerbation of COPD    Plan:   · Tolerated weaning to 10/8, 45% yesterday and was extubated to Bipap early morning  Tolerated BiPap initially, but became hypoxic with turning/toileting shortly thereafter  Became unresponsive and developed PEA arrest while bagging and establishing airway  Was ultimately re-intubated and remains on AC/VC 24/400/16/50     · Goal to wean FiO2 and PEEP as tolerated for O2 Sat 92% or above   · VAP ppx; chlorhexidine, PPI, HOB greater than 30 degrees  · Solumedrol 40mg QD  · Duonebs q6h   · Pulmonary hygiene  · Hold daily SAT/SBT until TTM course completed  · S/p bronch 10/22 for minimal thick secretions      Cardiac arrest [facility])  Assessment & Plan  · Tolerated extubation to Bipap early AM 10/25, then became acutely SOB while turning for toileting and desaturated  Became unresponsive shortly after and lost pulses while bagging and preparing for intubation  Code blue was called  See separate documentation for details  · Was reintubated with #7 0 ETT  Remained unresponsive thereafter, prompting TTM initiation  · Noted with myoclonic jerking movements shortly after arrest  EEG showing:  "no epileptiform discharges  In the setting of anoxic brain injury and absence of CNS sedating medication, a burst suppression background and myoclonus associated with burst activity but not epileptiform discharges are indicative of severe diffuse cerebral dysfunction and poor prognosis "   · Echo completed post ROSC: EF 67%, Compared to previous exam, there is no significant change  · Neuro exam: Unresponsive, no purposeful spontaneous movements, absent corneal reflexes, + cough  Overbreathing vent  Extends to painful stimuli, also with frequent myoclonic movements and facial twitching despite propofol   · Obtain CT head today  · Continue TTM per protocol  Goal temp 36 degrees reached 10/25 @ 1430  · Noted with worsening tachycardia, tachypnea and ? Shivering vs  myoclonus overnight  Water temp decreased to 8 degrees celsius  Concern for underlying fever despite scheduled tylenol  Initiated NMB to help with fever/metabolic demand  · Continue propofol, fentanyl, nimbex drips  Scheduled to rewarm today at 1430    Pneumonia  Assessment & Plan  · CTA chest 10/17 demonstrated dense consolidation in right lower lobe and left sided consolidation consistent with multilobar pneumonia  · Sputum cx resp tova  · Strep pneumo and legionella negative  · Completed cefepime D7, vanc D4  · S/p bronchoscopy 10/22 with repeat sputum culture showing no polys or bacteria  · Briefly tolerated extubation to BiPap 10/25, but desaturated with turning, developed PEA arrest shortly thereafter   Was reintubated and obtained ROSC  See plan as outlined above  · CXR pending this AM  · Chest PT TID, ETT suctioning PRN, 3% NSS nebs   · Trend fever curve, WBC and procal     Severe sepsis (HCC)  Assessment & Plan  Sepsis criteria met on admission due to tachycardia, tachypnea, fever 103  Multilobar pneumonia present on CT scan   · LA 1 0  · COVID/flu/RSV negative  · Given zosyn in the ER  · CXR: "Mild pulmonary venous congestion with trace right effusion"  · CT PE study: "No pulmonary embolism  Dense consolidation in the right lower lobe and medial aspect of the left lower lobe suspicious for multilobar pneumonia"  · Blood cultures Neg x5D    Plan:   · Completed day 7/7 cefepime/vanco  · Sputum culture resp tova  · Strep pneumo/legionella urine antigen negative    · BC 1/2 with staph coag negative, likely contaminant   · Goal MAP >65  · Trend fever curve, WBC and procal   · Scheduled Tylenol in setting of TTM     Toxic metabolic encephalopathy  Assessment & Plan  · Initially in the setting of hypercarbia/sepsis   Now s/p PEA arrest, concern for hypoxic injury  · Intubated and now sedated with propofol and fentanyl in setting of TTM  · EEG completed 10/25 and showing evidence of severe diffuse cerebral dysfunction   · Hold daily SAT given TTM  · CAM ICU  · Sleep hygiene  · Neuro checks per routine    COPD exacerbation ([facility] [place] [address] )  Assessment & Plan  · Hx of COPD, presented with respiratory distress likely in the setting of pneumonia, however possible component of COPD exacerbation  · Continue solumedrol 40mg QD  · Continue duonebs  · Encourage smoking cessation when appropriate    Obstructive sleep apnea  Assessment & Plan  · CPAP HS once extubated  · Likely component of OHS     Type 2 diabetes mellitus with hyperglycemia, with long-term current use of insulin [facility])  Assessment & Plan  Lab Results   Component Value Date    HGBA1C 9 4 (H) 10/20/2022       Recent Labs     10/25/22  1732 10/26/22  0005 10/26/22  0208 10/26/22  0526   POCGLU 188* 101 116 167*       Blood Sugar Average: Last 72 hrs:  (P) 011 [id number]   · Hyperglycemia with BG 400s on presentation, no elevation in anion gap   · PTA meds include lantus, meal coverage, victoza and metformin  · Transitioned from insulin infusion to Lantus 50 units qHS plus SSI with adequate BG control    Chronic pain syndrome  Assessment & Plan  · Prescribed tramadol 200mg Q24 hrs for chronic back pain  · Hold for now s/p intubation  · Continues on fentanyl drip in setting of TTM    Coronary artery disease involving native coronary artery of native heart with angina pectoris (HCC)  Assessment & Plan  · Cardiac cath in 2019 demonstrated non-obstructive CAD with moderate stenosis of the RCA  · EKG on admission showing sinus tachycardia, no acute ST changes  · HS troponin minimally elevated  · Continue statin    Hypertension  Assessment & Plan  · Hold home lisinopril/hydrochlorothiazide and cardizem      Depression with anxiety  Assessment & Plan  · Continues on home Wellbutrin, Cymbalta  Hold Seroquel       ----------------------------------------------------------------------------------------  HPI/24hr events: Tolerated extubation to BiPap yesterday AM  Developed SOB shortly thereafter while rolling to use bedpan  Became hypoxic and unresponsive  Developed PEA while preparing for intubation  CPR and ALS provided, and was emergently re-intubated  Obtained ROSC after intubation but remained unresponsive with myoclonic movements  EEG completed and showing no epileptiform discharges but findings were concerning for diffuse cerebral dysfunction  Was placed on propofol drip and TTM was initiated  Myoclonic movements and tachypnea briefly improved with initiation of propofol, but returned in the evening  PRN fentanyl also given to aid tachypnea and ? shivering vs  myoclonus  [place] sun water temp noted to dip to 8  Concern for underlying fever despite scheduled tylenol   Vec IV x1 provided and nimbex drip initiated  Remained hemodynamically stable  Will discuss timing of CT head on rounds given TTM  No other changes  Patient appropriate for transfer out of the ICU today?: No  Disposition: Continue Critical Care   Code Status: Level 1 - Full Code  ---------------------------------------------------------------------------------------  SUBJECTIVE  ALEX    Review of Systems   Unable to perform ROS: Intubated     Review of systems was unable to be performed secondary to ETT  ---------------------------------------------------------------------------------------  OBJECTIVE    Vitals   Vitals:    10/26/22 0300 10/26/22 0400 10/26/22 0500 10/26/22 0535   BP: (!) 196/89 166/78 147/92    Pulse: 70 80 86    Resp: (!) 24 (!) 24 (!) 24    Temp: (!) 96 08 °F (35 6 °C) (!) 95 72 °F (35 4 °C) (!) 96 26 °F (35 7 °C)    TempSrc: Esophageal Bladder     SpO2: 99% 99% 98%    Weight:    110 kg (241 lb 10 oz)   Height:         Temp (24hrs), Av 1 °F (36 7 °C), Min:95 72 °F (35 4 °C), Max:100 76 °F (38 2 °C)  Current: Temperature: (!) 96 26 °F (35 7 °C)  Arterial Line BP: 145/70  Arterial Line MAP (mmHg): 97 mmHg    Respiratory:  SpO2: SpO2: 98 %, SpO2 Activity: SpO2 Activity: At Rest, SpO2 Device: O2 Device: Ventilator       Invasive/non-invasive ventilation settings   Respiratory  Report   Lab Data (Last 4 hours)      10/26 0525            pH, Arterial       7 394             pCO2, Arterial       43 8             pO2, Arterial       109 1             HCO3, Arterial       26 2             Base Excess, Arterial       1 0                  O2/Vent Data     None                Physical Exam  Vitals and nursing note reviewed  Constitutional:       General: She is not in acute distress  Appearance: She is obese  She is ill-appearing and toxic-appearing  Interventions: She is sedated and intubated  HENT:      Head: Normocephalic and atraumatic        Nose: Nose normal       Mouth/Throat:      Mouth: Mucous membranes are moist  Eyes:      General: Lids are normal  No scleral icterus  Pupils: Pupils are equal, round, and reactive to light  Cardiovascular:      Rate and Rhythm: Normal rate and regular rhythm  Pulses: Normal pulses  Radial pulses are 2+ on the right side and 2+ on the left side  Dorsalis pedis pulses are 2+ on the right side and 2+ on the left side  Heart sounds: Murmur heard  Pulmonary:      Effort: Tachypnea present  No respiratory distress  She is intubated  Breath sounds: Rhonchi present  No wheezing  Abdominal:      General: Abdomen is protuberant  Bowel sounds are decreased  Palpations: Abdomen is soft  Genitourinary:     Comments: Urinary catheter draining yellow urine with sediment noted in tubing  Musculoskeletal:      Cervical back: Neck supple  Right lower leg: No edema  Left lower leg: No edema  Skin:     General: Skin is cool and dry  Neurological:      GCS: GCS eye subscore is 1  GCS verbal subscore is 1  GCS motor subscore is 2  Motor: Tremor (myoclonic jerking noted at times with stimulation, also with concern for shivering  ) present  Comments: Unresponsive, no eye opening  + cough, - corneals, - gag prior to NMB  Would overbreathe vent  Extended upper extremities when stimulated  Now on nimbex drip               Laboratory and Diagnostics:  Results from last 7 days   Lab Units 10/26/22  0525 10/25/22  1038 10/25/22  0451 10/24/22  0552 10/23/22  0525 10/21/22  0525 10/20/22  0542   WBC Thousand/uL 18 95*  --  15 32* 16 89* 14 87* 14 14* 17 30*   HEMOGLOBIN g/dL 13 2  --  11 7 12 1 12 0 11 8 11 6   I STAT HEMOGLOBIN g/dl  --  17 0*  --   --   --   --   --    HEMATOCRIT % 41 4  --  37 5 40 0 39 4 38 4 36 7   HEMATOCRIT, ISTAT %  --  50*  --   --   --   --   --    PLATELETS Thousands/uL 344  --  286 289 308 325 324   NEUTROS PCT %  --   --   --   --   --   --  89*   BANDS PCT %  --   --  4  --   --   --   --    MONOS PCT %  --   --   -- --   --   --  4   MONO PCT %  --   --  5  --   --   --   --      Results from last 7 days   Lab Units 10/25/22  2014 10/25/22  1232 10/25/22  0451 10/24/22  0552 10/23/22  0525 10/22/22  0554 10/21/22  0525   SODIUM mmol/L 139 138 139 139 140 135 136   POTASSIUM mmol/L 3 9 5 0 4 2 4 0 3 9 5 3 5 1   CHLORIDE mmol/L 102 101 101 100 102 99 101   CO2 mmol/L 31 28 33* 34* 37* 34* 31   ANION GAP mmol/L 6 9 5 5 1* 2* 4   BUN mg/dL 39* 37* 32* 40* 41* 39* 34*   CREATININE mg/dL 1 11 1 37* 0 95 1 01 1 21 1 08 1 07   CALCIUM mg/dL 9 0 8 4 8 4 8 6 8 7 8 2* 8 4   GLUCOSE RANDOM mg/dL 118 252* 178* 148* 113 275* 180*   ALT U/L  --  50  --   --   --   --   --    AST U/L  --  48*  --   --   --   --   --    ALK PHOS U/L  --  97  --   --   --   --   --    ALBUMIN g/dL  --  2 6*  --   --   --   --   --    TOTAL BILIRUBIN mg/dL  --  0 56  --   --   --   --   --      Results from last 7 days   Lab Units 10/25/22  0451 10/23/22  0525 10/20/22  0542   MAGNESIUM mg/dL 2 1 2 5 2 5   PHOSPHORUS mg/dL 3 5 5 0*  --                    ABG:  Results from last 7 days   Lab Units 10/26/22  0525   PH ART  7 394   PCO2 ART mm Hg 43 8   PO2 ART mm Hg 109 1   HCO3 ART mmol/L 26 2   BASE EXC ART mmol/L 1 0   ABG SOURCE  Line, Arterial     VBG:  Results from last 7 days   Lab Units 10/26/22  0525   ABG SOURCE  Line, Arterial     Results from last 7 days   Lab Units 10/25/22  0451   PROCALCITONIN ng/ml 0 28*       Micro  Results from last 7 days   Lab Units 10/22/22  1554 10/19/22  1103 10/19/22  1100   BLOOD CULTURE   --  No Growth After 5 Days  No Growth After 5 Days  SPUTUM CULTURE  No growth  --   --    GRAM STAIN RESULT  Rare Epithelial cells per low power field  No Polys or Bacteria seen  --   --        EKG: NSR on monitor, rate 90s  Imaging: I have personally reviewed pertinent reports  and I have personally reviewed pertinent films in PACS  10/25 CXR: Repositioning of endotracheal tube  Improving right lung aeration      Intake and Output  I/O       10/24 0701  10/25 0700 10/25 0701  10/26 0700    I V  (mL/kg) 261 6 (2 4) 153 6 (1 4)    NG/ 140    IV Piggyback      Feedings 890 100    Total Intake(mL/kg) 1371 6 (12 6) 393 6 (3 6)    Urine (mL/kg/hr) 1350 (0 5) 775 (0 5)    Emesis/NG output  400    Stool 0 0    Total Output 1350 1175    Net +21 6 -781  4          Unmeasured Stool Occurrence 1 x 3 x          Height and Weights   Height: 5' 3" (160 cm)     Body mass index is 42 8 kg/m²  Weight (last 2 days)     Date/Time Weight    10/26/22 0535 110 (241 62)    10/25/22 1130 109 (240 3)    10/25/22 0542 109 (239 64)    10/24/22 0600 108 (237 44)            Nutrition       Diet Orders   (From admission, onward)             Start     Ordered    10/25/22 1643  Diet Enteral/Parenteral; Tube Feeding No Oral Diet; Glucerna 1 2; Continuous; 20  Diet effective now        Comments: Start @ 20 cc/hr and increase by 10 cc/hr per protocol until reach goal   References:    Nutrtion Support Algorithm Enteral vs  Parenteral   Question Answer Comment   Diet Type Enteral/Parenteral    Enteral/Parenteral Tube Feeding No Oral Diet    Tube Feeding Formula: Glucerna 1 2    Bolus/Cyclic/Continuous Continuous    Tube Feeding Goal Rate (mL/hr): 20    RD to adjust diet per protocol?  Yes        10/25/22 1642    10/18/22 0844  Room Service  Once        Question:  Type of Service  Answer:  Room Service- Not Appropriate    10/18/22 0843                  Active Medications  Scheduled Meds:  Current Facility-Administered Medications   Medication Dose Route Frequency Provider Last Rate   • acetaminophen  650 mg Oral Q6H LURDES Wright     • cisatracurium (NIMBEX) in 0 9% sodium chloride infusion  0 1-5 mcg/kg/min Intravenous Titrated SHERLYN GilletteNP 0 8 mcg/kg/min (10/26/22 0228)    And   • artificial tear   Both Eyes Q2H SHERLYN GilletteNP     • atorvastatin  80 mg Oral Daily With SHERLYN JAMESNP     • buPROPion  100 mg Per NG Tube TID LURDES Peacock     • chlorhexidine  15 mL Mouth/Throat Q12H [address] [place], [address]     • DULoxetine  30 mg Oral Daily LURDES Peacock     • enoxaparin  40 mg Subcutaneous Q24H [address]     • fentaNYL  50 mcg/hr Intravenous Continuous LURDES Kelsey 50 mcg/hr (10/25/22 2259)   • fentanyl citrate (PF)  50 mcg Intravenous Q2H PRN LURDES Kelsey     • hydrALAZINE  5 mg Intravenous Q6H PRN LURDES Kelsey     • insulin glargine  25 Units Subcutaneous HS LURDES Kelsey     • insulin lispro  2-12 Units Subcutaneous J7U [address] [place], [place]     • ipratropium-albuterol  3 mL Nebulization Q6H LURDES Ozuna     • methylPREDNISolone sodium succinate  40 mg Intravenous Daily LURDES Cruz     • omeprazole (PRILOSEC) suspension 2 mg/mL  20 mg Oral Daily LURDES Peacock     • polyethylene glycol  17 g Oral Daily Dana Moncada PA-C     • propofol  5-50 mcg/kg/min Intravenous Titrated LURDES Kelsey 30 mcg/kg/min (10/26/22 0506)   • sodium chloride  4 mL Nebulization Q6H LURDES Peacock       Continuous Infusions:  cisatracurium (NIMBEX) in 0 9% sodium chloride infusion, 0 1-5 mcg/kg/min, Last Rate: 0 8 mcg/kg/min (10/26/22 0228)  fentaNYL, 50 mcg/hr, Last Rate: 50 mcg/hr (10/25/22 2259)  propofol, 5-50 mcg/kg/min, Last Rate: 30 mcg/kg/min (10/26/22 0506)      PRN Meds:   fentanyl citrate (PF), 50 mcg, Q2H PRN  hydrALAZINE, 5 mg, Q6H PRN        Invasive Devices Review  Invasive Devices  Report    Peripheral Intravenous Line  Duration           Long-Dwell Peripheral IV (Midline) 86/75/94 Left Basilic 4 days    Peripheral IV 10/25/22 Right Antecubital <1 day          Arterial Line  Duration           Arterial Line 10/25/22 <1 day          Drain  Duration           Urethral Catheter Temperature probe 16 Fr  8 days    NG/OG/Enteral Tube Orogastric 16 Fr Center mouth <1 day Airway  Duration           ETT  7 mm <1 day                Rationale for remaining devices: Critical illness  ---------------------------------------------------------------------------------------  Advance Directive and Living Will:      Power of :    POLST:    ---------------------------------------------------------------------------------------  Care Time Delivered:   Upon my evaluation, this patient had a high probability of imminent or life-threatening deterioration due to respiratory failure, cardiac arrest, encephalopathy, which required my direct attention, intervention, and personal management  I have personally provided an aggregate of 45 minutes (0005 to 030 05 25 13) of critical care time, exclusive of procedures, teaching, family meetings, and any prior time recorded by providers other than myself  LURDES Sewell      Portions of the record may have been created with voice recognition software  Occasional wrong word or "sound a like" substitutions may have occurred due to the inherent limitations of voice recognition software    Read the chart carefully and recognize, using context, where substitutions have occurred

## 2022-10-26 NOTE — RESPIRATORY THERAPY NOTE
RT Ventilator Management Note  Desiree Olszewski 57 y o  female MRN: [id number]  Unit/Bed#: ICU 05 Encounter: [id number]      Daily Screen       10/25/2022  0737 10/25/2022  1046          Patient safety screen outcome[de-identified] Passed Failed      Not Ready for Weaning due to[de-identified] -- Underline problem not resolved              Physical Exam:   Assessment Type: Pre-treatment  General Appearance: Sedated  Respiratory Pattern: Assisted  Chest Assessment: Chest expansion symmetrical  Bilateral Breath Sounds: Diminished  Location Specific: No  Cough: None  Suction: Oral, ET Tube  O2 Device: ventilator  Subjective Data: tolerating settings      Resp Comments: pt on ventilator

## 2022-10-27 NOTE — ASSESSMENT & PLAN NOTE
· 10/25 PEA arrest during reintubation   · TT initiated 10/25, 36 degrees achieved 10/25 at 1430  · Complete rewarming complete 10/26 at 1830  · Water temps still dropping, concerning for underlying fever   · Previous shivering noted 10/25 while cooled -- this seems to have since dissipated   · Cont scheduled apap, may consider katie hugger to avoid shivering   · Goal to maintain normothermia   · EEG 10/25 -- no epileptiform changes, concerns for anoxic brain injury with burst suppression background and myoclonus   · Echo 10/25 ROSC: EF 67%  · Would obtain CTH today   · Scheduled neuro exams

## 2022-10-27 NOTE — ASSESSMENT & PLAN NOTE
· 10/17 presented with progressively worsening shortness of breath over the past several weeks  · Recently had COVID, tested positive 8/19 and was hospitalized for 5 days requiring nasal cannula  · 10/16 seen OP by Pulm for continued SOB with concern for superimposed pna, rx'ed OP abx  · Likely 2/2 pneumonia +/- exacerbation of COPD  · 10/17 ETT in ER for hypoxia, work of breathing   · 10/25 extubated to bipap, though significant hypoxia with rolling   · 10/25 re-intubated with PEA arrest  · AC/VC 24/400/16/40  · Goal to wean FiO2 and PEEP as tolerated for O2 Sat 92% or above   · VAP ppx; chlorhexidine, PPI, HOB greater than 30 degrees  · Solumedrol 40mg QD -- wean as able   · Scheduled duonebs, aggressive pulmonary toileting   · S/p bronch 10/22 for minimal thick secretions  · Poor SBT candidate 2/2 continued encephalopathy   · Sedation: proporol for goal RASS 0 to -1, fentanyl @50, prn fentanyl for agitation, breakthrough pain  · 10/26 initiate TFs

## 2022-10-27 NOTE — ASSESSMENT & PLAN NOTE
· POA AEB tachycardia, tachypnea, fever 103   Multilobar pneumonia present on CT scan   · Completed day 7/7 cefepime/vanco  On 10/23  · Concern for aspiration event 10/25 with reintubation  -- zosyn started 10/26 as above   · Goal MAP >65  · Trend fever curve, WBC and procal

## 2022-10-27 NOTE — PROGRESS NOTES
Charlie 44  Progress Note - Lul Ornelas 1960, 57 y o  female MRN: [id number]  Unit/Bed#: ICU 05 Encounter: [id number]  Primary Care Provider: Arnav Cazares MD   Date and time admitted to hospital: 10/17/2022 10:24 AM    * Acute respiratory failure with hypoxia and hypercapnia [facility])  Assessment & Plan  · 10/17 presented with progressively worsening shortness of breath over the past several weeks  · Recently had COVID, tested positive 8/19 and was hospitalized for 5 days requiring nasal cannula  · 10/16 seen OP by Pulm for continued SOB with concern for superimposed pna, rx'ed OP abx  · Likely 2/2 pneumonia +/- exacerbation of COPD  · 10/17 ETT in ER for hypoxia, work of breathing   · 10/25 extubated to bipap, though significant hypoxia with rolling   · 10/25 re-intubated with PEA arrest  · AC/VC 24/400/16/40  · Goal to wean FiO2 and PEEP as tolerated for O2 Sat 92% or above   · VAP ppx; chlorhexidine, PPI, HOB greater than 30 degrees  · Solumedrol 40mg QD -- wean as able   · Scheduled duonebs, aggressive pulmonary toileting   · S/p bronch 10/22 for minimal thick secretions  · Poor SBT candidate 2/2 continued encephalopathy   · Sedation: proporol for goal RASS 0 to -1, fentanyl @50, prn fentanyl for agitation, breakthrough pain  · 10/26 initiate TFs    Cardiac arrest [facility])  Assessment & Plan  · 10/25 PEA arrest during reintubation   · TT initiated 10/25, 36 degrees achieved 10/25 at 1430  · Complete rewarming complete 10/26 at 1830  · Water temps still dropping, concerning for underlying fever   · Previous shivering noted 10/25 while cooled -- this seems to have since dissipated   · Cont scheduled apap, may consider katie hugger to avoid shivering   · Goal to maintain normothermia   · EEG 10/25 -- no epileptiform changes, concerns for anoxic brain injury with burst suppression background and myoclonus   · Echo 10/25 ROSC: EF 67%  · Would obtain CTH today   · Scheduled neuro exams Pneumonia  Assessment & Plan  · 10/17 CTA chest -- dense consolidation in right lower lobe and left sided consolidation consistent with multilobar pneumonia  · Sputum cx resp tova, Strep pneumo and legionella negative  · Completed cefepime and vanc course 10/23  · 10/22 S/p bronchoscopy with repeat sputum culture showing no polys or bacteria  · 10/25 extubated to bipap, hypoxic, reintubated   · Chest PT TID, ETT suctioning PRN, 3% NSS nebs   · Trend fever curve, WBC and procal   · Abx reinitiated in the setting of likely aspiration during reintubation   · Abx D2: Zosyn D2  · 10/26 BC x2, sputum pending     Severe sepsis (HCC)  Assessment & Plan  · POA AEB tachycardia, tachypnea, fever 103  Multilobar pneumonia present on CT scan   · Completed day 7/7 cefepime/vanco  On 10/23  · Concern for aspiration event 10/25 with reintubation  -- zosyn started 10/26 as above   · Goal MAP >65  · Trend fever curve, WBC and procal     Toxic metabolic encephalopathy  Assessment & Plan  · Initially in the setting of hypercarbia/sepsis   Now s/p PEA arrest, concern for hypoxic injury  · Intubated and now sedated with propofol and fentanyl  · 10/25 EEG showing evidence of severe diffuse cerebral dysfunction   · [facility] ICU  · Sleep hygiene  · Scheduled neuro checks     COPD exacerbation (HCC)  Assessment & Plan  · COPD with AE and acute PNA POA   · Dolumedrol 40mg QD -- wean as tolerated   · Continue duonebs  · Encourage smoking cessation when appropriate    Obstructive sleep apnea  Assessment & Plan  · CPAP HS once extubated  · Likely component of OHS     Type 2 diabetes mellitus with hyperglycemia, with long-term current use of insulin [facility])  Assessment & Plan  Lab Results   Component Value Date    HGBA1C 9 4 (H) 10/20/2022       Recent Labs     10/26/22  0208 10/26/22  0526 10/26/22  1120 10/26/22  1711   POCGLU 116 167* 167* 144*       Blood Sugar Average: Last 72 hrs:  (P) 158 8125     · Hyperglycemia with BG 400s on presentation, no elevation in anion gap   · PTA meds include lantus, meal coverage insulin, victoza and metformin  · Lantus 25U HS, SSI q6h to maintain glucose 140-180    Chronic pain syndrome  Assessment & Plan  · OP: tramadol -- hold in the setting of abobe   · fent gtt as above     Coronary artery disease involving native coronary artery of native heart with angina pectoris [facility])  Assessment & Plan  · Cardiac cath in 2019 demonstrated non-obstructive CAD with moderate stenosis of the RCA  · EKG on admission showing sinus tachycardia, no acute ST changes  · HS troponin minimally elevated  · Continue statin    Hypertension  Assessment & Plan  · Home meds: cardizem, hctz, lisinopril -- holding home meds for now   · PRN hydralazine (has required 0 doses)  · Goal SBP <160    Depression with anxiety  Assessment & Plan  · Home meds: wellbutrin, cymbalta, seroquel  · Hold 2/2 AMS      ----------------------------------------------------------------------------------------  HPI/24hr events: Her TTM rewarming was completed 10/26 at 1830  She remains on [place] to ensure normothermia, though it is noted that her water temps are dropping in order to achieve this  Continues on sedation for profound tachypnea  Attempted to wean propofol overnight, however tachypnea to the 50s noted       Patient appropriate for transfer out of the ICU today?: No  Disposition: Continue Critical Care   Code Status: Level 1 - Full Code  ---------------------------------------------------------------------------------------  SUBJECTIVE  NA -- LATOSHA    Review of Systems  Review of systems was unable to be performed secondary to ETT  ---------------------------------------------------------------------------------------  OBJECTIVE    Vitals   Vitals:    10/26/22 2300 10/26/22 2303 10/27/22 0000 10/27/22 0304   BP:       BP Location:       Pulse: 91 90 82 92   Resp: (!) 42 (!) 43 (!) 37 (!) 40   Temp:   (!) 97 3 °F (36 3 °C)    TempSrc:   Esophageal    SpO2: 97% 96% 93% 93%   Weight:       Height:         Temp (24hrs), Av 3 °F (36 3 °C), Min:95 72 °F (35 4 °C), Max:98 24 °F (36 8 °C)  Current: Temperature: (!) 97 3 °F (36 3 °C)  Arterial Line BP: 138/69  Arterial Line MAP (mmHg): 90 mmHg    Respiratory:  SpO2: SpO2: 93 %, SpO2 Activity: SpO2 Activity: At Rest, SpO2 Device: O2 Device: Ventilator       Invasive/non-invasive ventilation settings   Respiratory  Report   Lab Data (Last 4 hours)    None         O2/Vent Data (Last 4 hours)    None                Physical Exam  Vitals and nursing note reviewed  Constitutional:       General: She is not in acute distress  Appearance: She is morbidly obese  She is ill-appearing  She is not toxic-appearing or diaphoretic  Interventions: She is sedated, intubated and restrained  HENT:      Head: Normocephalic and atraumatic  Eyes:      General: Lids are normal       Comments: Pupils 4mm PERRLA BL   Cardiovascular:      Rate and Rhythm: Normal rate and regular rhythm  Pulmonary:      Effort: Tachypnea present  She is intubated  Breath sounds: Examination of the right-lower field reveals decreased breath sounds  Examination of the left-lower field reveals decreased breath sounds  Decreased breath sounds present  Comments: ACVC 24/400/60/14, RR 35-40, spo2 , -400  Abdominal:      General: Bowel sounds are normal       Palpations: Abdomen is soft  Tenderness: There is no abdominal tenderness  Genitourinary:     Comments: + purewick  Skin:     General: Skin is warm  Capillary Refill: Capillary refill takes less than 2 seconds  Coloration: Skin is not pale  Neurological:      Mental Status: She is lethargic  GCS: GCS eye subscore is 2  GCS verbal subscore is 1  GCS motor subscore is 2        Comments: Opens eyes to sternal rub   Fixed upward gaze   Pupils 4mm PERRLA BL  No corneal reflex present  No response of BL UE to painful stimulation   Slow, delayed, weak extension of BL feet to painful stimuli   +cough, gag on deep suctioning    Psychiatric:      Comments: Deferred              Laboratory and Diagnostics:  Results from last 7 days   Lab Units 10/26/22  0525 10/25/22  1038 10/25/22  0451 10/24/22  0552 10/23/22  0525 10/21/22  0525 10/20/22  0542   WBC Thousand/uL 18 95*  --  15 32* 16 89* 14 87* 14 14* 17 30*   HEMOGLOBIN g/dL 13 2  --  11 7 12 1 12 0 11 8 11 6   I STAT HEMOGLOBIN g/dl  --  17 0*  --   --   --   --   --    HEMATOCRIT % 41 4  --  37 5 40 0 39 4 38 4 36 7   HEMATOCRIT, ISTAT %  --  50*  --   --   --   --   --    PLATELETS Thousands/uL 344  --  286 289 308 325 324   NEUTROS PCT %  --   --   --   --   --   --  89*   BANDS PCT %  --   --  4  --   --   --   --    MONOS PCT %  --   --   --   --   --   --  4   MONO PCT %  --   --  5  --   --   --   --      Results from last 7 days   Lab Units 10/26/22  1758 10/26/22  0525 10/25/22  2014 10/25/22  1232 10/25/22  0451 10/24/22  0552 10/23/22  0525   SODIUM mmol/L 141 141 139 138 139 139 140   POTASSIUM mmol/L 4 3 3 2* 3 9 5 0 4 2 4 0 3 9   CHLORIDE mmol/L 104 103 102 101 101 100 102   CO2 mmol/L 27 28 31 28 33* 34* 37*   ANION GAP mmol/L 10 10 6 9 5 5 1*   BUN mg/dL 33* 33* 39* 37* 32* 40* 41*   CREATININE mg/dL 1 01 0 83 1 11 1 37* 0 95 1 01 1 21   CALCIUM mg/dL 8 6 9 0 9 0 8 4 8 4 8 6 8 7   GLUCOSE RANDOM mg/dL 155* 173* 118 252* 178* 148* 113   ALT U/L  --   --   --  50  --   --   --    AST U/L  --   --   --  48*  --   --   --    ALK PHOS U/L  --   --   --  97  --   --   --    ALBUMIN g/dL  --   --   --  2 6*  --   --   --    TOTAL BILIRUBIN mg/dL  --   --   --  0 56  --   --   --      Results from last 7 days   Lab Units 10/26/22  0525 10/25/22  0451 10/23/22  0525 10/20/22  0542   MAGNESIUM mg/dL 1 9 2 1 2 5 2 5   PHOSPHORUS mg/dL 3 4 3 5 5 0*  --       Results from last 7 days   Lab Units 10/26/22  0525   INR  1 04   PTT seconds 28          Results from last 7 days   Lab Units 10/26/22  0525   LACTIC ACID mmol/L 0 7 ABG:  Results from last 7 days   Lab Units 10/26/22  0525   PH ART  7 394   PCO2 ART mm Hg 43 8   PO2 ART mm Hg 109 1   HCO3 ART mmol/L 26 2   BASE EXC ART mmol/L 1 0   ABG SOURCE  Line, Arterial     VBG:  Results from last 7 days   Lab Units 10/26/22  0525   ABG SOURCE  Line, Arterial     Results from last 7 days   Lab Units 10/26/22  0525 10/25/22  0451   PROCALCITONIN ng/ml 12 09* 0 28*       Micro  Results from last 7 days   Lab Units 10/26/22  1244 10/26/22  1243 10/22/22  1554   BLOOD CULTURE  Received in Microbiology Lab  Culture in Progress  Received in Microbiology Lab  Culture in Progress  --    SPUTUM CULTURE   --   --  No growth   GRAM STAIN RESULT   --   --  Rare Epithelial cells per low power field  No Polys or Bacteria seen       EKG: NSR on tele   Imaging: I have personally reviewed pertinent reports  Intake and Output  I/O       10/25 0701  10/26 0700 10/26 0701  10/27 0700    I V  (mL/kg) 436 4 (4)     NG/ 60    Feedings 320     Total Intake(mL/kg) 996 4 (9 1) 60 (0 5)    Urine (mL/kg/hr) 1275 (0 5) 575 (0 4)    Emesis/NG output 400     Stool 0     Total Output 1675 575    Net -678 6 -515          Unmeasured Stool Occurrence 3 x           Height and Weights   Height: 5' 3" (160 cm)     Body mass index is 42 8 kg/m²  Weight (last 2 days)     Date/Time Weight    10/26/22 0535 110 (241 62)    10/25/22 1130 109 (240 3)    10/25/22 0542 109 (239 64)            Nutrition       Diet Orders   (From admission, onward)             Start     Ordered    10/26/22 1730  Diet Enteral/Parenteral; Tube Feeding No Oral Diet; Glucerna 1 2; Continuous; 40; 120;  Water; Every 4 hours  Diet effective now        Comments: Start @ 20 cc/hr and increase by 10 cc/hr per protocol until reach goal   References:    Nutrtion Support Algorithm Enteral vs  Parenteral   Question Answer Comment   Diet Type Enteral/Parenteral    Enteral/Parenteral Tube Feeding No Oral Diet    Tube Feeding Formula: Glucerna 1 2 Bolus/Cyclic/Continuous Continuous    Tube Feeding Goal Rate (mL/hr): 40    Tube Feeding flush (mL): 120    Water Flush type: Water    Water flush frequency: Every 4 hours    RD to adjust diet per protocol?  Yes        10/26/22 1729    10/18/22 0844  Room Service  Once        Question:  Type of Service  Answer:  Room Service- Not Appropriate    10/18/22 0843                  Active Medications  Scheduled Meds:  Current Facility-Administered Medications   Medication Dose Route Frequency Provider Last Rate   • acetaminophen  650 mg Oral Q6H Debbi [address] Eugenie Pimentel     • atorvastatin  80 mg Oral Daily With [place], [place]     • chlorhexidine  15 mL Mouth/Throat Q12H [address] [place] [address] Dr Pimentel [place]     • enoxaparin  40 mg Subcutaneous Q24H [address]     • fentaNYL  50 mcg/hr Intravenous Continuous Alfonza Laminet, CRNP 50 mcg/hr (10/26/22 1614)   • fentanyl citrate (PF)  50 mcg Intravenous Q2H PRN Alfonza SHERLYN ShirleyNP     • insulin glargine  25 Units Subcutaneous HS Michaelonza SHERLYN ShirleyNP     • insulin lispro  2-12 Units Subcutaneous M1F [facility] Kallie Hernandez PA-C     • ipratropium-albuterol  3 mL Nebulization Q6H LURDES Ward     • methylPREDNISolone sodium succinate  40 mg Intravenous Daily LURDES Quinones     • omeprazole (PRILOSEC) suspension 2 mg/mL  20 mg Oral Daily LURDES Chavez     • piperacillin-tazobactam  4 5 g Intravenous B3F Jethro Navarro PA-C 4 5 g (10/26/22 2322)   • polyethylene glycol  17 g Oral Daily Maximino Moncada PA-C     • propofol  5-50 mcg/kg/min Intravenous Titrated Alfonza Nett, CRNP 20 mcg/kg/min (10/27/22 0152)   • sodium chloride  4 mL Nebulization Q6H RheLURDES Redd       Continuous Infusions:  fentaNYL, 50 mcg/hr, Last Rate: 50 mcg/hr (10/26/22 1614)  propofol, 5-50 mcg/kg/min, Last Rate: 20 mcg/kg/min (10/27/22 0152)      PRN Meds:   fentanyl citrate (PF), 50 mcg, Q2H PRN        Invasive Devices Review  Invasive Devices  Report    Peripheral Intravenous Line  Duration           Long-Dwell Peripheral IV (Midline) 55/98/95 Left Basilic 5 days    Peripheral IV 10/25/22 Right Antecubital 1 day          Arterial Line  Duration           Arterial Line 10/25/22 1 day          Drain  Duration           NG/OG/Enteral Tube Orogastric 16 Fr Center mouth 1 day    External Urinary Catheter <1 day          Airway  Duration           ETT  7 mm 1 day                ---------------------------------------------------------------------------------------  Advance Directive and Living Will:      Power of :    POLST:    ---------------------------------------------------------------------------------------  Care Time Delivered:   No Critical Care time spent       LURDES Flores      Portions of the record may have been created with voice recognition software  Occasional wrong word or "sound a like" substitutions may have occurred due to the inherent limitations of voice recognition software    Read the chart carefully and recognize, using context, where substitutions have occurred

## 2022-10-27 NOTE — UTILIZATION REVIEW
Continued Stay Review    Date: 10/27/22                      Current Patient Class: Inpatient   Current Level of Care: Med Surg    HPI:61 y o  female initially admitted on 10/17/22      10/27 Critical Care: TTM rewarming was completed 10/26 at 1830  She remains on [place] to ensure normothermia, though it is noted that her water temps are dropping in order to achieve this  Continues on sedation for profound tachypnea  Attempted to wean propofol overnight, however tachypnea to the 50s noted  No obvious improvement in mental status  Will give Lasix given persistently positive fluid balance  Continue Zosyn pending 48h cultures  Tolerating tube feeds  PE: Intubated, sedated  ACVC 24/400/60/14, -400  Opens eyes to sternal rub  Fixed upward gaze, pupils PERRLA B/L, no corneal reflex, no response of B/L UE to painful stimulation  Slow, delayed, weak extension of B/L feet to painful stimuli  + cough, gag on deep suctioning       Vital Signs:         Date/Time Temp Pulse Resp BP MAP (mmHg) Arterial Line BP MAP SpO2 FiO2 (%) O2 Flow Rate (L/min) O2 Device   10/27/22 0737 -- -- -- -- -- -- -- 95 % -- -- --   10/27/22 0736 -- -- -- -- -- -- -- 95 % -- -- --   10/27/22 0600 -- 97 36 Abnormal  -- -- 115/49 68 mmHg 93 % -- -- --   10/27/22 0500 98 2 °F (36 8 °C) 97 42 Abnormal  -- -- 129/59 81 mmHg 96 % -- -- --   10/27/22 0400 96 8 °F (36 °C) Abnormal  92 39 Abnormal  -- -- 131/63 84 mmHg 95 % -- -- --   10/27/22 0304 -- 92 40 Abnormal  -- -- -- -- 93 % 55 -- Ventilator   10/27/22 0300 -- 93 45 Abnormal  -- -- 143/72 97 mmHg 92 % -- -- --   10/27/22 0200 -- 87 40 Abnormal  -- -- 130/68 88 mmHg 93 % -- -- --   10/27/22 0100 -- 86 41 Abnormal  -- -- 140/72 94 mmHg 93 % -- -- --   10/27/22 0000 97 3 °F (36 3 °C) Abnormal  82 37 Abnormal  -- -- 138/69 90 mmHg 93 % -- -- --   10/26/22 2303 -- 90 43 Abnormal  -- -- -- -- 96 % 55 -- Ventilator   10/26/22 2300 -- 91 42 Abnormal  -- -- 151/77 104 mmHg 97 % -- -- -- 10/26/22 2200 -- 91 46 Abnormal  -- -- 138/67 93 mmHg 92 % -- -- --   10/26/22 2100 -- 84 40 Abnormal  141/65 94 125/61 82 mmHg 93 % -- -- --   10/26/22 2000 97 2 °F (36 2 °C) Abnormal  86 44 Abnormal  164/88 113 126/59 81 mmHg 100 % -- -- --   10/26/22 1940 -- 80 36 Abnormal  -- -- -- -- 100 % 55 -- Ventilator   10/26/22 1900 -- 75 33 Abnormal  119/58 83 121/57 77 mmHg 100 % -- -- --   10/26/22 1859 96 7 °F (35 9 °C) Abnormal  -- -- -- -- -- -- -- -- -- --   10/26/22 1830 97 2 °F (36 2 °C) Abnormal  -- -- -- -- -- -- -- -- -- --   10/26/22 1800 97 1 °F (36 2 °C) Abnormal  83 38 Abnormal  117/79 93 138/58 80 mmHg 100 % -- -- --   10/26/22 1700 97 °F (36 1 °C) Abnormal  80 37 Abnormal  121/88 101 124/59 80 mmHg 100 % -- -- --   10/26/22 1600 97 4 °F (36 3 °C) Abnormal  90 47 Abnormal  -- -- 136/65 89 mmHg 99 % -- -- --   10/26/22 1500 97 5 °F (36 4 °C) 90 45 Abnormal  150/86 110 149/65 90 mmHg 94 % -- -- --   10/26/22 1430 97 88 °F (36 6 °C) -- -- -- -- -- -- -- -- -- --   10/26/22 1400 98 06 °F (36 7 °C) 91 42 Abnormal  144/79 103 153/72 97 mmHg 98 % -- -- --   10/26/22 1329 -- -- -- -- -- -- -- 98 % -- -- --   10/26/22 1300 98 24 °F (36 8 °C) 85 33 Abnormal  139/77 101 147/68 93 mmHg 98 % -- -- --   10/26/22 1200 98 06 °F (36 7 °C) 101 39 Abnormal  164/115 Abnormal  132 169/78 108 mmHg 99 % -- -- --   10/26/22 1100 98 24 °F (36 8 °C) 80 24 Abnormal  167/67 96 155/77 108 mmHg 99 % -- -- --   10/26/22 1000 97 7 °F (36 5 °C) 88 24 Abnormal  146/95 111 97/81 91 mmHg 99 % -- -- --   10/26/22 0900 97 7 °F (36 5 °C) 87 24 Abnormal  145/74 103 88/74 82 mmHg 99 % -- -- --   10/26/22 0800 97 52 °F (36 4 °C) 90 24 Abnormal  137/68 97 115/65 84 mmHg 99 % 60 -- Ventilator       Date and Time Eye Opening Best Verbal Response Best Motor Response Annie Coma Scale Score   10/27/22 0800 4 1 3 8   10/26/22 1830 4 1 3 8   10/26/22 1800 4 1 3 8   10/26/22 1700 4 1 3 8   10/26/22 1600 4 1 3 8   10/26/22 1500 4 1 3 8   10/26/22 1400 4 1 3 8   10/26/22 1300 4 1 3 8   10/26/22 1200 4 1 3 8   10/26/22 1100 1 1 1 3   10/26/22 1000 1 1 1 3   10/26/22 0900 1 1 1 3   10/26/22 0800 1 1 1 3         Pertinent Labs/Diagnostic Results:          Results from last 7 days   Lab Units 10/27/22  0536 10/26/22  0525 10/25/22  1038 10/25/22  0451 10/24/22  0552   WBC Thousand/uL 25 01* 18 95*  --  15 32* 16 89*   HEMOGLOBIN g/dL 13 5 13 2  --  11 7 12 1   I STAT HEMOGLOBIN g/dl  --   --  17 0*  --   --    HEMATOCRIT % 42 6 41 4  --  37 5 40 0   HEMATOCRIT, ISTAT %  --   --  50*  --   --    PLATELETS Thousands/uL 357 344  --  286 289   NEUTROS ABS Thousands/µL 21 94*  --   --   --   --    BANDS PCT %  --   --   --  4  --          Results from last 7 days   Lab Units 10/27/22  0536 10/26/22  1758 10/26/22  0525 10/25/22  2014 10/25/22  1232 10/25/22  1038 10/25/22  0451 10/24/22  0552 10/23/22  0525   SODIUM mmol/L 138 141 141 139 138  --  139   < > 140   POTASSIUM mmol/L 4 6 4 3 3 2* 3 9 5 0  --  4 2   < > 3 9   CHLORIDE mmol/L 103 104 103 102 101  --  101   < > 102   CO2 mmol/L 27 27 28 31 28  --  33*   < > 37*   ANION GAP mmol/L 8 10 10 6 9  --  5   < > 1*   BUN mg/dL 33* 33* 33* 39* 37*  --  32*   < > 41*   CREATININE mg/dL 1 02 1 01 0 83 1 11 1 37*  --  0 95   < > 1 21   EGFR ml/min/1 73sq m 59 59 75 53 41  --  64   < > 48   CALCIUM mg/dL 9 1 8 6 9 0 9 0 8 4  --  8 4   < > 8 7   CALCIUM, IONIZED mmol/L  --   --  1 17  --   --   --   --   --   --    CALCIUM, IONIZED, ISTAT mmol/L  --   --   --   --   --  1 24  --   --   --    MAGNESIUM mg/dL 2 1  --  1 9  --   --   --  2 1  --  2 5   PHOSPHORUS mg/dL 4 0  --  3 4  --   --   --  3 5  --  5 0*    < > = values in this interval not displayed       Results from last 7 days   Lab Units 10/25/22  1232   AST U/L 48*   ALT U/L 50   ALK PHOS U/L 97   TOTAL PROTEIN g/dL 6 5   ALBUMIN g/dL 2 6*   TOTAL BILIRUBIN mg/dL 0 56     Results from last 7 days   Lab Units 10/27/22  1138 10/27/22  0536 10/26/22  2827 10/26/22  9803 10/26/22  1120 10/26/22  0526 10/26/22  0208 10/26/22  0005 10/25/22  1732 10/25/22  1138 10/25/22  0449 10/24/22  2341   POC GLUCOSE mg/dl 107 125 101 144* 167* 167* 116 101 188* 220* 177* 134     Results from last 7 days   Lab Units 10/27/22  0536 10/26/22  1758 10/26/22  0525 10/25/22  2014 10/25/22  1232 10/25/22  0451 10/24/22  0552 10/23/22  0525 10/22/22  0554 10/21/22  0525   GLUCOSE RANDOM mg/dL 140 155* 173* 118 252* 178* 148* 113 275* 180*             Results from last 7 days   Lab Units 10/27/22  0812 10/26/22  0525 10/26/22  0039   PH ART  7 419 7 394 7 390   PCO2 ART mm Hg 44 1* 43 8 49 7*   PO2 ART mm Hg 66 1* 109 1 75 3   HCO3 ART mmol/L 27 9 26 2 29 4*   BASE EXC ART mmol/L 2 9 1 0 3 3   O2 CONTENT ART mL/dL 18 2 17 6 21 0   O2 HGB, ARTERIAL % 92 5* 97 7* 94 0   ABG SOURCE  Line, Arterial Line, Arterial Line, Arterial         Results from last 7 days   Lab Units 10/25/22  1038   PH, LANCE I-STAT  7 053*   PCO2, LANCE ISTAT mm HG >103 0*   PO2, LANCE ISTAT mm HG <13 0*         Results from last 7 days   Lab Units 10/26/22  0525   PROTIME seconds 13 7   INR  1 04   PTT seconds 28         Results from last 7 days   Lab Units 10/27/22  0536 10/26/22  0525 10/25/22  0451   PROCALCITONIN ng/ml 10 49* 12 09* 0 28*     Results from last 7 days   Lab Units 10/26/22  0525   LACTIC ACID mmol/L 0 7             Results from last 7 days   Lab Units 10/26/22  1244 10/26/22  1243 10/26/22  1240 10/22/22  1554   BLOOD CULTURE  Received in Microbiology Lab  Culture in Progress  Received in Microbiology Lab  Culture in Progress    --   --    SPUTUM CULTURE   --   --  Culture too young- will reincubate No growth   GRAM STAIN RESULT   --   --  Rare Polys  No organisms seen Rare Epithelial cells per low power field  No Polys or Bacteria seen     Results from last 7 days   Lab Units 10/22/22  1548   TOTAL COUNTED  100             Medications:   Scheduled Medications:    acetaminophen, 650 mg, Oral, Q6H  atorvastatin, 80 mg, Oral, Daily With Dinner  chlorhexidine, 15 mL, Mouth/Throat, Q12H MATTHEW  enoxaparin, 40 mg, Subcutaneous, Q24H MATTHEW  insulin glargine, 25 Units, Subcutaneous, HS  insulin lispro, 2-12 Units, Subcutaneous, Q6H [facility]  ipratropium-albuterol, 3 mL, Nebulization, Q6H  omeprazole (PRILOSEC) suspension 2 mg/mL, 20 mg, Oral, Daily  piperacillin-tazobactam, 4 5 g, Intravenous, Q6H  polyethylene glycol, 17 g, Oral, Daily  sodium chloride, 4 mL, Nebulization, Q6H      furosemide (LASIX) injection 40 mg  Dose: 40 mg  Freq: Once Route: IV  Start: 10/27/22 1030 End: 10/27/22 1055      fentanyl citrate (PF) 100 MCG/2ML 100 mcg  Dose: 100 mcg  Freq: Once Route: IV  Start: 10/27/22 0900 End: 10/27/22 0856    Continuous IV Infusions:    fentaNYL, 75 mcg/hr, Intravenous, Continuous  propofol, 5-50 mcg/kg/min, Intravenous, Titrated      PRN Meds:  fentanyl citrate (PF), 50 mcg, Intravenous, Q2H PRN x 1 dose 10/26, x 3 doses 10/27 thus far        Discharge Plan:  D        Network Utilization Review Department  ATTENTION: Please call with any questions or concerns to [phone] and carefully listen to the prompts so that you are directed to the right person  All voicemails are confidential   Carissa Baker all requests for admission clinical reviews, approved or denied determinations and any other requests to dedicated fax number below belonging to the campus where the patient is receiving treatment   List of dedicated fax numbers for the Facilities:  [address] [place] [address] DENIALS (Administrative/Medical Necessity) [phone]   [address] [place] [address] (Maternity/NICU/Pediatrics) [phone]   [address]  [phone]   [address] [address] [address] [phone]

## 2022-10-27 NOTE — ASSESSMENT & PLAN NOTE
Lab Results   Component Value Date    HGBA1C 9 4 (H) 10/20/2022       Recent Labs     10/26/22  0208 10/26/22  0526 10/26/22  1120 10/26/22  1711   POCGLU 116 167* 167* 144*       Blood Sugar Average: Last 72 hrs:  (P) 158 8125     · Hyperglycemia with BG 400s on presentation, no elevation in anion gap   · PTA meds include lantus, meal coverage insulin, victoza and metformin  · Lantus 25U HS, SSI q6h to maintain glucose 140-180

## 2022-10-27 NOTE — ASSESSMENT & PLAN NOTE
· Home meds: cardizem, hctz, lisinopril -- holding home meds for now   · PRN hydralazine (has required 0 doses)  · Goal SBP <160

## 2022-10-27 NOTE — RESPIRATORY THERAPY NOTE
RT Ventilator Management Note  Cj Ambriz 57 y o  female MRN: [id number]  Unit/Bed#: ICU 05 Encounter: [id number]      Daily Screen       10/27/2022  0737 10/27/2022  1127          Patient safety screen outcome[de-identified] Failed Failed              Physical Exam:   Assessment Type: Pre-treatment  General Appearance: Sedated  Respiratory Pattern: Assisted  Chest Assessment: Chest expansion symmetrical  Bilateral Breath Sounds: Diminished  Cough: None  Suction: ET Tube  O2 Device: ventilator  Subjective Data: `      Resp Comments: pt on ventilator

## 2022-10-27 NOTE — RESPIRATORY THERAPY NOTE
RT Ventilator Management Note  Lul Ornelas 57 y o  female MRN: [id number]  Unit/Bed#: ICU 05 Encounter: [id number]      Daily Screen       10/25/2022  0737 10/25/2022  1046          Patient safety screen outcome[de-identified] Passed Failed      Not Ready for Weaning due to[de-identified] -- Underline problem not resolved              Physical Exam:   Assessment Type: Pre-treatment  General Appearance: Sedated  Respiratory Pattern: Assisted  Chest Assessment: Chest expansion symmetrical  Bilateral Breath Sounds: Diminished  Location Specific: No  Cough: None  Suction: Oral, ET Tube  O2 Device: ventilator  Subjective Data: tolerating setttings      Resp Comments: pt on ventilator

## 2022-10-27 NOTE — ASSESSMENT & PLAN NOTE
· Initially in the setting of hypercarbia/sepsis   Now s/p PEA arrest, concern for hypoxic injury  · Intubated and now sedated with propofol and fentanyl  · 10/25 EEG showing evidence of severe diffuse cerebral dysfunction   · CAM ICU  · Sleep hygiene  · Scheduled neuro checks

## 2022-10-27 NOTE — ASSESSMENT & PLAN NOTE
· COPD with AE and acute PNA POA   · Dolumedrol 40mg QD -- wean as tolerated   · Continue duonebs  · Encourage smoking cessation when appropriate

## 2022-10-27 NOTE — ASSESSMENT & PLAN NOTE
· 10/17 CTA chest -- dense consolidation in right lower lobe and left sided consolidation consistent with multilobar pneumonia  · Sputum cx resp tova, Strep pneumo and legionella negative  · Completed cefepime and vanc course 10/23  · 10/22 S/p bronchoscopy with repeat sputum culture showing no polys or bacteria  · 10/25 extubated to bipap, hypoxic, reintubated   · Chest PT TID, ETT suctioning PRN, 3% NSS nebs   · Trend fever curve, WBC and procal   · Abx reinitiated in the setting of likely aspiration during reintubation   · Abx D2: Zosyn D2  · 10/26 BC x2, sputum pending

## 2022-10-28 NOTE — ASSESSMENT & PLAN NOTE
· 10/25 PEA arrest during reintubation   · TT initiated 10/25, 36 degrees achieved 10/25 at 1430  · Rewarming complete 10/26 at 1830  · Water temps still dropping, concerning for underlying fever   · Previous shivering noted 10/25 while cooled -- this seems to have since dissipated   · Cont scheduled apap, may consider katie hugger to avoid shivering   · Goal to maintain normothermia   · EEG 10/25 -- no epileptiform changes, concerns for anoxic brain injury with burst suppression background and myoclonus   · Echo 10/25 ROSC: EF 67%  · Consider CTH 48-72h post rewarming  · Scheduled neuro exams

## 2022-10-28 NOTE — ASSESSMENT & PLAN NOTE
· 10/17 CTA chest -- dense consolidation in right lower lobe and left sided consolidation consistent with multilobar pneumonia  · Sputum cx resp tova, Strep pneumo and legionella negative  · Completed cefepime and vanc course 10/23  · 10/22 S/p bronchoscopy with repeat sputum culture showing no polys or bacteria  · 10/25 extubated to bipap, hypoxic, reintubated   · Chest PT TID, ETT suctioning PRN, 3% NSS nebs   · Trend fever curve, WBC and procal   · Abx reinitiated in the setting of likely aspiration during reintubation   · Zosyn D3  · 10/26: BC x2 neg @24h, sputum rare poly's, no organisms (reincubate)

## 2022-10-28 NOTE — PROGRESS NOTES
Cat U  65   Progress Note - Keewatintracey Hopewell 1960, 57 y o  female MRN: [id number]  Unit/Bed#: ICU 05 Encounter: [id number]  Primary Care Provider: Renuka Obando MD   Date and time admitted to hospital: 10/17/2022 10:24 AM    * Acute respiratory failure with hypoxia and hypercapnia [facility])  Assessment & Plan  · 10/17 presented with progressively worsening shortness of breath over the past several weeks  · Recently had COVID, tested positive 8/19 and was hospitalized for 5 days requiring nasal cannula  · 10/16 seen OP by Pulm for continued SOB with concern for superimposed pna, rx'ed OP abx  · Likely 2/2 pneumonia +/- exacerbation of COPD  · 10/17 ETT in ER for hypoxia, work of breathing   Completed Cefepime/Vanco course 10/23  · 10/25 extubated to bipap, though significant hypoxia with rolling   · 10/25 re-intubated with PEA arrest  · AC/VC 24/400/12/50  · Continue Pip/tazo D3  · Goal to wean FiO2 and PEEP as tolerated for O2 Sat 92% or above   · VAP ppx; chlorhexidine, PPI, HOB greater than 30 degrees  · Solumedrol course completed 10/27  · Scheduled duonebs, aggressive pulmonary toileting   · S/p bronch 10/22 for minimal thick secretions  · Poor SBT candidate 2/2 continued encephalopathy   · Sedation: proporol for goal RASS 0 to -1, fentanyl @75, prn fentanyl for agitation, breakthrough pain      Cardiac arrest [facility])  Assessment & Plan  · 10/25 PEA arrest during reintubation   · TT initiated 10/25, 36 degrees achieved 10/25 at 1430  · Rewarming complete 10/26 at 1830  · Water temps still dropping, concerning for underlying fever   · Previous shivering noted 10/25 while cooled -- this seems to have since dissipated   · Cont scheduled apap, may consider katie hugger to avoid shivering   · Goal to maintain normothermia   · EEG 10/25 -- no epileptiform changes, concerns for anoxic brain injury with burst suppression background and myoclonus   · Echo 10/25 ROSC: EF 67%  · Consider CTH 48-72h post rewarming  · Scheduled neuro exams     Pneumonia  Assessment & Plan  · 10/17 CTA chest -- dense consolidation in right lower lobe and left sided consolidation consistent with multilobar pneumonia  · Sputum cx resp tova, Strep pneumo and legionella negative  · Completed cefepime and vanc course 10/23  · 10/22 S/p bronchoscopy with repeat sputum culture showing no polys or bacteria  · 10/25 extubated to bipap, hypoxic, reintubated   · Chest PT TID, ETT suctioning PRN, 3% NSS nebs   · Trend fever curve, WBC and procal   · Abx reinitiated in the setting of likely aspiration during reintubation   · Zosyn D3  · 10/26: BC x2 neg @24h, sputum rare poly's, no organisms (reincubate)    Severe sepsis (HCC)  Assessment & Plan  · POA AEB tachycardia, tachypnea, fever 103  Multilobar pneumonia present on CT scan   · Completed day 7/7 cefepime/vanco on 10/23  · Concern for aspiration event 10/25 with reintubation  -- Pip/tazo as above, D3  · Goal MAP >65  · Trend fever curve, WBC and procal     Toxic metabolic encephalopathy  Assessment & Plan  · Initially in the setting of hypercarbia/sepsis   Now s/p PEA arrest, concern for hypoxic injury  · Intubated and now sedated with propofol and fentanyl  · 10/25 EEG showing evidence of severe diffuse cerebral dysfunction   · [facility] ICU  · Sleep hygiene  · Scheduled neuro checks     COPD exacerbation ([facility] [address] )  Assessment & Plan  · COPD with AE and acute PNA POA   · Dolumedrol 40mg QD -- stopped 10/27  · Continue duonebs  · Encourage smoking cessation when appropriate    Obstructive sleep apnea  Assessment & Plan  · CPAP HS once extubated  · Likely component of OHS     Type 2 diabetes mellitus with hyperglycemia, with long-term current use of insulin [facility])  Assessment & Plan  Lab Results   Component Value Date    HGBA1C 9 4 (H) 10/20/2022       Recent Labs     10/26/22  2327 10/27/22  0536 10/27/22  1138 10/27/22  1728   POCGLU 101 125 107 126       Blood Sugar Average: Last 72 hrs:  (P) 000 [id number]     · Hyperglycemia with BG 400s on presentation, no elevation in anion gap   · PTA meds include lantus, meal coverage insulin, victoza and metformin  · Lantus decreased to 15u from 25U HS in setting of steroid discontinuation and lower BG past 24h  Continue q6h accuchecks and SSI for goal glucose 140-180    Chronic pain syndrome  Assessment & Plan  · OP: tramadol -- hold in the setting of above   · fent gtt as above     Coronary artery disease involving native coronary artery of native heart with angina pectoris [facility])  Assessment & Plan  · Cardiac cath in 2019 demonstrated non-obstructive CAD with moderate stenosis of the RCA  · EKG on admission showing sinus tachycardia, no acute ST changes  · HS troponin minimally elevated  · Continue statin    Hypertension  Assessment & Plan  · Home meds: cardizem, hctz, lisinopril -- holding home meds for now   · PRN hydralazine   · Goal SBP <160    Depression with anxiety  Assessment & Plan  · Home meds: wellbutrin, cymbalta, seroquel  · Hold 2/2 AMS    ----------------------------------------------------------------------------------------  HPI/24hr events: No change in neuro status  Required 1 dose hydralazine 5mg IVP overnight for SBP sustained >180  No other acute changes       Patient appropriate for transfer out of the ICU today?: No  Disposition: Continue Critical Care   Code Status: Level 1 - Full Code  ---------------------------------------------------------------------------------------  SUBJECTIVE  ALEX    Review of Systems   Unable to perform ROS: Intubated     Review of systems was unable to be performed secondary to AMS/ETT  ---------------------------------------------------------------------------------------  OBJECTIVE    Vitals   Vitals:    10/28/22 0400 10/28/22 0500 10/28/22 0544 10/28/22 0600   BP:       BP Location:       Pulse: 102 104  105   Resp: (!) 41 (!) 44  (!) 40   Temp: 98 6 °F (37 °C)      TempSrc:       SpO2: 93% 95%  97% Weight:   110 kg (243 lb 9 7 oz)    Height:         Temp (24hrs), Av °F (37 2 °C), Min:98 2 °F (36 8 °C), Max:99 9 °F (37 7 °C)  Current: Temperature: 98 6 °F (37 °C)  Arterial Line BP: 170/72  Arterial Line MAP (mmHg): 103 mmHg    Respiratory:  SpO2: SpO2: 97 %, SpO2 Activity: SpO2 Activity: At Rest, SpO2 Device: O2 Device: Ventilator       Invasive/non-invasive ventilation settings   Respiratory  Report   Lab Data (Last 4 hours)      10/28 0548            pH, Arterial       7 454             pCO2, Arterial       37 9             pO2, Arterial       72 5             HCO3, Arterial       26 0             Base Excess, Arterial       2 2                  O2/Vent Data           Most Recent         Vent Mode   AC/VC      Resp Rate (BPM) (BPM)   24      Vt (mL) (mL)   400      FIO2 (%) (%)   50      PEEP (cmH2O) (cmH2O)   12      MV   16 7                  Physical Exam  Vitals and nursing note reviewed  Constitutional:       Appearance: She is obese  She is ill-appearing and toxic-appearing  Interventions: She is intubated  Comments: Propofol and fentanyl infusing   HENT:      Head: Normocephalic and atraumatic  Nose: Nose normal       Mouth/Throat:      Mouth: Mucous membranes are moist    Eyes:      General: Lids are normal  No scleral icterus  Pupils: Pupils are equal, round, and reactive to light  Cardiovascular:      Rate and Rhythm: Normal rate and regular rhythm  Pulses:           Radial pulses are 2+ on the right side and 2+ on the left side  Dorsalis pedis pulses are 1+ on the right side and 1+ on the left side  Heart sounds: Normal heart sounds  No murmur heard  Pulmonary:      Effort: Tachypnea present  She is intubated  Breath sounds: Examination of the right-lower field reveals decreased breath sounds  Examination of the left-lower field reveals decreased breath sounds  Decreased breath sounds and rhonchi present  No wheezing     Abdominal: General: Abdomen is protuberant  Bowel sounds are normal       Palpations: Abdomen is soft  Genitourinary:     Comments: External catheter draining yellow urine  Musculoskeletal:      Cervical back: Neck supple  Right lower leg: No edema  Left lower leg: No edema  Skin:     General: Skin is cool and dry  Comments: Toes dusky b/l in setting of arctic sun   Neurological:      Mental Status: She is unresponsive  GCS: GCS eye subscore is 2  GCS verbal subscore is 1  GCS motor subscore is 2  Motor: Abnormal muscle tone (rigidity of extremities with stimulation) present               Laboratory and Diagnostics:  Results from last 7 days   Lab Units 10/28/22  0548 10/27/22  0536 10/26/22  0525 10/25/22  1038 10/25/22  0451 10/24/22  0552 10/23/22  0525   WBC Thousand/uL 18 76* 25 01* 18 95*  --  15 32* 16 89* 14 87*   HEMOGLOBIN g/dL 13 4 13 5 13 2  --  11 7 12 1 12 0   I STAT HEMOGLOBIN g/dl  --   --   --  17 0*  --   --   --    HEMATOCRIT % 41 1 42 6 41 4  --  37 5 40 0 39 4   HEMATOCRIT, ISTAT %  --   --   --  50*  --   --   --    PLATELETS Thousands/uL 370 357 344  --  286 289 308   NEUTROS PCT % 88* 88*  --   --   --   --   --    BANDS PCT %  --   --   --   --  4  --   --    MONOS PCT % 5 4  --   --   --   --   --    MONO PCT %  --   --   --   --  5  --   --      Results from last 7 days   Lab Units 10/28/22  0548 10/27/22  0536 10/26/22  1758 10/26/22  0525 10/25/22  2014 10/25/22  1232 10/25/22  0451   SODIUM mmol/L 136 138 141 141 139 138 139   POTASSIUM mmol/L 4 1 4 6 4 3 3 2* 3 9 5 0 4 2   CHLORIDE mmol/L 100 103 104 103 102 101 101   CO2 mmol/L 25 27 27 28 31 28 33*   ANION GAP mmol/L 11 8 10 10 6 9 5   BUN mg/dL 39* 33* 33* 33* 39* 37* 32*   CREATININE mg/dL 1 01 1 02 1 01 0 83 1 11 1 37* 0 95   CALCIUM mg/dL 8 7 9 1 8 6 9 0 9 0 8 4 8 4   GLUCOSE RANDOM mg/dL 212* 140 155* 173* 118 252* 178*   ALT U/L  --   --   --   --   --  50  --    AST U/L  --   --   --   --   --  48*  --    ALK PHOS U/L  --   --   --   --   --  97  --    ALBUMIN g/dL  --   --   --   --   --  2 6*  --    TOTAL BILIRUBIN mg/dL  --   --   --   --   --  0 56  --      Results from last 7 days   Lab Units 10/28/22  0548 10/27/22  0536 10/26/22  0525 10/25/22  0451 10/23/22  0525   MAGNESIUM mg/dL 2 0 2 1 1 9 2 1 2 5   PHOSPHORUS mg/dL 3 7 4 0 3 4 3 5 5 0*      Results from last 7 days   Lab Units 10/26/22  0525   INR  1 04   PTT seconds 28          Results from last 7 days   Lab Units 10/26/22  0525   LACTIC ACID mmol/L 0 7     ABG:  Results from last 7 days   Lab Units 10/28/22  0548   PH ART  7 454*   PCO2 ART mm Hg 37 9   PO2 ART mm Hg 72 5*   HCO3 ART mmol/L 26 0   BASE EXC ART mmol/L 2 2   ABG SOURCE  Line, Arterial     VBG:  Results from last 7 days   Lab Units 10/28/22  0548   ABG SOURCE  Line, Arterial     Results from last 7 days   Lab Units 10/28/22  0548 10/27/22  0536 10/26/22  0525 10/25/22  0451   PROCALCITONIN ng/ml 5 21* 10 49* 12 09* 0 28*       Micro  Results from last 7 days   Lab Units 10/26/22  1244 10/26/22  1243 10/26/22  1240 10/22/22  1554   BLOOD CULTURE  No Growth at 24 hrs  No Growth at 24 hrs   --   --    SPUTUM CULTURE   --   --  Culture too young- will reincubate No growth   GRAM STAIN RESULT   --   --  Rare Polys  No organisms seen Rare Epithelial cells per low power field  No Polys or Bacteria seen       EKG: NSR to sinus tachycardia on monitor, rate 90s-100s  Imaging: No new imaging    Intake and Output  I/O       10/26 0701  10/27 0700 10/27 0701  10/28 0700    I V  (mL/kg) 687 6 (6 3) 282 7 (2 6)    NG/ 330    IV Piggyback 200 100    Feedings 480 80    Total Intake(mL/kg) 1787 6 (16 4) 792 7 (7 3)    Urine (mL/kg/hr) 1250 (0 5) 500 (0 3)    Emesis/NG output      Stool      Total Output 1250 500    Net +537 6 +292 7                Height and Weights   Height: 5' 3" (160 cm)     Body mass index is 43 15 kg/m²    Weight (last 2 days)     Date/Time Weight    10/28/22 0544 110 (243 61) 10/27/22 0500 110 (241 4)    10/26/22 0535 110 (241 62)            Nutrition       Diet Orders   (From admission, onward)             Start     Ordered    10/26/22 1730  Diet Enteral/Parenteral; Tube Feeding No Oral Diet; Glucerna 1 2; Continuous; 40; 120; Water; Every 4 hours  Diet effective now        Comments: Start @ 20 cc/hr and increase by 10 cc/hr per protocol until reach goal   References:    Nutrtion Support Algorithm Enteral vs  Parenteral   Question Answer Comment   Diet Type Enteral/Parenteral    Enteral/Parenteral Tube Feeding No Oral Diet    Tube Feeding Formula: Glucerna 1 2    Bolus/Cyclic/Continuous Continuous    Tube Feeding Goal Rate (mL/hr): 40    Tube Feeding flush (mL): 120    Water Flush type: Water    Water flush frequency: Every 4 hours    RD to adjust diet per protocol?  Yes        10/26/22 1729    10/18/22 0844  Room Service  Once        Question:  Type of Service  Answer:  Room Service- Not Appropriate    10/18/22 0843                  Active Medications  Scheduled Meds:  Current Facility-Administered Medications   Medication Dose Route Frequency Provider Last Rate   • acetaminophen  650 mg Oral Q6H [address] [place], [place]     • atorvastatin  80 mg Oral Daily With [place], [place]     • busPIRone  15 mg Oral Q8H Orlie File, CRNP     • chlorhexidine  15 mL Mouth/Throat Q12H [facility] [place], [place]     • enoxaparin  40 mg Subcutaneous Q24H [address]     • fentaNYL  75 mcg/hr Intravenous Continuous Omelia Cherie, [place] 75 mcg/hr (10/28/22 0105)   • fentanyl citrate (PF)  50 mcg Intravenous Q2H PRN Orlie File, CRNP     • hydrALAZINE  5 mg Intravenous Q6H PRN Orlie File, CRNP     • insulin glargine  15 Units Subcutaneous HS Orlie File, CRNP     • insulin lispro  2-12 Units Subcutaneous B8I VEL Tam     • ipratropium-albuterol  3 mL Nebulization Q6H LURDES Noble     • omeprazole (PRILOSEC) suspension 2 mg/mL  20 mg Oral Daily LURDES Muller     • piperacillin-tazobactam  4 5 g Intravenous [id number] [place] Oropezajohn VEL Navarro 4 5 g (10/28/22 9868)   • polyethylene glycol  17 g Oral Daily Kayy Moncada PA-C     • propofol  5-50 mcg/kg/min Intravenous Titrated OrLURDES Hoskins 30 mcg/kg/min (10/28/22 0224)   • sodium chloride  4 mL Nebulization Q6H LURDES Muller       Continuous Infusions:  fentaNYL, 75 mcg/hr, Last Rate: 75 mcg/hr (10/28/22 0105)  propofol, 5-50 mcg/kg/min, Last Rate: 30 mcg/kg/min (10/28/22 0224)      PRN Meds:   fentanyl citrate (PF), 50 mcg, Q2H PRN  hydrALAZINE, 5 mg, Q6H PRN        Invasive Devices Review  Invasive Devices  Report    Peripheral Intravenous Line  Duration           Long-Dwell Peripheral IV (Midline) 53/45/14 Left Basilic 6 days    Peripheral IV 10/25/22 Right Antecubital 2 days          Arterial Line  Duration           Arterial Line 10/25/22 2 days          Drain  Duration           NG/OG/Enteral Tube Orogastric 16 Fr Center mouth 2 days    External Urinary Catheter 1 day          Airway  Duration           ETT  7 mm 2 days                Rationale for remaining devices: Critical illness  ---------------------------------------------------------------------------------------  Advance Directive and Living Will:      Power of :    POLST:    ---------------------------------------------------------------------------------------  Care Time Delivered:   No Critical Care time spent       Syliva Bi, CRNP      Portions of the record may have been created with voice recognition software  Occasional wrong word or "sound a like" substitutions may have occurred due to the inherent limitations of voice recognition software    Read the chart carefully and recognize, using context, where substitutions have occurred2

## 2022-10-28 NOTE — ASSESSMENT & PLAN NOTE
· 10/17 presented with progressively worsening shortness of breath over the past several weeks  · Recently had COVID, tested positive 8/19 and was hospitalized for 5 days requiring nasal cannula  · 10/16 seen OP by Pulm for continued SOB with concern for superimposed pna, rx'ed OP abx  · Likely 2/2 pneumonia +/- exacerbation of COPD  · 10/17 ETT in ER for hypoxia, work of breathing   Completed Cefepime/Vanco course 10/23  · 10/25 extubated to bipap, though significant hypoxia with rolling   · 10/25 re-intubated with PEA arrest  · AC/VC 24/400/12/50  · Continue Pip/tazo D3  · Goal to wean FiO2 and PEEP as tolerated for O2 Sat 92% or above   · VAP ppx; chlorhexidine, PPI, HOB greater than 30 degrees  · Solumedrol course completed 10/27  · Scheduled duonebs, aggressive pulmonary toileting   · S/p bronch 10/22 for minimal thick secretions  · Poor SBT candidate 2/2 continued encephalopathy   · Sedation: proporol for goal RASS 0 to -1, fentanyl @75, prn fentanyl for agitation, breakthrough pain

## 2022-10-28 NOTE — ASSESSMENT & PLAN NOTE
· COPD with AE and acute PNA POA   · Dolumedrol 40mg QD -- stopped 10/27  · Continue duonebs  · Encourage smoking cessation when appropriate

## 2022-10-28 NOTE — ASSESSMENT & PLAN NOTE
· POA AEB tachycardia, tachypnea, fever 103   Multilobar pneumonia present on CT scan   · Completed day 7/7 cefepime/vanco on 10/23  · Concern for aspiration event 10/25 with reintubation  -- Pip/tazo as above, D3  · Goal MAP >65  · Trend fever curve, WBC and procal

## 2022-10-28 NOTE — ASSESSMENT & PLAN NOTE
· Home meds: cardizem, hctz, lisinopril -- holding home meds for now   · PRN hydralazine   · Goal SBP <160

## 2022-10-28 NOTE — ACP (ADVANCE CARE PLANNING)
Patient's family updated at beside including mother Saeed Nielsen and sister Beverley Gabriel  Sister in law and niece also present for discussion  We discussed plan for CT head tomorrow, which would be 72 hours post re-warming  Patient unfortunately has thus far not shown any significant improvement in neurologic exam no response to noxious stimuli, +corneal/gag/cough reflexes  Sluggish R pupillary reflex  We discussed EEG findings from 10/25 with severe diffuse cerebral dysfunction  We also discussed that the CT head may still appear normal despite anoxic injury being present, but that an abnormal result may help in guiding further decision making going forward  Family expresses understanding of this  All questions have been answered to their satisfaction at this time

## 2022-10-28 NOTE — UTILIZATION REVIEW
Continued Stay Review    Date: 10- 28-22                       Current Patient Class:  Inpatient  Current Level of Care:  Critical care    HPI:61 y o  female initially admitted on  10-17-22     Assessment/Plan:     Procalcitonin and wbc trending down  Physical examination shows decreased breath sounds with rhonchi  Patient remains on iv sedation and mechanical ventilation  Continue duo neb q6 hr,  iv zosyn  Blood cultures negative at 24 hours  Plan CT head in 48 to 72 hours  Steroids discontinued and insulin decreased to 15 u  Continue q6 hr accu checks  Goal glucose :140-180  Required 1 dose hydralazine overnight for SBP >180  Bilateral toes dusky in setting of arctic sun  atient is currently unresponsive with extremities  rigid with stimulation  gcs = 5        Vital Signs:     Date/  Time Temp Pulse Resp MAP (mmHg) Arterial Line BP MAP SpO2 FiO2 (%) O2 Device   10/28/22 1500 97 5 °F (36 4 °C) 100 38 Abnormal  -- 149/69 96 mmHg 93 % -- --   10/28/22 1400 -- 93 27 Abnormal  -- 140/67 93 mmHg 97 % -- --   10/28/22 1300 -- 95 29 Abnormal  -- 153/70 98 mmHg 97 % -- --   10/28/22 1200 99 8 °F (37 7 °C) 103 45 Abnormal  -- 150/67 94 mmHg 85 % Abnormal  -- --   10/28/22 1100 -- 100 34 Abnormal  -- 158/67 95 mmHg 96 % -- --   10/28/22 1000 -- 97 32 Abnormal  120 146/65 92 mmHg 97 % -- Ventilator   10/28/22 0900 -- 97 30 Abnormal  -- 148/64 92 mmHg 98 % -- --   10/28/22 0811 -- -- -- -- -- -- 97 % -- --   10/28/22 0800 99 °F (37 2 °C) 104 28 Abnormal  -- 139/62 87 mmHg 97 % -- --   10/28/22 0700 -- 101 32 Abnormal  -- 155/67 96 mmHg 97 % -- --   10/28/22 0600 -- 105 40 Abnormal  -- 170/72 103 mmHg 97 % -- --   10/28/22 0500 -- 104 44 Abnormal  -- 188/79 112 mmHg 95 % -- --   10/28/22 0400 98 6 °F (37 °C) 102 41 Abnormal  -- 172/73 105 mmHg 93 % -- --   10/28/22 0338 -- 101 38 Abnormal  -- -- -- 92 % 50 Ventilator   10/28/22 0300 -- 100 37 Abnormal  -- 164/73 103 mmHg 94 % -- --   10/28/22 0200 -- 100 43 Abnormal  -- 185/81 115 mmHg 93 % -- --   10/28/22 0100 -- 97 40 Abnormal  -- 174/78 109 mmHg 96 % -- --   10/28/22 0000 98 4 °F (36 9 °C) 97 37 Abnormal  -- 173/78 111 mmHg 95 % -- --            10/27 0800 10/28 0338  Most Recent    Vent Mode  AC/VC  AC/VC   Resp Rate (BPM) (BPM)  24  24   Vt (mL) (mL)  400  400   FIO2 (%) (%)  50  50       PEEP (cmH2O) (cmH2O)  12  10   O2 Device Ventilator Ventilator  Ventilator   O2 Therapy  Oxygen     Oxygen        Luis C3/G5 Vent Mode    (S)CMV   Resp Rate (BPM) (BPM)    24   VT (mL) (mL)    400   FIO2 (%) (%)    50   PEEP (cmH2O) (cmH2O)    12   I:E Ratio    1:1 2   Insp Time (%) (%)    0 75   Flow Trigger (LPM)    3   Humidification    Heater   Heater Temperature (Set) (°F)    95 (35)           Pertinent Labs/Diagnostic Results:       Results from last 7 days   Lab Units 10/28/22  0548 10/27/22  0536 10/26/22  0525 10/25/22  1038 10/25/22  0451   WBC Thousand/uL 18 76* 25 01* 18 95*  --  15 32*   HEMOGLOBIN g/dL 13 4 13 5 13 2  --  11 7   I STAT HEMOGLOBIN g/dl  --   --   --  17 0*  --    HEMATOCRIT % 41 1 42 6 41 4  --  37 5   HEMATOCRIT, ISTAT %  --   --   --  50*  --    PLATELETS Thousands/uL 370 357 344  --  286   NEUTROS ABS Thousands/µL 16 45* 21 94*  --   --   --    BANDS PCT %  --   --   --   --  4         Results from last 7 days   Lab Units 10/28/22  0548 10/27/22  0536 10/26/22  1758 10/26/22  0525 10/25/22  2014 10/25/22  1232 10/25/22  1038 10/25/22  0451 10/24/22  0552 10/23/22  0525   SODIUM mmol/L 136 138 141 141 139   < >  --  139   < > 140   POTASSIUM mmol/L 4 1 4 6 4 3 3 2* 3 9   < >  --  4 2   < > 3 9   CHLORIDE mmol/L 100 103 104 103 102   < >  --  101   < > 102   CO2 mmol/L 25 27 27 28 31   < >  --  33*   < > 37*   ANION GAP mmol/L 11 8 10 10 6   < >  --  5   < > 1*   BUN mg/dL 39* 33* 33* 33* 39*   < >  --  32*   < > 41*   CREATININE mg/dL 1 01 1 02 1 01 0 83 1 11   < >  --  0 95   < > 1 21   EGFR ml/min/1 73sq m 59 59 59 75 53   < >  -- 64   < > 48   CALCIUM mg/dL 8 7 9 1 8 6 9 0 9 0   < >  --  8 4   < > 8 7   CALCIUM, IONIZED mmol/L  --   --   --  1 17  --   --   --   --   --   --    CALCIUM, IONIZED, ISTAT mmol/L  --   --   --   --   --   --  1 24  --   --   --    MAGNESIUM mg/dL 2 0 2 1  --  1 9  --   --   --  2 1  --  2 5   PHOSPHORUS mg/dL 3 7 4 0  --  3 4  --   --   --  3 5  --  5 0*    < > = values in this interval not displayed       Results from last 7 days   Lab Units 10/25/22  1232   AST U/L 48*   ALT U/L 50   ALK PHOS U/L 97   TOTAL PROTEIN g/dL 6 5   ALBUMIN g/dL 2 6*   TOTAL BILIRUBIN mg/dL 0 56     Results from last 7 days   Lab Units 10/28/22  1154 10/28/22  0547 10/27/22  2323 10/27/22  1728 10/27/22  1138 10/27/22  0536 10/26/22  2327 10/26/22  1711 10/26/22  1120 10/26/22  0526 10/26/22  0208 10/26/22  0005   POC GLUCOSE mg/dl 206* 174* 145* 126 107 125 101 144* 167* 167* 116 101     Results from last 7 days   Lab Units 10/28/22  0548 10/27/22  0536 10/26/22  1758 10/26/22  0525 10/25/22  2014 10/25/22  1232 10/25/22  0451 10/24/22  0552 10/23/22  0525 10/22/22  0554   GLUCOSE RANDOM mg/dL 212* 140 155* 173* 118 252* 178* 148* 113 275*       Results from last 7 days   Lab Units 10/28/22  0548 10/27/22  0812 10/26/22  0525   PH ART  7 454* 7 419 7 394   PCO2 ART mm Hg 37 9 44 1* 43 8   PO2 ART mm Hg 72 5* 66 1* 109 1   HCO3 ART mmol/L 26 0 27 9 26 2   BASE EXC ART mmol/L 2 2 2 9 1 0   O2 CONTENT ART mL/dL 18 9 18 2 17 6   O2 HGB, ARTERIAL % 94 4 92 5* 97 7*   ABG SOURCE  Line, Arterial Line, Arterial Line, Arterial         Results from last 7 days   Lab Units 10/25/22  1038   PH, LANCE I-STAT  7 053*   PCO2, LANCE ISTAT mm HG >103 0*   PO2, LANCE ISTAT mm HG <13 0*       Results from last 7 days   Lab Units 10/26/22  0525   PROTIME seconds 13 7   INR  1 04   PTT seconds 28         Results from last 7 days   Lab Units 10/28/22  0548 10/27/22  0536 10/26/22  0525 10/25/22  0451   PROCALCITONIN ng/ml 5 21* 10 49* 12 09* 0 28*     Results from last 7 days   Lab Units 10/26/22  0525   LACTIC ACID mmol/L 0 7       Results from last 7 days   Lab Units 10/26/22  1244 10/26/22  1243 10/26/22  1240 10/22/22  1554   BLOOD CULTURE  No Growth at 24 hrs  No Growth at 24 hrs   --   --    SPUTUM CULTURE   --   --  Culture results to follow  No growth   GRAM STAIN RESULT   --   --  Rare Polys  No organisms seen Rare Epithelial cells per low power field  No Polys or Bacteria seen     Results from last 7 days   Lab Units 10/22/22  1548   TOTAL COUNTED  100             Scheduled Medications:    atorvastatin, 80 mg, Oral, Daily With Dinner  busPIRone, 15 mg, Oral, Q8H  chlorhexidine, 15 mL, Mouth/Throat, Q12H MATTHEW  enoxaparin, 40 mg, Subcutaneous, Q24H [address]  insulin glargine, 15 Units, Subcutaneous, HS  insulin lispro, 2-12 Units, Subcutaneous, Q6H [address]  ipratropium-albuterol, 3 mL, Nebulization, Q6H  omeprazole (PRILOSEC) suspension 2 mg/mL, 20 mg, Oral, Daily  piperacillin-tazobactam, 4 5 g, Intravenous, Q6H  polyethylene glycol, 17 g, Oral, Daily  sodium chloride, 4 mL, Nebulization, Q6H      Continuous IV Infusions:  fentaNYL, 75 mcg/hr, Intravenous, Continuous  propofol, 5-50 mcg/kg/min, Intravenous, Titrated      PRN Meds:  acetaminophen, 650 mg, Oral, Q6H PRN  fentanyl citrate (PF), 50 mcg, Intravenous, Q2H PRN  hydrALAZINE, 5 mg, Intravenous, Q6H PRN        Discharge Plan: to be determined     Network Utilization Review Department  ATTENTION: Please call with any questions or concerns to [phone] and carefully listen to the prompts so that you are directed to the right person  All voicemails are confidential   Shea Yuan all requests for admission clinical reviews, approved or denied determinations and any other requests to dedicated fax number below belonging to the campus where the patient is receiving treatment   List of dedicated fax numbers for the Facilities:  FACILITY NAME TRAE Peterson 25 DENIALS (Administrative/Medical Necessity) [phone] [address] (Maternity/NICU/Pediatrics) [address] [place] [address] [phone]   [address] [place] [address] [facility] [address] [facility] [address] [facility] [place] [address] [phone]

## 2022-10-28 NOTE — ASSESSMENT & PLAN NOTE
Lab Results   Component Value Date    HGBA1C 9 4 (H) 10/20/2022       Recent Labs     10/26/22  2327 10/27/22  0536 10/27/22  1138 10/27/22  1728   POCGLU 101 125 107 126       Blood Sugar Average: Last 72 hrs:  (P) 146 [id number]     · Hyperglycemia with BG 400s on presentation, no elevation in anion gap   · PTA meds include lantus, meal coverage insulin, victoza and metformin  · Lantus decreased to 15u from 25U HS in setting of steroid discontinuation and lower BG past 24h   Continue q6h accuchecks and SSI for goal glucose 140-180

## 2022-10-29 PROBLEM — G93.1 ANOXIC BRAIN INJURY (HCC): Status: ACTIVE | Noted: 2022-10-17

## 2022-10-29 NOTE — ASSESSMENT & PLAN NOTE
Lab Results   Component Value Date    HGBA1C 9 4 (H) 10/20/2022       Recent Labs     10/28/22  1801 10/28/22  2310 10/29/22  0517 10/29/22  1105   POCGLU 215* 191* 191* 206*       Blood Sugar Average: Last 72 hrs:  (P) 155 125     · Hyperglycemia with BG 400s on presentation, no elevation in anion gap   · PTA meds include lantus, meal coverage insulin, victoza and metformin  · Continue SSI coverage, Lantus increased from 15 to 18 units secondary to hyperglycemia  · Goal blood glucose 140-180

## 2022-10-29 NOTE — ASSESSMENT & PLAN NOTE
· 10/17 presented with progressively worsening shortness of breath over the past several weeks  · Recently had COVID, tested positive 8/19 and was hospitalized for 5 days requiring nasal cannula  · 10/16 seen OP by Pulm for continued SOB with concern for superimposed pna, rx'ed OP abx  · Likely 2/2 pneumonia +/- exacerbation of COPD  · 10/17 ETT in ER for hypoxia, work of breathing   Completed Cefepime/Vanco course 10/23  · 10/25 extubated to bipap, though significant hypoxia with rolling   · 10/25 re-intubated with PEA arrest (difficult intubation requiring 3 attempts)  · AC/VC 24/400/8/50%  · Continue to titrate fio2 and PEEP as tolerated for O2 sat 92% or above   · Continue Pip/tazo D4 for HCAP/aspiration pneumonia   · VAP ppx; chlorhexidine, PPI, HOB greater than 30 degrees  · Solumedrol course completed 10/27  · Scheduled duonebs, aggressive pulmonary toileting   · S/p bronch 10/22 for minimal thick secretions  · Poor SBT candidate 2/2 continued encephalopathy   · CXR-R sided mucus plugging, continue duo-nebs and 3% NSS q6h, and chest PT TID

## 2022-10-29 NOTE — PROGRESS NOTES
Critical Care Time Attending Statement    I have personally seen and examined the patient on 10/29/22 at 1100   I discussed the patient with the medical team including, but not limited to, verifying findings; reviewing labs and x-rays; discussing with consultants; developing the plan of care with the bedside nurse; and discussing treatment plan with patient or surrogate  Critical care time, excluding procedures, teaching, family meetings, and excludes any prior time recorded by providers other than myself, 30 minutes (1100 to 1130)  NOBLE Peerira  Repeat head CT this AM showing anoxic brain injury  Has been intermittently losing neurologic reflexes  Vitals:  Afebrile, pulse 80s-100s, RR 20s-30s, MAP 70s-80s, SpO2 97% on 45% FiO2  Intake/Output:  2 8/1 8    On exam,  In general:  Comatose  No brainstem reflexes elicited this AM   HEENT:  Pupils 3mm bilaterally, minimally reactive  Gaze remains upward  Conjunctiva normal   No scleral icterus  Mucus membranes moist   Chest:  CTAB  Cardiovascular:  S1/S2 RRR no M/R/G  Abdomen:  S/NT/ND +BS  Extremities:  Trace peripheral edema  Neuro:  No cough, gag or corneal reflexes today off sedation  Labs show stable renal function/electrolytes  Improved procalcitonin  CBC with improved WBC  Cultures unchanged  Imaging:  CT head with diffuse anoxic brain injury  There is some new RUL atelectasis on CXR  Last ECHO:  Post-code echo showing EF 67%, abnormal septal motion, PASP 41, no significant change from prior      Impression:  1  Bradycardic cardiac arrest due to hypoxemia  2  Acute on chronic hypoxic and hypercarbic respiratory failure due to acute exacerbation of COPD  3  Right lower lobe pneumonia, present on admission  4  Left lower lobe aspiration pneumonia  5  Severe sepsis due to #1&2   6  Bradycardia, likely medication induced  7  Coronary artery disease  8  Hypertension  9  LESLIE    10  Morbid obesity complicating all other aspects of care  11  T2DM  Plan:    Neuro:  Sedated with propofol 40/fentanyl 75, will stop  Cardiovascular: Will hold further diuresis today  Pulmonary:  Improved ventilatory parameters  Continue pulmonary hygiene regimen  ID:  Remains on zosyn, plan on up to a 5 day course  Renal:  Increasing Bun/Cr ratio, clinically euvolemic status, will stop diuresis  Gastrointestinal:  Adding bowel regimen  On tube feeds at goal   Heme:  No issues  Endocrine:  Increase lantus to 18u daily  Blood glucoses 191-217  F/E/N:  IVF off  Electrolytes relpeted  On tube feedings  Prophylaxis:  Omeprazole for ulcer prophylaxis  Lovenox for DVT prophylaxis  Access:  Peripheral   ETT day 4   Purewick  Code status:  Full  Disposition:  ICU for mechanical ventilation

## 2022-10-29 NOTE — ASSESSMENT & PLAN NOTE
· 10/17 CTA chest -- dense consolidation in right lower lobe and left sided consolidation consistent with multilobar pneumonia  · Sputum cx resp tova, Strep pneumo and legionella negative  · Completed cefepime and vanc course 10/23  · 10/22 S/p bronchoscopy with repeat sputum culture showing no polys or bacteria  · 10/25 extubated to bipap, hypoxic, reintubated   · Chest PT TID, ETT suctioning PRN, 3% NSS nebs   · Trend fever curve, WBC and procal   · Abx reinitiated in the setting of likely aspiration during reintubation   · Zosyn D4  · 10/26 sputum cx with candida

## 2022-10-29 NOTE — PROGRESS NOTES
Cat U  65   Progress Note - Mansi Lawton 1960, 57 y o  female MRN: [id number]  Unit/Bed#: ICU 05 Encounter: [id number]  Primary Care Provider: Mando De Leon MD   Date and time admitted to hospital: 10/17/2022 10:24 AM    * Acute respiratory failure with hypoxia and hypercapnia [facility])  Assessment & Plan  · 10/17 presented with progressively worsening shortness of breath over the past several weeks  · Recently had COVID, tested positive 8/19 and was hospitalized for 5 days requiring nasal cannula  · 10/16 seen OP by Pulm for continued SOB with concern for superimposed pna, rx'ed OP abx  · Likely 2/2 pneumonia +/- exacerbation of COPD  · 10/17 ETT in ER for hypoxia, work of breathing   Completed Cefepime/Vanco course 10/23  · 10/25 extubated to bipap, though significant hypoxia with rolling   · 10/25 re-intubated with PEA arrest (difficult intubation requiring 3 attempts)  · AC/VC 24/400/8/50%  · Continue to titrate fio2 and PEEP as tolerated for O2 sat 92% or above   · Continue Pip/tazo D4 for HCAP/aspiration pneumonia   · VAP ppx; chlorhexidine, PPI, HOB greater than 30 degrees  · Solumedrol course completed 10/27  · Scheduled duonebs, aggressive pulmonary toileting   · S/p bronch 10/22 for minimal thick secretions  · Poor SBT candidate 2/2 continued encephalopathy   · CXR-R sided mucus plugging, continue duo-nebs and 3% NSS q6h, and chest PT TID      Cardiac arrest [facility])  Assessment & Plan  · 10/25 PEA arrest during reintubation, secondary to hypoxia   · TTM initiated 10/25, 36 degrees achieved 10/25 at 1430  · Echo 10/25: EF 67%  · Now with probably anoxic brain injury      Pneumonia  Assessment & Plan  · 10/17 CTA chest -- dense consolidation in right lower lobe and left sided consolidation consistent with multilobar pneumonia  · Sputum cx resp tova, Strep pneumo and legionella negative  · Completed cefepime and vanc course 10/23  · 10/22 S/p bronchoscopy with repeat sputum culture showing no polys or bacteria  · 10/25 extubated to bipap, hypoxic, reintubated   · Chest PT TID, ETT suctioning PRN, 3% NSS nebs   · Trend fever curve, WBC and procal   · Abx reinitiated in the setting of likely aspiration during reintubation   · Zosyn D4  · 10/26 sputum cx with candida     Severe sepsis (HCC)  Assessment & Plan  · POA AEB tachycardia, tachypnea, fever 103  Multilobar pneumonia present on CT scan   · Completed day 7/7 cefepime/vanco on 10/23  · Concern for aspiration event 10/25 with reintubation  -- Pip/tazo as above, D4  · Trend fever curve, WBC  · Procal downtrending 15--5--3  · Repeat BC x 2 10/26 neg at 48 hours    Anoxic brain injury [facility])  Assessment & Plan  · Initially in the setting of hypercarbia/sepsis   Now s/p PEA arrest, concern for anoxic brain injury  · 10/25 EEG-burst suppression background, indicative of severe diffuse cerebral dysfunction/poor prognosis   · 10/29: CT head-Diffuse loss of gray-white matter differentiation in bilateral cerebral hemispheres with some cerebral sulcal effacement, suspicious for diffuse hypoxic ischemic injury  · Yesterday had +cough/gag/corneal, today now cough/gag and only left corneal reflex present  · Overall has poor prognosis with low likelihood of any meaningful neurologic recovery  · Neuro checks q4h     COPD exacerbation (HCC)  Assessment & Plan  · COPD with AE and acute PNA POA   · Solumedrol d/c 10/27  · Continue duonebs  · Was actively smoking PTA     Obstructive sleep apnea  Assessment & Plan  · Intubated as above    Type 2 diabetes mellitus with hyperglycemia, with long-term current use of insulin [facility])  Assessment & Plan  Lab Results   Component Value Date    HGBA1C 9 4 (H) 10/20/2022       Recent Labs     10/28/22  1801 10/28/22  2310 10/29/22  0517 10/29/22  1105   POCGLU 215* 191* 191* 206*       Blood Sugar Average: Last 72 hrs:  (P) 155 125     · Hyperglycemia with BG 400s on presentation, no elevation in anion gap   · PTA meds include lantus, meal coverage insulin, victoza and metformin  · Continue SSI coverage, Lantus increased from 15 to 18 units secondary to hyperglycemia  · Goal blood glucose 140-180    Chronic pain syndrome  Assessment & Plan  · OP: tramadol -- hold in the setting of above   · fent gtt as above     Coronary artery disease involving native coronary artery of native heart with angina pectoris [facility])  Assessment & Plan  · Cardiac cath in 2019 demonstrated non-obstructive CAD with moderate stenosis of the RCA  · EKG on admission showing sinus tachycardia, no acute ST changes  · Trops negative   · Continue statin    Hypertension  Assessment & Plan  · Home meds: cardizem, hctz, lisinopril -- holding home meds for now   · PRN hydralazine   · Goal SBP <160    Depression with anxiety  Assessment & Plan  · Home meds: wellbutrin, cymbalta, seroquel  · Hold 2/2 AMS    ----------------------------------------------------------------------------------------  HPI/24hr events: CT head done this morning, shows diffuse loss of gray-white matter differentiation in bilateral cerebral hemispheres with some cerebral sulcal effacement, suspicious for diffuse hypoxic ischemic injury  Yesterday had +cough/gag/corneal, today now cough/gag and only left corneal reflex present  Goals of care discussion planned with family today       Patient appropriate for transfer out of the ICU today?: No  Disposition: Continue Critical Care   Code Status: Level 1 - Full Code  ---------------------------------------------------------------------------------------  SUBJECTIVE    Review of Systems  Review of systems was unable to be performed secondary to anoxic brain injury/unresponsive  ---------------------------------------------------------------------------------------  OBJECTIVE    Vitals   Vitals:    10/29/22 1200 10/29/22 1216 10/29/22 1300 10/29/22 1400   BP:       BP Location:       Pulse: 94  92 97   Resp: (!) 32  (!) 32 (!) 34   Temp: 99 32 °F (37 4 °C)  99 86 °F (37 7 °C) 100 4 °F (38 °C)   TempSrc: Esophageal      SpO2: 93% 94% 97% 96%   Weight:       Height:         Temp (24hrs), Av 5 °F (36 9 °C), Min:97 5 °F (36 4 °C), Max:100 4 °F (38 °C)  Current: Temperature: 100 4 °F (38 °C)  Arterial Line BP: 141/64  Arterial Line MAP (mmHg): 90 mmHg    Respiratory:  SpO2: SpO2: 96 %, SpO2 Activity: SpO2 Activity: At Rest, SpO2 Device: O2 Device: Ventilator       Invasive/non-invasive ventilation settings   Respiratory  Report   Lab Data (Last 4 hours)    None         O2/Vent Data (Last 4 hours)      10/29 1216           Vent Mode AC/VC       Resp Rate (BPM) (BPM) 24       Vt (mL) (mL) 400       FIO2 (%) (%) 45       PEEP (cmH2O) (cmH2O) 8       MV 13 8                   Physical Exam  Constitutional:       Appearance: She is morbidly obese  She is ill-appearing  Interventions: She is sedated, intubated and restrained  HENT:      Head: Normocephalic and atraumatic  Comments: No cough or gag reflex present  Eyes:      General: Lids are normal       Conjunctiva/sclera: Conjunctivae normal       Comments: Intermittent upward gaze, +L corneal reflex, absent R corneal reflex, L pupil +2 reactive, R pupil +2 fixed  Cardiovascular:      Rate and Rhythm: Normal rate and regular rhythm  Pulses: Normal pulses  Radial pulses are 2+ on the right side and 2+ on the left side  Dorsalis pedis pulses are 2+ on the right side and 2+ on the left side  Heart sounds: Normal heart sounds, S1 normal and S2 normal    Pulmonary:      Effort: Pulmonary effort is normal  She is intubated  Breath sounds: Rhonchi present  Abdominal:      General: Bowel sounds are normal       Palpations: Abdomen is soft  Musculoskeletal:      Comments: Absent extremity movement-no response to painful stimuli   Skin:     General: Skin is warm and dry  Capillary Refill: Capillary refill takes less than 2 seconds     Neurological:      GCS: GCS eye subscore is 1  GCS verbal subscore is 1  GCS motor subscore is 1               Laboratory and Diagnostics:  Results from last 7 days   Lab Units 10/29/22  0927 10/28/22  0548 10/27/22  0536 10/26/22  0525 10/25/22  1038 10/25/22  0451 10/24/22  0552 10/23/22  0525   WBC Thousand/uL 14 27* 18 76* 25 01* 18 95*  --  15 32* 16 89* 14 87*   HEMOGLOBIN g/dL 11 7 13 4 13 5 13 2  --  11 7 12 1 12 0   I STAT HEMOGLOBIN g/dl  --   --   --   --  17 0*  --   --   --    HEMATOCRIT % 36 7 41 1 42 6 41 4  --  37 5 40 0 39 4   HEMATOCRIT, ISTAT %  --   --   --   --  50*  --   --   --    PLATELETS Thousands/uL 316 370 357 344  --  286 289 308   NEUTROS PCT % 80* 88* 88*  --   --   --   --   --    BANDS PCT %  --   --   --   --   --  4  --   --    MONOS PCT % 6 5 4  --   --   --   --   --    MONO PCT %  --   --   --   --   --  5  --   --      Results from last 7 days   Lab Units 10/29/22  0927 10/28/22  0548 10/27/22  0536 10/26/22  1758 10/26/22  0525 10/25/22  2014 10/25/22  1232   SODIUM mmol/L 137 136 138 141 141 139 138   POTASSIUM mmol/L 3 8 4 1 4 6 4 3 3 2* 3 9 5 0   CHLORIDE mmol/L 102 100 103 104 103 102 101   CO2 mmol/L 28 25 27 27 28 31 28   ANION GAP mmol/L 7 11 8 10 10 6 9   BUN mg/dL 42* 39* 33* 33* 33* 39* 37*   CREATININE mg/dL 1 12 1 01 1 02 1 01 0 83 1 11 1 37*   CALCIUM mg/dL 8 6 8 7 9 1 8 6 9 0 9 0 8 4   GLUCOSE RANDOM mg/dL 217* 212* 140 155* 173* 118 252*   ALT U/L  --   --   --   --   --   --  50   AST U/L  --   --   --   --   --   --  48*   ALK PHOS U/L  --   --   --   --   --   --  97   ALBUMIN g/dL  --   --   --   --   --   --  2 6*   TOTAL BILIRUBIN mg/dL  --   --   --   --   --   --  0 56     Results from last 7 days   Lab Units 10/29/22  0927 10/28/22  0548 10/27/22  0536 10/26/22  0525 10/25/22  0451 10/23/22  0525   MAGNESIUM mg/dL 2 2 2 0 2 1 1 9 2 1 2 5   PHOSPHORUS mg/dL 4 4* 3 7 4 0 3 4 3 5 5 0*      Results from last 7 days   Lab Units 10/26/22  0525   INR  1 04   PTT seconds 28 Results from last 7 days   Lab Units 10/26/22  0525   LACTIC ACID mmol/L 0 7     ABG:  Results from last 7 days   Lab Units 10/28/22  0548   PH ART  7 454*   PCO2 ART mm Hg 37 9   PO2 ART mm Hg 72 5*   HCO3 ART mmol/L 26 0   BASE EXC ART mmol/L 2 2   ABG SOURCE  Line, Arterial     VBG:  Results from last 7 days   Lab Units 10/28/22  0548   ABG SOURCE  Line, Arterial     Results from last 7 days   Lab Units 10/29/22  0927 10/28/22  0548 10/27/22  0536 10/26/22  0525 10/25/22  0451   PROCALCITONIN ng/ml 3 39* 5 21* 10 49* 12 09* 0 28*       Micro  Results from last 7 days   Lab Units 10/26/22  1244 10/26/22  1243 10/26/22  1240 10/22/22  1554   BLOOD CULTURE  No Growth at 48 hrs  No Growth at 48 hrs   --   --    SPUTUM CULTURE   --   --  1+ Growth of Candida albicans*  Few Colonies of  No growth   GRAM STAIN RESULT   --   --  Rare Polys  No organisms seen Rare Epithelial cells per low power field  No Polys or Bacteria seen       Imaging:10/29: CT head-Diffuse loss of gray-white matter differentiation in bilateral cerebral hemispheres with some cerebral sulcal effacement, suspicious for diffuse hypoxic ischemic injury I have personally reviewed pertinent reports  and I have personally reviewed pertinent films in PACS    Intake and Output  I/O       10/27 0701  10/28 0700 10/28 0701  10/29 0700 10/29 0701  10/30 0700    I V  (mL/kg) 707 5 (6 4) 860 (7 9) 173 5 (1 6)    NG/ 780 120    IV Piggyback 300 400     Feedings 480 838     Total Intake(mL/kg) 2177 5 (19 8) 2878 (26 4) 293 5 (2 7)    Urine (mL/kg/hr) 1100 (0 4) 1800 (0 7) 550 (0 7)    Total Output 1100 1800 550    Net +1077 5 +1078 -256 5           Unmeasured Urine Occurrence   1 x          Height and Weights   Height: 5' 3" (160 cm)     Body mass index is 42 76 kg/m²    Weight (last 2 days)     Date/Time Weight    10/29/22 0421 110 (241 4)    10/28/22 0544 110 (243 61)    10/27/22 0500 110 (241 4)            Nutrition       Diet Orders   (From admission, onward)             Start     Ordered    10/26/22 1730  Diet Enteral/Parenteral; Tube Feeding No Oral Diet; Glucerna 1 2; Continuous; 40; 120; Water; Every 4 hours  Diet effective now        Comments: Start @ 20 cc/hr and increase by 10 cc/hr per protocol until reach goal   References:    Nutrtion Support Algorithm Enteral vs  Parenteral   Question Answer Comment   Diet Type Enteral/Parenteral    Enteral/Parenteral Tube Feeding No Oral Diet    Tube Feeding Formula: Glucerna 1 2    Bolus/Cyclic/Continuous Continuous    Tube Feeding Goal Rate (mL/hr): 40    Tube Feeding flush (mL): 120    Water Flush type: Water    Water flush frequency: Every 4 hours    RD to adjust diet per protocol?  Yes        10/26/22 1729    10/18/22 0844  Room Service  Once        Question:  Type of Service  Answer:  Room Service- Not Appropriate    10/18/22 0843                  Active Medications  Scheduled Meds:  Current Facility-Administered Medications   Medication Dose Route Frequency Provider Last Rate   • acetaminophen  650 mg Oral Q6H PRN LURDES Serrato     • atorvastatin  80 mg Oral Daily With LURDES JAMES     • chlorhexidine  15 mL Mouth/Throat Q12H [address] [place] [address] Dr Pimentel, [address]     • enoxaparin  40 mg Subcutaneous Q24H [address] VEL Pérez     • fentanyl citrate (PF)  50 mcg Intravenous Q2H PRN LURDES Barnes     • hydrALAZINE  5 mg Intravenous Q6H PRN LURDES Barnes     • insulin glargine  18 Units Subcutaneous HS LURDES Gupta     • insulin lispro  2-12 Units Subcutaneous X0X [address] Jarret Navarro PA-C     • ipratropium-albuterol  3 mL Nebulization Q6H LURDES Wright     • omeprazole (PRILOSEC) suspension 2 mg/mL  20 mg Oral Daily LURDES No     • piperacillin-tazobactam  4 5 g Intravenous H1I Jarret Navarro PA-C 4 5 g (10/29/22 1133)   • polyethylene glycol  17 g Oral Daily Adelaide Ricks PA-C     • senna  8 8 mg Per NG Tube BID LURDES Gupta     • sodium chloride  4 mL Nebulization Q6H LURDES Arellano       Continuous Infusions:     PRN Meds:   acetaminophen, 650 mg, Q6H PRN  fentanyl citrate (PF), 50 mcg, Q2H PRN  hydrALAZINE, 5 mg, Q6H PRN        Invasive Devices Review  Invasive Devices  Report    Peripheral Intravenous Line  Duration           Long-Dwell Peripheral IV (Midline) 03/59/90 Left Basilic 7 days    Peripheral IV 10/29/22 Left Wrist <1 day    Peripheral IV 10/29/22 Right Hand <1 day          Arterial Line  Duration           Arterial Line 10/25/22 4 days          Drain  Duration           NG/OG/Enteral Tube Orogastric 16 Fr Center mouth 4 days    External Urinary Catheter 2 days          Airway  Duration           ETT  7 mm 4 days                Rationale for remaining devices: continue a-line for frequency of labs  ---------------------------------------------------------------------------------------  Advance Directive and Living Will:      Power of :    POLST:    ---------------------------------------------------------------------------------------  Care Time Delivered:   No Critical Care time spent       LURDES Cabral      Portions of the record may have been created with voice recognition software  Occasional wrong word or "sound a like" substitutions may have occurred due to the inherent limitations of voice recognition software    Read the chart carefully and recognize, using context, where substitutions have occurred

## 2022-10-29 NOTE — ACP (ADVANCE CARE PLANNING)
Goals of care discussion held with patient's son Brent Duarte and other friends and family present at bedside  Made aware of CT head findings consistent with diffuse anoxic brain injury  We discussed patient's loss of cough and gag reflexes, one corneal reflex intact, and patient overbreathing the vent signaling intact respiratory drive  They are aware that patient has retained a very primitive brain stem reflex, but lacks all higher level brain functioning  I made them aware that there is a very low likelihood of any meaningful neurologic recovery  I presented the family with the option of proceeding with trach/PEG to give patient an extended period of time (weeks, months, to even years) where she could be fully supported while seeing if the brain recovers any function over time, although this possibility is low  The other option I presented was transitioning patient to comfort measures, and explained the process of medications to prevent pain, air hunger, and anxiety, and then removing the breathing tube and allowing natural death to occur  I asked family to take time to talk amongst themselves and to consider what they feel the patient would have wanted in this situation  All their questions have been answered to their satisfaction at this time      Critical care time: 15 minutes

## 2022-10-29 NOTE — ASSESSMENT & PLAN NOTE
· 10/25 PEA arrest during reintubation, secondary to hypoxia   · TTM initiated 10/25, 36 degrees achieved 10/25 at 1430  · Echo 10/25: EF 67%  · Now with probably anoxic brain injury

## 2022-10-29 NOTE — ASSESSMENT & PLAN NOTE
· Cardiac cath in 2019 demonstrated non-obstructive CAD with moderate stenosis of the RCA  · EKG on admission showing sinus tachycardia, no acute ST changes  · Trops negative   · Continue statin

## 2022-10-29 NOTE — UTILIZATION REVIEW
Continued Stay Review    Date: 10/29/22                          Current Patient Class: inpatient  Current Level of Care: ICU    HPI:61 y o  female initially admitted on 10/17/22     Assessment/Plan:   Repeat CT showing anoxic brain injury  Pt intermittently losing neurologic reflexes  No cough, gag or corneal reflex today, off sedation this  morning  Remains intubated  IVABT continued for pneumonia  Tolerating tube feedings at goal  Insulin dosing increased for elevated blood sugars  Vital Signs:   10/29/22 0900 97 88 °F (36 6 °C) 86 29 Abnormal  -- -- 114/52 73 mmHg 97 % -- -- --   10/29/22 0800 98 06 °F (36 7 °C) 84 27 Abnormal  -- -- 109/50 71 mmHg 98 % -- Ventilator Lying   10/29/22 0755 -- -- -- -- -- -- -- 98 % -- Ventilator --   10/29/22 0600 97 88 °F (36 6 °C) 85 28 Abnormal  -- -- 118/64 84 mmHg 95 % -- -- --   10/29/22 0500 98 06 °F (36 7 °C) 87 42 Abnormal  -- -- 122/65 86 mmHg 96 % -- -- --     Pertinent Labs/Diagnostic Results:   10/29  CT head wo contrast  Diffuse loss of gray-white matter differentiation in bilateral cerebral hemispheres with some cerebral sulcal effacement, suspicious for diffuse hypoxic ischemic injury    Consider follow-up MRI brain without contrast     Results from last 7 days   Lab Units 10/29/22  0927 10/28/22  0548 10/27/22  0536 10/26/22  0525 10/25/22  1038 10/25/22  0451   WBC Thousand/uL 14 27* 18 76* 25 01* 18 95*  --  15 32*   HEMOGLOBIN g/dL 11 7 13 4 13 5 13 2  --  11 7   I STAT HEMOGLOBIN g/dl  --   --   --   --  17 0*  --    HEMATOCRIT % 36 7 41 1 42 6 41 4  --  37 5   HEMATOCRIT, ISTAT %  --   --   --   --  50*  --    PLATELETS Thousands/uL 316 370 357 344  --  286   NEUTROS ABS Thousands/µL 11 45* 16 45* 21 94*  --   --   --    BANDS PCT %  --   --   --   --   --  4     Results from last 7 days   Lab Units 10/29/22  0927 10/28/22  0548 10/27/22  0536 10/26/22  1758 10/26/22  0525 10/25/22  1232 10/25/22  1038 10/25/22  0451   SODIUM mmol/L 137 136 138 141 141 < >  --  139   POTASSIUM mmol/L 3 8 4 1 4 6 4 3 3 2*   < >  --  4 2   CHLORIDE mmol/L 102 100 103 104 103   < >  --  101   CO2 mmol/L 28 25 27 27 28   < >  --  33*   ANION GAP mmol/L 7 11 8 10 10   < >  --  5   BUN mg/dL 42* 39* 33* 33* 33*   < >  --  32*   CREATININE mg/dL 1 12 1 01 1 02 1 01 0 83   < >  --  0 95   EGFR ml/min/1 73sq m 52 59 59 59 75   < >  --  64   CALCIUM mg/dL 8 6 8 7 9 1 8 6 9 0   < >  --  8 4   CALCIUM, IONIZED mmol/L  --   --   --   --  1 17  --   --   --    CALCIUM, IONIZED, ISTAT mmol/L  --   --   --   --   --   --  1 24  --    MAGNESIUM mg/dL 2 2 2 0 2 1  --  1 9  --   --  2 1   PHOSPHORUS mg/dL 4 4* 3 7 4 0  --  3 4  --   --  3 5    < > = values in this interval not displayed       Results from last 7 days   Lab Units 10/25/22  1232   AST U/L 48*   ALT U/L 50   ALK PHOS U/L 97   TOTAL PROTEIN g/dL 6 5   ALBUMIN g/dL 2 6*   TOTAL BILIRUBIN mg/dL 0 56     Results from last 7 days   Lab Units 10/29/22  0517 10/28/22  2310 10/28/22  1801 10/28/22  1154 10/28/22  0547 10/27/22  2323 10/27/22  1728 10/27/22  1138 10/27/22  0536 10/26/22  2327 10/26/22  1711 10/26/22  1120   POC GLUCOSE mg/dl 191* 191* 215* 206* 174* 145* 126 107 125 101 144* 167*     Results from last 7 days   Lab Units 10/29/22  0927 10/28/22  0548 10/27/22  0536 10/26/22  1758 10/26/22  0525 10/25/22  2014 10/25/22  1232 10/25/22  0451 10/24/22  0552 10/23/22  0525   GLUCOSE RANDOM mg/dL 217* 212* 140 155* 173* 118 252* 178* 148* 113     Results from last 7 days   Lab Units 10/28/22  0548 10/27/22  0812 10/26/22  0525   PH ART  7 454* 7 419 7 394   PCO2 ART mm Hg 37 9 44 1* 43 8   PO2 ART mm Hg 72 5* 66 1* 109 1   HCO3 ART mmol/L 26 0 27 9 26 2   BASE EXC ART mmol/L 2 2 2 9 1 0   O2 CONTENT ART mL/dL 18 9 18 2 17 6   O2 HGB, ARTERIAL % 94 4 92 5* 97 7*   ABG SOURCE  Line, Arterial Line, Arterial Line, Arterial     Results from last 7 days   Lab Units 10/25/22  1038   PH, LANCE I-STAT  7 053*   PCO2, LANCE ISTAT mm HG >103 0* PO2, LANCE ISTAT mm HG <13 0*     Results from last 7 days   Lab Units 10/26/22  0525   PROTIME seconds 13 7   INR  1 04   PTT seconds 28     Results from last 7 days   Lab Units 10/29/22  0927 10/28/22  0548 10/27/22  0536 10/26/22  0525 10/25/22  0451   PROCALCITONIN ng/ml 3 39* 5 21* 10 49* 12 09* 0 28*     Results from last 7 days   Lab Units 10/26/22  0525   LACTIC ACID mmol/L 0 7     Results from last 7 days   Lab Units 10/26/22  1244 10/26/22  1243 10/26/22  1240 10/22/22  1554   BLOOD CULTURE  No Growth at 48 hrs  No Growth at 48 hrs   --   --    SPUTUM CULTURE   --   --  Culture results to follow  No growth   GRAM STAIN RESULT   --   --  Rare Polys  No organisms seen Rare Epithelial cells per low power field  No Polys or Bacteria seen     Results from last 7 days   Lab Units 10/22/22  1548   TOTAL COUNTED  100     Medications:   Scheduled Medications:  atorvastatin, 80 mg, Oral, Daily With Dinner  chlorhexidine, 15 mL, Mouth/Throat, Q12H MATTHEW  enoxaparin, 40 mg, Subcutaneous, Q24H [facility]  insulin glargine, 15 Units, Subcutaneous, HS>increased to 18 units  insulin lispro, 2-12 Units, Subcutaneous, Q6H [facility]  ipratropium-albuterol, 3 mL, Nebulization, Q6H  omeprazole (PRILOSEC) suspension 2 mg/mL, 20 mg, Oral, Daily  piperacillin-tazobactam, 4 5 g, Intravenous, Q6H  polyethylene glycol, 17 g, Oral, Daily  potassium chloride oral solution 20 mEq, Once per NGT  sodium chloride, 4 mL, Nebulization, Q6H    Continuous IV Infusions:  fentaNYL, 75 mcg/hr, Intravenous, Continuous>stopped 10/29  propofol, 5-50 mcg/kg/min, Intravenous, Titrated>stopped 10/29    PRN Meds:  acetaminophen, 650 mg, Oral, Q6H PRN  fentanyl citrate (PF), 50 mcg, Intravenous, Q2H PRN  hydrALAZINE, 5 mg, Intravenous, Q6H PRN    Discharge Plan: d    Network Utilization Review Department  ATTENTION: Please call with any questions or concerns to [phone] and carefully listen to the prompts so that you are directed to the right person   All voicemails are confidential   [place] all requests for admission clinical reviews, approved or denied determinations and any other requests to dedicated fax number below belonging to the campus where the patient is receiving treatment   List of dedicated fax numbers for the Facilities:  [address] DENIALS (Administrative/Medical Necessity) [phone]   [address] (Maternity/NICU/Pediatrics) [phone]   0 Maia Bellamy [phone]   [facility]  [phone]   [address] [address] [place] [address] [phone]   [address] [facility] [employer] [place] [facility] [address] 815 [facility] [phone]

## 2022-10-29 NOTE — ASSESSMENT & PLAN NOTE
· POA AEB tachycardia, tachypnea, fever 103   Multilobar pneumonia present on CT scan   · Completed day 7/7 cefepime/vanco on 10/23  · Concern for aspiration event 10/25 with reintubation  -- Pip/tazo as above, D4  · Trend fever curve, WBC  · Procal downtrending 15--5--3  · Repeat BC x 2 10/26 neg at 48 hours

## 2022-10-29 NOTE — RESPIRATORY THERAPY NOTE
RT Ventilator Management Note  Fleet Laughter 57 y o  female MRN: [id number]  Unit/Bed#: ICU 05 Encounter: [id number]      Daily Screen         10/28/2022  0811 10/29/2022  0755          Patient safety screen outcome[de-identified] Failed Failed      Not Ready for Weaning due to[de-identified] PEEP > 8cmH2O;Underline problem not resolved FiO2 >60%;PEEP > 8cmH2O                Physical Exam:   Assessment Type: (P) Assess only  General Appearance: (P) Sedated  Respiratory Pattern: (P) Assisted  Chest Assessment: (P) Chest expansion symmetrical  Bilateral Breath Sounds: (P) Diminished  Cough: (P) Non-productive  Suction: ET Tube      Resp Comments: patient resting comfortable ETT advandce to 24 cm

## 2022-10-29 NOTE — ASSESSMENT & PLAN NOTE
· COPD with AE and acute PNA POA   · Solumedrol d/c 10/27  · Continue duonebs  · Was actively smoking PTA

## 2022-10-29 NOTE — ASSESSMENT & PLAN NOTE
· Initially in the setting of hypercarbia/sepsis   Now s/p PEA arrest, concern for anoxic brain injury  · 10/25 EEG-burst suppression background, indicative of severe diffuse cerebral dysfunction/poor prognosis   · 10/29: CT head-Diffuse loss of gray-white matter differentiation in bilateral cerebral hemispheres with some cerebral sulcal effacement, suspicious for diffuse hypoxic ischemic injury  · Yesterday had +cough/gag/corneal, today now cough/gag and only left corneal reflex present  · Overall has poor prognosis with low likelihood of any meaningful neurologic recovery  · Neuro checks q4h

## 2022-10-30 NOTE — ASSESSMENT & PLAN NOTE
· POA AEB tachycardia, tachypnea, fever 103   Multilobar pneumonia present on CT scan   · Completed day 7/7 cefepime/vanco on 10/23  · Concern for aspiration event 10/25 with reintubation  -- Pip/tazo as above, D5  · Trend fever curve, WBC  · Procal downtrending   · Repeat BC x 2 10/26 neg at 48 hours

## 2022-10-30 NOTE — ASSESSMENT & PLAN NOTE
Lab Results   Component Value Date    HGBA1C 9 4 (H) 10/20/2022       Recent Labs     10/28/22  2310 10/29/22  0517 10/29/22  1105 10/29/22  1745   POCGLU 191* 191* 206* 206*       Blood Sugar Average: Last 72 hrs:  (P) 104 [id number]     · Hyperglycemia with BG 400s on presentation, no elevation in anion gap   · PTA meds include lantus, meal coverage insulin, victoza and metformin  · Continue SSI coverage, Lantus increased but held since patient is NPO   · Goal blood glucose 140-180

## 2022-10-30 NOTE — PROGRESS NOTES
Charlie 44  Progress Note - Pascual Casillas 1960, 57 y o  female MRN: [id number]  Unit/Bed#: ICU 05 Encounter: [id number]  Primary Care Provider: Helen Esqueda MD   Date and time admitted to hospital: 10/17/2022 10:24 AM    * Acute respiratory failure with hypoxia and hypercapnia [facility])  Assessment & Plan  · 10/17 presented with progressively worsening shortness of breath over the past several weeks  · Recently had COVID, tested positive 8/19 and was hospitalized for 5 days requiring nasal cannula  · 10/16 seen OP by Pulm for continued SOB with concern for superimposed pna, rx'ed OP abx  · Likely 2/2 pneumonia +/- exacerbation of COPD  · 10/17 ETT in ER for hypoxia, work of breathing   Completed Cefepime/Vanco course 10/23  · 10/25 extubated to bipap, though significant hypoxia with rolling   · 10/25 re-intubated with PEA arrest (difficult intubation requiring 3 attempts)  · AC/VC 24/400/45/8  · Continue to titrate fio2 and PEEP as tolerated for O2 sat 92% or above   · Continue zoysn for PNA   · VAP ppx; chlorhexidine, PPI, HOB greater than 30 degrees  · Solumedrol course completed 10/27  · Scheduled duonebs, aggressive pulmonary toileting   · S/p bronch 10/22 for minimal thick secretions  · Poor SBT candidate 2/2 continued encephalopathy   · CXR-R sided mucus plugging, continue duo-nebs and 3% NSS q6h, and chest PT TID      Cardiac arrest [facility])  Assessment & Plan  · 10/25 PEA arrest during reintubation, secondary to hypoxia   · TTM initiated 10/25, 36 degrees achieved 10/25 at 1430  · Echo 10/25: EF 67%  · 10/29 CT head shows diffuse loss of gray/white matter suspicious for hypoxic injury       Pneumonia  Assessment & Plan  · 10/17 CTA chest -- dense consolidation in right lower lobe and left sided consolidation consistent with multilobar pneumonia  · Sputum cx resp tova, Strep pneumo and legionella negative  · Completed cefepime and vanc course 10/23  · 10/22 S/p bronchoscopy with repeat sputum culture showing no polys or bacteria  · 10/25 extubated to bipap, hypoxic, reintubated   · Chest PT TID, ETT suctioning PRN, 3% NSS nebs   · Trend fever curve, WBC and procal   · Abx reinitiated in the setting of likely aspiration during reintubation   · Zosyn D5  · 10/26 sputum cx with candida     Severe sepsis (HCC)  Assessment & Plan  · POA AEB tachycardia, tachypnea, fever 103  Multilobar pneumonia present on CT scan   · Completed day 7/7 cefepime/vanco on 10/23  · Concern for aspiration event 10/25 with reintubation  -- Pip/tazo as above, D5  · Trend fever curve, WBC  · Procal downtrending   · Repeat BC x 2 10/26 neg at 48 hours    Anoxic brain injury [facility])  Assessment & Plan  · Initially in the setting of hypercarbia/sepsis   Now s/p PEA arrest, concern for anoxic brain injury  · 10/25 EEG-burst suppression background, indicative of severe diffuse cerebral dysfunction/poor prognosis   · 10/29: CT head-Diffuse loss of gray-white matter differentiation in bilateral cerebral hemispheres with some cerebral sulcal effacement, suspicious for diffuse hypoxic ischemic injury  · Yesterday had +cough/gag/corneal, today now cough/gag and only left corneal reflex present  · Overall has poor prognosis with low likelihood of any meaningful neurologic recovery  · Neuro checks q4h     COPD exacerbation (HCC)  Assessment & Plan  · COPD with AE and acute PNA POA   · Solumedrol d/c 10/27  · Continue duonebs  · Was actively smoking PTA     Obstructive sleep apnea  Assessment & Plan  · Intubated as above    Type 2 diabetes mellitus with hyperglycemia, with long-term current use of insulin [facility])  Assessment & Plan  Lab Results   Component Value Date    HGBA1C 9 4 (H) 10/20/2022       Recent Labs     10/28/22  2310 10/29/22  0517 10/29/22  1105 10/29/22  1745   POCGLU 191* 191* 206* 206*       Blood Sugar Average: Last 72 hrs:  (P) 175 [id number]     · Hyperglycemia with BG 400s on presentation, no elevation in anion gap   · PTA meds include lantus, meal coverage insulin, victoza and metformin  · Continue SSI coverage, Lantus increased but held since patient is NPO   · Goal blood glucose 140-180    Chronic pain syndrome  Assessment & Plan  · OP: tramadol -- hold in the setting of above     Coronary artery disease involving native coronary artery of native heart with angina pectoris [facility])  Assessment & Plan  · Cardiac cath in 2019 demonstrated non-obstructive CAD with moderate stenosis of the RCA  · EKG on admission showing sinus tachycardia, no acute ST changes  · Trops negative   · Continue statin    Hypertension  Assessment & Plan  · Home meds: cardizem, hctz, lisinopril -- holding home meds for now   · PRN hydralazine   · Goal SBP <160    Depression with anxiety  Assessment & Plan  · Home meds: wellbutrin, cymbalta, seroquel  · Hold 2/2 AMS    ----------------------------------------------------------------------------------------  HPI/24hr events: Patient remained on mechanical ventilation however she was more awake and breathing at a faster rate with increasing peak pressures  She was started on low dose propofol  She does have some improvement in her neuro exam as she was noted to have corneal reflexes, a gag and withdraws to painful stimuli        Patient appropriate for transfer out of the ICU today?: No  Disposition: Continue Critical Care   Code Status: Level 1 - Full Code  ---------------------------------------------------------------------------------------  SUBJECTIVE  Intubated    Review of Systems   Unable to perform ROS: Intubated     Review of systems was unable to be performed secondary to Intubation  ---------------------------------------------------------------------------------------  OBJECTIVE    Vitals   Vitals:    10/30/22 0100 10/30/22 0121 10/30/22 0200 10/30/22 0422   BP:       BP Location:       Pulse: 92  96    Resp: (!) 32  (!) 30    Temp: 99 86 °F (37 7 °C)  99 86 °F (37 7 °C)    TempSrc: SpO2: 95% 94% 94% 97%   Weight:       Height:         Temp (24hrs), Av 6 °F (37 6 °C), Min:97 88 °F (36 6 °C), Max:100 94 °F (38 3 °C)  Current: Temperature: 99 86 °F (37 7 °C)  Arterial Line BP: 195/72  Arterial Line MAP (mmHg): 108 mmHg    Respiratory:  SpO2: SpO2: 97 %       Invasive/non-invasive ventilation settings   Respiratory  Report   Lab Data (Last 4 hours)    None         O2/Vent Data (Last 4 hours)      10/30 0422           Vent Mode AC/VC       Resp Rate (BPM) (BPM) 24       Vt (mL) (mL) 400       FIO2 (%) (%) 45       PEEP (cmH2O) (cmH2O) 8       MV 13 9                   Physical Exam  Vitals and nursing note reviewed  Constitutional:       Appearance: She is ill-appearing and toxic-appearing  Comments: B/L Wrist restraints   HENT:      Head: Normocephalic and atraumatic  Right Ear: Tympanic membrane, ear canal and external ear normal       Left Ear: Tympanic membrane, ear canal and external ear normal       Nose: Nose normal       Mouth/Throat:      Mouth: Mucous membranes are dry  Pharynx: Oropharynx is clear  Eyes:      Comments: Bilateral pupils 2 mm and sluggish but reactive    Cardiovascular:      Rate and Rhythm: Normal rate and regular rhythm  Pulses: Normal pulses  Heart sounds: Normal heart sounds  Pulmonary:      Comments: ETT on mechanical ventilation  B/L lung sounds diminished   Abdominal:      Comments: OGT via tube feeds   Bowel sounds normoactive    Genitourinary:     Comments: Urinary catheter  Musculoskeletal:         General: Normal range of motion  Cervical back: Normal range of motion and neck supple  Skin:     General: Skin is warm and dry  Capillary Refill: Capillary refill takes less than 2 seconds     Neurological:      Comments: + corneals  + gag  Withdraws to painful stimuli    Psychiatric:      Comments: Intubated              Laboratory and Diagnostics:  Results from last 7 days   Lab Units 10/29/22  1114 10/28/22  0548 10/27/22  0536 10/26/22  0525 10/25/22  1038 10/25/22  0451 10/24/22  0552   WBC Thousand/uL 14 27* 18 76* 25 01* 18 95*  --  15 32* 16 89*   HEMOGLOBIN g/dL 11 7 13 4 13 5 13 2  --  11 7 12 1   I STAT HEMOGLOBIN g/dl  --   --   --   --  17 0*  --   --    HEMATOCRIT % 36 7 41 1 42 6 41 4  --  37 5 40 0   HEMATOCRIT, ISTAT %  --   --   --   --  50*  --   --    PLATELETS Thousands/uL 316 370 357 344  --  286 289   NEUTROS PCT % 80* 88* 88*  --   --   --   --    BANDS PCT %  --   --   --   --   --  4  --    MONOS PCT % 6 5 4  --   --   --   --    MONO PCT %  --   --   --   --   --  5  --      Results from last 7 days   Lab Units 10/29/22  0927 10/28/22  0548 10/27/22  0536 10/26/22  1758 10/26/22  0525 10/25/22  2014 10/25/22  1232   SODIUM mmol/L 137 136 138 141 141 139 138   POTASSIUM mmol/L 3 8 4 1 4 6 4 3 3 2* 3 9 5 0   CHLORIDE mmol/L 102 100 103 104 103 102 101   CO2 mmol/L 28 25 27 27 28 31 28   ANION GAP mmol/L 7 11 8 10 10 6 9   BUN mg/dL 42* 39* 33* 33* 33* 39* 37*   CREATININE mg/dL 1 12 1 01 1 02 1 01 0 83 1 11 1 37*   CALCIUM mg/dL 8 6 8 7 9 1 8 6 9 0 9 0 8 4   GLUCOSE RANDOM mg/dL 217* 212* 140 155* 173* 118 252*   ALT U/L  --   --   --   --   --   --  50   AST U/L  --   --   --   --   --   --  48*   ALK PHOS U/L  --   --   --   --   --   --  97   ALBUMIN g/dL  --   --   --   --   --   --  2 6*   TOTAL BILIRUBIN mg/dL  --   --   --   --   --   --  0 56     Results from last 7 days   Lab Units 10/29/22  0927 10/28/22  0548 10/27/22  0536 10/26/22  0525 10/25/22  0451   MAGNESIUM mg/dL 2 2 2 0 2 1 1 9 2 1   PHOSPHORUS mg/dL 4 4* 3 7 4 0 3 4 3 5      Results from last 7 days   Lab Units 10/26/22  0525   INR  1 04   PTT seconds 28          Results from last 7 days   Lab Units 10/26/22  0525   LACTIC ACID mmol/L 0 7     ABG:  Results from last 7 days   Lab Units 10/28/22  0548   PH ART  7 454*   PCO2 ART mm Hg 37 9   PO2 ART mm Hg 72 5*   HCO3 ART mmol/L 26 0   BASE EXC ART mmol/L 2 2   ABG SOURCE  Line, Arterial     VBG:  Results from last 7 days   Lab Units 10/28/22  0548   ABG SOURCE  Line, Arterial     Results from last 7 days   Lab Units 10/29/22  0927 10/28/22  0548 10/27/22  0536 10/26/22  0525 10/25/22  0451   PROCALCITONIN ng/ml 3 39* 5 21* 10 49* 12 09* 0 28*       Micro  Results from last 7 days   Lab Units 10/26/22  1244 10/26/22  1243 10/26/22  1240   BLOOD CULTURE  No Growth at 72 hrs  No Growth at 72 hrs   --    SPUTUM CULTURE   --   --  1+ Growth of Candida albicans*  Few Colonies of    GRAM STAIN RESULT   --   --  Rare Polys  No organisms seen       EKG:  NSR HR 91  Imaging: I have personally reviewed pertinent reports  XR chest portable    Result Date: 10/22/2022  Impression: 1  Status post bronchoscopy with no pneumothorax  2   Recurrent right upper lobe consolidation likely at least in part due to atelectasis  3   Left predominantly perihilar pulmonary opacity which may represent atelectasis, asymmetric pulmonary edema, or pneumonia  Findings marked for immediate notification in Epic  Workstation performed: [id number]     XR chest portable    Result Date: 10/21/2022  Impression: Persistent bibasilar atelectasis  Pneumonia not excluded in the appropriate clinical setting  Workstation performed: [id number]     XR chest 1 view portable    Result Date: 10/17/2022  Impression: Mild pulmonary venous congestion with trace right effusion  Workstation performed: [id number]     XR chest portable ICU    Result Date: 10/19/2022  Impression: Persistent though improved bilateral lower lobe airspace consolidations consistent with pneumonia  Workstation performed: [id number]     CTA chest pe study    Result Date: 10/17/2022  Impression: No pulmonary embolism  Dense consolidation in the right lower lobe and medial aspect of the left lower lobe suspicious for multilobar pneumonia  Endotracheal tube tip at the ca could be retracted 2 cm  Enlarged fatty liver   Workstation performed: [id number]     Intake and Output  I/O       10/28 0701  10/29 0700 10/29 0701  10/30 0700    I V  (mL/kg) 860 (7 9) 313 5 (2 9)    NG/ 300    IV Piggyback 400 200    Feedings 838 240    Total Intake(mL/kg) 2878 (26 4) 1053 5 (9 7)    Urine (mL/kg/hr) 1800 (0 7) 1150 (0 4)    Total Output 1800 1150    Net +1078 -96 5          Unmeasured Urine Occurrence  1 x          Height and Weights   Height: 5' 3" (160 cm)     Body mass index is 42 76 kg/m²  Weight (last 2 days)     Date/Time Weight    10/29/22 0421 110 (241 4)    10/28/22 0544 110 (243 61)            Nutrition       Diet Orders   (From admission, onward)             Start     Ordered    10/26/22 1730  Diet Enteral/Parenteral; Tube Feeding No Oral Diet; Glucerna 1 2; Continuous; 40; 120; Water; Every 4 hours  Diet effective now        Comments: Start @ 20 cc/hr and increase by 10 cc/hr per protocol until reach goal   References:    Nutrtion Support Algorithm Enteral vs  Parenteral   Question Answer Comment   Diet Type Enteral/Parenteral    Enteral/Parenteral Tube Feeding No Oral Diet    Tube Feeding Formula: Glucerna 1 2    Bolus/Cyclic/Continuous Continuous    Tube Feeding Goal Rate (mL/hr): 40    Tube Feeding flush (mL): 120    Water Flush type: Water    Water flush frequency: Every 4 hours    RD to adjust diet per protocol?  Yes        10/26/22 1729    10/18/22 0844  Room Service  Once        Question:  Type of Service  Answer:  Room Service- Not Appropriate    10/18/22 0843                  Active Medications  Scheduled Meds:  Current Facility-Administered Medications   Medication Dose Route Frequency Provider Last Rate   • acetaminophen  650 mg Oral Q6H PRN Elta Bolds V CRNP     • atorvastatin  80 mg Oral Daily With LURDES JAMES     • chlorhexidine  15 mL Mouth/Throat Q12H [address] [place], [place]     • enoxaparin  40 mg Subcutaneous Q24H [address] VEL Moncada     • fentanyl citrate (PF)  50 mcg Intravenous Q2H PRN LURDES Frye     • hydrALAZINE  5 mg Intravenous Q6H PRN LURDES Frye     • insulin glargine  18 Units Subcutaneous HS LURDES Gupta     • insulin lispro  2-12 Units Subcutaneous YO [facility] Alisha Baugh PA-C     • ipratropium-albuterol  3 mL Nebulization Q6H LURDES Knowles     • omeprazole (PRILOSEC) suspension 2 mg/mL  20 mg Oral Daily TerLURDES Garrett     • piperacillin-tazobactam  4 5 g Intravenous R8A Joaquin Navarro PA-C Stopped (10/30/22 0001)   • polyethylene glycol  17 g Oral Daily Bob Moncada PA-C     • propofol  5-50 mcg/kg/min Intravenous Titrated LURDES Linares 5 mcg/kg/min (10/30/22 0421)   • senna  8 8 mg Per NG Tube BID LURDES Gupta     • sodium chloride  4 mL Nebulization Q6H LURDES Smyth       Continuous Infusions:  propofol, 5-50 mcg/kg/min, Last Rate: 5 mcg/kg/min (10/30/22 0421)      PRN Meds:   acetaminophen, 650 mg, Q6H PRN  fentanyl citrate (PF), 50 mcg, Q2H PRN  hydrALAZINE, 5 mg, Q6H PRN        Invasive Devices Review  Invasive Devices  Report    Peripheral Intravenous Line  Duration           Long-Dwell Peripheral IV (Midline) 04/81/18 Left Basilic 8 days    Peripheral IV 10/29/22 Left Wrist 1 day    Peripheral IV 10/29/22 Right Hand 1 day          Arterial Line  Duration           Arterial Line 10/25/22 4 days          Drain  Duration           NG/OG/Enteral Tube Orogastric 16 Fr Center mouth 4 days    External Urinary Catheter 3 days          Airway  Duration           ETT  7 mm 4 days                ---------------------------------------------------------------------------------------  Care Time Delivered:   No Critical Care time spent       LURDES Linares      Portions of the record may have been created with voice recognition software    Occasional wrong word or "sound a like" substitutions may have occurred due to the inherent limitations of voice recognition software    Read the chart carefully and recognize, using context, where substitutions have occurred

## 2022-10-30 NOTE — RESPIRATORY THERAPY NOTE
RT Ventilator Management Note  Tony Bright 57 y o  female MRN: [id number]  Unit/Bed#: ICU 05 Encounter: [id number]      Daily Screen       10/29/2022  0755 10/30/2022  0754          Patient safety screen outcome[de-identified] Failed Failed      Not Ready for Weaning due to[de-identified] FiO2 >60%;PEEP > 8cmH2O Underline problem not resolved              Physical Exam:   Assessment Type: During-treatment  General Appearance: Sedated  Respiratory Pattern: Assisted  Chest Assessment: Chest expansion symmetrical  Bilateral Breath Sounds: Diminished  Cough: None  Suction: ET Tube  O2 Device: vent      Resp Comments: tachypneic   Received on vent as ordered  Tolerated all well this shift

## 2022-10-30 NOTE — ASSESSMENT & PLAN NOTE
· 10/17 presented with progressively worsening shortness of breath over the past several weeks  · Recently had COVID, tested positive 8/19 and was hospitalized for 5 days requiring nasal cannula  · 10/16 seen OP by Pulm for continued SOB with concern for superimposed pna, rx'ed OP abx  · Likely 2/2 pneumonia +/- exacerbation of COPD  · 10/17 ETT in ER for hypoxia, work of breathing   Completed Cefepime/Vanco course 10/23  · 10/25 extubated to bipap, though significant hypoxia with rolling   · 10/25 re-intubated with PEA arrest (difficult intubation requiring 3 attempts)  · AC/VC 24/400/45/8  · Continue to titrate fio2 and PEEP as tolerated for O2 sat 92% or above   · Continue zoysn for PNA   · VAP ppx; chlorhexidine, PPI, HOB greater than 30 degrees  · Solumedrol course completed 10/27  · Scheduled duonebs, aggressive pulmonary toileting   · S/p bronch 10/22 for minimal thick secretions  · Poor SBT candidate 2/2 continued encephalopathy   · CXR-R sided mucus plugging, continue duo-nebs and 3% NSS q6h, and chest PT TID

## 2022-10-30 NOTE — ASSESSMENT & PLAN NOTE
· 10/25 PEA arrest during reintubation, secondary to hypoxia   · TTM initiated 10/25, 36 degrees achieved 10/25 at 1430  · Echo 10/25: EF 67%  · 10/29 CT head shows diffuse loss of gray/white matter suspicious for hypoxic injury

## 2022-10-30 NOTE — ASSESSMENT & PLAN NOTE
· 10/17 CTA chest -- dense consolidation in right lower lobe and left sided consolidation consistent with multilobar pneumonia  · Sputum cx resp tova, Strep pneumo and legionella negative  · Completed cefepime and vanc course 10/23  · 10/22 S/p bronchoscopy with repeat sputum culture showing no polys or bacteria  · 10/25 extubated to bipap, hypoxic, reintubated   · Chest PT TID, ETT suctioning PRN, 3% NSS nebs   · Trend fever curve, WBC and procal   · Abx reinitiated in the setting of likely aspiration during reintubation   · Zosyn D5  · 10/26 sputum cx with candida

## 2022-10-30 NOTE — UTILIZATION REVIEW
Continued Stay Review    Date: 10/30/22                          Current Patient Class: inpatient  Current Level of Care: ICU    HPI:61 y o  female initially admitted on 10/17/22     Assessment/Plan:   HPI/24hr events: Patient remained on mechanical ventilation however she was more awake and breathing at a faster rate with increasing peak pressures  She was started on low dose propofol  She does have some improvement in her neuro exam as she was noted to have corneal reflexes, a gag and withdraws to painful stimuli  Exam:  Bilateral pupils 2 mm and sluggish but reactive  B/L lung sounds diminished  OGT via tube feeds  Bowel sounds normoactive  + corneals, + gag  Withdraws to painful stimuli      IVABT continued for pneumonia  IV Hydralazine x 1 for /86  Vital Signs:   Date/Time Temp Pulse Resp BP MAP (mmHg) Arterial Line BP MAP SpO2 FiO2 (%) O2 Device Patient Position - Orthostatic VS   10/30/22 0900 99 5 °F (37 5 °C) 95 30 Abnormal  -- -- 137/60 81 mmHg 99 % -- -- --   10/30/22 0800 99 86 °F (37 7 °C) 100 52 Abnormal  -- -- 165/70 97 mmHg 100 % -- -- --   10/30/22 0754 -- -- -- -- -- -- -- 100 % 40 Ventilator --   10/30/22 0600 99 5 °F (37 5 °C) 97 34 Abnormal  -- -- 152/69 92 mmHg 98 % -- -- --   10/30/22 0500 99 5 °F (37 5 °C) 102 40 Abnormal  -- -- 179/74 102 mmHg 99 % -- -- --   10/30/22 0422 -- -- -- -- -- -- -- 97 % 45 Ventilator --   10/30/22 0400 99 3 °F (37 4 °C) 101 45 Abnormal  -- -- 191/77 108 mmHg 99 % -- -- --   10/30/22 0300 99 7 °F (37 6 °C) 100 53 Abnormal  176/86 Abnormal  122 174/70 101 mmHg 97 % -- -- --     Pertinent Labs/Diagnostic Results:   10/29  XR chest portable ICU=  New right-sided opacity which appears to be loculated pleural fluid in the fissure and along the mediastinal border  Right greater than left bibasilar atelectasis  Endotracheal and endogastric tubes appear satisfactory  Multiple artifacts      Results from last 7 days   Lab Units 10/30/22  0554 10/29/22  8657 10/28/22  0548 10/27/22  0536 10/26/22  0525 10/25/22  1038 10/25/22  0451   WBC Thousand/uL 19 72* 14 27* 18 76* 25 01* 18 95*  --  15 32*   HEMOGLOBIN g/dL 11 8 11 7 13 4 13 5 13 2  --  11 7   I STAT HEMOGLOBIN   --   --   --   --   --    < >  --    HEMATOCRIT % 37 5 36 7 41 1 42 6 41 4  --  37 5   HEMATOCRIT, ISTAT   --   --   --   --   --    < >  --    PLATELETS Thousands/uL 318 316 370 357 344  --  286   NEUTROS ABS Thousands/µL  --  11 45* 16 45* 21 94*  --   --   --    BANDS PCT %  --   --   --   --   --   --  4    < > = values in this interval not displayed  Results from last 7 days   Lab Units 10/30/22  0554 10/29/22  0927 10/28/22  0548 10/27/22  0536 10/26/22  1758 10/26/22  0525 10/25/22  1232 10/25/22  1038 10/25/22  0451   SODIUM mmol/L 140 137 136 138 141 141   < >  --  139   POTASSIUM mmol/L 4 2 3 8 4 1 4 6 4 3 3 2*   < >  --  4 2   CHLORIDE mmol/L 105 102 100 103 104 103   < >  --  101   CO2 mmol/L 28 28 25 27 27 28   < >  --  33*   ANION GAP mmol/L 7 7 11 8 10 10   < >  --  5   BUN mg/dL 34* 42* 39* 33* 33* 33*   < >  --  32*   CREATININE mg/dL 1 00 1 12 1 01 1 02 1 01 0 83   < >  --  0 95   EGFR ml/min/1 73sq m 60 52 59 59 59 75   < >  --  64   CALCIUM mg/dL 8 3 8 6 8 7 9 1 8 6 9 0   < >  --  8 4   CALCIUM, IONIZED mmol/L  --   --   --   --   --  1 17  --   --   --    CALCIUM, IONIZED, ISTAT mmol/L  --   --   --   --   --   --   --  1 24  --    MAGNESIUM mg/dL 2 1 2 2 2 0 2 1  --  1 9  --   --  2 1   PHOSPHORUS mg/dL  --  4 4* 3 7 4 0  --  3 4  --   --  3 5    < > = values in this interval not displayed       Results from last 7 days   Lab Units 10/25/22  1232   AST U/L 48*   ALT U/L 50   ALK PHOS U/L 97   TOTAL PROTEIN g/dL 6 5   ALBUMIN g/dL 2 6*   TOTAL BILIRUBIN mg/dL 0 56     Results from last 7 days   Lab Units 10/30/22  0552 10/29/22  2259 10/29/22  1745 10/29/22  1105 10/29/22  0517 10/28/22  2310 10/28/22  1801 10/28/22  1154 10/28/22  0547 10/27/22  2323 10/27/22  1728 10/27/22  1138 POC GLUCOSE mg/dl 209* 188* 206* 206* 191* 191* 215* 206* 174* 145* 126 107     Results from last 7 days   Lab Units 10/30/22  0554 10/29/22  0927 10/28/22  0548 10/27/22  0536 10/26/22  1758 10/26/22  0525 10/25/22  2014 10/25/22  1232 10/25/22  0451 10/24/22  0552   GLUCOSE RANDOM mg/dL 225* 217* 212* 140 155* 173* 118 252* 178* 148*     Results from last 7 days   Lab Units 10/28/22  0548 10/27/22  0812 10/26/22  0525   PH ART  7 454* 7 419 7 394   PCO2 ART mm Hg 37 9 44 1* 43 8   PO2 ART mm Hg 72 5* 66 1* 109 1   HCO3 ART mmol/L 26 0 27 9 26 2   BASE EXC ART mmol/L 2 2 2 9 1 0   O2 CONTENT ART mL/dL 18 9 18 2 17 6   O2 HGB, ARTERIAL % 94 4 92 5* 97 7*   ABG SOURCE  Line, Arterial Line, Arterial Line, Arterial     Results from last 7 days   Lab Units 10/25/22  1038   PH, LANCE I-STAT  7 053*   PCO2, LANCE ISTAT mm HG >103 0*   PO2, LANCE ISTAT mm HG <13 0*     Results from last 7 days   Lab Units 10/26/22  0525   PROTIME seconds 13 7   INR  1 04   PTT seconds 28     Results from last 7 days   Lab Units 10/29/22  0927 10/28/22  0548 10/27/22  0536 10/26/22  0525 10/25/22  0451   PROCALCITONIN ng/ml 3 39* 5 21* 10 49* 12 09* 0 28*     Results from last 7 days   Lab Units 10/26/22  0525   LACTIC ACID mmol/L 0 7     Results from last 7 days   Lab Units 10/26/22  1244 10/26/22  1243 10/26/22  1240   BLOOD CULTURE  No Growth at 72 hrs   No Growth at 72 hrs   --    SPUTUM CULTURE   --   --  1+ Growth of Candida albicans*  Few Colonies of    GRAM STAIN RESULT   --   --  Rare Polys  No organisms seen     Medications:   Scheduled Medications:  atorvastatin, 80 mg, Oral, Daily With Dinner  chlorhexidine, 15 mL, Mouth/Throat, Q12H MATTHEW  enoxaparin, 40 mg, Subcutaneous, Q24H MATTHEW  insulin glargine, 22 Units, Subcutaneous, HS  insulin lispro, 4-20 Units, Subcutaneous, Q6H MATTHEW  ipratropium-albuterol, 3 mL, Nebulization, Q6H  omeprazole (PRILOSEC) suspension 2 mg/mL, 20 mg, Oral, Daily  piperacillin-tazobactam, 4 5 g, Intravenous, Q6H  polyethylene glycol, 17 g, Oral, Daily  [START ON 10/31/2022] senna, 8 8 mg, Per NG Tube, Daily  sodium chloride, 4 mL, Nebulization, Q6H    Continuous IV Infusions:  propofol, 5-50 mcg/kg/min, Intravenous, Titrated    PRN Meds:  acetaminophen, 650 mg, Oral, Q6H PRN  fentanyl citrate (PF), 50 mcg, Intravenous, Q2H PRN  hydrALAZINE, 5 mg, Intravenous, Q6H PRN, 10/30 x1    Discharge Plan: d    Network Utilization Review Department  ATTENTION: Please call with any questions or concerns to [phone] and carefully listen to the prompts so that you are directed to the right person  All voicemails are confidential   Nelson Grand all requests for admission clinical reviews, approved or denied determinations and any other requests to dedicated fax number below belonging to the campus where the patient is receiving treatment   List of dedicated fax numbers for the Facilities:  [address] DENIALS (Administrative/Medical Necessity) [phone]   [address] (Maternity/NICU/Pediatrics) [phone]   4 Maia Bellamy [phone]   [place] [address] [phone]   [address] [place] [address] Sourav Benz 28 [phone]   1554 [facility] [address] [phone]

## 2022-10-31 PROBLEM — I83.10 VARICOSE VEINS WITH INFLAMMATION: Status: RESOLVED | Noted: 2017-09-20 | Resolved: 2022-01-01

## 2022-10-31 PROBLEM — E66.01 OBESITY, CLASS III, BMI 40-49.9 (MORBID OBESITY) (HCC): Chronic | Status: ACTIVE | Noted: 2018-11-30

## 2022-10-31 PROBLEM — M16.12 PRIMARY OSTEOARTHRITIS OF ONE HIP, LEFT: Status: RESOLVED | Noted: 2018-08-31 | Resolved: 2022-01-01

## 2022-10-31 PROBLEM — I83.813 VARICOSE VEINS OF BOTH LOWER EXTREMITIES WITH PAIN: Chronic | Status: ACTIVE | Noted: 2017-09-20

## 2022-10-31 PROBLEM — M54.16 LUMBAR RADICULOPATHY: Status: RESOLVED | Noted: 2017-11-27 | Resolved: 2022-01-01

## 2022-10-31 PROBLEM — G47.19 DAYTIME HYPERSOMNOLENCE: Status: RESOLVED | Noted: 2019-05-13 | Resolved: 2022-01-01

## 2022-10-31 PROBLEM — D22.9 ATYPICAL NEVI: Status: RESOLVED | Noted: 2020-05-04 | Resolved: 2022-01-01

## 2022-10-31 PROBLEM — J40 BRONCHITIS: Chronic | Status: RESOLVED | Noted: 2020-05-04 | Resolved: 2022-01-01

## 2022-10-31 PROBLEM — R10.32 GROIN PAIN, LEFT: Chronic | Status: RESOLVED | Noted: 2018-02-01 | Resolved: 2022-01-01

## 2022-10-31 PROBLEM — M96.1 POSTLAMINECTOMY SYNDROME, LUMBAR REGION: Chronic | Status: ACTIVE | Noted: 2018-12-17

## 2022-10-31 PROBLEM — Z78.0 POSTMENOPAUSAL: Status: RESOLVED | Noted: 2021-01-14 | Resolved: 2022-01-01

## 2022-10-31 PROBLEM — G47.00 INSOMNIA: Status: RESOLVED | Noted: 2017-08-14 | Resolved: 2022-01-01

## 2022-10-31 PROBLEM — Z86.16 HISTORY OF COVID-19: Status: RESOLVED | Noted: 2022-01-01 | Resolved: 2022-01-01

## 2022-10-31 PROBLEM — N85.00 ENDOMETRIAL HYPERPLASIA: Status: RESOLVED | Noted: 2021-01-14 | Resolved: 2022-01-01

## 2022-10-31 NOTE — ASSESSMENT & PLAN NOTE
· 10/25 PEA arrest during reintubation, secondary to hypoxia   · TTM initiated 10/25, 36 degrees achieved 10/25 at 1430  · Echo 10/25: EF 67%  · 10/29 CT head shows diffuse loss of gray/white matter suspicious for hypoxic injury   · MRI pending

## 2022-10-31 NOTE — UTILIZATION REVIEW
Continued Stay Review    Date: 10/31/2022                         Current Patient Class: Inpatient  Current Level of Care: Critical Care    HPI:61 y o  female initially admitted on  10/17/2022    Assessment/Plan:  Patient continued to have intermittent tachypnea with peak pressures in the high 40s  She received p r n  Fentanyl x1 and remained on propofol infusion   Scheduled duonebs, aggressive pulmonary toileting  Remains intubated / vented  Continue Tube Feeding  Maintain A Line  Vital Signs: BP (!) 176/86   Pulse 75   Temp 99 °F (37 2 °C)   Resp (!) 24   Ht 5' 3" (1 6 m)   Wt 110 kg (242 lb 11 6 oz)   SpO2 91%   BMI 43 00 kg/m²     Pertinent Labs/Diagnostic Results:     Results from last 7 days   Lab Units 10/31/22  0540 10/30/22  0554 10/29/22  0927 10/28/22  0548 10/27/22  0536 10/25/22  1038 10/25/22  0451   WBC Thousand/uL 14 32* 19 72* 14 27* 18 76* 25 01*   < > 15 32*   HEMOGLOBIN g/dL 10 7* 11 8 11 7 13 4 13 5   < > 11 7   I STAT HEMOGLOBIN   --   --   --   --   --    < >  --    HEMATOCRIT % 33 7* 37 5 36 7 41 1 42 6   < > 37 5   HEMATOCRIT, ISTAT   --   --   --   --   --    < >  --    PLATELETS Thousands/uL 284 318 316 370 357   < > 286   NEUTROS ABS Thousands/µL 11 04*  --  11 45* 16 45* 21 94*   < >  --    BANDS PCT %  --   --   --   --   --   --  4    < > = values in this interval not displayed       Results from last 7 days   Lab Units 10/31/22  0540 10/30/22  0554 10/29/22  0927 10/28/22  0548 10/27/22  0536 10/26/22  1758 10/26/22  0525 10/25/22  1232 10/25/22  1038   SODIUM mmol/L 139 140 137 136 138   < > 141   < >  --    POTASSIUM mmol/L 4 1 4 2 3 8 4 1 4 6   < > 3 2*   < >  --    CHLORIDE mmol/L 105 105 102 100 103   < > 103   < >  --    CO2 mmol/L 28 28 28 25 27   < > 28   < >  --    ANION GAP mmol/L 6 7 7 11 8   < > 10   < >  --    BUN mg/dL 27* 34* 42* 39* 33*   < > 33*   < >  --    CREATININE mg/dL 0 88 1 00 1 12 1 01 1 02   < > 0 83   < >  --    EGFR ml/min/1 73sq m 70 60 52 59 59   < > 75   < >  --    CALCIUM mg/dL 8 0* 8 3 8 6 8 7 9 1   < > 9 0   < >  --    CALCIUM, IONIZED mmol/L  --   --   --   --   --   --  1 17  --   --    CALCIUM, IONIZED, ISTAT mmol/L  --   --   --   --   --   --   --   --  1 24   MAGNESIUM mg/dL 2 1 2 1 2 2 2 0 2 1  --  1 9  --   --    PHOSPHORUS mg/dL 2 9  --  4 4* 3 7 4 0  --  3 4  --   --     < > = values in this interval not displayed       Results from last 7 days   Lab Units 10/25/22  1232   AST U/L 48*   ALT U/L 50   ALK PHOS U/L 97   TOTAL PROTEIN g/dL 6 5   ALBUMIN g/dL 2 6*   TOTAL BILIRUBIN mg/dL 0 56     Results from last 7 days   Lab Units 10/31/22  1212 10/31/22  0004 10/30/22  2151 10/30/22  1703 10/30/22  1315 10/30/22  0552 10/29/22  2259 10/29/22  1745 10/29/22  1105 10/29/22  0517 10/28/22  2310 10/28/22  1801   POC GLUCOSE mg/dl 159* 165* 143* 197* 189* 209* 188* 206* 206* 191* 191* 215*     Results from last 7 days   Lab Units 10/31/22  0540 10/30/22  0554 10/29/22  0927 10/28/22  0548 10/27/22  0536 10/26/22  1758 10/26/22  0525 10/25/22  2014 10/25/22  1232 10/25/22  0451   GLUCOSE RANDOM mg/dL 172* 225* 217* 212* 140 155* 173* 118 252* 178*      Results from last 7 days   Lab Units 10/28/22  0548 10/27/22  0812 10/26/22  0525   PH ART  7 454* 7 419 7 394   PCO2 ART mm Hg 37 9 44 1* 43 8   PO2 ART mm Hg 72 5* 66 1* 109 1   HCO3 ART mmol/L 26 0 27 9 26 2   BASE EXC ART mmol/L 2 2 2 9 1 0   O2 CONTENT ART mL/dL 18 9 18 2 17 6   O2 HGB, ARTERIAL % 94 4 92 5* 97 7*   ABG SOURCE  Line, Arterial Line, Arterial Line, Arterial     Results from last 7 days   Lab Units 10/25/22  1038   PH, LANCE I-STAT  7 053*   PCO2, LANCE ISTAT mm HG >103 0*   PO2, LANCE ISTAT mm HG <13 0*       Results from last 7 days   Lab Units 10/26/22  0525   PROTIME seconds 13 7   INR  1 04   PTT seconds 28     Results from last 7 days   Lab Units 10/31/22  0540 10/29/22  0927 10/28/22  0548 10/27/22  0536 10/26/22  0525   PROCALCITONIN ng/ml 1 11* 3 39* 5 21* 10 49* 12 09* Results from last 7 days   Lab Units 10/26/22  0525   LACTIC ACID mmol/L 0 7     Results from last 7 days   Lab Units 10/26/22  1244 10/26/22  1243 10/26/22  1240   BLOOD CULTURE  No Growth After 5 Days  No Growth After 5 Days  --    SPUTUM CULTURE   --   --  1+ Growth of Candida albicans*  Few Colonies of    GRAM STAIN RESULT   --   --  Rare Polys  No organisms seen     Medications:   Scheduled Medications:  atorvastatin, 80 mg, Oral, Daily With Dinner  chlorhexidine, 15 mL, Mouth/Throat, Q12H MATTHEW  enoxaparin, 40 mg, Subcutaneous, Q24H [facility]  insulin glargine, 22 Units, Subcutaneous, HS  insulin lispro, 4-20 Units, Subcutaneous, Q6H [facility]  ipratropium-albuterol, 3 mL, Nebulization, Q6H  omeprazole (PRILOSEC) suspension 2 mg/mL, 20 mg, Oral, Daily  polyethylene glycol, 17 g, Oral, Daily  senna, 8 8 mg, Per NG Tube, Daily  sodium chloride, 4 mL, Nebulization, Q6H      Continuous IV Infusions:  propofol, 5-50 mcg/kg/min, Intravenous, Titrated      PRN Meds:  acetaminophen, 650 mg, Oral, Q6H PRN  fentanyl citrate (PF), 50 mcg, Intravenous, Q2H PRN  hydrALAZINE, 5 mg, Intravenous, Q6H PRN        Discharge Plan: D    Network Utilization Review Department  ATTENTION: Please call with any questions or concerns to [phone] and carefully listen to the prompts so that you are directed to the right person  All voicemails are confidential   Cathie Quiñonez all requests for admission clinical reviews, approved or denied determinations and any other requests to dedicated fax number below belonging to the campus where the patient is receiving treatment   List of dedicated fax numbers for the Facilities:  [address] [facility] [address] DENIALS (Administrative/Medical Necessity) [phone]   [address] [place] [address] (Maternity/NICU/Pediatrics) [address] [facility] [address] [place] [address] [address] [place] [address] [place] [address] [facility] [address] [facility] 310 [facility] [place] [address] [phone]

## 2022-10-31 NOTE — PROGRESS NOTES
Charlie 44  Progress Note - [facility] 1960, 57 y o  female MRN: [id number]  Unit/Bed#: ICU 05 Encounter: [id number]  Primary Care Provider: Renuka Obando MD   Date and time admitted to hospital: 10/17/2022 10:24 AM    * Acute respiratory failure with hypoxia and hypercapnia [facility])  Assessment & Plan  · 10/17 presented with progressively worsening shortness of breath over the past several weeks  · Recently had COVID, tested positive 8/19 and was hospitalized for 5 days requiring nasal cannula  · 10/16 seen OP by Pulm for continued SOB with concern for superimposed pna, rx'ed OP abx  · Likely 2/2 pneumonia +/- exacerbation of COPD  · 10/17 ETT in ER for hypoxia, work of breathing   Completed Cefepime/Vanco course 10/23  · 10/25 extubated to bipap, though significant hypoxia with rolling   · 10/25 re-intubated with PEA arrest (difficult intubation requiring 3 attempts)  · AC/VC 24/400/40/6  · Continue to titrate fio2 and PEEP as tolerated for O2 sat 92% or above   · Zosyn completed on 10/31  · VAP ppx; chlorhexidine, PPI, HOB greater than 30 degrees  · Solumedrol course completed 10/27  · Scheduled duonebs, aggressive pulmonary toileting   · S/p bronch 10/22 for minimal thick secretions  · Poor SBT candidate 2/2 continued encephalopathy   · CXR-R sided mucus plugging, continue duo-nebs and 3% NSS q6h, and chest PT TID      Cardiac arrest [facility])  Assessment & Plan  · 10/25 PEA arrest during reintubation, secondary to hypoxia   · TTM initiated 10/25, 36 degrees achieved 10/25 at 1430  · Echo 10/25: EF 67%  · 10/29 CT head shows diffuse loss of gray/white matter suspicious for hypoxic injury   · MRI pending      Pneumonia  Assessment & Plan  · 10/17 CTA chest -- dense consolidation in right lower lobe and left sided consolidation consistent with multilobar pneumonia  · Sputum cx resp tova, Strep pneumo and legionella negative  · Completed cefepime and vanc course 10/23  · 10/22 S/p bronchoscopy with repeat sputum culture showing no polys or bacteria  · 10/25 extubated to bipap, hypoxic, reintubated   · Chest PT TID, ETT suctioning PRN, 3% NSS nebs   · Trend fever curve, WBC and procal   · Abx reinitiated in the setting of likely aspiration during reintubation   · Zosyn completed on 10/30 after 5 days  · 10/26 sputum cx with candida     Severe sepsis ([facility])  Assessment & Plan  · POA AEB tachycardia, tachypnea, fever 103  Multilobar pneumonia present on CT scan   · Completed day 7/7 cefepime/vanco on 10/23  · Concern for aspiration event 10/25 with reintubation  -- Zosyn completed on 10/30 after 5 days  · Trend fever curve, WBC  · Procal downtrending   · Repeat BC x 2 10/26 neg at 48 hours    Anoxic brain injury [facility])  Assessment & Plan  · Initially in the setting of hypercarbia/sepsis   Now s/p PEA arrest, concern for anoxic brain injury  · 10/25 EEG-burst suppression background, indicative of severe diffuse cerebral dysfunction/poor prognosis   · 10/29: CT head-Diffuse loss of gray-white matter differentiation in bilateral cerebral hemispheres with some cerebral sulcal effacement, suspicious for diffuse hypoxic ischemic injury  · Neuro exam on axial weaning however patient does have protective reflexes  · Overall has poor prognosis with low likelihood of any meaningful neurologic recovery  · Neuro checks q4h     COPD exacerbation (HCC)  Assessment & Plan  · COPD with AE and acute PNA POA   · Solumedrol d/c 10/27  · Continue duonebs  · Was actively smoking PTA     Obstructive sleep apnea  Assessment & Plan  · Intubated as above    Type 2 diabetes mellitus with hyperglycemia, with long-term current use of insulin [facility])  Assessment & Plan  Lab Results   Component Value Date    HGBA1C 9 4 (H) 10/20/2022       Recent Labs     10/30/22  1315 10/30/22  1703 10/30/22  2151 10/31/22  0004   POCGLU 189* 197* 143* 165*       Blood Sugar Average: Last 72 hrs:  (P) 190 [id number]     · Hyperglycemia with BG 400s on presentation, no elevation in anion gap   · PTA meds include lantus, meal coverage insulin, victoza and metformin  · Continue SSI coverage, Lantus increased but held since patient is NPO   · Goal blood glucose 140-180    Chronic pain syndrome  Assessment & Plan  · OP: tramadol -- hold in the setting of above     Coronary artery disease involving native coronary artery of native heart with angina pectoris [facility])  Assessment & Plan  · Cardiac cath in 2019 demonstrated non-obstructive CAD with moderate stenosis of the RCA  · EKG on admission showing sinus tachycardia, no acute ST changes  · Trops negative   · Continue statin    Hypertension  Assessment & Plan  · Home meds: cardizem, hctz, lisinopril -- holding home meds for now   · PRN hydralazine   · Goal SBP <160    Depression with anxiety  Assessment & Plan  · Home meds: wellbutrin, cymbalta, seroquel  · Hold 2/2 AMS      ----------------------------------------------------------------------------------------  HPI/24hr events:  Patient continued to have intermittent tachypnea with peak pressures in the high 40s  She received p r n  Fentanyl x1 and remained on propofol infusion      Patient appropriate for transfer out of the ICU today?: No  Disposition: Continue Critical Care   Code Status: Level 2 - DNAR: but accepts endotracheal intubation  ---------------------------------------------------------------------------------------  SUBJECTIVE  Intubated    Review of Systems  Review of systems was unable to be performed secondary to Intubation  ---------------------------------------------------------------------------------------  OBJECTIVE    Vitals   Vitals:    10/31/22 0000 10/31/22 0100 10/31/22 0131 10/31/22 0315   BP:       BP Location:       Pulse: 85 85     Resp: (!) 25 (!) 29     Temp: (!) 100 6 °F (38 1 °C)      TempSrc:       SpO2: 95% 94% 95% 98%   Weight:       Height:         Temp (24hrs), Av 2 °F (37 9 °C), Min:99 5 °F (37 5 °C), Max:101 5 °F (38 6 °C)  Current: Temperature: (!) 100 6 °F (38 1 °C)  Arterial Line BP: 129/60  Arterial Line MAP (mmHg): 79 mmHg    Respiratory:  SpO2: SpO2: 98 %       Invasive/non-invasive ventilation settings   Respiratory  Report   Lab Data (Last 4 hours)    None         O2/Vent Data (Last 4 hours)      10/31 0315           Vent Mode AC/VC       Resp Rate (BPM) (BPM) 24       Vt (mL) (mL) 400       FIO2 (%) (%) 40       PEEP (cmH2O) (cmH2O) 6       MV 10 4                   Physical Exam  Vitals and nursing note reviewed  Constitutional:       Appearance: She is obese  She is ill-appearing and toxic-appearing  Comments: Bilateral Wrist restraints in place   HENT:      Head: Normocephalic and atraumatic  Right Ear: Tympanic membrane, ear canal and external ear normal       Left Ear: Tympanic membrane, ear canal and external ear normal       Nose: Nose normal       Mouth/Throat:      Mouth: Mucous membranes are dry  Pharynx: Oropharynx is clear  Eyes:      Comments: Bilateral pupils 2 mm reactive but sluggish   Cardiovascular:      Rate and Rhythm: Normal rate and regular rhythm  Pulses: Normal pulses  Heart sounds: Normal heart sounds  Pulmonary:      Comments: ETT on mechanical ventilation  Bilateral breath sounds diminished worse  + white secretions  Abdominal:      Comments: OG tube with tube feeds  Bowel sounds normoactive   Genitourinary:     Comments: Urinary catheter  Musculoskeletal:         General: Normal range of motion  Cervical back: Normal range of motion and neck supple  Skin:     General: Skin is warm and dry  Capillary Refill: Capillary refill takes less than 2 seconds     Neurological:      Comments: + cough  + gag  + corneals  Does not follow commands  Withdrawals to painful stimuli in lower extremities   Psychiatric:      Comments: Intubated, on sedation             Laboratory and Diagnostics:  Results from last 7 days   Lab Units 10/30/22  5276 10/29/22  4890 10/28/22  0548 10/27/22  0536 10/26/22  0525 10/25/22  1038 10/25/22  0451 10/24/22  0552   WBC Thousand/uL 19 72* 14 27* 18 76* 25 01* 18 95*  --  15 32* 16 89*   HEMOGLOBIN g/dL 11 8 11 7 13 4 13 5 13 2  --  11 7 12 1   I STAT HEMOGLOBIN g/dl  --   --   --   --   --  17 0*  --   --    HEMATOCRIT % 37 5 36 7 41 1 42 6 41 4  --  37 5 40 0   HEMATOCRIT, ISTAT %  --   --   --   --   --  50*  --   --    PLATELETS Thousands/uL 318 316 370 357 344  --  286 289   NEUTROS PCT %  --  80* 88* 88*  --   --   --   --    BANDS PCT %  --   --   --   --   --   --  4  --    MONOS PCT %  --  6 5 4  --   --   --   --    MONO PCT %  --   --   --   --   --   --  5  --      Results from last 7 days   Lab Units 10/30/22  0554 10/29/22  0927 10/28/22  0548 10/27/22  0536 10/26/22  1758 10/26/22  0525 10/25/22  2014 10/25/22  1232   SODIUM mmol/L 140 137 136 138 141 141 139 138   POTASSIUM mmol/L 4 2 3 8 4 1 4 6 4 3 3 2* 3 9 5 0   CHLORIDE mmol/L 105 102 100 103 104 103 102 101   CO2 mmol/L 28 28 25 27 27 28 31 28   ANION GAP mmol/L 7 7 11 8 10 10 6 9   BUN mg/dL 34* 42* 39* 33* 33* 33* 39* 37*   CREATININE mg/dL 1 00 1 12 1 01 1 02 1 01 0 83 1 11 1 37*   CALCIUM mg/dL 8 3 8 6 8 7 9 1 8 6 9 0 9 0 8 4   GLUCOSE RANDOM mg/dL 225* 217* 212* 140 155* 173* 118 252*   ALT U/L  --   --   --   --   --   --   --  50   AST U/L  --   --   --   --   --   --   --  48*   ALK PHOS U/L  --   --   --   --   --   --   --  97   ALBUMIN g/dL  --   --   --   --   --   --   --  2 6*   TOTAL BILIRUBIN mg/dL  --   --   --   --   --   --   --  0 56     Results from last 7 days   Lab Units 10/30/22  0554 10/29/22  0927 10/28/22  0548 10/27/22  0536 10/26/22  0525 10/25/22  0451   MAGNESIUM mg/dL 2 1 2 2 2 0 2 1 1 9 2 1   PHOSPHORUS mg/dL  --  4 4* 3 7 4 0 3 4 3 5      Results from last 7 days   Lab Units 10/26/22  0525   INR  1 04   PTT seconds 28          Results from last 7 days   Lab Units 10/26/22  0525   LACTIC ACID mmol/L 0 7     ABG:  Results from last 7 days   Lab Units 10/28/22  0548   PH ART  7 454*   PCO2 ART mm Hg 37 9   PO2 ART mm Hg 72 5*   HCO3 ART mmol/L 26 0   BASE EXC ART mmol/L 2 2   ABG SOURCE  Line, Arterial     VBG:  Results from last 7 days   Lab Units 10/28/22  0548   ABG SOURCE  Line, Arterial     Results from last 7 days   Lab Units 10/29/22  0927 10/28/22  0548 10/27/22  0536 10/26/22  0525 10/25/22  0451   PROCALCITONIN ng/ml 3 39* 5 21* 10 49* 12 09* 0 28*       Micro  Results from last 7 days   Lab Units 10/26/22  1244 10/26/22  1243 10/26/22  1240   BLOOD CULTURE  No Growth After 4 Days  No Growth After 4 Days  --    SPUTUM CULTURE   --   --  1+ Growth of Candida albicans*  Few Colonies of    GRAM STAIN RESULT   --   --  Rare Polys  No organisms seen       EKG:  Normal sinus rhythm heart rate 85 alarms on   Imaging: I have personally reviewed pertinent reports  XR chest portable    Result Date: 10/22/2022  Impression: 1  Status post bronchoscopy with no pneumothorax  2   Recurrent right upper lobe consolidation likely at least in part due to atelectasis  3   Left predominantly perihilar pulmonary opacity which may represent atelectasis, asymmetric pulmonary edema, or pneumonia  Findings marked for immediate notification in Epic  Workstation performed: [id number]     XR chest portable    Result Date: 10/21/2022  Impression: Persistent bibasilar atelectasis  Pneumonia not excluded in the appropriate clinical setting  Workstation performed: [id number]     XR chest 1 view portable    Result Date: 10/17/2022  Impression: Mild pulmonary venous congestion with trace right effusion  Workstation performed: [id number]     XR chest portable ICU    Result Date: 10/19/2022  Impression: Persistent though improved bilateral lower lobe airspace consolidations consistent with pneumonia  Workstation performed: [id number]     CTA chest pe study    Result Date: 10/17/2022  Impression: No pulmonary embolism   Dense consolidation in the right lower lobe and medial aspect of the left lower lobe suspicious for multilobar pneumonia  Endotracheal tube tip at the ca could be retracted 2 cm  Enlarged fatty liver  Workstation performed: [id number]     Intake and Output  I/O       10/29 0701  10/30 0700 10/30 0701  10/31 0700    I V  (mL/kg) 449 (4 1) 197 (1 8)    NG/ 420    IV Piggyback 300 200    Feedings 480 400    Total Intake(mL/kg) 1709 (15 7) 1217 (11 2)    Urine (mL/kg/hr) 1550 (0 6) 1250 (0 5)    Stool  0    Total Output 1550 1250    Net +159 -33          Unmeasured Urine Occurrence 1 x     Unmeasured Stool Occurrence  3 x          Height and Weights   Height: 5' 3" (160 cm)     Body mass index is 42 41 kg/m²  Weight (last 2 days)     Date/Time Weight    10/30/22 0555 109 (239 42)    10/29/22 0421 110 (241 4)            Nutrition       Diet Orders   (From admission, onward)             Start     Ordered    10/26/22 1730  Diet Enteral/Parenteral; Tube Feeding No Oral Diet; Glucerna 1 2; Continuous; 40; 120; Water; Every 4 hours  Diet effective now        Comments: Start @ 20 cc/hr and increase by 10 cc/hr per protocol until reach goal   References:    Nutrtion Support Algorithm Enteral vs  Parenteral   Question Answer Comment   Diet Type Enteral/Parenteral    Enteral/Parenteral Tube Feeding No Oral Diet    Tube Feeding Formula: Glucerna 1 2    Bolus/Cyclic/Continuous Continuous    Tube Feeding Goal Rate (mL/hr): 40    Tube Feeding flush (mL): 120    Water Flush type: Water    Water flush frequency: Every 4 hours    RD to adjust diet per protocol?  Yes        10/26/22 1729    10/18/22 0844  Room Service  Once        Question:  Type of Service  Answer:  Room Service- Not Appropriate    10/18/22 0843                  Active Medications  Scheduled Meds:  Current Facility-Administered Medications   Medication Dose Route Frequency Provider Last Rate   • acetaminophen  650 mg Oral Q6H PRN LURDES Gupta     • atorvastatin  80 mg Oral Daily With [address] [employer] Valentino Bowling     • chlorhexidine  15 mL Mouth/Throat Q12H [address] [place] [address] Dr Pimentel, [address]     • enoxaparin  40 mg Subcutaneous Q24H [address]     • fentanyl citrate (PF)  50 mcg Intravenous Q2H PRN Tyrelea ChamberSHERLYNNP     • hydrALAZINE  5 mg Intravenous Q6H PRN Tyrelea Chamber, CRNP     • insulin glargine  22 Units Subcutaneous HS LURDES Gupta     • insulin lispro  4-20 Units Subcutaneous Q6H [address] SHERLYN GuptaNP     • ipratropium-albuterol  3 mL Nebulization Q6H LURDES Wright     • omeprazole (PRILOSEC) suspension 2 mg/mL  20 mg Oral Daily LURDES Reddy     • polyethylene glycol  17 g Oral Daily Juan Luis Moncada PA-C     • propofol  5-50 mcg/kg/min Intravenous Titrated SHERLYN GuptaNP 20 mcg/kg/min (10/31/22 0020)   • senna  8 8 mg Per NG Tube Daily LURDES Gupta     • sodium chloride  4 mL Nebulization Q6H LURDES Reddy       Continuous Infusions:  propofol, 5-50 mcg/kg/min, Last Rate: 20 mcg/kg/min (10/31/22 0020)      PRN Meds:   acetaminophen, 650 mg, Q6H PRN  fentanyl citrate (PF), 50 mcg, Q2H PRN  hydrALAZINE, 5 mg, Q6H PRN        Invasive Devices Review  Invasive Devices  Report    Peripheral Intravenous Line  Duration           Long-Dwell Peripheral IV (Midline) 86/01/56 Left Basilic 9 days    Peripheral IV 10/29/22 Left Wrist 2 days    Peripheral IV 10/29/22 Right Hand 2 days          Arterial Line  Duration           Arterial Line 10/25/22 5 days          Drain  Duration           NG/OG/Enteral Tube Orogastric 16 Fr Center mouth 5 days    External Urinary Catheter 4 days          Airway  Duration           ETT  7 mm 5 days                ---------------------------------------------------------------------------------------  Care Time Delivered:   No Critical Care time spent       [facility] Center[facility]      Portions of the record may have been created with voice recognition software  Occasional wrong word or "sound a like" substitutions may have occurred due to the inherent limitations of voice recognition software    Read the chart carefully and recognize, using context, where substitutions have occurred

## 2022-10-31 NOTE — ASSESSMENT & PLAN NOTE
· Initially in the setting of hypercarbia/sepsis   Now s/p PEA arrest, concern for anoxic brain injury  · 10/25 EEG-burst suppression background, indicative of severe diffuse cerebral dysfunction/poor prognosis   · 10/29: CT head-Diffuse loss of gray-white matter differentiation in bilateral cerebral hemispheres with some cerebral sulcal effacement, suspicious for diffuse hypoxic ischemic injury  · Neuro exam on axial weaning however patient does have protective reflexes  · Overall has poor prognosis with low likelihood of any meaningful neurologic recovery  · Neuro checks q4h

## 2022-10-31 NOTE — RESPIRATORY THERAPY NOTE
Transported to MRI on portable ventilator same settings, resumed 840 ventilator same settings upon return to ICU SpO2 92%, tolerated well

## 2022-10-31 NOTE — ASSESSMENT & PLAN NOTE
· 10/17 CTA chest -- dense consolidation in right lower lobe and left sided consolidation consistent with multilobar pneumonia  · Sputum cx resp tova, Strep pneumo and legionella negative  · Completed cefepime and vanc course 10/23  · 10/22 S/p bronchoscopy with repeat sputum culture showing no polys or bacteria  · 10/25 extubated to bipap, hypoxic, reintubated   · Chest PT TID, ETT suctioning PRN, 3% NSS nebs   · Trend fever curve, WBC and procal   · Abx reinitiated in the setting of likely aspiration during reintubation   · Zosyn completed on 10/30 after 5 days  · 10/26 sputum cx with candida

## 2022-10-31 NOTE — ASSESSMENT & PLAN NOTE
Lab Results   Component Value Date    HGBA1C 9 4 (H) 10/20/2022       Recent Labs     10/30/22  1315 10/30/22  1703 10/30/22  2151 10/31/22  0004   POCGLU 189* 197* 143* 165*       Blood Sugar Average: Last 72 hrs:  (P) 190 [id number]     · Hyperglycemia with BG 400s on presentation, no elevation in anion gap   · PTA meds include lantus, meal coverage insulin, victoza and metformin  · Continue SSI coverage, Lantus increased but held since patient is NPO   · Goal blood glucose 140-180

## 2022-10-31 NOTE — ASSESSMENT & PLAN NOTE
· 10/17 presented with progressively worsening shortness of breath over the past several weeks  · Recently had COVID, tested positive 8/19 and was hospitalized for 5 days requiring nasal cannula  · 10/16 seen OP by Pulm for continued SOB with concern for superimposed pna, rx'ed OP abx  · Likely 2/2 pneumonia +/- exacerbation of COPD  · 10/17 ETT in ER for hypoxia, work of breathing   Completed Cefepime/Vanco course 10/23  · 10/25 extubated to bipap, though significant hypoxia with rolling   · 10/25 re-intubated with PEA arrest (difficult intubation requiring 3 attempts)  · AC/VC 24/400/40/6  · Continue to titrate fio2 and PEEP as tolerated for O2 sat 92% or above   · Zosyn completed on 10/31  · VAP ppx; chlorhexidine, PPI, HOB greater than 30 degrees  · Solumedrol course completed 10/27  · Scheduled duonebs, aggressive pulmonary toileting   · S/p bronch 10/22 for minimal thick secretions  · Poor SBT candidate 2/2 continued encephalopathy   · CXR-R sided mucus plugging, continue duo-nebs and 3% NSS q6h, and chest PT TID

## 2022-10-31 NOTE — ASSESSMENT & PLAN NOTE
· POA AEB tachycardia, tachypnea, fever 103   Multilobar pneumonia present on CT scan   · Completed day 7/7 cefepime/vanco on 10/23  · Concern for aspiration event 10/25 with reintubation  -- Zosyn completed on 10/30 after 5 days  · Trend fever curve, WBC  · Procal downtrending   · Repeat BC x 2 10/26 neg at 48 hours

## 2022-10-31 NOTE — PROGRESS NOTES
Weakly withdraws to pain possibility of decerebrating compared to yesterday when pain is induced to both feet  Opens eyes spontaneously at timesDr [facility] aware

## 2022-11-01 NOTE — ASSESSMENT & PLAN NOTE
· 10/25 PEA arrest during reintubation, secondary to hypoxia   · TTM initiated 10/25, 36 degrees achieved 10/25 at 1430  · Echo 10/25: EF 67%  · 10/29 CT head shows diffuse loss of gray/white matter suspicious for hypoxic injury   · MRI showed diffuse restricted diffusion involving the bilateral cerebral and cerebellar cortices concerning for hypoxia/anoxia

## 2022-11-01 NOTE — ASSESSMENT & PLAN NOTE
Lab Results   Component Value Date    HGBA1C 9 4 (H) 10/20/2022       Recent Labs     10/30/22  2151 10/31/22  0004 10/31/22  1212 10/31/22  1625   POCGLU 143* 165* 159* 178*       Blood Sugar Average: Last 72 hrs:  (P) 187 8     · Hyperglycemia with BG 400s on presentation, no elevation in anion gap   · PTA meds include lantus, meal coverage insulin, victoza and metformin  · Continue SSI coverage, Lantus increased but held since patient is NPO   · Goal blood glucose 140-180

## 2022-11-01 NOTE — ASSESSMENT & PLAN NOTE
· 10/17 presented with progressively worsening shortness of breath over the past several weeks  · Recently had COVID, tested positive 8/19 and was hospitalized for 5 days requiring nasal cannula  · 10/16 seen OP by Pulm for continued SOB with concern for superimposed pna, rx'ed OP abx  · Likely 2/2 pneumonia +/- exacerbation of COPD  · 10/17 ETT in ER for hypoxia, work of breathing   Completed Cefepime/Vanco course 10/23  · 10/25 extubated to bipap, though significant hypoxia with rolling   · 10/25 re-intubated with PEA arrest (difficult intubation requiring 3 attempts)  · AC/VC 24/400/40/6  · Continue to titrate fio2 and PEEP as tolerated for O2 sat 92% or above   · Zosyn completed on 10/31  · VAP ppx; chlorhexidine, PPI, HOB greater than 30 degrees  · Solumedrol course completed 10/27  · Scheduled duonebs, aggressive pulmonary toileting   · S/p bronch 10/22 for minimal thick secretions  · Poor SBT candidate 2/2 continued encephalopathy   · CXR-R sided mucus plugging, continue duo-nebs and 3% NSS q6h, and chest PT

## 2022-11-01 NOTE — CASE MANAGEMENT
Case Management Progress Note    Patient name Zohra Armando  Location ICU 05/ICU 05 MRN [id number]  : 1960 Date 2022       LOS (days): 15  Geometric Mean LOS (GMLOS) (days): 5 00  Days to GMLOS:-9 9        OBJECTIVE:        Current admission status: Inpatient  Preferred Pharmacy:   [address] [address] [facility]  Phone: [phone] Fax: [phone]    Primary Care Provider: Rusty Rodas MD    Primary Insurance: [facility] [place]note 2[place] [address] [facility]  Secondary Insurance:     PROGRESS NOTE:    Weekly Care Management Length of Stay Review     Current LOS: 15 Days    Most Recent Labs:     Lab Results   Component Value Date/Time    WBC 15 26 (H) 2022 04:23 AM    HGB 11 2 (L) 2022 04:23 AM    HCT 35 4 2022 04:23 AM     2022 04:23 AM    SODIUM 138 2022 04:23 AM    K 4 6 2022 04:23 AM     2022 04:23 AM    CO2 28 2022 04:23 AM    BUN 25 2022 04:23 AM    CREATININE 0 80 2022 04:23 AM    GLUC 195 (H) 2022 04:23 AM       Most Recent Vitals:   Vitals:    22 0900   BP:    Pulse: 86   Resp: (!) 27   Temp:    SpO2: 98%        Identified Barriers to Discharge/Discharge Goals/Care Management Interventions: resp distress, extubated last week with difficult reintubation shortly after, anoxic brain injury, GOC    Intended Discharge Disposition: TBD    Expected Discharge Date: TBD

## 2022-11-01 NOTE — PROGRESS NOTES
Recommend Glucerna 1 2 at goal rate of 60 mL/hr for a total volume of 1440 mL  This will provide 1728 kcals, 86 grams protein (1 6 grams/kg IBW) and 1159 mL free water  Recommend flushes of 120 mL q 6 hours for a total fluid intake of 1639 mL

## 2022-11-01 NOTE — UTILIZATION REVIEW
Continued Stay Review    Date: 11/1/22                          Current Patient Class: inpatient  Current Level of Care: ICU  HPI:61 y o  female initially admitted on 10/17/22     Assessment/Plan:   Pt remains intubated off sedation since yesterday  MRI brain verified hypoxic/anoxic injury  EEG showed diffuse severe background slowing suggestive of hypoxic/ischemic toxic metabolic encephalopathy  Neurology consulted for prognosis input  Exam: B/L pupils 2 mm and non reactive  B/L breath sounds coarse  Tube feeds via OGT  Posturing position  Withdraws to painful stimuli in B/L LE  Overbreathing the ventilator  + cough  No gag  No corneals       10/31 per neuro:  Neurology recommendations with regards to patient's prognosis was requested  1  Anoxic brain injury  2  Acute on chronic hypoxemic hypercapnic respiratory failure  3  Multifocal pneumonia  4  Severe sepsis  5  COPD  6  DM  7  Coronary artery disease  Appears as though patient has had significant anoxic injury, possibly at risk secondary to ongoing pulmonary disease  She is unresponsive and posturing with occasional reflex findings  Prognosis is poor for any significant recovery or quality of life  If patient should survive she is likely to require ongoing long-term management with or support      Vital Signs:   11/01/22 0900 -- 86 27 Abnormal  -- -- 151/67 94 mmHg 98 % -- --   11/01/22 0800 99 9 °F (37 7 °C) 90 34 Abnormal  -- -- 146/59 86 mmHg 91 % 40 Ventilator   11/01/22 0742 -- -- -- -- -- -- -- 95 % -- --   11/01/22 0600 -- 77 -- -- -- 138/68 92 mmHg 96 % -- --   11/01/22 0500 -- 79 -- -- -- 141/67 91 mmHg 96 % -- --   11/01/22 0400 -- 81 -- -- -- 145/70 95 mmHg 94 % -- --   11/01/22 0354 -- -- -- -- -- -- -- 94 % 40 Ventilator   11/01/22 0300 -- 88 -- -- -- 156/73 100 mmHg 93 % -- --   11/01/22 0200 -- 82 -- -- -- 147/68 94 mmHg 92 % -- --   11/01/22 0111 -- -- -- -- -- -- -- 94 % -- Ventilator   11/01/22 0100 -- 80 -- -- -- 150/70 97 mmHg 95 % -- --   11/01/22 0000 100 2 °F (37 9 °C) 80 30 Abnormal  -- -- 148/62 90 mmHg 96 % 40 Ventilator     O2/Vent Data                11/01 0354   Most Recent       Vent Mode AC/VC   AC/VC      Resp Rate (BPM) (BPM) 24   24      Vt (mL) (mL) 400   400      FIO2 (%) (%) 40   40      PEEP (cmH2O) (cmH2O) 6   6      MV 9 88   9 88      Diet Enteral/Parenteral; Tube Feeding No Oral Diet; Glucerna 1 2; Continuous; 40; 120; Water; Every 4 hours     Pertinent Labs/Diagnostic Results:   10/31  MRI brain wo contrast  1  Diffuse restricted diffusion involving the bilateral cerebral and cerebellar cortices concerning for hypoxic/anoxic injury consistent with the CT head findings  2   Sinus mucosal disease with opacification of the bilateral mastoids air cells    10/31  EEG awake or drowsy routine  EEG Interpretation: This Routine EEG recorded during unresponsive state is abnormal due to presence of diffuse severe background slowing suggestive of underlying hypoxic/ischemic, toxic metabolic encephalopathy  Clinical correlation is recommended       Results from last 7 days   Lab Units 11/01/22  0423 10/31/22  0540 10/30/22  0554 10/29/22  0927 10/28/22  0548   WBC Thousand/uL 15 26* 14 32* 19 72* 14 27* 18 76*   HEMOGLOBIN g/dL 11 2* 10 7* 11 8 11 7 13 4   HEMATOCRIT % 35 4 33 7* 37 5 36 7 41 1   PLATELETS Thousands/uL 270 284 318 316 370   NEUTROS ABS Thousands/µL 12 76* 11 04*  --  11 45* 16 45*     Results from last 7 days   Lab Units 11/01/22  0423 10/31/22  0540 10/30/22  0554 10/29/22  0927 10/28/22  0548 10/27/22  0536 10/26/22  1758 10/26/22  0525   SODIUM mmol/L 138 139 140 137 136 138   < > 141   POTASSIUM mmol/L 4 6 4 1 4 2 3 8 4 1 4 6   < > 3 2*   CHLORIDE mmol/L 105 105 105 102 100 103   < > 103   CO2 mmol/L 28 28 28 28 25 27   < > 28   ANION GAP mmol/L 5 6 7 7 11 8   < > 10   BUN mg/dL 25 27* 34* 42* 39* 33*   < > 33*   CREATININE mg/dL 0 80 0 88 1 00 1 12 1 01 1 02   < > 0 83   EGFR ml/min/1 73sq m 79 70 60 52 59 59   < > 75   CALCIUM mg/dL 8 3 8 0* 8 3 8 6 8 7 9 1   < > 9 0   CALCIUM, IONIZED mmol/L  --   --   --   --   --   --   --  1 17   MAGNESIUM mg/dL 2 0 2 1 2 1 2 2 2 0 2 1  --  1 9   PHOSPHORUS mg/dL 3 6 2 9  --  4 4* 3 7 4 0  --  3 4    < > = values in this interval not displayed  Results from last 7 days   Lab Units 10/25/22  1232   AST U/L 48*   ALT U/L 50   ALK PHOS U/L 97   TOTAL PROTEIN g/dL 6 5   ALBUMIN g/dL 2 6*   TOTAL BILIRUBIN mg/dL 0 56     Results from last 7 days   Lab Units 11/01/22  0003 10/31/22  1625 10/31/22  1212 10/31/22  0004 10/30/22  2151 10/30/22  1703 10/30/22  1315 10/30/22  0552 10/29/22  2259 10/29/22  1745 10/29/22  1105 10/29/22  0517   POC GLUCOSE mg/dl 187* 178* 159* 165* 143* 197* 189* 209* 188* 206* 206* 191*     Results from last 7 days   Lab Units 11/01/22  0423 10/31/22  0540 10/30/22  0554 10/29/22  0927 10/28/22  0548 10/27/22  0536 10/26/22  1758 10/26/22  0525 10/25/22  2014 10/25/22  1232   GLUCOSE RANDOM mg/dL 195* 172* 225* 217* 212* 140 155* 173* 118 252*     Results from last 7 days   Lab Units 11/01/22  0423 10/28/22  0548 10/27/22  0812 10/26/22  0525   PH ART  7 457* 7 454* 7 419 7 394   PCO2 ART mm Hg 40 5 37 9 44 1* 43 8   PO2 ART mm Hg 75 1 72 5* 66 1* 109 1   HCO3 ART mmol/L 28 0 26 0 27 9 26 2   BASE EXC ART mmol/L 3 8 2 2 2 9 1 0   O2 CONTENT ART mL/dL 16 5 18 9 18 2 17 6   O2 HGB, ARTERIAL % 95 2 94 4 92 5* 97 7*   ABG SOURCE   --  Line, Arterial Line, Arterial Line, Arterial     Results from last 7 days   Lab Units 10/26/22  0525   PROTIME seconds 13 7   INR  1 04   PTT seconds 28     Results from last 7 days   Lab Units 10/31/22  0540 10/29/22  0927 10/28/22  0548 10/27/22  0536 10/26/22  0525   PROCALCITONIN ng/ml 1 11* 3 39* 5 21* 10 49* 12 09*     Results from last 7 days   Lab Units 10/26/22  0525   LACTIC ACID mmol/L 0 7     Results from last 7 days   Lab Units 10/26/22  1244 10/26/22  1243 10/26/22  1240   BLOOD CULTURE  No Growth After 5 Days  No Growth After 5 Days  --    SPUTUM CULTURE   --   --  1+ Growth of Candida albicans*  Few Colonies of    GRAM STAIN RESULT   --   --  Rare Polys  No organisms seen     Medications:   Scheduled Medications:  atorvastatin, 80 mg, Oral, Daily With Dinner  chlorhexidine, 15 mL, Mouth/Throat, Q12H MATTHEW  enoxaparin, 40 mg, Subcutaneous, Q24H [address]  insulin glargine, 22 Units, Subcutaneous, HS  insulin lispro, 4-20 Units, Subcutaneous, Q6H [address]  ipratropium-albuterol, 3 mL, Nebulization, Q6H  omeprazole (PRILOSEC) suspension 2 mg/mL, 20 mg, Oral, Daily  polyethylene glycol, 17 g, Oral, Daily  senna, 8 8 mg, Per NG Tube, Daily  sodium chloride, 4 mL, Nebulization, Q6H    PRN Meds:  acetaminophen, 650 mg, Oral, Q6H PRN  fentanyl citrate (PF), 50 mcg, Intravenous, Q2H PRN  hydrALAZINE, 5 mg, Intravenous, Q6H PRN    Discharge Plan: d    Network Utilization Review Department  ATTENTION: Please call with any questions or concerns to [phone] and carefully listen to the prompts so that you are directed to the right person  All voicemails are confidential   Jacqui Mi all requests for admission clinical reviews, approved or denied determinations and any other requests to dedicated fax number below belonging to the campus where the patient is receiving treatment   List of dedicated fax numbers for the Facilities:  [address] DENIALS (Administrative/Medical Necessity) [phone]   [address] (Maternity/NICU/Pediatrics) [phone]   [address] [phone]   [facility] [phone]   Formerly [facility] [phone]   [facility] [address] [phone]

## 2022-11-01 NOTE — ASSESSMENT & PLAN NOTE
· Initially in the setting of hypercarbia/sepsis   Now s/p PEA arrest, concern for anoxic brain injury  · 10/25 EEG-burst suppression background, indicative of severe diffuse cerebral dysfunction/poor prognosis   · 10/29: CT head-Diffuse loss of gray-white matter differentiation in bilateral cerebral hemispheres with some cerebral sulcal effacement, suspicious for diffuse hypoxic ischemic injury  · Neuro exam on axial weaning however patient does have protective reflexes  · Overall has poor prognosis with low likelihood of any meaningful neurologic recovery  · Neuro checks q4h   · Repeat EEG done 10/31  · 10/31 MRI showed diffuse restricted diffusion involving the bilateral cerebral and cerebellar cortices concerning for hypoxia/anoxia

## 2022-11-01 NOTE — PROGRESS NOTES
Charlie 44  Progress Note - Janiya People 1960, 57 y o  female MRN: [id number]  Unit/Bed#: ICU 05 Encounter: [id number]  Primary Care Provider: Kristie Burnett MD   Date and time admitted to hospital: 10/17/2022 10:24 AM    * Acute respiratory failure with hypoxia and hypercapnia [facility])  Assessment & Plan  · 10/17 presented with progressively worsening shortness of breath over the past several weeks  · Recently had COVID, tested positive 8/19 and was hospitalized for 5 days requiring nasal cannula  · 10/16 seen OP by Pulm for continued SOB with concern for superimposed pna, rx'ed OP abx  · Likely 2/2 pneumonia +/- exacerbation of COPD  · 10/17 ETT in ER for hypoxia, work of breathing  Completed Cefepime/Vanco course 10/23  · 10/25 extubated to bipap, though significant hypoxia with rolling   · 10/25 re-intubated with PEA arrest (difficult intubation requiring 3 attempts)  · AC/VC 24/400/40/6  · Continue to titrate fio2 and PEEP as tolerated for O2 sat 92% or above   · Zosyn completed on 10/31  · VAP ppx; chlorhexidine, PPI, HOB greater than 30 degrees  · Solumedrol course completed 10/27  · Scheduled duonebs, aggressive pulmonary toileting   · S/p bronch 10/22 for minimal thick secretions  · Poor SBT candidate 2/2 continued encephalopathy   · CXR-R sided mucus plugging, continue duo-nebs and 3% NSS q6h, and chest PT       Cardiac arrest [facility])  Assessment & Plan  · 10/25 PEA arrest during reintubation, secondary to hypoxia   · TTM initiated 10/25, 36 degrees achieved 10/25 at 1430  · Echo 10/25: EF 67%  · 10/29 CT head shows diffuse loss of gray/white matter suspicious for hypoxic injury   · MRI showed diffuse restricted diffusion involving the bilateral cerebral and cerebellar cortices concerning for hypoxia/anoxia      Anoxic brain injury [facility])  Assessment & Plan  · Initially in the setting of hypercarbia/sepsis   Now s/p PEA arrest, concern for anoxic brain injury  · 10/25 EEG-burst suppression background, indicative of severe diffuse cerebral dysfunction/poor prognosis   · 10/29: CT head-Diffuse loss of gray-white matter differentiation in bilateral cerebral hemispheres with some cerebral sulcal effacement, suspicious for diffuse hypoxic ischemic injury  · Neuro exam on axial weaning however patient does have protective reflexes  · Overall has poor prognosis with low likelihood of any meaningful neurologic recovery  · Neuro checks q4h   · Repeat EEG done 10/31  · 10/31 MRI showed diffuse restricted diffusion involving the bilateral cerebral and cerebellar cortices concerning for hypoxia/anoxia     Pneumonia  Assessment & Plan  · 10/17 CTA chest -- dense consolidation in right lower lobe and left sided consolidation consistent with multilobar pneumonia  · Sputum cx resp tova, Strep pneumo and legionella negative  · Completed cefepime and vanc course 10/23  · 10/22 S/p bronchoscopy with repeat sputum culture showing no polys or bacteria  · 10/25 extubated to bipap, hypoxic, reintubated   · Chest PT TID, ETT suctioning PRN, 3% NSS nebs   · Trend fever curve, WBC and procal   · Abx reinitiated in the setting of likely aspiration during reintubation   · Zosyn completed on 10/30 after 5 days  · 10/26 sputum cx with candida     Severe sepsis (HCC)  Assessment & Plan  · POA AEB tachycardia, tachypnea, fever 103   Multilobar pneumonia present on CT scan   · Completed day 7/7 cefepime/vanco on 10/23  · Concern for aspiration event 10/25 with reintubation  -- Zosyn completed on 10/30 after 5 days  · Trend fever curve, WBC  · Procal downtrending   · Repeat BC x 2 10/26 neg at 48 hours    COPD exacerbation (HCC)  Assessment & Plan  · COPD with AE and acute PNA POA   · Solumedrol d/c 10/27  · Continue duonebs  · Was actively smoking PTA     Obstructive sleep apnea  Assessment & Plan  · Intubated as above    Type 2 diabetes mellitus with hyperglycemia, with long-term current use of insulin (HCC)  Assessment & Plan  Lab Results   Component Value Date    HGBA1C 9 4 (H) 10/20/2022       Recent Labs     10/30/22  2151 10/31/22  0004 10/31/22  1212 10/31/22  1625   POCGLU 143* 165* 159* 178*       Blood Sugar Average: Last 72 hrs:  (P) 187 8     · Hyperglycemia with BG 400s on presentation, no elevation in anion gap   · PTA meds include lantus, meal coverage insulin, victoza and metformin  · Continue SSI coverage, Lantus increased but held since patient is NPO   · Goal blood glucose 140-180    Chronic pain syndrome  Assessment & Plan  · OP: tramadol -- hold in the setting of above     Coronary artery disease involving native coronary artery of native heart with angina pectoris (HCC)  Assessment & Plan  · Cardiac cath in 2019 demonstrated non-obstructive CAD with moderate stenosis of the RCA  · EKG on admission showing sinus tachycardia, no acute ST changes  · Trops negative   · Continue statin    Hypertension  Assessment & Plan  · Home meds: cardizem, hctz, lisinopril -- holding home meds for now   · PRN hydralazine   · Goal SBP <160    Depression with anxiety  Assessment & Plan  · Home meds: wellbutrin, cymbalta, seroquel  · Hold 2/2 AMS      ----------------------------------------------------------------------------------------  HPI/24hr events: No acute events overnight  Patient remained stable on mechanical ventilation without sedation        Patient appropriate for transfer out of the ICU today?: No  Disposition: Continue Critical Care   Code Status: Level 2 - DNAR: but accepts endotracheal intubation  ---------------------------------------------------------------------------------------  SUBJECTIVE  Intubated     Review of Systems   Unable to perform ROS: Intubated     Review of systems was unable to be performed secondary to intubation   ---------------------------------------------------------------------------------------  OBJECTIVE    Vitals   Vitals:    11/01/22 0200 11/01/22 0300 11/01/22 0354 11/01/22 0400 BP:       BP Location:       Pulse: 82 88  81   Resp:       Temp:       TempSrc:       SpO2: 92% 93% 94% 94%   Weight:       Height:         Temp (24hrs), Av 9 °F (37 7 °C), Min:99 °F (37 2 °C), Max:100 3 °F (37 9 °C)  Current: Temperature: 100 2 °F (37 9 °C)  Arterial Line BP: 145/70  Arterial Line MAP (mmHg): 95 mmHg    Respiratory:  SpO2: SpO2: 94 %       Invasive/non-invasive ventilation settings   Respiratory  Report   Lab Data (Last 4 hours)       0423            pH, Arterial       7 457             pCO2, Arterial       40 5             pO2, Arterial       75 1             HCO3, Arterial       28 0             Base Excess, Arterial       3 8                  O2/Vent Data        0354   Most Recent         Vent Mode AC/VC  AC/VC      Resp Rate (BPM) (BPM) 24  24      Vt (mL) (mL) 400  400      FIO2 (%) (%) 40  40      PEEP (cmH2O) (cmH2O) 6  6      MV 9 88  9 88                  Physical Exam  Vitals and nursing note reviewed  Constitutional:       Appearance: She is ill-appearing and toxic-appearing  Comments: B/L wrist restraints    HENT:      Head: Normocephalic and atraumatic  Right Ear: Tympanic membrane, ear canal and external ear normal       Left Ear: Tympanic membrane, ear canal and external ear normal       Nose: Nose normal       Mouth/Throat:      Mouth: Mucous membranes are dry  Pharynx: Oropharynx is clear  Eyes:      Comments: B/L pupils 2 mm and non reactive    Cardiovascular:      Rate and Rhythm: Normal rate  Pulmonary:      Comments: ETT on mechanical ventilation   B/L breath sounds coarse   Abdominal:      Comments: Tube feeds via OGT   Bowel sounds normoactive    Genitourinary:     Comments: Urinary catheter   Musculoskeletal:         General: Normal range of motion  Cervical back: Normal range of motion and neck supple  Skin:     General: Skin is warm and dry  Capillary Refill: Capillary refill takes less than 2 seconds     Neurological: Comments: Posturing position  Withdraws to painful stimuli in B/L LE  Overbreathing the ventilator   + cough  No gag  No corneals    Psychiatric:      Comments: Intubated              Laboratory and Diagnostics:  Results from last 7 days   Lab Units 11/01/22  0423 10/31/22  0540 10/30/22  0554 10/29/22  0927 10/28/22  0548 10/27/22  0536 10/26/22  0525   WBC Thousand/uL 15 26* 14 32* 19 72* 14 27* 18 76* 25 01* 18 95*   HEMOGLOBIN g/dL 11 2* 10 7* 11 8 11 7 13 4 13 5 13 2   HEMATOCRIT % 35 4 33 7* 37 5 36 7 41 1 42 6 41 4   PLATELETS Thousands/uL 270 284 318 316 370 357 344   NEUTROS PCT % 84* 76*  --  80* 88* 88*  --    MONOS PCT % 6 6  --  6 5 4  --      Results from last 7 days   Lab Units 11/01/22  0423 10/31/22  0540 10/30/22  0554 10/29/22  0927 10/28/22  0548 10/27/22  0536 10/26/22  1758 10/25/22  2014 10/25/22  1232   SODIUM mmol/L 138 139 140 137 136 138 141   < > 138   POTASSIUM mmol/L 4 6 4 1 4 2 3 8 4 1 4 6 4 3   < > 5 0   CHLORIDE mmol/L 105 105 105 102 100 103 104   < > 101   CO2 mmol/L 28 28 28 28 25 27 27   < > 28   ANION GAP mmol/L 5 6 7 7 11 8 10   < > 9   BUN mg/dL 25 27* 34* 42* 39* 33* 33*   < > 37*   CREATININE mg/dL 0 80 0 88 1 00 1 12 1 01 1 02 1 01   < > 1 37*   CALCIUM mg/dL 8 3 8 0* 8 3 8 6 8 7 9 1 8 6   < > 8 4   GLUCOSE RANDOM mg/dL 195* 172* 225* 217* 212* 140 155*   < > 252*   ALT U/L  --   --   --   --   --   --   --   --  50   AST U/L  --   --   --   --   --   --   --   --  48*   ALK PHOS U/L  --   --   --   --   --   --   --   --  97   ALBUMIN g/dL  --   --   --   --   --   --   --   --  2 6*   TOTAL BILIRUBIN mg/dL  --   --   --   --   --   --   --   --  0 56    < > = values in this interval not displayed       Results from last 7 days   Lab Units 11/01/22  0423 10/31/22  0540 10/30/22  0554 10/29/22  0927 10/28/22  0548 10/27/22  0536 10/26/22  0525   MAGNESIUM mg/dL 2 0 2 1 2 1 2 2 2 0 2 1 1 9   PHOSPHORUS mg/dL 3 6 2 9  --  4 4* 3 7 4 0 3 4      Results from last 7 days Lab Units 10/26/22  0525   INR  1 04   PTT seconds 28          Results from last 7 days   Lab Units 10/26/22  0525   LACTIC ACID mmol/L 0 7     ABG:  Results from last 7 days   Lab Units 11/01/22  0423 10/28/22  0548   PH ART  7 457* 7 454*   PCO2 ART mm Hg 40 5 37 9   PO2 ART mm Hg 75 1 72 5*   HCO3 ART mmol/L 28 0 26 0   BASE EXC ART mmol/L 3 8 2 2   ABG SOURCE   --  Line, Arterial     VBG:  Results from last 7 days   Lab Units 10/28/22  0548   ABG SOURCE  Line, Arterial     Results from last 7 days   Lab Units 10/31/22  0540 10/29/22  0927 10/28/22  0548 10/27/22  0536 10/26/22  0525   PROCALCITONIN ng/ml 1 11* 3 39* 5 21* 10 49* 12 09*       Micro  Results from last 7 days   Lab Units 10/26/22  1244 10/26/22  1243 10/26/22  1240   BLOOD CULTURE  No Growth After 5 Days  No Growth After 5 Days  --    SPUTUM CULTURE   --   --  1+ Growth of Candida albicans*  Few Colonies of    GRAM STAIN RESULT   --   --  Rare Polys  No organisms seen       EKG: NSR HR 82 alarms on   Imaging: I have personally reviewed pertinent reports  XR chest portable    Result Date: 10/22/2022  Impression: 1  Status post bronchoscopy with no pneumothorax  2   Recurrent right upper lobe consolidation likely at least in part due to atelectasis  3   Left predominantly perihilar pulmonary opacity which may represent atelectasis, asymmetric pulmonary edema, or pneumonia  Findings marked for immediate notification in Epic  Workstation performed: [id number]     XR chest portable    Result Date: 10/21/2022  Impression: Persistent bibasilar atelectasis  Pneumonia not excluded in the appropriate clinical setting  Workstation performed: [id number]     XR chest 1 view portable    Result Date: 10/17/2022  Impression: Mild pulmonary venous congestion with trace right effusion   Workstation performed: [id number]     XR chest portable ICU    Result Date: 10/19/2022  Impression: Persistent though improved bilateral lower lobe airspace consolidations consistent with pneumonia  Workstation performed: [id number]     CTA chest pe study    Result Date: 10/17/2022  Impression: No pulmonary embolism  Dense consolidation in the right lower lobe and medial aspect of the left lower lobe suspicious for multilobar pneumonia  Endotracheal tube tip at the ca could be retracted 2 cm  Enlarged fatty liver  Workstation performed: [id number]     Intake and Output  I/O       10/30 0701  10/31 0700 10/31 0701  11/01 0700    I V  (mL/kg) 365 3 (3 3) 138 7 (1 3)    NG/ 330    IV Piggyback 200     Feedings 788     Total Intake(mL/kg) 2073 3 (18 8) 468 7 (4 3)    Urine (mL/kg/hr) 2476 (0 9) 650 (0 2)    Stool 0 0    Total Output 2476 650    Net -402 7 -181  3          Unmeasured Stool Occurrence 3 x 4 x          Height and Weights   Height: 5' 3" (160 cm)     Body mass index is 43 kg/m²  Weight (last 2 days)     Date/Time Weight    10/31/22 0600 110 (242 73)    10/30/22 0555 109 (239 42)            Nutrition       Diet Orders   (From admission, onward)             Start     Ordered    10/26/22 1730  Diet Enteral/Parenteral; Tube Feeding No Oral Diet; Glucerna 1 2; Continuous; 40; 120; Water; Every 4 hours  Diet effective now        Comments: Start @ 20 cc/hr and increase by 10 cc/hr per protocol until reach goal   References:    Nutrtion Support Algorithm Enteral vs  Parenteral   Question Answer Comment   Diet Type Enteral/Parenteral    Enteral/Parenteral Tube Feeding No Oral Diet    Tube Feeding Formula: Glucerna 1 2    Bolus/Cyclic/Continuous Continuous    Tube Feeding Goal Rate (mL/hr): 40    Tube Feeding flush (mL): 120    Water Flush type: Water    Water flush frequency: Every 4 hours    RD to adjust diet per protocol?  Yes        10/26/22 1729    10/18/22 0844  Room Service  Once        Question:  Type of Service  Answer:  Room Service- Not Appropriate    10/18/22 0843                  Active Medications  Scheduled Meds:  Current Facility-Administered Medications Medication Dose Route Frequency Provider Last Rate   • acetaminophen  650 mg Oral Q6H PRN LURDES Rodriguez     • atorvastatin  80 mg Oral Daily With [employer], [address]     • chlorhexidine  15 mL Mouth/Throat Q12H [address] [place] [address] Dr Pimentel, [address]     • enoxaparin  40 mg Subcutaneous Q24H [address] VEL Pérez     • fentanyl citrate (PF)  50 mcg Intravenous Q2H PRN LURDES Tipton     • hydrALAZINE  5 mg Intravenous Q6H PRN LURDES Tipton     • insulin glargine  22 Units Subcutaneous HS LURDES Gupta     • insulin lispro  4-20 Units Subcutaneous Q6H [facility] LURDES Gupta     • ipratropium-albuterol  3 mL Nebulization Q6H LURDES Wright     • omeprazole (PRILOSEC) suspension 2 mg/mL  20 mg Oral Daily H[facility]ctor LURDES Mendoza     • polyethylene glycol  17 g Oral Daily Nicholas Moncada PA-C     • senna  8 8 mg Per NG Tube Daily LURDES Gupta     • sodium chloride  4 mL Nebulization Q6H LURDES Foy       Continuous Infusions:     PRN Meds:   acetaminophen, 650 mg, Q6H PRN  fentanyl citrate (PF), 50 mcg, Q2H PRN  hydrALAZINE, 5 mg, Q6H PRN        Invasive Devices Review  Invasive Devices  Report    Peripheral Intravenous Line  Duration           Long-Dwell Peripheral IV (Midline) 38/38/89 Left Basilic 10 days    Peripheral IV 10/29/22 Right Hand 3 days          Arterial Line  Duration           Arterial Line 10/25/22 6 days          Drain  Duration           NG/OG/Enteral Tube Orogastric 16 Fr Center mouth 6 days    Urethral Catheter Latex 16 Fr  <1 day          Airway  Duration           ETT  7 mm 6 days                ---------------------------------------------------------------------------------------  Care Time Delivered:   No Critical Care time spent       [facility], LURDES      Portions of the record may have been created with voice recognition software    Occasional wrong word or "sound a like" substitutions may have occurred due to the inherent limitations of voice recognition software    Read the chart carefully and recognize, using context, where substitutions have occurred

## 2022-11-02 PROBLEM — Z51.5 COMFORT MEASURES ONLY STATUS: Status: ACTIVE | Noted: 2022-01-01

## 2022-11-02 NOTE — PROGRESS NOTES
Charlie 44  Progress Note - Bigg Mcgregor 1960, 57 y o  female MRN: [id number]  Unit/Bed#: ICU 05 Encounter: [id number]  Primary Care Provider: Jessica Merrill MD   Date and time admitted to hospital: 10/17/2022 10:24 AM    * Acute respiratory failure with hypoxia and hypercapnia [facility])  Assessment & Plan  · 10/17 presented with progressively worsening shortness of breath over the past several weeks  · Recently had COVID, tested positive 8/19 and was hospitalized for 5 days requiring nasal cannula  · 10/16 seen OP by Pulm for continued SOB with concern for superimposed pna, rx'ed OP abx  · Likely 2/2 pneumonia +/- exacerbation of COPD  · 10/17 ETT in ER for hypoxia, work of breathing  Completed Cefepime/Vanco course 10/23  · 10/25 extubated to bipap, though significant hypoxia with rolling   · 10/25 re-intubated with PEA arrest (difficult intubation requiring 3 attempts)  · AC/VC 24/400/40/6  · Continue to titrate fio2 and PEEP as tolerated for O2 sat 92% or above   · Zosyn completed on 10/31  · VAP ppx; chlorhexidine, PPI, HOB greater than 30 degrees  · Solumedrol course completed 10/27  · Scheduled duonebs, aggressive pulmonary toileting   · S/p bronch 10/22 for minimal thick secretions  · Poor SBT candidate 2/2 continued encephalopathy   · CXR-R sided mucus plugging, continue duo-nebs and 3% NSS q6h, and chest PT       Cardiac arrest [facility])  Assessment & Plan  · 10/25 PEA arrest during reintubation, secondary to hypoxia   · TTM initiated 10/25, 36 degrees achieved 10/25 at 1430  · Echo 10/25: EF 67%  · 10/29 CT head shows diffuse loss of gray/white matter suspicious for hypoxic injury   · MRI showed diffuse restricted diffusion involving the bilateral cerebral and cerebellar cortices concerning for hypoxia/anoxia      Anoxic brain injury [facility])  Assessment & Plan  · Initially in the setting of hypercarbia/sepsis   Now s/p PEA arrest, concern for anoxic brain injury  · 10/25 EEG-burst suppression background, indicative of severe diffuse cerebral dysfunction/poor prognosis   · 10/29: CT head-Diffuse loss of gray-white matter differentiation in bilateral cerebral hemispheres with some cerebral sulcal effacement, suspicious for diffuse hypoxic ischemic injury  · Neuro exam on axial weaning however patient does have protective reflexes  · Overall has poor prognosis with low likelihood of any meaningful neurologic recovery  · Neuro checks q4h   · Repeat EEG done 10/31 - There is severe suppression of background throughout the study and no reactivity  · 10/31 MRI showed diffuse restricted diffusion involving the bilateral cerebral and cerebellar cortices concerning for hypoxia/anoxia     Pneumonia  Assessment & Plan  · 10/17 CTA chest -- dense consolidation in right lower lobe and left sided consolidation consistent with multilobar pneumonia  · Sputum cx resp tova, Strep pneumo and legionella negative  · Completed cefepime and vanc course 10/23  · 10/22 S/p bronchoscopy with repeat sputum culture showing no polys or bacteria  · 10/25 extubated to bipap, hypoxic, reintubated   · Chest PT TID, ETT suctioning PRN, 3% NSS nebs   · Trend fever curve, WBC and procal   · Abx reinitiated in the setting of likely aspiration during reintubation   · Zosyn completed on 10/30 after 5 days  · 10/26 sputum cx with candida     Severe sepsis (HCC)  Assessment & Plan  · POA AEB tachycardia, tachypnea, fever 103   Multilobar pneumonia present on CT scan   · Completed day 7/7 cefepime/vanco on 10/23  · Concern for aspiration event 10/25 with reintubation  -- Zosyn completed on 10/30 after 5 days  · Trend fever curve, WBC  · Procal downtrending   · Repeat BC x 2 10/26 neg x 5 days     COPD exacerbation (HCC)  Assessment & Plan  · COPD with AE and acute PNA POA   · Solumedrol d/c 10/27  · Continue duonebs  · Was actively smoking PTA     Obstructive sleep apnea  Assessment & Plan  · Intubated as above    Type 2 diabetes mellitus with hyperglycemia, with long-term current use of insulin [facility])  Assessment & Plan  Lab Results   Component Value Date    HGBA1C 9 4 (H) 10/20/2022       Recent Labs     11/01/22  0003 11/01/22  1231 11/01/22  1807 11/01/22  2317   POCGLU 187* 164* 190* 182*       Blood Sugar Average: Last 72 hrs:  (P) 074 [id number]     · Hyperglycemia with BG 400s on presentation, no elevation in anion gap   · PTA meds include lantus, meal coverage insulin, victoza and metformin  · Continue SSI coverage, Lantus increased but held since patient is NPO   · Goal blood glucose 140-180    Chronic pain syndrome  Assessment & Plan  · OP: tramadol -- hold in the setting of above     Coronary artery disease involving native coronary artery of native heart with angina pectoris (HCC)  Assessment & Plan  · Cardiac cath in 2019 demonstrated non-obstructive CAD with moderate stenosis of the RCA  · EKG on admission showing sinus tachycardia, no acute ST changes  · Trops negative   · Continue statin    Hypertension  Assessment & Plan  · Home meds: cardizem, hctz, lisinopril -- holding home meds for now   · PRN hydralazine   · Goal SBP <160    Depression with anxiety  Assessment & Plan  · Home meds: wellbutrin, cymbalta, seroquel  · Hold 2/2 AMS      ----------------------------------------------------------------------------------------  HPI/24hr events: No acute events overnight  Patient remained hemodynamically stable on mechanical ventilation with no sedation      Patient appropriate for transfer out of the ICU today?: No  Disposition: Continue Critical Care   Code Status: Level 2 - DNAR: but accepts endotracheal intubation  ---------------------------------------------------------------------------------------  SUBJECTIVE  Intubated     Review of Systems   Unable to perform ROS: Intubated     Review of systems was unable to be performed secondary to intubation  ---------------------------------------------------------------------------------------  OBJECTIVE    Vitals   Vitals:    22 0300 22 0309 22 0400 22 0540   BP:       BP Location:       Pulse: 86  85    Resp: (!) 27  (!) 34    Temp:   99 7 °F (37 6 °C)    TempSrc:   Esophageal    SpO2: 94% 94% 92%    Weight:    110 kg (241 lb 6 5 oz)   Height:         Temp (24hrs), Av 9 °F (37 7 °C), Min:99 7 °F (37 6 °C), Max:100 1 °F (37 8 °C)  Current: Temperature: 99 7 °F (37 6 °C)  Arterial Line BP: 141/65  Arterial Line MAP (mmHg): 92 mmHg    Respiratory:  SpO2: SpO2: 92 %       Invasive/non-invasive ventilation settings   Respiratory  Report   Lab Data (Last 4 hours)    None         O2/Vent Data (Last 4 hours)       0309           Vent Mode AC/VC       Resp Rate (BPM) (BPM) 24       Vt (mL) (mL) 400       FIO2 (%) (%) 40       PEEP (cmH2O) (cmH2O) 6       MV 9 94                   Physical Exam  Vitals and nursing note reviewed  Constitutional:       Appearance: She is obese  She is ill-appearing and toxic-appearing  HENT:      Head: Normocephalic and atraumatic  Right Ear: Tympanic membrane, ear canal and external ear normal       Left Ear: Tympanic membrane, ear canal and external ear normal       Nose: Nose normal       Mouth/Throat:      Mouth: Mucous membranes are dry  Pharynx: Oropharynx is clear  Eyes:      Comments: B/L pupils 2 mm sluggish but reactive    Cardiovascular:      Rate and Rhythm: Normal rate and regular rhythm  Pulses: Normal pulses  Heart sounds: Normal heart sounds  Pulmonary:      Comments: ETT on mechanical ventilation  B/L breath sounds coarse and diminished   Abdominal:      Comments: Tube feeds via OGT   Bowel sounds normoactive    Genitourinary:     Comments: Urinary catheter   Musculoskeletal:      Cervical back: Normal range of motion and neck supple  Right lower leg: Edema present        Left lower leg: Edema present  Skin:     General: Skin is warm and dry  Capillary Refill: Capillary refill takes less than 2 seconds     Neurological:      Comments: + weak protective reflexes   Does not follow commands   Intermittently withdraws to painful stimuli    Psychiatric:      Comments: Intubated with no sedation              Laboratory and Diagnostics:  Results from last 7 days   Lab Units 11/01/22 0423 10/31/22  0540 10/30/22  0554 10/29/22  0927 10/28/22  0548 10/27/22  0536   WBC Thousand/uL 15 26* 14 32* 19 72* 14 27* 18 76* 25 01*   HEMOGLOBIN g/dL 11 2* 10 7* 11 8 11 7 13 4 13 5   HEMATOCRIT % 35 4 33 7* 37 5 36 7 41 1 42 6   PLATELETS Thousands/uL 270 284 318 316 370 357   NEUTROS PCT % 84* 76*  --  80* 88* 88*   MONOS PCT % 6 6  --  6 5 4     Results from last 7 days   Lab Units 11/01/22  0423 10/31/22  0540 10/30/22  0554 10/29/22  0927 10/28/22  0548 10/27/22  0536 10/26/22  1758   SODIUM mmol/L 138 139 140 137 136 138 141   POTASSIUM mmol/L 4 6 4 1 4 2 3 8 4 1 4 6 4 3   CHLORIDE mmol/L 105 105 105 102 100 103 104   CO2 mmol/L 28 28 28 28 25 27 27   ANION GAP mmol/L 5 6 7 7 11 8 10   BUN mg/dL 25 27* 34* 42* 39* 33* 33*   CREATININE mg/dL 0 80 0 88 1 00 1 12 1 01 1 02 1 01   CALCIUM mg/dL 8 3 8 0* 8 3 8 6 8 7 9 1 8 6   GLUCOSE RANDOM mg/dL 195* 172* 225* 217* 212* 140 155*     Results from last 7 days   Lab Units 11/01/22  0423 10/31/22  0540 10/30/22  0554 10/29/22  0927 10/28/22  0548 10/27/22  0536   MAGNESIUM mg/dL 2 0 2 1 2 1 2 2 2 0 2 1   PHOSPHORUS mg/dL 3 6 2 9  --  4 4* 3 7 4 0                   ABG:  Results from last 7 days   Lab Units 11/01/22  0423 10/28/22  0548   PH ART  7 457* 7 454*   PCO2 ART mm Hg 40 5 37 9   PO2 ART mm Hg 75 1 72 5*   HCO3 ART mmol/L 28 0 26 0   BASE EXC ART mmol/L 3 8 2 2   ABG SOURCE   --  Line, Arterial     VBG:  Results from last 7 days   Lab Units 10/28/22  0548   ABG SOURCE  Line, Arterial     Results from last 7 days   Lab Units 10/31/22  0540 10/29/22  3937 10/28/22  0548 10/27/22  0536   PROCALCITONIN ng/ml 1 11* 3 39* 5 21* 10 49*       Micro  Results from last 7 days   Lab Units 10/26/22  1244 10/26/22  1243 10/26/22  1240   BLOOD CULTURE  No Growth After 5 Days  No Growth After 5 Days  --    SPUTUM CULTURE   --   --  1+ Growth of Candida albicans*  Few Colonies of    GRAM STAIN RESULT   --   --  Rare Polys  No organisms seen       EKG: NSR HR 83 alarms on   Imaging: I have personally reviewed pertinent reports  XR chest portable    Result Date: 10/22/2022  Impression: 1  Status post bronchoscopy with no pneumothorax  2   Recurrent right upper lobe consolidation likely at least in part due to atelectasis  3   Left predominantly perihilar pulmonary opacity which may represent atelectasis, asymmetric pulmonary edema, or pneumonia  Findings marked for immediate notification in Epic  Workstation performed: [id number]     XR chest portable    Result Date: 10/21/2022  Impression: Persistent bibasilar atelectasis  Pneumonia not excluded in the appropriate clinical setting  Workstation performed: [id number]     XR chest 1 view portable    Result Date: 10/17/2022  Impression: Mild pulmonary venous congestion with trace right effusion  Workstation performed: [id number]     XR chest portable ICU    Result Date: 10/19/2022  Impression: Persistent though improved bilateral lower lobe airspace consolidations consistent with pneumonia  Workstation performed: [id number]     CTA chest pe study    Result Date: 10/17/2022  Impression: No pulmonary embolism  Dense consolidation in the right lower lobe and medial aspect of the left lower lobe suspicious for multilobar pneumonia  Endotracheal tube tip at the ca could be retracted 2 cm  Enlarged fatty liver   Workstation performed: [id number]     Intake and Output  I/O       10/31 0701  11/01 0700 11/01 0701  11/02 0700    I V  (mL/kg) 158 7 (1 5)     NG/ 540    Feedings 367 720    Total Intake(mL/kg) 1125 7 (10 4) 1260 (11 7)    Urine (mL/kg/hr) 1425 (0 5) 1020 (0 4)    Stool 0 0    Total Output 1425 1020    Net -299 3 +240          Unmeasured Stool Occurrence 6 x 1 x          Height and Weights   Height: 5' 3" (160 cm)     Body mass index is 42 76 kg/m²  Weight (last 2 days)     Date/Time Weight    11/02/22 0540 110 (241 4)    11/01/22 0600 108 (238 98)    10/31/22 0600 110 (242 73)            Nutrition       Diet Orders   (From admission, onward)             Start     Ordered    10/26/22 1730  Diet Enteral/Parenteral; Tube Feeding No Oral Diet; Glucerna 1 2; Continuous; 40; 120; Water; Every 4 hours  Diet effective now        Comments: Start @ 20 cc/hr and increase by 10 cc/hr per protocol until reach goal   References:    Nutrtion Support Algorithm Enteral vs  Parenteral   Question Answer Comment   Diet Type Enteral/Parenteral    Enteral/Parenteral Tube Feeding No Oral Diet    Tube Feeding Formula: Glucerna 1 2    Bolus/Cyclic/Continuous Continuous    Tube Feeding Goal Rate (mL/hr): 40    Tube Feeding flush (mL): 120    Water Flush type: Water    Water flush frequency: Every 4 hours    RD to adjust diet per protocol?  Yes        10/26/22 1729    10/18/22 0844  Room Service  Once        Question:  Type of Service  Answer:  Room Service- Not Appropriate    10/18/22 0843                  Active Medications  Scheduled Meds:  Current Facility-Administered Medications   Medication Dose Route Frequency Provider Last Rate   • acetaminophen  650 mg Oral Q6H PRN LURDES Gupta     • atorvastatin  80 mg Oral Daily With LURDES JAMES     • chlorhexidine  15 mL Mouth/Throat Q12H [address] [place] [address] [address] Piment[address], [address] [facility]     • enoxaparin  40 mg Subcutaneous Q24H [address] [employer]     • fentanyl citrate (PF)  50 mcg Intravenous Q2H PRN LURDES Barnes     • hydrALAZINE  5 mg Intravenous Q6H PRN LURDES Barnes     • insulin glargine  22 Units Subcutaneous HS LURDES Gupta • insulin lispro  4-20 Units Subcutaneous Q6H [address] LURDES Gupta     • ipratropium-albuterol  3 mL Nebulization Q6H LURDES Wright     • omeprazole (PRILOSEC) suspension 2 mg/mL  20 mg Oral Daily LURDES Abernathy     • polyethylene glycol  17 g Oral Daily Jonny Moncada PA-C     • senna  8 8 mg Per NG Tube Daily LURDES Gupta     • sodium chloride  4 mL Nebulization Q6H ULRDES Abernathy       Continuous Infusions:     PRN Meds:   acetaminophen, 650 mg, Q6H PRN  fentanyl citrate (PF), 50 mcg, Q2H PRN  hydrALAZINE, 5 mg, Q6H PRN        Invasive Devices Review  Invasive Devices  Report    Peripheral Intravenous Line  Duration           Long-Dwell Peripheral IV (Midline) 16/68/55 Left Basilic 11 days    Peripheral IV 11/02/22 Right Antecubital <1 day          Arterial Line  Duration           Arterial Line 10/25/22 7 days          Drain  Duration           NG/OG/Enteral Tube Orogastric 16 Fr Center mouth 7 days    Urethral Catheter Latex 16 Fr  1 day          Airway  Duration           ETT  7 mm 7 days              ---------------------------------------------------------------------------------------  Care Time Delivered:   No Critical Care time spent       [facility]      Portions of the record may have been created with voice recognition software  Occasional wrong word or "sound a like" substitutions may have occurred due to the inherent limitations of voice recognition software    Read the chart carefully and recognize, using context, where substitutions have occurred

## 2022-11-02 NOTE — RESPIRATORY THERAPY NOTE
Pt recd on a pb 840 vent setting as per flow sheet all alarms on and functional vent working properly

## 2022-11-02 NOTE — PROGRESS NOTES
[facility] was called and made aware of comfort care  OK to proceed, just call with cardiac death

## 2022-11-02 NOTE — ACP (ADVANCE CARE PLANNING)
Critical Care Advanced Care Planning Note  Desiree Olszewski 57 y o  female MRN: [id number]  Unit/Bed#: ICU 05 Encounter: [id number]    Desiree Olszewski is a 57 y o  female requiring critical care evaluation and advanced care planning  The patient has chronic comorbidities, including but not limited to COPD, HTN, DM2, CAD, obesity, which is now further complicated by the following acute conditions: acute respiratory failure with hypoxia, pneumonia, cardiac arrest   Due to the severity of the patient's acute condition and/or the extent of chronic conditions, additional conversations pertaining to advanced care planning were required  Today's discussion, which was held in a face-to-face meeting, included Marleneheathclaudia Valencia, patient's son, and it was established that all stake holders understood the rationale for the advanced care planning  The patient was unable to participate in the discussion due to AMS on ETT  Summary of Discussion:  I spoke to Marley Valencia at [facility] bedside  He was tearful, but shared that he had been reflecting on earlier discussions and while he understands that she is "spititually gone", he acknowledges that the decision has been made all the more difficult due to their close relationship and the fact that "her body is still here"  He states he is sure he wants to focus on her comfort, as he is aware that there is little to no likelihood of meaningful neurologic recovery  He further shares that he nor his family wish to be present, though he states he wants to ensure she is not alone, music is playing for her and that she does not suffer  Time was provided for him to spend time alone with [facility]  Marley Valencia did not wish to speak further regarding specifics if comfort care  He states he is amenable to receiving a phone call update once [facility] passes  Total time spent, (15) minutes (1900 to 1915)        CODE STATUS: COMFORT CARE - Level 4  POA:    POLST:        SIGNATURE: LURDES Torre  DATE: November 2, 2022  TIME: 7:26 PM

## 2022-11-02 NOTE — UTILIZATION REVIEW
Continued Stay Review    Date: 11/02/2022                          Current Patient Class: Inpatient  Current Level of Care: Critical Care    HPI:61 y o  female initially admitted on 10/17/2022  Acute on chronic hypoxemic hypercapnic respiratory failure  Anoxic Brain Injury  Cardiac Arrest     Assessment/Plan: Intubated / maintain vent  FiO2 40%  Continue tube feeding - No Oral Feedings  Maintain A  Line  Maintain Mid-line Cath  Anne  High frequency chest wall oscillation        Vital Signs: BP (!) 176/86   Pulse 79   Temp 99 9 °F (37 7 °C) (Esophageal)   Resp (!) 26   Ht 5' 3" (1 6 m)   Wt 110 kg (241 lb 6 5 oz)   SpO2 94%   BMI 42 76 kg/m²     Pertinent Labs/Diagnostic Results:     Results from last 7 days   Lab Units 11/01/22  0423 10/31/22  0540 10/30/22  0554 10/29/22  0927 10/28/22  0548   WBC Thousand/uL 15 26* 14 32* 19 72* 14 27* 18 76*   HEMOGLOBIN g/dL 11 2* 10 7* 11 8 11 7 13 4   HEMATOCRIT % 35 4 33 7* 37 5 36 7 41 1   PLATELETS Thousands/uL 270 284 318 316 370   NEUTROS ABS Thousands/µL 12 76* 11 04*  --  11 45* 16 45*     Results from last 7 days   Lab Units 11/01/22  0423 10/31/22  0540 10/30/22  0554 10/29/22  0927 10/28/22  0548 10/27/22  0536   SODIUM mmol/L 138 139 140 137 136 138   POTASSIUM mmol/L 4 6 4 1 4 2 3 8 4 1 4 6   CHLORIDE mmol/L 105 105 105 102 100 103   CO2 mmol/L 28 28 28 28 25 27   ANION GAP mmol/L 5 6 7 7 11 8   BUN mg/dL 25 27* 34* 42* 39* 33*   CREATININE mg/dL 0 80 0 88 1 00 1 12 1 01 1 02   EGFR ml/min/1 73sq m 79 70 60 52 59 59   CALCIUM mg/dL 8 3 8 0* 8 3 8 6 8 7 9 1   MAGNESIUM mg/dL 2 0 2 1 2 1 2 2 2 0 2 1   PHOSPHORUS mg/dL 3 6 2 9  --  4 4* 3 7 4 0     Results from last 7 days   Lab Units 11/02/22  1140 11/02/22  0533 11/01/22  2317 11/01/22  1807 11/01/22  1231 11/01/22  0003 10/31/22  1625 10/31/22  1212 10/31/22  0004 10/30/22  2151 10/30/22  1703 10/30/22  1315   POC GLUCOSE mg/dl 147* 171* 182* 190* 164* 187* 178* 159* 165* 143* 197* 189*     Results from last 7 days   Lab Units 11/01/22  0423 10/31/22  0540 10/30/22  0554 10/29/22  0927 10/28/22  0548 10/27/22  0536 10/26/22  1758   GLUCOSE RANDOM mg/dL 195* 172* 225* 217* 212* 140 155*      Results from last 7 days   Lab Units 11/01/22  0423 10/28/22  0548 10/27/22  0812   PH ART  7 457* 7 454* 7 419   PCO2 ART mm Hg 40 5 37 9 44 1*   PO2 ART mm Hg 75 1 72 5* 66 1*   HCO3 ART mmol/L 28 0 26 0 27 9   BASE EXC ART mmol/L 3 8 2 2 2 9   O2 CONTENT ART mL/dL 16 5 18 9 18 2   O2 HGB, ARTERIAL % 95 2 94 4 92 5*   ABG SOURCE   --  Line, Arterial Line, Arterial     Results from last 7 days   Lab Units 10/31/22  0540 10/29/22  0927 10/28/22  0548 10/27/22  0536   PROCALCITONIN ng/ml 1 11* 3 39* 5 21* 10 49*     Results from last 7 days   Lab Units 10/26/22  1244 10/26/22  1243 10/26/22  1240   BLOOD CULTURE  No Growth After 5 Days  No Growth After 5 Days  --    SPUTUM CULTURE   --   --  1+ Growth of Candida albicans*  Few Colonies of    GRAM STAIN RESULT   --   --  Rare Polys  No organisms seen     Medications:   Scheduled Medications:  atorvastatin, 80 mg, Oral, Daily With Dinner  chlorhexidine, 15 mL, Mouth/Throat, Q12H MATTHEW  enoxaparin, 40 mg, Subcutaneous, Q24H [facility]  insulin glargine, 22 Units, Subcutaneous, HS  insulin lispro, 4-20 Units, Subcutaneous, Q6H [facility]  ipratropium-albuterol, 3 mL, Nebulization, Q6H  omeprazole (PRILOSEC) suspension 2 mg/mL, 20 mg, Oral, Daily  polyethylene glycol, 17 g, Oral, Daily  senna, 8 8 mg, Per NG Tube, Daily  sodium chloride, 4 mL, Nebulization, Q6H      Continuous IV Infusions:     PRN Meds:  acetaminophen, 650 mg, Oral, Q6H PRN  fentanyl citrate (PF), 50 mcg, Intravenous, Q2H PRN  hydrALAZINE, 5 mg, Intravenous, Q6H PRN        Discharge Plan: TBD    Network Utilization Review Department  ATTENTION: Please call with any questions or concerns to [phone] and carefully listen to the prompts so that you are directed to the right person   All voicemails are confidential   Jose G Bye all requests for admission clinical reviews, approved or denied determinations and any other requests to dedicated fax number below belonging to the campus where the patient is receiving treatment   List of dedicated fax numbers for the Facilities:  [address] [place] [address] DENIALS (Administrative/Medical Necessity) [phone]   [address] [facility]/P[facility]s) [phone]   [address] [phone]   [place] [address] [phone]   [address] [place] [address] [phone]   [address] [facility] [address] [facility] [phone]

## 2022-11-02 NOTE — ASSESSMENT & PLAN NOTE
Lab Results   Component Value Date    HGBA1C 9 4 (H) 10/20/2022       Recent Labs     11/01/22  0003 11/01/22  1231 11/01/22  1807 11/01/22  2317   POCGLU 187* 164* 190* 182*       Blood Sugar Average: Last 72 hrs:  (P) 181 [id number]     · Hyperglycemia with BG 400s on presentation, no elevation in anion gap   · PTA meds include lantus, meal coverage insulin, victoza and metformin  · Continue SSI coverage, Lantus increased but held since patient is NPO   · Goal blood glucose 140-180

## 2022-11-02 NOTE — ASSESSMENT & PLAN NOTE
· Initially in the setting of hypercarbia/sepsis  Now s/p PEA arrest, concern for anoxic brain injury  · 10/25 EEG-burst suppression background, indicative of severe diffuse cerebral dysfunction/poor prognosis   · 10/29: CT head-Diffuse loss of gray-white matter differentiation in bilateral cerebral hemispheres with some cerebral sulcal effacement, suspicious for diffuse hypoxic ischemic injury  · Neuro exam on axial weaning however patient does have protective reflexes  · Overall has poor prognosis with low likelihood of any meaningful neurologic recovery  · Neuro checks q4h   · Repeat EEG done 10/31 - There is severe suppression of background throughout the study and no reactivity    · 10/31 MRI showed diffuse restricted diffusion involving the bilateral cerebral and cerebellar cortices concerning for hypoxia/anoxia

## 2022-11-02 NOTE — ASSESSMENT & PLAN NOTE
· POA AEB tachycardia, tachypnea, fever 103   Multilobar pneumonia present on CT scan   · Completed day 7/7 cefepime/vanco on 10/23  · Concern for aspiration event 10/25 with reintubation  -- Zosyn completed on 10/30 after 5 days  · Trend fever curve, WBC  · Procal downtrending   · Repeat BC x 2 10/26 neg x 5 days

## 2022-11-03 NOTE — DISCHARGE SUMMARY
Discharge Summary - Ingris Rodgers 57 y o  female MRN: [id number]    Unit/Bed#: ICU 05 Encounter: [id number] PCP: Jose R Street MD    Admission Date:   Admission Orders (From admission, onward)     Ordered        10/17/22 [address]  Once                        Admitting Diagnosis: Respiratory failure ([facility] ) [J96 90]  CHF (congestive heart failure) ([facility] ) [I50 9]  SOB (shortness of breath) [R06 02]  COPD with acute exacerbation ([facility] ) [I22 0]  Toxic metabolic encephalopathy [U49 4]    HPI: Per H&P by LURDES Harper, on 10/17/22:  "Ingris Rodgers is a 57 y o  female with past medical history of LESLIE, COPD, HTN, HLD, DM, chronic back pain and recent COVID infection (8/19/22) who presented today with progressive shortness of breath  She was admitted from 8/21-8/26 due to [facility] and was on max of 6L NC  She was discharged home and reportedly has had progressive shortness of breath  She was prescribed a Zpak and prednisone taper 10/6 by Dr Horne Kos  Shortness of breath apparently worsened this past week prompting her to seek care       Once in the ER, she was hypoxic and was placed on a NRB  She had initial improvement and was transitioned to midflow but decompensated again requiring NRB  ABG was obtained at that time that demonstrated hypoxic/hypercarbic respiratory failure  upon assessment, patient was in acute respiratory distress, unable to provide any history  Patient was then intubated emergently in the ER  Will admit to ICU for further workup and monitoring "    Procedures Performed:   Orders Placed This Encounter   Procedures   • ED ECG Documentation Only   • Intubation   • Intubation       Summary of Hospital Course: She continued on mechanical ventilator until she was able to be liberated on 10/25, at which time she was extubated to bipap  That same day, she became severely hypoxic with turning during care and experienced cardiac arrest following hypoxia   ROSC was achieved, and TTM was initiated  poor neuro exam  14 [facility] 10/29 showed loss of gray-white matter differentiation  She persisted with poor neuro exam  EEG 10/31 showed severe suppression of background activity throughout the study and no reactivity  Ongoing discussions with family, where son ultimately decided to palliatively extubate her and focus on her comfort  Her ETT was removed after administration of pain medication on the evening of   She  at 2234 surrounded by nursing staff with music playing, per her son's request      Significant Findings, Care, Treatment and Services Provided:    EEG: There is severe suppression of background throughout the study and no reactivity  10/31 MRI Brain:  Diffuse restricted diffusion involving the bilateral cerebral and cerebellar cortices concerning for hypoxic/anoxic injury consistent with the CT head findings  Sinus mucosal disease with opacification of the bilateral mastoids air cells  10/29: CT head-Diffuse loss of gray-white matter differentiation in bilateral cerebral hemispheres with some cerebral sulcal effacement, suspicious for diffuse hypoxic ischemic injury  10/25 Cardiac arrest, reintubated, TTM  10/22 Bronched  10/17: CTA chest-Dense consolidation in the right lower lobe and medial aspect of the left lower lobe suspicious for multilobar pneumonia  Enlarged fatty liver        Complications: Cardiac arrest, anoxic brain injury, death    Disposition:      Final Diagnosis: Acute Hypoxic respiratory failure requiring mechanical ventilation in the setting of pneumonia, cardiac arrest, anoxic brain injury     Medical Problems             Resolved Problems  Date Reviewed: 2022   None                 Condition at Time of Death:      Date, Time and Cause of Death    Date of Death: 22  Time of Death: 10:34 PM  Preliminary Cause of Death: Community acquired pneumonia  Entered by: Marylen Essex, CRNP[EJ1 1]     Attribution     EJ1 1 LURDES Ng 22 22:47 Death Note:    INPATIENT DEATH NOTE  Glynn Monge 57 y o  female MRN: [id number]  Unit/Bed#: ICU 05 Encounter: [id number]    Date, Time and Cause of Death    Date of Death: 22  Time of Death: 10:34 PM  Preliminary Cause of Death: Community acquired pneumonia  Entered by: LURDES Hunter[EJ1 1]     Attribution     EJ1 1 Edythe Opitz, CRNP 22 22:47           Patient's Information  Pronounced by: LURDES Hunter  Did the patient's death occur in the ED?: No  Did the patient's death occur in the OR?: No  Did the patient's death occur less than 10 days post-op?: No  Did the patient's death occur within 24 hours of admission?: No  Was code status DNR at the time of death?: Yes    PHYSICAL EXAM:  Unresponsive to noxious stimuli, Spontaneous respirations absent, Breath sounds absent, Carotid pulse absent and Heart sounds absent    Medical Examiner notification criteria:  NONE APPLICABLE   Medical Examiner's office notified?:  No, does not meet ME notification criteria   Medical Examiner accepted case?:  No  Name of Medical Examiner: NA    Family Notification  Was the family notified?: Yes  Date Notified: 22  Time Notified: 2248  Notified by: Sona Hunter  Name of Family Notified of Death: Janet Jaramillo Person Relationship to Patient: Son  Family Notification Route: Telephone  Was the family told to contact a  home?: Yes    Autopsy Options:  Post-mortem examination declined by next of kin    Primary Service Attending Physician notified?:  yes - Attending:  Rip Sloan MD    Physician/Resident responsible for completing Discharge Summary:  LURDES Hunter

## 2022-11-03 NOTE — DEATH NOTE
INPATIENT DEATH NOTE  Akiko Jacobs 57 y o  female MRN: [id number]  Unit/Bed#: ICU 05 Encounter: [id number]    Date, Time and Cause of Death    Date of Death: 22  Time of Death: 10:34 PM  Preliminary Cause of Death: Community acquired pneumonia  Entered by: LURDES Griffiths[EJ1 1]     Attribution     EJ1 1 LURDES Prabhakar 22 22:47           Patient's Information  Pronounced by: LURDES Griffiths  Did the patient's death occur in the ED?: No  Did the patient's death occur in the OR?: No  Did the patient's death occur less than 10 days post-op?: No  Did the patient's death occur within 24 hours of admission?: No  Was code status DNR at the time of death?: Yes    PHYSICAL EXAM:  Unresponsive to noxious stimuli, Spontaneous respirations absent, Breath sounds absent, Carotid pulse absent and Heart sounds absent    Medical Examiner notification criteria:  NONE APPLICABLE   Medical Examiner's office notified?:  No, does not meet ME notification criteria   Medical Examiner accepted case?:  No  Name of Medical Examiner: NA    Family Notification  Was the family notified?: Yes  Date Notified: 22  Time Notified: 2248  Notified by: Miguel Angel Bowers, 10 Eric Ramirez  Name of Family Notified of Death: Vanessa Duval Person Relationship to Patient: Son  Family Notification Route: Telephone  Was the family told to contact a  home?: Yes    Autopsy Options:  Post-mortem examination declined by next of kin    Primary Service Attending Physician notified?:  yes - Attending:  Estefania Morgan MD    Physician/Resident responsible for completing Discharge Summary:  LURDES Griffiths

## 2022-11-03 NOTE — QUICK NOTE
I called Nessa Garcia at the number listed in the chart  I informed him of his mother's death, surrounded by nurses with music playing, as per his request  He was appreciative and had no further questions

## 2022-11-04 NOTE — UTILIZATION REVIEW
NOTIFICATION OF ADMISSION DISCHARGE   This is a Notification of Discharge from [facility]  Please be advised that this patient has been discharge from our facility  Below you will find the admission and discharge date and time including the patient’s disposition  UTILIZATION REVIEW CONTACT:  [facility]  Utilization   Network Utilization Review Department  Phone: [phone] x carefully listen to the prompts  All voicemails are confidential   Email: [email or web address]     ADMISSION INFORMATION  PRESENTATION DATE: 10/17/2022 10:24 AM  OBERVATION ADMISSION DATE:   INPATIENT ADMISSION DATE: 10/17/22  4:06 PM   DISCHARGE DATE: 2022 10:34 PM  DISPOSITION:        IMPORTANT INFORMATION:  Send all requests for admission clinical reviews, approved or denied determinations and any other requests to dedicated fax number below belonging to the campus where the patient is receiving treatment   List of dedicated fax numbers:  [address] DENIALS (Administrative/Medical Necessity) [phone]   [address] (Maternity/NICU/Pediatrics) [phone]   [facility] [phone]   [place] [address] [phone]   [address] [phone]   [address] [facility] [phone]   [address] [place] [address] [phone]   [address] [place] [address] 119 [phone]   [facility]  [phone]   [address] [phone]   412 [facility] [address] [facility] [phone]

## 2023-04-03 NOTE — PROGRESS NOTES
Assessment/Plan:  1  Postlaminectomy syndrome, lumbar region     2  Lumbar radiculopathy     3  Primary osteoarthritis of one hip, left     4  Left hip pain       Andrea Urrutia is a very pleasant 43-year-old female with moderate left hip osteoarthritis  Given that she had a complete resolution of her symptoms from the caudal injection administered by Dr Kelsi Coon in the end of December, she is not currently necessitate any specific treatment for her hip osteoarthritis  All she does have discomfort in the groin with activity, we would not expect the caudal injection to have relieved these symptoms if she truly was symptomatic of arthritis  Therefore, we have recommended that she continue treatment with pain management for the time being and continue with her home exercise program as taught to her by physical therapy  She can return to our office on an as-needed basis  We will defer to Dr Gonzáles Parents discretion if she would benefit from a fluoroscopic guided intra-articular left hip cortisone injection  She is welcome to call with any questions or concerns  All questions addressed today  Subjective: Left hip follow up    Patient ID: Charley Velazquez is a 60 y o  female  Andrea Urrutia is a very pleasant 43-year-old female presenting today for follow-up of her activity related left hip pain  After her last appointment, she was referred to Dr Kelsi Coon for consideration of an intra-articular fluoroscopic guided left hip cortisone injection to treat her osteoarthritis and groin pain  However, after reviewing her symptoms, Dr Kelsi Coon administered a caudal injection in the end of December  She reports that she had a near complete resolution of her symptoms including the left hip and groin  This symptoms have started to slowly return, but she is still much more comfortable than she was prior to the caudal injection    She has recently had more difficulty navigating stairs, but is looking for to her follow-up appointment with pain management next week  Review of Systems   Constitutional: Negative  HENT: Negative  Eyes: Negative  Respiratory: Positive for cough, shortness of breath and wheezing  Cardiovascular: Negative  Gastrointestinal: Negative  Endocrine: Positive for polydipsia and polyuria  Genitourinary: Negative  Musculoskeletal: Positive for arthralgias  Skin: Negative  Allergic/Immunologic: Negative  Neurological: Negative  Hematological: Negative  Psychiatric/Behavioral: The patient is nervous/anxious  Past Medical History:   Diagnosis Date    Anxiety     Arthritis     Asthma     Back pain     Breast lump     last assessed 10/14/14     Carpal tunnel syndrome 2006    unspecifiedd laterality / last assessed 10/14/14     COPD (chronic obstructive pulmonary disease) ([facility] )     with exacerbation / last assessed 14     Depression     Diabetes mellitus ([facility] )     GERD (gastroesophageal reflux disease)     Hypercholesterolemia     Hyperlipidemia     Hypertension     Insomnia     Low back pain     Lumbosacral disc disease     Nodule of tendon sheath     last assessed 2/5/15     Obesity     Peripheral neuropathy     Type 2 diabetes mellitus with hyperglycemia ([facility] )     last assessed 17     Varicose vein of leg        Past Surgical History:   Procedure Laterality Date    BACK SURGERY      lower fusion    CAUDAL BLOCK N/A 2017    Procedure: BLOCK / INJECTION CAUDAL;  Surgeon: Milena Randall MD;  Location: [place] [address] MAIN OR;  Service: Pain Management     CAUDAL BLOCK N/A 2018    Procedure: Caudal Epidural Steroid Injection (44186);   Surgeon: Milena Randall MD;  Location: [facility] MAIN OR;  Service: Pain Management      SECTION      LAMINECTOMY      Lumbar        Family History   Problem Relation Age of Onset    Diabetes Mother     Dementia Father        Social History     Occupational History     Comment: unemployed Tobacco Use    Smoking status: Current Every Day Smoker     Packs/day: 1 00     Types: Cigarettes    Smokeless tobacco: Never Used    Tobacco comment: tobacco use   Substance and Sexual Activity    Alcohol use: No    Drug use: No    Sexual activity: Not on file         Current Outpatient Medications:     albuterol (VENTOLIN HFA) 90 mcg/act inhaler, Inhale 2 puffs every 4 (four) hours as needed for wheezing, Disp: 18 g, Rfl: 5    atorvastatin (LIPITOR) 80 mg tablet, Take 1 tablet (80 mg total) by mouth daily, Disp: 90 tablet, Rfl: 3    buPROPion (WELLBUTRIN) 100 mg tablet, Take 1 tablet (100 mg total) by mouth 2 (two) times a day, Disp: 60 tablet, Rfl: 5    DULoxetine (CYMBALTA) 30 mg delayed release capsule, Take 1 capsule (30 mg total) by mouth daily, Disp: 90 capsule, Rfl: 3    DULoxetine (CYMBALTA) 60 mg delayed release capsule, Take 1 capsule (60 mg total) by mouth daily, Disp: 90 capsule, Rfl: 3    fluticasone (FLONASE) 50 mcg/act nasal spray, 2 sprays into each nostril daily, Disp: 16 g, Rfl: 3    fluticasone-salmeterol (AIRDUO RESPICLICK) 732-95 mcg/act dry powder inhaler, Inhale 1 puff 2 (two) times a day Rinse mouth after use , Disp: 1 Inhaler, Rfl: 3    glucose blood test strip, Once daily testing, Disp: 100 each, Rfl: 0    Insulin Pen Needle 31G X 5 MM MISC, Inject as directed daily For use w victoza pen, Disp: 30 each, Rfl: 3    liraglutide (VICTOZA) injection, Inject 0 3 mL (1 8 mg total) under the skin daily, Disp: 3 mL, Rfl: 3    lisinopril-hydrochlorothiazide (PRINZIDE,ZESTORETIC) 20-25 MG per tablet, Take 1 tablet by mouth daily, Disp: 90 tablet, Rfl: 3    LYRICA 100 MG capsule, TAKE ONE CAPSULE BY MOUTH EVERY EIGHT HOURS , Disp: 90 capsule, Rfl: 4    metFORMIN (GLUCOPHAGE) 1000 MG tablet, Take 1,000 mg by mouth daily , Disp: , Rfl:     metroNIDAZOLE (METROGEL) 1 % gel, Apply topically daily, Disp: 45 g, Rfl: 0    predniSONE 10 mg tablet, 4 tablets x 3, 3 tablets x 3, 2 tablets x 3, then 1 tablets x 3 , Disp: 30 tablet, Rfl: 0    promethazine-codeine (PHENERGAN WITH CODEINE) 6 25-10 mg/5 mL syrup, Take 5 mL by mouth every 4 (four) hours as needed for cough, Disp: 180 mL, Rfl: 0    QUEtiapine (SEROquel) 25 mg tablet, TAKE 1 TABLET BY MOUTH TWICE A DAY, Disp: 60 tablet, Rfl: 5    ranitidine (ZANTAC) 150 mg tablet, Take 1 tablet (150 mg total) by mouth 2 (two) times a day, Disp: 60 tablet, Rfl: 1    Tapentadol HCl ER (NUCYNTA ER) 50 MG TB12, Take 1 tablet (50 mg total) by mouth every 12 (twelve) hours Earliest Fill Date: 12/26/18 Max Daily Amount: 100 mg, Disp: 60 tablet, Rfl: 0    tiotropium (SPIRIVA HANDIHALER) 18 mcg inhalation capsule, Place 1 capsule (18 mcg total) into inhaler and inhale daily, Disp: 30 capsule, Rfl: 5    No Known Allergies    Objective:  Vitals:    03/01/19 1104   BP: 112/78   Pulse: (!) 110       Body mass index is 42 51 kg/m²  Left Hip Exam     Tenderness   The patient is experiencing no tenderness  Range of Motion   Abduction: 50   Adduction: 30   Extension: 0   Flexion: 110   External rotation: 50   Internal rotation: 20     Muscle Strength   Abduction: 5/5   Adduction: 5/5   Flexion: 5/5     Tests   SHERRELL: negative  Roger: negative    Other   Erythema: absent  Scars: absent  Sensation: normal  Pulse: present    Comments:  Patient does have an equivocal [place] and impingement test  Negative SHERRELL  No internal or external snapping hip  No tenderness palpation over the greater trochanter or IT band  No leg length discrepancy  Ambulates with a slightly antalgic gait on the left  Grossly NVI            Physical Exam   Constitutional: She is oriented to person, place, and time  She appears well-developed and well-nourished  Body mass index is 42 51 kg/m²  HENT:   Head: Normocephalic and atraumatic  Eyes: EOM are normal    Neck: Normal range of motion  Cardiovascular: Intact distal pulses     Pulmonary/Chest: Effort normal  Musculoskeletal:   See ortho exam   Neurological: She is alert and oriented to person, place, and time  Skin: Skin is warm and dry  Psychiatric: She has a normal mood and affect   Her behavior is normal  Judgment and thought content normal  None

## 2023-07-18 NOTE — TELEPHONE ENCOUNTER
Spoke with patient and reviewed lab results    She understood Detail Level: Zone Detail Level: Simple

## 2023-10-27 NOTE — ASSESSMENT & PLAN NOTE
Recent Labs   Lab 10/18/23  0439   WBC 2.82*   RBC 2.66*   HGB 7.8*   HCT 23.3*   PLT 39*   MCV 88   MCH 29.3   MCHC 33.5      -- CBC daily and trend   -- Transfuse for hgb < 7   -- Receives EPO with iHD   · POA AEB tachycardia, tachypnea, fever 103   Multilobar pneumonia present on CT scan   · Completed day 7/7 cefepime/vanco on 10/23  · Concern for aspiration event 10/25 with reintubation  -- Zosyn completed on 10/30 after 5 days  · Trend fever curve, WBC  · Procal downtrending   · Repeat BC x 2 10/26 neg at 48 hours

## 2025-01-23 NOTE — TELEPHONE ENCOUNTER
Unable to get form online   I called and spoke with a rep  All forms must be completed on the site electronically     I would have to enroll for the website and then complete the form June 2024
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
## 2025-03-10 NOTE — ASSESSMENT & PLAN NOTE
Sepsis criteria met on admission due to tachycardia, tachypnea, fever 103  Multilobar pneumonia present on CT scan   · LA 1 0  · COVID/flu/RSV negative  · Given zosyn in the ER  · CXR: "Mild pulmonary venous congestion with trace right effusion"  · CT PE study: "No pulmonary embolism  Dense consolidation in the right lower lobe and medial aspect of the left lower lobe suspicious for multilobar pneumonia"  · Blood cultures pending    Plan:   · Continue cefepime and vanco D4  · MRS nasal surveillance pending  · Sputum culture pending   · Strep pneumo/legionella urine antigen negative    · BC 1/2 with staph coag negative, likely contaminant   · Goal MAP >65  · Trend fever curve, WBC and procal   · PRN Tylenol for fever Render In Strict Bullet Format?: Yes Detail Level: Zone Plan: Follow up in 2 months Initiate Treatment: Rx Hydrocortisone 2.5% cream apply thin film to affected areas on face BID up to 7-10 days , 1 week off repeat as needed.\\nRx Ketoconazole 2% shampoo apply shampoo [id number] to scalp and face let settle 2-3 minutes before rinsing Initiate Treatment: Rx Tretinoin 0.025% cream apply a pea sized amount to face at bedtime. Start every other day and progress to nightly. Render In Strict Bullet Format?: No
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
## (undated) DEVICE — TOWEL SET X-RAY

## (undated) DEVICE — TRAY EPIDURAL PERIFIX 20GA X 3.5IN TUOHY 8ML

## (undated) DEVICE — NEEDLE SPINAL 25G X 3.5 IN QUINCKE

## (undated) DEVICE — WIPES BABY PAMPERS SENSITIVE 36/PK

## (undated) DEVICE — RADIOLOGY STERILE LABELS: Brand: CENTURION

## (undated) DEVICE — SYRINGE 5ML LL

## (undated) DEVICE — NEEDLE BLUNT 18 G X 1 1/2 W FILTER

## (undated) DEVICE — PLASTIC ADHESIVE BANDAGE: Brand: CURITY

## (undated) DEVICE — CHLORAPREP APPLICATOR TINTED 10.5ML ONE-STEP

## (undated) DEVICE — SYRINGE 20ML LL

## (undated) DEVICE — CHLORAPREP HI-LITE 10.5ML ORANGE

## (undated) DEVICE — SMALL NEEDLE COUNTER NEST

## (undated) DEVICE — UNIVERSAL BLOCK TRAY: Brand: INTEGRA®

## (undated) DEVICE — NEEDLE SPINAL 22G X 3.5IN  QUINCKE

## (undated) DEVICE — GLOVE SRG BIOGEL 7.5